# Patient Record
Sex: FEMALE | Race: WHITE | Employment: OTHER | URBAN - METROPOLITAN AREA
[De-identification: names, ages, dates, MRNs, and addresses within clinical notes are randomized per-mention and may not be internally consistent; named-entity substitution may affect disease eponyms.]

---

## 2023-10-09 ENCOUNTER — OFFICE VISIT (OUTPATIENT)
Dept: PHYSICAL MEDICINE AND REHAB | Age: 78
End: 2023-10-09
Payer: MEDICARE

## 2023-10-09 VITALS
HEIGHT: 62 IN | HEART RATE: 82 BPM | WEIGHT: 116 LBS | SYSTOLIC BLOOD PRESSURE: 140 MMHG | DIASTOLIC BLOOD PRESSURE: 80 MMHG | BODY MASS INDEX: 21.35 KG/M2

## 2023-10-09 DIAGNOSIS — R26.89 BALANCE DISORDER: ICD-10-CM

## 2023-10-09 DIAGNOSIS — G44.86 CERVICOGENIC HEADACHE: ICD-10-CM

## 2023-10-09 DIAGNOSIS — R11.0 NAUSEA: ICD-10-CM

## 2023-10-09 DIAGNOSIS — M48.02 CERVICAL STENOSIS OF SPINAL CANAL: ICD-10-CM

## 2023-10-09 DIAGNOSIS — M47.812 SPONDYLOSIS, CERVICAL: Primary | ICD-10-CM

## 2023-10-09 DIAGNOSIS — G25.9 MOVEMENT DISORDER: ICD-10-CM

## 2023-10-09 PROCEDURE — G8420 CALC BMI NORM PARAMETERS: HCPCS | Performed by: PHYSICAL MEDICINE & REHABILITATION

## 2023-10-09 PROCEDURE — G8427 DOCREV CUR MEDS BY ELIG CLIN: HCPCS | Performed by: PHYSICAL MEDICINE & REHABILITATION

## 2023-10-09 PROCEDURE — G8484 FLU IMMUNIZE NO ADMIN: HCPCS | Performed by: PHYSICAL MEDICINE & REHABILITATION

## 2023-10-09 PROCEDURE — 1090F PRES/ABSN URINE INCON ASSESS: CPT | Performed by: PHYSICAL MEDICINE & REHABILITATION

## 2023-10-09 PROCEDURE — 99205 OFFICE O/P NEW HI 60 MIN: CPT | Performed by: PHYSICAL MEDICINE & REHABILITATION

## 2023-10-09 RX ORDER — ASPIRIN 81 MG/1
81 TABLET, CHEWABLE ORAL DAILY
COMMUNITY

## 2023-10-09 RX ORDER — CELECOXIB 50 MG/1
50 CAPSULE ORAL 2 TIMES DAILY
Qty: 60 CAPSULE | Refills: 2 | Status: SHIPPED | OUTPATIENT
Start: 2023-10-09

## 2023-10-09 RX ORDER — LEVOTHYROXINE SODIUM 100 UG/1
1 CAPSULE ORAL DAILY
COMMUNITY
Start: 2023-08-17

## 2023-10-09 NOTE — PROGRESS NOTES
Pam Parada D.O. [facility]  [address], [address] [place] [address]  Phone: [phone]  Fax: [phone]    10/9/2023  Consult requested by: Berna Terrazas DO  [address] [facility],  [address] [facility] for :   Chief Complaint   Patient presents with    Headache     NEW PATIENT  FOR HEADACHES      Primary care: Dr. Porfirio Saenz    An independent historian was not needed. Hand dominance: RIGHT HAND     Onset: suddenly after no known injury 2 months  ago. Patient was referred by Dr. Jennifer Hammer, ortho spine, for neck and head pain starting in the bilateral occipital and superior cervical spine and radiating to bilateral retro-orbital region. Patient is a poor historian. Patient reports she has been having balance issues since 1998 when she was diagnosed with Sjogren's she was evaluated with rheumatology at [facility] Dr. Corrinne Cary. She also reports dizzness with nausea which only occurs when she turns her head. States she follows with [facility] and has \"inner ear damage\". Neurology consultation with Dr. Bryce Carreon is scheduled for 10/29/23. The following records were reviewed in care everywhere from [id number]. Office note Dr. Corrinne Cary impression was Sjogren's. She was seen at the [address] [facility] clinic Dr. Jack Curtis  at [facility] due to T2 abnormalities on cervical spine imaging. She has been seen by [facility] program of vestibular and balance disorders Dr. Charmel Koyanagi. Both Dr. Jack Curtis and Dr. Charmel Koyanagi have documented that Sjogren rather than MSmay define MRI changes in cerivcal spine. Dr. Charmel Koyanagi felt pain was nociceptive rather than cervicogenic or neurocardiogenic. She was referred to Dr. Neva Garcia in the chronic pain rehabilitation program.  C spine was repeated with contrast as well as vetibular testing and LP. She has attempted treatment for BPPV in the past and used Meclizine without success. She had a tilt table test with Dr. Cristian Larios at [facility] and had no postural hypotension.   She had MRI brain which

## 2023-10-27 ENCOUNTER — OFFICE VISIT (OUTPATIENT)
Age: 78
End: 2023-10-27
Payer: MEDICARE

## 2023-10-27 VITALS
BODY MASS INDEX: 21.07 KG/M2 | HEART RATE: 76 BPM | DIASTOLIC BLOOD PRESSURE: 68 MMHG | SYSTOLIC BLOOD PRESSURE: 153 MMHG | WEIGHT: 115.2 LBS

## 2023-10-27 DIAGNOSIS — R26.0 ATAXIC GAIT: ICD-10-CM

## 2023-10-27 DIAGNOSIS — G43.711 INTRACTABLE CHRONIC MIGRAINE WITHOUT AURA AND WITH STATUS MIGRAINOSUS: ICD-10-CM

## 2023-10-27 DIAGNOSIS — G43.809 CERVICOGENIC MIGRAINE: ICD-10-CM

## 2023-10-27 DIAGNOSIS — R25.1: Primary | ICD-10-CM

## 2023-10-27 PROCEDURE — 1123F ACP DISCUSS/DSCN MKR DOCD: CPT | Performed by: PSYCHIATRY & NEUROLOGY

## 2023-10-27 PROCEDURE — 99204 OFFICE O/P NEW MOD 45 MIN: CPT | Performed by: PSYCHIATRY & NEUROLOGY

## 2023-10-27 RX ORDER — DIVALPROEX SODIUM 125 MG/1
125 TABLET, DELAYED RELEASE ORAL 2 TIMES DAILY
Qty: 60 TABLET | Refills: 2 | Status: SHIPPED | OUTPATIENT
Start: 2023-10-27

## 2023-10-27 RX ORDER — METHYLPREDNISOLONE 4 MG/1
TABLET ORAL
Qty: 25 TABLET | Refills: 0 | Status: SHIPPED | OUTPATIENT
Start: 2023-10-27 | End: 2023-11-02

## 2023-10-27 NOTE — PROGRESS NOTES
neck flexion strength  5/5   XII: tongue strength  Normal   Perioral tremors         Muscle tone examination showed : mildly increased  Muscle strength testing revealed  : 5/5 in ue/le  Deep tendon reflexes were : 2+ diffusely  The plantar reflex was Right:  downgoing  Left:  downgoing  There is action tremors in left hand, perioral tremors  There was not dysmetria seen on bilaterally found on finger-nose-finger testing and heel shin testing. Rapid alternating movements were : decreased in hands, left > right   There was no sensory deficits noted    There was not extinction on the bilaterally with bilateral simultaneous stimulus. The gait examination wide based  Truncal ataxia. Gait ataxia,   Skin:  Skin is warm. she is not diaphoretic. Psychiatric: Memory, affect and judgement normal.     ASSESSMENT AND PLAN   1. Perioral tremor  2. Ataxic gait    Patient has perioral tremors, c/o left arm tremors (not seen on exam)  Has truncal ataxia. Has gait ataxia. No involvement of speech, EOM (like nystagmus)  Chronic but progressive  Mri brain- did not reveal any cerebellar atrophy  Will start by working up with reji scan to r/o MSA  Check tilt test to evaluate for autonomic dysfunction with parkinsonism or sjogrens disease     May need blood work up for causes of cerebellar ataxia- based on results of reji scan(cbc, cmp, a1c, tsh, cpk, marcus, tpo ab, kennedi ab, praful ab, celiac, spep, paraneoplastic- hu, yo, ri, nmda ab, lyme, rpr, htlv, b12/folate/copper, ceruloplasmin, ferritin, urine heavy metals, coq10, lactate, pyruvate)   May need emg to evaluate for peripheral nerve involvement  Hold off genetic tests     Will try depakote for headaches, small suggestion of it helping ataxia as well  Consider amantadine in future    - NM BRAIN SPECT; Future  - NM Tilt Table Test    3.  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

## 2023-11-03 ENCOUNTER — TELEPHONE (OUTPATIENT)
Age: 78
End: 2023-11-03

## 2023-11-07 ENCOUNTER — HOSPITAL ENCOUNTER (OUTPATIENT)
Dept: NUCLEAR MEDICINE | Age: 78
Discharge: HOME | End: 2023-11-09
Attending: PSYCHIATRY & NEUROLOGY
Payer: MEDICARE

## 2023-11-07 DIAGNOSIS — R25.1: ICD-10-CM

## 2023-11-07 DIAGNOSIS — R26.0 ATAXIC GAIT: ICD-10-CM

## 2023-11-07 PROCEDURE — 6370000000 HC RX 637 (ALT 250 FOR IP): Performed by: RADIOLOGY

## 2023-11-07 PROCEDURE — 3430000000 HC RX DIAGNOSTIC RADIOPHARMACEUTICAL: Performed by: RADIOLOGY

## 2023-11-07 PROCEDURE — A9584 IODINE I-123 IOFLUPANE: HCPCS | Performed by: RADIOLOGY

## 2023-11-07 PROCEDURE — 78803 RP LOCLZJ TUM SPECT 1 AREA: CPT

## 2023-11-07 RX ORDER — IODINE SOLUTION STRONG 5% (LUGOL'S) 5 %
0.8 SOLUTION ORAL ONCE
Status: COMPLETED | OUTPATIENT
Start: 2023-11-07 | End: 2023-11-07

## 2023-11-07 RX ADMIN — IOFLUPANE I-123 5.5 MILLICURIE: 2 INJECTION, SOLUTION INTRAVENOUS at 08:35

## 2023-11-07 RX ADMIN — IODINE SOLUTION STRONG 5% (LUGOL'S) 0.8 ML: 5 SOLUTION at 07:25

## 2023-12-16 ENCOUNTER — HOSPITAL ENCOUNTER (EMERGENCY)
Age: 78
Discharge: HOME | End: 2023-12-16
Payer: MEDICARE

## 2023-12-16 ENCOUNTER — APPOINTMENT (OUTPATIENT)
Dept: GENERAL RADIOLOGY | Age: 78
End: 2023-12-16
Payer: MEDICARE

## 2023-12-16 VITALS
WEIGHT: 108 LBS | DIASTOLIC BLOOD PRESSURE: 84 MMHG | TEMPERATURE: 97.3 F | SYSTOLIC BLOOD PRESSURE: 140 MMHG | OXYGEN SATURATION: 97 % | RESPIRATION RATE: 20 BRPM | BODY MASS INDEX: 19.75 KG/M2 | HEART RATE: 71 BPM

## 2023-12-16 DIAGNOSIS — J18.9 PNEUMONIA DUE TO INFECTIOUS ORGANISM, UNSPECIFIED LATERALITY, UNSPECIFIED PART OF LUNG: Primary | ICD-10-CM

## 2023-12-16 PROCEDURE — 71046 X-RAY EXAM CHEST 2 VIEWS: CPT

## 2023-12-16 PROCEDURE — 99211 OFF/OP EST MAY X REQ PHY/QHP: CPT

## 2023-12-16 RX ORDER — GUAIFENESIN/DEXTROMETHORPHAN 100-10MG/5
10 SYRUP ORAL 3 TIMES DAILY PRN
Qty: 120 ML | Refills: 0 | Status: SHIPPED | OUTPATIENT
Start: 2023-12-16 | End: 2023-12-26

## 2023-12-16 RX ORDER — CEFUROXIME AXETIL 500 MG/1
500 TABLET ORAL 2 TIMES DAILY
Qty: 20 TABLET | Refills: 0 | Status: SHIPPED | OUTPATIENT
Start: 2023-12-16 | End: 2023-12-26

## 2023-12-16 RX ORDER — AZITHROMYCIN 250 MG/1
TABLET, FILM COATED ORAL
Qty: 6 TABLET | Refills: 0 | Status: SHIPPED | OUTPATIENT
Start: 2023-12-16

## 2023-12-16 ASSESSMENT — PAIN - FUNCTIONAL ASSESSMENT: PAIN_FUNCTIONAL_ASSESSMENT: NONE - DENIES PAIN

## 2024-01-04 ENCOUNTER — TELEPHONE (OUTPATIENT)
Dept: NEUROLOGY | Facility: HOSPITAL | Age: 79
End: 2024-01-04

## 2024-01-08 ENCOUNTER — HOSPITAL ENCOUNTER (OUTPATIENT)
Dept: NEUROLOGY | Facility: CLINIC | Age: 79
Discharge: HOME | End: 2024-01-08
Payer: MEDICARE

## 2024-01-08 DIAGNOSIS — R26.0: ICD-10-CM

## 2024-01-08 DIAGNOSIS — R42 DIZZINESS AND GIDDINESS: ICD-10-CM

## 2024-01-08 PROCEDURE — 95923 AUTONOMIC NRV SYST FUNJ TEST: CPT | Performed by: PSYCHIATRY & NEUROLOGY

## 2024-01-08 PROCEDURE — 95924 ANS PARASYMP & SYMP W/TILT: CPT | Performed by: PSYCHIATRY & NEUROLOGY

## 2024-01-30 ENCOUNTER — OFFICE VISIT (OUTPATIENT)
Age: 79
End: 2024-01-30
Payer: MEDICARE

## 2024-01-30 VITALS
BODY MASS INDEX: 21.56 KG/M2 | WEIGHT: 117.9 LBS | DIASTOLIC BLOOD PRESSURE: 84 MMHG | HEART RATE: 65 BPM | SYSTOLIC BLOOD PRESSURE: 147 MMHG

## 2024-01-30 DIAGNOSIS — G90.9 AUTONOMIC NEUROPATHY: Primary | ICD-10-CM

## 2024-01-30 DIAGNOSIS — G90.9 AUTONOMIC NEUROPATHY: ICD-10-CM

## 2024-01-30 DIAGNOSIS — R20.0 BILATERAL LEG NUMBNESS: ICD-10-CM

## 2024-01-30 DIAGNOSIS — G43.711 INTRACTABLE CHRONIC MIGRAINE WITHOUT AURA AND WITH STATUS MIGRAINOSUS: ICD-10-CM

## 2024-01-30 LAB
C-REACTIVE PROTEIN: <3 MG/L (ref 0–5)
FOLATE: 15.8 NG/ML (ref 4.8–24.2)
HBA1C MFR BLD: 5.9 % (ref 4–5.6)
HOMOCYSTEINE: 10.4 UMOL/L (ref 0–15)
SEDIMENTATION RATE, ERYTHROCYTE: 22 MM/HR (ref 0–20)
T4 FREE: 2 NG/DL (ref 0.9–1.7)
TSH SERPL DL<=0.05 MIU/L-ACNC: 0.04 UIU/ML (ref 0.27–4.2)
VITAMIN B-12: 332 PG/ML (ref 211–946)
VITAMIN D 25-HYDROXY: 29.6 NG/ML (ref 30–100)

## 2024-01-30 PROCEDURE — 1123F ACP DISCUSS/DSCN MKR DOCD: CPT | Performed by: PSYCHIATRY & NEUROLOGY

## 2024-01-30 PROCEDURE — G8427 DOCREV CUR MEDS BY ELIG CLIN: HCPCS | Performed by: PSYCHIATRY & NEUROLOGY

## 2024-01-30 PROCEDURE — 1036F TOBACCO NON-USER: CPT | Performed by: PSYCHIATRY & NEUROLOGY

## 2024-01-30 PROCEDURE — 99215 OFFICE O/P EST HI 40 MIN: CPT | Performed by: PSYCHIATRY & NEUROLOGY

## 2024-01-30 PROCEDURE — 1090F PRES/ABSN URINE INCON ASSESS: CPT | Performed by: PSYCHIATRY & NEUROLOGY

## 2024-01-30 PROCEDURE — G8484 FLU IMMUNIZE NO ADMIN: HCPCS | Performed by: PSYCHIATRY & NEUROLOGY

## 2024-01-30 PROCEDURE — G8400 PT W/DXA NO RESULTS DOC: HCPCS | Performed by: PSYCHIATRY & NEUROLOGY

## 2024-01-30 PROCEDURE — G8420 CALC BMI NORM PARAMETERS: HCPCS | Performed by: PSYCHIATRY & NEUROLOGY

## 2024-01-30 RX ORDER — ERENUMAB-AOOE 70 MG/ML
70 INJECTION SUBCUTANEOUS
Qty: 1 ML | Refills: 2 | Status: SHIPPED | OUTPATIENT
Start: 2024-01-30

## 2024-01-30 RX ORDER — FLUDROCORTISONE ACETATE 0.1 MG/1
0.1 TABLET ORAL DAILY
Qty: 30 TABLET | Refills: 2 | Status: SHIPPED | OUTPATIENT
Start: 2024-01-30

## 2024-01-30 NOTE — PROGRESS NOTES
profile; Future  - Hemoglobin A1C; Future  - TSH; Future  - T4, Free; Future  - Copper, Serum; Future    2. Intractable chronic migraine without aura and with status migrainosus  No benefit with depakote- so d/yen  Cannot use tricyclic antidepressants with severe autonomic dysfunction  Failed inderal with dizziness in the past  Use aimovig 70 mg q 4 weeks.             Jaylene Arroyo MD

## 2024-01-30 NOTE — PATIENT INSTRUCTIONS
Wear thigh high compression when awake    Measure bp in am and at night- everyday. Bring the log every time with appt      Jaylene Arroyo MD

## 2024-01-31 LAB
ANTI RNP AB: NEGATIVE
ANTI-NUCLEAR ANTIBODY (ANA): NEGATIVE
ANTI-SMITH: NEGATIVE
JO-1 ANTIBODY: NEGATIVE
SCLERODERMA (SCL-70) AB: NEGATIVE
SJOGREN'S ANTIBODIES (SSA): POSITIVE
SJOGREN'S ANTIBODIES (SSB): POSITIVE

## 2024-02-01 LAB
ALBUMIN (CALCULATED): 3.3 G/DL (ref 3.5–4.7)
ALPHA-1-GLOBULIN: 0.3 G/DL (ref 0.2–0.4)
ALPHA-2-GLOBULIN: 1.2 G/DL (ref 0.5–1)
BETA GLOBULIN: 1.1 G/DL (ref 0.8–1.3)
GAMMA GLOBULIN: 1.2 G/DL (ref 0.7–1.6)
PATHOLOGIST REVIEW: NORMAL
PATHOLOGIST: ABNORMAL
PROTEIN ELECTROPHORESIS, SERUM: ABNORMAL
SERUM IFX INTERP: NORMAL
TOTAL PROTEIN: 7.1 G/DL (ref 6.4–8.3)

## 2024-02-02 LAB
BORRELIA BURGDORFERI ABS TOTAL: 0.08 IV
COPPER: 120.9 UG/DL (ref 80–155)
THYROID PEROXIDASE (TPO) AB: 33 IU/ML (ref 0–25)

## 2024-02-03 LAB — VITAMIN B6: 29.8 NMOL/L (ref 20–125)

## 2024-02-04 LAB
ANCA IFA PATTERN: NORMAL
ANCA IFA TITER: NORMAL
MYELOPEROXIDASE AB: 0 AU/ML (ref 0–19)
SERINE PROTEASE 3, IGG: 0 AU/ML (ref 0–19)
VITAMIN B1 WHOLE BLOOD: 172 NMOL/L (ref 70–180)

## 2024-02-05 ENCOUNTER — HOSPITAL ENCOUNTER (INPATIENT)
Age: 79
LOS: 14 days | Discharge: NURSING, REHAB OR LONG-TERM CARE | DRG: 233 | End: 2024-02-19
Attending: INTERNAL MEDICINE | Admitting: INTERNAL MEDICINE
Payer: MEDICARE

## 2024-02-05 ENCOUNTER — HOSPITAL ENCOUNTER (EMERGENCY)
Age: 79
Discharge: ANOTHER HOSPITAL | End: 2024-02-05
Attending: EMERGENCY MEDICINE
Payer: MEDICARE

## 2024-02-05 ENCOUNTER — ANESTHESIA EVENT (OUTPATIENT)
Dept: OPERATING ROOM | Age: 79
End: 2024-02-05
Payer: MEDICARE

## 2024-02-05 ENCOUNTER — APPOINTMENT (OUTPATIENT)
Dept: ULTRASOUND IMAGING | Age: 79
DRG: 233 | End: 2024-02-05
Attending: INTERNAL MEDICINE
Payer: MEDICARE

## 2024-02-05 ENCOUNTER — APPOINTMENT (OUTPATIENT)
Dept: INTERVENTIONAL RADIOLOGY/VASCULAR | Age: 79
DRG: 233 | End: 2024-02-05
Attending: INTERNAL MEDICINE
Payer: MEDICARE

## 2024-02-05 ENCOUNTER — APPOINTMENT (OUTPATIENT)
Dept: GENERAL RADIOLOGY | Age: 79
End: 2024-02-05
Payer: MEDICARE

## 2024-02-05 ENCOUNTER — APPOINTMENT (OUTPATIENT)
Age: 79
DRG: 233 | End: 2024-02-05
Attending: INTERNAL MEDICINE
Payer: MEDICARE

## 2024-02-05 VITALS
OXYGEN SATURATION: 94 % | SYSTOLIC BLOOD PRESSURE: 99 MMHG | WEIGHT: 127 LBS | BODY MASS INDEX: 23.23 KG/M2 | TEMPERATURE: 98 F | DIASTOLIC BLOOD PRESSURE: 65 MMHG | RESPIRATION RATE: 20 BRPM | HEART RATE: 80 BPM

## 2024-02-05 DIAGNOSIS — Z95.1 S/P CABG X 3: Primary | ICD-10-CM

## 2024-02-05 DIAGNOSIS — I50.23 ACUTE ON CHRONIC SYSTOLIC HEART FAILURE (HCC): ICD-10-CM

## 2024-02-05 DIAGNOSIS — G89.18 ACUTE POST-OPERATIVE PAIN: ICD-10-CM

## 2024-02-05 DIAGNOSIS — I21.11 ACUTE ST ELEVATION MYOCARDIAL INFARCTION (STEMI) INVOLVING RIGHT CORONARY ARTERY (HCC): Primary | ICD-10-CM

## 2024-02-05 DIAGNOSIS — I21.3 STEMI (ST ELEVATION MYOCARDIAL INFARCTION) (HCC): ICD-10-CM

## 2024-02-05 DIAGNOSIS — I25.10 CAD IN NATIVE ARTERY: ICD-10-CM

## 2024-02-05 DIAGNOSIS — Z59.82 TRANSPORTATION INSECURITY: ICD-10-CM

## 2024-02-05 PROBLEM — Z98.890 S/P CARDIAC CATH: Status: ACTIVE | Noted: 2024-02-05

## 2024-02-05 LAB
ANION GAP SERPL CALCULATED.3IONS-SCNC: 13 MMOL/L (ref 7–16)
B-OH-BUTYR SERPL-MCNC: 0.37 MMOL/L (ref 0.02–0.27)
BACTERIA URNS QL MICRO: ABNORMAL
BASOPHILS # BLD: 0.02 K/UL (ref 0–0.2)
BASOPHILS NFR BLD: 0 % (ref 0–2)
BILIRUB UR QL STRIP: NEGATIVE
BUN SERPL-MCNC: 29 MG/DL (ref 6–23)
CALCIUM SERPL-MCNC: 8.4 MG/DL (ref 8.6–10.2)
CHLORIDE SERPL-SCNC: 94 MMOL/L (ref 98–107)
CLARITY UR: CLEAR
CO2 SERPL-SCNC: 22 MMOL/L (ref 22–29)
COLOR UR: YELLOW
CREAT SERPL-MCNC: 1.2 MG/DL (ref 0.5–1)
ECHO AO ASC DIAM: 3.1 CM
ECHO AO ASCENDING AORTA INDEX: 1.96 CM/M2
ECHO AV AREA PEAK VELOCITY: 1.7 CM2
ECHO AV AREA VTI: 1.9 CM2
ECHO AV AREA/BSA PEAK VELOCITY: 1.1 CM2/M2
ECHO AV AREA/BSA VTI: 1.2 CM2/M2
ECHO AV CUSP MM: 2 CM
ECHO AV MEAN GRADIENT: 4 MMHG
ECHO AV MEAN VELOCITY: 0.9 M/S
ECHO AV PEAK GRADIENT: 7 MMHG
ECHO AV PEAK VELOCITY: 1.3 M/S
ECHO AV VELOCITY RATIO: 0.62
ECHO AV VTI: 23.1 CM
ECHO BSA: 1.59 M2
ECHO EST RA PRESSURE: 3 MMHG
ECHO LA DIAMETER INDEX: 2.09 CM/M2
ECHO LA DIAMETER: 3.3 CM
ECHO LA VOL A-L A2C: 26 ML (ref 22–52)
ECHO LA VOL A-L A4C: 34 ML (ref 22–52)
ECHO LA VOL MOD A2C: 24 ML (ref 22–52)
ECHO LA VOL MOD A4C: 33 ML (ref 22–52)
ECHO LA VOLUME AREA LENGTH: 34 ML
ECHO LA VOLUME INDEX A-L A2C: 16 ML/M2 (ref 16–34)
ECHO LA VOLUME INDEX A-L A4C: 22 ML/M2 (ref 16–34)
ECHO LA VOLUME INDEX AREA LENGTH: 22 ML/M2 (ref 16–34)
ECHO LA VOLUME INDEX MOD A2C: 15 ML/M2 (ref 16–34)
ECHO LA VOLUME INDEX MOD A4C: 21 ML/M2 (ref 16–34)
ECHO LV EJECTION FRACTION A2C: 46 %
ECHO LV EJECTION FRACTION A4C: 42 %
ECHO LV INTERNAL DIMENSION DIASTOLE INDEX: 3.04 CM/M2
ECHO LV INTERNAL DIMENSION DIASTOLIC: 4.8 CM (ref 3.9–5.3)
ECHO LV IVSD: 0.8 CM (ref 0.6–0.9)
ECHO LV MASS 2D: 137.1 G (ref 67–162)
ECHO LV MASS INDEX 2D: 86.8 G/M2 (ref 43–95)
ECHO LV POSTERIOR WALL DIASTOLIC: 0.9 CM (ref 0.6–0.9)
ECHO LV RELATIVE WALL THICKNESS RATIO: 0.38
ECHO LVOT AREA: 2.8 CM2
ECHO LVOT AV VTI INDEX: 0.68
ECHO LVOT DIAM: 1.9 CM
ECHO LVOT MEAN GRADIENT: 2 MMHG
ECHO LVOT PEAK GRADIENT: 3 MMHG
ECHO LVOT PEAK VELOCITY: 0.8 M/S
ECHO LVOT STROKE VOLUME INDEX: 28.2 ML/M2
ECHO LVOT SV: 44.5 ML
ECHO LVOT VTI: 15.7 CM
ECHO MV "A" WAVE DURATION: 107.3 MSEC
ECHO MV A VELOCITY: 0.87 M/S
ECHO MV AREA PHT: 3 CM2
ECHO MV AREA VTI: 1.7 CM2
ECHO MV E DECELERATION TIME (DT): 168 MS
ECHO MV E VELOCITY: 0.73 M/S
ECHO MV E/A RATIO: 0.84
ECHO MV LVOT VTI INDEX: 1.62
ECHO MV MAX VELOCITY: 1 M/S
ECHO MV MEAN GRADIENT: 1 MMHG
ECHO MV MEAN VELOCITY: 0.6 M/S
ECHO MV PEAK GRADIENT: 4 MMHG
ECHO MV PRESSURE HALF TIME (PHT): 73.3 MS
ECHO MV VTI: 25.5 CM
ECHO PULMONARY ARTERY END DIASTOLIC PRESSURE: 5 MMHG
ECHO PV MAX VELOCITY: 0.9 M/S
ECHO PV MEAN GRADIENT: 2 MMHG
ECHO PV MEAN VELOCITY: 0.6 M/S
ECHO PV PEAK GRADIENT: 3 MMHG
ECHO PV REGURGITANT MAX VELOCITY: 1.1 M/S
ECHO PV VTI: 12.8 CM
ECHO RIGHT VENTRICULAR SYSTOLIC PRESSURE (RVSP): 27 MMHG
ECHO RV INTERNAL DIMENSION: 3 CM
ECHO TV REGURGITANT MAX VELOCITY: 2.47 M/S
ECHO TV REGURGITANT PEAK GRADIENT: 24 MMHG
EKG ATRIAL RATE: 70 BPM
EKG P AXIS: 52 DEGREES
EKG P-R INTERVAL: 132 MS
EKG Q-T INTERVAL: 450 MS
EKG QRS DURATION: 84 MS
EKG QTC CALCULATION (BAZETT): 486 MS
EKG R AXIS: -32 DEGREES
EKG T AXIS: 98 DEGREES
EKG VENTRICULAR RATE: 70 BPM
EOSINOPHIL # BLD: 0.07 K/UL (ref 0.05–0.5)
EOSINOPHILS RELATIVE PERCENT: 1 % (ref 0–6)
ERYTHROCYTE [DISTWIDTH] IN BLOOD BY AUTOMATED COUNT: 15 % (ref 11.5–15)
ERYTHROCYTE [DISTWIDTH] IN BLOOD BY AUTOMATED COUNT: 15.1 % (ref 11.5–15)
GFR SERPL CREATININE-BSD FRML MDRD: 46 ML/MIN/1.73M2
GLUCOSE BLD-MCNC: 101 MG/DL (ref 74–99)
GLUCOSE SERPL-MCNC: 103 MG/DL (ref 74–99)
GLUCOSE UR STRIP-MCNC: NEGATIVE MG/DL
HCT VFR BLD AUTO: 34.9 % (ref 34–48)
HCT VFR BLD AUTO: 35.2 % (ref 34–48)
HGB BLD-MCNC: 11.1 G/DL (ref 11.5–15.5)
HGB BLD-MCNC: 11.3 G/DL (ref 11.5–15.5)
HGB UR QL STRIP.AUTO: NEGATIVE
IMM GRANULOCYTES # BLD AUTO: 0.11 K/UL (ref 0–0.58)
IMM GRANULOCYTES NFR BLD: 1 % (ref 0–5)
KETONES UR STRIP-MCNC: NEGATIVE MG/DL
LEUKOCYTE ESTERASE UR QL STRIP: ABNORMAL
LYMPHOCYTES NFR BLD: 1.91 K/UL (ref 1.5–4)
LYMPHOCYTES RELATIVE PERCENT: 21 % (ref 20–42)
MCH RBC QN AUTO: 28.5 PG (ref 26–35)
MCH RBC QN AUTO: 28.8 PG (ref 26–35)
MCHC RBC AUTO-ENTMCNC: 31.8 G/DL (ref 32–34.5)
MCHC RBC AUTO-ENTMCNC: 32.1 G/DL (ref 32–34.5)
MCV RBC AUTO: 88.9 FL (ref 80–99.9)
MCV RBC AUTO: 90.6 FL (ref 80–99.9)
MONOCYTES NFR BLD: 0.9 K/UL (ref 0.1–0.95)
MONOCYTES NFR BLD: 10 % (ref 2–12)
NEUTROPHILS NFR BLD: 67 % (ref 43–80)
NEUTS SEG NFR BLD: 6.17 K/UL (ref 1.8–7.3)
NITRITE UR QL STRIP: NEGATIVE
PARTIAL THROMBOPLASTIN TIME: 27.7 SEC (ref 24.5–35.1)
PARTIAL THROMBOPLASTIN TIME: 73.5 SEC (ref 24.5–35.1)
PH UR STRIP: 6 [PH] (ref 5–9)
PH VENOUS: 7.34 (ref 7.35–7.45)
PLATELET # BLD AUTO: 247 K/UL (ref 130–450)
PLATELET # BLD AUTO: 284 K/UL (ref 130–450)
PMV BLD AUTO: 10.1 FL (ref 7–12)
PMV BLD AUTO: 10.6 FL (ref 7–12)
POTASSIUM SERPL-SCNC: 3.7 MMOL/L (ref 3.5–5)
PROT UR STRIP-MCNC: NEGATIVE MG/DL
RBC # BLD AUTO: 3.85 M/UL (ref 3.5–5.5)
RBC # BLD AUTO: 3.96 M/UL (ref 3.5–5.5)
RBC #/AREA URNS HPF: ABNORMAL /HPF
SODIUM SERPL-SCNC: 129 MMOL/L (ref 132–146)
SP GR UR STRIP: <1.005 (ref 1–1.03)
TROPONIN I SERPL HS-MCNC: 5020 NG/L (ref 0–9)
UROBILINOGEN UR STRIP-ACNC: 0.2 EU/DL (ref 0–1)
WBC #/AREA URNS HPF: ABNORMAL /HPF
WBC OTHER # BLD: 10.1 K/UL (ref 4.5–11.5)
WBC OTHER # BLD: 9.2 K/UL (ref 4.5–11.5)

## 2024-02-05 PROCEDURE — 2709999900 HC NON-CHARGEABLE SUPPLY: Performed by: INTERNAL MEDICINE

## 2024-02-05 PROCEDURE — C1894 INTRO/SHEATH, NON-LASER: HCPCS | Performed by: INTERNAL MEDICINE

## 2024-02-05 PROCEDURE — 2140000000 HC CCU INTERMEDIATE R&B

## 2024-02-05 PROCEDURE — 96365 THER/PROPH/DIAG IV INF INIT: CPT

## 2024-02-05 PROCEDURE — 2580000003 HC RX 258: Performed by: INTERNAL MEDICINE

## 2024-02-05 PROCEDURE — 93922 UPR/L XTREMITY ART 2 LEVELS: CPT | Performed by: SURGERY

## 2024-02-05 PROCEDURE — 2500000003 HC RX 250 WO HCPCS: Performed by: INTERNAL MEDICINE

## 2024-02-05 PROCEDURE — 99223 1ST HOSP IP/OBS HIGH 75: CPT | Performed by: INTERNAL MEDICINE

## 2024-02-05 PROCEDURE — 99222 1ST HOSP IP/OBS MODERATE 55: CPT | Performed by: STUDENT IN AN ORGANIZED HEALTH CARE EDUCATION/TRAINING PROGRAM

## 2024-02-05 PROCEDURE — 85730 THROMBOPLASTIN TIME PARTIAL: CPT

## 2024-02-05 PROCEDURE — 93005 ELECTROCARDIOGRAM TRACING: CPT

## 2024-02-05 PROCEDURE — 81001 URINALYSIS AUTO W/SCOPE: CPT

## 2024-02-05 PROCEDURE — B2151ZZ FLUOROSCOPY OF LEFT HEART USING LOW OSMOLAR CONTRAST: ICD-10-PCS | Performed by: INTERNAL MEDICINE

## 2024-02-05 PROCEDURE — 84484 ASSAY OF TROPONIN QUANT: CPT

## 2024-02-05 PROCEDURE — 82800 BLOOD PH: CPT

## 2024-02-05 PROCEDURE — 2580000003 HC RX 258

## 2024-02-05 PROCEDURE — 6370000000 HC RX 637 (ALT 250 FOR IP): Performed by: EMERGENCY MEDICINE

## 2024-02-05 PROCEDURE — 80048 BASIC METABOLIC PNL TOTAL CA: CPT

## 2024-02-05 PROCEDURE — 93010 ELECTROCARDIOGRAM REPORT: CPT | Performed by: INTERNAL MEDICINE

## 2024-02-05 PROCEDURE — 86900 BLOOD TYPING SEROLOGIC ABO: CPT

## 2024-02-05 PROCEDURE — 87086 URINE CULTURE/COLONY COUNT: CPT

## 2024-02-05 PROCEDURE — 82010 KETONE BODYS QUAN: CPT

## 2024-02-05 PROCEDURE — 6370000000 HC RX 637 (ALT 250 FOR IP): Performed by: INTERNAL MEDICINE

## 2024-02-05 PROCEDURE — 85027 COMPLETE CBC AUTOMATED: CPT

## 2024-02-05 PROCEDURE — 82962 GLUCOSE BLOOD TEST: CPT

## 2024-02-05 PROCEDURE — 93306 TTE W/DOPPLER COMPLETE: CPT | Performed by: INTERNAL MEDICINE

## 2024-02-05 PROCEDURE — 87081 CULTURE SCREEN ONLY: CPT

## 2024-02-05 PROCEDURE — 86850 RBC ANTIBODY SCREEN: CPT

## 2024-02-05 PROCEDURE — 93458 L HRT ARTERY/VENTRICLE ANGIO: CPT | Performed by: INTERNAL MEDICINE

## 2024-02-05 PROCEDURE — 6360000004 HC RX CONTRAST MEDICATION: Performed by: INTERNAL MEDICINE

## 2024-02-05 PROCEDURE — 85025 COMPLETE CBC W/AUTO DIFF WBC: CPT

## 2024-02-05 PROCEDURE — 93005 ELECTROCARDIOGRAM TRACING: CPT | Performed by: INTERNAL MEDICINE

## 2024-02-05 PROCEDURE — 86901 BLOOD TYPING SEROLOGIC RH(D): CPT

## 2024-02-05 PROCEDURE — 6360000002 HC RX W HCPCS: Performed by: INTERNAL MEDICINE

## 2024-02-05 PROCEDURE — 36415 COLL VENOUS BLD VENIPUNCTURE: CPT

## 2024-02-05 PROCEDURE — B2111ZZ FLUOROSCOPY OF MULTIPLE CORONARY ARTERIES USING LOW OSMOLAR CONTRAST: ICD-10-PCS | Performed by: INTERNAL MEDICINE

## 2024-02-05 PROCEDURE — 86923 COMPATIBILITY TEST ELECTRIC: CPT

## 2024-02-05 PROCEDURE — C1769 GUIDE WIRE: HCPCS | Performed by: INTERNAL MEDICINE

## 2024-02-05 PROCEDURE — 93306 TTE W/DOPPLER COMPLETE: CPT

## 2024-02-05 PROCEDURE — 99285 EMERGENCY DEPT VISIT HI MDM: CPT

## 2024-02-05 PROCEDURE — 93880 EXTRACRANIAL BILAT STUDY: CPT

## 2024-02-05 PROCEDURE — 4A023N7 MEASUREMENT OF CARDIAC SAMPLING AND PRESSURE, LEFT HEART, PERCUTANEOUS APPROACH: ICD-10-PCS | Performed by: INTERNAL MEDICINE

## 2024-02-05 PROCEDURE — 96376 TX/PRO/DX INJ SAME DRUG ADON: CPT

## 2024-02-05 PROCEDURE — 7100000010 HC PHASE II RECOVERY - FIRST 15 MIN: Performed by: INTERNAL MEDICINE

## 2024-02-05 PROCEDURE — 6360000002 HC RX W HCPCS

## 2024-02-05 PROCEDURE — 93922 UPR/L XTREMITY ART 2 LEVELS: CPT

## 2024-02-05 RX ORDER — KETOROLAC TROMETHAMINE 15 MG/ML
15 INJECTION, SOLUTION INTRAMUSCULAR; INTRAVENOUS ONCE
Status: DISCONTINUED | OUTPATIENT
Start: 2024-02-05 | End: 2024-02-05

## 2024-02-05 RX ORDER — 0.9 % SODIUM CHLORIDE 0.9 %
500 INTRAVENOUS SOLUTION INTRAVENOUS ONCE
Status: COMPLETED | OUTPATIENT
Start: 2024-02-05 | End: 2024-02-05

## 2024-02-05 RX ORDER — SODIUM CHLORIDE 9 MG/ML
INJECTION, SOLUTION INTRAVENOUS PRN
Status: DISCONTINUED | OUTPATIENT
Start: 2024-02-05 | End: 2024-02-07

## 2024-02-05 RX ORDER — HEPARIN SODIUM 1000 [USP'U]/ML
30 INJECTION, SOLUTION INTRAVENOUS; SUBCUTANEOUS PRN
Status: DISCONTINUED | OUTPATIENT
Start: 2024-02-05 | End: 2024-02-05 | Stop reason: HOSPADM

## 2024-02-05 RX ORDER — ASPIRIN 81 MG/1
324 TABLET, CHEWABLE ORAL ONCE
Status: COMPLETED | OUTPATIENT
Start: 2024-02-05 | End: 2024-02-05

## 2024-02-05 RX ORDER — FENTANYL CITRATE 50 UG/ML
INJECTION, SOLUTION INTRAMUSCULAR; INTRAVENOUS PRN
Status: DISCONTINUED | OUTPATIENT
Start: 2024-02-05 | End: 2024-02-05 | Stop reason: HOSPADM

## 2024-02-05 RX ORDER — HEPARIN SODIUM 1000 [USP'U]/ML
60 INJECTION, SOLUTION INTRAVENOUS; SUBCUTANEOUS PRN
Status: DISCONTINUED | OUTPATIENT
Start: 2024-02-05 | End: 2024-02-05 | Stop reason: HOSPADM

## 2024-02-05 RX ORDER — HEPARIN SODIUM 1000 [USP'U]/ML
60 INJECTION, SOLUTION INTRAVENOUS; SUBCUTANEOUS PRN
Status: DISCONTINUED | OUTPATIENT
Start: 2024-02-05 | End: 2024-02-07

## 2024-02-05 RX ORDER — HEPARIN SODIUM 1000 [USP'U]/ML
30 INJECTION, SOLUTION INTRAVENOUS; SUBCUTANEOUS PRN
Status: DISCONTINUED | OUTPATIENT
Start: 2024-02-05 | End: 2024-02-07

## 2024-02-05 RX ORDER — HEPARIN SODIUM 10000 [USP'U]/100ML
5-30 INJECTION, SOLUTION INTRAVENOUS CONTINUOUS
Status: DISCONTINUED | OUTPATIENT
Start: 2024-02-05 | End: 2024-02-07

## 2024-02-05 RX ORDER — VERAPAMIL HYDROCHLORIDE 2.5 MG/ML
INJECTION, SOLUTION INTRAVENOUS PRN
Status: DISCONTINUED | OUTPATIENT
Start: 2024-02-05 | End: 2024-02-05 | Stop reason: HOSPADM

## 2024-02-05 RX ORDER — ACETAMINOPHEN 325 MG/1
650 TABLET ORAL EVERY 4 HOURS PRN
Status: DISCONTINUED | OUTPATIENT
Start: 2024-02-05 | End: 2024-02-07

## 2024-02-05 RX ORDER — HEPARIN SODIUM 1000 [USP'U]/ML
60 INJECTION, SOLUTION INTRAVENOUS; SUBCUTANEOUS ONCE
Status: COMPLETED | OUTPATIENT
Start: 2024-02-05 | End: 2024-02-05

## 2024-02-05 RX ORDER — SODIUM CHLORIDE 0.9 % (FLUSH) 0.9 %
5-40 SYRINGE (ML) INJECTION EVERY 12 HOURS SCHEDULED
Status: DISCONTINUED | OUTPATIENT
Start: 2024-02-05 | End: 2024-02-07

## 2024-02-05 RX ORDER — NITROGLYCERIN 20 MG/100ML
INJECTION INTRAVENOUS PRN
Status: DISCONTINUED | OUTPATIENT
Start: 2024-02-05 | End: 2024-02-05 | Stop reason: HOSPADM

## 2024-02-05 RX ORDER — ASPIRIN 81 MG/1
81 TABLET, CHEWABLE ORAL DAILY
Status: DISCONTINUED | OUTPATIENT
Start: 2024-02-06 | End: 2024-02-07

## 2024-02-05 RX ORDER — ATORVASTATIN CALCIUM 40 MG/1
80 TABLET, FILM COATED ORAL NIGHTLY
Status: DISCONTINUED | OUTPATIENT
Start: 2024-02-05 | End: 2024-02-07

## 2024-02-05 RX ORDER — METOPROLOL SUCCINATE 25 MG/1
25 TABLET, EXTENDED RELEASE ORAL DAILY
Status: DISCONTINUED | OUTPATIENT
Start: 2024-02-06 | End: 2024-02-06

## 2024-02-05 RX ORDER — SODIUM CHLORIDE 0.9 % (FLUSH) 0.9 %
5-40 SYRINGE (ML) INJECTION PRN
Status: DISCONTINUED | OUTPATIENT
Start: 2024-02-05 | End: 2024-02-07

## 2024-02-05 RX ORDER — HEPARIN SODIUM 10000 [USP'U]/100ML
5-30 INJECTION, SOLUTION INTRAVENOUS CONTINUOUS
Status: DISCONTINUED | OUTPATIENT
Start: 2024-02-05 | End: 2024-02-05 | Stop reason: HOSPADM

## 2024-02-05 RX ORDER — HEPARIN SODIUM 10000 [USP'U]/ML
INJECTION, SOLUTION INTRAVENOUS; SUBCUTANEOUS PRN
Status: DISCONTINUED | OUTPATIENT
Start: 2024-02-05 | End: 2024-02-05 | Stop reason: HOSPADM

## 2024-02-05 RX ORDER — MIDAZOLAM HYDROCHLORIDE 1 MG/ML
INJECTION INTRAMUSCULAR; INTRAVENOUS PRN
Status: DISCONTINUED | OUTPATIENT
Start: 2024-02-05 | End: 2024-02-05 | Stop reason: HOSPADM

## 2024-02-05 RX ADMIN — SODIUM CHLORIDE 500 ML: 9 INJECTION, SOLUTION INTRAVENOUS at 09:50

## 2024-02-05 RX ADMIN — HEPARIN SODIUM 3460 UNITS: 1000 INJECTION INTRAVENOUS; SUBCUTANEOUS at 10:10

## 2024-02-05 RX ADMIN — ASPIRIN 81 MG CHEWABLE TABLET 324 MG: 81 TABLET CHEWABLE at 09:54

## 2024-02-05 RX ADMIN — HEPARIN SODIUM AND DEXTROSE 12 UNITS/KG/HR: 10000; 5 INJECTION INTRAVENOUS at 10:12

## 2024-02-05 RX ADMIN — HEPARIN SODIUM 12 UNITS/KG/HR: 10000 INJECTION, SOLUTION INTRAVENOUS at 11:58

## 2024-02-05 RX ADMIN — SODIUM CHLORIDE, PRESERVATIVE FREE 10 ML: 5 INJECTION INTRAVENOUS at 20:07

## 2024-02-05 RX ADMIN — ATORVASTATIN CALCIUM 80 MG: 80 TABLET, FILM COATED ORAL at 20:07

## 2024-02-05 SDOH — ECONOMIC STABILITY - TRANSPORTATION SECURITY: TRANSPORTATION INSECURITY: Z59.82

## 2024-02-05 NOTE — CONSULTS
CTS Consult    Patient name: Katt Diaz    Reason for consult: MVD    Referring Physician: Arnav Shaw    Primary Care Physician: Luis Angel Rea DO    Date of service: 2/5/2024    Chief Complaint: chest pressure    HPI: 77yo F with PMH Sjogrens, DM presented to OSH ED 2/5/2024 with chest pressure. Her troponins were markedly elevated. She was transferred for emergent LHC. LHC demonstrated severe MV CAD. CTS was consulted for recommendations.   Currently, she denies complaints. She is overwhelmed by the situation.    Allergies:   Allergies   Allergen Reactions    Doxycycline      hives, rash    Penicillins      hives       Home medications:    Current Facility-Administered Medications   Medication Dose Route Frequency Provider Last Rate Last Admin    sodium chloride flush 0.9 % injection 5-40 mL  5-40 mL IntraVENous 2 times per day Arnav Shaw MD        sodium chloride flush 0.9 % injection 5-40 mL  5-40 mL IntraVENous PRN Arnav Shaw MD        0.9 % sodium chloride infusion   IntraVENous PRN Arnav Shaw MD        acetaminophen (TYLENOL) tablet 650 mg  650 mg Oral Q4H PRN Arnav Shaw MD        atorvastatin (LIPITOR) tablet 80 mg  80 mg Oral Nightly Arnav Shaw MD        [START ON 2/6/2024] aspirin chewable tablet 81 mg  81 mg Oral Daily Arnav Shaw MD        heparin (porcine) injection 3,460 Units  60 Units/kg IntraVENous PRN Arnav Shaw MD        heparin (porcine) injection 1,730 Units  30 Units/kg IntraVENous PRN Arnav Shaw MD        heparin 25,000 units in dextrose 5% 250 mL (premix) infusion  5-30 Units/kg/hr IntraVENous Continuous Arnav Shaw MD 6.9 mL/hr at 02/05/24 1158 12 Units/kg/hr at 02/05/24 1158       Past Medical History:  Past Medical History:   Diagnosis Date    Diabetes mellitus (HCC)     Headache     Hearing loss     Hx of rheumatoid arthritis     Migraine     Osteopenia     Sjogren's disease (HCC)        Past Surgical History:  Past Surgical History:   Procedure  Minocycline Pregnancy And Lactation Text: This medication is Pregnancy Category D and not consider safe during pregnancy. It is also excreted in breast milk.

## 2024-02-05 NOTE — ED PROVIDER NOTES
78-year-old female with past medical history of rheumatoid arthritis,Sjogrens is on chronic steroid therapy.  She adamantly denies any history of diabetes nor has never been on any diabetic medication.  She states her blood pressure runs low and is being followed by her rheumatoid doctor.  Otherwise no other pertinent medical history.  She comes emergency department for the complaints of chest pain, shortness of breath and generalized weakness for the past 2 weeks.  Her symptoms are worsened with exertion.  She describes her chest pain as a sharp-like sensation across her chest without any radiation.  No other aggravating alleviating factors noted.  She denies the following: Abdominal pain, nausea, vomiting, urinary symptoms, abdominal pain patient did report she had 2 episodes of loose watery stools but otherwise no consistent episodes of diarrhea no GI bleed no rashes no headache no vision changes.      Chief Complaint   Patient presents with    Shortness of Breath     Weakness sent by dr ivan for eval       Review of Systems   Pertinent in HPI above  Physical Exam   Vitals and nursing note reviewed.   Constitutional:       General: She is not in acute distress.     Appearance: Normal appearance. She is normal weight. She is not ill-appearing or toxic-appearing.   HENT:      Head: Normocephalic and atraumatic.      Nose: Nose normal. No congestion.      Mouth/Throat:      Mouth: Mucous membranes are moist.      Pharynx: Oropharynx is clear.   Eyes:      General: No scleral icterus.     Extraocular Movements: Extraocular movements intact.      Conjunctiva/sclera: Conjunctivae normal.   Cardiovascular:      Rate and Rhythm: Normal rate and regular rhythm.      Pulses: Normal pulses.      Heart sounds: No murmur heard.  Pulmonary:      Effort: Pulmonary effort is normal. No respiratory distress.   Abdominal:      General: Abdomen is flat. There is no distension.      Palpations: Abdomen is soft.   Musculoskeletal:

## 2024-02-06 ENCOUNTER — APPOINTMENT (OUTPATIENT)
Dept: CT IMAGING | Age: 79
DRG: 233 | End: 2024-02-06
Attending: INTERNAL MEDICINE
Payer: MEDICARE

## 2024-02-06 ENCOUNTER — APPOINTMENT (OUTPATIENT)
Dept: ULTRASOUND IMAGING | Age: 79
DRG: 233 | End: 2024-02-06
Attending: INTERNAL MEDICINE
Payer: MEDICARE

## 2024-02-06 LAB
ALBUMIN SERPL-MCNC: 2.7 G/DL (ref 3.5–5.2)
ALP SERPL-CCNC: 89 U/L (ref 35–104)
ALT SERPL-CCNC: 32 U/L (ref 0–32)
ANION GAP SERPL CALCULATED.3IONS-SCNC: 11 MMOL/L (ref 7–16)
ANION GAP SERPL CALCULATED.3IONS-SCNC: 11 MMOL/L (ref 7–16)
AST SERPL-CCNC: 50 U/L (ref 0–31)
BASOPHILS # BLD: 0.05 K/UL (ref 0–0.2)
BASOPHILS NFR BLD: 1 % (ref 0–2)
BILIRUB DIRECT SERPL-MCNC: <0.2 MG/DL (ref 0–0.3)
BILIRUB INDIRECT SERPL-MCNC: ABNORMAL MG/DL (ref 0–1)
BILIRUB SERPL-MCNC: 0.4 MG/DL (ref 0–1.2)
BUN SERPL-MCNC: 22 MG/DL (ref 6–23)
BUN SERPL-MCNC: 23 MG/DL (ref 6–23)
CALCIUM SERPL-MCNC: 7.9 MG/DL (ref 8.6–10.2)
CALCIUM SERPL-MCNC: 8.2 MG/DL (ref 8.6–10.2)
CHLORIDE SERPL-SCNC: 100 MMOL/L (ref 98–107)
CHLORIDE SERPL-SCNC: 100 MMOL/L (ref 98–107)
CO2 SERPL-SCNC: 19 MMOL/L (ref 22–29)
CO2 SERPL-SCNC: 20 MMOL/L (ref 22–29)
CREAT SERPL-MCNC: 1 MG/DL (ref 0.5–1)
CREAT SERPL-MCNC: 1 MG/DL (ref 0.5–1)
EKG ATRIAL RATE: 79 BPM
EKG P AXIS: 47 DEGREES
EKG P-R INTERVAL: 96 MS
EKG Q-T INTERVAL: 366 MS
EKG QRS DURATION: 72 MS
EKG QTC CALCULATION (BAZETT): 419 MS
EKG R AXIS: -52 DEGREES
EKG T AXIS: 103 DEGREES
EKG VENTRICULAR RATE: 79 BPM
EOSINOPHIL # BLD: 0.13 K/UL (ref 0.05–0.5)
EOSINOPHILS RELATIVE PERCENT: 1 % (ref 0–6)
ERYTHROCYTE [DISTWIDTH] IN BLOOD BY AUTOMATED COUNT: 14.8 % (ref 11.5–15)
ERYTHROCYTE [DISTWIDTH] IN BLOOD BY AUTOMATED COUNT: 15 % (ref 11.5–15)
GFR SERPL CREATININE-BSD FRML MDRD: 55 ML/MIN/1.73M2
GFR SERPL CREATININE-BSD FRML MDRD: 58 ML/MIN/1.73M2
GLUCOSE SERPL-MCNC: 96 MG/DL (ref 74–99)
GLUCOSE SERPL-MCNC: 99 MG/DL (ref 74–99)
HCT VFR BLD AUTO: 28.7 % (ref 34–48)
HCT VFR BLD AUTO: 31.2 % (ref 34–48)
HGB BLD-MCNC: 9.3 G/DL (ref 11.5–15.5)
HGB BLD-MCNC: 9.9 G/DL (ref 11.5–15.5)
IMM GRANULOCYTES # BLD AUTO: 0.09 K/UL (ref 0–0.58)
IMM GRANULOCYTES NFR BLD: 1 % (ref 0–5)
INR PPP: 1.2
LYMPHOCYTES NFR BLD: 2.63 K/UL (ref 1.5–4)
LYMPHOCYTES RELATIVE PERCENT: 26 % (ref 20–42)
MCH RBC QN AUTO: 28.6 PG (ref 26–35)
MCH RBC QN AUTO: 28.7 PG (ref 26–35)
MCHC RBC AUTO-ENTMCNC: 31.7 G/DL (ref 32–34.5)
MCHC RBC AUTO-ENTMCNC: 32.4 G/DL (ref 32–34.5)
MCV RBC AUTO: 88.6 FL (ref 80–99.9)
MCV RBC AUTO: 90.2 FL (ref 80–99.9)
MICROORGANISM SPEC CULT: NO GROWTH
MONOCYTES NFR BLD: 0.85 K/UL (ref 0.1–0.95)
MONOCYTES NFR BLD: 8 % (ref 2–12)
NEUTROPHILS NFR BLD: 63 % (ref 43–80)
NEUTS SEG NFR BLD: 6.51 K/UL (ref 1.8–7.3)
PARTIAL THROMBOPLASTIN TIME: 39.5 SEC (ref 24.5–35.1)
PARTIAL THROMBOPLASTIN TIME: 45.9 SEC (ref 24.5–35.1)
PARTIAL THROMBOPLASTIN TIME: 50.9 SEC (ref 24.5–35.1)
PARTIAL THROMBOPLASTIN TIME: 63.1 SEC (ref 24.5–35.1)
PARTIAL THROMBOPLASTIN TIME: 69.7 SEC (ref 24.5–35.1)
PLATELET # BLD AUTO: 260 K/UL (ref 130–450)
PLATELET # BLD AUTO: 285 K/UL (ref 130–450)
PMV BLD AUTO: 10.6 FL (ref 7–12)
PMV BLD AUTO: 10.6 FL (ref 7–12)
POTASSIUM SERPL-SCNC: 3.2 MMOL/L (ref 3.5–5)
POTASSIUM SERPL-SCNC: 3.4 MMOL/L (ref 3.5–5)
PROT SERPL-MCNC: 6 G/DL (ref 6.4–8.3)
PROTHROMBIN TIME: 13.4 SEC (ref 9.3–12.4)
RBC # BLD AUTO: 3.24 M/UL (ref 3.5–5.5)
RBC # BLD AUTO: 3.46 M/UL (ref 3.5–5.5)
SODIUM SERPL-SCNC: 130 MMOL/L (ref 132–146)
SODIUM SERPL-SCNC: 131 MMOL/L (ref 132–146)
SPECIMEN DESCRIPTION: NORMAL
WBC OTHER # BLD: 10.3 K/UL (ref 4.5–11.5)
WBC OTHER # BLD: 9.9 K/UL (ref 4.5–11.5)

## 2024-02-06 PROCEDURE — 2140000000 HC CCU INTERMEDIATE R&B

## 2024-02-06 PROCEDURE — 80048 BASIC METABOLIC PNL TOTAL CA: CPT

## 2024-02-06 PROCEDURE — 85027 COMPLETE CBC AUTOMATED: CPT

## 2024-02-06 PROCEDURE — 93970 EXTREMITY STUDY: CPT

## 2024-02-06 PROCEDURE — 99232 SBSQ HOSP IP/OBS MODERATE 35: CPT | Performed by: STUDENT IN AN ORGANIZED HEALTH CARE EDUCATION/TRAINING PROGRAM

## 2024-02-06 PROCEDURE — 93010 ELECTROCARDIOGRAM REPORT: CPT | Performed by: INTERNAL MEDICINE

## 2024-02-06 PROCEDURE — 6370000000 HC RX 637 (ALT 250 FOR IP): Performed by: PHYSICIAN ASSISTANT

## 2024-02-06 PROCEDURE — 85025 COMPLETE CBC W/AUTO DIFF WBC: CPT

## 2024-02-06 PROCEDURE — 85610 PROTHROMBIN TIME: CPT

## 2024-02-06 PROCEDURE — 6370000000 HC RX 637 (ALT 250 FOR IP): Performed by: INTERNAL MEDICINE

## 2024-02-06 PROCEDURE — 6360000002 HC RX W HCPCS: Performed by: PHYSICIAN ASSISTANT

## 2024-02-06 PROCEDURE — 85730 THROMBOPLASTIN TIME PARTIAL: CPT

## 2024-02-06 PROCEDURE — 36415 COLL VENOUS BLD VENIPUNCTURE: CPT

## 2024-02-06 PROCEDURE — 71250 CT THORAX DX C-: CPT

## 2024-02-06 PROCEDURE — 94375 RESPIRATORY FLOW VOLUME LOOP: CPT

## 2024-02-06 PROCEDURE — 94729 DIFFUSING CAPACITY: CPT

## 2024-02-06 PROCEDURE — 2580000003 HC RX 258: Performed by: INTERNAL MEDICINE

## 2024-02-06 PROCEDURE — 99233 SBSQ HOSP IP/OBS HIGH 50: CPT | Performed by: INTERNAL MEDICINE

## 2024-02-06 PROCEDURE — 82248 BILIRUBIN DIRECT: CPT

## 2024-02-06 PROCEDURE — 6360000002 HC RX W HCPCS: Performed by: INTERNAL MEDICINE

## 2024-02-06 PROCEDURE — 80053 COMPREHEN METABOLIC PANEL: CPT

## 2024-02-06 RX ORDER — CHLORHEXIDINE GLUCONATE 4 G/100ML
SOLUTION TOPICAL SEE ADMIN INSTRUCTIONS
Status: DISCONTINUED | OUTPATIENT
Start: 2024-02-06 | End: 2024-02-07 | Stop reason: HOSPADM

## 2024-02-06 RX ORDER — SODIUM CHLORIDE 9 MG/ML
INJECTION, SOLUTION INTRAVENOUS PRN
Status: DISCONTINUED | OUTPATIENT
Start: 2024-02-06 | End: 2024-02-07

## 2024-02-06 RX ORDER — POTASSIUM CHLORIDE 20 MEQ/1
40 TABLET, EXTENDED RELEASE ORAL ONCE
Status: COMPLETED | OUTPATIENT
Start: 2024-02-06 | End: 2024-02-06

## 2024-02-06 RX ORDER — CHLORHEXIDINE GLUCONATE ORAL RINSE 1.2 MG/ML
15 SOLUTION DENTAL ONCE
Status: COMPLETED | OUTPATIENT
Start: 2024-02-07 | End: 2024-02-07

## 2024-02-06 RX ORDER — SODIUM CHLORIDE 0.9 % (FLUSH) 0.9 %
10 SYRINGE (ML) INJECTION PRN
Status: DISCONTINUED | OUTPATIENT
Start: 2024-02-06 | End: 2024-02-07

## 2024-02-06 RX ORDER — SODIUM CHLORIDE 0.9 % (FLUSH) 0.9 %
5-40 SYRINGE (ML) INJECTION EVERY 12 HOURS SCHEDULED
Status: DISCONTINUED | OUTPATIENT
Start: 2024-02-06 | End: 2024-02-07

## 2024-02-06 RX ADMIN — ATORVASTATIN CALCIUM 80 MG: 80 TABLET, FILM COATED ORAL at 22:52

## 2024-02-06 RX ADMIN — SODIUM CHLORIDE, PRESERVATIVE FREE 10 ML: 5 INJECTION INTRAVENOUS at 09:47

## 2024-02-06 RX ADMIN — HEPARIN SODIUM 1730 UNITS: 1000 INJECTION INTRAVENOUS; SUBCUTANEOUS at 09:47

## 2024-02-06 RX ADMIN — MUPIROCIN: 20 OINTMENT TOPICAL at 22:50

## 2024-02-06 RX ADMIN — POTASSIUM CHLORIDE 40 MEQ: 1500 TABLET, EXTENDED RELEASE ORAL at 14:50

## 2024-02-06 RX ADMIN — METOPROLOL SUCCINATE 25 MG: 25 TABLET, EXTENDED RELEASE ORAL at 11:29

## 2024-02-06 RX ADMIN — CHLORHEXIDINE GLUCONATE 118 ML: 4 SOLUTION TOPICAL at 22:51

## 2024-02-06 RX ADMIN — HEPARIN SODIUM 14 UNITS/KG/HR: 10000 INJECTION, SOLUTION INTRAVENOUS at 22:48

## 2024-02-06 RX ADMIN — ASPIRIN 81 MG 81 MG: 81 TABLET ORAL at 11:30

## 2024-02-06 ASSESSMENT — LIFESTYLE VARIABLES: SMOKING_STATUS: 0

## 2024-02-06 ASSESSMENT — ENCOUNTER SYMPTOMS: DYSPNEA ACTIVITY LEVEL: AFTER AMBULATING 1 FLIGHT OF STAIRS

## 2024-02-06 NOTE — CARE COORDINATION
2/6:  Transition of care:  Pt presented to the [facility] ER for CP, SOB & generalized weakness from home.  Pt was [place] [facility] [place].  Pt is on room air at 95% & Iv Heparin gtt.  PT/OT pending evals.  Pt was off the floor & spoke with pt's son Ke forman to discuss CM role & dc planning.  Pt's PCP is Dr Rea & [facility].  Pt lives alone in a ranch house with no steps to enter.  PTA pt was independent w/o any DME.  Pt has no hx of HHC/SNF.  CM sent backdoor referral to [facility].  Pt's dc plan is unclear until seen by PT/OT.  If home her family can transport.  CM provided pt with a SNF list & to have back up choices 3-4.  Sw/CM will continue to follow.  Electronically signed by Tena Wise RN on 2/6/2024 at 12:17 PM  The Plan for Transition of Care is related to the following treatment goals: SNF/HHC    The Patient and/or patient representative  was provided with a choice of provider and agrees   with the discharge plan. [x] Yes [] No    Freedom of choice list was provided with basic dialogue that supports the patient's individualized plan of care/goals, treatment preferences and shares the quality data associated with the providers. [x] Yes [] No

## 2024-02-06 NOTE — CONSULTS
Consult note    NAME: Katt Diaz     YOB: 1945   AGE: 78 y.o.   MEDICAL RECORD NUMBER: [id number]       Reason for consultation: Chest pain, STEMI    HPI:  79 yo F with PMH of RA, Sjogren dizziness, peripheral neuropathy who presented to [facility] with chest pain and SOB over the last 2 weeks. Found with elevated troponin and inferior ST elevations on her ECG along with Q waves inferiorly. She was taken to cath lab for coronary angiography and possible intervention.  Patient was found to have three-vessel disease.  CT surgery consulted.    ROS:  See HPI    Physical exam:  VITAL SIGNS: /71   Pulse 80   Temp 97 °F (36.1 °C) (Temporal)   Resp 18   Ht 1.575 m (5' 2\")   Wt 57.6 kg (127 lb)   SpO2 99%   BMI 23.23 kg/m²     GENERAL:\ NAD   HEAD: Normocephalic, atraumatic.   NECK: No JVD. No carotid bruits. No neck masses.    CARDIOVASCULAR: Normal rate, regular rhythm, normal S1, S2, no MRG    LUNGS: Clear to auscultation bilaterally, no wheezing.    ABDOMEN: Abdomen is soft and not distended. No tenderness.    EXTREMITIES: There is no pedal edema.   MUSCULOSKELETAL: No joint swelling. Normal range of motion    SKIN: Skin is moist. No ulcers or lesions.   NEUROLOGICAL: No evidence of obvious neurological deficits.    PSYCHOLOGICAL: Patient is in normal mood.        Labs:  CBC:   Lab Results   Component Value Date/Time    WBC 10.1 02/05/2024 02:23 PM    RBC 3.85 02/05/2024 02:23 PM    HGB 11.1 02/05/2024 02:23 PM    HCT 34.9 02/05/2024 02:23 PM    MCV 90.6 02/05/2024 02:23 PM    MCH 28.8 02/05/2024 02:23 PM    MCHC 31.8 02/05/2024 02:23 PM    RDW 15.1 02/05/2024 02:23 PM     02/05/2024 02:23 PM    MPV 10.6 02/05/2024 02:23 PM     BMP: @BRIEFLAB(N  A,K,CL,CO2,BUN,LABALBU,CREATININE,CALCIUM,GFRAA,LABGLOM,AGLUCOSE)@  Magnesium:  No results found for: \"MG\"  Cardiac Enzymes: No results found for: \"CKTOTAL\", \"CKMB\", \"CKMBINDEX\", \"TROPONINI\"   PT/INR:  No results found for:

## 2024-02-06 NOTE — PATIENT CARE CONFERENCE
P Quality Flow/Interdisciplinary Rounds Progress Note        Quality Flow Rounds held on February 6, 2024    Disciplines Attending:  Bedside Nurse, , , and Nursing Unit Leadership    Kattcirilo Diaz was admitted on 2/5/2024 10:40 AM    Anticipated Discharge Date:       Disposition:    Dani Score:  Dani Scale Score: 19    Readmission Risk              Risk of Unplanned Readmission:  8           Discussed patient goal for the day, patient clinical progression, and barriers to discharge.  The following Goal(s) of the Day/Commitment(s) have been identified:  Report labs/diagnostics       Dalila Mcqueen RN  February 6, 2024

## 2024-02-06 NOTE — H&P
History & Physical     CHIEF COMPLAINT: CP      DATE OF SERVICE: 2/5/2024     HISTORY OF PRESENT ILLNESS:    79 yo F with PMH of RA, Sjogren dizziness, peripheral neuropathy who presented to [facility] with chest pain and SOB over the last 2 weeks. Found with elevated troponin and inferior ST elevations on her ECG along with Q waves inferiorly. She was taken to cath lab for coronary angiography and possible intervention.               Past Medical History:  Past Medical History:   Diagnosis Date    CAD in native artery 2/5/2024    Diabetes mellitus (HCC)     Headache     Hearing loss     Hx of rheumatoid arthritis     Migraine     Osteopenia     Sjogren's disease (HCC)        Past Surgical History:   Past Surgical History:   Procedure Laterality Date    PARTIAL HYSTERECTOMY (CERVIX NOT REMOVED)      states 1976    VEIN LIGATION AND STRIPPING      states 1972        Home Medications:    Prior to Admission medications    Medication Sig Start Date End Date Taking? Authorizing Provider   Elastic Bandages & Supports (MEDICAL COMPRESSION THIGH HIGH) MISC 20-30 mm thigh high compression when awake 1/30/24   Jaylene Arroyo MD   fludrocortisone (FLORINEF) 0.1 MG tablet Take 1 tablet by mouth daily 1/30/24   Jaylene Arroyo MD   Erenumab-aooe (AIMOVIG) 70 MG/ML SOAJ Inject 70 mg into the skin every 28 days  Patient not taking: Reported on 2/5/2024 1/30/24   Jaylene Arroyo MD   azithromycin (ZITHROMAX Z-LILLIANA) 250 MG tablet Take 2 tabs on day one, followed by 1 tablet daily for 4 days.  Patient not taking: Reported on 2/5/2024 12/16/23   Tammi Haile, APRN - CNP   Levothyroxine Sodium 100 MCG CAPS Take 1 tablet by mouth daily 8/17/23   Provider, MD Vesna   aspirin 81 MG chewable tablet Take 1 tablet by mouth daily States OTC    Provider, MD Vesna   celecoxib (CELEBREX) 50 MG capsule Take 1 capsule by mouth 2 times daily  Patient not taking: Reported on 2/5/2024 10/9/23   Nicolasa Sagastume

## 2024-02-06 NOTE — ANESTHESIA PRE PROCEDURE
Department of Anesthesiology  Preprocedure Note       Name:  Katt Diaz   Age:  78 y.o.  :  1945                                          MRN:  [id number]         Date:  2024      Surgeon: Surgeon(s):  Nicolasa Jasso DO    Procedure: Procedure(s):  CORONARY ARTERY BYPASS GRAFTING    Medications prior to admission:   Prior to Admission medications    Medication Sig Start Date End Date Taking? Authorizing Provider   Elastic Bandages & Supports (MEDICAL COMPRESSION THIGH HIGH) MISC 20-30 mm thigh high compression when awake 24   Jaylene Arroyo MD   fludrocortisone (FLORINEF) 0.1 MG tablet Take 1 tablet by mouth daily 24   Jaylene Arroyo MD   Erenumab-aooe (AIMOVIG) 70 MG/ML SOAJ Inject 70 mg into the skin every 28 days  Patient not taking: Reported on 2024   Jaylene Arroyo MD   azithromycin (ZITHROMAX Z-LILLIANA) 250 MG tablet Take 2 tabs on day one, followed by 1 tablet daily for 4 days.  Patient not taking: Reported on 23   Tammi Haile, APRN - CNP   Levothyroxine Sodium 100 MCG CAPS Take 1 tablet by mouth daily 23   Provider, MD Vesna   aspirin 81 MG chewable tablet Take 1 tablet by mouth daily States OTC    Provider, MD Vesna   celecoxib (CELEBREX) 50 MG capsule Take 1 capsule by mouth 2 times daily  Patient not taking: Reported on 2024 10/9/23   Nicolasa Sagastume DO       Current medications:    Current Facility-Administered Medications   Medication Dose Route Frequency Provider Last Rate Last Admin    sodium chloride flush 0.9 % injection 5-40 mL  5-40 mL IntraVENous 2 times per day Aysha Christy PA        sodium chloride flush 0.9 % injection 10 mL  10 mL IntraVENous PRN Aysha Christy PA        0.9 % sodium chloride infusion   IntraVENous PRN Aysha Christy PA        mupirocin (BACTROBAN) 2 % ointment   Each Nostril BID Aysha Christy PA   Given at 24 2250    chlorhexidine (HIBICLENS) 4 % liquid

## 2024-02-06 NOTE — CARE COORDINATION
Advance Care Planning   Healthcare Decision Maker:    Primary Decision Maker: LOCO SANCHEZ - [phone]    Click here to complete Healthcare Decision Makers including selection of the Healthcare Decision Maker Relationship (ie \"Primary\").  Today we documented Decision Maker(s) consistent with Legal Next of Kin hierarchy.       Cm spoke with pt's son who states his mother has POA paper work.  CM requested a copy to be brought in.  Electronically signed by Tena Wise RN on 2/6/2024 at 12:18 PM

## 2024-02-07 ENCOUNTER — APPOINTMENT (OUTPATIENT)
Dept: GENERAL RADIOLOGY | Age: 79
DRG: 233 | End: 2024-02-07
Attending: INTERNAL MEDICINE
Payer: MEDICARE

## 2024-02-07 ENCOUNTER — ANESTHESIA (OUTPATIENT)
Dept: OPERATING ROOM | Age: 79
End: 2024-02-07
Payer: MEDICARE

## 2024-02-07 PROBLEM — T81.9XXA COMPLICATION OF SURGICAL PROCEDURE: Status: ACTIVE | Noted: 2024-02-07

## 2024-02-07 PROBLEM — I95.81 POSTOPERATIVE HYPOTENSION: Status: ACTIVE | Noted: 2024-02-07

## 2024-02-07 PROBLEM — R73.9 STRESS HYPERGLYCEMIA: Status: ACTIVE | Noted: 2024-02-07

## 2024-02-07 LAB
AADO2: 187.3 MMHG
AADO2: 79.7 MMHG
ACTIVATED CLOTTING TIME, HIGH RANGE: 374 SEC
ACTIVATED CLOTTING TIME, HIGH RANGE: 401 SEC
ACTIVATED CLOTTING TIME, HIGH RANGE: 414 SEC
ACTIVATED CLOTTING TIME, HIGH RANGE: 448 SEC
ACTIVATED CLOTTING TIME, HIGH RANGE: 92 SEC
ACTIVATED CLOTTING TIME, HIGH RANGE: 93 SEC
ANION GAP SERPL CALCULATED.3IONS-SCNC: 11 MMOL/L (ref 7–16)
ANION GAP SERPL CALCULATED.3IONS-SCNC: 13 MMOL/L (ref 7–16)
B.E.: -3.1 MMOL/L (ref -3–3)
B.E.: -6.5 MMOL/L (ref -3–3)
B.E.: -8.5 MMOL/L (ref -3–3)
BUN BLD-MCNC: 12 MG/DL (ref 6–23)
BUN BLD-MCNC: 12 MG/DL (ref 6–23)
BUN SERPL-MCNC: 12 MG/DL (ref 6–23)
BUN SERPL-MCNC: 15 MG/DL (ref 6–23)
CA-I BLD-SCNC: 0.88 MMOL/L (ref 1.15–1.33)
CA-I BLD-SCNC: 1.05 MMOL/L (ref 1.15–1.33)
CA-I BLD-SCNC: 1.21 MMOL/L (ref 1.15–1.33)
CALCIUM SERPL-MCNC: 6.8 MG/DL (ref 8.6–10.2)
CALCIUM SERPL-MCNC: 8 MG/DL (ref 8.6–10.2)
CHLORIDE BLD-SCNC: 103 MMOL/L (ref 100–108)
CHLORIDE BLD-SCNC: 104 MMOL/L (ref 100–108)
CHLORIDE SERPL-SCNC: 102 MMOL/L (ref 98–107)
CHLORIDE SERPL-SCNC: 102 MMOL/L (ref 98–107)
CO2 BLD CALC-SCNC: 17 MMOL/L (ref 22–29)
CO2 BLD CALC-SCNC: 18 MMOL/L (ref 22–29)
CO2 SERPL-SCNC: 18 MMOL/L (ref 22–29)
CO2 SERPL-SCNC: 19 MMOL/L (ref 22–29)
COHB: 0.3 % (ref 0–1.5)
CREAT BLD-MCNC: 0.5 MG/DL (ref 0.5–1)
CREAT BLD-MCNC: 0.7 MG/DL (ref 0.5–1)
CREAT SERPL-MCNC: 0.8 MG/DL (ref 0.5–1)
CREAT SERPL-MCNC: 1 MG/DL (ref 0.5–1)
CRITICAL: ABNORMAL
DATE ANALYZED: ABNORMAL
DATE OF COLLECTION: ABNORMAL
EGFR, POC: >60 ML/MIN/1.73M2
EGFR, POC: >60 ML/MIN/1.73M2
ERYTHROCYTE [DISTWIDTH] IN BLOOD BY AUTOMATED COUNT: 14.6 % (ref 11.5–15)
ERYTHROCYTE [DISTWIDTH] IN BLOOD BY AUTOMATED COUNT: 14.9 % (ref 11.5–15)
FIO2: 40 %
FIO2: 50 %
GFR SERPL CREATININE-BSD FRML MDRD: 59 ML/MIN/1.73M2
GFR SERPL CREATININE-BSD FRML MDRD: >60 ML/MIN/1.73M2
GLUCOSE BLD-MCNC: 101 MG/DL (ref 74–99)
GLUCOSE BLD-MCNC: 114 MG/DL (ref 74–99)
GLUCOSE BLD-MCNC: 116 MG/DL (ref 74–99)
GLUCOSE BLD-MCNC: 123 MG/DL (ref 74–99)
GLUCOSE BLD-MCNC: 183 MG/DL (ref 74–99)
GLUCOSE BLD-MCNC: 223 MG/DL (ref 74–99)
GLUCOSE BLD-MCNC: 226 MG/DL (ref 74–99)
GLUCOSE BLD-MCNC: 239 MG/DL (ref 74–99)
GLUCOSE BLD-MCNC: 241 MG/DL (ref 74–99)
GLUCOSE BLD-MCNC: 264 MG/DL (ref 74–99)
GLUCOSE BLD-MCNC: 271 MG/DL (ref 74–99)
GLUCOSE BLD-MCNC: 292 MG/DL (ref 74–99)
GLUCOSE SERPL-MCNC: 154 MG/DL (ref 74–99)
GLUCOSE SERPL-MCNC: 248 MG/DL (ref 74–99)
HCO3: 17.4 MMOL/L (ref 22–26)
HCO3: 18.1 MMOL/L (ref 22–26)
HCO3: 20.8 MMOL/L (ref 22–26)
HCT VFR BLD AUTO: 17 % (ref 37–54)
HCT VFR BLD AUTO: 24 % (ref 37–54)
HCT VFR BLD AUTO: 25.8 % (ref 34–48)
HCT VFR BLD AUTO: 29.3 % (ref 34–48)
HGB BLD-MCNC: 8.5 G/DL (ref 11.5–15.5)
HGB BLD-MCNC: 9.6 G/DL (ref 11.5–15.5)
HHB: 1.4 % (ref 0–5)
HHB: 1.7 % (ref 0–5)
HHB: 2.5 % (ref 0–5)
INR PPP: 1.4
LAB: ABNORMAL
Lab: 1200
Lab: 1445
Lab: 1705
MAGNESIUM SERPL-MCNC: 3.5 MG/DL (ref 1.6–2.6)
MCH RBC QN AUTO: 29.3 PG (ref 26–35)
MCH RBC QN AUTO: 29.9 PG (ref 26–35)
MCHC RBC AUTO-ENTMCNC: 32.8 G/DL (ref 32–34.5)
MCHC RBC AUTO-ENTMCNC: 32.9 G/DL (ref 32–34.5)
MCV RBC AUTO: 89.3 FL (ref 80–99.9)
MCV RBC AUTO: 90.8 FL (ref 80–99.9)
METHB: 0.2 % (ref 0–1.5)
METHB: 0.3 % (ref 0–1.5)
METHB: 0.5 % (ref 0–1.5)
MICROORGANISM SPEC CULT: NORMAL
MODE: ABNORMAL
MODE: ABNORMAL
MODE: AC
NEGATIVE BASE EXCESS, ART: 6.5 MMOL/L
NEGATIVE BASE EXCESS, ART: 7.6 MMOL/L
O2 CONTENT: 12.9 ML/DL
O2 SATURATION: 97.5 % (ref 92–98.5)
O2 SATURATION: 98.3 % (ref 92–98.5)
O2 SATURATION: 98.6 % (ref 92–98.5)
O2HB: 96.7 % (ref 94–97)
O2HB: 97.7 % (ref 94–97)
O2HB: 98.1 % (ref 94–97)
OPERATOR ID: 7292
OPERATOR ID: ABNORMAL
OPERATOR ID: ABNORMAL
PARTIAL THROMBOPLASTIN TIME: 33.2 SEC (ref 24.5–35.1)
PARTIAL THROMBOPLASTIN TIME: 48.3 SEC (ref 24.5–35.1)
PATIENT TEMP: 37 C
PCO2: 32.5 MMHG (ref 35–45)
PCO2: 32.7 MMHG (ref 35–45)
PCO2: 37.2 MMHG (ref 35–45)
PEEP/CPAP: 8 CMH2O
PEEP/CPAP: 8 CMH2O
PFO2: 2.3 MMHG/%
PFO2: 3.95 MMHG/%
PH BLOOD GAS: 7.29 (ref 7.35–7.45)
PH BLOOD GAS: 7.36 (ref 7.35–7.45)
PH BLOOD GAS: 7.42 (ref 7.35–7.45)
PLATELET # BLD AUTO: 142 K/UL (ref 130–450)
PLATELET # BLD AUTO: 304 K/UL (ref 130–450)
PMV BLD AUTO: 10.5 FL (ref 7–12)
PMV BLD AUTO: 9.9 FL (ref 7–12)
PO2: 114.9 MMHG (ref 75–100)
PO2: 157.9 MMHG (ref 75–100)
PO2: 242.5 MMHG (ref 75–100)
POC ANION GAP: 10 MMOL/L (ref 7–16)
POC ANION GAP: 6 MMOL/L (ref 7–16)
POC HCO3: 17.2 MMOL/L (ref 22–26)
POC HCO3: 17.9 MMOL/L (ref 22–26)
POC HEMOGLOBIN (CALC): 5.7 G/DL (ref 12.5–15.5)
POC HEMOGLOBIN (CALC): 8.2 G/DL (ref 12.5–15.5)
POC LACTIC ACID: 0.6 MMOL/L (ref 0.5–2.2)
POC LACTIC ACID: 2.8 MMOL/L (ref 0.5–2.2)
POC O2 SATURATION: 100 % (ref 92–98.5)
POC O2 SATURATION: 94.8 % (ref 92–98.5)
POC PCO2: 26 MM HG (ref 35–45)
POC PCO2: 35.7 MM HG (ref 35–45)
POC PH: 7.31 (ref 7.35–7.45)
POC PH: 7.43 (ref 7.35–7.45)
POC PO2: 404 MM HG (ref 60–80)
POC PO2: 80.7 MM HG (ref 60–80)
POTASSIUM BLD-SCNC: 4.6 MMOL/L (ref 3.5–5)
POTASSIUM BLD-SCNC: 5.1 MMOL/L (ref 3.5–5)
POTASSIUM SERPL-SCNC: 3.71 MMOL/L (ref 3.5–5)
POTASSIUM SERPL-SCNC: 3.8 MMOL/L (ref 3.5–5)
POTASSIUM SERPL-SCNC: 4.4 MMOL/L (ref 3.5–5)
POTASSIUM SERPL-SCNC: 4.7 MMOL/L (ref 3.5–5)
PROTHROMBIN TIME: 15.2 SEC (ref 9.3–12.4)
PS: 8 CMH20
RBC # BLD AUTO: 2.84 M/UL (ref 3.5–5.5)
RBC # BLD AUTO: 3.28 M/UL (ref 3.5–5.5)
RI(T): 0.5
RI(T): 1.63
RR MECHANICAL: 12 B/MIN
SODIUM BLD-SCNC: 126 MMOL/L (ref 132–146)
SODIUM BLD-SCNC: 132 MMOL/L (ref 132–146)
SODIUM SERPL-SCNC: 132 MMOL/L (ref 132–146)
SODIUM SERPL-SCNC: 133 MMOL/L (ref 132–146)
SOURCE, BLOOD GAS: ABNORMAL
SPECIMEN DESCRIPTION: NORMAL
THB: 9.3 G/DL (ref 11.5–16.5)
THB: 9.3 G/DL (ref 11.5–16.5)
THB: 9.6 G/DL (ref 11.5–16.5)
TIME ANALYZED: 1209
TIME ANALYZED: 1507
TIME ANALYZED: 1709
VT MECHANICAL: 350 ML
WBC OTHER # BLD: 17.9 K/UL (ref 4.5–11.5)
WBC OTHER # BLD: 9.9 K/UL (ref 4.5–11.5)

## 2024-02-07 PROCEDURE — 85730 THROMBOPLASTIN TIME PARTIAL: CPT

## 2024-02-07 PROCEDURE — 30233N1 TRANSFUSION OF NONAUTOLOGOUS RED BLOOD CELLS INTO PERIPHERAL VEIN, PERCUTANEOUS APPROACH: ICD-10-PCS | Performed by: INTERNAL MEDICINE

## 2024-02-07 PROCEDURE — C9113 INJ PANTOPRAZOLE SODIUM, VIA: HCPCS | Performed by: NURSE PRACTITIONER

## 2024-02-07 PROCEDURE — P9045 ALBUMIN (HUMAN), 5%, 250 ML: HCPCS

## 2024-02-07 PROCEDURE — 94002 VENT MGMT INPAT INIT DAY: CPT

## 2024-02-07 PROCEDURE — 2580000003 HC RX 258: Performed by: STUDENT IN AN ORGANIZED HEALTH CARE EDUCATION/TRAINING PROGRAM

## 2024-02-07 PROCEDURE — 2500000003 HC RX 250 WO HCPCS: Performed by: NURSE PRACTITIONER

## 2024-02-07 PROCEDURE — C1729 CATH, DRAINAGE: HCPCS | Performed by: STUDENT IN AN ORGANIZED HEALTH CARE EDUCATION/TRAINING PROGRAM

## 2024-02-07 PROCEDURE — 6370000000 HC RX 637 (ALT 250 FOR IP)

## 2024-02-07 PROCEDURE — P9016 RBC LEUKOCYTES REDUCED: HCPCS

## 2024-02-07 PROCEDURE — 94640 AIRWAY INHALATION TREATMENT: CPT

## 2024-02-07 PROCEDURE — 6360000002 HC RX W HCPCS: Performed by: PHYSICIAN ASSISTANT

## 2024-02-07 PROCEDURE — 7100000001 HC PACU RECOVERY - ADDTL 15 MIN

## 2024-02-07 PROCEDURE — P9045 ALBUMIN (HUMAN), 5%, 250 ML: HCPCS | Performed by: NURSE PRACTITIONER

## 2024-02-07 PROCEDURE — B24BZZ4 ULTRASONOGRAPHY OF HEART WITH AORTA, TRANSESOPHAGEAL: ICD-10-PCS | Performed by: STUDENT IN AN ORGANIZED HEALTH CARE EDUCATION/TRAINING PROGRAM

## 2024-02-07 PROCEDURE — 3700000000 HC ANESTHESIA ATTENDED CARE: Performed by: STUDENT IN AN ORGANIZED HEALTH CARE EDUCATION/TRAINING PROGRAM

## 2024-02-07 PROCEDURE — 80047 BASIC METABLC PNL IONIZED CA: CPT

## 2024-02-07 PROCEDURE — 88307 TISSUE EXAM BY PATHOLOGIST: CPT

## 2024-02-07 PROCEDURE — 36415 COLL VENOUS BLD VENIPUNCTURE: CPT

## 2024-02-07 PROCEDURE — 2580000003 HC RX 258

## 2024-02-07 PROCEDURE — 82962 GLUCOSE BLOOD TEST: CPT

## 2024-02-07 PROCEDURE — 2500000003 HC RX 250 WO HCPCS

## 2024-02-07 PROCEDURE — 6370000000 HC RX 637 (ALT 250 FOR IP): Performed by: PHYSICIAN ASSISTANT

## 2024-02-07 PROCEDURE — 88342 IMHCHEM/IMCYTCHM 1ST ANTB: CPT

## 2024-02-07 PROCEDURE — 02100Z9 BYPASS CORONARY ARTERY, ONE ARTERY FROM LEFT INTERNAL MAMMARY, OPEN APPROACH: ICD-10-PCS | Performed by: STUDENT IN AN ORGANIZED HEALTH CARE EDUCATION/TRAINING PROGRAM

## 2024-02-07 PROCEDURE — 84132 ASSAY OF SERUM POTASSIUM: CPT

## 2024-02-07 PROCEDURE — 3E033XZ INTRODUCTION OF VASOPRESSOR INTO PERIPHERAL VEIN, PERCUTANEOUS APPROACH: ICD-10-PCS | Performed by: OTOLARYNGOLOGY

## 2024-02-07 PROCEDURE — 33518 CABG ARTERY-VEIN TWO: CPT | Performed by: STUDENT IN AN ORGANIZED HEALTH CARE EDUCATION/TRAINING PROGRAM

## 2024-02-07 PROCEDURE — 85347 COAGULATION TIME ACTIVATED: CPT

## 2024-02-07 PROCEDURE — 2000000000 HC ICU R&B

## 2024-02-07 PROCEDURE — 6360000002 HC RX W HCPCS: Performed by: NURSE ANESTHETIST, CERTIFIED REGISTERED

## 2024-02-07 PROCEDURE — 5A1221Z PERFORMANCE OF CARDIAC OUTPUT, CONTINUOUS: ICD-10-PCS | Performed by: STUDENT IN AN ORGANIZED HEALTH CARE EDUCATION/TRAINING PROGRAM

## 2024-02-07 PROCEDURE — 6360000002 HC RX W HCPCS: Performed by: NURSE PRACTITIONER

## 2024-02-07 PROCEDURE — 06BQ4ZZ EXCISION OF LEFT SAPHENOUS VEIN, PERCUTANEOUS ENDOSCOPIC APPROACH: ICD-10-PCS | Performed by: STUDENT IN AN ORGANIZED HEALTH CARE EDUCATION/TRAINING PROGRAM

## 2024-02-07 PROCEDURE — 6370000000 HC RX 637 (ALT 250 FOR IP): Performed by: NURSE PRACTITIONER

## 2024-02-07 PROCEDURE — 4A133J1 MONITORING OF ARTERIAL PULSE, PERIPHERAL, PERCUTANEOUS APPROACH: ICD-10-PCS | Performed by: ANESTHESIOLOGY

## 2024-02-07 PROCEDURE — 82803 BLOOD GASES ANY COMBINATION: CPT

## 2024-02-07 PROCEDURE — 33268 EXCL LAA OPN OTH PX ANY METH: CPT | Performed by: STUDENT IN AN ORGANIZED HEALTH CARE EDUCATION/TRAINING PROGRAM

## 2024-02-07 PROCEDURE — 6360000002 HC RX W HCPCS: Performed by: STUDENT IN AN ORGANIZED HEALTH CARE EDUCATION/TRAINING PROGRAM

## 2024-02-07 PROCEDURE — 02HV33Z INSERTION OF INFUSION DEVICE INTO SUPERIOR VENA CAVA, PERCUTANEOUS APPROACH: ICD-10-PCS | Performed by: ANESTHESIOLOGY

## 2024-02-07 PROCEDURE — 3600000008 HC SURGERY OHS BASE: Performed by: STUDENT IN AN ORGANIZED HEALTH CARE EDUCATION/TRAINING PROGRAM

## 2024-02-07 PROCEDURE — 88305 TISSUE EXAM BY PATHOLOGIST: CPT

## 2024-02-07 PROCEDURE — 85610 PROTHROMBIN TIME: CPT

## 2024-02-07 PROCEDURE — P9041 ALBUMIN (HUMAN),5%, 50ML: HCPCS

## 2024-02-07 PROCEDURE — A4216 STERILE WATER/SALINE, 10 ML: HCPCS | Performed by: NURSE PRACTITIONER

## 2024-02-07 PROCEDURE — 2580000003 HC RX 258: Performed by: NURSE PRACTITIONER

## 2024-02-07 PROCEDURE — 83605 ASSAY OF LACTIC ACID: CPT

## 2024-02-07 PROCEDURE — 02L70CK OCCLUSION OF LEFT ATRIAL APPENDAGE WITH EXTRALUMINAL DEVICE, OPEN APPROACH: ICD-10-PCS | Performed by: STUDENT IN AN ORGANIZED HEALTH CARE EDUCATION/TRAINING PROGRAM

## 2024-02-07 PROCEDURE — 88341 IMHCHEM/IMCYTCHM EA ADD ANTB: CPT

## 2024-02-07 PROCEDURE — 03H733Z INSERTION OF INFUSION DEVICE INTO RIGHT BRACHIAL ARTERY, PERCUTANEOUS APPROACH: ICD-10-PCS | Performed by: ANESTHESIOLOGY

## 2024-02-07 PROCEDURE — 82330 ASSAY OF CALCIUM: CPT

## 2024-02-07 PROCEDURE — 71045 X-RAY EXAM CHEST 1 VIEW: CPT

## 2024-02-07 PROCEDURE — 2709999900 HC NON-CHARGEABLE SUPPLY: Performed by: STUDENT IN AN ORGANIZED HEALTH CARE EDUCATION/TRAINING PROGRAM

## 2024-02-07 PROCEDURE — 37799 UNLISTED PX VASCULAR SURGERY: CPT

## 2024-02-07 PROCEDURE — 83735 ASSAY OF MAGNESIUM: CPT

## 2024-02-07 PROCEDURE — 3600000018 HC SURGERY OHS ADDTL 15MIN: Performed by: STUDENT IN AN ORGANIZED HEALTH CARE EDUCATION/TRAINING PROGRAM

## 2024-02-07 PROCEDURE — 82805 BLOOD GASES W/O2 SATURATION: CPT

## 2024-02-07 PROCEDURE — 2700000000 HC OXYGEN THERAPY PER DAY

## 2024-02-07 PROCEDURE — 2580000003 HC RX 258: Performed by: PHYSICIAN ASSISTANT

## 2024-02-07 PROCEDURE — 6360000002 HC RX W HCPCS

## 2024-02-07 PROCEDURE — A4217 STERILE WATER/SALINE, 500 ML: HCPCS | Performed by: STUDENT IN AN ORGANIZED HEALTH CARE EDUCATION/TRAINING PROGRAM

## 2024-02-07 PROCEDURE — C1889 IMPLANT/INSERT DEVICE, NOC: HCPCS | Performed by: STUDENT IN AN ORGANIZED HEALTH CARE EDUCATION/TRAINING PROGRAM

## 2024-02-07 PROCEDURE — 33533 CABG ARTERIAL SINGLE: CPT | Performed by: STUDENT IN AN ORGANIZED HEALTH CARE EDUCATION/TRAINING PROGRAM

## 2024-02-07 PROCEDURE — 021109W BYPASS CORONARY ARTERY, TWO ARTERIES FROM AORTA WITH AUTOLOGOUS VENOUS TISSUE, OPEN APPROACH: ICD-10-PCS | Performed by: STUDENT IN AN ORGANIZED HEALTH CARE EDUCATION/TRAINING PROGRAM

## 2024-02-07 PROCEDURE — 7100000000 HC PACU RECOVERY - FIRST 15 MIN

## 2024-02-07 PROCEDURE — C1713 ANCHOR/SCREW BN/BN,TIS/BN: HCPCS | Performed by: STUDENT IN AN ORGANIZED HEALTH CARE EDUCATION/TRAINING PROGRAM

## 2024-02-07 PROCEDURE — 99291 CRITICAL CARE FIRST HOUR: CPT | Performed by: INTERNAL MEDICINE

## 2024-02-07 PROCEDURE — 33508 ENDOSCOPIC VEIN HARVEST: CPT | Performed by: STUDENT IN AN ORGANIZED HEALTH CARE EDUCATION/TRAINING PROGRAM

## 2024-02-07 PROCEDURE — 2720000010 HC SURG SUPPLY STERILE: Performed by: STUDENT IN AN ORGANIZED HEALTH CARE EDUCATION/TRAINING PROGRAM

## 2024-02-07 PROCEDURE — 99232 SBSQ HOSP IP/OBS MODERATE 35: CPT | Performed by: NURSE PRACTITIONER

## 2024-02-07 PROCEDURE — 85027 COMPLETE CBC AUTOMATED: CPT

## 2024-02-07 PROCEDURE — 3700000001 HC ADD 15 MINUTES (ANESTHESIA): Performed by: STUDENT IN AN ORGANIZED HEALTH CARE EDUCATION/TRAINING PROGRAM

## 2024-02-07 PROCEDURE — 85014 HEMATOCRIT: CPT

## 2024-02-07 PROCEDURE — 4A133B1 MONITORING OF ARTERIAL PRESSURE, PERIPHERAL, PERCUTANEOUS APPROACH: ICD-10-PCS | Performed by: ANESTHESIOLOGY

## 2024-02-07 PROCEDURE — 80048 BASIC METABOLIC PNL TOTAL CA: CPT

## 2024-02-07 DEVICE — LOCKING SCREW,AXS,SELF-DRILLING
Type: IMPLANTABLE DEVICE | Site: STERNUM | Status: FUNCTIONAL
Brand: AXS, SMARTLOCK

## 2024-02-07 DEVICE — STERNALPLATE, BOX: Type: IMPLANTABLE DEVICE | Site: STERNUM | Status: FUNCTIONAL

## 2024-02-07 DEVICE — DEVICE OCCL CLP L35MM PLUNG GRP FLX SHFT FOR GILLINOV: Type: IMPLANTABLE DEVICE | Site: HEART | Status: FUNCTIONAL

## 2024-02-07 DEVICE — STERNALPLATE, HEX, NARROW: Type: IMPLANTABLE DEVICE | Site: STERNUM | Status: FUNCTIONAL

## 2024-02-07 RX ORDER — INSULIN GLARGINE 100 [IU]/ML
0.15 INJECTION, SOLUTION SUBCUTANEOUS NIGHTLY
Status: DISCONTINUED | OUTPATIENT
Start: 2024-02-08 | End: 2024-02-12

## 2024-02-07 RX ORDER — SODIUM CHLORIDE 0.9 % (FLUSH) 0.9 %
5-40 SYRINGE (ML) INJECTION PRN
Status: DISCONTINUED | OUTPATIENT
Start: 2024-02-07 | End: 2024-02-19 | Stop reason: HOSPADM

## 2024-02-07 RX ORDER — FLUDROCORTISONE ACETATE 0.1 MG/1
0.1 TABLET ORAL DAILY
Status: DISCONTINUED | OUTPATIENT
Start: 2024-02-08 | End: 2024-02-19 | Stop reason: HOSPADM

## 2024-02-07 RX ORDER — MAGNESIUM SULFATE IN WATER 40 MG/ML
2000 INJECTION, SOLUTION INTRAVENOUS PRN
Status: DISCONTINUED | OUTPATIENT
Start: 2024-02-07 | End: 2024-02-14

## 2024-02-07 RX ORDER — LIDOCAINE HYDROCHLORIDE 20 MG/ML
INJECTION, SOLUTION INTRAVENOUS PRN
Status: DISCONTINUED | OUTPATIENT
Start: 2024-02-07 | End: 2024-02-07 | Stop reason: SDUPTHER

## 2024-02-07 RX ORDER — PANTOPRAZOLE SODIUM 40 MG/1
40 TABLET, DELAYED RELEASE ORAL
Status: DISCONTINUED | OUTPATIENT
Start: 2024-02-08 | End: 2024-02-19 | Stop reason: HOSPADM

## 2024-02-07 RX ORDER — 0.9 % SODIUM CHLORIDE 0.9 %
250 INTRAVENOUS SOLUTION INTRAVENOUS CONTINUOUS PRN
Status: DISCONTINUED | OUTPATIENT
Start: 2024-02-07 | End: 2024-02-14

## 2024-02-07 RX ORDER — ONDANSETRON 2 MG/ML
4 INJECTION INTRAMUSCULAR; INTRAVENOUS EVERY 6 HOURS PRN
Status: DISCONTINUED | OUTPATIENT
Start: 2024-02-07 | End: 2024-02-19 | Stop reason: HOSPADM

## 2024-02-07 RX ORDER — INSULIN LISPRO 100 [IU]/ML
0-16 INJECTION, SOLUTION INTRAVENOUS; SUBCUTANEOUS EVERY 4 HOURS
Status: DISCONTINUED | OUTPATIENT
Start: 2024-02-07 | End: 2024-02-12

## 2024-02-07 RX ORDER — GLYCOPYRROLATE 1 MG/5 ML
SYRINGE (ML) INTRAVENOUS PRN
Status: DISCONTINUED | OUTPATIENT
Start: 2024-02-07 | End: 2024-02-07 | Stop reason: SDUPTHER

## 2024-02-07 RX ORDER — ACETAMINOPHEN 325 MG/1
650 TABLET ORAL EVERY 4 HOURS PRN
Status: DISCONTINUED | OUTPATIENT
Start: 2024-02-10 | End: 2024-02-09 | Stop reason: SDUPTHER

## 2024-02-07 RX ORDER — VASOPRESSIN 20 U/ML
INJECTION PARENTERAL PRN
Status: DISCONTINUED | OUTPATIENT
Start: 2024-02-07 | End: 2024-02-07 | Stop reason: SDUPTHER

## 2024-02-07 RX ORDER — FENTANYL CITRATE 0.05 MG/ML
INJECTION, SOLUTION INTRAMUSCULAR; INTRAVENOUS PRN
Status: DISCONTINUED | OUTPATIENT
Start: 2024-02-07 | End: 2024-02-07 | Stop reason: SDUPTHER

## 2024-02-07 RX ORDER — SODIUM CHLORIDE 0.9 % (FLUSH) 0.9 %
5-40 SYRINGE (ML) INJECTION EVERY 12 HOURS SCHEDULED
Status: DISCONTINUED | OUTPATIENT
Start: 2024-02-07 | End: 2024-02-19 | Stop reason: HOSPADM

## 2024-02-07 RX ORDER — CALCIUM CHLORIDE 100 MG/ML
INJECTION INTRAVENOUS; INTRAVENTRICULAR PRN
Status: DISCONTINUED | OUTPATIENT
Start: 2024-02-07 | End: 2024-02-07 | Stop reason: SDUPTHER

## 2024-02-07 RX ORDER — POTASSIUM CHLORIDE 29.8 MG/ML
20 INJECTION INTRAVENOUS PRN
Status: DISCONTINUED | OUTPATIENT
Start: 2024-02-07 | End: 2024-02-14

## 2024-02-07 RX ORDER — ACETAMINOPHEN 650 MG/1
650 SUPPOSITORY RECTAL EVERY 4 HOURS PRN
Status: DISCONTINUED | OUTPATIENT
Start: 2024-02-07 | End: 2024-02-14

## 2024-02-07 RX ORDER — MIDAZOLAM HYDROCHLORIDE 1 MG/ML
INJECTION INTRAMUSCULAR; INTRAVENOUS PRN
Status: DISCONTINUED | OUTPATIENT
Start: 2024-02-07 | End: 2024-02-07 | Stop reason: SDUPTHER

## 2024-02-07 RX ORDER — PROTAMINE SULFATE 10 MG/ML
INJECTION, SOLUTION INTRAVENOUS PRN
Status: DISCONTINUED | OUTPATIENT
Start: 2024-02-07 | End: 2024-02-07 | Stop reason: SDUPTHER

## 2024-02-07 RX ORDER — GLUCAGON 1 MG/ML
1 KIT INJECTION PRN
Status: DISCONTINUED | OUTPATIENT
Start: 2024-02-07 | End: 2024-02-14

## 2024-02-07 RX ORDER — PROPOFOL 10 MG/ML
10 INJECTION, EMULSION INTRAVENOUS CONTINUOUS PRN
Status: DISCONTINUED | OUTPATIENT
Start: 2024-02-07 | End: 2024-02-08

## 2024-02-07 RX ORDER — PROPOFOL 10 MG/ML
INJECTION, EMULSION INTRAVENOUS
Status: COMPLETED
Start: 2024-02-07 | End: 2024-02-07

## 2024-02-07 RX ORDER — DEXTROSE MONOHYDRATE 100 MG/ML
INJECTION, SOLUTION INTRAVENOUS CONTINUOUS PRN
Status: DISCONTINUED | OUTPATIENT
Start: 2024-02-07 | End: 2024-02-14

## 2024-02-07 RX ORDER — SODIUM CHLORIDE 9 MG/ML
INJECTION, SOLUTION INTRAVENOUS CONTINUOUS PRN
Status: DISCONTINUED | OUTPATIENT
Start: 2024-02-07 | End: 2024-02-07 | Stop reason: SDUPTHER

## 2024-02-07 RX ORDER — SENNA AND DOCUSATE SODIUM 50; 8.6 MG/1; MG/1
1 TABLET, FILM COATED ORAL 2 TIMES DAILY
Status: DISCONTINUED | OUTPATIENT
Start: 2024-02-07 | End: 2024-02-14

## 2024-02-07 RX ORDER — ASPIRIN 81 MG/1
81 TABLET, CHEWABLE ORAL ONCE
Status: COMPLETED | OUTPATIENT
Start: 2024-02-07 | End: 2024-02-07

## 2024-02-07 RX ORDER — SODIUM CHLORIDE 9 MG/ML
INJECTION, SOLUTION INTRAVENOUS PRN
Status: DISCONTINUED | OUTPATIENT
Start: 2024-02-07 | End: 2024-02-14

## 2024-02-07 RX ORDER — SODIUM CHLORIDE 9 MG/ML
INJECTION, SOLUTION INTRAVENOUS CONTINUOUS
Status: DISCONTINUED | OUTPATIENT
Start: 2024-02-07 | End: 2024-02-14

## 2024-02-07 RX ORDER — ALBUMIN, HUMAN INJ 5% 5 %
SOLUTION INTRAVENOUS PRN
Status: DISCONTINUED | OUTPATIENT
Start: 2024-02-07 | End: 2024-02-07 | Stop reason: SDUPTHER

## 2024-02-07 RX ORDER — NEOSTIGMINE METHYLSULFATE 1 MG/ML
INJECTION, SOLUTION INTRAVENOUS PRN
Status: DISCONTINUED | OUTPATIENT
Start: 2024-02-07 | End: 2024-02-07 | Stop reason: SDUPTHER

## 2024-02-07 RX ORDER — AMIODARONE HYDROCHLORIDE 200 MG/1
400 TABLET ORAL PRN
Status: DISCONTINUED | OUTPATIENT
Start: 2024-02-07 | End: 2024-02-14

## 2024-02-07 RX ORDER — DEXTROSE MONOHYDRATE 25 G/50ML
INJECTION, SOLUTION INTRAVENOUS PRN
Status: DISCONTINUED | OUTPATIENT
Start: 2024-02-07 | End: 2024-02-07 | Stop reason: SDUPTHER

## 2024-02-07 RX ORDER — LANOLIN ALCOHOL/MO/W.PET/CERES
400 CREAM (GRAM) TOPICAL 2 TIMES DAILY
Status: DISCONTINUED | OUTPATIENT
Start: 2024-02-08 | End: 2024-02-19 | Stop reason: HOSPADM

## 2024-02-07 RX ORDER — LEVOTHYROXINE SODIUM 0.1 MG/1
100 TABLET ORAL DAILY
Status: DISCONTINUED | OUTPATIENT
Start: 2024-02-08 | End: 2024-02-19 | Stop reason: HOSPADM

## 2024-02-07 RX ORDER — OXYCODONE HYDROCHLORIDE 5 MG/1
5 TABLET ORAL EVERY 4 HOURS PRN
Status: DISCONTINUED | OUTPATIENT
Start: 2024-02-07 | End: 2024-02-19 | Stop reason: HOSPADM

## 2024-02-07 RX ORDER — ONDANSETRON 4 MG/1
4 TABLET, ORALLY DISINTEGRATING ORAL EVERY 8 HOURS PRN
Status: DISCONTINUED | OUTPATIENT
Start: 2024-02-07 | End: 2024-02-19 | Stop reason: HOSPADM

## 2024-02-07 RX ORDER — IPRATROPIUM BROMIDE AND ALBUTEROL SULFATE 2.5; .5 MG/3ML; MG/3ML
1 SOLUTION RESPIRATORY (INHALATION)
Status: DISCONTINUED | OUTPATIENT
Start: 2024-02-07 | End: 2024-02-19 | Stop reason: HOSPADM

## 2024-02-07 RX ORDER — VECURONIUM BROMIDE 1 MG/ML
INJECTION, POWDER, LYOPHILIZED, FOR SOLUTION INTRAVENOUS PRN
Status: DISCONTINUED | OUTPATIENT
Start: 2024-02-07 | End: 2024-02-07 | Stop reason: SDUPTHER

## 2024-02-07 RX ORDER — BISACODYL 10 MG
10 SUPPOSITORY, RECTAL RECTAL DAILY PRN
Status: DISCONTINUED | OUTPATIENT
Start: 2024-02-08 | End: 2024-02-14

## 2024-02-07 RX ORDER — LIDOCAINE 4 G/G
1 PATCH TOPICAL DAILY
Status: DISCONTINUED | OUTPATIENT
Start: 2024-02-07 | End: 2024-02-19 | Stop reason: HOSPADM

## 2024-02-07 RX ORDER — ACETAMINOPHEN 500 MG
1000 TABLET ORAL EVERY 6 HOURS
Status: DISPENSED | OUTPATIENT
Start: 2024-02-07 | End: 2024-02-10

## 2024-02-07 RX ORDER — ETOMIDATE 2 MG/ML
INJECTION INTRAVENOUS PRN
Status: DISCONTINUED | OUTPATIENT
Start: 2024-02-07 | End: 2024-02-07 | Stop reason: SDUPTHER

## 2024-02-07 RX ORDER — CHLORHEXIDINE GLUCONATE ORAL RINSE 1.2 MG/ML
15 SOLUTION DENTAL 2 TIMES DAILY
Status: DISCONTINUED | OUTPATIENT
Start: 2024-02-07 | End: 2024-02-14

## 2024-02-07 RX ORDER — HEPARIN SODIUM 10000 [USP'U]/ML
INJECTION, SOLUTION INTRAVENOUS; SUBCUTANEOUS PRN
Status: DISCONTINUED | OUTPATIENT
Start: 2024-02-07 | End: 2024-02-07 | Stop reason: SDUPTHER

## 2024-02-07 RX ORDER — MEPERIDINE HYDROCHLORIDE 25 MG/ML
25 INJECTION INTRAMUSCULAR; INTRAVENOUS; SUBCUTANEOUS
Status: ACTIVE | OUTPATIENT
Start: 2024-02-07 | End: 2024-02-08

## 2024-02-07 RX ORDER — AMINOCAPROIC ACID 250 MG/ML
INJECTION, SOLUTION INTRAVENOUS PRN
Status: DISCONTINUED | OUTPATIENT
Start: 2024-02-07 | End: 2024-02-07 | Stop reason: SDUPTHER

## 2024-02-07 RX ORDER — ALBUMIN, HUMAN INJ 5% 5 %
25 SOLUTION INTRAVENOUS PRN
Status: DISCONTINUED | OUTPATIENT
Start: 2024-02-07 | End: 2024-02-14

## 2024-02-07 RX ORDER — NOREPINEPHRINE BITARTRATE 0.06 MG/ML
1-100 INJECTION, SOLUTION INTRAVENOUS CONTINUOUS PRN
Status: DISCONTINUED | OUTPATIENT
Start: 2024-02-07 | End: 2024-02-08

## 2024-02-07 RX ORDER — ASPIRIN 81 MG/1
81 TABLET ORAL DAILY
Status: DISCONTINUED | OUTPATIENT
Start: 2024-02-08 | End: 2024-02-14

## 2024-02-07 RX ORDER — ALBUMIN, HUMAN INJ 5% 5 %
SOLUTION INTRAVENOUS
Status: COMPLETED
Start: 2024-02-07 | End: 2024-02-07

## 2024-02-07 RX ORDER — ATORVASTATIN CALCIUM 40 MG/1
40 TABLET, FILM COATED ORAL NIGHTLY
Status: DISCONTINUED | OUTPATIENT
Start: 2024-02-07 | End: 2024-02-19 | Stop reason: HOSPADM

## 2024-02-07 RX ORDER — OXYCODONE HYDROCHLORIDE 10 MG/1
10 TABLET ORAL EVERY 4 HOURS PRN
Status: DISCONTINUED | OUTPATIENT
Start: 2024-02-07 | End: 2024-02-19 | Stop reason: HOSPADM

## 2024-02-07 RX ORDER — CLOPIDOGREL BISULFATE 75 MG/1
75 TABLET ORAL DAILY
Status: DISCONTINUED | OUTPATIENT
Start: 2024-02-08 | End: 2024-02-19 | Stop reason: HOSPADM

## 2024-02-07 RX ADMIN — INSULIN HUMAN 5 UNITS: 100 INJECTION, SOLUTION PARENTERAL at 10:07

## 2024-02-07 RX ADMIN — PROPOFOL 10 MCG/KG/MIN: 10 INJECTION, EMULSION INTRAVENOUS at 12:09

## 2024-02-07 RX ADMIN — ETOMIDATE 10 MG: 20 INJECTION, SOLUTION INTRAVENOUS at 07:10

## 2024-02-07 RX ADMIN — ALBUMIN (HUMAN) 25 G: 12.5 INJECTION, SOLUTION INTRAVENOUS at 11:36

## 2024-02-07 RX ADMIN — AMINOCAPROIC ACID 5000 MG: 250 INJECTION, SOLUTION INTRAVENOUS at 07:30

## 2024-02-07 RX ADMIN — Medication 50 MEQ: at 12:36

## 2024-02-07 RX ADMIN — SODIUM CHLORIDE, PRESERVATIVE FREE 10 ML: 5 INJECTION INTRAVENOUS at 20:09

## 2024-02-07 RX ADMIN — CEFAZOLIN 2000 MG: 2 INJECTION, POWDER, FOR SOLUTION INTRAMUSCULAR; INTRAVENOUS at 11:13

## 2024-02-07 RX ADMIN — ASPIRIN 81 MG: 81 TABLET, CHEWABLE ORAL at 13:07

## 2024-02-07 RX ADMIN — HEPARIN SODIUM 25000 UNITS: 10000 INJECTION INTRAVENOUS; SUBCUTANEOUS at 08:54

## 2024-02-07 RX ADMIN — FENTANYL CITRATE 100 MCG: 50 INJECTION, SOLUTION INTRAMUSCULAR; INTRAVENOUS at 10:51

## 2024-02-07 RX ADMIN — SODIUM CHLORIDE 4.64 UNITS/HR: 9 INJECTION, SOLUTION INTRAVENOUS at 14:17

## 2024-02-07 RX ADMIN — ALBUMIN (HUMAN) 25 G: 12.5 INJECTION, SOLUTION INTRAVENOUS at 12:18

## 2024-02-07 RX ADMIN — ALBUMIN (HUMAN) 12.5 G: 12.5 INJECTION, SOLUTION INTRAVENOUS at 14:42

## 2024-02-07 RX ADMIN — PANTOPRAZOLE SODIUM 40 MG: 40 INJECTION, POWDER, FOR SOLUTION INTRAVENOUS at 12:59

## 2024-02-07 RX ADMIN — Medication 0.6 MG: at 14:10

## 2024-02-07 RX ADMIN — LIDOCAINE HYDROCHLORIDE 100 MG: 20 INJECTION, SOLUTION INTRAVENOUS at 07:10

## 2024-02-07 RX ADMIN — PHENYLEPHRINE HYDROCHLORIDE 200 MCG: 10 INJECTION INTRAVENOUS at 07:52

## 2024-02-07 RX ADMIN — PROTAMINE SULFATE 200 MG: 10 INJECTION, SOLUTION INTRAVENOUS at 10:47

## 2024-02-07 RX ADMIN — Medication 3 MG: at 14:10

## 2024-02-07 RX ADMIN — VASOPRESSIN 1 UNITS: 20 INJECTION INTRAVENOUS at 10:46

## 2024-02-07 RX ADMIN — SODIUM BICARBONATE 50 MEQ: 84 INJECTION INTRAVENOUS at 12:36

## 2024-02-07 RX ADMIN — IPRATROPIUM BROMIDE AND ALBUTEROL SULFATE 1 DOSE: .5; 2.5 SOLUTION RESPIRATORY (INHALATION) at 16:28

## 2024-02-07 RX ADMIN — CHLORHEXIDINE GLUCONATE, 0.12% ORAL RINSE 15 ML: 1.2 SOLUTION DENTAL at 05:28

## 2024-02-07 RX ADMIN — CEFAZOLIN 2000 MG: 2 INJECTION, POWDER, FOR SOLUTION INTRAMUSCULAR; INTRAVENOUS at 07:32

## 2024-02-07 RX ADMIN — MIDAZOLAM 2 MG: 1 INJECTION INTRAMUSCULAR; INTRAVENOUS at 06:57

## 2024-02-07 RX ADMIN — ACETAMINOPHEN 1000 MG: 500 TABLET ORAL at 13:07

## 2024-02-07 RX ADMIN — SODIUM NITROPRUSSIDE 0.1 MCG/KG/MIN: 50 INJECTION, SOLUTION INTRAVENOUS at 13:59

## 2024-02-07 RX ADMIN — AMINOCAPROIC ACID 1 G/HR: 250 INJECTION, SOLUTION INTRAVENOUS at 07:32

## 2024-02-07 RX ADMIN — SENNOSIDES AND DOCUSATE SODIUM 1 TABLET: 8.6; 5 TABLET ORAL at 20:09

## 2024-02-07 RX ADMIN — SODIUM CHLORIDE: 9 INJECTION, SOLUTION INTRAVENOUS at 06:51

## 2024-02-07 RX ADMIN — PHENYLEPHRINE HYDROCHLORIDE 200 MCG: 10 INJECTION INTRAVENOUS at 07:37

## 2024-02-07 RX ADMIN — OXYCODONE 5 MG: 5 TABLET ORAL at 20:48

## 2024-02-07 RX ADMIN — CALCIUM CHLORIDE INJECTION 0.5 G: 100 INJECTION, SOLUTION INTRAVENOUS at 10:47

## 2024-02-07 RX ADMIN — Medication 2 MCG/MIN: at 14:56

## 2024-02-07 RX ADMIN — PHENYLEPHRINE HYDROCHLORIDE 200 MCG: 10 INJECTION INTRAVENOUS at 07:32

## 2024-02-07 RX ADMIN — CALCIUM CHLORIDE INJECTION 1000 MG: 100 INJECTION, SOLUTION INTRAVENOUS at 18:12

## 2024-02-07 RX ADMIN — EPINEPHRINE 2 MCG/MIN: 1 INJECTION INTRAMUSCULAR; INTRAVENOUS; SUBCUTANEOUS at 12:03

## 2024-02-07 RX ADMIN — HYDROCORTISONE SODIUM SUCCINATE 100 MG: 100 INJECTION, POWDER, FOR SOLUTION INTRAMUSCULAR; INTRAVENOUS at 07:20

## 2024-02-07 RX ADMIN — IPRATROPIUM BROMIDE AND ALBUTEROL SULFATE 1 DOSE: .5; 2.5 SOLUTION RESPIRATORY (INHALATION) at 20:57

## 2024-02-07 RX ADMIN — CHLORHEXIDINE GLUCONATE, 0.12% ORAL RINSE 15 ML: 1.2 SOLUTION DENTAL at 20:09

## 2024-02-07 RX ADMIN — POTASSIUM CHLORIDE 20 MEQ: 29.8 INJECTION, SOLUTION INTRAVENOUS at 17:19

## 2024-02-07 RX ADMIN — SODIUM CHLORIDE: 9 INJECTION, SOLUTION INTRAVENOUS at 11:58

## 2024-02-07 RX ADMIN — HYDROMORPHONE HYDROCHLORIDE 0.25 MG: 1 INJECTION, SOLUTION INTRAMUSCULAR; INTRAVENOUS; SUBCUTANEOUS at 16:18

## 2024-02-07 RX ADMIN — VECURONIUM BROMIDE 5 MG: 1 INJECTION, POWDER, LYOPHILIZED, FOR SOLUTION INTRAVENOUS at 08:53

## 2024-02-07 RX ADMIN — MUPIROCIN: 20 OINTMENT TOPICAL at 20:09

## 2024-02-07 RX ADMIN — FENTANYL CITRATE 250 MCG: 50 INJECTION, SOLUTION INTRAMUSCULAR; INTRAVENOUS at 07:10

## 2024-02-07 RX ADMIN — VECURONIUM BROMIDE 10 MG: 1 INJECTION, POWDER, LYOPHILIZED, FOR SOLUTION INTRAVENOUS at 07:10

## 2024-02-07 RX ADMIN — CHLORHEXIDINE GLUCONATE, 0.12% ORAL RINSE 15 ML: 1.2 SOLUTION DENTAL at 13:07

## 2024-02-07 RX ADMIN — SENNOSIDES AND DOCUSATE SODIUM 1 TABLET: 8.6; 5 TABLET ORAL at 13:07

## 2024-02-07 RX ADMIN — ATORVASTATIN CALCIUM 40 MG: 40 TABLET, FILM COATED ORAL at 20:09

## 2024-02-07 RX ADMIN — PHENYLEPHRINE HYDROCHLORIDE 100 MCG: 10 INJECTION INTRAVENOUS at 07:47

## 2024-02-07 RX ADMIN — PHENYLEPHRINE HYDROCHLORIDE 200 MCG: 10 INJECTION INTRAVENOUS at 09:13

## 2024-02-07 RX ADMIN — CEFAZOLIN 2000 MG: 2 INJECTION, POWDER, FOR SOLUTION INTRAMUSCULAR; INTRAVENOUS at 18:56

## 2024-02-07 RX ADMIN — DEXTROSE MONOHYDRATE 12.5 G: 25 INJECTION, SOLUTION INTRAVENOUS at 10:07

## 2024-02-07 RX ADMIN — EPINEPHRINE 0.04 MCG: 1 INJECTION PARENTERAL at 10:29

## 2024-02-07 RX ADMIN — MUPIROCIN: 20 OINTMENT TOPICAL at 12:58

## 2024-02-07 RX ADMIN — PHENYLEPHRINE HYDROCHLORIDE 200 MCG: 10 INJECTION INTRAVENOUS at 07:21

## 2024-02-07 ASSESSMENT — PULMONARY FUNCTION TESTS
PIF_VALUE: 25
PIF_VALUE: 24
PIF_VALUE: 16
PIF_VALUE: 17
PIF_VALUE: 25
PIF_VALUE: 24
PIF_VALUE: 24
PIF_VALUE: 17
PIF_VALUE: 25
PIF_VALUE: 16
PIF_VALUE: 17
PIF_VALUE: 17
PIF_VALUE: 25
PIF_VALUE: 24
PIF_VALUE: 25

## 2024-02-07 ASSESSMENT — PAIN DESCRIPTION - LOCATION
LOCATION: STERNUM;CHEST
LOCATION: CHEST

## 2024-02-07 ASSESSMENT — PAIN DESCRIPTION - ORIENTATION
ORIENTATION: MID
ORIENTATION: MID

## 2024-02-07 ASSESSMENT — PAIN SCALES - GENERAL
PAINLEVEL_OUTOF10: 0
PAINLEVEL_OUTOF10: 9
PAINLEVEL_OUTOF10: 6

## 2024-02-07 ASSESSMENT — PAIN DESCRIPTION - DESCRIPTORS
DESCRIPTORS: ACHING;DISCOMFORT
DESCRIPTORS: ACHING

## 2024-02-07 ASSESSMENT — PAIN DESCRIPTION - FREQUENCY: FREQUENCY: CONTINUOUS

## 2024-02-07 ASSESSMENT — PAIN - FUNCTIONAL ASSESSMENT: PAIN_FUNCTIONAL_ASSESSMENT: ACTIVITIES ARE NOT PREVENTED

## 2024-02-07 ASSESSMENT — PAIN DESCRIPTION - ONSET: ONSET: AWAKENED FROM SLEEP

## 2024-02-07 ASSESSMENT — PAIN DESCRIPTION - PAIN TYPE: TYPE: SURGICAL PAIN

## 2024-02-07 NOTE — BRIEF OP NOTE
Brief Postoperative Note    Katt BURLESON   YOB: 1945  [id number]    Pre-operative Diagnosis: STEMI, severe coronary artery disease    Post-operative Diagnosis: Same    Operation: urgent sternotomy, urgent CABG x3 (LIMA-LAD, SVG-OM, SVG-RCA), EVH LLE, left atrial appendage ligation with 35mm Atriclip    Anesthesia: General    Surgeon: Louisa    Assistants: Eran Manzano Hayes    Estimated Blood Loss: 1000    Complications: none apparent    Implants: 35mm Atriclip

## 2024-02-07 NOTE — PROCEDURES
[address] [place] [address]                               PULMONARY FUNCTION    PATIENT NAME: JESSICA SANCHEZ                    :        1945  MED REC NO:   [id number]                            ROOM:       [facility]8  ACCOUNT NO:   [id number]                           ADMIT DATE: 2024  PROVIDER:     Carlos Adams DO    DATE OF PROCEDURE:  2024    IDENTIFYING INFORMATION:  A 78-year-old female, 62 inches, 127 pounds.    FINDINGS:  Spirometry reveals a forced vital capacity of 1.93 liters,  80% of predicted with an FEV1 of 1.70 liters, 92% of predicted with an  FEV1-FVC ratio of 88%.  Midflow rates are 141% of predicted with a  maximum voluntary ventilation of 59 liters per minute, 78% of predicted.  The diffusion capacity is 12.20 mL/minute per mmHg, which is 69% of  predicted, but when the diffusion capacity is corrected for hemoglobin,  the diffusion capacity normalizes to 81%.  Notching in the expiratory  flow limb are seen.    IMPRESSION:  No definitive airflow obstruction, normal spirometric  values, and a corrected diffusion capacity for hemoglobin indicates the  gas transfer is limited to exchange due to anemia.  Clinical correlation  needed.        CARLOS ADAMS DO    D: 2024 18:15:17       T: 2024 18:17:28     [email or web address]  Job#: [id number]     Doc#: [id number]    CC:

## 2024-02-07 NOTE — ACP (ADVANCE CARE PLANNING)
Advance Care Planning   Healthcare Decision Maker:    Primary Decision Maker (Active): ROSE SANCHEZ - [phone]    Secondary Decision Maker: LOCO Jessie Guevara - [phone]    Click here to complete Healthcare Decision Makers including selection of the Healthcare Decision Maker Relationship (ie \"Primary\").

## 2024-02-07 NOTE — OP NOTE
[facility]  [address] [place] [address]      OPERATIVE REPORT     PATIENT NAME: Katt Diaz  : 1945  MEDICAL RECORD NUMBER: [id number]                          ADMIT DATE: 2024 10:40 AM    PROVIDER: Nicolasa Jasso DO     DATE OF PROCEDURE:  2024     PREOPERATIVE DIAGNOSES:  Severe multivessel coronary artery disease, STEMI.     POSTOPERATIVE DIAGNOSES: same     INDICATIONS:  Severe multivessel coronary artery disease, STEMI     SURGEON:  Nicolasa Jasso DO     ASSISTANT:  Kevin Manzano MD (no resident was adequately trained to be able to assist).  Dr. Manzano was present and assisting throughout the critical portion of the operation.     SECOND ASSISTANT:  Nery Barillas NP (harvested the vein), Aysha WAGGONER (assisted with opening, exposure and closure).     COMPLICATIONS:  None apparent.     ESTIMATED BLOOD LOSS:  Approximately 1000 mL.     ANESTHESIA:  General endotracheal.     ANESTHESIA ATTENDING:  Jayna Valenzuela MD     SPECIMENS OBTAINED:  PA window lymph node.     DRAINS: 28Fr chest tube in the mediastinum, 19Fr channel drain x2 in the mediastinum, 19Fr channel drain in the left chest     PROCEDURES PERFORMED:  1.  Urgent sternotomy.  2.  Urgent coronary artery bypass grafting x3; left internal mammary artery to the LAD, saphenous vein graft to the distal right coronary artery, saphenous vein graft to the obtuse marginal artery.  3.  Left atrial appendage occlusion with a 35 mm AtriClip.  4.  Endoscopic harvesting, left lower extremity greater saphenous vein.  5.  Sternal closure with combination of sternal wires and Martha sternal plates x2     HISTORY:  This is a 78-year-old female who was having chest pain. She was found to have a STEMI. She had a cardiac cath, which showed severe multivessel coronary artery disease.  She was referred for coronary artery bypass grafting by Dr. Shaw.  The patient was described the procedure in full

## 2024-02-07 NOTE — ANESTHESIA PROCEDURE NOTES
Central Venous Line:    A central venous line was placed using ultrasound guidance and surface landmarks, in the pre-op for the following indication(s): central venous access and CVP monitoring.    Sterility preparation included the following: hand hygiene performed prior to procedure, maximum sterile barriers used and sterile technique used to drape from head to toe.    The patient was placed in Trendelenburg position.The right internal jugular vein was prepped.    The site was prepped with Chloraprep.  A 8.5 Fr (size), 10 (length), introducer slick was placed.    During the procedure, the following specific steps were taken: target vein identified, needle advanced into vein and blood aspirated and guidewire advanced into vein.    Intravenous verification was obtained by ultrasound and venous blood return.    Post insertion care included: all ports aspirated, all ports flushed easily, guidewire removed intact, Biopatch applied, line sutured in place and dressing applied.    During the procedure the patient experienced: patient tolerated procedure well with no complications.      Outcomes: uncomplicated and patient tolerated procedure well  Real-time US image taken/store: yes  Anesthesia type: local..No  Staffing  Performed: Anesthesiologist   Anesthesiologist: Jayna Valenzuela MD  Performed by: Jayna Valenzuela MD  Authorized by: Jayna Valenzuela MD    Preanesthetic Checklist  Completed: patient identified, IV checked, site marked, risks and benefits discussed, surgical/procedural consents, equipment checked, pre-op evaluation, timeout performed, anesthesia consent given, oxygen available, monitors applied/VS acknowledged, fire risk safety assessment completed and verbalized and blood product R/B/A discussed and consented

## 2024-02-07 NOTE — ANESTHESIA PROCEDURE NOTES
Arterial Line:    An arterial line was placed using ultrasound guidance, in the OR for the following indication(s): continuous blood pressure monitoring and blood sampling needed.    A 20 gauge (size), 12 cm (length), Arrow (type) catheter was placed, Seldinger technique used, into the right brachial artery, secured by Tegaderm.  Anesthesia type: Local  Local infiltration: Injection    Events:  patient tolerated procedure well with no complications.2/7/2024 7:00 AM2/7/2024 7:08 AM  Anesthesiologist: Jayna Valenzuela MD  Resident/CRNA: Thee Edmond APRN - RODERICK  Other anesthesia staff: Navneet Martinez RN  Performed: Other anesthesia staff   Preanesthetic Checklist  Completed: patient identified, IV checked, site marked, risks and benefits discussed, surgical/procedural consents, equipment checked, pre-op evaluation, timeout performed, anesthesia consent given, oxygen available and monitors applied/VS acknowledged

## 2024-02-07 NOTE — ANESTHESIA PROCEDURE NOTES
Procedure Performed: YOVANY       Start Time:        End Time:      Preanesthesia Checklist:  Patient identified, IV assessed, risks and benefits discussed, monitors and equipment assessed, procedure being performed at surgeon's request and anesthesia consent obtained.    General Procedure Information  Diagnostic Indications for Echo:  assessment of ascending aorta, assessment of surgical repair, defect repair evaluation, hemodynamic monitoring and assessment of valve function  Physician Requesting Echo: Nicolasa Jasso DO  Location performed:  OR  Intubated  Bite block placed  Heart visualized  Probe Insertion:  Easy  Probe Type:  3D and mulitplane  Modalities:  3D, color flow mapping, pulse wave Doppler and continuous wave Doppler    Echocardiographic and Doppler Measurements    Ventricles    Right Ventricle:  Cavity size dilated.  Hypertrophy not present.  Thrombus not present.  Global function moderately impaired.    Left Ventricle:  Cavity size normal.  Hypertrophy not present.  Thrombus not present.  Global Function moderately impaired.  Ejection Fraction 35%.      Ventricular Regional Function:  1- Basal Anteroseptal:  normal  2- Basal Anterior:  normal  3- Basal Anterolateral:  normal  4- Basal Inferolateral:  akinetic  5- Basal Inferior:  akinetic  6- Basal Inferoseptal:  akinetic  7- Mid Anteroseptal:  normal  8- Mid Anterior:  normal  9- Mid Anterolateral:  normal  10- Mid Inferolateral:  hypokinetic  11- Mid Inferior:  akinetic  12- Mid Inferoseptal:  akinetic  13- Apical Anterior:  normal  14- Apical Lateral:  normal  15- Apical Inferior:  hypokinetic  16- Apical Septal:  normal  17- McConnells:  normal    Wall Motion Comments:       Inferior to inferoseptal akinesis.  RV posterior wall severe hypokinesis    Valves    Aortic Valve:  Annulus calcified.  Stenosis not present.  Regurgitation none.  Leaflets normal and thickened.  Leaflet motions normal.      Mitral Valve:  Annulus calcified.  Stenosis not

## 2024-02-08 ENCOUNTER — APPOINTMENT (OUTPATIENT)
Age: 79
DRG: 233 | End: 2024-02-08
Attending: INTERNAL MEDICINE
Payer: MEDICARE

## 2024-02-08 ENCOUNTER — APPOINTMENT (OUTPATIENT)
Dept: GENERAL RADIOLOGY | Age: 79
DRG: 233 | End: 2024-02-08
Attending: INTERNAL MEDICINE
Payer: MEDICARE

## 2024-02-08 LAB
ALBUMIN SERPL-MCNC: 4.2 G/DL (ref 3.5–5.2)
ALBUMIN SERPL-MCNC: 4.4 G/DL (ref 3.5–5.2)
ALP SERPL-CCNC: 41 U/L (ref 35–104)
ALP SERPL-CCNC: 42 U/L (ref 35–104)
ALT SERPL-CCNC: 6 U/L (ref 0–32)
ALT SERPL-CCNC: 7 U/L (ref 0–32)
ANION GAP SERPL CALCULATED.3IONS-SCNC: 13 MMOL/L (ref 7–16)
ANION GAP SERPL CALCULATED.3IONS-SCNC: 17 MMOL/L (ref 7–16)
ANION GAP SERPL CALCULATED.3IONS-SCNC: 20 MMOL/L (ref 7–16)
AST SERPL-CCNC: 27 U/L (ref 0–31)
AST SERPL-CCNC: 28 U/L (ref 0–31)
B.E.: -2.4 MMOL/L (ref -3–3)
B.E.: -5.8 MMOL/L (ref -3–3)
B.E.: -6 MMOL/L (ref -3–3)
B.E.: -9.1 MMOL/L (ref -3–3)
BILIRUB SERPL-MCNC: 0.8 MG/DL (ref 0–1.2)
BILIRUB SERPL-MCNC: 0.9 MG/DL (ref 0–1.2)
BUN SERPL-MCNC: 16 MG/DL (ref 6–23)
BUN SERPL-MCNC: 18 MG/DL (ref 6–23)
BUN SERPL-MCNC: 19 MG/DL (ref 6–23)
CA-I BLD-SCNC: 1.1 MMOL/L (ref 1.15–1.33)
CALCIUM SERPL-MCNC: 7.8 MG/DL (ref 8.6–10.2)
CALCIUM SERPL-MCNC: 8 MG/DL (ref 8.6–10.2)
CALCIUM SERPL-MCNC: 8.3 MG/DL (ref 8.6–10.2)
CHLORIDE SERPL-SCNC: 105 MMOL/L (ref 98–107)
CHLORIDE SERPL-SCNC: 107 MMOL/L (ref 98–107)
CHLORIDE SERPL-SCNC: 107 MMOL/L (ref 98–107)
CO2 SERPL-SCNC: 17 MMOL/L (ref 22–29)
CO2 SERPL-SCNC: 17 MMOL/L (ref 22–29)
CO2 SERPL-SCNC: 19 MMOL/L (ref 22–29)
COHB: 0.3 % (ref 0–1.5)
CREAT SERPL-MCNC: 1.1 MG/DL (ref 0.5–1)
CREAT SERPL-MCNC: 1.1 MG/DL (ref 0.5–1)
CREAT SERPL-MCNC: 1.2 MG/DL (ref 0.5–1)
CRITICAL: ABNORMAL
D DIMER: 676 NG/ML DDU (ref 0–232)
DATE ANALYZED: ABNORMAL
DATE OF COLLECTION: ABNORMAL
ECHO AO ASC DIAM: 3.4 CM
ECHO AO ASCENDING AORTA INDEX: 2.11 CM/M2
ECHO AV AREA PEAK VELOCITY: 1.1 CM2
ECHO AV AREA VTI: 0.9 CM2
ECHO AV AREA/BSA PEAK VELOCITY: 0.7 CM2/M2
ECHO AV AREA/BSA VTI: 0.6 CM2/M2
ECHO AV CUSP MM: 1.5 CM
ECHO AV MEAN GRADIENT: 11 MMHG
ECHO AV MEAN VELOCITY: 1.6 M/S
ECHO AV PEAK GRADIENT: 16 MMHG
ECHO AV PEAK VELOCITY: 2 M/S
ECHO AV VELOCITY RATIO: 0.35
ECHO AV VTI: 39.3 CM
ECHO BSA: 1.49 M2
ECHO LA DIAMETER INDEX: 2.42 CM/M2
ECHO LA DIAMETER: 3.9 CM
ECHO LA VOL A-L A2C: 39 ML (ref 22–52)
ECHO LA VOL A-L A4C: 43 ML (ref 22–52)
ECHO LA VOL MOD A2C: 37 ML (ref 22–52)
ECHO LA VOL MOD A4C: 39 ML (ref 22–52)
ECHO LA VOLUME AREA LENGTH: 43 ML
ECHO LA VOLUME INDEX A-L A2C: 24 ML/M2 (ref 16–34)
ECHO LA VOLUME INDEX A-L A4C: 27 ML/M2 (ref 16–34)
ECHO LA VOLUME INDEX AREA LENGTH: 27 ML/M2 (ref 16–34)
ECHO LA VOLUME INDEX MOD A2C: 23 ML/M2 (ref 16–34)
ECHO LA VOLUME INDEX MOD A4C: 24 ML/M2 (ref 16–34)
ECHO LV EJECTION FRACTION A2C: 26 %
ECHO LV EJECTION FRACTION A4C: 29 %
ECHO LV FRACTIONAL SHORTENING: 15 % (ref 28–44)
ECHO LV INTERNAL DIMENSION DIASTOLE INDEX: 2.92 CM/M2
ECHO LV INTERNAL DIMENSION DIASTOLIC: 4.7 CM (ref 3.9–5.3)
ECHO LV INTERNAL DIMENSION SYSTOLIC INDEX: 2.48 CM/M2
ECHO LV INTERNAL DIMENSION SYSTOLIC: 4 CM
ECHO LV IVSD: 0.9 CM (ref 0.6–0.9)
ECHO LV IVSS: 1 CM
ECHO LV MASS 2D: 132.3 G (ref 67–162)
ECHO LV MASS INDEX 2D: 82.2 G/M2 (ref 43–95)
ECHO LV POSTERIOR WALL DIASTOLIC: 0.8 CM (ref 0.6–0.9)
ECHO LV POSTERIOR WALL SYSTOLIC: 1 CM
ECHO LV RELATIVE WALL THICKNESS RATIO: 0.34
ECHO LVOT AREA: 3.1 CM2
ECHO LVOT AV VTI INDEX: 0.28
ECHO LVOT DIAM: 2 CM
ECHO LVOT MEAN GRADIENT: 1 MMHG
ECHO LVOT PEAK GRADIENT: 2 MMHG
ECHO LVOT PEAK VELOCITY: 0.7 M/S
ECHO LVOT STROKE VOLUME INDEX: 21.6 ML/M2
ECHO LVOT SV: 34.9 ML
ECHO LVOT VTI: 11.1 CM
ECHO MV "A" WAVE DURATION: 79.6 MSEC
ECHO MV A VELOCITY: 0.89 M/S
ECHO MV AREA PHT: 3 CM2
ECHO MV AREA VTI: 2.1 CM2
ECHO MV E DECELERATION TIME (DT): 123.7 MS
ECHO MV E VELOCITY: 0.85 M/S
ECHO MV E/A RATIO: 0.96
ECHO MV EROA PISA: 0.2 CM2
ECHO MV LVOT VTI INDEX: 1.5
ECHO MV MAX VELOCITY: 0.9 M/S
ECHO MV MEAN GRADIENT: 1 MMHG
ECHO MV MEAN VELOCITY: 0.6 M/S
ECHO MV PEAK GRADIENT: 3 MMHG
ECHO MV PRESSURE HALF TIME (PHT): 72.3 MS
ECHO MV REGURGITANT ALIASING (NYQUIST) VELOCITY: 37 CM/S
ECHO MV REGURGITANT RADIUS PISA: 0.53 CM
ECHO MV REGURGITANT VELOCITY PISA: 4 M/S
ECHO MV REGURGITANT VOLUME PISA: 18.75 ML
ECHO MV REGURGITANT VTIA: 114.9 CM
ECHO MV VTI: 16.6 CM
ECHO PV MAX VELOCITY: 0.6 M/S
ECHO PV MEAN GRADIENT: 1 MMHG
ECHO PV MEAN VELOCITY: 0.5 M/S
ECHO PV PEAK GRADIENT: 1 MMHG
ECHO PV VTI: 9.6 CM
ECHO RV INTERNAL DIMENSION: 3.2 CM
ECHO TV REGURGITANT MAX VELOCITY: 3.18 M/S
ECHO TV REGURGITANT PEAK GRADIENT: 40 MMHG
ERYTHROCYTE [DISTWIDTH] IN BLOOD BY AUTOMATED COUNT: 15.4 % (ref 11.5–15)
GFR SERPL CREATININE-BSD FRML MDRD: 47 ML/MIN/1.73M2
GFR SERPL CREATININE-BSD FRML MDRD: 50 ML/MIN/1.73M2
GFR SERPL CREATININE-BSD FRML MDRD: 53 ML/MIN/1.73M2
GLUCOSE BLD-MCNC: 106 MG/DL (ref 74–99)
GLUCOSE BLD-MCNC: 118 MG/DL (ref 74–99)
GLUCOSE BLD-MCNC: 124 MG/DL (ref 74–99)
GLUCOSE BLD-MCNC: 134 MG/DL (ref 74–99)
GLUCOSE BLD-MCNC: 136 MG/DL (ref 74–99)
GLUCOSE BLD-MCNC: 145 MG/DL (ref 74–99)
GLUCOSE BLD-MCNC: 147 MG/DL (ref 74–99)
GLUCOSE BLD-MCNC: 153 MG/DL (ref 74–99)
GLUCOSE BLD-MCNC: 164 MG/DL (ref 74–99)
GLUCOSE BLD-MCNC: 185 MG/DL (ref 74–99)
GLUCOSE BLD-MCNC: 187 MG/DL (ref 74–99)
GLUCOSE BLD-MCNC: 188 MG/DL (ref 74–99)
GLUCOSE BLD-MCNC: 194 MG/DL (ref 74–99)
GLUCOSE BLD-MCNC: 223 MG/DL (ref 74–99)
GLUCOSE SERPL-MCNC: 144 MG/DL (ref 74–99)
GLUCOSE SERPL-MCNC: 184 MG/DL (ref 74–99)
GLUCOSE SERPL-MCNC: 219 MG/DL (ref 74–99)
HCO3: 14.8 MMOL/L (ref 22–26)
HCO3: 16.9 MMOL/L (ref 22–26)
HCO3: 17.1 MMOL/L (ref 22–26)
HCO3: 20.7 MMOL/L (ref 22–26)
HCT VFR BLD AUTO: 25.2 % (ref 34–48)
HGB BLD-MCNC: 8.2 G/DL (ref 11.5–15.5)
HHB: 4.8 % (ref 0–5)
HHB: 5.1 % (ref 0–5)
HHB: 7.2 % (ref 0–5)
HHB: 7.6 % (ref 0–5)
INR PPP: 1.3
LAB: ABNORMAL
Lab: 1000
Lab: 1210
Lab: 2034
Lab: 430
MAGNESIUM SERPL-MCNC: 2.9 MG/DL (ref 1.6–2.6)
MCH RBC QN AUTO: 29.1 PG (ref 26–35)
MCHC RBC AUTO-ENTMCNC: 32.5 G/DL (ref 32–34.5)
MCV RBC AUTO: 89.4 FL (ref 80–99.9)
METHB: 0.2 % (ref 0–1.5)
METHB: 0.3 % (ref 0–1.5)
METHB: 0.3 % (ref 0–1.5)
METHB: 0.4 % (ref 0–1.5)
MODE: ABNORMAL
O2 SATURATION: 92.3 % (ref 92–98.5)
O2 SATURATION: 92.8 % (ref 92–98.5)
O2 SATURATION: 94.9 % (ref 92–98.5)
O2 SATURATION: 95.2 % (ref 92–98.5)
O2HB: 91.7 % (ref 94–97)
O2HB: 92.2 % (ref 94–97)
O2HB: 94.4 % (ref 94–97)
O2HB: 94.6 % (ref 94–97)
OPERATOR ID: 2962
OPERATOR ID: 421
OPERATOR ID: 5323
OPERATOR ID: 5323
PATIENT TEMP: 37 C
PCO2: 24.1 MMHG (ref 35–45)
PCO2: 25.1 MMHG (ref 35–45)
PCO2: 25.1 MMHG (ref 35–45)
PCO2: 29 MMHG (ref 35–45)
PH BLOOD GAS: 7.39 (ref 7.35–7.45)
PH BLOOD GAS: 7.45 (ref 7.35–7.45)
PH BLOOD GAS: 7.46 (ref 7.35–7.45)
PH BLOOD GAS: 7.47 (ref 7.35–7.45)
PLATELET # BLD AUTO: 145 K/UL (ref 130–450)
PMV BLD AUTO: 10.7 FL (ref 7–12)
PO2: 65.1 MMHG (ref 75–100)
PO2: 65.6 MMHG (ref 75–100)
PO2: 78.2 MMHG (ref 75–100)
PO2: 83.1 MMHG (ref 75–100)
POC LACTIC ACID: 2.9 MMOL/L (ref 0.5–2.2)
POC LACTIC ACID: 4 MMOL/L (ref 0.5–2.2)
POC LACTIC ACID: 5 MMOL/L (ref 0.5–2.2)
POC LACTIC ACID: 5.1 MMOL/L (ref 0.5–2.2)
POC LACTIC ACID: 5.1 MMOL/L (ref 0.5–2.2)
POC LACTIC ACID: 5.5 MMOL/L (ref 0.5–2.2)
POC LACTIC ACID: 7.2 MMOL/L (ref 0.5–2.2)
POC LACTIC ACID: 7.3 MMOL/L (ref 0.5–2.2)
POTASSIUM SERPL-SCNC: 4 MMOL/L (ref 3.5–5)
POTASSIUM SERPL-SCNC: 4.1 MMOL/L (ref 3.5–5)
POTASSIUM SERPL-SCNC: 4.7 MMOL/L (ref 3.5–5)
POTASSIUM SERPL-SCNC: 4.9 MMOL/L (ref 3.5–5)
POTASSIUM SERPL-SCNC: 5 MMOL/L (ref 3.5–5)
PROT SERPL-MCNC: 5.5 G/DL (ref 6.4–8.3)
PROT SERPL-MCNC: 5.8 G/DL (ref 6.4–8.3)
PROTHROMBIN TIME: 13.6 SEC (ref 9.3–12.4)
RBC # BLD AUTO: 2.82 M/UL (ref 3.5–5.5)
SODIUM SERPL-SCNC: 137 MMOL/L (ref 132–146)
SODIUM SERPL-SCNC: 141 MMOL/L (ref 132–146)
SODIUM SERPL-SCNC: 144 MMOL/L (ref 132–146)
SOURCE, BLOOD GAS: ABNORMAL
THB: 7.9 G/DL (ref 11.5–16.5)
THB: 8.3 G/DL (ref 11.5–16.5)
THB: 8.5 G/DL (ref 11.5–16.5)
THB: 9.4 G/DL (ref 11.5–16.5)
TIME ANALYZED: 1010
TIME ANALYZED: 1213
TIME ANALYZED: 2044
TIME ANALYZED: 437
WBC OTHER # BLD: 10.8 K/UL (ref 4.5–11.5)

## 2024-02-08 PROCEDURE — 94669 MECHANICAL CHEST WALL OSCILL: CPT

## 2024-02-08 PROCEDURE — 83605 ASSAY OF LACTIC ACID: CPT

## 2024-02-08 PROCEDURE — 2500000003 HC RX 250 WO HCPCS: Performed by: NURSE PRACTITIONER

## 2024-02-08 PROCEDURE — 97162 PT EVAL MOD COMPLEX 30 MIN: CPT

## 2024-02-08 PROCEDURE — 2580000003 HC RX 258: Performed by: NURSE PRACTITIONER

## 2024-02-08 PROCEDURE — 85027 COMPLETE CBC AUTOMATED: CPT

## 2024-02-08 PROCEDURE — 82805 BLOOD GASES W/O2 SATURATION: CPT

## 2024-02-08 PROCEDURE — 85610 PROTHROMBIN TIME: CPT

## 2024-02-08 PROCEDURE — 97530 THERAPEUTIC ACTIVITIES: CPT

## 2024-02-08 PROCEDURE — P9047 ALBUMIN (HUMAN), 25%, 50ML: HCPCS | Performed by: NURSE PRACTITIONER

## 2024-02-08 PROCEDURE — 80048 BASIC METABOLIC PNL TOTAL CA: CPT

## 2024-02-08 PROCEDURE — 93306 TTE W/DOPPLER COMPLETE: CPT | Performed by: INTERNAL MEDICINE

## 2024-02-08 PROCEDURE — P9047 ALBUMIN (HUMAN), 25%, 50ML: HCPCS

## 2024-02-08 PROCEDURE — 93306 TTE W/DOPPLER COMPLETE: CPT

## 2024-02-08 PROCEDURE — 6360000002 HC RX W HCPCS

## 2024-02-08 PROCEDURE — P9045 ALBUMIN (HUMAN), 5%, 250 ML: HCPCS | Performed by: NURSE PRACTITIONER

## 2024-02-08 PROCEDURE — 6370000000 HC RX 637 (ALT 250 FOR IP): Performed by: NURSE PRACTITIONER

## 2024-02-08 PROCEDURE — 71045 X-RAY EXAM CHEST 1 VIEW: CPT

## 2024-02-08 PROCEDURE — 97166 OT EVAL MOD COMPLEX 45 MIN: CPT

## 2024-02-08 PROCEDURE — 6360000002 HC RX W HCPCS: Performed by: NURSE PRACTITIONER

## 2024-02-08 PROCEDURE — 2580000003 HC RX 258: Performed by: THORACIC SURGERY (CARDIOTHORACIC VASCULAR SURGERY)

## 2024-02-08 PROCEDURE — 36415 COLL VENOUS BLD VENIPUNCTURE: CPT

## 2024-02-08 PROCEDURE — 83735 ASSAY OF MAGNESIUM: CPT

## 2024-02-08 PROCEDURE — 85379 FIBRIN DEGRADATION QUANT: CPT

## 2024-02-08 PROCEDURE — 37799 UNLISTED PX VASCULAR SURGERY: CPT

## 2024-02-08 PROCEDURE — 94640 AIRWAY INHALATION TREATMENT: CPT

## 2024-02-08 PROCEDURE — 82962 GLUCOSE BLOOD TEST: CPT

## 2024-02-08 PROCEDURE — 2700000000 HC OXYGEN THERAPY PER DAY

## 2024-02-08 PROCEDURE — 82330 ASSAY OF CALCIUM: CPT

## 2024-02-08 PROCEDURE — P9047 ALBUMIN (HUMAN), 25%, 50ML: HCPCS | Performed by: THORACIC SURGERY (CARDIOTHORACIC VASCULAR SURGERY)

## 2024-02-08 PROCEDURE — 6360000002 HC RX W HCPCS: Performed by: THORACIC SURGERY (CARDIOTHORACIC VASCULAR SURGERY)

## 2024-02-08 PROCEDURE — 84132 ASSAY OF SERUM POTASSIUM: CPT

## 2024-02-08 PROCEDURE — 2500000003 HC RX 250 WO HCPCS: Performed by: THORACIC SURGERY (CARDIOTHORACIC VASCULAR SURGERY)

## 2024-02-08 PROCEDURE — 2000000000 HC ICU R&B

## 2024-02-08 PROCEDURE — 80053 COMPREHEN METABOLIC PANEL: CPT

## 2024-02-08 PROCEDURE — 99291 CRITICAL CARE FIRST HOUR: CPT | Performed by: INTERNAL MEDICINE

## 2024-02-08 RX ORDER — ALBUMIN (HUMAN) 12.5 G/50ML
25 SOLUTION INTRAVENOUS ONCE
Status: COMPLETED | OUTPATIENT
Start: 2024-02-08 | End: 2024-02-08

## 2024-02-08 RX ORDER — ALBUMIN (HUMAN) 12.5 G/50ML
SOLUTION INTRAVENOUS
Status: COMPLETED
Start: 2024-02-08 | End: 2024-02-08

## 2024-02-08 RX ORDER — ALBUMIN, HUMAN INJ 5% 5 %
25 SOLUTION INTRAVENOUS ONCE
Status: COMPLETED | OUTPATIENT
Start: 2024-02-08 | End: 2024-02-08

## 2024-02-08 RX ORDER — ALBUMIN (HUMAN) 12.5 G/50ML
50 SOLUTION INTRAVENOUS ONCE
Status: COMPLETED | OUTPATIENT
Start: 2024-02-08 | End: 2024-02-08

## 2024-02-08 RX ADMIN — WATER 100 MG: 1 INJECTION INTRAMUSCULAR; INTRAVENOUS; SUBCUTANEOUS at 19:45

## 2024-02-08 RX ADMIN — SODIUM CHLORIDE, PRESERVATIVE FREE 10 ML: 5 INJECTION INTRAVENOUS at 19:46

## 2024-02-08 RX ADMIN — CHLORHEXIDINE GLUCONATE, 0.12% ORAL RINSE 15 ML: 1.2 SOLUTION DENTAL at 19:46

## 2024-02-08 RX ADMIN — ALBUMIN (HUMAN) 25 G: 0.25 INJECTION, SOLUTION INTRAVENOUS at 18:43

## 2024-02-08 RX ADMIN — OXYCODONE 5 MG: 5 TABLET ORAL at 11:31

## 2024-02-08 RX ADMIN — ACETAMINOPHEN 1000 MG: 500 TABLET ORAL at 18:06

## 2024-02-08 RX ADMIN — MUPIROCIN: 20 OINTMENT TOPICAL at 08:21

## 2024-02-08 RX ADMIN — SODIUM CHLORIDE, PRESERVATIVE FREE 10 ML: 5 INJECTION INTRAVENOUS at 08:21

## 2024-02-08 RX ADMIN — Medication 400 MG: at 08:22

## 2024-02-08 RX ADMIN — ACETAMINOPHEN 1000 MG: 500 TABLET ORAL at 00:18

## 2024-02-08 RX ADMIN — MUPIROCIN: 20 OINTMENT TOPICAL at 19:46

## 2024-02-08 RX ADMIN — CALCIUM CHLORIDE INJECTION 1000 MG: 100 INJECTION, SOLUTION INTRAVENOUS at 07:19

## 2024-02-08 RX ADMIN — IPRATROPIUM BROMIDE AND ALBUTEROL SULFATE 1 DOSE: .5; 2.5 SOLUTION RESPIRATORY (INHALATION) at 19:53

## 2024-02-08 RX ADMIN — IPRATROPIUM BROMIDE AND ALBUTEROL SULFATE 1 DOSE: .5; 2.5 SOLUTION RESPIRATORY (INHALATION) at 12:27

## 2024-02-08 RX ADMIN — IPRATROPIUM BROMIDE AND ALBUTEROL SULFATE 1 DOSE: .5; 2.5 SOLUTION RESPIRATORY (INHALATION) at 08:06

## 2024-02-08 RX ADMIN — SODIUM CHLORIDE: 9 INJECTION, SOLUTION INTRAVENOUS at 17:33

## 2024-02-08 RX ADMIN — CEFAZOLIN 2000 MG: 2 INJECTION, POWDER, FOR SOLUTION INTRAMUSCULAR; INTRAVENOUS at 18:47

## 2024-02-08 RX ADMIN — LEVOTHYROXINE SODIUM 100 MCG: 0.1 TABLET ORAL at 05:34

## 2024-02-08 RX ADMIN — ACETAMINOPHEN 1000 MG: 500 TABLET ORAL at 05:34

## 2024-02-08 RX ADMIN — ALBUMIN (HUMAN) 25 G: 0.25 INJECTION, SOLUTION INTRAVENOUS at 13:57

## 2024-02-08 RX ADMIN — ALBUMIN (HUMAN) 25 G: 12.5 INJECTION, SOLUTION INTRAVENOUS at 09:44

## 2024-02-08 RX ADMIN — VASOPRESSIN 0.02 UNITS/MIN: 20 INJECTION INTRAVENOUS at 10:10

## 2024-02-08 RX ADMIN — SENNOSIDES AND DOCUSATE SODIUM 1 TABLET: 8.6; 5 TABLET ORAL at 08:22

## 2024-02-08 RX ADMIN — ALBUMIN (HUMAN) 50 G: 12.5 SOLUTION INTRAVENOUS at 06:34

## 2024-02-08 RX ADMIN — OXYCODONE 5 MG: 5 TABLET ORAL at 18:06

## 2024-02-08 RX ADMIN — Medication 400 MG: at 19:48

## 2024-02-08 RX ADMIN — ASPIRIN 81 MG: 81 TABLET, COATED ORAL at 08:22

## 2024-02-08 RX ADMIN — SODIUM BICARBONATE: 84 INJECTION, SOLUTION INTRAVENOUS at 22:06

## 2024-02-08 RX ADMIN — SODIUM CHLORIDE 2.5 UNITS/HR: 9 INJECTION, SOLUTION INTRAVENOUS at 15:12

## 2024-02-08 RX ADMIN — SODIUM BICARBONATE 50 MEQ: 84 INJECTION INTRAVENOUS at 08:39

## 2024-02-08 RX ADMIN — INSULIN GLARGINE 8 UNITS: 100 INJECTION, SOLUTION SUBCUTANEOUS at 20:30

## 2024-02-08 RX ADMIN — IPRATROPIUM BROMIDE AND ALBUTEROL SULFATE 1 DOSE: .5; 2.5 SOLUTION RESPIRATORY (INHALATION) at 16:02

## 2024-02-08 RX ADMIN — SENNOSIDES AND DOCUSATE SODIUM 1 TABLET: 8.6; 5 TABLET ORAL at 19:45

## 2024-02-08 RX ADMIN — OXYCODONE HYDROCHLORIDE 10 MG: 10 TABLET ORAL at 05:34

## 2024-02-08 RX ADMIN — PANTOPRAZOLE SODIUM 40 MG: 40 TABLET, DELAYED RELEASE ORAL at 05:33

## 2024-02-08 RX ADMIN — CEFAZOLIN 2000 MG: 2 INJECTION, POWDER, FOR SOLUTION INTRAMUSCULAR; INTRAVENOUS at 10:11

## 2024-02-08 RX ADMIN — FLUDROCORTISONE ACETATE 0.1 MG: 0.1 TABLET ORAL at 08:22

## 2024-02-08 RX ADMIN — CEFAZOLIN 2000 MG: 2 INJECTION, POWDER, FOR SOLUTION INTRAMUSCULAR; INTRAVENOUS at 03:06

## 2024-02-08 RX ADMIN — ACETAMINOPHEN 1000 MG: 500 TABLET ORAL at 11:32

## 2024-02-08 RX ADMIN — ONDANSETRON 4 MG: 2 INJECTION INTRAMUSCULAR; INTRAVENOUS at 10:21

## 2024-02-08 RX ADMIN — ALBUMIN (HUMAN) 12.5 G: 12.5 INJECTION, SOLUTION INTRAVENOUS at 00:26

## 2024-02-08 RX ADMIN — EPINEPHRINE 2 MCG/MIN: 1 INJECTION INTRAMUSCULAR; INTRAVENOUS; SUBCUTANEOUS at 10:51

## 2024-02-08 RX ADMIN — ATORVASTATIN CALCIUM 40 MG: 40 TABLET, FILM COATED ORAL at 19:45

## 2024-02-08 RX ADMIN — WATER 100 MG: 1 INJECTION INTRAMUSCULAR; INTRAVENOUS; SUBCUTANEOUS at 11:56

## 2024-02-08 RX ADMIN — CLOPIDOGREL 75 MG: 75 TABLET, FILM COATED ORAL at 08:22

## 2024-02-08 RX ADMIN — INSULIN LISPRO 4 UNITS: 100 INJECTION, SOLUTION INTRAVENOUS; SUBCUTANEOUS at 11:49

## 2024-02-08 RX ADMIN — ALBUMIN (HUMAN) 50 G: 0.25 INJECTION, SOLUTION INTRAVENOUS at 06:34

## 2024-02-08 RX ADMIN — Medication 100 MEQ: at 10:26

## 2024-02-08 ASSESSMENT — PAIN SCALES - GENERAL
PAINLEVEL_OUTOF10: 0
PAINLEVEL_OUTOF10: 0
PAINLEVEL_OUTOF10: 4
PAINLEVEL_OUTOF10: 0
PAINLEVEL_OUTOF10: 9
PAINLEVEL_OUTOF10: 0
PAINLEVEL_OUTOF10: 5
PAINLEVEL_OUTOF10: 0
PAINLEVEL_OUTOF10: 5

## 2024-02-08 ASSESSMENT — PAIN DESCRIPTION - ORIENTATION
ORIENTATION: MID
ORIENTATION: RIGHT
ORIENTATION: MID
ORIENTATION: MID

## 2024-02-08 ASSESSMENT — PAIN DESCRIPTION - FREQUENCY
FREQUENCY: INTERMITTENT
FREQUENCY: INTERMITTENT

## 2024-02-08 ASSESSMENT — PAIN DESCRIPTION - PAIN TYPE
TYPE: SURGICAL PAIN
TYPE: SURGICAL PAIN

## 2024-02-08 ASSESSMENT — PAIN DESCRIPTION - LOCATION
LOCATION: CHEST
LOCATION: STERNUM;CHEST;INCISION
LOCATION: CHEST
LOCATION: STERNUM;INCISION

## 2024-02-08 ASSESSMENT — PAIN DESCRIPTION - ONSET
ONSET: ON-GOING
ONSET: ON-GOING

## 2024-02-08 ASSESSMENT — PAIN DESCRIPTION - DESCRIPTORS
DESCRIPTORS: ACHING
DESCRIPTORS: ACHING

## 2024-02-08 ASSESSMENT — PAIN - FUNCTIONAL ASSESSMENT: PAIN_FUNCTIONAL_ASSESSMENT: ACTIVITIES ARE NOT PREVENTED

## 2024-02-08 NOTE — CARE COORDINATION
2/8 Care Coordination: Pt in [facility], POD#1 CABG x 3. On RA, IV Tung.Pt lives alone in a ranch house with no steps to enter. PTA pt was independent w/o any DME. Pt has no hx of HHC/SNF. [facility] is following. Previous CM also gave Family [facility]O list. Will await to see how pt does post op. Await PT/OT evals.   CM/SW will continue to follow for discharge planning.   Dhaval MARTINEZ,RN--BC  [phone]

## 2024-02-09 ENCOUNTER — APPOINTMENT (OUTPATIENT)
Dept: GENERAL RADIOLOGY | Age: 79
DRG: 233 | End: 2024-02-09
Attending: INTERNAL MEDICINE
Payer: MEDICARE

## 2024-02-09 LAB
ABO/RH: NORMAL
ALBUMIN SERPL-MCNC: 3.7 G/DL (ref 3.5–5.2)
ALBUMIN SERPL-MCNC: 3.8 G/DL (ref 3.5–5.2)
ALBUMIN SERPL-MCNC: 4 G/DL (ref 3.5–5.2)
ALBUMIN SERPL-MCNC: 4.2 G/DL (ref 3.5–5.2)
ALP SERPL-CCNC: 41 U/L (ref 35–104)
ALP SERPL-CCNC: 52 U/L (ref 35–104)
ALP SERPL-CCNC: 75 U/L (ref 35–104)
ALP SERPL-CCNC: 99 U/L (ref 35–104)
ALT SERPL-CCNC: 6 U/L (ref 0–32)
ALT SERPL-CCNC: 6 U/L (ref 0–32)
ALT SERPL-CCNC: 7 U/L (ref 0–32)
ALT SERPL-CCNC: 7 U/L (ref 0–32)
ANION GAP SERPL CALCULATED.3IONS-SCNC: 12 MMOL/L (ref 7–16)
ANION GAP SERPL CALCULATED.3IONS-SCNC: 17 MMOL/L (ref 7–16)
ANION GAP SERPL CALCULATED.3IONS-SCNC: 18 MMOL/L (ref 7–16)
ANION GAP SERPL CALCULATED.3IONS-SCNC: 8 MMOL/L (ref 7–16)
ANTIBODY SCREEN: NEGATIVE
ARM BAND NUMBER: NORMAL
AST SERPL-CCNC: 36 U/L (ref 0–31)
AST SERPL-CCNC: 45 U/L (ref 0–31)
AST SERPL-CCNC: 50 U/L (ref 0–31)
AST SERPL-CCNC: 52 U/L (ref 0–31)
B.E.: -1.5 MMOL/L (ref -3–3)
B.E.: -2 MMOL/L (ref -3–3)
B.E.: 1.7 MMOL/L (ref -3–3)
B.E.: 2.7 MMOL/L (ref -3–3)
BILIRUB SERPL-MCNC: 0.6 MG/DL (ref 0–1.2)
BILIRUB SERPL-MCNC: 0.7 MG/DL (ref 0–1.2)
BILIRUB SERPL-MCNC: 0.7 MG/DL (ref 0–1.2)
BILIRUB SERPL-MCNC: 0.8 MG/DL (ref 0–1.2)
BLOOD BANK BLOOD PRODUCT EXPIRATION DATE: NORMAL
BLOOD BANK DISPENSE STATUS: NORMAL
BLOOD BANK ISBT PRODUCT BLOOD TYPE: 9500
BLOOD BANK PRODUCT CODE: NORMAL
BLOOD BANK SAMPLE EXPIRATION: NORMAL
BLOOD BANK UNIT TYPE AND RH: NORMAL
BPU ID: NORMAL
BUN SERPL-MCNC: 21 MG/DL (ref 6–23)
BUN SERPL-MCNC: 22 MG/DL (ref 6–23)
BUN SERPL-MCNC: 25 MG/DL (ref 6–23)
BUN SERPL-MCNC: 25 MG/DL (ref 6–23)
CA-I BLD-SCNC: 0.97 MMOL/L (ref 1.15–1.33)
CA-I BLD-SCNC: 1.23 MMOL/L (ref 1.15–1.33)
CALCIUM SERPL-MCNC: 7.1 MG/DL (ref 8.6–10.2)
CALCIUM SERPL-MCNC: 7.3 MG/DL (ref 8.6–10.2)
CALCIUM SERPL-MCNC: 7.7 MG/DL (ref 8.6–10.2)
CALCIUM SERPL-MCNC: 9.3 MG/DL (ref 8.6–10.2)
CHLORIDE SERPL-SCNC: 100 MMOL/L (ref 98–107)
CHLORIDE SERPL-SCNC: 103 MMOL/L (ref 98–107)
CHLORIDE SERPL-SCNC: 97 MMOL/L (ref 98–107)
CHLORIDE SERPL-SCNC: 99 MMOL/L (ref 98–107)
CO2 SERPL-SCNC: 20 MMOL/L (ref 22–29)
CO2 SERPL-SCNC: 21 MMOL/L (ref 22–29)
CO2 SERPL-SCNC: 25 MMOL/L (ref 22–29)
CO2 SERPL-SCNC: 28 MMOL/L (ref 22–29)
COHB: 0.1 % (ref 0–1.5)
COHB: 0.3 % (ref 0–1.5)
COMPONENT: NORMAL
CREAT SERPL-MCNC: 1.3 MG/DL (ref 0.5–1)
CREAT SERPL-MCNC: 1.3 MG/DL (ref 0.5–1)
CREAT SERPL-MCNC: 1.4 MG/DL (ref 0.5–1)
CREAT SERPL-MCNC: 1.4 MG/DL (ref 0.5–1)
CRITICAL: ABNORMAL
CROSSMATCH RESULT: NORMAL
DATE ANALYZED: ABNORMAL
DATE OF COLLECTION: ABNORMAL
ERYTHROCYTE [DISTWIDTH] IN BLOOD BY AUTOMATED COUNT: 15.6 % (ref 11.5–15)
GFR SERPL CREATININE-BSD FRML MDRD: 38 ML/MIN/1.73M2
GFR SERPL CREATININE-BSD FRML MDRD: 38 ML/MIN/1.73M2
GFR SERPL CREATININE-BSD FRML MDRD: 40 ML/MIN/1.73M2
GFR SERPL CREATININE-BSD FRML MDRD: 42 ML/MIN/1.73M2
GLUCOSE BLD-MCNC: 109 MG/DL (ref 74–99)
GLUCOSE BLD-MCNC: 126 MG/DL (ref 74–99)
GLUCOSE BLD-MCNC: 149 MG/DL (ref 74–99)
GLUCOSE BLD-MCNC: 151 MG/DL (ref 74–99)
GLUCOSE BLD-MCNC: 163 MG/DL (ref 74–99)
GLUCOSE BLD-MCNC: 163 MG/DL (ref 74–99)
GLUCOSE BLD-MCNC: 171 MG/DL (ref 74–99)
GLUCOSE BLD-MCNC: 223 MG/DL (ref 74–99)
GLUCOSE BLD-MCNC: 70 MG/DL (ref 74–99)
GLUCOSE BLD-MCNC: 85 MG/DL (ref 74–99)
GLUCOSE SERPL-MCNC: 106 MG/DL (ref 74–99)
GLUCOSE SERPL-MCNC: 153 MG/DL (ref 74–99)
GLUCOSE SERPL-MCNC: 168 MG/DL (ref 74–99)
GLUCOSE SERPL-MCNC: 199 MG/DL (ref 74–99)
HCO3: 21.1 MMOL/L (ref 22–26)
HCO3: 21.9 MMOL/L (ref 22–26)
HCO3: 24.1 MMOL/L (ref 22–26)
HCO3: 25.4 MMOL/L (ref 22–26)
HCT VFR BLD AUTO: 22 % (ref 34–48)
HGB BLD-MCNC: 7.3 G/DL (ref 11.5–15.5)
HHB: 3.6 % (ref 0–5)
HHB: 4.7 % (ref 0–5)
HHB: 6.3 % (ref 0–5)
HHB: 7.4 % (ref 0–5)
LAB: ABNORMAL
Lab: 10
Lab: 1005
Lab: 1355
Lab: 513
MAGNESIUM SERPL-MCNC: 2.7 MG/DL (ref 1.6–2.6)
MCH RBC QN AUTO: 29.6 PG (ref 26–35)
MCHC RBC AUTO-ENTMCNC: 33.2 G/DL (ref 32–34.5)
MCV RBC AUTO: 89.1 FL (ref 80–99.9)
METHB: 0.3 % (ref 0–1.5)
METHB: 0.4 % (ref 0–1.5)
MODE: ABNORMAL
O2 CONTENT: 10.8 ML/DL
O2 SATURATION: 92.5 % (ref 92–98.5)
O2 SATURATION: 93.7 % (ref 92–98.5)
O2 SATURATION: 95.3 % (ref 92–98.5)
O2 SATURATION: 96.4 % (ref 92–98.5)
O2HB: 91.9 % (ref 94–97)
O2HB: 93.1 % (ref 94–97)
O2HB: 94.6 % (ref 94–97)
O2HB: 95.9 % (ref 94–97)
OPERATOR ID: 1023
OPERATOR ID: 1023
OPERATOR ID: 2577
OPERATOR ID: 2577
PATIENT TEMP: 37 C
PCO2: 29.3 MMHG (ref 35–45)
PCO2: 29.4 MMHG (ref 35–45)
PCO2: 30.9 MMHG (ref 35–45)
PCO2: 31.7 MMHG (ref 35–45)
PH BLOOD GAS: 7.46 (ref 7.35–7.45)
PH BLOOD GAS: 7.47 (ref 7.35–7.45)
PH BLOOD GAS: 7.53 (ref 7.35–7.45)
PH BLOOD GAS: 7.53 (ref 7.35–7.45)
PLATELET # BLD AUTO: 177 K/UL (ref 130–450)
PMV BLD AUTO: 11.2 FL (ref 7–12)
PO2: 65.3 MMHG (ref 75–100)
PO2: 71.3 MMHG (ref 75–100)
PO2: 80.5 MMHG (ref 75–100)
PO2: 89.4 MMHG (ref 75–100)
POC LACTIC ACID: 0.6 MMOL/L (ref 0.5–2.2)
POC LACTIC ACID: 1.1 MMOL/L (ref 0.5–2.2)
POC LACTIC ACID: 1.4 MMOL/L (ref 0.5–2.2)
POC LACTIC ACID: 1.9 MMOL/L (ref 0.5–2.2)
POC LACTIC ACID: 3.8 MMOL/L (ref 0.5–2.2)
POC LACTIC ACID: 4.3 MMOL/L (ref 0.5–2.2)
POC LACTIC ACID: 5.8 MMOL/L (ref 0.5–2.2)
POTASSIUM SERPL-SCNC: 3.9 MMOL/L (ref 3.5–5)
POTASSIUM SERPL-SCNC: 3.9 MMOL/L (ref 3.5–5)
POTASSIUM SERPL-SCNC: 4.1 MMOL/L (ref 3.5–5)
POTASSIUM SERPL-SCNC: 4.3 MMOL/L (ref 3.5–5)
PROT SERPL-MCNC: 5 G/DL (ref 6.4–8.3)
PROT SERPL-MCNC: 5.3 G/DL (ref 6.4–8.3)
PROT SERPL-MCNC: 5.5 G/DL (ref 6.4–8.3)
PROT SERPL-MCNC: 5.6 G/DL (ref 6.4–8.3)
RBC # BLD AUTO: 2.47 M/UL (ref 3.5–5.5)
SODIUM SERPL-SCNC: 134 MMOL/L (ref 132–146)
SODIUM SERPL-SCNC: 136 MMOL/L (ref 132–146)
SODIUM SERPL-SCNC: 137 MMOL/L (ref 132–146)
SODIUM SERPL-SCNC: 141 MMOL/L (ref 132–146)
SOURCE, BLOOD GAS: ABNORMAL
THB: 7.9 G/DL (ref 11.5–16.5)
THB: 8.2 G/DL (ref 11.5–16.5)
THB: 8.2 G/DL (ref 11.5–16.5)
THB: 8.5 G/DL (ref 11.5–16.5)
TIME ANALYZED: 1021
TIME ANALYZED: 1357
TIME ANALYZED: 17
TIME ANALYZED: 517
TRANSFUSION STATUS: NORMAL
UNIT DIVISION: 0
UNIT ISSUE DATE/TIME: NORMAL
WBC OTHER # BLD: 12.9 K/UL (ref 4.5–11.5)

## 2024-02-09 PROCEDURE — 2580000003 HC RX 258: Performed by: NURSE PRACTITIONER

## 2024-02-09 PROCEDURE — 6370000000 HC RX 637 (ALT 250 FOR IP): Performed by: NURSE PRACTITIONER

## 2024-02-09 PROCEDURE — 2580000003 HC RX 258: Performed by: THORACIC SURGERY (CARDIOTHORACIC VASCULAR SURGERY)

## 2024-02-09 PROCEDURE — C1751 CATH, INF, PER/CENT/MIDLINE: HCPCS

## 2024-02-09 PROCEDURE — 82805 BLOOD GASES W/O2 SATURATION: CPT

## 2024-02-09 PROCEDURE — 37799 UNLISTED PX VASCULAR SURGERY: CPT

## 2024-02-09 PROCEDURE — 6360000002 HC RX W HCPCS: Performed by: NURSE PRACTITIONER

## 2024-02-09 PROCEDURE — 82330 ASSAY OF CALCIUM: CPT

## 2024-02-09 PROCEDURE — 99291 CRITICAL CARE FIRST HOUR: CPT | Performed by: INTERNAL MEDICINE

## 2024-02-09 PROCEDURE — 94640 AIRWAY INHALATION TREATMENT: CPT

## 2024-02-09 PROCEDURE — 99291 CRITICAL CARE FIRST HOUR: CPT | Performed by: NURSE PRACTITIONER

## 2024-02-09 PROCEDURE — 85027 COMPLETE CBC AUTOMATED: CPT

## 2024-02-09 PROCEDURE — 2500000003 HC RX 250 WO HCPCS: Performed by: NURSE PRACTITIONER

## 2024-02-09 PROCEDURE — 2700000000 HC OXYGEN THERAPY PER DAY

## 2024-02-09 PROCEDURE — 83605 ASSAY OF LACTIC ACID: CPT

## 2024-02-09 PROCEDURE — 71045 X-RAY EXAM CHEST 1 VIEW: CPT

## 2024-02-09 PROCEDURE — 2000000000 HC ICU R&B

## 2024-02-09 PROCEDURE — 83735 ASSAY OF MAGNESIUM: CPT

## 2024-02-09 PROCEDURE — 2500000003 HC RX 250 WO HCPCS: Performed by: THORACIC SURGERY (CARDIOTHORACIC VASCULAR SURGERY)

## 2024-02-09 PROCEDURE — 82962 GLUCOSE BLOOD TEST: CPT

## 2024-02-09 RX ORDER — LIDOCAINE HYDROCHLORIDE 10 MG/ML
5 INJECTION, SOLUTION EPIDURAL; INFILTRATION; INTRACAUDAL; PERINEURAL ONCE
Status: DISCONTINUED | OUTPATIENT
Start: 2024-02-09 | End: 2024-02-14

## 2024-02-09 RX ORDER — SODIUM CHLORIDE 0.9 % (FLUSH) 0.9 %
5-40 SYRINGE (ML) INJECTION EVERY 12 HOURS SCHEDULED
Status: DISCONTINUED | OUTPATIENT
Start: 2024-02-09 | End: 2024-02-19 | Stop reason: HOSPADM

## 2024-02-09 RX ORDER — FUROSEMIDE 10 MG/ML
20 INJECTION INTRAMUSCULAR; INTRAVENOUS ONCE
Status: COMPLETED | OUTPATIENT
Start: 2024-02-09 | End: 2024-02-09

## 2024-02-09 RX ORDER — SODIUM CHLORIDE 9 MG/ML
INJECTION, SOLUTION INTRAVENOUS PRN
Status: DISCONTINUED | OUTPATIENT
Start: 2024-02-09 | End: 2024-02-14

## 2024-02-09 RX ORDER — SODIUM CHLORIDE 0.9 % (FLUSH) 0.9 %
5-40 SYRINGE (ML) INJECTION PRN
Status: DISCONTINUED | OUTPATIENT
Start: 2024-02-09 | End: 2024-02-19 | Stop reason: HOSPADM

## 2024-02-09 RX ORDER — HEPARIN 100 UNIT/ML
3 SYRINGE INTRAVENOUS EVERY 12 HOURS SCHEDULED
Status: DISCONTINUED | OUTPATIENT
Start: 2024-02-09 | End: 2024-02-14

## 2024-02-09 RX ORDER — HEPARIN 100 UNIT/ML
3 SYRINGE INTRAVENOUS PRN
Status: DISCONTINUED | OUTPATIENT
Start: 2024-02-09 | End: 2024-02-14

## 2024-02-09 RX ORDER — DOBUTAMINE HYDROCHLORIDE 200 MG/100ML
2.5 INJECTION INTRAVENOUS CONTINUOUS
Status: DISCONTINUED | OUTPATIENT
Start: 2024-02-09 | End: 2024-02-11

## 2024-02-09 RX ORDER — ACETAMINOPHEN 325 MG/1
650 TABLET ORAL EVERY 4 HOURS PRN
Status: DISCONTINUED | OUTPATIENT
Start: 2024-02-10 | End: 2024-02-14

## 2024-02-09 RX ADMIN — ASPIRIN 81 MG: 81 TABLET, COATED ORAL at 08:07

## 2024-02-09 RX ADMIN — INSULIN LISPRO 4 UNITS: 100 INJECTION, SOLUTION INTRAVENOUS; SUBCUTANEOUS at 16:49

## 2024-02-09 RX ADMIN — CHLORHEXIDINE GLUCONATE, 0.12% ORAL RINSE 15 ML: 1.2 SOLUTION DENTAL at 08:06

## 2024-02-09 RX ADMIN — POTASSIUM CHLORIDE 20 MEQ: 29.8 INJECTION, SOLUTION INTRAVENOUS at 14:22

## 2024-02-09 RX ADMIN — ACETAMINOPHEN 1000 MG: 500 TABLET ORAL at 05:25

## 2024-02-09 RX ADMIN — Medication 400 MG: at 20:34

## 2024-02-09 RX ADMIN — OXYCODONE 5 MG: 5 TABLET ORAL at 16:50

## 2024-02-09 RX ADMIN — IPRATROPIUM BROMIDE AND ALBUTEROL SULFATE 1 DOSE: .5; 2.5 SOLUTION RESPIRATORY (INHALATION) at 16:11

## 2024-02-09 RX ADMIN — CEFAZOLIN 2000 MG: 2 INJECTION, POWDER, FOR SOLUTION INTRAMUSCULAR; INTRAVENOUS at 03:30

## 2024-02-09 RX ADMIN — ATORVASTATIN CALCIUM 40 MG: 40 TABLET, FILM COATED ORAL at 20:34

## 2024-02-09 RX ADMIN — SENNOSIDES AND DOCUSATE SODIUM 1 TABLET: 8.6; 5 TABLET ORAL at 20:34

## 2024-02-09 RX ADMIN — Medication 400 MG: at 08:07

## 2024-02-09 RX ADMIN — SODIUM BICARBONATE: 84 INJECTION, SOLUTION INTRAVENOUS at 06:42

## 2024-02-09 RX ADMIN — SODIUM CHLORIDE, PRESERVATIVE FREE 10 ML: 5 INJECTION INTRAVENOUS at 08:06

## 2024-02-09 RX ADMIN — INSULIN LISPRO 4 UNITS: 100 INJECTION, SOLUTION INTRAVENOUS; SUBCUTANEOUS at 23:48

## 2024-02-09 RX ADMIN — INSULIN GLARGINE 8 UNITS: 100 INJECTION, SOLUTION SUBCUTANEOUS at 20:35

## 2024-02-09 RX ADMIN — IPRATROPIUM BROMIDE AND ALBUTEROL SULFATE 1 DOSE: .5; 2.5 SOLUTION RESPIRATORY (INHALATION) at 11:48

## 2024-02-09 RX ADMIN — OXYCODONE 5 MG: 5 TABLET ORAL at 08:14

## 2024-02-09 RX ADMIN — SENNOSIDES AND DOCUSATE SODIUM 1 TABLET: 8.6; 5 TABLET ORAL at 08:07

## 2024-02-09 RX ADMIN — LEVOTHYROXINE SODIUM 100 MCG: 0.1 TABLET ORAL at 05:25

## 2024-02-09 RX ADMIN — MUPIROCIN: 20 OINTMENT TOPICAL at 08:06

## 2024-02-09 RX ADMIN — IPRATROPIUM BROMIDE AND ALBUTEROL SULFATE 1 DOSE: .5; 2.5 SOLUTION RESPIRATORY (INHALATION) at 08:32

## 2024-02-09 RX ADMIN — FUROSEMIDE 20 MG: 10 INJECTION, SOLUTION INTRAMUSCULAR; INTRAVENOUS at 09:07

## 2024-02-09 RX ADMIN — CALCIUM CHLORIDE INJECTION 2000 MG: 100 INJECTION, SOLUTION INTRAVENOUS at 11:44

## 2024-02-09 RX ADMIN — EPINEPHRINE 4 MCG/MIN: 1 INJECTION INTRAMUSCULAR; INTRAVENOUS; SUBCUTANEOUS at 06:43

## 2024-02-09 RX ADMIN — IPRATROPIUM BROMIDE AND ALBUTEROL SULFATE 1 DOSE: .5; 2.5 SOLUTION RESPIRATORY (INHALATION) at 21:21

## 2024-02-09 RX ADMIN — MUPIROCIN: 20 OINTMENT TOPICAL at 20:34

## 2024-02-09 RX ADMIN — ACETAMINOPHEN 1000 MG: 500 TABLET ORAL at 19:06

## 2024-02-09 RX ADMIN — WATER 100 MG: 1 INJECTION INTRAMUSCULAR; INTRAVENOUS; SUBCUTANEOUS at 12:08

## 2024-02-09 RX ADMIN — OXYCODONE 5 MG: 5 TABLET ORAL at 00:21

## 2024-02-09 RX ADMIN — CHLORHEXIDINE GLUCONATE, 0.12% ORAL RINSE 15 ML: 1.2 SOLUTION DENTAL at 20:34

## 2024-02-09 RX ADMIN — CLOPIDOGREL 75 MG: 75 TABLET, FILM COATED ORAL at 08:07

## 2024-02-09 RX ADMIN — SODIUM CHLORIDE, PRESERVATIVE FREE 10 ML: 5 INJECTION INTRAVENOUS at 20:34

## 2024-02-09 RX ADMIN — PANTOPRAZOLE SODIUM 40 MG: 40 TABLET, DELAYED RELEASE ORAL at 05:25

## 2024-02-09 RX ADMIN — WATER 100 MG: 1 INJECTION INTRAMUSCULAR; INTRAVENOUS; SUBCUTANEOUS at 02:28

## 2024-02-09 RX ADMIN — DOBUTAMINE HYDROCHLORIDE 2.5 MCG/KG/MIN: 200 INJECTION INTRAVENOUS at 12:16

## 2024-02-09 ASSESSMENT — PAIN SCALES - GENERAL
PAINLEVEL_OUTOF10: 0
PAINLEVEL_OUTOF10: 4
PAINLEVEL_OUTOF10: 3
PAINLEVEL_OUTOF10: 0
PAINLEVEL_OUTOF10: 0
PAINLEVEL_OUTOF10: 2
PAINLEVEL_OUTOF10: 6
PAINLEVEL_OUTOF10: 0
PAINLEVEL_OUTOF10: 5
PAINLEVEL_OUTOF10: 0
PAINLEVEL_OUTOF10: 3
PAINLEVEL_OUTOF10: 0
PAINLEVEL_OUTOF10: 5
PAINLEVEL_OUTOF10: 6
PAINLEVEL_OUTOF10: 5

## 2024-02-09 ASSESSMENT — PAIN - FUNCTIONAL ASSESSMENT
PAIN_FUNCTIONAL_ASSESSMENT: ACTIVITIES ARE NOT PREVENTED

## 2024-02-09 ASSESSMENT — PAIN DESCRIPTION - DESCRIPTORS
DESCRIPTORS: ACHING;DISCOMFORT
DESCRIPTORS: ACHING
DESCRIPTORS: ACHING;DISCOMFORT
DESCRIPTORS: ACHING;DISCOMFORT
DESCRIPTORS: DISCOMFORT;ACHING

## 2024-02-09 ASSESSMENT — PAIN DESCRIPTION - ORIENTATION
ORIENTATION: MID
ORIENTATION: LOWER;MID
ORIENTATION: MID
ORIENTATION: MID

## 2024-02-09 ASSESSMENT — PAIN DESCRIPTION - FREQUENCY: FREQUENCY: INTERMITTENT

## 2024-02-09 ASSESSMENT — PAIN DESCRIPTION - LOCATION
LOCATION: GENERALIZED
LOCATION: OTHER (COMMENT)
LOCATION: GENERALIZED
LOCATION: CHEST
LOCATION: GENERALIZED
LOCATION: STERNUM;INCISION;CHEST
LOCATION: GENERALIZED
LOCATION: OTHER (COMMENT)

## 2024-02-09 ASSESSMENT — PAIN DESCRIPTION - PAIN TYPE: TYPE: SURGICAL PAIN

## 2024-02-09 ASSESSMENT — PAIN DESCRIPTION - ONSET: ONSET: ON-GOING

## 2024-02-09 NOTE — ANESTHESIA POSTPROCEDURE EVALUATION
Department of Anesthesiology  Postprocedure Note    Patient: Katt Diaz  MRN: [id number]  YOB: 1945  Date of evaluation: 2/9/2024    Procedure Summary       Date: 02/07/24 Room / Location: [address]    Anesthesia Start: 0651 Anesthesia Stop: 1204    Procedure: CORONARY ARTERY BYPASS GRAFTING, EVH Diagnosis:       CAD in native artery      (CAD in native artery [I25.10])    Surgeons: Nicolasa Jasso DO Responsible Provider: Jayna Valenzuela MD    Anesthesia Type: General ASA Status: 4            Anesthesia Type: General    Alis Phase I: Alis Score: 4    Alis Phase II:      Anesthesia Post Evaluation    Patient location during evaluation: ICU  Patient participation: complete - patient participated  Level of consciousness: awake and alert  Airway patency: patent  Nausea & Vomiting: no nausea and no vomiting  Cardiovascular status: blood pressure returned to baseline and hemodynamically stable  Respiratory status: acceptable  Hydration status: euvolemic  Pain management: adequate    No notable events documented.

## 2024-02-10 ENCOUNTER — APPOINTMENT (OUTPATIENT)
Dept: GENERAL RADIOLOGY | Age: 79
DRG: 233 | End: 2024-02-10
Attending: INTERNAL MEDICINE
Payer: MEDICARE

## 2024-02-10 LAB
ALBUMIN SERPL-MCNC: 3.4 G/DL (ref 3.5–5.2)
ALP SERPL-CCNC: 133 U/L (ref 35–104)
ALT SERPL-CCNC: 7 U/L (ref 0–32)
ANION GAP SERPL CALCULATED.3IONS-SCNC: 12 MMOL/L (ref 7–16)
AST SERPL-CCNC: 68 U/L (ref 0–31)
B.E.: 6.1 MMOL/L (ref -3–3)
BILIRUB SERPL-MCNC: 0.5 MG/DL (ref 0–1.2)
BUN SERPL-MCNC: 27 MG/DL (ref 6–23)
CA-I BLD-SCNC: 1.18 MMOL/L (ref 1.15–1.33)
CALCIUM SERPL-MCNC: 8.8 MG/DL (ref 8.6–10.2)
CHLORIDE SERPL-SCNC: 100 MMOL/L (ref 98–107)
CO2 SERPL-SCNC: 25 MMOL/L (ref 22–29)
COHB: 0.3 % (ref 0–1.5)
CREAT SERPL-MCNC: 1.4 MG/DL (ref 0.5–1)
CRITICAL NOTIFICATION DATE/TIME: NORMAL
CRITICAL NOTIFICATION DATE/TIME: NORMAL
CRITICAL: ABNORMAL
DATE ANALYZED: ABNORMAL
DATE OF COLLECTION: ABNORMAL
ERYTHROCYTE [DISTWIDTH] IN BLOOD BY AUTOMATED COUNT: 15.7 % (ref 11.5–15)
GFR SERPL CREATININE-BSD FRML MDRD: 39 ML/MIN/1.73M2
GLUCOSE BLD-MCNC: 120 MG/DL (ref 74–99)
GLUCOSE BLD-MCNC: 124 MG/DL (ref 74–99)
GLUCOSE BLD-MCNC: 142 MG/DL (ref 74–99)
GLUCOSE BLD-MCNC: 86 MG/DL (ref 74–99)
GLUCOSE BLD-MCNC: 89 MG/DL (ref 74–99)
GLUCOSE SERPL-MCNC: 82 MG/DL (ref 74–99)
HCO3: 29.1 MMOL/L (ref 22–26)
HCT VFR BLD AUTO: 19.8 % (ref 34–48)
HCT VFR BLD AUTO: 25.4 % (ref 34–48)
HGB BLD-MCNC: 6.3 G/DL (ref 11.5–15.5)
HGB BLD-MCNC: 8.4 G/DL (ref 11.5–15.5)
HHB: 3.1 % (ref 0–5)
LAB: ABNORMAL
Lab: 425
MAGNESIUM SERPL-MCNC: 2.6 MG/DL (ref 1.6–2.6)
MCH RBC QN AUTO: 28.6 PG (ref 26–35)
MCHC RBC AUTO-ENTMCNC: 31.8 G/DL (ref 32–34.5)
MCV RBC AUTO: 90 FL (ref 80–99.9)
METHB: 0.2 % (ref 0–1.5)
MODE: ABNORMAL
O2 SATURATION: 96.9 % (ref 92–98.5)
O2HB: 96.4 % (ref 94–97)
OPERATOR ID: 2577
PATIENT TEMP: 37 C
PCO2: 35.1 MMHG (ref 35–45)
PH BLOOD GAS: 7.54 (ref 7.35–7.45)
PLATELET # BLD AUTO: 146 K/UL (ref 130–450)
PMV BLD AUTO: 11 FL (ref 7–12)
PO2: 96.8 MMHG (ref 75–100)
POC LACTIC ACID: 0.9 MMOL/L (ref 0.5–2.2)
POC LACTIC ACID: 1 MMOL/L (ref 0.5–2.2)
POC LACTIC ACID: 1 MMOL/L (ref 0.5–2.2)
POTASSIUM SERPL-SCNC: 3.9 MMOL/L (ref 3.5–5)
PROT SERPL-MCNC: 5 G/DL (ref 6.4–8.3)
RBC # BLD AUTO: 2.2 M/UL (ref 3.5–5.5)
SODIUM SERPL-SCNC: 137 MMOL/L (ref 132–146)
SOURCE, BLOOD GAS: ABNORMAL
THB: 7.3 G/DL (ref 11.5–16.5)
TIME ANALYZED: 428
WBC OTHER # BLD: 10.5 K/UL (ref 4.5–11.5)

## 2024-02-10 PROCEDURE — 99291 CRITICAL CARE FIRST HOUR: CPT | Performed by: INTERNAL MEDICINE

## 2024-02-10 PROCEDURE — 6360000002 HC RX W HCPCS: Performed by: THORACIC SURGERY (CARDIOTHORACIC VASCULAR SURGERY)

## 2024-02-10 PROCEDURE — 6370000000 HC RX 637 (ALT 250 FOR IP): Performed by: NURSE PRACTITIONER

## 2024-02-10 PROCEDURE — 94640 AIRWAY INHALATION TREATMENT: CPT

## 2024-02-10 PROCEDURE — 85018 HEMOGLOBIN: CPT

## 2024-02-10 PROCEDURE — 2580000003 HC RX 258: Performed by: NURSE PRACTITIONER

## 2024-02-10 PROCEDURE — 71045 X-RAY EXAM CHEST 1 VIEW: CPT

## 2024-02-10 PROCEDURE — 85014 HEMATOCRIT: CPT

## 2024-02-10 PROCEDURE — 37799 UNLISTED PX VASCULAR SURGERY: CPT

## 2024-02-10 PROCEDURE — 83735 ASSAY OF MAGNESIUM: CPT

## 2024-02-10 PROCEDURE — 82330 ASSAY OF CALCIUM: CPT

## 2024-02-10 PROCEDURE — 83605 ASSAY OF LACTIC ACID: CPT

## 2024-02-10 PROCEDURE — 86901 BLOOD TYPING SEROLOGIC RH(D): CPT

## 2024-02-10 PROCEDURE — 86850 RBC ANTIBODY SCREEN: CPT

## 2024-02-10 PROCEDURE — 82805 BLOOD GASES W/O2 SATURATION: CPT

## 2024-02-10 PROCEDURE — 80053 COMPREHEN METABOLIC PANEL: CPT

## 2024-02-10 PROCEDURE — 85027 COMPLETE CBC AUTOMATED: CPT

## 2024-02-10 PROCEDURE — 6360000002 HC RX W HCPCS: Performed by: NURSE PRACTITIONER

## 2024-02-10 PROCEDURE — P9016 RBC LEUKOCYTES REDUCED: HCPCS

## 2024-02-10 PROCEDURE — 2700000000 HC OXYGEN THERAPY PER DAY

## 2024-02-10 PROCEDURE — 86923 COMPATIBILITY TEST ELECTRIC: CPT

## 2024-02-10 PROCEDURE — 6360000002 HC RX W HCPCS

## 2024-02-10 PROCEDURE — 2000000000 HC ICU R&B

## 2024-02-10 PROCEDURE — 86900 BLOOD TYPING SEROLOGIC ABO: CPT

## 2024-02-10 PROCEDURE — 94669 MECHANICAL CHEST WALL OSCILL: CPT

## 2024-02-10 PROCEDURE — 82962 GLUCOSE BLOOD TEST: CPT

## 2024-02-10 PROCEDURE — 36430 TRANSFUSION BLD/BLD COMPNT: CPT

## 2024-02-10 RX ORDER — FUROSEMIDE 10 MG/ML
40 INJECTION INTRAMUSCULAR; INTRAVENOUS ONCE
Status: COMPLETED | OUTPATIENT
Start: 2024-02-10 | End: 2024-02-10

## 2024-02-10 RX ORDER — FUROSEMIDE 10 MG/ML
INJECTION INTRAMUSCULAR; INTRAVENOUS
Status: COMPLETED
Start: 2024-02-10 | End: 2024-02-10

## 2024-02-10 RX ORDER — SODIUM CHLORIDE 9 MG/ML
INJECTION, SOLUTION INTRAVENOUS PRN
Status: DISCONTINUED | OUTPATIENT
Start: 2024-02-10 | End: 2024-02-14

## 2024-02-10 RX ORDER — FUROSEMIDE 10 MG/ML
20 INJECTION INTRAMUSCULAR; INTRAVENOUS PRN
Status: DISCONTINUED | OUTPATIENT
Start: 2024-02-10 | End: 2024-02-14

## 2024-02-10 RX ADMIN — SODIUM CHLORIDE, PRESERVATIVE FREE 10 ML: 5 INJECTION INTRAVENOUS at 08:42

## 2024-02-10 RX ADMIN — MUPIROCIN: 20 OINTMENT TOPICAL at 20:31

## 2024-02-10 RX ADMIN — IPRATROPIUM BROMIDE AND ALBUTEROL SULFATE 1 DOSE: .5; 2.5 SOLUTION RESPIRATORY (INHALATION) at 12:19

## 2024-02-10 RX ADMIN — FUROSEMIDE 20 MG: 10 INJECTION, SOLUTION INTRAMUSCULAR; INTRAVENOUS at 10:50

## 2024-02-10 RX ADMIN — ATORVASTATIN CALCIUM 40 MG: 40 TABLET, FILM COATED ORAL at 20:30

## 2024-02-10 RX ADMIN — SODIUM CHLORIDE, PRESERVATIVE FREE 10 ML: 5 INJECTION INTRAVENOUS at 20:30

## 2024-02-10 RX ADMIN — INSULIN GLARGINE 8 UNITS: 100 INJECTION, SOLUTION SUBCUTANEOUS at 20:30

## 2024-02-10 RX ADMIN — IPRATROPIUM BROMIDE AND ALBUTEROL SULFATE 1 DOSE: .5; 2.5 SOLUTION RESPIRATORY (INHALATION) at 07:46

## 2024-02-10 RX ADMIN — OXYCODONE HYDROCHLORIDE 10 MG: 10 TABLET ORAL at 21:01

## 2024-02-10 RX ADMIN — Medication 400 MG: at 20:30

## 2024-02-10 RX ADMIN — DOBUTAMINE HYDROCHLORIDE 2.5 MCG/KG/MIN: 200 INJECTION INTRAVENOUS at 16:38

## 2024-02-10 RX ADMIN — MUPIROCIN: 20 OINTMENT TOPICAL at 08:42

## 2024-02-10 RX ADMIN — CLOPIDOGREL 75 MG: 75 TABLET, FILM COATED ORAL at 08:41

## 2024-02-10 RX ADMIN — IPRATROPIUM BROMIDE AND ALBUTEROL SULFATE 1 DOSE: .5; 2.5 SOLUTION RESPIRATORY (INHALATION) at 15:51

## 2024-02-10 RX ADMIN — IPRATROPIUM BROMIDE AND ALBUTEROL SULFATE 1 DOSE: .5; 2.5 SOLUTION RESPIRATORY (INHALATION) at 20:38

## 2024-02-10 RX ADMIN — PANTOPRAZOLE SODIUM 40 MG: 40 TABLET, DELAYED RELEASE ORAL at 05:34

## 2024-02-10 RX ADMIN — FUROSEMIDE 40 MG: 10 INJECTION, SOLUTION INTRAMUSCULAR; INTRAVENOUS at 16:40

## 2024-02-10 RX ADMIN — FUROSEMIDE 40 MG: 10 INJECTION INTRAMUSCULAR; INTRAVENOUS at 16:40

## 2024-02-10 RX ADMIN — ASPIRIN 81 MG: 81 TABLET, COATED ORAL at 08:42

## 2024-02-10 RX ADMIN — OXYCODONE 5 MG: 5 TABLET ORAL at 13:48

## 2024-02-10 RX ADMIN — FLUDROCORTISONE ACETATE 0.1 MG: 0.1 TABLET ORAL at 08:42

## 2024-02-10 RX ADMIN — OXYCODONE HYDROCHLORIDE 10 MG: 10 TABLET ORAL at 09:31

## 2024-02-10 RX ADMIN — SENNOSIDES AND DOCUSATE SODIUM 1 TABLET: 8.6; 5 TABLET ORAL at 08:41

## 2024-02-10 RX ADMIN — LEVOTHYROXINE SODIUM 100 MCG: 0.1 TABLET ORAL at 05:34

## 2024-02-10 RX ADMIN — SODIUM CHLORIDE: 9 INJECTION, SOLUTION INTRAVENOUS at 06:23

## 2024-02-10 RX ADMIN — Medication 400 MG: at 08:42

## 2024-02-10 RX ADMIN — ACETAMINOPHEN 1000 MG: 500 TABLET ORAL at 05:34

## 2024-02-10 ASSESSMENT — PAIN DESCRIPTION - DESCRIPTORS
DESCRIPTORS: ACHING;DISCOMFORT

## 2024-02-10 ASSESSMENT — PAIN DESCRIPTION - ORIENTATION
ORIENTATION: MID

## 2024-02-10 ASSESSMENT — PAIN SCALES - GENERAL
PAINLEVEL_OUTOF10: 4
PAINLEVEL_OUTOF10: 6
PAINLEVEL_OUTOF10: 7
PAINLEVEL_OUTOF10: 5
PAINLEVEL_OUTOF10: 6
PAINLEVEL_OUTOF10: 8
PAINLEVEL_OUTOF10: 6
PAINLEVEL_OUTOF10: 3
PAINLEVEL_OUTOF10: 3
PAINLEVEL_OUTOF10: 7
PAINLEVEL_OUTOF10: 5

## 2024-02-10 ASSESSMENT — PAIN DESCRIPTION - LOCATION
LOCATION: GENERALIZED;INCISION
LOCATION: GENERALIZED;INCISION
LOCATION: STERNUM
LOCATION: GENERALIZED;INCISION
LOCATION: INCISION
LOCATION: GENERALIZED;INCISION
LOCATION: STERNUM
LOCATION: GENERALIZED;INCISION
LOCATION: GENERALIZED;INCISION

## 2024-02-10 ASSESSMENT — PAIN - FUNCTIONAL ASSESSMENT
PAIN_FUNCTIONAL_ASSESSMENT: PREVENTS OR INTERFERES SOME ACTIVE ACTIVITIES AND ADLS
PAIN_FUNCTIONAL_ASSESSMENT: PREVENTS OR INTERFERES SOME ACTIVE ACTIVITIES AND ADLS

## 2024-02-11 ENCOUNTER — APPOINTMENT (OUTPATIENT)
Dept: GENERAL RADIOLOGY | Age: 79
DRG: 233 | End: 2024-02-11
Attending: INTERNAL MEDICINE
Payer: MEDICARE

## 2024-02-11 ENCOUNTER — APPOINTMENT (OUTPATIENT)
Dept: CT IMAGING | Age: 79
DRG: 233 | End: 2024-02-11
Attending: INTERNAL MEDICINE
Payer: MEDICARE

## 2024-02-11 LAB
ABO/RH: NORMAL
ALBUMIN SERPL-MCNC: 3 G/DL (ref 3.5–5.2)
ALP SERPL-CCNC: 128 U/L (ref 35–104)
ALT SERPL-CCNC: 14 U/L (ref 0–32)
ANION GAP SERPL CALCULATED.3IONS-SCNC: 10 MMOL/L (ref 7–16)
ANTIBODY SCREEN: NEGATIVE
ARM BAND NUMBER: NORMAL
AST SERPL-CCNC: 91 U/L (ref 0–31)
B.E.: 3.7 MMOL/L (ref -3–3)
BILIRUB SERPL-MCNC: 0.6 MG/DL (ref 0–1.2)
BLOOD BANK BLOOD PRODUCT EXPIRATION DATE: NORMAL
BLOOD BANK DISPENSE STATUS: NORMAL
BLOOD BANK ISBT PRODUCT BLOOD TYPE: 9500
BLOOD BANK PRODUCT CODE: NORMAL
BLOOD BANK SAMPLE EXPIRATION: NORMAL
BLOOD BANK UNIT TYPE AND RH: NORMAL
BPU ID: NORMAL
BUN SERPL-MCNC: 29 MG/DL (ref 6–23)
CA-I BLD-SCNC: 1.13 MMOL/L (ref 1.15–1.33)
CALCIUM SERPL-MCNC: 7.9 MG/DL (ref 8.6–10.2)
CHLORIDE SERPL-SCNC: 100 MMOL/L (ref 98–107)
CO2 SERPL-SCNC: 26 MMOL/L (ref 22–29)
COHB: 0.4 % (ref 0–1.5)
COMPONENT: NORMAL
CREAT SERPL-MCNC: 1.3 MG/DL (ref 0.5–1)
CRITICAL NOTIFICATION DATE/TIME: NORMAL
CRITICAL: ABNORMAL
CROSSMATCH RESULT: NORMAL
DATE ANALYZED: ABNORMAL
DATE OF COLLECTION: ABNORMAL
ERYTHROCYTE [DISTWIDTH] IN BLOOD BY AUTOMATED COUNT: 15.7 % (ref 11.5–15)
GFR SERPL CREATININE-BSD FRML MDRD: 43 ML/MIN/1.73M2
GLUCOSE BLD-MCNC: 103 MG/DL (ref 74–99)
GLUCOSE BLD-MCNC: 111 MG/DL (ref 74–99)
GLUCOSE BLD-MCNC: 117 MG/DL (ref 74–99)
GLUCOSE BLD-MCNC: 122 MG/DL (ref 74–99)
GLUCOSE BLD-MCNC: 67 MG/DL (ref 74–99)
GLUCOSE BLD-MCNC: 72 MG/DL (ref 74–99)
GLUCOSE BLD-MCNC: 85 MG/DL (ref 74–99)
GLUCOSE BLD-MCNC: 94 MG/DL (ref 74–99)
GLUCOSE SERPL-MCNC: 85 MG/DL (ref 74–99)
HCO3: 27.2 MMOL/L (ref 22–26)
HCT VFR BLD AUTO: 24.7 % (ref 34–48)
HGB BLD-MCNC: 8.1 G/DL (ref 11.5–15.5)
HHB: 1.9 % (ref 0–5)
LAB: ABNORMAL
Lab: 418
MAGNESIUM SERPL-MCNC: 2.4 MG/DL (ref 1.6–2.6)
MCH RBC QN AUTO: 29.8 PG (ref 26–35)
MCHC RBC AUTO-ENTMCNC: 32.8 G/DL (ref 32–34.5)
MCV RBC AUTO: 90.8 FL (ref 80–99.9)
METHB: 0.3 % (ref 0–1.5)
MODE: ABNORMAL
O2 SATURATION: 98.1 % (ref 92–98.5)
O2HB: 97.4 % (ref 94–97)
OPERATOR ID: 2577
PATIENT TEMP: 37 C
PCO2: 36.3 MMHG (ref 35–45)
PH BLOOD GAS: 7.49 (ref 7.35–7.45)
PLATELET # BLD AUTO: 153 K/UL (ref 130–450)
PMV BLD AUTO: 11.2 FL (ref 7–12)
PO2: 121.6 MMHG (ref 75–100)
POC LACTIC ACID: 0.6 MMOL/L (ref 0.5–2.2)
POC LACTIC ACID: 0.7 MMOL/L (ref 0.5–2.2)
POC LACTIC ACID: 0.8 MMOL/L (ref 0.5–2.2)
POC LACTIC ACID: 1 MMOL/L (ref 0.5–2.2)
POTASSIUM SERPL-SCNC: 3.8 MMOL/L (ref 3.5–5)
PROT SERPL-MCNC: 4.8 G/DL (ref 6.4–8.3)
RBC # BLD AUTO: 2.72 M/UL (ref 3.5–5.5)
SODIUM SERPL-SCNC: 136 MMOL/L (ref 132–146)
SOURCE, BLOOD GAS: ABNORMAL
THB: 8.9 G/DL (ref 11.5–16.5)
TIME ANALYZED: 422
TRANSFUSION STATUS: NORMAL
UNIT DIVISION: 0
UNIT ISSUE DATE/TIME: NORMAL
WBC OTHER # BLD: 8.8 K/UL (ref 4.5–11.5)

## 2024-02-11 PROCEDURE — 71250 CT THORAX DX C-: CPT

## 2024-02-11 PROCEDURE — 82805 BLOOD GASES W/O2 SATURATION: CPT

## 2024-02-11 PROCEDURE — 6370000000 HC RX 637 (ALT 250 FOR IP): Performed by: NURSE PRACTITIONER

## 2024-02-11 PROCEDURE — 2580000003 HC RX 258: Performed by: NURSE PRACTITIONER

## 2024-02-11 PROCEDURE — 82330 ASSAY OF CALCIUM: CPT

## 2024-02-11 PROCEDURE — 99291 CRITICAL CARE FIRST HOUR: CPT | Performed by: INTERNAL MEDICINE

## 2024-02-11 PROCEDURE — 94669 MECHANICAL CHEST WALL OSCILL: CPT

## 2024-02-11 PROCEDURE — 80053 COMPREHEN METABOLIC PANEL: CPT

## 2024-02-11 PROCEDURE — 94640 AIRWAY INHALATION TREATMENT: CPT

## 2024-02-11 PROCEDURE — 71045 X-RAY EXAM CHEST 1 VIEW: CPT

## 2024-02-11 PROCEDURE — 82962 GLUCOSE BLOOD TEST: CPT

## 2024-02-11 PROCEDURE — 83605 ASSAY OF LACTIC ACID: CPT

## 2024-02-11 PROCEDURE — 37799 UNLISTED PX VASCULAR SURGERY: CPT

## 2024-02-11 PROCEDURE — 2000000000 HC ICU R&B

## 2024-02-11 PROCEDURE — 85027 COMPLETE CBC AUTOMATED: CPT

## 2024-02-11 PROCEDURE — 2700000000 HC OXYGEN THERAPY PER DAY

## 2024-02-11 PROCEDURE — 83735 ASSAY OF MAGNESIUM: CPT

## 2024-02-11 RX ORDER — DOBUTAMINE HYDROCHLORIDE 200 MG/100ML
2.5 INJECTION INTRAVENOUS CONTINUOUS
Status: DISCONTINUED | OUTPATIENT
Start: 2024-02-11 | End: 2024-02-12

## 2024-02-11 RX ADMIN — SODIUM CHLORIDE, PRESERVATIVE FREE 10 ML: 5 INJECTION INTRAVENOUS at 08:25

## 2024-02-11 RX ADMIN — Medication 16 G: at 09:34

## 2024-02-11 RX ADMIN — LEVOTHYROXINE SODIUM 100 MCG: 0.1 TABLET ORAL at 05:05

## 2024-02-11 RX ADMIN — SODIUM CHLORIDE, PRESERVATIVE FREE 10 ML: 5 INJECTION INTRAVENOUS at 20:17

## 2024-02-11 RX ADMIN — OXYCODONE 5 MG: 5 TABLET ORAL at 20:16

## 2024-02-11 RX ADMIN — SENNOSIDES AND DOCUSATE SODIUM 1 TABLET: 8.6; 5 TABLET ORAL at 08:31

## 2024-02-11 RX ADMIN — ATORVASTATIN CALCIUM 40 MG: 40 TABLET, FILM COATED ORAL at 20:17

## 2024-02-11 RX ADMIN — PANTOPRAZOLE SODIUM 40 MG: 40 TABLET, DELAYED RELEASE ORAL at 05:05

## 2024-02-11 RX ADMIN — Medication 400 MG: at 08:31

## 2024-02-11 RX ADMIN — INSULIN GLARGINE 8 UNITS: 100 INJECTION, SOLUTION SUBCUTANEOUS at 20:16

## 2024-02-11 RX ADMIN — SENNOSIDES AND DOCUSATE SODIUM 1 TABLET: 8.6; 5 TABLET ORAL at 20:17

## 2024-02-11 RX ADMIN — ASPIRIN 81 MG: 81 TABLET, COATED ORAL at 08:31

## 2024-02-11 RX ADMIN — IPRATROPIUM BROMIDE AND ALBUTEROL SULFATE 1 DOSE: .5; 2.5 SOLUTION RESPIRATORY (INHALATION) at 19:28

## 2024-02-11 RX ADMIN — Medication 400 MG: at 20:17

## 2024-02-11 RX ADMIN — MUPIROCIN: 20 OINTMENT TOPICAL at 20:18

## 2024-02-11 RX ADMIN — SODIUM CHLORIDE, PRESERVATIVE FREE 10 ML: 5 INJECTION INTRAVENOUS at 08:30

## 2024-02-11 RX ADMIN — MUPIROCIN: 20 OINTMENT TOPICAL at 08:25

## 2024-02-11 RX ADMIN — OXYCODONE 5 MG: 5 TABLET ORAL at 08:39

## 2024-02-11 RX ADMIN — OXYCODONE HYDROCHLORIDE 10 MG: 10 TABLET ORAL at 13:54

## 2024-02-11 RX ADMIN — IPRATROPIUM BROMIDE AND ALBUTEROL SULFATE 1 DOSE: .5; 2.5 SOLUTION RESPIRATORY (INHALATION) at 08:54

## 2024-02-11 RX ADMIN — IPRATROPIUM BROMIDE AND ALBUTEROL SULFATE 1 DOSE: .5; 2.5 SOLUTION RESPIRATORY (INHALATION) at 11:55

## 2024-02-11 RX ADMIN — ACETAMINOPHEN 650 MG: 325 TABLET ORAL at 17:40

## 2024-02-11 RX ADMIN — FLUDROCORTISONE ACETATE 0.1 MG: 0.1 TABLET ORAL at 08:31

## 2024-02-11 RX ADMIN — CHLORHEXIDINE GLUCONATE, 0.12% ORAL RINSE 15 ML: 1.2 SOLUTION DENTAL at 20:16

## 2024-02-11 RX ADMIN — IPRATROPIUM BROMIDE AND ALBUTEROL SULFATE 1 DOSE: .5; 2.5 SOLUTION RESPIRATORY (INHALATION) at 15:54

## 2024-02-11 ASSESSMENT — PAIN DESCRIPTION - ORIENTATION
ORIENTATION: MID

## 2024-02-11 ASSESSMENT — PAIN DESCRIPTION - LOCATION
LOCATION: STERNUM
LOCATION: CHEST

## 2024-02-11 ASSESSMENT — PAIN DESCRIPTION - DESCRIPTORS
DESCRIPTORS: DULL;ACHING;DISCOMFORT
DESCRIPTORS: ACHING;DISCOMFORT
DESCRIPTORS: DULL

## 2024-02-11 ASSESSMENT — PAIN SCALES - GENERAL
PAINLEVEL_OUTOF10: 6
PAINLEVEL_OUTOF10: 3
PAINLEVEL_OUTOF10: 3
PAINLEVEL_OUTOF10: 0
PAINLEVEL_OUTOF10: 6
PAINLEVEL_OUTOF10: 7
PAINLEVEL_OUTOF10: 2
PAINLEVEL_OUTOF10: 3

## 2024-02-11 ASSESSMENT — PAIN - FUNCTIONAL ASSESSMENT
PAIN_FUNCTIONAL_ASSESSMENT: ACTIVITIES ARE NOT PREVENTED
PAIN_FUNCTIONAL_ASSESSMENT: PREVENTS OR INTERFERES SOME ACTIVE ACTIVITIES AND ADLS
PAIN_FUNCTIONAL_ASSESSMENT: ACTIVITIES ARE NOT PREVENTED
PAIN_FUNCTIONAL_ASSESSMENT: PREVENTS OR INTERFERES SOME ACTIVE ACTIVITIES AND ADLS

## 2024-02-11 ASSESSMENT — PAIN DESCRIPTION - PAIN TYPE
TYPE: SURGICAL PAIN

## 2024-02-11 ASSESSMENT — PAIN DESCRIPTION - FREQUENCY: FREQUENCY: INTERMITTENT

## 2024-02-11 NOTE — HOME CARE
[facility] intake following.  SOC orders will need to be placed PRIOR to discharge.      Thank you!  Lynne Vickers LPN  Central Intake Clinical Liaison  [facility].

## 2024-02-12 LAB
ALBUMIN SERPL-MCNC: 2.9 G/DL (ref 3.5–5.2)
ALP SERPL-CCNC: 104 U/L (ref 35–104)
ALT SERPL-CCNC: 11 U/L (ref 0–32)
ANION GAP SERPL CALCULATED.3IONS-SCNC: 7 MMOL/L (ref 7–16)
AST SERPL-CCNC: 50 U/L (ref 0–31)
BILIRUB SERPL-MCNC: 0.5 MG/DL (ref 0–1.2)
BUN SERPL-MCNC: 24 MG/DL (ref 6–23)
CA-I BLD-SCNC: 1.1 MMOL/L (ref 1.15–1.33)
CALCIUM SERPL-MCNC: 7.5 MG/DL (ref 8.6–10.2)
CHLORIDE SERPL-SCNC: 100 MMOL/L (ref 98–107)
CO2 SERPL-SCNC: 26 MMOL/L (ref 22–29)
CREAT SERPL-MCNC: 1 MG/DL (ref 0.5–1)
ERYTHROCYTE [DISTWIDTH] IN BLOOD BY AUTOMATED COUNT: 15.7 % (ref 11.5–15)
GFR SERPL CREATININE-BSD FRML MDRD: >60 ML/MIN/1.73M2
GLUCOSE BLD-MCNC: 113 MG/DL (ref 74–99)
GLUCOSE BLD-MCNC: 62 MG/DL (ref 74–99)
GLUCOSE BLD-MCNC: 70 MG/DL (ref 74–99)
GLUCOSE BLD-MCNC: 72 MG/DL (ref 74–99)
GLUCOSE BLD-MCNC: 81 MG/DL (ref 74–99)
GLUCOSE BLD-MCNC: 84 MG/DL (ref 74–99)
GLUCOSE BLD-MCNC: 91 MG/DL (ref 74–99)
GLUCOSE SERPL-MCNC: 78 MG/DL (ref 74–99)
HCT VFR BLD AUTO: 25.1 % (ref 34–48)
HGB BLD-MCNC: 8.1 G/DL (ref 11.5–15.5)
MAGNESIUM SERPL-MCNC: 2.2 MG/DL (ref 1.6–2.6)
MCH RBC QN AUTO: 29.6 PG (ref 26–35)
MCHC RBC AUTO-ENTMCNC: 32.3 G/DL (ref 32–34.5)
MCV RBC AUTO: 91.6 FL (ref 80–99.9)
PLATELET # BLD AUTO: 166 K/UL (ref 130–450)
PMV BLD AUTO: 10.4 FL (ref 7–12)
POC LACTIC ACID: 0.6 MMOL/L (ref 0.5–2.2)
POC LACTIC ACID: 0.7 MMOL/L (ref 0.5–2.2)
POTASSIUM SERPL-SCNC: 3.6 MMOL/L (ref 3.5–5)
PROT SERPL-MCNC: 4.5 G/DL (ref 6.4–8.3)
RBC # BLD AUTO: 2.74 M/UL (ref 3.5–5.5)
SODIUM SERPL-SCNC: 133 MMOL/L (ref 132–146)
WBC OTHER # BLD: 8.5 K/UL (ref 4.5–11.5)

## 2024-02-12 PROCEDURE — 2580000003 HC RX 258: Performed by: NURSE PRACTITIONER

## 2024-02-12 PROCEDURE — 6370000000 HC RX 637 (ALT 250 FOR IP): Performed by: NURSE PRACTITIONER

## 2024-02-12 PROCEDURE — 2000000000 HC ICU R&B

## 2024-02-12 PROCEDURE — 2700000000 HC OXYGEN THERAPY PER DAY

## 2024-02-12 PROCEDURE — 82330 ASSAY OF CALCIUM: CPT

## 2024-02-12 PROCEDURE — 83605 ASSAY OF LACTIC ACID: CPT

## 2024-02-12 PROCEDURE — 85027 COMPLETE CBC AUTOMATED: CPT

## 2024-02-12 PROCEDURE — 2500000003 HC RX 250 WO HCPCS: Performed by: NURSE PRACTITIONER

## 2024-02-12 PROCEDURE — 97530 THERAPEUTIC ACTIVITIES: CPT

## 2024-02-12 PROCEDURE — 94640 AIRWAY INHALATION TREATMENT: CPT

## 2024-02-12 PROCEDURE — 94669 MECHANICAL CHEST WALL OSCILL: CPT

## 2024-02-12 PROCEDURE — 37799 UNLISTED PX VASCULAR SURGERY: CPT

## 2024-02-12 PROCEDURE — 83735 ASSAY OF MAGNESIUM: CPT

## 2024-02-12 PROCEDURE — 82962 GLUCOSE BLOOD TEST: CPT

## 2024-02-12 PROCEDURE — 6360000002 HC RX W HCPCS: Performed by: NURSE PRACTITIONER

## 2024-02-12 PROCEDURE — 80053 COMPREHEN METABOLIC PANEL: CPT

## 2024-02-12 PROCEDURE — 99024 POSTOP FOLLOW-UP VISIT: CPT | Performed by: NURSE PRACTITIONER

## 2024-02-12 RX ORDER — INSULIN LISPRO 100 [IU]/ML
0-8 INJECTION, SOLUTION INTRAVENOUS; SUBCUTANEOUS NIGHTLY
Status: DISCONTINUED | OUTPATIENT
Start: 2024-02-12 | End: 2024-02-19 | Stop reason: HOSPADM

## 2024-02-12 RX ORDER — SORBITOL SOLUTION 70 %
30 SOLUTION, ORAL MISCELLANEOUS ONCE
Status: COMPLETED | OUTPATIENT
Start: 2024-02-12 | End: 2024-02-12

## 2024-02-12 RX ORDER — DOBUTAMINE HYDROCHLORIDE 200 MG/100ML
1.5 INJECTION INTRAVENOUS CONTINUOUS
Status: DISCONTINUED | OUTPATIENT
Start: 2024-02-12 | End: 2024-02-14

## 2024-02-12 RX ORDER — INSULIN LISPRO 100 [IU]/ML
0-8 INJECTION, SOLUTION INTRAVENOUS; SUBCUTANEOUS
Status: DISCONTINUED | OUTPATIENT
Start: 2024-02-12 | End: 2024-02-16

## 2024-02-12 RX ADMIN — SENNOSIDES AND DOCUSATE SODIUM 1 TABLET: 8.6; 5 TABLET ORAL at 08:51

## 2024-02-12 RX ADMIN — Medication 400 MG: at 08:51

## 2024-02-12 RX ADMIN — Medication 16 G: at 00:16

## 2024-02-12 RX ADMIN — ACETAMINOPHEN 650 MG: 325 TABLET ORAL at 20:43

## 2024-02-12 RX ADMIN — ACETAMINOPHEN 650 MG: 325 TABLET ORAL at 02:54

## 2024-02-12 RX ADMIN — SODIUM CHLORIDE: 9 INJECTION, SOLUTION INTRAVENOUS at 21:48

## 2024-02-12 RX ADMIN — POTASSIUM CHLORIDE 20 MEQ: 29.8 INJECTION, SOLUTION INTRAVENOUS at 08:00

## 2024-02-12 RX ADMIN — LEVOTHYROXINE SODIUM 100 MCG: 0.1 TABLET ORAL at 07:18

## 2024-02-12 RX ADMIN — IPRATROPIUM BROMIDE AND ALBUTEROL SULFATE 1 DOSE: .5; 2.5 SOLUTION RESPIRATORY (INHALATION) at 20:53

## 2024-02-12 RX ADMIN — ASPIRIN 81 MG: 81 TABLET, COATED ORAL at 08:51

## 2024-02-12 RX ADMIN — OXYCODONE 5 MG: 5 TABLET ORAL at 02:54

## 2024-02-12 RX ADMIN — PANTOPRAZOLE SODIUM 40 MG: 40 TABLET, DELAYED RELEASE ORAL at 07:18

## 2024-02-12 RX ADMIN — SODIUM CHLORIDE, PRESERVATIVE FREE 10 ML: 5 INJECTION INTRAVENOUS at 20:44

## 2024-02-12 RX ADMIN — OXYCODONE 5 MG: 5 TABLET ORAL at 20:42

## 2024-02-12 RX ADMIN — SORBITOL SOLUTION (BULK) 30 ML: 70 SOLUTION at 16:04

## 2024-02-12 RX ADMIN — OXYCODONE 5 MG: 5 TABLET ORAL at 09:11

## 2024-02-12 RX ADMIN — Medication 400 MG: at 20:42

## 2024-02-12 RX ADMIN — IPRATROPIUM BROMIDE AND ALBUTEROL SULFATE 1 DOSE: .5; 2.5 SOLUTION RESPIRATORY (INHALATION) at 16:10

## 2024-02-12 RX ADMIN — FLUDROCORTISONE ACETATE 0.1 MG: 0.1 TABLET ORAL at 08:52

## 2024-02-12 RX ADMIN — SODIUM CHLORIDE, PRESERVATIVE FREE 10 ML: 5 INJECTION INTRAVENOUS at 20:43

## 2024-02-12 RX ADMIN — ATORVASTATIN CALCIUM 40 MG: 40 TABLET, FILM COATED ORAL at 20:43

## 2024-02-12 RX ADMIN — SENNOSIDES AND DOCUSATE SODIUM 1 TABLET: 8.6; 5 TABLET ORAL at 20:42

## 2024-02-12 RX ADMIN — SODIUM CHLORIDE, PRESERVATIVE FREE 10 ML: 5 INJECTION INTRAVENOUS at 08:54

## 2024-02-12 RX ADMIN — CALCIUM CHLORIDE INJECTION 1000 MG: 100 INJECTION, SOLUTION INTRAVENOUS at 08:50

## 2024-02-12 RX ADMIN — IPRATROPIUM BROMIDE AND ALBUTEROL SULFATE 1 DOSE: .5; 2.5 SOLUTION RESPIRATORY (INHALATION) at 12:51

## 2024-02-12 RX ADMIN — OXYCODONE HYDROCHLORIDE 10 MG: 10 TABLET ORAL at 16:04

## 2024-02-12 RX ADMIN — IPRATROPIUM BROMIDE AND ALBUTEROL SULFATE 1 DOSE: .5; 2.5 SOLUTION RESPIRATORY (INHALATION) at 08:45

## 2024-02-12 ASSESSMENT — PAIN DESCRIPTION - DESCRIPTORS
DESCRIPTORS: ACHING;DISCOMFORT
DESCRIPTORS: ACHING;DISCOMFORT

## 2024-02-12 ASSESSMENT — PAIN SCALES - GENERAL
PAINLEVEL_OUTOF10: 2
PAINLEVEL_OUTOF10: 5
PAINLEVEL_OUTOF10: 2
PAINLEVEL_OUTOF10: 0
PAINLEVEL_OUTOF10: 8
PAINLEVEL_OUTOF10: 2
PAINLEVEL_OUTOF10: 6
PAINLEVEL_OUTOF10: 6

## 2024-02-12 ASSESSMENT — PAIN DESCRIPTION - ORIENTATION: ORIENTATION: RIGHT;LEFT

## 2024-02-12 ASSESSMENT — PAIN - FUNCTIONAL ASSESSMENT
PAIN_FUNCTIONAL_ASSESSMENT: ACTIVITIES ARE NOT PREVENTED
PAIN_FUNCTIONAL_ASSESSMENT: ACTIVITIES ARE NOT PREVENTED

## 2024-02-12 ASSESSMENT — PAIN DESCRIPTION - LOCATION
LOCATION: CHEST
LOCATION: CHEST;INCISION;SHOULDER

## 2024-02-12 NOTE — PRE-PROCEDURE INSTRUCTIONS
Special Procedures/Interventional Radiology Pre-Procedure Evaluation    Patient Name: Katt Diaz  MRN: [id number]  : 1945  Date of Procedure: 24  Planned Procedure: PICC and R Thora    Is the patient alert, oriented, and capable of providing informed procedural consent? Yes  Has the patient adhered to NPO status since midnight? No, ate lunch  Are recent lab results within acceptable parameters to safely proceed with the procedure? Yes  Has medical/surgical/allergy history been reviewed? Yes  Have anticoagulant medications been appropriately withheld as per protocol? No, ASA admin  Is the patient receiving intravenous antibiotic therapy? No    I have discussed and reviewed this information with the patient's primary RN, Jessica ,via telephone conversation. Plan to proceed with the procedure TOMORROW. However, the exact timing of the procedure cannot be specified at this moment. There is also a possibility that the procedure may be rescheduled due to the department's caseload and prioritization of cases.

## 2024-02-12 NOTE — CARE COORDINATION
2/12 Care Coordination: Pt remains in [facility] POD#5,CABG x 3.Remains on 2 liters NC, Cont CT's. Cont on IV Dobutrex drip. Spoke with pt in her room. Discussed discharge plan.. PTA she lives alone, was Independent. Pt states she would like to go home. Discussed she would need some one to stay with her. She could not go home alone. Previous CM/SW made referral to [facility]. We spoke about FLACO. She states if needed want [facility] in [place]. CM will make referral for them to follow. Spoke with [facility],she will review. CM/SW will continue to follow for discharge planning.   Dhaval MARTINEZ,RN-CV-BC  [phone]

## 2024-02-13 ENCOUNTER — APPOINTMENT (OUTPATIENT)
Dept: GENERAL RADIOLOGY | Age: 79
DRG: 233 | End: 2024-02-13
Attending: INTERNAL MEDICINE
Payer: MEDICARE

## 2024-02-13 ENCOUNTER — APPOINTMENT (OUTPATIENT)
Dept: INTERVENTIONAL RADIOLOGY/VASCULAR | Age: 79
DRG: 233 | End: 2024-02-13
Attending: INTERNAL MEDICINE
Payer: MEDICARE

## 2024-02-13 PROBLEM — N17.9 AKI (ACUTE KIDNEY INJURY) (HCC): Status: ACTIVE | Noted: 2024-02-13

## 2024-02-13 PROBLEM — I50.23 ACUTE ON CHRONIC SYSTOLIC HEART FAILURE (HCC): Status: ACTIVE | Noted: 2024-02-13

## 2024-02-13 LAB
ALBUMIN SERPL-MCNC: 2.6 G/DL (ref 3.5–5.2)
ALP SERPL-CCNC: 105 U/L (ref 35–104)
ALT SERPL-CCNC: 12 U/L (ref 0–32)
ANION GAP SERPL CALCULATED.3IONS-SCNC: 10 MMOL/L (ref 7–16)
AST SERPL-CCNC: 37 U/L (ref 0–31)
BILIRUB SERPL-MCNC: 0.6 MG/DL (ref 0–1.2)
BUN SERPL-MCNC: 22 MG/DL (ref 6–23)
CA-I BLD-SCNC: 1.13 MMOL/L (ref 1.15–1.33)
CALCIUM SERPL-MCNC: 7.8 MG/DL (ref 8.6–10.2)
CHLORIDE SERPL-SCNC: 99 MMOL/L (ref 98–107)
CO2 SERPL-SCNC: 22 MMOL/L (ref 22–29)
CREAT SERPL-MCNC: 0.8 MG/DL (ref 0.5–1)
ERYTHROCYTE [DISTWIDTH] IN BLOOD BY AUTOMATED COUNT: 15.6 % (ref 11.5–15)
GFR SERPL CREATININE-BSD FRML MDRD: >60 ML/MIN/1.73M2
GLUCOSE BLD-MCNC: 102 MG/DL (ref 74–99)
GLUCOSE BLD-MCNC: 104 MG/DL (ref 74–99)
GLUCOSE BLD-MCNC: 137 MG/DL (ref 74–99)
GLUCOSE BLD-MCNC: 84 MG/DL (ref 74–99)
GLUCOSE SERPL-MCNC: 118 MG/DL (ref 74–99)
HCT VFR BLD AUTO: 27.1 % (ref 34–48)
HGB BLD-MCNC: 8.7 G/DL (ref 11.5–15.5)
INR PPP: 1.3
MAGNESIUM SERPL-MCNC: 1.9 MG/DL (ref 1.6–2.6)
MCH RBC QN AUTO: 29.7 PG (ref 26–35)
MCHC RBC AUTO-ENTMCNC: 32.1 G/DL (ref 32–34.5)
MCV RBC AUTO: 92.5 FL (ref 80–99.9)
PLATELET # BLD AUTO: 206 K/UL (ref 130–450)
PMV BLD AUTO: 11.2 FL (ref 7–12)
POTASSIUM SERPL-SCNC: 4.4 MMOL/L (ref 3.5–5)
PROT SERPL-MCNC: 4.6 G/DL (ref 6.4–8.3)
PROTHROMBIN TIME: 14.4 SEC (ref 9.3–12.4)
RBC # BLD AUTO: 2.93 M/UL (ref 3.5–5.5)
SODIUM SERPL-SCNC: 131 MMOL/L (ref 132–146)
WBC OTHER # BLD: 9.8 K/UL (ref 4.5–11.5)

## 2024-02-13 PROCEDURE — 2000000000 HC ICU R&B

## 2024-02-13 PROCEDURE — 83735 ASSAY OF MAGNESIUM: CPT

## 2024-02-13 PROCEDURE — 05HM33Z INSERTION OF INFUSION DEVICE INTO RIGHT INTERNAL JUGULAR VEIN, PERCUTANEOUS APPROACH: ICD-10-PCS | Performed by: RADIOLOGY

## 2024-02-13 PROCEDURE — 2580000003 HC RX 258: Performed by: NURSE PRACTITIONER

## 2024-02-13 PROCEDURE — 6360000002 HC RX W HCPCS: Performed by: RADIOLOGY

## 2024-02-13 PROCEDURE — 36556 INSERT NON-TUNNEL CV CATH: CPT

## 2024-02-13 PROCEDURE — 6370000000 HC RX 637 (ALT 250 FOR IP): Performed by: NURSE PRACTITIONER

## 2024-02-13 PROCEDURE — 37799 UNLISTED PX VASCULAR SURGERY: CPT

## 2024-02-13 PROCEDURE — 6360000002 HC RX W HCPCS: Performed by: NURSE PRACTITIONER

## 2024-02-13 PROCEDURE — 2709999900 IR TUNNELED CVC PLACE WO SQ PORT/PUMP > 5 YEARS

## 2024-02-13 PROCEDURE — 76937 US GUIDE VASCULAR ACCESS: CPT

## 2024-02-13 PROCEDURE — 2500000003 HC RX 250 WO HCPCS: Performed by: RADIOLOGY

## 2024-02-13 PROCEDURE — 85610 PROTHROMBIN TIME: CPT

## 2024-02-13 PROCEDURE — 80053 COMPREHEN METABOLIC PANEL: CPT

## 2024-02-13 PROCEDURE — 94640 AIRWAY INHALATION TREATMENT: CPT

## 2024-02-13 PROCEDURE — 82330 ASSAY OF CALCIUM: CPT

## 2024-02-13 PROCEDURE — 76604 US EXAM CHEST: CPT

## 2024-02-13 PROCEDURE — 85027 COMPLETE CBC AUTOMATED: CPT

## 2024-02-13 PROCEDURE — 71045 X-RAY EXAM CHEST 1 VIEW: CPT

## 2024-02-13 PROCEDURE — 82962 GLUCOSE BLOOD TEST: CPT

## 2024-02-13 PROCEDURE — 36558 INSERT TUNNELED CV CATH: CPT

## 2024-02-13 PROCEDURE — 99024 POSTOP FOLLOW-UP VISIT: CPT | Performed by: NURSE PRACTITIONER

## 2024-02-13 PROCEDURE — 32555 ASPIRATE PLEURA W/ IMAGING: CPT

## 2024-02-13 PROCEDURE — 2580000003 HC RX 258: Performed by: RADIOLOGY

## 2024-02-13 PROCEDURE — 77001 FLUOROGUIDE FOR VEIN DEVICE: CPT

## 2024-02-13 PROCEDURE — 0JH63XZ INSERTION OF TUNNELED VASCULAR ACCESS DEVICE INTO CHEST SUBCUTANEOUS TISSUE AND FASCIA, PERCUTANEOUS APPROACH: ICD-10-PCS | Performed by: RADIOLOGY

## 2024-02-13 PROCEDURE — 2700000000 HC OXYGEN THERAPY PER DAY

## 2024-02-13 RX ORDER — LIDOCAINE HYDROCHLORIDE 20 MG/ML
INJECTION, SOLUTION INFILTRATION; PERINEURAL PRN
Status: COMPLETED | OUTPATIENT
Start: 2024-02-13 | End: 2024-02-13

## 2024-02-13 RX ORDER — SORBITOL SOLUTION 70 %
30 SOLUTION, ORAL MISCELLANEOUS ONCE
Status: COMPLETED | OUTPATIENT
Start: 2024-02-13 | End: 2024-02-13

## 2024-02-13 RX ORDER — LIDOCAINE HYDROCHLORIDE AND EPINEPHRINE BITARTRATE 20; .01 MG/ML; MG/ML
INJECTION, SOLUTION SUBCUTANEOUS PRN
Status: COMPLETED | OUTPATIENT
Start: 2024-02-13 | End: 2024-02-13

## 2024-02-13 RX ADMIN — SORBITOL SOLUTION (BULK) 30 ML: 70 SOLUTION at 22:32

## 2024-02-13 RX ADMIN — DOBUTAMINE HYDROCHLORIDE 2 MCG/KG/MIN: 200 INJECTION INTRAVENOUS at 05:17

## 2024-02-13 RX ADMIN — LEVOTHYROXINE SODIUM 100 MCG: 0.1 TABLET ORAL at 07:45

## 2024-02-13 RX ADMIN — PANTOPRAZOLE SODIUM 40 MG: 40 TABLET, DELAYED RELEASE ORAL at 11:35

## 2024-02-13 RX ADMIN — LIDOCAINE HYDROCHLORIDE 10 ML: 20 INJECTION, SOLUTION INFILTRATION; PERINEURAL at 17:44

## 2024-02-13 RX ADMIN — IPRATROPIUM BROMIDE AND ALBUTEROL SULFATE 1 DOSE: .5; 2.5 SOLUTION RESPIRATORY (INHALATION) at 22:18

## 2024-02-13 RX ADMIN — LIDOCAINE HYDROCHLORIDE,EPINEPHRINE BITARTRATE 10 ML: 20; .01 INJECTION, SOLUTION INFILTRATION; PERINEURAL at 17:45

## 2024-02-13 RX ADMIN — SODIUM CHLORIDE, PRESERVATIVE FREE 10 ML: 5 INJECTION INTRAVENOUS at 20:48

## 2024-02-13 RX ADMIN — IPRATROPIUM BROMIDE AND ALBUTEROL SULFATE 1 DOSE: .5; 2.5 SOLUTION RESPIRATORY (INHALATION) at 13:35

## 2024-02-13 RX ADMIN — ATORVASTATIN CALCIUM 40 MG: 40 TABLET, FILM COATED ORAL at 20:48

## 2024-02-13 RX ADMIN — OXYCODONE HYDROCHLORIDE 10 MG: 10 TABLET ORAL at 21:01

## 2024-02-13 RX ADMIN — CEFAZOLIN 1000 MG: 2 INJECTION, POWDER, FOR SOLUTION INTRAMUSCULAR; INTRAVENOUS at 17:57

## 2024-02-13 RX ADMIN — SENNOSIDES AND DOCUSATE SODIUM 1 TABLET: 8.6; 5 TABLET ORAL at 20:48

## 2024-02-13 RX ADMIN — FLUDROCORTISONE ACETATE 0.1 MG: 0.1 TABLET ORAL at 11:33

## 2024-02-13 RX ADMIN — OXYCODONE 5 MG: 5 TABLET ORAL at 11:31

## 2024-02-13 RX ADMIN — Medication 400 MG: at 20:48

## 2024-02-13 RX ADMIN — SODIUM CHLORIDE, PRESERVATIVE FREE 10 ML: 5 INJECTION INTRAVENOUS at 09:00

## 2024-02-13 ASSESSMENT — PAIN SCALES - GENERAL
PAINLEVEL_OUTOF10: 6
PAINLEVEL_OUTOF10: 0
PAINLEVEL_OUTOF10: 8

## 2024-02-13 ASSESSMENT — PAIN DESCRIPTION - LOCATION
LOCATION: CHEST;INCISION
LOCATION: CHEST;INCISION

## 2024-02-13 ASSESSMENT — PAIN DESCRIPTION - DESCRIPTORS
DESCRIPTORS: ACHING;DISCOMFORT
DESCRIPTORS: ACHING;DISCOMFORT

## 2024-02-13 ASSESSMENT — PAIN - FUNCTIONAL ASSESSMENT: PAIN_FUNCTIONAL_ASSESSMENT: ACTIVITIES ARE NOT PREVENTED

## 2024-02-13 ASSESSMENT — PAIN DESCRIPTION - ORIENTATION: ORIENTATION: MID

## 2024-02-13 NOTE — OR NURSING
Report called to primary RN. New tunneled CVC okay to use. Both ports draw and flush easily. Placement confirmed with Xray

## 2024-02-13 NOTE — OR NURSING
Ultrasound performed and patient does not have enough fluid in right lung to perform thoracentesis safely.

## 2024-02-13 NOTE — BRIEF OP NOTE
Brief-Op Note  ____________________________________________________________      IR  U/S LIMITED CHEST PLEURAL SPACE  [facility]    Patient Name: Katt Diaz   YOB: 1945  Medical Record Number: [id number]  Date of Procedure: 2/13/24  Room/Bed: [address] [place] [address]    Preoperative diagnosis: right Pleural Effusion    Postoperative diagnosis: Minimal/Insufficient Pleural Effusion    Consent: The patient was counseled regarding the procedure, it's indications, risks, potential complications and alternatives and any questions were answered.     Procedure: Prior to start of procedure, routine scanning of the posterior thorax was performed using real-time ultrasound and revealed a small amount of pleural fluid present. Decision was made not to proceed with procedure due to insufficient fluid present.  Risks and benefits were discussed with patient/family and agree not to proceed at this time.    Anesthesia: None.    Performed by: EDILSON Ascencio under on-site supervision by Quan Hutchins MD.    Estimated blood loss: None    Complications: None    Specimen Obtained: None    Electronically signed by EDILSON Ascencio   DD: 2/13/24  4:31 PM

## 2024-02-14 ENCOUNTER — APPOINTMENT (OUTPATIENT)
Dept: GENERAL RADIOLOGY | Age: 79
DRG: 233 | End: 2024-02-14
Attending: INTERNAL MEDICINE
Payer: MEDICARE

## 2024-02-14 LAB
ALBUMIN SERPL-MCNC: 2.6 G/DL (ref 3.5–5.2)
ALP SERPL-CCNC: 102 U/L (ref 35–104)
ALT SERPL-CCNC: 11 U/L (ref 0–32)
ANION GAP SERPL CALCULATED.3IONS-SCNC: 8 MMOL/L (ref 7–16)
AST SERPL-CCNC: 39 U/L (ref 0–31)
BILIRUB SERPL-MCNC: 0.5 MG/DL (ref 0–1.2)
BUN SERPL-MCNC: 23 MG/DL (ref 6–23)
CA-I BLD-SCNC: 1.12 MMOL/L (ref 1.15–1.33)
CALCIUM SERPL-MCNC: 7.6 MG/DL (ref 8.6–10.2)
CHLORIDE SERPL-SCNC: 103 MMOL/L (ref 98–107)
CO2 SERPL-SCNC: 22 MMOL/L (ref 22–29)
CREAT SERPL-MCNC: 0.7 MG/DL (ref 0.5–1)
ERYTHROCYTE [DISTWIDTH] IN BLOOD BY AUTOMATED COUNT: 15.7 % (ref 11.5–15)
GFR SERPL CREATININE-BSD FRML MDRD: >60 ML/MIN/1.73M2
GLUCOSE BLD-MCNC: 123 MG/DL (ref 74–99)
GLUCOSE BLD-MCNC: 132 MG/DL (ref 74–99)
GLUCOSE BLD-MCNC: 134 MG/DL (ref 74–99)
GLUCOSE BLD-MCNC: 135 MG/DL (ref 74–99)
GLUCOSE BLD-MCNC: 92 MG/DL (ref 74–99)
GLUCOSE SERPL-MCNC: 146 MG/DL (ref 74–99)
HCT VFR BLD AUTO: 27.3 % (ref 34–48)
HGB BLD-MCNC: 8.7 G/DL (ref 11.5–15.5)
MAGNESIUM SERPL-MCNC: 1.8 MG/DL (ref 1.6–2.6)
MCH RBC QN AUTO: 29.3 PG (ref 26–35)
MCHC RBC AUTO-ENTMCNC: 31.9 G/DL (ref 32–34.5)
MCV RBC AUTO: 91.9 FL (ref 80–99.9)
PLATELET # BLD AUTO: 242 K/UL (ref 130–450)
PMV BLD AUTO: 10.7 FL (ref 7–12)
POTASSIUM SERPL-SCNC: 4.1 MMOL/L (ref 3.5–5)
POTASSIUM SERPL-SCNC: 4.8 MMOL/L (ref 3.5–5)
PROT SERPL-MCNC: 4.9 G/DL (ref 6.4–8.3)
RBC # BLD AUTO: 2.97 M/UL (ref 3.5–5.5)
SODIUM SERPL-SCNC: 133 MMOL/L (ref 132–146)
SURGICAL PATHOLOGY REPORT: NORMAL
WBC OTHER # BLD: 8.5 K/UL (ref 4.5–11.5)

## 2024-02-14 PROCEDURE — 6370000000 HC RX 637 (ALT 250 FOR IP): Performed by: NURSE PRACTITIONER

## 2024-02-14 PROCEDURE — 97530 THERAPEUTIC ACTIVITIES: CPT

## 2024-02-14 PROCEDURE — 2140000000 HC CCU INTERMEDIATE R&B

## 2024-02-14 PROCEDURE — 94669 MECHANICAL CHEST WALL OSCILL: CPT

## 2024-02-14 PROCEDURE — 80053 COMPREHEN METABOLIC PANEL: CPT

## 2024-02-14 PROCEDURE — 82330 ASSAY OF CALCIUM: CPT

## 2024-02-14 PROCEDURE — 84132 ASSAY OF SERUM POTASSIUM: CPT

## 2024-02-14 PROCEDURE — 2580000003 HC RX 258: Performed by: NURSE PRACTITIONER

## 2024-02-14 PROCEDURE — 71045 X-RAY EXAM CHEST 1 VIEW: CPT

## 2024-02-14 PROCEDURE — 82962 GLUCOSE BLOOD TEST: CPT

## 2024-02-14 PROCEDURE — 94640 AIRWAY INHALATION TREATMENT: CPT

## 2024-02-14 PROCEDURE — 93798 PHYS/QHP OP CAR RHAB W/ECG: CPT

## 2024-02-14 PROCEDURE — 83735 ASSAY OF MAGNESIUM: CPT

## 2024-02-14 PROCEDURE — 2700000000 HC OXYGEN THERAPY PER DAY

## 2024-02-14 PROCEDURE — 37799 UNLISTED PX VASCULAR SURGERY: CPT

## 2024-02-14 PROCEDURE — 6360000002 HC RX W HCPCS: Performed by: NURSE PRACTITIONER

## 2024-02-14 PROCEDURE — 85027 COMPLETE CBC AUTOMATED: CPT

## 2024-02-14 PROCEDURE — 36415 COLL VENOUS BLD VENIPUNCTURE: CPT

## 2024-02-14 RX ORDER — BISACODYL 5 MG/1
5 TABLET, DELAYED RELEASE ORAL DAILY
Status: DISCONTINUED | OUTPATIENT
Start: 2024-02-14 | End: 2024-02-19 | Stop reason: HOSPADM

## 2024-02-14 RX ORDER — ASCORBIC ACID 500 MG
500 TABLET ORAL 2 TIMES DAILY
Status: DISCONTINUED | OUTPATIENT
Start: 2024-02-14 | End: 2024-02-19 | Stop reason: HOSPADM

## 2024-02-14 RX ORDER — DOBUTAMINE HYDROCHLORIDE 200 MG/100ML
1 INJECTION INTRAVENOUS CONTINUOUS
Status: DISCONTINUED | OUTPATIENT
Start: 2024-02-14 | End: 2024-02-15

## 2024-02-14 RX ORDER — AMIODARONE HYDROCHLORIDE 200 MG/1
400 TABLET ORAL PRN
Status: DISCONTINUED | OUTPATIENT
Start: 2024-02-14 | End: 2024-02-19 | Stop reason: HOSPADM

## 2024-02-14 RX ORDER — MAGNESIUM SULFATE IN WATER 40 MG/ML
2000 INJECTION, SOLUTION INTRAVENOUS PRN
Status: DISCONTINUED | OUTPATIENT
Start: 2024-02-14 | End: 2024-02-19 | Stop reason: HOSPADM

## 2024-02-14 RX ORDER — ENOXAPARIN SODIUM 100 MG/ML
40 INJECTION SUBCUTANEOUS DAILY
Status: DISCONTINUED | OUTPATIENT
Start: 2024-02-14 | End: 2024-02-19 | Stop reason: HOSPADM

## 2024-02-14 RX ORDER — FERROUS SULFATE 325(65) MG
325 TABLET ORAL 2 TIMES DAILY WITH MEALS
Status: DISCONTINUED | OUTPATIENT
Start: 2024-02-14 | End: 2024-02-19 | Stop reason: HOSPADM

## 2024-02-14 RX ORDER — DEXTROSE MONOHYDRATE 100 MG/ML
INJECTION, SOLUTION INTRAVENOUS CONTINUOUS PRN
Status: DISCONTINUED | OUTPATIENT
Start: 2024-02-14 | End: 2024-02-19 | Stop reason: HOSPADM

## 2024-02-14 RX ORDER — GLUCAGON 1 MG/ML
1 KIT INJECTION PRN
Status: DISCONTINUED | OUTPATIENT
Start: 2024-02-14 | End: 2024-02-19 | Stop reason: HOSPADM

## 2024-02-14 RX ORDER — LACTULOSE 10 G/15ML
20 SOLUTION ORAL 3 TIMES DAILY
Status: DISCONTINUED | OUTPATIENT
Start: 2024-02-14 | End: 2024-02-15

## 2024-02-14 RX ORDER — PROCHLORPERAZINE EDISYLATE 5 MG/ML
10 INJECTION INTRAMUSCULAR; INTRAVENOUS EVERY 6 HOURS PRN
Status: DISCONTINUED | OUTPATIENT
Start: 2024-02-14 | End: 2024-02-19 | Stop reason: HOSPADM

## 2024-02-14 RX ORDER — BISACODYL 10 MG
10 SUPPOSITORY, RECTAL RECTAL 2 TIMES DAILY
Status: DISCONTINUED | OUTPATIENT
Start: 2024-02-14 | End: 2024-02-15

## 2024-02-14 RX ORDER — ENOXAPARIN SODIUM 100 MG/ML
30 INJECTION SUBCUTANEOUS DAILY
Status: DISCONTINUED | OUTPATIENT
Start: 2024-02-14 | End: 2024-02-14

## 2024-02-14 RX ORDER — BISACODYL 10 MG
10 SUPPOSITORY, RECTAL RECTAL DAILY
Status: DISCONTINUED | OUTPATIENT
Start: 2024-02-14 | End: 2024-02-14 | Stop reason: SDUPTHER

## 2024-02-14 RX ORDER — DIPHENHYDRAMINE HCL 25 MG
25 TABLET ORAL EVERY 8 HOURS PRN
Status: DISCONTINUED | OUTPATIENT
Start: 2024-02-14 | End: 2024-02-19 | Stop reason: HOSPADM

## 2024-02-14 RX ORDER — POTASSIUM CHLORIDE 20 MEQ/1
20 TABLET, EXTENDED RELEASE ORAL PRN
Status: DISCONTINUED | OUTPATIENT
Start: 2024-02-14 | End: 2024-02-19 | Stop reason: HOSPADM

## 2024-02-14 RX ORDER — ASPIRIN 81 MG/1
81 TABLET ORAL DAILY
Status: DISCONTINUED | OUTPATIENT
Start: 2024-02-15 | End: 2024-02-19 | Stop reason: HOSPADM

## 2024-02-14 RX ORDER — FOLIC ACID 1 MG/1
1 TABLET ORAL DAILY
Status: DISCONTINUED | OUTPATIENT
Start: 2024-02-14 | End: 2024-02-19 | Stop reason: HOSPADM

## 2024-02-14 RX ORDER — SENNA AND DOCUSATE SODIUM 50; 8.6 MG/1; MG/1
1 TABLET, FILM COATED ORAL 2 TIMES DAILY
Status: DISCONTINUED | OUTPATIENT
Start: 2024-02-14 | End: 2024-02-19 | Stop reason: HOSPADM

## 2024-02-14 RX ORDER — ACETAMINOPHEN 325 MG/1
650 TABLET ORAL EVERY 4 HOURS PRN
Status: DISCONTINUED | OUTPATIENT
Start: 2024-02-14 | End: 2024-02-19 | Stop reason: HOSPADM

## 2024-02-14 RX ADMIN — Medication 400 MG: at 21:42

## 2024-02-14 RX ADMIN — SODIUM CHLORIDE, PRESERVATIVE FREE 10 ML: 5 INJECTION INTRAVENOUS at 21:46

## 2024-02-14 RX ADMIN — SENNOSIDES AND DOCUSATE SODIUM 1 TABLET: 8.6; 5 TABLET ORAL at 08:06

## 2024-02-14 RX ADMIN — Medication 500 MG: at 21:42

## 2024-02-14 RX ADMIN — BISACODYL 5 MG: 5 TABLET, COATED ORAL at 11:46

## 2024-02-14 RX ADMIN — BISACODYL 10 MG: 10 SUPPOSITORY RECTAL at 09:38

## 2024-02-14 RX ADMIN — PANTOPRAZOLE SODIUM 40 MG: 40 TABLET, DELAYED RELEASE ORAL at 06:19

## 2024-02-14 RX ADMIN — IPRATROPIUM BROMIDE AND ALBUTEROL SULFATE 1 DOSE: .5; 2.5 SOLUTION RESPIRATORY (INHALATION) at 20:07

## 2024-02-14 RX ADMIN — OXYCODONE HYDROCHLORIDE 10 MG: 10 TABLET ORAL at 06:19

## 2024-02-14 RX ADMIN — CHLORHEXIDINE GLUCONATE, 0.12% ORAL RINSE 15 ML: 1.2 SOLUTION DENTAL at 08:04

## 2024-02-14 RX ADMIN — SODIUM CHLORIDE: 9 INJECTION, SOLUTION INTRAVENOUS at 07:12

## 2024-02-14 RX ADMIN — LACTULOSE 20 G: 20 SOLUTION ORAL at 21:43

## 2024-02-14 RX ADMIN — IPRATROPIUM BROMIDE AND ALBUTEROL SULFATE 1 DOSE: .5; 2.5 SOLUTION RESPIRATORY (INHALATION) at 16:17

## 2024-02-14 RX ADMIN — OXYCODONE 5 MG: 5 TABLET ORAL at 21:42

## 2024-02-14 RX ADMIN — FOLIC ACID 1 MG: 1 TABLET ORAL at 11:46

## 2024-02-14 RX ADMIN — LEVOTHYROXINE SODIUM 100 MCG: 0.1 TABLET ORAL at 06:19

## 2024-02-14 RX ADMIN — CLOPIDOGREL 75 MG: 75 TABLET, FILM COATED ORAL at 09:38

## 2024-02-14 RX ADMIN — SODIUM CHLORIDE, PRESERVATIVE FREE 10 ML: 5 INJECTION INTRAVENOUS at 08:05

## 2024-02-14 RX ADMIN — FLUDROCORTISONE ACETATE 0.1 MG: 0.1 TABLET ORAL at 08:01

## 2024-02-14 RX ADMIN — IPRATROPIUM BROMIDE AND ALBUTEROL SULFATE 1 DOSE: .5; 2.5 SOLUTION RESPIRATORY (INHALATION) at 12:48

## 2024-02-14 RX ADMIN — OXYCODONE 5 MG: 5 TABLET ORAL at 11:50

## 2024-02-14 RX ADMIN — Medication 400 MG: at 08:05

## 2024-02-14 RX ADMIN — FERROUS SULFATE TAB 325 MG (65 MG ELEMENTAL FE) 325 MG: 325 (65 FE) TAB at 11:46

## 2024-02-14 RX ADMIN — SENNOSIDES AND DOCUSATE SODIUM 1 TABLET: 50; 8.6 TABLET ORAL at 21:42

## 2024-02-14 RX ADMIN — SENNOSIDES AND DOCUSATE SODIUM 1 TABLET: 50; 8.6 TABLET ORAL at 11:46

## 2024-02-14 RX ADMIN — SODIUM CHLORIDE, PRESERVATIVE FREE 10 ML: 5 INJECTION INTRAVENOUS at 08:06

## 2024-02-14 RX ADMIN — FERROUS SULFATE TAB 325 MG (65 MG ELEMENTAL FE) 325 MG: 325 (65 FE) TAB at 16:42

## 2024-02-14 RX ADMIN — SODIUM CHLORIDE, PRESERVATIVE FREE 10 ML: 5 INJECTION INTRAVENOUS at 21:43

## 2024-02-14 RX ADMIN — ENOXAPARIN SODIUM 40 MG: 100 INJECTION SUBCUTANEOUS at 09:38

## 2024-02-14 RX ADMIN — Medication 500 MG: at 11:46

## 2024-02-14 RX ADMIN — ATORVASTATIN CALCIUM 40 MG: 40 TABLET, FILM COATED ORAL at 21:42

## 2024-02-14 RX ADMIN — LACTULOSE 20 G: 20 SOLUTION ORAL at 09:38

## 2024-02-14 RX ADMIN — ASPIRIN 81 MG: 81 TABLET, COATED ORAL at 08:05

## 2024-02-14 RX ADMIN — HEPARIN 300 UNITS: 100 SYRINGE at 08:04

## 2024-02-14 ASSESSMENT — PAIN DESCRIPTION - LOCATION
LOCATION: INCISION
LOCATION: INCISION
LOCATION: CHEST;INCISION
LOCATION: INCISION
LOCATION: CHEST

## 2024-02-14 ASSESSMENT — PAIN SCALES - GENERAL
PAINLEVEL_OUTOF10: 1
PAINLEVEL_OUTOF10: 8
PAINLEVEL_OUTOF10: 8
PAINLEVEL_OUTOF10: 1
PAINLEVEL_OUTOF10: 9
PAINLEVEL_OUTOF10: 5
PAINLEVEL_OUTOF10: 5
PAINLEVEL_OUTOF10: 6

## 2024-02-14 ASSESSMENT — PAIN DESCRIPTION - DESCRIPTORS
DESCRIPTORS: ACHING;DISCOMFORT
DESCRIPTORS: ACHING;DISCOMFORT
DESCRIPTORS: ACHING;DISCOMFORT;SORE
DESCRIPTORS: ACHING;DISCOMFORT;SORE
DESCRIPTORS: DISCOMFORT;SORE
DESCRIPTORS: ACHING;DISCOMFORT;SORE
DESCRIPTORS: ACHING;DISCOMFORT

## 2024-02-14 ASSESSMENT — PAIN DESCRIPTION - ORIENTATION
ORIENTATION: MID

## 2024-02-14 ASSESSMENT — PAIN - FUNCTIONAL ASSESSMENT: PAIN_FUNCTIONAL_ASSESSMENT: PREVENTS OR INTERFERES SOME ACTIVE ACTIVITIES AND ADLS

## 2024-02-14 NOTE — CARE COORDINATION
2/14 Care Coordination: POD# 7 CABG x 3. Pt remains in [facility]IC, Plan for Transfer to PCCU today. Dobutamine weaned to 1mcg/kg/min. Pt on 2L O2 NC. PTA she lives alone, was Independent. Pt states she would like to go home. Discussed she would need some one to stay with her. She could not go home alone. Previous CM/SW made referral to [facility]. We spoke about [facility]. She states if needed want [facility] in [place]. [facility] following. Need to let her know when closer to discharge to see if will have a Bed.  CM/SW will continue to follow for discharge planning.   Dhaval HOANGN,RN-CV-BC  [phone]

## 2024-02-15 ENCOUNTER — APPOINTMENT (OUTPATIENT)
Age: 79
DRG: 233 | End: 2024-02-15
Attending: INTERNAL MEDICINE
Payer: MEDICARE

## 2024-02-15 ENCOUNTER — APPOINTMENT (OUTPATIENT)
Dept: GENERAL RADIOLOGY | Age: 79
DRG: 233 | End: 2024-02-15
Attending: INTERNAL MEDICINE
Payer: MEDICARE

## 2024-02-15 LAB
ALBUMIN SERPL-MCNC: 2.5 G/DL (ref 3.5–5.2)
ALP SERPL-CCNC: 77 U/L (ref 35–104)
ALT SERPL-CCNC: 9 U/L (ref 0–32)
ANION GAP SERPL CALCULATED.3IONS-SCNC: 7 MMOL/L (ref 7–16)
AST SERPL-CCNC: 29 U/L (ref 0–31)
BILIRUB SERPL-MCNC: 0.5 MG/DL (ref 0–1.2)
BUN SERPL-MCNC: 18 MG/DL (ref 6–23)
CALCIUM SERPL-MCNC: 7.5 MG/DL (ref 8.6–10.2)
CHLORIDE SERPL-SCNC: 101 MMOL/L (ref 98–107)
CO2 SERPL-SCNC: 25 MMOL/L (ref 22–29)
CREAT SERPL-MCNC: 0.6 MG/DL (ref 0.5–1)
ECHO BSA: 1.49 M2
ECHO LA DIAMETER INDEX: 1.95 CM/M2
ECHO LA DIAMETER: 3.2 CM
ECHO LA VOL A-L A2C: 42 ML (ref 22–52)
ECHO LA VOL A-L A4C: 27 ML (ref 22–52)
ECHO LA VOL MOD A2C: 40 ML (ref 22–52)
ECHO LA VOL MOD A4C: 27 ML (ref 22–52)
ECHO LA VOLUME AREA LENGTH: 34 ML
ECHO LA VOLUME INDEX A-L A2C: 26 ML/M2 (ref 16–34)
ECHO LA VOLUME INDEX A-L A4C: 16 ML/M2 (ref 16–34)
ECHO LA VOLUME INDEX AREA LENGTH: 21 ML/M2 (ref 16–34)
ECHO LA VOLUME INDEX MOD A2C: 24 ML/M2 (ref 16–34)
ECHO LA VOLUME INDEX MOD A4C: 16 ML/M2 (ref 16–34)
ECHO LV EDV A2C: 74 ML
ECHO LV EDV A4C: 82 ML
ECHO LV EDV BP: 80 ML (ref 56–104)
ECHO LV EDV INDEX A4C: 50 ML/M2
ECHO LV EDV INDEX BP: 49 ML/M2
ECHO LV EDV NDEX A2C: 45 ML/M2
ECHO LV EJECTION FRACTION A2C: 30 %
ECHO LV EJECTION FRACTION A4C: 47 %
ECHO LV EJECTION FRACTION BIPLANE: 39 % (ref 55–100)
ECHO LV ESV A2C: 52 ML
ECHO LV ESV A4C: 43 ML
ECHO LV ESV BP: 48 ML (ref 19–49)
ECHO LV ESV INDEX A2C: 32 ML/M2
ECHO LV ESV INDEX A4C: 26 ML/M2
ECHO LV ESV INDEX BP: 29 ML/M2
ECHO LV FRACTIONAL SHORTENING: 18 % (ref 28–44)
ECHO LV INTERNAL DIMENSION DIASTOLE INDEX: 3.05 CM/M2
ECHO LV INTERNAL DIMENSION DIASTOLIC: 5 CM (ref 3.9–5.3)
ECHO LV INTERNAL DIMENSION SYSTOLIC INDEX: 2.5 CM/M2
ECHO LV INTERNAL DIMENSION SYSTOLIC: 4.1 CM
ECHO LV IVSD: 1 CM (ref 0.6–0.9)
ECHO LV MASS 2D: 207.1 G (ref 67–162)
ECHO LV MASS INDEX 2D: 126.3 G/M2 (ref 43–95)
ECHO LV POSTERIOR WALL DIASTOLIC: 1.2 CM (ref 0.6–0.9)
ECHO LV RELATIVE WALL THICKNESS RATIO: 0.48
ECHO MV A VELOCITY: 0.89 M/S
ECHO MV E DECELERATION TIME (DT): 125.4 MS
ECHO MV E VELOCITY: 0.78 M/S
ECHO MV E/A RATIO: 0.88
ECHO MV MAX VELOCITY: 0.9 M/S
ECHO MV MEAN GRADIENT: 1 MMHG
ECHO MV MEAN VELOCITY: 0.5 M/S
ECHO MV PEAK GRADIENT: 3 MMHG
ECHO MV VTI: 11.2 CM
ECHO TV REGURGITANT MAX VELOCITY: 2.31 M/S
ECHO TV REGURGITANT PEAK GRADIENT: 21 MMHG
ERYTHROCYTE [DISTWIDTH] IN BLOOD BY AUTOMATED COUNT: 15.5 % (ref 11.5–15)
GFR SERPL CREATININE-BSD FRML MDRD: >60 ML/MIN/1.73M2
GLUCOSE BLD-MCNC: 100 MG/DL (ref 74–99)
GLUCOSE BLD-MCNC: 105 MG/DL (ref 74–99)
GLUCOSE BLD-MCNC: 87 MG/DL (ref 74–99)
GLUCOSE BLD-MCNC: 99 MG/DL (ref 74–99)
GLUCOSE SERPL-MCNC: 113 MG/DL (ref 74–99)
HCT VFR BLD AUTO: 28.8 % (ref 34–48)
HGB BLD-MCNC: 9.1 G/DL (ref 11.5–15.5)
MAGNESIUM SERPL-MCNC: 1.9 MG/DL (ref 1.6–2.6)
MCH RBC QN AUTO: 29.6 PG (ref 26–35)
MCHC RBC AUTO-ENTMCNC: 31.6 G/DL (ref 32–34.5)
MCV RBC AUTO: 93.8 FL (ref 80–99.9)
PLATELET # BLD AUTO: 271 K/UL (ref 130–450)
PMV BLD AUTO: 10.2 FL (ref 7–12)
POTASSIUM SERPL-SCNC: 3.7 MMOL/L (ref 3.5–5)
PROT SERPL-MCNC: 4.7 G/DL (ref 6.4–8.3)
RBC # BLD AUTO: 3.07 M/UL (ref 3.5–5.5)
SODIUM SERPL-SCNC: 133 MMOL/L (ref 132–146)
WBC OTHER # BLD: 8.8 K/UL (ref 4.5–11.5)

## 2024-02-15 PROCEDURE — 94640 AIRWAY INHALATION TREATMENT: CPT

## 2024-02-15 PROCEDURE — 80053 COMPREHEN METABOLIC PANEL: CPT

## 2024-02-15 PROCEDURE — 2140000000 HC CCU INTERMEDIATE R&B

## 2024-02-15 PROCEDURE — 6370000000 HC RX 637 (ALT 250 FOR IP): Performed by: NURSE PRACTITIONER

## 2024-02-15 PROCEDURE — 93308 TTE F-UP OR LMTD: CPT

## 2024-02-15 PROCEDURE — 82962 GLUCOSE BLOOD TEST: CPT

## 2024-02-15 PROCEDURE — 93325 DOPPLER ECHO COLOR FLOW MAPG: CPT | Performed by: INTERNAL MEDICINE

## 2024-02-15 PROCEDURE — 97535 SELF CARE MNGMENT TRAINING: CPT

## 2024-02-15 PROCEDURE — 83735 ASSAY OF MAGNESIUM: CPT

## 2024-02-15 PROCEDURE — 85027 COMPLETE CBC AUTOMATED: CPT

## 2024-02-15 PROCEDURE — 2580000003 HC RX 258: Performed by: NURSE PRACTITIONER

## 2024-02-15 PROCEDURE — 6370000000 HC RX 637 (ALT 250 FOR IP): Performed by: PHYSICIAN ASSISTANT

## 2024-02-15 PROCEDURE — 71045 X-RAY EXAM CHEST 1 VIEW: CPT

## 2024-02-15 PROCEDURE — 6360000002 HC RX W HCPCS: Performed by: NURSE PRACTITIONER

## 2024-02-15 PROCEDURE — 6370000000 HC RX 637 (ALT 250 FOR IP)

## 2024-02-15 PROCEDURE — 97530 THERAPEUTIC ACTIVITIES: CPT

## 2024-02-15 PROCEDURE — 93308 TTE F-UP OR LMTD: CPT | Performed by: INTERNAL MEDICINE

## 2024-02-15 PROCEDURE — 93321 DOPPLER ECHO F-UP/LMTD STD: CPT | Performed by: INTERNAL MEDICINE

## 2024-02-15 PROCEDURE — 93798 PHYS/QHP OP CAR RHAB W/ECG: CPT

## 2024-02-15 PROCEDURE — 2700000000 HC OXYGEN THERAPY PER DAY

## 2024-02-15 RX ORDER — AMIODARONE HYDROCHLORIDE 200 MG/1
200 TABLET ORAL 2 TIMES DAILY
Status: DISCONTINUED | OUTPATIENT
Start: 2024-02-15 | End: 2024-02-15

## 2024-02-15 RX ORDER — AMIODARONE HYDROCHLORIDE 200 MG/1
400 TABLET ORAL 2 TIMES DAILY
Status: DISCONTINUED | OUTPATIENT
Start: 2024-02-15 | End: 2024-02-19 | Stop reason: HOSPADM

## 2024-02-15 RX ORDER — FUROSEMIDE 40 MG/1
40 TABLET ORAL ONCE
Status: COMPLETED | OUTPATIENT
Start: 2024-02-15 | End: 2024-02-15

## 2024-02-15 RX ADMIN — AMIODARONE HYDROCHLORIDE 400 MG: 200 TABLET ORAL at 20:46

## 2024-02-15 RX ADMIN — ATORVASTATIN CALCIUM 40 MG: 40 TABLET, FILM COATED ORAL at 20:47

## 2024-02-15 RX ADMIN — FUROSEMIDE 40 MG: 40 TABLET ORAL at 10:23

## 2024-02-15 RX ADMIN — Medication 400 MG: at 20:47

## 2024-02-15 RX ADMIN — LEVOTHYROXINE SODIUM 100 MCG: 0.1 TABLET ORAL at 06:13

## 2024-02-15 RX ADMIN — POTASSIUM CHLORIDE 20 MEQ: 1500 TABLET, EXTENDED RELEASE ORAL at 08:57

## 2024-02-15 RX ADMIN — AMIODARONE HYDROCHLORIDE 200 MG: 200 TABLET ORAL at 10:23

## 2024-02-15 RX ADMIN — PANTOPRAZOLE SODIUM 40 MG: 40 TABLET, DELAYED RELEASE ORAL at 06:13

## 2024-02-15 RX ADMIN — SODIUM CHLORIDE, PRESERVATIVE FREE 10 ML: 5 INJECTION INTRAVENOUS at 20:49

## 2024-02-15 RX ADMIN — POTASSIUM CHLORIDE 20 MEQ: 1500 TABLET, EXTENDED RELEASE ORAL at 08:58

## 2024-02-15 RX ADMIN — ENOXAPARIN SODIUM 40 MG: 100 INJECTION SUBCUTANEOUS at 08:56

## 2024-02-15 RX ADMIN — FOLIC ACID 1 MG: 1 TABLET ORAL at 08:56

## 2024-02-15 RX ADMIN — IPRATROPIUM BROMIDE AND ALBUTEROL SULFATE 1 DOSE: .5; 2.5 SOLUTION RESPIRATORY (INHALATION) at 12:30

## 2024-02-15 RX ADMIN — Medication 500 MG: at 08:56

## 2024-02-15 RX ADMIN — Medication 500 MG: at 20:47

## 2024-02-15 RX ADMIN — FERROUS SULFATE TAB 325 MG (65 MG ELEMENTAL FE) 325 MG: 325 (65 FE) TAB at 16:25

## 2024-02-15 RX ADMIN — SODIUM CHLORIDE, PRESERVATIVE FREE 10 ML: 5 INJECTION INTRAVENOUS at 08:58

## 2024-02-15 RX ADMIN — SODIUM CHLORIDE, PRESERVATIVE FREE 10 ML: 5 INJECTION INTRAVENOUS at 20:47

## 2024-02-15 RX ADMIN — ASPIRIN 81 MG: 81 TABLET, COATED ORAL at 08:56

## 2024-02-15 RX ADMIN — FERROUS SULFATE TAB 325 MG (65 MG ELEMENTAL FE) 325 MG: 325 (65 FE) TAB at 08:56

## 2024-02-15 RX ADMIN — CLOPIDOGREL 75 MG: 75 TABLET, FILM COATED ORAL at 08:56

## 2024-02-15 RX ADMIN — OXYCODONE HYDROCHLORIDE 10 MG: 10 TABLET ORAL at 06:15

## 2024-02-15 RX ADMIN — OXYCODONE HYDROCHLORIDE 10 MG: 10 TABLET ORAL at 20:47

## 2024-02-15 RX ADMIN — FLUDROCORTISONE ACETATE 0.1 MG: 0.1 TABLET ORAL at 08:59

## 2024-02-15 RX ADMIN — Medication 400 MG: at 08:56

## 2024-02-15 ASSESSMENT — PAIN DESCRIPTION - LOCATION
LOCATION: CHEST;INCISION
LOCATION: CHEST

## 2024-02-15 ASSESSMENT — PAIN SCALES - GENERAL
PAINLEVEL_OUTOF10: 6
PAINLEVEL_OUTOF10: 4
PAINLEVEL_OUTOF10: 8

## 2024-02-15 ASSESSMENT — PAIN DESCRIPTION - ORIENTATION: ORIENTATION: MID

## 2024-02-15 ASSESSMENT — PAIN DESCRIPTION - DESCRIPTORS
DESCRIPTORS: DISCOMFORT;SORE;SHARP
DESCRIPTORS: DISCOMFORT;SORE;SHARP

## 2024-02-15 NOTE — PATIENT CARE CONFERENCE
P Quality Flow/Interdisciplinary Rounds Progress Note        Quality Flow Rounds held on February 15, 2024    Disciplines Attending:  Bedside Nurse, , , and Nursing Unit Leadership    Kattcirilo Diaz was admitted on 2/5/2024 10:40 AM    Anticipated Discharge Date:       Disposition:    Dani Score:  Dani Scale Score: 19    Readmission Risk              Risk of Unplanned Readmission:  16           Discussed patient goal for the day, patient clinical progression, and barriers to discharge.  The following Goal(s) of the Day/Commitment(s) have been identified:  Report labs/diagnostics      Dalila Mcqueen RN  February 15, 2024

## 2024-02-15 NOTE — CARE COORDINATION
2/15/24  Transition of care update.  Patient is POD #8 for CABG x3.  One chest tube removed with one remaining.  Dobutamine has been stopped.  TTE pending to assess LV/RV function and need for potential WCD.  Patient continues on 2 liters of 02 with attempt to wean.  Patient lives alone and is only walking a few steps. PT/OT evals complete with AM-PAC of 10/24 for PT and 11/24 for OT.  Discharge goal is a FLACO stay at [facility] who is following per previous .  [facility] was asked to start pre-cert but states they \"cannot until the final chest tube is pulled as they cannot accept clinically until then\".  [facility] is following should pre-cert not be approved. Ambulette started and pending on the soft chart.  PASRR complete.  SW/Cm to follow.    Electronically signed by ADAMA Shirley on 2/15/2024 at 11:42 AM

## 2024-02-15 NOTE — DISCHARGE INSTR - COC
patient.    Active Problems:  Patient Active Problem List   Diagnosis Code    S/P cardiac cath Z98.890    CAD in native artery I25.10    ST elevation myocardial infarction (STEMI) ([facility]) I21.3    Postoperative hypotension I95.81    Complication of surgical procedure T81.9XXA    Stress hyperglycemia R73.9    VERONIQUE (acute kidney injury) ([facility]) N17.9    Acute on chronic systolic heart failure ([facility]) I50.23       Isolation/Infection:   Isolation            No Isolation          Patient Infection Status       None to display            Nurse Assessment:  Last Vital Signs: BP (!) 116/59   Pulse (!) 107   Temp 97 °F (36.1 °C) (Temporal)   Resp 22   Ht 1.6 m (5' 3\")   Wt 61.2 kg (135 lb)   SpO2 93%   BMI 23.91 kg/m²     Last documented pain score (0-10 scale): Pain Level: 4  Last Weight:   Wt Readings from Last 1 Encounters:   02/15/24 61.2 kg (135 lb)     Mental Status:  oriented and alert    IV Access:  - None    Nursing Mobility/ADLs:  Walking   Assisted  Transfer  Assisted  Bathing  Assisted  Dressing  Assisted  Toileting  Assisted  Feeding  Assisted  Med Admin  Assisted  Med Delivery   whole    Wound Care Documentation and Therapy:  Puncture 02/07/24 Thigh (Active)   Dressing/Treatment Surgical glue;Other (comment) 02/07/24 1015   Number of days: 8       Incision 02/07/24 Thigh Distal;Left (Active)   Dressing Status Clean;Dry;Intact 02/15/24 0444   Incision Cleansed Cleansed with saline 02/15/24 0444   Dressing/Treatment Open to air;JUNIE hose 02/15/24 0444   Closure Surgical glue 02/15/24 0444   Incision Assessment Dry 02/15/24 0444   Drainage Amount None (dry) 02/15/24 0444   Odor None 02/15/24 0444   Carmen-incision Assessment Intact 02/15/24 0444   Number of days: 8       Incision 02/07/24 Sternum Mid (Active)   Dressing Status Clean;Dry;Intact 02/15/24 0444   Incision Cleansed Not Cleansed 02/14/24 1335   Dressing/Treatment Other (comment) 02/15/24 0444   Closure Other (Comment) 02/10/24 0800   Incision Assessment

## 2024-02-16 LAB
ALBUMIN SERPL-MCNC: 2.3 G/DL (ref 3.5–5.2)
ALP SERPL-CCNC: 71 U/L (ref 35–104)
ALT SERPL-CCNC: 9 U/L (ref 0–32)
ANION GAP SERPL CALCULATED.3IONS-SCNC: 10 MMOL/L (ref 7–16)
AST SERPL-CCNC: 32 U/L (ref 0–31)
BILIRUB SERPL-MCNC: 0.5 MG/DL (ref 0–1.2)
BUN SERPL-MCNC: 14 MG/DL (ref 6–23)
CALCIUM SERPL-MCNC: 7.6 MG/DL (ref 8.6–10.2)
CHLORIDE SERPL-SCNC: 99 MMOL/L (ref 98–107)
CO2 SERPL-SCNC: 23 MMOL/L (ref 22–29)
CREAT SERPL-MCNC: 0.7 MG/DL (ref 0.5–1)
ERYTHROCYTE [DISTWIDTH] IN BLOOD BY AUTOMATED COUNT: 15.4 % (ref 11.5–15)
GFR SERPL CREATININE-BSD FRML MDRD: >60 ML/MIN/1.73M2
GLUCOSE BLD-MCNC: 120 MG/DL (ref 74–99)
GLUCOSE BLD-MCNC: 93 MG/DL (ref 74–99)
GLUCOSE BLD-MCNC: 98 MG/DL (ref 74–99)
GLUCOSE SERPL-MCNC: 93 MG/DL (ref 74–99)
HCT VFR BLD AUTO: 28.7 % (ref 34–48)
HGB BLD-MCNC: 9.1 G/DL (ref 11.5–15.5)
MAGNESIUM SERPL-MCNC: 1.9 MG/DL (ref 1.6–2.6)
MCH RBC QN AUTO: 29.4 PG (ref 26–35)
MCHC RBC AUTO-ENTMCNC: 31.7 G/DL (ref 32–34.5)
MCV RBC AUTO: 92.6 FL (ref 80–99.9)
PLATELET # BLD AUTO: 300 K/UL (ref 130–450)
PMV BLD AUTO: 10.2 FL (ref 7–12)
POTASSIUM SERPL-SCNC: 4.1 MMOL/L (ref 3.5–5)
PROT SERPL-MCNC: 4.8 G/DL (ref 6.4–8.3)
RBC # BLD AUTO: 3.1 M/UL (ref 3.5–5.5)
SODIUM SERPL-SCNC: 132 MMOL/L (ref 132–146)
WBC OTHER # BLD: 8.1 K/UL (ref 4.5–11.5)

## 2024-02-16 PROCEDURE — 97530 THERAPEUTIC ACTIVITIES: CPT

## 2024-02-16 PROCEDURE — 85027 COMPLETE CBC AUTOMATED: CPT

## 2024-02-16 PROCEDURE — 94640 AIRWAY INHALATION TREATMENT: CPT

## 2024-02-16 PROCEDURE — 2580000003 HC RX 258: Performed by: NURSE PRACTITIONER

## 2024-02-16 PROCEDURE — 6370000000 HC RX 637 (ALT 250 FOR IP): Performed by: NURSE PRACTITIONER

## 2024-02-16 PROCEDURE — 6370000000 HC RX 637 (ALT 250 FOR IP): Performed by: PHYSICIAN ASSISTANT

## 2024-02-16 PROCEDURE — 80053 COMPREHEN METABOLIC PANEL: CPT

## 2024-02-16 PROCEDURE — 6370000000 HC RX 637 (ALT 250 FOR IP)

## 2024-02-16 PROCEDURE — 83735 ASSAY OF MAGNESIUM: CPT

## 2024-02-16 PROCEDURE — 2140000000 HC CCU INTERMEDIATE R&B

## 2024-02-16 PROCEDURE — 2700000000 HC OXYGEN THERAPY PER DAY

## 2024-02-16 PROCEDURE — 82962 GLUCOSE BLOOD TEST: CPT

## 2024-02-16 PROCEDURE — 6360000002 HC RX W HCPCS: Performed by: NURSE PRACTITIONER

## 2024-02-16 PROCEDURE — 93798 PHYS/QHP OP CAR RHAB W/ECG: CPT

## 2024-02-16 RX ORDER — METOPROLOL SUCCINATE 25 MG/1
12.5 TABLET, EXTENDED RELEASE ORAL DAILY
Status: DISCONTINUED | OUTPATIENT
Start: 2024-02-16 | End: 2024-02-19 | Stop reason: HOSPADM

## 2024-02-16 RX ADMIN — IPRATROPIUM BROMIDE AND ALBUTEROL SULFATE 1 DOSE: .5; 2.5 SOLUTION RESPIRATORY (INHALATION) at 10:05

## 2024-02-16 RX ADMIN — SODIUM CHLORIDE, PRESERVATIVE FREE 10 ML: 5 INJECTION INTRAVENOUS at 08:39

## 2024-02-16 RX ADMIN — CLOPIDOGREL 75 MG: 75 TABLET, FILM COATED ORAL at 08:38

## 2024-02-16 RX ADMIN — ENOXAPARIN SODIUM 40 MG: 100 INJECTION SUBCUTANEOUS at 08:37

## 2024-02-16 RX ADMIN — METOPROLOL SUCCINATE 12.5 MG: 25 TABLET, EXTENDED RELEASE ORAL at 09:56

## 2024-02-16 RX ADMIN — Medication 500 MG: at 21:32

## 2024-02-16 RX ADMIN — MAGNESIUM SULFATE HEPTAHYDRATE 2000 MG: 40 INJECTION, SOLUTION INTRAVENOUS at 08:44

## 2024-02-16 RX ADMIN — IPRATROPIUM BROMIDE AND ALBUTEROL SULFATE 1 DOSE: .5; 2.5 SOLUTION RESPIRATORY (INHALATION) at 19:03

## 2024-02-16 RX ADMIN — IPRATROPIUM BROMIDE AND ALBUTEROL SULFATE 1 DOSE: .5; 2.5 SOLUTION RESPIRATORY (INHALATION) at 15:36

## 2024-02-16 RX ADMIN — OXYCODONE HYDROCHLORIDE 10 MG: 10 TABLET ORAL at 08:53

## 2024-02-16 RX ADMIN — SODIUM CHLORIDE, PRESERVATIVE FREE 10 ML: 5 INJECTION INTRAVENOUS at 21:25

## 2024-02-16 RX ADMIN — FERROUS SULFATE TAB 325 MG (65 MG ELEMENTAL FE) 325 MG: 325 (65 FE) TAB at 08:38

## 2024-02-16 RX ADMIN — FOLIC ACID 1 MG: 1 TABLET ORAL at 08:38

## 2024-02-16 RX ADMIN — ATORVASTATIN CALCIUM 40 MG: 40 TABLET, FILM COATED ORAL at 21:32

## 2024-02-16 RX ADMIN — LEVOTHYROXINE SODIUM 100 MCG: 0.1 TABLET ORAL at 05:42

## 2024-02-16 RX ADMIN — PANTOPRAZOLE SODIUM 40 MG: 40 TABLET, DELAYED RELEASE ORAL at 05:42

## 2024-02-16 RX ADMIN — Medication 400 MG: at 08:38

## 2024-02-16 RX ADMIN — AMIODARONE HYDROCHLORIDE 400 MG: 200 TABLET ORAL at 08:38

## 2024-02-16 RX ADMIN — ASPIRIN 81 MG: 81 TABLET, COATED ORAL at 08:38

## 2024-02-16 RX ADMIN — FLUDROCORTISONE ACETATE 0.1 MG: 0.1 TABLET ORAL at 08:39

## 2024-02-16 RX ADMIN — FERROUS SULFATE TAB 325 MG (65 MG ELEMENTAL FE) 325 MG: 325 (65 FE) TAB at 17:27

## 2024-02-16 RX ADMIN — AMIODARONE HYDROCHLORIDE 400 MG: 200 TABLET ORAL at 21:32

## 2024-02-16 RX ADMIN — Medication 400 MG: at 21:32

## 2024-02-16 RX ADMIN — Medication 500 MG: at 08:38

## 2024-02-16 ASSESSMENT — PAIN SCALES - GENERAL
PAINLEVEL_OUTOF10: 0
PAINLEVEL_OUTOF10: 7

## 2024-02-16 ASSESSMENT — PAIN - FUNCTIONAL ASSESSMENT: PAIN_FUNCTIONAL_ASSESSMENT: ACTIVITIES ARE NOT PREVENTED

## 2024-02-16 NOTE — PATIENT CARE CONFERENCE
P Quality Flow/Interdisciplinary Rounds Progress Note        Quality Flow Rounds held on February 16, 2024    Disciplines Attending:  Bedside Nurse, , , and Nursing Unit Leadership    Kattcirilo Diaz was admitted on 2/5/2024 10:40 AM    Anticipated Discharge Date:       Disposition:    Dani Score:  Dani Scale Score: 18    Readmission Risk              Risk of Unplanned Readmission:  16           Discussed patient goal for the day, patient clinical progression, and barriers to discharge.  The following Goal(s) of the Day/Commitment(s) have been identified:  Diagnostics - Report Results and Labs - Report Results      Jena Locke RN  February 16, 2024

## 2024-02-16 NOTE — CARE COORDINATION
2/16/24  Transition of Care Update.  Patient is POD #9 for CABG x3. Chest tube removed today.  TTE complete with EF of 30-35% with life vest ordered.  [facility] refusing to [facility] due to need for life vest.  Referral called to [facility] does not have a bed avail, [facility] who is not in network with patients insurance as well as [facility].  [facility] able to accept but will not have a bed until Monday.  Pre-cert is needed and will be started this weekend per liaison.  Patient added to the weekend therapy list.  JOSE and destination updated.  PASRR changed to [facility].  Ambulette started and pending on the soft chart. FRED/[facility] to follow.     2:15pm  Cardiac rehab protocol placed on soft chart to transfer with patient to the nursing facility.     Electronically signed by ADAMA Shirley on 2/16/2024 at 1:58 PM

## 2024-02-17 LAB
ALBUMIN SERPL-MCNC: 2.6 G/DL (ref 3.5–5.2)
ALP SERPL-CCNC: 71 U/L (ref 35–104)
ALT SERPL-CCNC: 11 U/L (ref 0–32)
ANION GAP SERPL CALCULATED.3IONS-SCNC: 6 MMOL/L (ref 7–16)
AST SERPL-CCNC: 37 U/L (ref 0–31)
BILIRUB SERPL-MCNC: 0.4 MG/DL (ref 0–1.2)
BUN SERPL-MCNC: 15 MG/DL (ref 6–23)
CALCIUM SERPL-MCNC: 7.3 MG/DL (ref 8.6–10.2)
CHLORIDE SERPL-SCNC: 97 MMOL/L (ref 98–107)
CO2 SERPL-SCNC: 25 MMOL/L (ref 22–29)
CREAT SERPL-MCNC: 0.7 MG/DL (ref 0.5–1)
ERYTHROCYTE [DISTWIDTH] IN BLOOD BY AUTOMATED COUNT: 15.4 % (ref 11.5–15)
GFR SERPL CREATININE-BSD FRML MDRD: >60 ML/MIN/1.73M2
GLUCOSE BLD-MCNC: 100 MG/DL (ref 74–99)
GLUCOSE BLD-MCNC: 114 MG/DL (ref 74–99)
GLUCOSE SERPL-MCNC: 98 MG/DL (ref 74–99)
HCT VFR BLD AUTO: 26.1 % (ref 34–48)
HGB BLD-MCNC: 8.4 G/DL (ref 11.5–15.5)
MAGNESIUM SERPL-MCNC: 2.2 MG/DL (ref 1.6–2.6)
MCH RBC QN AUTO: 29.9 PG (ref 26–35)
MCHC RBC AUTO-ENTMCNC: 32.2 G/DL (ref 32–34.5)
MCV RBC AUTO: 92.9 FL (ref 80–99.9)
PLATELET # BLD AUTO: 317 K/UL (ref 130–450)
PMV BLD AUTO: 10.1 FL (ref 7–12)
POTASSIUM SERPL-SCNC: 4 MMOL/L (ref 3.5–5)
PROT SERPL-MCNC: 4.8 G/DL (ref 6.4–8.3)
RBC # BLD AUTO: 2.81 M/UL (ref 3.5–5.5)
SODIUM SERPL-SCNC: 128 MMOL/L (ref 132–146)
WBC OTHER # BLD: 8.2 K/UL (ref 4.5–11.5)

## 2024-02-17 PROCEDURE — 6360000002 HC RX W HCPCS: Performed by: NURSE PRACTITIONER

## 2024-02-17 PROCEDURE — 2140000000 HC CCU INTERMEDIATE R&B

## 2024-02-17 PROCEDURE — 82962 GLUCOSE BLOOD TEST: CPT

## 2024-02-17 PROCEDURE — 6370000000 HC RX 637 (ALT 250 FOR IP)

## 2024-02-17 PROCEDURE — 85027 COMPLETE CBC AUTOMATED: CPT

## 2024-02-17 PROCEDURE — 6370000000 HC RX 637 (ALT 250 FOR IP): Performed by: NURSE PRACTITIONER

## 2024-02-17 PROCEDURE — 2580000003 HC RX 258: Performed by: NURSE PRACTITIONER

## 2024-02-17 PROCEDURE — 93798 PHYS/QHP OP CAR RHAB W/ECG: CPT

## 2024-02-17 PROCEDURE — 6370000000 HC RX 637 (ALT 250 FOR IP): Performed by: PHYSICIAN ASSISTANT

## 2024-02-17 PROCEDURE — 83735 ASSAY OF MAGNESIUM: CPT

## 2024-02-17 PROCEDURE — 80053 COMPREHEN METABOLIC PANEL: CPT

## 2024-02-17 RX ADMIN — SODIUM CHLORIDE, PRESERVATIVE FREE 10 ML: 5 INJECTION INTRAVENOUS at 21:02

## 2024-02-17 RX ADMIN — PANTOPRAZOLE SODIUM 40 MG: 40 TABLET, DELAYED RELEASE ORAL at 06:04

## 2024-02-17 RX ADMIN — CLOPIDOGREL 75 MG: 75 TABLET, FILM COATED ORAL at 08:37

## 2024-02-17 RX ADMIN — FERROUS SULFATE TAB 325 MG (65 MG ELEMENTAL FE) 325 MG: 325 (65 FE) TAB at 08:23

## 2024-02-17 RX ADMIN — ENOXAPARIN SODIUM 40 MG: 100 INJECTION SUBCUTANEOUS at 08:25

## 2024-02-17 RX ADMIN — FOLIC ACID 1 MG: 1 TABLET ORAL at 08:24

## 2024-02-17 RX ADMIN — SODIUM CHLORIDE, PRESERVATIVE FREE 10 ML: 5 INJECTION INTRAVENOUS at 08:38

## 2024-02-17 RX ADMIN — FLUDROCORTISONE ACETATE 0.1 MG: 0.1 TABLET ORAL at 08:41

## 2024-02-17 RX ADMIN — LEVOTHYROXINE SODIUM 100 MCG: 0.1 TABLET ORAL at 06:04

## 2024-02-17 RX ADMIN — OXYCODONE 5 MG: 5 TABLET ORAL at 08:36

## 2024-02-17 RX ADMIN — METOPROLOL SUCCINATE 12.5 MG: 25 TABLET, EXTENDED RELEASE ORAL at 08:23

## 2024-02-17 RX ADMIN — Medication 400 MG: at 21:02

## 2024-02-17 RX ADMIN — Medication 500 MG: at 21:02

## 2024-02-17 RX ADMIN — Medication 400 MG: at 08:23

## 2024-02-17 RX ADMIN — OXYCODONE HYDROCHLORIDE 10 MG: 10 TABLET ORAL at 21:02

## 2024-02-17 RX ADMIN — ASPIRIN 81 MG: 81 TABLET, COATED ORAL at 08:23

## 2024-02-17 RX ADMIN — AMIODARONE HYDROCHLORIDE 400 MG: 200 TABLET ORAL at 21:02

## 2024-02-17 RX ADMIN — ATORVASTATIN CALCIUM 40 MG: 40 TABLET, FILM COATED ORAL at 21:01

## 2024-02-17 RX ADMIN — SODIUM CHLORIDE, PRESERVATIVE FREE 10 ML: 5 INJECTION INTRAVENOUS at 21:03

## 2024-02-17 RX ADMIN — Medication 500 MG: at 08:23

## 2024-02-17 RX ADMIN — AMIODARONE HYDROCHLORIDE 400 MG: 200 TABLET ORAL at 08:23

## 2024-02-17 RX ADMIN — FERROUS SULFATE TAB 325 MG (65 MG ELEMENTAL FE) 325 MG: 325 (65 FE) TAB at 16:47

## 2024-02-17 ASSESSMENT — PAIN DESCRIPTION - DESCRIPTORS
DESCRIPTORS: DISCOMFORT
DESCRIPTORS: ACHING;DISCOMFORT;SORE

## 2024-02-17 ASSESSMENT — PAIN DESCRIPTION - LOCATION
LOCATION: CHEST
LOCATION: CHEST
LOCATION: CHEST;INCISION

## 2024-02-17 ASSESSMENT — PAIN - FUNCTIONAL ASSESSMENT: PAIN_FUNCTIONAL_ASSESSMENT: ACTIVITIES ARE NOT PREVENTED

## 2024-02-17 ASSESSMENT — PAIN SCALES - GENERAL
PAINLEVEL_OUTOF10: 6
PAINLEVEL_OUTOF10: 5
PAINLEVEL_OUTOF10: 5

## 2024-02-17 ASSESSMENT — PAIN DESCRIPTION - ORIENTATION
ORIENTATION: MID
ORIENTATION: MID

## 2024-02-18 LAB
ALBUMIN SERPL-MCNC: 2.5 G/DL (ref 3.5–5.2)
ALP SERPL-CCNC: 76 U/L (ref 35–104)
ALT SERPL-CCNC: 14 U/L (ref 0–32)
ANION GAP SERPL CALCULATED.3IONS-SCNC: 8 MMOL/L (ref 7–16)
AST SERPL-CCNC: 31 U/L (ref 0–31)
BILIRUB SERPL-MCNC: 0.4 MG/DL (ref 0–1.2)
BUN SERPL-MCNC: 14 MG/DL (ref 6–23)
CALCIUM SERPL-MCNC: 7.4 MG/DL (ref 8.6–10.2)
CHLORIDE SERPL-SCNC: 101 MMOL/L (ref 98–107)
CO2 SERPL-SCNC: 25 MMOL/L (ref 22–29)
CREAT SERPL-MCNC: 0.8 MG/DL (ref 0.5–1)
ERYTHROCYTE [DISTWIDTH] IN BLOOD BY AUTOMATED COUNT: 15.8 % (ref 11.5–15)
GFR SERPL CREATININE-BSD FRML MDRD: >60 ML/MIN/1.73M2
GLUCOSE BLD-MCNC: 120 MG/DL (ref 74–99)
GLUCOSE BLD-MCNC: 89 MG/DL (ref 74–99)
GLUCOSE SERPL-MCNC: 91 MG/DL (ref 74–99)
HCT VFR BLD AUTO: 25.4 % (ref 34–48)
HGB BLD-MCNC: 8 G/DL (ref 11.5–15.5)
MAGNESIUM SERPL-MCNC: 2.2 MG/DL (ref 1.6–2.6)
MCH RBC QN AUTO: 29.4 PG (ref 26–35)
MCHC RBC AUTO-ENTMCNC: 31.5 G/DL (ref 32–34.5)
MCV RBC AUTO: 93.4 FL (ref 80–99.9)
PLATELET # BLD AUTO: 306 K/UL (ref 130–450)
PMV BLD AUTO: 10 FL (ref 7–12)
POTASSIUM SERPL-SCNC: 4.3 MMOL/L (ref 3.5–5)
PROT SERPL-MCNC: 5 G/DL (ref 6.4–8.3)
RBC # BLD AUTO: 2.72 M/UL (ref 3.5–5.5)
SODIUM SERPL-SCNC: 134 MMOL/L (ref 132–146)
WBC OTHER # BLD: 7.8 K/UL (ref 4.5–11.5)

## 2024-02-18 PROCEDURE — 2580000003 HC RX 258: Performed by: NURSE PRACTITIONER

## 2024-02-18 PROCEDURE — 6370000000 HC RX 637 (ALT 250 FOR IP): Performed by: NURSE PRACTITIONER

## 2024-02-18 PROCEDURE — 97530 THERAPEUTIC ACTIVITIES: CPT

## 2024-02-18 PROCEDURE — 6370000000 HC RX 637 (ALT 250 FOR IP): Performed by: PHYSICIAN ASSISTANT

## 2024-02-18 PROCEDURE — 85027 COMPLETE CBC AUTOMATED: CPT

## 2024-02-18 PROCEDURE — 97535 SELF CARE MNGMENT TRAINING: CPT

## 2024-02-18 PROCEDURE — 83735 ASSAY OF MAGNESIUM: CPT

## 2024-02-18 PROCEDURE — 93798 PHYS/QHP OP CAR RHAB W/ECG: CPT

## 2024-02-18 PROCEDURE — 82962 GLUCOSE BLOOD TEST: CPT

## 2024-02-18 PROCEDURE — 6360000002 HC RX W HCPCS: Performed by: NURSE PRACTITIONER

## 2024-02-18 PROCEDURE — 2140000000 HC CCU INTERMEDIATE R&B

## 2024-02-18 PROCEDURE — 80053 COMPREHEN METABOLIC PANEL: CPT

## 2024-02-18 PROCEDURE — 6370000000 HC RX 637 (ALT 250 FOR IP)

## 2024-02-18 RX ADMIN — SODIUM CHLORIDE, PRESERVATIVE FREE 10 ML: 5 INJECTION INTRAVENOUS at 21:29

## 2024-02-18 RX ADMIN — AMIODARONE HYDROCHLORIDE 400 MG: 200 TABLET ORAL at 08:19

## 2024-02-18 RX ADMIN — OXYCODONE 5 MG: 5 TABLET ORAL at 08:18

## 2024-02-18 RX ADMIN — BISACODYL 5 MG: 5 TABLET, COATED ORAL at 08:19

## 2024-02-18 RX ADMIN — ASPIRIN 81 MG: 81 TABLET, COATED ORAL at 08:19

## 2024-02-18 RX ADMIN — METOPROLOL SUCCINATE 12.5 MG: 25 TABLET, EXTENDED RELEASE ORAL at 08:19

## 2024-02-18 RX ADMIN — FOLIC ACID 1 MG: 1 TABLET ORAL at 08:19

## 2024-02-18 RX ADMIN — OXYCODONE 5 MG: 5 TABLET ORAL at 15:54

## 2024-02-18 RX ADMIN — PANTOPRAZOLE SODIUM 40 MG: 40 TABLET, DELAYED RELEASE ORAL at 06:19

## 2024-02-18 RX ADMIN — FERROUS SULFATE TAB 325 MG (65 MG ELEMENTAL FE) 325 MG: 325 (65 FE) TAB at 15:54

## 2024-02-18 RX ADMIN — ENOXAPARIN SODIUM 40 MG: 100 INJECTION SUBCUTANEOUS at 08:20

## 2024-02-18 RX ADMIN — FLUDROCORTISONE ACETATE 0.1 MG: 0.1 TABLET ORAL at 08:21

## 2024-02-18 RX ADMIN — Medication 400 MG: at 21:28

## 2024-02-18 RX ADMIN — SODIUM CHLORIDE, PRESERVATIVE FREE 10 ML: 5 INJECTION INTRAVENOUS at 08:21

## 2024-02-18 RX ADMIN — ACETAMINOPHEN 650 MG: 325 TABLET ORAL at 21:33

## 2024-02-18 RX ADMIN — SODIUM CHLORIDE, PRESERVATIVE FREE 10 ML: 5 INJECTION INTRAVENOUS at 08:20

## 2024-02-18 RX ADMIN — ATORVASTATIN CALCIUM 40 MG: 40 TABLET, FILM COATED ORAL at 21:28

## 2024-02-18 RX ADMIN — AMIODARONE HYDROCHLORIDE 400 MG: 200 TABLET ORAL at 21:28

## 2024-02-18 RX ADMIN — Medication 500 MG: at 21:28

## 2024-02-18 RX ADMIN — DIPHENHYDRAMINE HYDROCHLORIDE 25 MG: 25 TABLET ORAL at 00:38

## 2024-02-18 RX ADMIN — CLOPIDOGREL 75 MG: 75 TABLET, FILM COATED ORAL at 08:19

## 2024-02-18 RX ADMIN — Medication 400 MG: at 08:19

## 2024-02-18 RX ADMIN — Medication 500 MG: at 08:19

## 2024-02-18 RX ADMIN — FERROUS SULFATE TAB 325 MG (65 MG ELEMENTAL FE) 325 MG: 325 (65 FE) TAB at 08:18

## 2024-02-18 RX ADMIN — DIPHENHYDRAMINE HYDROCHLORIDE 25 MG: 25 TABLET ORAL at 21:31

## 2024-02-18 RX ADMIN — SENNOSIDES AND DOCUSATE SODIUM 1 TABLET: 50; 8.6 TABLET ORAL at 08:18

## 2024-02-18 RX ADMIN — LEVOTHYROXINE SODIUM 100 MCG: 0.1 TABLET ORAL at 06:19

## 2024-02-18 ASSESSMENT — PAIN SCALES - GENERAL
PAINLEVEL_OUTOF10: 6
PAINLEVEL_OUTOF10: 6

## 2024-02-18 ASSESSMENT — PAIN DESCRIPTION - ORIENTATION: ORIENTATION: MID

## 2024-02-18 ASSESSMENT — PAIN - FUNCTIONAL ASSESSMENT: PAIN_FUNCTIONAL_ASSESSMENT: ACTIVITIES ARE NOT PREVENTED

## 2024-02-18 ASSESSMENT — PAIN DESCRIPTION - LOCATION
LOCATION: CHEST
LOCATION: CHEST;INCISION

## 2024-02-18 ASSESSMENT — PAIN DESCRIPTION - DESCRIPTORS: DESCRIPTORS: ACHING;DISCOMFORT;SORE

## 2024-02-19 ENCOUNTER — APPOINTMENT (OUTPATIENT)
Dept: INTERVENTIONAL RADIOLOGY/VASCULAR | Age: 79
DRG: 233 | End: 2024-02-19
Attending: INTERNAL MEDICINE
Payer: MEDICARE

## 2024-02-19 VITALS
RESPIRATION RATE: 16 BRPM | TEMPERATURE: 97.5 F | WEIGHT: 126.2 LBS | OXYGEN SATURATION: 98 % | DIASTOLIC BLOOD PRESSURE: 62 MMHG | SYSTOLIC BLOOD PRESSURE: 101 MMHG | HEIGHT: 63 IN | BODY MASS INDEX: 22.36 KG/M2 | HEART RATE: 79 BPM

## 2024-02-19 LAB
ALBUMIN SERPL-MCNC: 2.7 G/DL (ref 3.5–5.2)
ALP SERPL-CCNC: 75 U/L (ref 35–104)
ALT SERPL-CCNC: 9 U/L (ref 0–32)
ANION GAP SERPL CALCULATED.3IONS-SCNC: 12 MMOL/L (ref 7–16)
AST SERPL-CCNC: 30 U/L (ref 0–31)
BILIRUB SERPL-MCNC: 0.4 MG/DL (ref 0–1.2)
BUN SERPL-MCNC: 15 MG/DL (ref 6–23)
CALCIUM SERPL-MCNC: 7.5 MG/DL (ref 8.6–10.2)
CHLORIDE SERPL-SCNC: 103 MMOL/L (ref 98–107)
CO2 SERPL-SCNC: 22 MMOL/L (ref 22–29)
CREAT SERPL-MCNC: 0.8 MG/DL (ref 0.5–1)
ERYTHROCYTE [DISTWIDTH] IN BLOOD BY AUTOMATED COUNT: 15.6 % (ref 11.5–15)
GFR SERPL CREATININE-BSD FRML MDRD: >60 ML/MIN/1.73M2
GLUCOSE SERPL-MCNC: 97 MG/DL (ref 74–99)
HCT VFR BLD AUTO: 26.2 % (ref 34–48)
HGB BLD-MCNC: 8.2 G/DL (ref 11.5–15.5)
MAGNESIUM SERPL-MCNC: 2.1 MG/DL (ref 1.6–2.6)
MCH RBC QN AUTO: 29.1 PG (ref 26–35)
MCHC RBC AUTO-ENTMCNC: 31.3 G/DL (ref 32–34.5)
MCV RBC AUTO: 92.9 FL (ref 80–99.9)
PLATELET # BLD AUTO: 326 K/UL (ref 130–450)
PMV BLD AUTO: 10 FL (ref 7–12)
POTASSIUM SERPL-SCNC: 4.3 MMOL/L (ref 3.5–5)
PROT SERPL-MCNC: 5.2 G/DL (ref 6.4–8.3)
RBC # BLD AUTO: 2.82 M/UL (ref 3.5–5.5)
SODIUM SERPL-SCNC: 137 MMOL/L (ref 132–146)
WBC OTHER # BLD: 7.8 K/UL (ref 4.5–11.5)

## 2024-02-19 PROCEDURE — 6370000000 HC RX 637 (ALT 250 FOR IP): Performed by: PHYSICIAN ASSISTANT

## 2024-02-19 PROCEDURE — 6370000000 HC RX 637 (ALT 250 FOR IP): Performed by: NURSE PRACTITIONER

## 2024-02-19 PROCEDURE — 0JPTXXZ REMOVAL OF TUNNELED VASCULAR ACCESS DEVICE FROM TRUNK SUBCUTANEOUS TISSUE AND FASCIA, EXTERNAL APPROACH: ICD-10-PCS | Performed by: RADIOLOGY

## 2024-02-19 PROCEDURE — 93798 PHYS/QHP OP CAR RHAB W/ECG: CPT

## 2024-02-19 PROCEDURE — 97530 THERAPEUTIC ACTIVITIES: CPT

## 2024-02-19 PROCEDURE — 83735 ASSAY OF MAGNESIUM: CPT

## 2024-02-19 PROCEDURE — 97535 SELF CARE MNGMENT TRAINING: CPT

## 2024-02-19 PROCEDURE — 77001 FLUOROGUIDE FOR VEIN DEVICE: CPT

## 2024-02-19 PROCEDURE — 80053 COMPREHEN METABOLIC PANEL: CPT

## 2024-02-19 PROCEDURE — 36589 REMOVAL TUNNELED CV CATH: CPT

## 2024-02-19 PROCEDURE — 85027 COMPLETE CBC AUTOMATED: CPT

## 2024-02-19 PROCEDURE — 6370000000 HC RX 637 (ALT 250 FOR IP)

## 2024-02-19 PROCEDURE — 6360000002 HC RX W HCPCS: Performed by: NURSE PRACTITIONER

## 2024-02-19 PROCEDURE — 2580000003 HC RX 258: Performed by: NURSE PRACTITIONER

## 2024-02-19 RX ORDER — FUROSEMIDE 20 MG/1
10 TABLET ORAL DAILY
Status: DISCONTINUED | OUTPATIENT
Start: 2024-02-19 | End: 2024-02-19 | Stop reason: HOSPADM

## 2024-02-19 RX ORDER — LANOLIN ALCOHOL/MO/W.PET/CERES
400 CREAM (GRAM) TOPICAL 2 TIMES DAILY
Qty: 28 TABLET | Refills: 0
Start: 2024-02-19 | End: 2024-03-04

## 2024-02-19 RX ORDER — HYDROCODONE BITARTRATE AND ACETAMINOPHEN 5; 325 MG/1; MG/1
1 TABLET ORAL EVERY 6 HOURS PRN
Qty: 28 TABLET | Refills: 0 | Status: ON HOLD | OUTPATIENT
Start: 2024-02-19 | End: 2024-02-22

## 2024-02-19 RX ORDER — ENOXAPARIN SODIUM 100 MG/ML
40 INJECTION SUBCUTANEOUS DAILY
Qty: 120 ML | Refills: 0 | Status: SHIPPED | OUTPATIENT
Start: 2024-02-19

## 2024-02-19 RX ORDER — ATORVASTATIN CALCIUM 40 MG/1
40 TABLET, FILM COATED ORAL NIGHTLY
Qty: 30 TABLET | Refills: 3
Start: 2024-02-19

## 2024-02-19 RX ORDER — PANTOPRAZOLE SODIUM 40 MG/1
40 TABLET, DELAYED RELEASE ORAL
Qty: 14 TABLET | Refills: 0
Start: 2024-02-20 | End: 2024-03-05

## 2024-02-19 RX ORDER — DOCUSATE SODIUM 100 MG/1
100 CAPSULE, LIQUID FILLED ORAL 2 TIMES DAILY PRN
Qty: 60 CAPSULE | Refills: 0
Start: 2024-02-19 | End: 2024-03-20

## 2024-02-19 RX ORDER — FUROSEMIDE 20 MG/1
10 TABLET ORAL DAILY
Qty: 60 TABLET | Refills: 3 | Status: ON HOLD
Start: 2024-02-19 | End: 2024-02-22

## 2024-02-19 RX ORDER — POTASSIUM CHLORIDE 750 MG/1
10 TABLET, EXTENDED RELEASE ORAL EVERY OTHER DAY
Status: DISCONTINUED | OUTPATIENT
Start: 2024-02-19 | End: 2024-02-19 | Stop reason: HOSPADM

## 2024-02-19 RX ORDER — ASCORBIC ACID 500 MG
500 TABLET ORAL 2 TIMES DAILY
Qty: 60 TABLET | Refills: 0
Start: 2024-02-19 | End: 2024-03-20

## 2024-02-19 RX ORDER — FOLIC ACID 1 MG/1
1 TABLET ORAL DAILY
Qty: 30 TABLET | Refills: 0
Start: 2024-02-19 | End: 2024-03-20

## 2024-02-19 RX ORDER — FERROUS SULFATE 325(65) MG
325 TABLET ORAL 2 TIMES DAILY WITH MEALS
Qty: 60 TABLET | Refills: 0 | Status: ON HOLD
Start: 2024-02-19 | End: 2024-02-22 | Stop reason: HOSPADM

## 2024-02-19 RX ORDER — CLOPIDOGREL BISULFATE 75 MG/1
75 TABLET ORAL DAILY
Qty: 45 TABLET | Refills: 0
Start: 2024-02-19 | End: 2024-04-04

## 2024-02-19 RX ORDER — AMIODARONE HYDROCHLORIDE 200 MG/1
TABLET ORAL
Qty: 44 TABLET | Refills: 0 | Status: SHIPPED | OUTPATIENT
Start: 2024-02-19

## 2024-02-19 RX ORDER — POTASSIUM CHLORIDE 750 MG/1
10 TABLET, EXTENDED RELEASE ORAL EVERY OTHER DAY
Qty: 60 TABLET | Refills: 3 | Status: ON HOLD
Start: 2024-02-19 | End: 2024-02-22 | Stop reason: HOSPADM

## 2024-02-19 RX ORDER — METOPROLOL SUCCINATE 25 MG/1
12.5 TABLET, EXTENDED RELEASE ORAL DAILY
Qty: 30 TABLET | Refills: 3 | Status: ON HOLD
Start: 2024-02-19 | End: 2024-02-22 | Stop reason: HOSPADM

## 2024-02-19 RX ADMIN — Medication 500 MG: at 08:33

## 2024-02-19 RX ADMIN — PANTOPRAZOLE SODIUM 40 MG: 40 TABLET, DELAYED RELEASE ORAL at 06:22

## 2024-02-19 RX ADMIN — LEVOTHYROXINE SODIUM 100 MCG: 0.1 TABLET ORAL at 06:22

## 2024-02-19 RX ADMIN — Medication 400 MG: at 08:33

## 2024-02-19 RX ADMIN — FOLIC ACID 1 MG: 1 TABLET ORAL at 08:34

## 2024-02-19 RX ADMIN — AMIODARONE HYDROCHLORIDE 400 MG: 200 TABLET ORAL at 08:33

## 2024-02-19 RX ADMIN — FERROUS SULFATE TAB 325 MG (65 MG ELEMENTAL FE) 325 MG: 325 (65 FE) TAB at 08:34

## 2024-02-19 RX ADMIN — ASPIRIN 81 MG: 81 TABLET, COATED ORAL at 08:34

## 2024-02-19 RX ADMIN — METOPROLOL SUCCINATE 12.5 MG: 25 TABLET, EXTENDED RELEASE ORAL at 08:34

## 2024-02-19 RX ADMIN — CLOPIDOGREL 75 MG: 75 TABLET, FILM COATED ORAL at 08:34

## 2024-02-19 RX ADMIN — OXYCODONE 5 MG: 5 TABLET ORAL at 08:38

## 2024-02-19 RX ADMIN — SENNOSIDES AND DOCUSATE SODIUM 1 TABLET: 50; 8.6 TABLET ORAL at 08:33

## 2024-02-19 RX ADMIN — POTASSIUM CHLORIDE 10 MEQ: 750 TABLET, EXTENDED RELEASE ORAL at 08:33

## 2024-02-19 RX ADMIN — OXYCODONE 5 MG: 5 TABLET ORAL at 16:38

## 2024-02-19 RX ADMIN — ENOXAPARIN SODIUM 40 MG: 100 INJECTION SUBCUTANEOUS at 08:34

## 2024-02-19 RX ADMIN — FLUDROCORTISONE ACETATE 0.1 MG: 0.1 TABLET ORAL at 08:34

## 2024-02-19 RX ADMIN — FERROUS SULFATE TAB 325 MG (65 MG ELEMENTAL FE) 325 MG: 325 (65 FE) TAB at 16:38

## 2024-02-19 RX ADMIN — BISACODYL 5 MG: 5 TABLET, COATED ORAL at 08:34

## 2024-02-19 RX ADMIN — FUROSEMIDE 10 MG: 20 TABLET ORAL at 08:33

## 2024-02-19 RX ADMIN — SODIUM CHLORIDE, PRESERVATIVE FREE 10 ML: 5 INJECTION INTRAVENOUS at 08:34

## 2024-02-19 ASSESSMENT — PAIN SCALES - GENERAL
PAINLEVEL_OUTOF10: 6
PAINLEVEL_OUTOF10: 5

## 2024-02-19 ASSESSMENT — PAIN DESCRIPTION - ORIENTATION
ORIENTATION: MID
ORIENTATION: MID

## 2024-02-19 ASSESSMENT — PAIN DESCRIPTION - DESCRIPTORS: DESCRIPTORS: DISCOMFORT;DULL;ACHING

## 2024-02-19 ASSESSMENT — PAIN DESCRIPTION - LOCATION
LOCATION: CHEST
LOCATION: CHEST;STERNUM

## 2024-02-19 NOTE — OP NOTE
Operative Note  ______________________________________________________________      IR REMOVAL OF TUNNELED HD CATHETER  [facility] P[facility]U 1    Patient Name: Katt Diaz   YOB: 1945  Medical Record Number: [id number]  Date of Procedure: 2/19/24  Room/Bed: [address] [place] [address]      DATE OF PROCEDURE: 2/19/2024     PERFORMED BY: EDILSON Ascencio     PREOPERATIVE DIAGNOSIS: Removal of  CVC Tunneled Catheter     POSTOPERATIVE DIAGNOSIS: Same    PROCEDURE: Removal of right internal jugular vein tunneled central venous catheter     ANESTHESIA: none     ESTIMATED BLOOD LOSS: none     COMPLICATIONS: None    DESCRIPTION OF PROCEDURE: The patient was identified and the procedure was confirmed. The right neck, chest, and catheter were prepped and draped in the usual sterile fashion. Traction was applied to the catheter and it was removed intact. Pressure was held for hemostasis and tissue adhesive was placed and a sterile pressure dressing was then applied to the exit site.  The patient tolerated the procedure well.    EDILSON Ascencio   DD: 2/19/24  4:02 PM

## 2024-02-19 NOTE — PLAN OF CARE
Problem: Chronic Conditions and Co-morbidities  Goal: Patient's chronic conditions and co-morbidity symptoms are monitored and maintained or improved  2/10/2024 0925 by Angela Thurman, RN  Outcome: Progressing     Problem: Discharge Planning  Goal: Discharge to home or other facility with appropriate resources  2/10/2024 0925 by Angela Thurman, RN  Outcome: Progressing     Problem: Safety - Adult  Goal: Free from fall injury  2/10/2024 0925 by Angela Thurman, RN  Outcome: Progressing     Problem: ABCDS Injury Assessment  Goal: Absence of physical injury  2/10/2024 0925 by Angela Thurman, RN  Outcome: Progressing     
  Problem: Chronic Conditions and Co-morbidities  Goal: Patient's chronic conditions and co-morbidity symptoms are monitored and maintained or improved  2/12/2024 2235 by Linda Teague RN  Outcome: Progressing  Flowsheets (Taken 2/10/2024 2000 by Silvia Jama, RN)  Care Plan - Patient's Chronic Conditions and Co-Morbidity Symptoms are Monitored and Maintained or Improved: Monitor and assess patient's chronic conditions and comorbid symptoms for stability, deterioration, or improvement     Problem: Discharge Planning  Goal: Discharge to home or other facility with appropriate resources  Outcome: Progressing  Flowsheets (Taken 2/11/2024 2200)  Discharge to home or other facility with appropriate resources:   Arrange for needed discharge resources and transportation as appropriate   Identify discharge learning needs (meds, wound care, etc)   Identify barriers to discharge with patient and caregiver   Arrange for interpreters to assist at discharge as needed     Problem: Metabolic/Fluid and Electrolytes - Adult  Goal: Electrolytes maintained within normal limits  2/12/2024 2235 by Linda Teague RN  Outcome: Progressing  Flowsheets (Taken 2/10/2024 2000 by Silvia Jama, RN)  Electrolytes maintained within normal limits:   Monitor labs and assess patient for signs and symptoms of electrolyte imbalances   Administer electrolyte replacement as ordered     
  Problem: Chronic Conditions and Co-morbidities  Goal: Patient's chronic conditions and co-morbidity symptoms are monitored and maintained or improved  2/13/2024 0929 by Maria Isabel Lopez RN  Outcome: Progressing  Flowsheets (Taken 2/10/2024 2000 by Silvia Jama, RN)  Care Plan - Patient's Chronic Conditions and Co-Morbidity Symptoms are Monitored and Maintained or Improved: Monitor and assess patient's chronic conditions and comorbid symptoms for stability, deterioration, or improvement  2/12/2024 2235 by Linda Teague RN  Outcome: Progressing  Flowsheets (Taken 2/10/2024 2000 by Silvia Jama, RN)  Care Plan - Patient's Chronic Conditions and Co-Morbidity Symptoms are Monitored and Maintained or Improved: Monitor and assess patient's chronic conditions and comorbid symptoms for stability, deterioration, or improvement     Problem: Discharge Planning  Goal: Discharge to home or other facility with appropriate resources  2/13/2024 0929 by Maria Isabel Lopez RN  Outcome: Progressing  Flowsheets (Taken 2/11/2024 2200 by Linda Teague RN)  Discharge to home or other facility with appropriate resources:   Arrange for needed discharge resources and transportation as appropriate   Identify discharge learning needs (meds, wound care, etc)   Identify barriers to discharge with patient and caregiver   Arrange for interpreters to assist at discharge as needed  2/12/2024 2235 by Linda Teague RN  Outcome: Progressing  Flowsheets (Taken 2/11/2024 2200)  Discharge to home or other facility with appropriate resources:   Arrange for needed discharge resources and transportation as appropriate   Identify discharge learning needs (meds, wound care, etc)   Identify barriers to discharge with patient and caregiver   Arrange for interpreters to assist at discharge as needed     Problem: Metabolic/Fluid and Electrolytes - Adult  Goal: Electrolytes maintained within normal limits  2/12/2024 2235 by Linda Teague RN  Outcome: 
  Problem: Chronic Conditions and Co-morbidities  Goal: Patient's chronic conditions and co-morbidity symptoms are monitored and maintained or improved  2/13/2024 2113 by Silvia Jama RN  Outcome: Progressing  Flowsheets (Taken 2/13/2024 2000)  Care Plan - Patient's Chronic Conditions and Co-Morbidity Symptoms are Monitored and Maintained or Improved: Monitor and assess patient's chronic conditions and comorbid symptoms for stability, deterioration, or improvement  2/13/2024 0929 by Maria Isabel Lopez RN  Outcome: Progressing  Flowsheets (Taken 2/10/2024 2000 by Silvia Jama, RN)  Care Plan - Patient's Chronic Conditions and Co-Morbidity Symptoms are Monitored and Maintained or Improved: Monitor and assess patient's chronic conditions and comorbid symptoms for stability, deterioration, or improvement     Problem: Discharge Planning  Goal: Discharge to home or other facility with appropriate resources  2/13/2024 2113 by Silvia Jama RN  Outcome: Progressing  Flowsheets (Taken 2/13/2024 2000)  Discharge to home or other facility with appropriate resources: Identify barriers to discharge with patient and caregiver  2/13/2024 0929 by Maria Isabel Lopez RN  Outcome: Progressing  Flowsheets (Taken 2/11/2024 2200 by Linda Teague, RN)  Discharge to home or other facility with appropriate resources:   Arrange for needed discharge resources and transportation as appropriate   Identify discharge learning needs (meds, wound care, etc)   Identify barriers to discharge with patient and caregiver   Arrange for interpreters to assist at discharge as needed     Problem: Safety - Adult  Goal: Free from fall injury  Outcome: Progressing  Flowsheets (Taken 2/13/2024 2000)  Free From Fall Injury: Instruct family/caregiver on patient safety     Problem: ABCDS Injury Assessment  Goal: Absence of physical injury  Outcome: Progressing  Flowsheets (Taken 2/13/2024 2000)  Absence of Physical Injury: Implement safety measures based on 
  Problem: Chronic Conditions and Co-morbidities  Goal: Patient's chronic conditions and co-morbidity symptoms are monitored and maintained or improved  2/15/2024 1029 by Farida Taylor RN  Outcome: Progressing  2/15/2024 0042 by Bobbi Vásquez  Outcome: Progressing     Problem: Discharge Planning  Goal: Discharge to home or other facility with appropriate resources  Outcome: Progressing     Problem: Safety - Adult  Goal: Free from fall injury  2/15/2024 1029 by Farida Taylor RN  Outcome: Progressing  2/15/2024 0042 by Bobbi Vásquez  Outcome: Progressing     Problem: ABCDS Injury Assessment  Goal: Absence of physical injury  Outcome: Progressing     Problem: Cardiovascular - Adult  Goal: Maintains optimal cardiac output and hemodynamic stability  2/15/2024 0042 by Bobbi Vásquez  Outcome: Progressing  Goal: Absence of cardiac dysrhythmias or at baseline  2/15/2024 0042 by Bobbi Vásquez  Outcome: Progressing     Problem: Nutrition Deficit:  Goal: Optimize nutritional status  Outcome: Progressing     
  Problem: Chronic Conditions and Co-morbidities  Goal: Patient's chronic conditions and co-morbidity symptoms are monitored and maintained or improved  2/16/2024 2119 by Maritza Glez RN  Outcome: Progressing  2/16/2024 1136 by Farida Taylor RN  Outcome: Progressing     Problem: Discharge Planning  Goal: Discharge to home or other facility with appropriate resources  2/16/2024 2119 by Maritza Glez RN  Outcome: Progressing  2/16/2024 1136 by Farida Taylor RN  Outcome: Progressing     Problem: Safety - Adult  Goal: Free from fall injury  2/16/2024 1136 by Farida Taylor RN  Outcome: Progressing     Problem: ABCDS Injury Assessment  Goal: Absence of physical injury  2/16/2024 1136 by Farida Taylor RN  Outcome: Progressing     Problem: Cardiovascular - Adult  Goal: Maintains optimal cardiac output and hemodynamic stability  2/16/2024 1136 by Farida Taylor RN  Outcome: Progressing     Problem: Metabolic/Fluid and Electrolytes - Adult  Goal: Electrolytes maintained within normal limits  2/16/2024 1136 by Farida Taylor RN  Outcome: Progressing     Problem: Nutrition Deficit:  Goal: Optimize nutritional status  2/16/2024 1136 by Farida Taylor RN  Outcome: Progressing     
  Problem: Chronic Conditions and Co-morbidities  Goal: Patient's chronic conditions and co-morbidity symptoms are monitored and maintained or improved  2/17/2024 0912 by Tiesha Melgar RN  Outcome: Progressing  2/16/2024 2119 by Maritza Glez RN  Outcome: Progressing     Problem: Discharge Planning  Goal: Discharge to home or other facility with appropriate resources  2/17/2024 0912 by Tiesha Melgar RN  Outcome: Progressing  2/16/2024 2119 by Maritza Glez RN  Outcome: Progressing     Problem: Safety - Adult  Goal: Free from fall injury  Outcome: Progressing     Problem: ABCDS Injury Assessment  Goal: Absence of physical injury  Outcome: Progressing     Problem: Cardiovascular - Adult  Goal: Maintains optimal cardiac output and hemodynamic stability  Outcome: Progressing  Goal: Absence of cardiac dysrhythmias or at baseline  Outcome: Progressing     Problem: Metabolic/Fluid and Electrolytes - Adult  Goal: Electrolytes maintained within normal limits  Outcome: Progressing  Goal: Hemodynamic stability and optimal renal function maintained  Outcome: Progressing  Goal: Glucose maintained within prescribed range  Outcome: Progressing     Problem: Nutrition Deficit:  Goal: Optimize nutritional status  Outcome: Progressing     
  Problem: Chronic Conditions and Co-morbidities  Goal: Patient's chronic conditions and co-morbidity symptoms are monitored and maintained or improved  2/17/2024 2059 by Maritza Glez RN  Outcome: Progressing  2/17/2024 0912 by Tiesha Melgar RN  Outcome: Progressing     Problem: Discharge Planning  Goal: Discharge to home or other facility with appropriate resources  2/17/2024 2059 by Maritza Glez RN  Outcome: Progressing  2/17/2024 0912 by Tiesha Melgar RN  Outcome: Progressing     Problem: Safety - Adult  Goal: Free from fall injury  2/17/2024 0912 by Tiesha Melgar RN  Outcome: Progressing     Problem: ABCDS Injury Assessment  Goal: Absence of physical injury  2/17/2024 0912 by Tiesha Melgar RN  Outcome: Progressing     Problem: Cardiovascular - Adult  Goal: Maintains optimal cardiac output and hemodynamic stability  2/17/2024 0912 by Tiesha Melgar RN  Outcome: Progressing  Goal: Absence of cardiac dysrhythmias or at baseline  2/17/2024 0912 by Tiesha Melgar RN  Outcome: Progressing     Problem: Metabolic/Fluid and Electrolytes - Adult  Goal: Electrolytes maintained within normal limits  2/17/2024 0912 by Tiesha Melgar RN  Outcome: Progressing  Goal: Hemodynamic stability and optimal renal function maintained  2/17/2024 0912 by Tiesha Melgar RN  Outcome: Progressing  Goal: Glucose maintained within prescribed range  2/17/2024 0912 by Tiesha Melgar RN  Outcome: Progressing     Problem: Nutrition Deficit:  Goal: Optimize nutritional status  2/17/2024 0912 by Tiesha Melgar RN  Outcome: Progressing     
  Problem: Chronic Conditions and Co-morbidities  Goal: Patient's chronic conditions and co-morbidity symptoms are monitored and maintained or improved  2/18/2024 0851 by Tiesha Melgar RN  Outcome: Progressing  2/17/2024 2059 by Maritza Glez RN  Outcome: Progressing     Problem: Discharge Planning  Goal: Discharge to home or other facility with appropriate resources  2/18/2024 0851 by Tiesha Melgar RN  Outcome: Progressing  2/17/2024 2059 by Maritza Glez RN  Outcome: Progressing     Problem: Safety - Adult  Goal: Free from fall injury  Outcome: Progressing     Problem: ABCDS Injury Assessment  Goal: Absence of physical injury  Outcome: Progressing     Problem: Cardiovascular - Adult  Goal: Maintains optimal cardiac output and hemodynamic stability  Outcome: Progressing  Goal: Absence of cardiac dysrhythmias or at baseline  Outcome: Progressing     Problem: Metabolic/Fluid and Electrolytes - Adult  Goal: Electrolytes maintained within normal limits  Outcome: Progressing  Goal: Hemodynamic stability and optimal renal function maintained  Outcome: Progressing  Goal: Glucose maintained within prescribed range  Outcome: Progressing     Problem: Nutrition Deficit:  Goal: Optimize nutritional status  Outcome: Progressing  Flowsheets (Taken 2/18/2024 0851)  Nutrient intake appropriate for improving, restoring, or maintaining nutritional needs:   Recommend appropriate diets, oral nutritional supplements, and vitamin/mineral supplements   Assess nutritional status and recommend course of action   Monitor oral intake, labs, and treatment plans     
  Problem: Chronic Conditions and Co-morbidities  Goal: Patient's chronic conditions and co-morbidity symptoms are monitored and maintained or improved  2/18/2024 2210 by Thalia Nicole RN  Outcome: Progressing     Problem: Discharge Planning  Goal: Discharge to home or other facility with appropriate resources  2/18/2024 2210 by Thalia Nicole RN  Outcome: Progressing     Problem: Safety - Adult  Goal: Free from fall injury  2/18/2024 2210 by Thalia Nicole RN  Outcome: Progressing     Problem: ABCDS Injury Assessment  Goal: Absence of physical injury  2/18/2024 2210 by Thalia Nicole RN  Outcome: Progressing     Problem: Cardiovascular - Adult  Goal: Maintains optimal cardiac output and hemodynamic stability  2/18/2024 2210 by Thalia Nicole RN  Outcome: Progressing     Problem: Cardiovascular - Adult  Goal: Absence of cardiac dysrhythmias or at baseline  2/18/2024 2210 by Thalia Nicole RN  Outcome: Progressing     Problem: Metabolic/Fluid and Electrolytes - Adult  Goal: Electrolytes maintained within normal limits  2/18/2024 2210 by Thalia Nicole RN  Outcome: Progressing     Problem: Nutrition Deficit:  Goal: Optimize nutritional status  2/18/2024 2210 by Thalia Nicole RN  Outcome: Progressing     
  Problem: Chronic Conditions and Co-morbidities  Goal: Patient's chronic conditions and co-morbidity symptoms are monitored and maintained or improved  2/6/2024 0125 by Umberto Pritchard, RN  Outcome: Progressing     Problem: Discharge Planning  Goal: Discharge to home or other facility with appropriate resources  2/6/2024 0125 by Umberto Pritchard, RN  Outcome: Progressing     Problem: Safety - Adult  Goal: Free from fall injury  2/6/2024 0125 by Umberto Pritchard, RN  Outcome: Progressing     Problem: ABCDS Injury Assessment  Goal: Absence of physical injury  2/6/2024 0125 by Umberto Pritchard, RN  Outcome: Progressing     
  Problem: Chronic Conditions and Co-morbidities  Goal: Patient's chronic conditions and co-morbidity symptoms are monitored and maintained or improved  2/7/2024 0141 by Umberto Pritchard, RN  Outcome: Progressing     Problem: Safety - Adult  Goal: Free from fall injury  2/7/2024 0141 by Umberto Pritchard, RN  Outcome: Progressing     Problem: ABCDS Injury Assessment  Goal: Absence of physical injury  2/7/2024 0141 by Umberto Pritchard, RN  Outcome: Progressing     
  Problem: Chronic Conditions and Co-morbidities  Goal: Patient's chronic conditions and co-morbidity symptoms are monitored and maintained or improved  2/7/2024 1530 by Nancy Hawkins RN  Outcome: Progressing  Flowsheets (Taken 2/7/2024 1200)  Care Plan - Patient's Chronic Conditions and Co-Morbidity Symptoms are Monitored and Maintained or Improved: Monitor and assess patient's chronic conditions and comorbid symptoms for stability, deterioration, or improvement  2/7/2024 0141 by Umberto Pritchard, RN  Outcome: Progressing     Problem: Safety - Adult  Goal: Free from fall injury  2/7/2024 1530 by Nancy Hawkins RN  Outcome: Progressing  2/7/2024 0141 by Umberto Pritchard, RN  Outcome: Progressing     Problem: ABCDS Injury Assessment  Goal: Absence of physical injury  2/7/2024 1530 by Nancy Hawkins RN  Outcome: Progressing  2/7/2024 0141 by Umberto Pritchard, RN  Outcome: Progressing     
  Problem: Chronic Conditions and Co-morbidities  Goal: Patient's chronic conditions and co-morbidity symptoms are monitored and maintained or improved  2/7/2024 2212 by Kelley Shafer RN  Outcome: Progressing  2/7/2024 1530 by Nancy Hawkins RN  Outcome: Progressing  Flowsheets (Taken 2/7/2024 1200)  Care Plan - Patient's Chronic Conditions and Co-Morbidity Symptoms are Monitored and Maintained or Improved: Monitor and assess patient's chronic conditions and comorbid symptoms for stability, deterioration, or improvement     Problem: Discharge Planning  Goal: Discharge to home or other facility with appropriate resources  Outcome: Progressing  Flowsheets (Taken 2/7/2024 1200 by Nancy Hawkins RN)  Discharge to home or other facility with appropriate resources:   Identify barriers to discharge with patient and caregiver   Arrange for needed discharge resources and transportation as appropriate     Problem: Safety - Adult  Goal: Free from fall injury  2/7/2024 2212 by Kelley Shafer RN  Outcome: Progressing  2/7/2024 1530 by Nancy Hawkins RN  Outcome: Progressing     Problem: ABCDS Injury Assessment  Goal: Absence of physical injury  2/7/2024 2212 by Kelley Shafer RN  Outcome: Progressing  2/7/2024 1530 by Nancy Hawkins RN  Outcome: Progressing     Problem: Cardiovascular - Adult  Goal: Maintains optimal cardiac output and hemodynamic stability  Outcome: Progressing  Goal: Absence of cardiac dysrhythmias or at baseline  Outcome: Progressing     Problem: Metabolic/Fluid and Electrolytes - Adult  Goal: Electrolytes maintained within normal limits  Outcome: Progressing  Goal: Hemodynamic stability and optimal renal function maintained  Outcome: Progressing  Goal: Glucose maintained within prescribed range  Outcome: Progressing     
  Problem: Chronic Conditions and Co-morbidities  Goal: Patient's chronic conditions and co-morbidity symptoms are monitored and maintained or improved  2/8/2024 1046 by Nancy Hawkins RN  Outcome: Progressing  2/7/2024 2212 by Kelley Shafer RN  Outcome: Progressing     Problem: Safety - Adult  Goal: Free from fall injury  2/8/2024 1046 by Nancy Hawkins RN  Outcome: Progressing  2/7/2024 2212 by Kelley Shafer RN  Outcome: Progressing     Problem: ABCDS Injury Assessment  Goal: Absence of physical injury  2/8/2024 1046 by Nancy Hawkins RN  Outcome: Progressing  2/7/2024 2212 by Kelley Shafer RN  Outcome: Progressing     
  Problem: Chronic Conditions and Co-morbidities  Goal: Patient's chronic conditions and co-morbidity symptoms are monitored and maintained or improved  2/8/2024 2140 by Kelley Shafer RN  Outcome: Progressing  2/8/2024 1046 by Nancy Hawkins RN  Outcome: Progressing     Problem: Discharge Planning  Goal: Discharge to home or other facility with appropriate resources  Outcome: Progressing     Problem: Safety - Adult  Goal: Free from fall injury  2/8/2024 2140 by Kelley Shafer RN  Outcome: Progressing  2/8/2024 1046 by Nancy Hawkins RN  Outcome: Progressing     Problem: ABCDS Injury Assessment  Goal: Absence of physical injury  2/8/2024 2140 by Kelley Shafer RN  Outcome: Progressing  2/8/2024 1046 by Nancy Hawkins RN  Outcome: Progressing     Problem: Cardiovascular - Adult  Goal: Maintains optimal cardiac output and hemodynamic stability  Outcome: Progressing  Goal: Absence of cardiac dysrhythmias or at baseline  Outcome: Progressing     Problem: Metabolic/Fluid and Electrolytes - Adult  Goal: Electrolytes maintained within normal limits  Outcome: Progressing  Goal: Hemodynamic stability and optimal renal function maintained  Outcome: Progressing  Goal: Glucose maintained within prescribed range  Outcome: Progressing     
  Problem: Chronic Conditions and Co-morbidities  Goal: Patient's chronic conditions and co-morbidity symptoms are monitored and maintained or improved  2/9/2024 0837 by Nancy Hawkins RN  Outcome: Progressing  2/8/2024 2140 by Kelley Shafer RN  Outcome: Progressing     Problem: Discharge Planning  Goal: Discharge to home or other facility with appropriate resources  2/9/2024 0837 by Nancy Hawkins RN  Outcome: Progressing  2/8/2024 2140 by Kelley Shafer RN  Outcome: Progressing     Problem: Safety - Adult  Goal: Free from fall injury  2/9/2024 0837 by Nancy Hawkins RN  Outcome: Progressing  2/8/2024 2140 by Kelley Shafer RN  Outcome: Progressing     
  Problem: Chronic Conditions and Co-morbidities  Goal: Patient's chronic conditions and co-morbidity symptoms are monitored and maintained or improved  Outcome: Progressing     Problem: Discharge Planning  Goal: Discharge to home or other facility with appropriate resources  2/16/2024 1136 by Farida Taylor RN  Outcome: Progressing  2/16/2024 0047 by Bobbi Vásquez  Outcome: Progressing     Problem: Safety - Adult  Goal: Free from fall injury  2/16/2024 1136 by Farida Taylor RN  Outcome: Progressing  2/16/2024 0047 by Bobbi Vásquez  Outcome: Progressing     Problem: ABCDS Injury Assessment  Goal: Absence of physical injury  Outcome: Progressing     Problem: Cardiovascular - Adult  Goal: Maintains optimal cardiac output and hemodynamic stability  Outcome: Progressing     Problem: Metabolic/Fluid and Electrolytes - Adult  Goal: Electrolytes maintained within normal limits  Outcome: Progressing     Problem: Nutrition Deficit:  Goal: Optimize nutritional status  Outcome: Progressing     
  Problem: Chronic Conditions and Co-morbidities  Goal: Patient's chronic conditions and co-morbidity symptoms are monitored and maintained or improved  Outcome: Progressing     Problem: Discharge Planning  Goal: Discharge to home or other facility with appropriate resources  Outcome: Progressing     Problem: Safety - Adult  Goal: Free from fall injury  Outcome: Progressing     Problem: ABCDS Injury Assessment  Goal: Absence of physical injury  Outcome: Progressing     
  Problem: Chronic Conditions and Co-morbidities  Goal: Patient's chronic conditions and co-morbidity symptoms are monitored and maintained or improved  Outcome: Progressing     Problem: Discharge Planning  Goal: Discharge to home or other facility with appropriate resources  Outcome: Progressing     Problem: Safety - Adult  Goal: Free from fall injury  Outcome: Progressing     Problem: ABCDS Injury Assessment  Goal: Absence of physical injury  Outcome: Progressing     
  Problem: Chronic Conditions and Co-morbidities  Goal: Patient's chronic conditions and co-morbidity symptoms are monitored and maintained or improved  Outcome: Progressing     Problem: Safety - Adult  Goal: Free from fall injury  Outcome: Progressing     Problem: Cardiovascular - Adult  Goal: Maintains optimal cardiac output and hemodynamic stability  Outcome: Progressing     Problem: Cardiovascular - Adult  Goal: Absence of cardiac dysrhythmias or at baseline  Outcome: Progressing     
  Problem: Chronic Conditions and Co-morbidities  Goal: Patient's chronic conditions and co-morbidity symptoms are monitored and maintained or improved  Outcome: Progressing  Flowsheets (Taken 2/10/2024 2000 by Silvia Jama RN)  Care Plan - Patient's Chronic Conditions and Co-Morbidity Symptoms are Monitored and Maintained or Improved: Monitor and assess patient's chronic conditions and comorbid symptoms for stability, deterioration, or improvement     Problem: Discharge Planning  Goal: Discharge to home or other facility with appropriate resources  Outcome: Progressing  Flowsheets (Taken 2/10/2024 2000 by Silvia Jama, RN)  Discharge to home or other facility with appropriate resources: Identify barriers to discharge with patient and caregiver     Problem: Safety - Adult  Goal: Free from fall injury  Outcome: Progressing  Flowsheets (Taken 2/10/2024 2000 by Silvia Jama, RN)  Free From Fall Injury: Instruct family/caregiver on patient safety     Problem: ABCDS Injury Assessment  Goal: Absence of physical injury  Outcome: Progressing  Flowsheets (Taken 2/10/2024 2000 by Silvia Jama, RN)  Absence of Physical Injury: Implement safety measures based on patient assessment     
  Problem: Chronic Conditions and Co-morbidities  Goal: Patient's chronic conditions and co-morbidity symptoms are monitored and maintained or improved  Outcome: Progressing  Flowsheets (Taken 2/10/2024 2000 by Silvia Jama RN)  Care Plan - Patient's Chronic Conditions and Co-Morbidity Symptoms are Monitored and Maintained or Improved: Monitor and assess patient's chronic conditions and comorbid symptoms for stability, deterioration, or improvement     Problem: Discharge Planning  Goal: Discharge to home or other facility with appropriate resources  Outcome: Progressing  Flowsheets (Taken 2/11/2024 2200)  Discharge to home or other facility with appropriate resources:   Arrange for needed discharge resources and transportation as appropriate   Identify discharge learning needs (meds, wound care, etc)   Identify barriers to discharge with patient and caregiver   Arrange for interpreters to assist at discharge as needed     Problem: Cardiovascular - Adult  Goal: Maintains optimal cardiac output and hemodynamic stability  Outcome: Progressing  Flowsheets (Taken 2/11/2024 2200)  Maintains optimal cardiac output and hemodynamic stability:   Monitor blood pressure and heart rate   Monitor urine output and notify Licensed Independent Practitioner for values outside of normal range   Assess for signs of decreased cardiac output   Administer fluid and/or volume expanders as ordered   Administer vasoactive medications as ordered     
  Problem: Chronic Conditions and Co-morbidities  Goal: Patient's chronic conditions and co-morbidity symptoms are monitored and maintained or improved  Outcome: Progressing  Flowsheets (Taken 2/9/2024 2000)  Care Plan - Patient's Chronic Conditions and Co-Morbidity Symptoms are Monitored and Maintained or Improved: Monitor and assess patient's chronic conditions and comorbid symptoms for stability, deterioration, or improvement     Problem: Discharge Planning  Goal: Discharge to home or other facility with appropriate resources  Outcome: Progressing  Flowsheets (Taken 2/9/2024 2000)  Discharge to home or other facility with appropriate resources: Identify barriers to discharge with patient and caregiver     Problem: Safety - Adult  Goal: Free from fall injury  Outcome: Progressing  Flowsheets (Taken 2/9/2024 2000)  Free From Fall Injury: Instruct family/caregiver on patient safety     Problem: ABCDS Injury Assessment  Goal: Absence of physical injury  Outcome: Progressing  Flowsheets (Taken 2/9/2024 2000)  Absence of Physical Injury: Implement safety measures based on patient assessment     Problem: Cardiovascular - Adult  Goal: Maintains optimal cardiac output and hemodynamic stability  Outcome: Progressing  Flowsheets (Taken 2/9/2024 2000)  Maintains optimal cardiac output and hemodynamic stability:   Monitor blood pressure and heart rate   Monitor urine output and notify Licensed Independent Practitioner for values outside of normal range  Goal: Absence of cardiac dysrhythmias or at baseline  Outcome: Progressing  Flowsheets (Taken 2/9/2024 2000)  Absence of cardiac dysrhythmias or at baseline: Monitor cardiac rate and rhythm     Problem: Metabolic/Fluid and Electrolytes - Adult  Goal: Electrolytes maintained within normal limits  Outcome: Progressing  Flowsheets (Taken 2/9/2024 2000)  Electrolytes maintained within normal limits:   Monitor labs and assess patient for signs and symptoms of electrolyte 
  Problem: Discharge Planning  Goal: Discharge to home or other facility with appropriate resources  Outcome: Progressing     Problem: Safety - Adult  Goal: Free from fall injury  Outcome: Progressing     
Patient waiting to be discharged to rehab.   
Patient's chart updated to reflect:      .    - HF care plan, HF education points and HF discharge instructions.  -Orders: 2 gram sodium diet, daily weights, I/O.  -PCP and cardiology follow up appointments to be scheduled within 7 days of hospital discharge.  -CHF education session will be provided to the patient prior to hospital discharge.    Niki Mendes RN   Heart Failure Navigator    
values outside of normal range   Assess for signs of decreased cardiac output   Administer fluid and/or volume expanders as ordered   Administer vasoactive medications as ordered  2/12/2024 0437 by Linda Teague, RN  Outcome: Progressing  Flowsheets (Taken 2/11/2024 2200)  Maintains optimal cardiac output and hemodynamic stability:   Monitor blood pressure and heart rate   Monitor urine output and notify Licensed Independent Practitioner for values outside of normal range   Assess for signs of decreased cardiac output   Administer fluid and/or volume expanders as ordered   Administer vasoactive medications as ordered     Problem: Metabolic/Fluid and Electrolytes - Adult  Goal: Electrolytes maintained within normal limits  Outcome: Progressing  Flowsheets (Taken 2/10/2024 2000 by Silvia Jama, RN)  Electrolytes maintained within normal limits:   Monitor labs and assess patient for signs and symptoms of electrolyte imbalances   Administer electrolyte replacement as ordered

## 2024-02-19 NOTE — CONSULTS
[facility]   Inpatient CHF Nurse Navigator Consult      Cardiologist: New to Dr. Colin BURLESON  is a 78 y.o. (1945) female with a history of HFrEF, most recent EF: 30-35% 2/15/24  No results found for: \"LVEF\", \"LVEFMODE\"    Patient was awake and alert, sitting up in her chair during the consultation and is agreeable to heart failure education. She was engaged and asked appropriate questions throughout the education session. Patient is being discharged to [facility] and with a [facility]. Patient is interested in the CHF clinic. Post hospital follow up appointments scheduled. Milagros Mckeon our community healthcare worker has been consulted to help with transportation once patient is discharged from rehab.     Barriers identified during consult contributing to HF Hospitalization:  [] Limited medication adherence   [] Poor health literacy, education regarding HF medications provided   [] Pill box provided to patient  [] Difficulty affording medications  [] Difficulty obtaining/ managing medications  [] Prescription assistance information given     [] Not weighing themselves daily  [x] Weight log provided for easy monitoring  [] Scale provided     [] Not following low sodium diet  [] Food insecurity   [x] 2 gram sodium diet education provided   [] Low sodium recipes provided  [] Sodium free seasoning provided   [] Low sodium meal delivery options given to patient  [] Dietician consulted     [] Lack of transportation to appointments     [] Depression, given chronic illness  [] Primary team notified     [] Goals of care need addressed  [] Palliative care consulted     [x] CHF CHW consulted, to assist with transportation insecurities.         Chart Reviewed:  Diet: ADULT ORAL NUTRITION SUPPLEMENT; Breakfast, Dinner; Wound Healing Oral Supplement  ADULT ORAL NUTRITION SUPPLEMENT; Breakfast, Lunch, Dinner; Diabetic Oral Supplement  ADULT DIET; Regular; 4 carb

## 2024-02-19 NOTE — DISCHARGE INSTRUCTIONS
Discharge Instructions for Open Heart Surgery    What you will need at home:               * Accurate Scale               *  Digital Thermometer               *  Antibacterial Soap                *  Clean Wash Cloths             *  Someone to be with you for one week              DO NOT LIFT, PUSH, OR PULL ANYTHING OVER 5 POUNDS FOR 8 WEEK from the day of surgery. This could prevent your sternum from healing properly.                    ?When you are given permission from your surgeon to begin lifting again,                     do so gradually. You will need time to build your muscle strength.                  ? A gallon of milk is more than 5 lbs. you should buy ½ gallons.    NEVER SMOKE AGAIN!  Absolutely no tobacco products!  Do not allow others to smoke around you.  Second hand smoke can be just as bad.  The [employer] has a free program available, call 1-680.660.4739.      Activity: See your activity diary in your binder for your walking plan.  Plan to walk indoors on days the temperature is below 40? or over 80? or during smog alerts.  At first limit your stair climbing to once or twice a day.  You may use the handrail for balance only.  Do not pull yourself up with it.  It is not unusual to feel tired for the first few weeks, but walking builds up your strength.    Driving: Do not drive a car or any vehicle for 4 weeks from the day of surgery.  You can not drive a truck, tractor or  for 8 weeks from the day of surgery.  After 4 weeks, you can drive vehicles with power steering only.  Do not drive while on pain medication.    Incentive Spirometer:  Continue to use your lung exerciser for the next 4 weeks.  Support your chest and cough each time you complete the 10 exercises.    Weight:  Weigh yourself every morning.  Call the surgeon if you gain 3 pounds or more overnight or 5 pounds in a week.  This may be a sign of fluid retention.    Medication:    Pain pills are ordered to keep you

## 2024-02-19 NOTE — INTERVAL H&P NOTE
IR H&P UPDATE NOTE  [facility] 6SE PCCU 1    Patient's History and Physical Examination were reviewed from current hospital admission records.    Katt Diaz appears likely to able to tolerate the requested Tunneled Catheter Removal procedure by the consultant.    Risk and benefits were discussed with patient including ultimate complications, possibly death and consent was obtained.    Electronically signed by EDILSON Ascencio   DD: 2/19/24  3:59 PM

## 2024-02-19 NOTE — PROGRESS NOTES
OT consult received and appreciated. Chart reviewed. Will hold occupational therapy this date. Pt scheduled for CABG tomorrow in AM. Will evaluate at a later time. Thank you. Ivone Case, OTR/L # [id number]     
      Hospitalist Progress Note      PCP: Luis Angel Rea DO    Date of Admission: 2/5/2024        Hospital Course:   PT ADMITTED WITH CHEST PAIN AND SOB, FOR THE PAST 2 WEEKS AND WAS FOUND TO AVE AN ELEVATED TT AND EKG CHANGES.   SHE WAS TAKEN TO THE CATH LAB, AND FOUND TO HAVE SEVERE  CAD.  WENT TO OR FOR STAT CABG .        Subjective: POST OP PAIN           Medications:  Reviewed    Infusion Medications    vasopressin 20 Units in sodium chloride 0.9 % 100 mL infusion 0.04 Units/min (02/08/24 1144)    sodium chloride 30 mL/hr at 02/08/24 0726    sodium chloride      sodium chloride      nitroPRUSSide (NIPRIDE) 50 mg in sodium chloride 0.9 % 250 mL infusion Stopped (02/07/24 1434)    insulin 2.5 Units/hr (02/08/24 1512)    dextrose      EPINEPHrine 4 mcg/min (02/08/24 1540)     Scheduled Medications    hydrocortisone sodium succinate PF (SOLU-CORTEF) 100 mg in sterile water 2 mL injection  100 mg IntraVENous Q8H    atorvastatin  40 mg Oral Nightly    sodium chloride flush  5-40 mL IntraVENous 2 times per day    aspirin  81 mg Oral Daily    acetaminophen  1,000 mg Oral Q6H    chlorhexidine  15 mL Mouth/Throat BID    magnesium oxide  400 mg Oral BID    mupirocin   Each Nostril BID    sennosides-docusate sodium  1 tablet Oral BID    ceFAZolin (ANCEF) IVPB  2,000 mg IntraVENous Q8H    ipratropium 0.5 mg-albuterol 2.5 mg  1 Dose Inhalation Q4H WA RT    insulin glargine  0.15 Units/kg SubCUTAneous Nightly    insulin lispro  0-16 Units SubCUTAneous Q4H    lidocaine  1 patch TransDERmal Daily    pantoprazole  40 mg Oral QAM AC    clopidogrel  75 mg Oral Daily    [Held by provider] fludrocortisone  0.1 mg Oral Daily    levothyroxine  100 mcg Oral Daily     PRN Meds: sodium chloride flush, sodium chloride, ondansetron **OR** ondansetron, oxyCODONE **OR** oxyCODONE, HYDROmorphone **OR** HYDROmorphone, magnesium hydroxide, bisacodyl, potassium chloride, magnesium sulfate, albumin human 5%, sodium chloride, nitroPRUSSide 
      Hospitalist Progress Note      PCP: Luis Angel Rea DO    Date of Admission: 2/5/2024        Hospital Course:  PT ADMITTED WITH CHEST PAIN AND SOB, FOR THE PAST 2 WEEKS AND WAS FOUND TO AVE AN ELEVATED TT AND EKG CHANGES.   SHE WAS TAKEN TO THE CATH LAB, AND FOUND TO HAVE SEVERE  CAD.  WENT TO OR FOR STAT CABG .           Subjective:    NO COMPLAINTS           Medications:  Reviewed    Infusion Medications    DOBUTamine 1.5 mcg/kg/min (02/13/24 1512)    sodium chloride      sodium chloride      sodium chloride 30 mL/hr at 02/13/24 1512    sodium chloride      sodium chloride      nitroPRUSSide (NIPRIDE) 50 mg in sodium chloride 0.9 % 250 mL infusion Stopped (02/07/24 1434)    dextrose       Scheduled Medications    sorbitol  30 mL Oral Once    insulin lispro  0-8 Units SubCUTAneous TID WC    insulin lispro  0-8 Units SubCUTAneous Nightly    lidocaine PF  5 mL IntraDERmal Once    sodium chloride flush  5-40 mL IntraVENous 2 times per day    heparin flush  3 mL IntraVENous 2 times per day    atorvastatin  40 mg Oral Nightly    sodium chloride flush  5-40 mL IntraVENous 2 times per day    aspirin  81 mg Oral Daily    chlorhexidine  15 mL Mouth/Throat BID    magnesium oxide  400 mg Oral BID    sennosides-docusate sodium  1 tablet Oral BID    ipratropium 0.5 mg-albuterol 2.5 mg  1 Dose Inhalation Q4H WA RT    lidocaine  1 patch TransDERmal Daily    pantoprazole  40 mg Oral QAM AC    [Held by provider] clopidogrel  75 mg Oral Daily    fludrocortisone  0.1 mg Oral Daily    levothyroxine  100 mcg Oral Daily     PRN Meds: sodium chloride, furosemide, acetaminophen, calcium chloride 1,000 mg in sodium chloride 0.9 % 100 mL IVPB **OR** calcium chloride 2,000 mg in sodium chloride 0.9 % 100 mL IVPB, sodium chloride flush, sodium chloride, heparin flush, sodium chloride flush, sodium chloride, ondansetron **OR** ondansetron, oxyCODONE **OR** oxyCODONE, magnesium hydroxide, bisacodyl, potassium chloride, magnesium 
      Hospitalist Progress Note      PCP: Luis Angel Rea DO    Date of Admission: 2/5/2024        Hospital Course:  PT ADMITTED WITH CHEST PAIN AND SOB, FOR THE PAST 2 WEEKS AND WAS FOUND TO AVE AN ELEVATED TT AND EKG CHANGES.   SHE WAS TAKEN TO THE CATH LAB, AND FOUND TO HAVE SEVERE  CAD.  WENT TO OR FOR STAT CABG .           Subjective:   POST OP PAIN      Medications:  Reviewed    Infusion Medications    DOBUTamine 5 mcg/kg/min (02/09/24 1308)    sodium chloride      vasopressin 20 Units in sodium chloride 0.9 % 100 mL infusion Stopped (02/08/24 1842)    sodium chloride 30 mL/hr at 02/09/24 0547    sodium chloride      sodium chloride      nitroPRUSSide (NIPRIDE) 50 mg in sodium chloride 0.9 % 250 mL infusion Stopped (02/07/24 1434)    insulin Stopped (02/09/24 0806)    dextrose      EPINEPHrine 1.5 mcg/min (02/09/24 1518)     Scheduled Medications    lidocaine PF  5 mL IntraDERmal Once    sodium chloride flush  5-40 mL IntraVENous 2 times per day    heparin flush  3 mL IntraVENous 2 times per day    atorvastatin  40 mg Oral Nightly    sodium chloride flush  5-40 mL IntraVENous 2 times per day    aspirin  81 mg Oral Daily    acetaminophen  1,000 mg Oral Q6H    chlorhexidine  15 mL Mouth/Throat BID    magnesium oxide  400 mg Oral BID    mupirocin   Each Nostril BID    sennosides-docusate sodium  1 tablet Oral BID    ipratropium 0.5 mg-albuterol 2.5 mg  1 Dose Inhalation Q4H WA RT    insulin glargine  0.15 Units/kg SubCUTAneous Nightly    insulin lispro  0-16 Units SubCUTAneous Q4H    lidocaine  1 patch TransDERmal Daily    pantoprazole  40 mg Oral QAM AC    clopidogrel  75 mg Oral Daily    fludrocortisone  0.1 mg Oral Daily    levothyroxine  100 mcg Oral Daily     PRN Meds: [START ON 2/10/2024] acetaminophen, calcium chloride 1,000 mg in sodium chloride 0.9 % 100 mL IVPB **OR** calcium chloride 2,000 mg in sodium chloride 0.9 % 100 mL IVPB, sodium chloride flush, sodium chloride, heparin flush, sodium chloride 
      Hospitalist Progress Note      PCP: Luis Angel Rea DO    Date of Admission: 2/5/2024        Hospital Course:  PT ADMITTED WITH CHEST PAIN AND SOB, FOR THE PAST 2 WEEKS AND WAS FOUND TO AVE AN ELEVATED TT AND EKG CHANGES.   SHE WAS TAKEN TO THE CATH LAB, AND FOUND TO HAVE SEVERE  CAD.  WENT TO OR FOR STAT CABG .           Subjective:  STILL HAVING POST OP PAIN,            Medications:  Reviewed    Infusion Medications    DOBUTamine 2 mcg/kg/min (02/12/24 0847)    sodium chloride      sodium chloride      sodium chloride 30 mL/hr at 02/12/24 0925    sodium chloride      sodium chloride      nitroPRUSSide (NIPRIDE) 50 mg in sodium chloride 0.9 % 250 mL infusion Stopped (02/07/24 1434)    dextrose       Scheduled Medications    insulin lispro  0-8 Units SubCUTAneous TID WC    insulin lispro  0-8 Units SubCUTAneous Nightly    lidocaine PF  5 mL IntraDERmal Once    sodium chloride flush  5-40 mL IntraVENous 2 times per day    heparin flush  3 mL IntraVENous 2 times per day    atorvastatin  40 mg Oral Nightly    sodium chloride flush  5-40 mL IntraVENous 2 times per day    aspirin  81 mg Oral Daily    chlorhexidine  15 mL Mouth/Throat BID    magnesium oxide  400 mg Oral BID    sennosides-docusate sodium  1 tablet Oral BID    ipratropium 0.5 mg-albuterol 2.5 mg  1 Dose Inhalation Q4H WA RT    lidocaine  1 patch TransDERmal Daily    pantoprazole  40 mg Oral QAM AC    [Held by provider] clopidogrel  75 mg Oral Daily    fludrocortisone  0.1 mg Oral Daily    levothyroxine  100 mcg Oral Daily     PRN Meds: sodium chloride, furosemide, acetaminophen, calcium chloride 1,000 mg in sodium chloride 0.9 % 100 mL IVPB **OR** calcium chloride 2,000 mg in sodium chloride 0.9 % 100 mL IVPB, sodium chloride flush, sodium chloride, heparin flush, sodium chloride flush, sodium chloride, ondansetron **OR** ondansetron, oxyCODONE **OR** oxyCODONE, magnesium hydroxide, bisacodyl, potassium chloride, magnesium sulfate, albumin human 5%, 
      Hospitalist Progress Note      PCP: Luis Angel Rea DO    Date of Admission: 2/5/2024        Hospital Course:  PT ADMITTED WITH CHEST PAIN AND SOB, FOR THE PAST 2 WEEKS AND WAS FOUND TO AVE AN ELEVATED TT AND EKG CHANGES.   SHE WAS TAKEN TO THE CATH LAB, AND FOUND TO HAVE SEVERE  CAD.  WENT TO OR FOR STAT CABG .           Subjective: Appears comfortable, denies any acute complaints, she is responsive and alert on evaluation      Medications:  Reviewed    Infusion Medications    sodium chloride      DOBUTamine 5 mcg/kg/min (02/09/24 1308)    sodium chloride      vasopressin 20 Units in sodium chloride 0.9 % 100 mL infusion Stopped (02/08/24 1842)    sodium chloride 30 mL/hr at 02/10/24 1358    sodium chloride      sodium chloride      nitroPRUSSide (NIPRIDE) 50 mg in sodium chloride 0.9 % 250 mL infusion Stopped (02/07/24 1434)    insulin Stopped (02/09/24 0806)    dextrose      EPINEPHrine Stopped (02/10/24 1345)     Scheduled Medications    lidocaine PF  5 mL IntraDERmal Once    sodium chloride flush  5-40 mL IntraVENous 2 times per day    heparin flush  3 mL IntraVENous 2 times per day    atorvastatin  40 mg Oral Nightly    sodium chloride flush  5-40 mL IntraVENous 2 times per day    aspirin  81 mg Oral Daily    chlorhexidine  15 mL Mouth/Throat BID    magnesium oxide  400 mg Oral BID    mupirocin   Each Nostril BID    sennosides-docusate sodium  1 tablet Oral BID    ipratropium 0.5 mg-albuterol 2.5 mg  1 Dose Inhalation Q4H WA RT    insulin glargine  0.15 Units/kg SubCUTAneous Nightly    insulin lispro  0-16 Units SubCUTAneous Q4H    lidocaine  1 patch TransDERmal Daily    pantoprazole  40 mg Oral QAM AC    clopidogrel  75 mg Oral Daily    fludrocortisone  0.1 mg Oral Daily    levothyroxine  100 mcg Oral Daily     PRN Meds: sodium chloride, furosemide, acetaminophen, calcium chloride 1,000 mg in sodium chloride 0.9 % 100 mL IVPB **OR** calcium chloride 2,000 mg in sodium chloride 0.9 % 100 mL IVPB, 
      Hospitalist Progress Note      PCP: Luis Angel Rea DO    Date of Admission: 2/5/2024        Hospital Course:  PT ADMITTED WITH CHEST PAIN AND SOB, FOR THE PAST 2 WEEKS AND WAS FOUND TO AVE AN ELEVATED TT AND EKG CHANGES.   SHE WAS TAKEN TO THE CATH LAB, AND FOUND TO HAVE SEVERE  CAD.  WENT TO OR FOR STAT CABG .     2/14  TRANSFERRED TO [facility]       Subjective:  CONT TO HAVE POST OP PAIN      Medications:  Reviewed    Infusion Medications    dextrose      DOBUTamine 1 mcg/kg/min (02/14/24 0856)     Scheduled Medications    [START ON 2/15/2024] aspirin  81 mg Oral Daily    bisacodyl  5 mg Oral Daily    sennosides-docusate sodium  1 tablet Oral BID    ferrous sulfate  325 mg Oral BID WC    folic acid  1 mg Oral Daily    vitamin C  500 mg Oral BID    bisacodyl  10 mg Rectal BID    lactulose  20 g Oral TID    enoxaparin  40 mg SubCUTAneous Daily    insulin lispro  0-8 Units SubCUTAneous TID WC    insulin lispro  0-8 Units SubCUTAneous Nightly    sodium chloride flush  5-40 mL IntraVENous 2 times per day    atorvastatin  40 mg Oral Nightly    sodium chloride flush  5-40 mL IntraVENous 2 times per day    magnesium oxide  400 mg Oral BID    ipratropium 0.5 mg-albuterol 2.5 mg  1 Dose Inhalation Q4H WA RT    lidocaine  1 patch TransDERmal Daily    pantoprazole  40 mg Oral QAM AC    clopidogrel  75 mg Oral Daily    fludrocortisone  0.1 mg Oral Daily    levothyroxine  100 mcg Oral Daily     PRN Meds: acetaminophen, amiodarone, amiodarone (CORDARONE) 150 mg in dextrose 5 % 100 mL bolus, diphenhydrAMINE, HYDROmorphone, glucose, dextrose bolus **OR** dextrose bolus, glucagon (rDNA), dextrose, magnesium sulfate, magnesium sulfate, potassium chloride, prochlorperazine, sodium chloride, sodium chloride flush, sodium chloride flush, ondansetron **OR** ondansetron, oxyCODONE **OR** oxyCODONE      Intake/Output Summary (Last 24 hours) at 2/14/2024 1631  Last data filed at 2/14/2024 1540  Gross per 24 hour   Intake 415.91 ml 
     OCCUPATIONAL THERAPY INITIAL EVALUATION    [facility]  [address], [place]     Date:2024                                                               Patient Name: Katt Diaz  MRN: [id number]  : 1945  Room: [address] [place] [address]    Evaluating OT: Lynne Mcintyre, OTR/L [id number]    Referring Provider:   Zoe Shannon APRN - CNP      Specific Provider Orders/Date: OT eval and treat (24)       Diagnosis: STEMI (ST elevation myocardial infarction) ([facility]) [I21.3]  S/P cardiac cath [Z98.890]        Surgery/Procedures:  urgent CABG x3      Pertinent Medical History:    Past Medical History:   Diagnosis Date    CAD in native artery 2024    Diabetes mellitus (HCC)     Headache     Hearing loss     Hx of rheumatoid arthritis     Migraine     Osteopenia     Sjogren's disease (HCC)     Pt reports balance concerns PTA- seeking medical attention (neurologist and therapy)    *Precautions:  Fall Risk, sternal, O2, chest tubes x 2, pacemaker, multidirectional balance deficits, hypotensive EOB     Assessment of current deficits   [x]Functional mobility  [x]ADLs [x]Strength  []Cognition  [x]Functional transfers  [x]IADLs [x]Safety Awareness  [x]Endurance  [x]Fine Motor Coordination  [x]Balance       []Vision/perception  [x]Sensation    [x]Gross Motor Coordination [x]ROM  []Delirium                  [x] Motor Control     [x]Communication     OT PLAN OF CARE   OT POC based on physician orders, patient diagnosis and results of clinical assessment.       Frequency/Duration: 1-3 days/wk for 1-2 weeks PRN     Specific OT Treatment to include:   ADL retraining/adapted techniques and AE recommendations to increase functional independence within precautions                    Energy conservation techniques to improve tolerance for selfcare routine   Functional transfer/mobility training/DME recommendations for increased independence, 
    Inpatient Cardiology Progress note        SUBJECTIVE:   Denies having chest pain or shortness of breath    OBJECTIVE: No apparent distress       PHYSICAL EXAM:   /62   Pulse 76   Temp 98.6 °F (37 °C) (Temporal)   Resp 18   Ht 1.575 m (5' 2\")   Wt 53.3 kg (117 lb 9.6 oz)   SpO2 97%   BMI 21.51 kg/m²    B/P Range last 24 hours: Systolic (24hrs), Av , Min:97 , Max:114    Diastolic (24hrs), Av, Min:48, Max:75      GENERAL: Not in distress.   HEAD: Normocephalic, Atraumatic.   NECK: No JVD. No carotid bruits. No neck masses.?   CARDIOVASCULAR: Normal rate, regular rhythm, normal S1, S2, no MRG    LUNGS: Clear to auscultation bilaterally, no wheezing.?   ABDOMEN: Abdomen is soft and not distended. No tenderness.  EXTREMITIES:There is no pedal edema.   MUSCULOSKELETAL: No joint swelling. Normal range of motion?   SKIN: Skins is moist. No ulcers or lesions.   NEUROLOGICAL: Conscious, alert, oriented to time, place and person. No evidence of obvious neurological deficits.?   PSYCHOLOGICAL: Patient is in normal mood.?       Intake/Output Summary (Last 24 hours) at 2024 2304  Last data filed at 2024 1332  Gross per 24 hour   Intake 240 ml   Output 400 ml   Net -160 ml       Weight:   Wt Readings from Last 3 Encounters:   24 53.3 kg (117 lb 9.6 oz)   24 57.6 kg (127 lb)   24 53.5 kg (117 lb 14.4 oz)       Current Inpatient Medications:   sodium chloride flush  5-40 mL IntraVENous 2 times per day    [START ON 2024] ceFAZolin (ANCEF) IVPB  2,000 mg IntraVENous On Call to OR    mupirocin   Each Nostril BID    [START ON 2024] chlorhexidine  15 mL Mouth/Throat Once    chlorhexidine   Topical See Admin Instructions    sodium chloride flush  5-40 mL IntraVENous 2 times per day    atorvastatin  80 mg Oral Nightly    aspirin  81 mg Oral Daily       IV Infusions (if any):   sodium chloride      sodium chloride      heparin (PORCINE) Infusion 14 Units/kg/hr (24 3691) 
    Inpatient Cardiology Progress note        SUBJECTIVE:   Patient was seen and examined at bedside.  Oxygen requirements improving, currently on 4 L nasal cannula.  Her urine output improved with 40 IV Lasix yesterday.  CT chest showing large right pleural effusion.  Hemoglobin stable, creatinine stable.          PHYSICAL EXAM:   BP (!) 118/58   Pulse (!) 108   Temp 99.5 °F (37.5 °C) (Bladder)   Resp 11   Ht 1.6 m (5' 3\")   Wt 61.7 kg (136 lb 0.4 oz)   SpO2 98%   BMI 24.10 kg/m²    B/P Range last 24 hours: Systolic (24hrs), Av , Min:102 , Max:134    Diastolic (24hrs), Av, Min:50, Max:69      GENERAL: Not in distress.   HEAD: Normocephalic, Atraumatic.   NECK: Right IJ triple lumen catheter. No carotid bruits. No neck masses.?   CARDIOVASCULAR: Normal rate, regular rhythm, normal S1, S2, no MRG    LUNGS: Diminished RLL breath sounds. no wheezing.?   ABDOMEN: Abdomen is soft and not distended. No tenderness.  EXTREMITIES:There is left upper extremity edema edema with .   MUSCULOSKELETAL: No joint swelling. Normal range of motion?   SKIN: Skins is moist. No ulcers or lesions. Left upper arm ecchymosis  NEUROLOGICAL: Conscious, alert, oriented to time, place and person. No evidence of obvious neurological deficits.?   PSYCHOLOGICAL: Patient is in normal mood.?       Intake/Output Summary (Last 24 hours) at 2024 1811  Last data filed at 2024 1700  Gross per 24 hour   Intake 662.48 ml   Output 1338 ml   Net -675.52 ml       Weight:   Wt Readings from Last 3 Encounters:   24 61.7 kg (136 lb 0.4 oz)   24 57.6 kg (127 lb)   24 53.5 kg (117 lb 14.4 oz)       Current Inpatient Medications:   lidocaine PF  5 mL IntraDERmal Once    sodium chloride flush  5-40 mL IntraVENous 2 times per day    heparin flush  3 mL IntraVENous 2 times per day    atorvastatin  40 mg Oral Nightly    sodium chloride flush  5-40 mL IntraVENous 2 times per day    aspirin  81 mg Oral Daily    chlorhexidine  
    Inpatient Cardiology Progress note        SUBJECTIVE:   Patient was seen and examined at bedside. She does have significant bruise over her left arm from site of PICC line attempt. Her hgb trended down and was given 1 unit of RBC which brought it up appropriately. Now off levophed, only on Dobutamine 5 mcg rate. Her urine output still low, given 20 mg IV lasix after blood transfusion    Her lactic acidosis has improved.    OBJECTIVE: No apparent distress       PHYSICAL EXAM:   /62   Pulse (!) 104   Temp 97.7 °F (36.5 °C) (Bladder)   Resp 21   Ht 1.6 m (5' 3\")   Wt 61.8 kg (136 lb 3.9 oz)   SpO2 97%   BMI 24.13 kg/m²    B/P Range last 24 hours: Systolic (24hrs), Av , Min:73 , Max:138    Diastolic (24hrs), Av, Min:37, Max:71      GENERAL: Not in distress.   HEAD: Normocephalic, Atraumatic.   NECK: No JVD. No carotid bruits. No neck masses.?   CARDIOVASCULAR: Normal rate, regular rhythm, normal S1, S2, no MRG    LUNGS: Bilateral crackles, no wheezing.?   ABDOMEN: Abdomen is soft and not distended. No tenderness.  EXTREMITIES:There is no pedal edema.   MUSCULOSKELETAL: No joint swelling. Normal range of motion?   SKIN: Skins is moist. No ulcers or lesions. Left upper arm ecchymosis  NEUROLOGICAL: Conscious, alert, oriented to time, place and person. No evidence of obvious neurological deficits.?   PSYCHOLOGICAL: Patient is in normal mood.?       Intake/Output Summary (Last 24 hours) at 2/10/2024 1646  Last data filed at 2/10/2024 1500  Gross per 24 hour   Intake 1345.53 ml   Output 610 ml   Net 735.53 ml       Weight:   Wt Readings from Last 3 Encounters:   02/10/24 61.8 kg (136 lb 3.9 oz)   24 57.6 kg (127 lb)   24 53.5 kg (117 lb 14.4 oz)       Current Inpatient Medications:   lidocaine PF  5 mL IntraDERmal Once    sodium chloride flush  5-40 mL IntraVENous 2 times per day    heparin flush  3 mL IntraVENous 2 times per day    atorvastatin  40 mg Oral Nightly    sodium chloride 
    Inpatient Cardiology Progress note        SUBJECTIVE:   Patient was seen and examined at bedside./Still have some pain over the surgical site.  Denies have any shortness of breath or lower extremity edema.    Patient continues to be requiring pressors with worsening lactic acidosis.    OBJECTIVE: No apparent distress       PHYSICAL EXAM:   /65   Pulse 87   Temp 97.5 °F (36.4 °C) (Bladder)   Resp 26   Ht 1.6 m (5' 3\")   Wt 58.8 kg (129 lb 9.6 oz)   SpO2 96%   BMI 22.96 kg/m²    B/P Range last 24 hours: Systolic (24hrs), Av , Min:92 , Max:115    Diastolic (24hrs), Av, Min:57, Max:65      GENERAL: Not in distress.   HEAD: Normocephalic, Atraumatic.   NECK: No JVD. No carotid bruits. No neck masses.?   CARDIOVASCULAR: Normal rate, regular rhythm, normal S1, S2, no MRG    LUNGS: Clear to auscultation bilaterally, no wheezing.?   ABDOMEN: Abdomen is soft and not distended. No tenderness.  EXTREMITIES:There is no pedal edema.   MUSCULOSKELETAL: No joint swelling. Normal range of motion?   SKIN: Skins is moist. No ulcers or lesions.   NEUROLOGICAL: Conscious, alert, oriented to time, place and person. No evidence of obvious neurological deficits.?   PSYCHOLOGICAL: Patient is in normal mood.?       Intake/Output Summary (Last 24 hours) at 2024 0011  Last data filed at 2024 2300  Gross per 24 hour   Intake 3093.61 ml   Output 1215 ml   Net 1878.61 ml       Weight:   Wt Readings from Last 3 Encounters:   24 58.8 kg (129 lb 9.6 oz)   24 57.6 kg (127 lb)   24 53.5 kg (117 lb 14.4 oz)       Current Inpatient Medications:   hydrocortisone sodium succinate PF (SOLU-CORTEF) 100 mg in sterile water 2 mL injection  100 mg IntraVENous Q8H    atorvastatin  40 mg Oral Nightly    sodium chloride flush  5-40 mL IntraVENous 2 times per day    aspirin  81 mg Oral Daily    acetaminophen  1,000 mg Oral Q6H    chlorhexidine  15 mL Mouth/Throat BID    magnesium oxide  400 mg Oral BID    
    Inpatient Cardiology Progress note        SUBJECTIVE:   Patient was seen and examined at bedside.Still have some pain over the surgical site.  She has some shortness of breath today    Her lactic acidosis has improved.    OBJECTIVE: No apparent distress       PHYSICAL EXAM:   BP 91/61   Pulse 97   Temp 98.4 °F (36.9 °C) (Bladder)   Resp 11   Ht 1.6 m (5' 3\")   Wt 59.1 kg (130 lb 3.2 oz)   SpO2 98%   BMI 23.06 kg/m²    B/P Range last 24 hours: Systolic (24hrs), Av , Min:73 , Max:138    Diastolic (24hrs), Av, Min:37, Max:67      GENERAL: Not in distress.   HEAD: Normocephalic, Atraumatic.   NECK: No JVD. No carotid bruits. No neck masses.?   CARDIOVASCULAR: Normal rate, regular rhythm, normal S1, S2, no MRG    LUNGS: Bilateral crackles, no wheezing.?   ABDOMEN: Abdomen is soft and not distended. No tenderness.  EXTREMITIES:There is no pedal edema.   MUSCULOSKELETAL: No joint swelling. Normal range of motion?   SKIN: Skins is moist. No ulcers or lesions.   NEUROLOGICAL: Conscious, alert, oriented to time, place and person. No evidence of obvious neurological deficits.?   PSYCHOLOGICAL: Patient is in normal mood.?       Intake/Output Summary (Last 24 hours) at 2/10/2024 0006  Last data filed at 2024 2300  Gross per 24 hour   Intake 3774.08 ml   Output 1285 ml   Net 2489.08 ml       Weight:   Wt Readings from Last 3 Encounters:   24 59.1 kg (130 lb 3.2 oz)   24 57.6 kg (127 lb)   24 53.5 kg (117 lb 14.4 oz)       Current Inpatient Medications:   lidocaine PF  5 mL IntraDERmal Once    sodium chloride flush  5-40 mL IntraVENous 2 times per day    heparin flush  3 mL IntraVENous 2 times per day    atorvastatin  40 mg Oral Nightly    sodium chloride flush  5-40 mL IntraVENous 2 times per day    aspirin  81 mg Oral Daily    acetaminophen  1,000 mg Oral Q6H    chlorhexidine  15 mL Mouth/Throat BID    magnesium oxide  400 mg Oral BID    mupirocin   Each Nostril BID    sennosides-docusate 
    [facility]                                Progress note    Subjective:  Pt off the floor for testing on my attempts to see     Objective:    BP (!) 98/54   Pulse 84   Temp 98.7 °F (37.1 °C) (Temporal)   Resp 14   Ht 1.575 m (5' 2\")   Wt 57.6 kg (127 lb)   SpO2 95%   BMI 23.23 kg/m²     In: 120 [P.O.:120]  Out: 20   In: 120   Out: 20          Recent Labs     02/05/24  0941 02/05/24  1423 02/06/24  0355   WBC 9.2 10.1 9.9   HGB 11.3* 11.1* 9.3*   HCT 35.2 34.9 28.7*    247 260       Recent Labs     02/05/24  0941 02/06/24  0355   * 130*   K 3.7 3.4*   CL 94* 100   CO2 22 19*   BUN 29* 23   CREATININE 1.2* 1.0   CALCIUM 8.4* 7.9*       Assessment:    Principal Problem:    S/P cardiac cath  Active Problems:    CAD in native artery    ST elevation myocardial infarction (STEMI) (formerly [facility])  Resolved Problems:    * No resolved hospital problems. *      Plan:  Patient is a 78-year-old female admitted to [facility] for  MVCAD   -Monitor labs  -s/p [facility] multivessel coronary artery disease- no intervention done   -CT surgery consulted  -CABG imaging workup pending  -Tentative plan for CABG tomorrow 2/7  -Continue ASA statin beta-blocker  -Continue heparin drip  -BP's marginal , continue to monitor - meds with parameters   -echo      H/P and Consult  notes both completed by Cardiology service , however admitted pt to my service.    Will ask cardiology to assume care, if they wish to  be admitting service.       Code status: Full  Consultants: Cardio and CTS     DVT Prophylaxis Heparin gtt  PT/OT  Discharge planning      Angelika Pa MD     
  OCCUPATIONAL THERAPY TREATMENT NOTE   [facility]  [address], [place]       Date:2024                                                               Patient Name: Katt Diaz  MRN: [id number]  : 1945  Room: [address] [place], [place] [address]    Evaluating OT: Lynne Mcintyre, OTR/L 7074     Referring Provider:   Zoe Shannon APRN - CNP       Specific Provider Orders/Date: OT eval and treat (24)        Diagnosis: STEMI (ST elevation myocardial infarction) (formerly [facility]) [I21.3]  S/P cardiac cath [Z98.890]         Surgery/Procedures:  urgent CABG x3       Pertinent Medical History:    Past Medical History        Past Medical History:   Diagnosis Date    CAD in native artery 2024    Diabetes mellitus (HCC)      Headache      Hearing loss      Hx of rheumatoid arthritis      Migraine      Osteopenia      Sjogren's disease (HCC)         Pt reports balance concerns PTA- seeking medical attention (neurologist and therapy)     *Precautions:  Fall Risk, sternal, O2, REFUGIO drain, , pacemaker,, hypotensive EOB      Assessment of current deficits   [x]Functional mobility            [x]ADLs           [x]Strength                  []Cognition  [x]Functional transfers          [x]IADLs          [x]Safety Awareness   [x]Endurance  [x]Fine Motor Coordination   [x]Balance       []Vision/perception    [x]Sensation      [x]Gross Motor Coordination [x]ROM           []Delirium                  [x] Motor Control     [x]Communication      OT PLAN OF CARE   OT POC based on physician orders, patient diagnosis and results of clinical assessment.        Frequency/Duration: 1-3 days/wk for 1-2 weeks PRN     Specific OT Treatment to include:   ADL retraining/adapted techniques and AE recommendations to increase functional independence within precautions                    Energy conservation techniques to improve tolerance for selfcare routine   Functional 
  OCCUPATIONAL THERAPY TREATMENT NOTE   [facility]  [address] [place], [place]       Date:2024                                                               Patient Name: Katt Diaz  MRN: [id number]  : 1945  Room: [address] [place], [place] [address]    Evaluating OT: Lynne Mcintyre, OTR/L 5982     Referring Provider:   Zoe Shannon APRN - CNP       Specific Provider Orders/Date: OT eval and treat (24)        Diagnosis: STEMI (ST elevation myocardial infarction) ([facility]) [I21.3]  S/P cardiac cath [Z98.890]         Surgery/Procedures:  urgent CABG x3       Pertinent Medical History:    Past Medical History        Past Medical History:   Diagnosis Date    CAD in native artery 2024    Diabetes mellitus (HCC)      Headache      Hearing loss      Hx of rheumatoid arthritis      Migraine      Osteopenia      Sjogren's disease (HCC)         Pt reports balance concerns PTA- seeking medical attention (neurologist and therapy)     *Precautions:  Fall Risk, sternal, O2, REFUGIO drain, , pacemaker,, hypotensive EOB      Assessment of current deficits   [x]Functional mobility            [x]ADLs           [x]Strength                  []Cognition  [x]Functional transfers          [x]IADLs          [x]Safety Awareness   [x]Endurance  [x]Fine Motor Coordination   [x]Balance       []Vision/perception    [x]Sensation      [x]Gross Motor Coordination [x]ROM           []Delirium                  [x] Motor Control     [x]Communication      OT PLAN OF CARE   OT POC based on physician orders, patient diagnosis and results of clinical assessment.        Frequency/Duration: 1-3 days/wk for 1-2 weeks PRN     Specific OT Treatment to include:   ADL retraining/adapted techniques and AE recommendations to increase functional independence within precautions                    Energy conservation techniques to improve tolerance for selfcare routine   Functional 
  OCCUPATIONAL THERAPY TREATMENT NOTE   [facility]  [address] [place], [place]       Date:2024                                                               Patient Name: Katt Diaz  MRN: [id number]  : 1945  Room: [address] [place], [place] [address]    Evaluating OT: Lynne Mcintyre OTR/L 5723     Referring Provider:   Zoe Shannon APRN - CNP       Specific Provider Orders/Date: OT eval and treat (24)        Diagnosis: STEMI (ST elevation myocardial infarction) ([facility]) [I21.3]  S/P cardiac cath [Z98.890]         Surgery/Procedures:  urgent CABG x3       Pertinent Medical History:    Past Medical History        Past Medical History:   Diagnosis Date    CAD in native artery 2024    Diabetes mellitus ([facility])      Headache      Hearing loss      Hx of rheumatoid arthritis      Migraine      Osteopenia      Sjogren's disease (HCC)         Pt reports balance concerns PTA- seeking medical attention (neurologist and therapy)     *Precautions:  Fall Risk, sternal, , pacemaker,, hypotensive EOB      Assessment of current deficits   [x]Functional mobility            [x]ADLs           [x]Strength                  []Cognition  [x]Functional transfers          [x]IADLs          [x]Safety Awareness   [x]Endurance  [x]Fine Motor Coordination   [x]Balance       []Vision/perception    [x]Sensation      [x]Gross Motor Coordination [x]ROM           []Delirium                  [x] Motor Control     [x]Communication      OT PLAN OF CARE   OT POC based on physician orders, patient diagnosis and results of clinical assessment.        Frequency/Duration: 1-3 days/wk for 1-2 weeks PRN     Specific OT Treatment to include:   ADL retraining/adapted techniques and AE recommendations to increase functional independence within precautions                    Energy conservation techniques to improve tolerance for selfcare routine   Functional transfer/mobility 
 Nutrition Education        Nutrition Recommendations/Plan:   [facility] dietetic student counseled pt on a heart healthy/diabetic diet and provided educational handouts and sample meal plans.   Pt states that she lives alone, cooks for herself, and that her appetite is improving.   Recommend to continue ONS glucerna TID and Skip BID to promote wound healing and help meet nutritional needs.                    Madeline Suh  Contact: [phone]  
4 Eyes Skin Assessment     NAME:  Katt Diaz  YOB: 1945  MEDICAL RECORD NUMBER:  [id number]    The patient is being assessed for  Post-Op Surgical    I agree that at least one RN has performed a thorough Head to Toe Skin Assessment on the patient. ALL assessment sites listed below have been assessed.      Areas assessed by both nurses:    Head, Face, Ears, Shoulders, Back, Chest, Arms, Elbows, Hands, Sacrum. Buttock, Coccyx, Ischium, Legs. Feet and Heels, and Under Medical Devices         Does the Patient have a Wound? No noted wound(s)       Dani Prevention initiated by RN: Yes  Wound Care Orders initiated by RN: No    Pressure Injury (Stage 3,4, Unstageable, DTI, NWPT, and Complex wounds) if present, place Wound referral order by RN under : No    New Ostomies, if present place, Ostomy referral order under : No     Nurse 1 eSignature: Electronically signed by Nancy Hawkins RN on 2/7/24 at 4:04 PM EST    **SHARE this note so that the co-signing nurse can place an eSignature**    Nurse 2 eSignature: Electronically signed by Jelly Meyer RN on 2/7/24 at 4:08 PM EST   
4 Eyes Skin Assessment     NAME:  Katt Diaz  YOB: 1945  MEDICAL RECORD NUMBER:  [id number]    The patient is being assessed for  Admission    I agree that at least one RN has performed a thorough Head to Toe Skin Assessment on the patient. ALL assessment sites listed below have been assessed.      Areas assessed by both nurses:    Head, Face, Ears, Shoulders, Back, Chest, Arms, Elbows, Hands, Sacrum. Buttock, Coccyx, Ischium, and Legs. Feet and Heels        Does the Patient have a Wound? No noted wound(s)       Dani Prevention initiated by RN: No  Wound Care Orders initiated by RN: No    Pressure Injury (Stage 3,4, Unstageable, DTI, NWPT, and Complex wounds) if present, place Wound referral order by RN under : No    New Ostomies, if present place, Ostomy referral order under : No     Nurse 1 eSignature: Electronically signed by Jessika Brennan RN on 2/5/24 at 2:30 PM EST    **SHARE this note so that the co-signing nurse can place an eSignature**    Nurse 2 eSignature: Electronically signed by Dalila Mcqueen RN on 2/5/24 at 3:44 PM EST  
4 Eyes Skin Assessment     NAME:  Katt Diaz  YOB: 1945  MEDICAL RECORD NUMBER:  [id number]    The patient is being assessed for  Transfer to New Unit    I agree that at least one RN has performed a thorough Head to Toe Skin Assessment on the patient. ALL assessment sites listed below have been assessed.      Areas assessed by both nurses:    Head, Face, Ears, Shoulders, Back, Chest, Arms, Elbows, Hands, Sacrum. Buttock, Coccyx, Ischium, and Legs. Feet and Heels        Does the Patient have a Wound? No noted wound(s)       Dani Prevention initiated by RN: Yes  Wound Care Orders initiated by RN: No    Pressure Injury (Stage 3,4, Unstageable, DTI, NWPT, and Complex wounds) if present, place Wound referral order by RN under : No    New Ostomies, if present place, Ostomy referral order under : No     Nurse 1 eSignature: Electronically signed by Farida Romero RN on 2/14/24 at 12:49 PM EST    **SHARE this note so that the co-signing nurse can place an eSignature**    Nurse 2 eSignature: {Esignature:[id number]}  
Alysa called to reverse patient.  
Attempted picc line placement Bilateral upper extremities with ultrasound under sterile and max. Barrier prec.  The right brachial vein was accessed without diff.  The picc line would not advance beyond the right subclavian vein despite multiple position changes and maneuvers. An extra guidewire was placed to stiffen the catheter but advancement was still unsuccessful.  The procedure was stopped and the catheter removed, DSD applied..  The left upper extremity prior to attempt was noted with edema and a large hematoma at the AC region. All vessel compressed without difficulty.The brachial vein was attempted but the guidewire would not advance Via a 21 gauge micropuncture needle. Procedure stopped. The venous system bilaterally does demonstrate a tortuous pathway. RN notified of. Pt tolerated well.  
Bilateral BP done     R arm 98/55  L arm 103/58    
CVICU Admission Note    Name: Katt Diaz  MRN: [id number]    CC: Postoperative Critical Care Management     Indication for Surgery/Procedure: STEMi,CAD  LVEF:  35% pre CPB, 57% post CPB on inotropic support  RVF:  NML    Important/Relevant PMH/PSH: HFrEF, rheumatoid arthritis, sjrogen disease, chronic steroids, neuropathy, migraines     Procedure/Surgeries: 2/7/2024 urgent sternotomy, urgent CABG x3 (LIMA-LAD, SVG-OM, SVG-RCA), EVH LLE, left atrial appendage ligation with 35mm Atriclip     Pacing wires: Ventricular       Physical Exam:    BP (!) 107/49   Pulse 86   Temp 96.8 °F (36 °C) (Bladder)   Resp 14   Ht 1.6 m (5' 3\")   Wt 58.5 kg (129 lb)   SpO2 100%   BMI 22.85 kg/m²     Recent Labs     02/07/24  1204   WBC 17.9*   RBC 2.84*   HGB 8.5*   HCT 25.8*   MCV 90.8   MCH 29.9   MCHC 32.9   RDW 14.6      MPV 9.9     Recent Labs     02/07/24  0502 02/07/24  0942 02/07/24  1124     --   --    K 3.8  --   --      --   --    CO2 19*  --   --    BUN 15  --   --    CREATININE 1.0   < > 0.7   GLUCOSE 154*  --   --    CALCIUM 8.0*  --   --     < > = values in this interval not displayed.         Post operative CXR:        Atelectasis, No pneumothorax noted, Mildly increased vascular markings bilateral, No significant pleural effusion. ETT/lines/drains appear to be in proper position.  Final Radiology report pending.     General Appearance: Arrived to ICU intubated, placed on ventilator,   Eyes: PERRL  Pulmonary: Diminished bibasilar.  No wheezes, no accessory muscle use noted   Ventilator: Mode: AC/VC, 50 FiO2, 8 PEEP, 350 Vt   Cardiovascular: RRR, no heaves or thrills palpated   Telemetry: SR  Abdomen: Soft, OG to LIWS  Extremities: Palpable pulses all extremities, no edema   Neurologic/Psych: Sedated   Skin: Warm and dry    Incision: MSI with sumit dressing intact, LLE SVG sites with ace wrap on       Assessment/Plan: Day of Surgery     1. STEMI/CAD S/p Urgent CABG x3 (LIMA-LAD, SVG-OM, 
CVICU Progress Note    Name: Katt Diaz  MRN: [id number]    CC: Postoperative Critical Care Management     Indication for Surgery/Procedure: STEMi,CAD  LVEF:  35% pre CPB, 57% post CPB on inotropic support  RVF:  NML     Important/Relevant PMH/PSH: HFrEF, rheumatoid arthritis, sjrogen disease, chronic steroids, neuropathy, migraines      Procedure/Surgeries: 2/7/2024 urgent sternotomy, urgent CABG x3 (LIMA-LAD, SVG-OM, SVG-RCA), EVH LLE, left atrial appendage ligation with 35mm Atriclip      Pacing wires: Ventricular      Intake/Output Summary (Last 24 hours) at 2/8/2024 0834  Last data filed at 2/8/2024 0726  Gross per 24 hour   Intake 5139.36 ml   Output 2200 ml   Net 2939.36 ml       Recent Labs     02/07/24  0504 02/07/24  1204 02/08/24  0403   WBC 9.9 17.9* 10.8   HGB 9.6* 8.5* 8.2*   HCT 29.3* 25.8* 25.2*    142 145      Lab Results   Component Value Date/Time     02/08/2024 04:01 AM    K 4.9 02/08/2024 04:03 AM     02/08/2024 04:01 AM    CO2 17 02/08/2024 04:01 AM    BUN 16 02/08/2024 04:01 AM    CREATININE 1.1 02/08/2024 04:01 AM    GLUCOSE 144 02/08/2024 04:01 AM    CALCIUM 7.8 02/08/2024 04:01 AM    MG 2.9 02/08/2024 04:01 AM         Physical Exam:    BP (!) 107/49   Pulse 71   Temp 97 °F (36.1 °C) (Bladder)   Resp 21   Ht 1.6 m (5' 3\")   Wt 58.8 kg (129 lb 9.6 oz)   SpO2 95%   BMI 22.96 kg/m²       CXR Findings: 2/8/2024    -- CXR personally viewed and interpreted by ICU Nurse Practitioner, agree with above findings     General: Awake, alert. Denies SOB, palpitations   Eyes: PERRL, anicteric   Pulmonary: Diminished bibasilar. No wheezes, no accessory muscle use noted on room air   Cardiovascular:  RRR, no heaves or thrills on palpation  Tele: SR   Abdomen: Soft, nontender, + BS   Extremities: Palpable pulses all extremities, no edema   Neurologic/Psych: A&Ox3, ARROYO to command   Skin: Warm and dry  Incisions: MSI with sumit dressing intact, SVG sites well approximated 
CVICU Progress Note    Name: Katt Diaz  MRN: [id number]    CC: Postoperative Critical Care Management      Indication for Surgery/Procedure: STEMi,CAD  LVEF:  35% pre CPB, 57% post CPB on inotropic support  RVF:  NML     Important/Relevant PMH/PSH: HFrEF, rheumatoid arthritis, sjrogen disease, chronic steroids, neuropathy, migraines      Procedure/Surgeries: 2/7/2024 urgent sternotomy, urgent CABG x3 (LIMA-LAD, SVG-OM, SVG-RCA), EVH LLE, left atrial appendage ligation with 35mm Atriclip      Pacing wires: Ventricular       Intake/Output Summary (Last 24 hours) at 2/14/2024 0841  Last data filed at 2/14/2024 0800  Gross per 24 hour   Intake 1350.73 ml   Output 1350 ml   Net 0.73 ml       Recent Labs     02/12/24  0406 02/13/24  0401 02/14/24  0349   WBC 8.5 9.8 8.5   HGB 8.1* 8.7* 8.7*   HCT 25.1* 27.1* 27.3*    206 242      Lab Results   Component Value Date/Time     02/14/2024 03:49 AM    K 4.1 02/14/2024 03:49 AM     02/14/2024 03:49 AM    CO2 22 02/14/2024 03:49 AM    BUN 23 02/14/2024 03:49 AM    CREATININE 0.7 02/14/2024 03:49 AM    GLUCOSE 146 02/14/2024 03:49 AM    CALCIUM 7.6 02/14/2024 03:49 AM    MG 1.8 02/14/2024 03:49 AM         Physical Exam:    /74   Pulse (!) 109   Temp 97.5 °F (36.4 °C) (Temporal)   Resp 17   Ht 1.6 m (5' 3\")   Wt 65.3 kg (143 lb 15.4 oz)   SpO2 99%   BMI 25.50 kg/m²       CXR Findings:     - CXR reviewed by CVICU Nurse Practitioner, Radiology report pending. Right pleural effusion significantly improved.     General: Awake, alert. On Dobutamine 1mcg/kg/min.   Eyes: PERRL, anicteric   Pulmonary: Diminished bibasilar. No wheezes, no accessory muscle use noted on 2L O2 NC  Cardiovascular:  RRR, no heaves or thrills on palpation  Tele: ST   Abdomen: Soft, nontender, nondistended +BS, -BM  Extremities: Palpable pulses all extremities, trace edema   Neurologic/Psych: A&Ox3, ARROYO to command   Skin: Warm and dry, LUE ecchymosis   Incisions: MSI EDU 
CVICU Progress Note    Name: Katt Diaz  MRN: [id number]    CC: Postoperative Critical Care Management      Indication for Surgery/Procedure: STEMi,CAD  LVEF:  35% pre CPB, 57% post CPB on inotropic support  RVF:  NML     Important/Relevant PMH/PSH: HFrEF, rheumatoid arthritis, sjrogen disease, chronic steroids, neuropathy, migraines      Procedure/Surgeries: 2/7/2024 urgent sternotomy, urgent CABG x3 (LIMA-LAD, SVG-OM, SVG-RCA), EVH LLE, left atrial appendage ligation with 35mm Atriclip      Pacing wires: Ventricular       Intake/Output Summary (Last 24 hours) at 2/12/2024 0923  Last data filed at 2/12/2024 0700  Gross per 24 hour   Intake 384.06 ml   Output 1578 ml   Net -1193.94 ml       Recent Labs     02/10/24  0403 02/10/24  1229 02/11/24  0408 02/12/24  0406   WBC 10.5  --  8.8 8.5   HGB 6.3* 8.4* 8.1* 8.1*   HCT 19.8* 25.4* 24.7* 25.1*     --  153 166      Lab Results   Component Value Date/Time     02/12/2024 04:06 AM    K 3.6 02/12/2024 04:06 AM     02/12/2024 04:06 AM    CO2 26 02/12/2024 04:06 AM    BUN 24 02/12/2024 04:06 AM    CREATININE 1.0 02/12/2024 04:06 AM    GLUCOSE 78 02/12/2024 04:06 AM    CALCIUM 7.5 02/12/2024 04:06 AM    MG 2.2 02/12/2024 04:06 AM         Physical Exam:    /65   Pulse (!) 103   Temp 98.2 °F (36.8 °C) (Bladder)   Resp 15   Ht 1.6 m (5' 3\")   Wt 66.9 kg (147 lb 7.8 oz)   SpO2 100%   BMI 26.13 kg/m²       CXR Findings: ordered for tomorrow       General: Awake, alert. On Dobutamine, weaned to 2mcg/kg/min. On 2L O2 NC   Eyes: PERRL, anicteric   Pulmonary: Diminished bibasilar. No wheezes, no accessory muscle use noted on 2L O2 NC  Cardiovascular:  RRR, no heaves or thrills on palpation  Tele: SR/ST   Abdomen: Soft, nontender, +BS, -BM  Extremities: Palpable pulses all extremities, trace edema   Neurologic/Psych: A&Ox3, ARROYO to command   Skin: Warm and dry, LUE ecchymosis   Incisions: MSI EDU intact      Assessment/Plan: POD #5    1. STEMI/CAD 
CVICU Progress Note    Name: Katt Diaz  MRN: [id number]    CC: Postoperative Critical Care Management      Indication for Surgery/Procedure: STEMi,CAD  LVEF:  35% pre CPB, 57% post CPB on inotropic support  RVF:  NML     Important/Relevant PMH/PSH: HFrEF, rheumatoid arthritis, sjrogen disease, chronic steroids, neuropathy, migraines      Procedure/Surgeries: 2/7/2024 urgent sternotomy, urgent CABG x3 (LIMA-LAD, SVG-OM, SVG-RCA), EVH LLE, left atrial appendage ligation with 35mm Atriclip      Pacing wires: Ventricular       Intake/Output Summary (Last 24 hours) at 2/13/2024 0835  Last data filed at 2/13/2024 0700  Gross per 24 hour   Intake 617.33 ml   Output 1398 ml   Net -780.67 ml       Recent Labs     02/11/24  0408 02/12/24  0406 02/13/24  0401   WBC 8.8 8.5 9.8   HGB 8.1* 8.1* 8.7*   HCT 24.7* 25.1* 27.1*    166 206      Lab Results   Component Value Date/Time     02/13/2024 04:01 AM    K 4.4 02/13/2024 04:01 AM    CL 99 02/13/2024 04:01 AM    CO2 22 02/13/2024 04:01 AM    BUN 22 02/13/2024 04:01 AM    CREATININE 0.8 02/13/2024 04:01 AM    GLUCOSE 118 02/13/2024 04:01 AM    CALCIUM 7.8 02/13/2024 04:01 AM    MG 1.9 02/13/2024 04:01 AM         Physical Exam:    /65   Pulse (!) 106   Temp 99.4 °F (37.4 °C) (Oral)   Resp 17   Ht 1.6 m (5' 3\")   Wt 64.3 kg (141 lb 12.1 oz)   SpO2 (!) 88% Comment: finger bent  BMI 25.11 kg/m²       CXR Findings:     Findings: IMPRESSION:  Bibasilar infiltrates with some partial resolution in the right lower lobe  since the prior study.    General: Awake, alert. On Dobutamine 2mcg/kg/min.   Eyes: PERRL, anicteric   Pulmonary: Diminished bibasilar. No wheezes, no accessory muscle use noted on 3L O2 NC  Cardiovascular:  RRR, no heaves or thrills on palpation  Tele: SR/ST   Abdomen: Soft, nontender, +BS, -BM  Extremities: Palpable pulses all extremities, trace edema   Neurologic/Psych: A&Ox3, ARROYO to command   Skin: Warm and dry, LUE ecchymosis   Incisions: 
Called and spoke to nurse about tunneled catheter removal. Surgical resident to remove. IR procedure cancelled.   
Dr. Manzano notified of hemoglobin 6.3    New orders received   
First pre OP CHG bath completed at this time.   Linen, gown, electrodes changed.     BS Checked at 0313. Pre surgery drink finished by 0320    Pt second CHG bath completed, linens, gown electrodes changed, pre op checklists completed, height/weight, pre Op antibiotic attached to soft chart, procedural/blood consent signed and stuffed in patient soft chart along with blank anesthesia consent. All completed between 1123-5279.  
IR here to  remove right CVC  
Incentive spirometer given to patient and educated on use. Patient verbalized understanding and demonstrated appropriate use.    Last dose of 25 mg Toprol XL given 2/6 at 1129.  
Leone removed at 1000  
Life vest put on and patient told has to wear at all times except when showers. Extra battery and  in box. Family here and took belongings.   
MS 32 Fr chest tube removed without difficulty, remaining drains continue to WS.   
N-N called to [facility], all questions answered   
Notified Dr. Manzano of patient's decrease in urinary output, order for 200cc of 25% Albumin. Updated on patient's morning ABG as well.   
OCCUPATIONAL THERAPY    Date:2024  Patient Name: Katt Diaz  MRN: [id number]  : 1945  Room: [address] [place] [address]              Chart reviewed. Pt on hold for medical status.   Will re-attempt at later time. Thank you for consult.    Lynne Mcintyre, OTR/L 2094     
Okay to discontinue POCT per nursing communication order. Patient has not need coverage in over 48 hours.     Farida Romero RN    
POD#10 Awake, alert. No complaints. Denies CP, palpitations, SOB at rest, dizziness/lightheadedness.    Vitals:    02/16/24 1939 02/16/24 2332 02/17/24 0322 02/17/24 0649   BP: (!) 100/58 (!) 98/58 (!) 106/55    Pulse: 91 86 (!) 18    Resp: 16 16     Temp: 97.2 °F (36.2 °C) 97.1 °F (36.2 °C) 97 °F (36.1 °C)    TempSrc: Temporal Temporal Temporal    SpO2: 97% 92% 100%    Weight:    61 kg (134 lb 6.4 oz)   Height:         O2: None       Intake/Output Summary (Last 24 hours) at 2/17/2024 0741  Last data filed at 2/17/2024 0456  Gross per 24 hour   Intake 840 ml   Output 800 ml   Net 40 ml         +BM on 2/15    UO: 200mL/8hr       Recent Labs     02/15/24  0545 02/16/24  0505 02/17/24  0600   WBC 8.8 8.1 8.2   HGB 9.1* 9.1* 8.4*   HCT 28.8* 28.7* 26.1*    300 317      Recent Labs     02/15/24  0545 02/16/24  0505 02/17/24  0600   BUN 18 14 15   CREATININE 0.6 0.7 0.7         Telemetry: SR      PE  Cardiac: RRR  Lungs: decreased bases  Chest incision  C/D/I, approximated, no erythema. Sternum stable. Prior chest tube site incisions C/D/I, no erythema with intact sutures. Epicardial pacing wires present and secure.   Abd: Soft, nontender, +BS  Ext: Incisions C/D/I, approximated, no erythema, + mild  edema           A/P: POD#10      1. STEMI/CAD  --Stable s/p urgent CABG x3 (LIMA-LAD, SVG-OM, SVG-RCA), EVH LLE, left atrial appendage ligation with 35mm Atriclip    --Post op YOVANY reveals BiV dysfunction  -- TTE done 2/15- EF 30-35%- Life vest ordered 2/16/24 and in room   --Scr stable at 0.7  --DAPT/lipitor/BB  -- po amio for AF prophy  --reinforce sternal precautions  --continue epicardial pacing wires  -- All CTs out   - Unable to place PICC BUEs; 2/13 Right Subclavian tunneled line placed         2. Expected acute blood loss anemia secondary to open heart surgery  --1 prbc intra-op, 1 unit prbc 2/10   --hgb 8.4 today ( hgb 9.1 yest) no signs of active bleeding, will monitor         3. NSR  -- BB started yesterday, 
POD#11 Awake, alert. No complaints. Denies CP, palpitations, SOB at rest, dizziness/lightheadedness.    Vitals:    02/17/24 1956 02/17/24 2308 02/18/24 0306 02/18/24 0411   BP: (!) 107/59 110/61 102/63    Pulse: 76 74 72    Resp: 18 18 16    Temp: 98 °F (36.7 °C) 98 °F (36.7 °C) 97.6 °F (36.4 °C)    TempSrc: Temporal Temporal Temporal    SpO2: 94% 94% 97%    Weight:    60.9 kg (134 lb 3.2 oz)   Height:         O2: None       Intake/Output Summary (Last 24 hours) at 2/18/2024 0656  Last data filed at 2/18/2024 0333  Gross per 24 hour   Intake --   Output 500 ml   Net -500 ml         +BM on 2/15    UO: 200mL/8hr       Recent Labs     02/16/24  0505 02/17/24  0600 02/18/24  0613   WBC 8.1 8.2 7.8   HGB 9.1* 8.4* 8.0*   HCT 28.7* 26.1* 25.4*    317 306      Recent Labs     02/16/24  0505 02/17/24  0600   BUN 14 15   CREATININE 0.7 0.7         Telemetry: SR      PE  Cardiac: RRR  Lungs: decreased bases  Chest incision  C/D/I, approximated, no erythema. Sternum stable. Prior chest tube site incisions C/D/I, no erythema with intact sutures. Epicardial pacing wires present and secure.   Abd: Soft, nontender, +BS  Ext: Incisions C/D/I, approximated, no erythema, + mild  edema           A/P: POD# 11    1. STEMI/CAD  --Stable s/p urgent CABG x3 (LIMA-LAD, SVG-OM, SVG-RCA), EVH LLE, left atrial appendage ligation with 35mm Atriclip    --Post op YOVANY reveals BiV dysfunction  -- TTE done 2/15- EF 30-35%- Life vest ordered 2/16/24 and in room   --Scr stable at 0.7  --DAPT/lipitor/BB  -- po amio for AF prophy  --reinforce sternal precautions  --continue epicardial pacing wires  -- All CTs out   - Unable to place PICC BUEs; 2/13 Right Subclavian tunneled line placed         2. Expected acute blood loss anemia secondary to open heart surgery  --1 prbc intra-op, 1 unit prbc 2/10   --hgb 8.0 today ( hgb 8.4 yest) no signs of active bleeding, will monitor         3. NSR  -- BB started 2/16 , tolerating  -- Cont PO amio for afib 
POD#8 Awake, alert. No complaints. OOB in chair      Vitals:    02/14/24 2319 02/15/24 0324 02/15/24 0605 02/15/24 0717   BP: 134/79 113/65  (!) 116/59   Pulse: (!) 110 (!) 108  (!) 107   Resp: 14 16 22   Temp: 97.7 °F (36.5 °C) 98.3 °F (36.8 °C)  97 °F (36.1 °C)   TempSrc: Temporal Temporal  Temporal   SpO2: 100% 97%  96%   Weight:   61.2 kg (135 lb)    Height:         O2: 2 L/NC      Intake/Output Summary (Last 24 hours) at 2/15/2024 0845  Last data filed at 2/15/2024 0605  Gross per 24 hour   Intake 120 ml   Output 895 ml   Net -775 ml         +BM x 3 on 2/14      CT output: Mediastinal: (70mL/24hrs)     Pleural:  (495mL/24hrs)      Recent Labs     02/13/24  0401 02/14/24  0349 02/15/24  0545   WBC 9.8 8.5 8.8   HGB 8.7* 8.7* 9.1*   HCT 27.1* 27.3* 28.8*    242 271      Recent Labs     02/13/24  0401 02/14/24  0349 02/15/24  0545   BUN 22 23 18   CREATININE 0.8 0.7 0.6         Telemetry: NSR/borderline ST      PE  Cardiac: RRR  Lungs: decreased bases  Chest incision with intact EDU DSD.   Sternum stable. Prior chest tube site incisions C/D/I, no erythema with intact sutures. Chest tubes x 2 and Epicardial pacing wires present and secure.   Abd: Soft, nontender, +BS  Ext: Incisions C/D/I, approximated, no erythema, minimal edema or ecchymosis           A/P: POD#8    1. STEMI/CAD  --Stable s/p urgent CABG x3 (LIMA-LAD, SVG-OM, SVG-RCA), EVH LLE, left atrial appendage ligation with 35mm Atriclip    --Post op YOVANY reveals BiV dysfunction  --will order TTE today to assess LV/RV function off dobutamine and potential need for WCD on discharge  --Scr stable at 0.6  --DAPT/lipitor- add po amio for AF prophy- hopefully add BB prior to DC if BP allows  --reinforce sternal precautions  --continue epicardial pacing wires  --mediastinal CT removed today; cont pleural tube (draining)  - remove EDU today  - Unable to place PICC BUEs; 2/13 Right Subclavian tunneled line placed        2. Expected acute blood loss anemia 
Patent off unit for vascular studies. Unable to take VS. VS prior to leaving floor were stable, right radial site stable with +2 pulse, no redness, swelling, drainage, or hematoma.  
Patient arrived to PCCU with cellphone, , and eyeglasses. VS stable. Patient on 2L of O2. 2 Chest tubes to bulb suction. Dobutamine gtt at 1mcg    Farida Romero RN    
Patient left unit at this time  
Patient off floor in OR during rounds.    Electronically signed by PALMER Alston CNP on 2/7/2024 at 10:45 AM    
Patient refused vein mapping, had diarrhea and asked to return to room. Will retry exam tomorrow 2/5.  
Patient to IR for procedure with IR RN's.  
Physical Therapy      Name: Katt Diaz  : 1945  MRN: [id number]  Date of Service: 2024  Room #:  6508/6508-A    PT order received. PT held - surgical intervention planned. PT will follow up as able/appropriate.    Camacho Bolanos, PT, DPT  [id number]      
Physical Therapy  Physical Therapy Attempt    Name: Katt Diaz  : 1945  MRN: [id number]      Date of Service: 2024  Chart reviewed.  Spoke with NP - pt is not medically appropriate for skilled PT at this time.  Will re-attempt as able.    Toni Flores, PT, DPT  [id number]    
Physical Therapy  Physical Therapy Initial Assessment     Name: Katt Diaz  : 1945  MRN: [id number]      Date of Service: 2024    Evaluating PT:  Toni Flores PT, DPT [id number]    Room #:  3818/3818-A  Diagnosis:  STEMI (ST elevation myocardial infarction) ([facility]) [I21.3]  S/P cardiac cath [Z98.890]  PMHx/PSHx:    Past Medical History:   Diagnosis Date    CAD in native artery 2024    Diabetes mellitus (HCC)     Headache     Hearing loss     Hx of rheumatoid arthritis     Migraine     Osteopenia     Sjogren's disease (HCC)      Procedure/Surgery:   LHC,  CABG x 3  Precautions:  Falls, Sternal, O2, chest tube x 2  Equipment Needs:  TBD    SUBJECTIVE:    Pt lives alone in a 1 story Independent Living condo with level entry.     Pt ambulated without device and was independent PTA.    OBJECTIVE:   Initial Evaluation  Date: 24 Treatment Short Term/ Long Term   Goals   AM-PAC 6 Clicks      Was pt agreeable to Eval/treatment? Yes     Does pt have pain? 5/10 surgical pain     Bed Mobility  Rolling: NT  Supine to sit: MaxA with HOB elevated  Sit to supine: MaxA  Scooting: MaxA  Bekah   Transfers Sit to stand: NT  Stand to sit: NT  Stand pivot: NT  Bekah with WW   Ambulation   NT  >100 feet with Bekah with WW   Stair negotiation: ascended and descended NA     ROM BUE:  Defer to OT note  BLE:  WFL     Strength BUE:  Defer to OT note  BLE:  4/5  Increase by 1/3 MMT grade   Balance Sitting EOB:  MaxA  Dynamic Standing:  NT  Sitting EOB:  Independent  Dynamic Standing:  Bekah with WW     Pt is A & O x 4  CAM-ICU: NT  RASS: 0  Sensation:  no reported paresthesias  Edema:  none    Vitals:  Heart Rate at rest 89 bpm Heart Rate post session 95 bpm   SpO2 at rest 90% SpO2 post session 93%   Blood Pressure at rest 117/58 mmHg Blood Pressure post session 105/56 mmHg       Functional Status Score-Intensive Care Unit (FSS-ICU)   Rolling -/   Supine to sit transfer 2/7   Unsupported sitting  2/   Sit to stand 
Physical Therapy  Physical Therapy Treatment Note    Name: Katt Diaz  : 1945  MRN: [id number]      Date of Service: 2024    Evaluating PT:  Toni Flores, PT, DPT [id number]    Room #:  6506/6506-A  Diagnosis:  STEMI (ST elevation myocardial infarction) ([facility]) [I21.3]  S/P cardiac cath [Z98.890]  PMHx/PSHx:    Past Medical History:   Diagnosis Date    VERONIQUE (acute kidney injury) ([facility]) 2024    CAD in native artery 2024    Diabetes mellitus (HCC)     Headache     Hearing loss     Hx of rheumatoid arthritis     Migraine     Osteopenia     Sjogren's disease (HCC)      Procedure/Surgery:   LHC,  CABG x 3  Precautions:  Falls, Sternal, O2, chest tube x 2  Equipment Needs:  TBD    SUBJECTIVE:    Pt lives alone in a 1 story Independent Living condo with level entry.     Pt ambulated without device and was independent PTA.    OBJECTIVE:   Initial Evaluation  Date: 24 Treatment  24 Short Term/ Long Term   Goals   AM-PAC 6 Clicks 8/24 10/24    Was pt agreeable to Eval/treatment? Yes Yes    Does pt have pain? 5/10 surgical pain No c/o pain    Bed Mobility  Rolling: NT  Supine to sit: MaxA with HOB elevated  Sit to supine: MaxA  Scooting: MaxA Rolling: NT  Supine to sit: MaxA with HOB elevated  Sit to supine: MaxA  Scooting: MaxA Bekah   Transfers Sit to stand: NT  Stand to sit: NT  Stand pivot: NT Sit to stand: MaxA  Stand to sit: MaxA  Stand pivot: MaxA no device Bekah with WW   Ambulation   NT A few steps to chair with MaxA no device >100 feet with Bekah with WW   Stair negotiation: ascended and descended NA NT    ROM BUE:  Defer to OT note  BLE:  WFL     Strength BUE:  Defer to OT note  BLE:  4/5  Increase by 1/3 MMT grade   Balance Sitting EOB:  MaxA  Dynamic Standing:  NT Sitting EOB:  Min A  Dynamic Standing:  MaxA no device Sitting EOB:  Independent  Dynamic Standing:  Bekah with WW     Pt is A & O x 4  Sensation:  no reported paresthesias  Edema:  none    Vitals:   bpm, SPO2 90% 
Physical Therapy  Treatment Note    Name: Katt Diaz  : 1945  MRN: [id number]      Date of Service: 2024    Evaluating PT:  Toni Flores, PT, DPT [id number]    Room #:  6506/6506-A  Diagnosis:  STEMI (ST elevation myocardial infarction) ([facility]) [I21.3]  S/P cardiac cath [Z98.890]  PMHx/PSHx:   has a past medical history of VERONIQUE (acute kidney injury) ([facility]), CAD in native artery, Diabetes mellitus ([facility]), Headache, Hearing loss, Hx of rheumatoid arthritis, Migraine, Osteopenia, and Sjogren's disease ([facility]).   has a past surgical history that includes Vein ligation and stripping; Partial hysterectomy; Cardiac procedure (N/A, 2024); Coronary artery bypass graft (N/A, 2024); and IR TUNNELED CVC PLACE WO SQ PORT/PUMP > 5 YEARS (2024).  Procedure/Surgery:   LHC,  CABG x 3  Precautions:  Falls, Sternal, O2, Equipment Needs:  TBD    SUBJECTIVE:    Pt lives alone in a 1 story Independent Living condo with level entry.  Pt ambulated without device and was independent PTA.    OBJECTIVE:   Initial Evaluation  Date: 24 Treatment  Date: 24 Short Term/ Long Term   Goals   AM-PAC 6 Clicks 8/24 15/24    Was pt agreeable to Eval/treatment? Yes Yes    Does pt have pain? 5/10 surgical pain Unrated sternal pain    Bed Mobility  Rolling: NT  Supine to sit: MaxA with HOB elevated  Sit to supine: MaxA  Scooting: MaxA Rolling: NT  Supine to sit: NT  Sit to supine: NT  Scooting: NT Bekah   Transfers Sit to stand: NT  Stand to sit: NT  Stand pivot: NT Sit to stand: min A  Stand to sit: min A  Stand pivot: min A with no AD Bekah with WW   Ambulation   NT 50'x2 with no AD mod A >100 feet with Bekah with WW   Stair negotiation: ascended and descended NA NT    ROM BUE:  Defer to OT note  BLE:  WFL     Strength BUE:  Defer to OT note  BLE:  4/5  Increase by 1/3 MMT grade   Balance Sitting EOB:  MaxA  Dynamic Standing:  NT Sitting EOB:  SBA  Dynamic Standing:  mod A with no AD Sitting EOB:  Independent  Dynamic 
Please continue to hold thinners and keep NPO, after INR is drawn and resulted, we will bring pt over to IR for procedure as soon as time allows.   
Pt admitted to room 3818 from OR, report received from anesthesia. Hemodynamic lines leveled and zeroed, VSS  
Pt awake. Alert, following commands appropriately, VSS. Anasthesia criteria met.  
Pt extubated per RT, placed on 6L n/c, Instructed to take slow deep breaths. No stridor noted.   
Pt son Ke brought in a copy of her POA paper work appointing son, Luis Angel NICHOLSEverardo Diaz as primary decision maker-- also brought in pt living will.    All in patient soft chart.   
[facility] all questions answered. Transport Requested. The following patient belongings:  Cell Phone and Cell Phone  were gathered and placed with patient on transfer upon transfer to [facility]. Family notified by phone, updated on new unit and room number. Patient agrees to update family with her personal cell phone.   
Stable.  Epi off, Dobut at 5- slow wean.  Labs ok.  CXR moderate to large right effusion vs RLL collapse.  Check non con CT chest today.  Continue ICU for now.  Kevin Manzano MD      Addendum:  It appears that Dobutamine was adjusted to 2.5 without my knowledge.  Inotropes will be managed by CTS.  No further diuretics for now.  Kevin Manzano MD    
Stable.  On 5 Dobut and 1 Epi.  MAP currently 65-75.  Hb low- expected blood loss anemia from cardiac surgery.  Transfuse to increase D02.  Continue inotropes for biventricular failure and low output syndrome.  CXR with right effusion- lasix today, observe.  Might need drained at some point.  Kevin Manzano MD    
The pt was suctioned prior to extubation.  The pt was extubated to a 6 lpm nasal cannula with no stridor.  
Unable to take bilateral arm BPs at this time due to right radial heart cath this morning.  
Updated Dr. Barrios. Lactic 7.2. Low urine outputs. Epi and vaso Gtt  
Updated Dr. Barrios. Patient had sudden SOB/wheezing, increased O2 demand, visible discomfort/difficulty breathing with shallow breaths. Crackles in upper lobes. ABG sent and updated Dr. Barrios on results. D-dimer 676. Order for Bicarb gtt and STAT chest xray.   
Voicemail left for echo department, notifying them that patient needs echo done today for possible open heart Wednesday.  
[facility]  PROGRESS NOTE  ATTENDING NOTE    A lot of confusion from residents that they involved me to remove this tunneled line.  I am not removing a line IR placed.  I called IR and they are happy to remove the line today.  Nurse has been called and updated.    Mara Larsen MD, MSc, FACS  2/19/2024  2:52 PM    
  BMI Categories: Underweight (BMI less than 22) age over 65    Estimated Daily Nutrient Needs:  Energy Requirements Based On: Formula  Weight Used for Energy Requirements: Admission  Energy (kcal/day):  x 1.3 SF= 0044-6050  Weight Used for Protein Requirements: Admission  Protein (g/day): 1.5-1.8 g/kg dry adm wt; 80-95  Fluid (ml/day): per critical care    Nutrition Diagnosis:   Increased nutrient needs related to increase demand for energy/nutrients (2/2 open heart) as evidenced by wounds (surgical)    Nutrition Interventions:     Nutrition Education/Counseling: Education needed (when transferred to general floor)  Coordination of Nutrition Care: Continue to monitor while inpatient       Goals:     Goals: by next RD assessment, PO intake 75% or greater       Nutrition Monitoring and Evaluation:      Food/Nutrient Intake Outcomes: Food and Nutrient Intake, Supplement Intake  Physical Signs/Symptoms Outcomes: Biochemical Data, Nutrition Focused Physical Findings, Skin, Weight, GI Status, Fluid Status or Edema, Hemodynamic Status, Constipation    Discharge Planning:    Continue Oral Nutrition Supplement     Tita Carlisle RD, LD  Contact: Ext [phone]    
Bekah with WW     Pt is A & O x 4  Sensation:  No reports of numbness/tingling to extremities  Edema:  Unremarkable    Vitals:   -122 with activity.    Patient education  Pt educated on safety during functional mobility.    Patient response to education:   Pt expressed understanding Pt demonstrated skill Pt requires further education in this area   Yes Yes Yes     ASSESSMENT:    Comments:  Session cleared by nurse. Patient sitting in chair upon arrival; agreeable to PT session. Cardiac rehab present. Ambulated with decreased speed and unsteadiness. Patient dependently assisted during bed mobility. Patient left in bed with call light in reach.    Treatment:  Patient practiced and was instructed in the following treatment:    Functional transfers - physical assistance provided as needed during activity  Ambulation - physical assistance provided as needed during activity  Skilled monitoring of vitals    PLAN:    Patient is making good progress towards established goals.  Will continue with current POC.      Time in  0853  Time out  0908    Total Treatment Time  15 minutes     CPT codes:  [] Gait training 88588 0 minutes  [] Manual therapy 39824 0 minutes  [x] Therapeutic activities 52975 15 minutes  [] Therapeutic exercises 76960 0 minutes  [] Neuromuscular reeducation 84461 0 minutes    Camacho Bolanos, PT, DPT  [id number]  
on palpation  Tele: SR   Abdomen: Soft, nontender, hypoactive BS   Extremities: Palpable pulses all extremities, no edema   Neurologic/Psych: A&Ox3, ARROYO to command   Skin: Warm and dry  Incisions: MSI with sumit dressing intact, SVG sites well approximated       Assessment/Plan: POD #2  1. STEMI/CAD S/p Urgent CABG x3 (LIMA-LAD, SVG-OM, SVG-RCA), EVH LLE, left atrial appendage ligation with 35mm Atriclip   - DAPT, Lipitor   - Perioperative Ancef  - Chest tubes with 1005cc/24hrs, will remove large MS chest tube today, continue all remaining tubes to wall suction.    - Place PICC today      2. Acute Pulmonary Insufficiency Following Surgery    - Worsening hypoxia, now on 8L O2 HFNC, sats 96%  - CXR with right pleural effusion, lasix 20mg x1, minimal response   - continue IS, Ezpap, Flutter, ambulate      3. Cardiogenic Shock   - Acute on chronic HFrEF   -Intra-op YOVANY: 35% pre CPB improved 57% post CPB on inotropic support  -2/8 Progressing hypotension, started on vasopressin and levophed infusion weaned off. Oliguric with LA 2.9, Epinephrine started. STAT Echo with biventricular dysfunction, EF severe 20-25%, RV moderately reduced systolic dysfunction with evidence of volume overload. Moderate to severe TR.   - 2/9 LA 3.8>>1.9. Epi @ 4mcg, add dobutamine infusion, start at 2.5mcg/kg/min, increase to 5mcg/kg/min if hemodynamics improve. Plan to wean epi once dobutamine started.      4. VERONIQUE   -Scr 0.8-->1.1--> 1.3 today, on bicarb infusion, lasix 20mg x1 this AM,minimal response  -Repeat CMP at 1200     5. Acute Post Operative Pain   - Scheduled tylenol, lidocaine patch; PRN oxycodone     6. Acute blood loss anemia following surgery   -1 prbc intra-op   -Hemoglobin 7.3; monitor       7. Stress hyperglycemia   -Hemoglobin A1c 5.9  on no diabetic meds at home  -RHI infusion for BG >180 per protocol, SSI when off infusion       This patient has a high probability of sudden deterioration, which requires preparedness to 
session 103 bpm   SpO2 at rest 96% SpO2 post session 92%   Blood Pressure at rest 126/66 mmHg Blood Pressure post session 124/63 mmHg     Functional Status Score-Intensive Care Unit (FSS-ICU)   Rolling -/7   Supine to sit transfer 2/7   Unsupported sitting  2/7   Sit to stand transfers 2/7   Ambulation 1/7   Total  7/35     Therapeutic Exercises:  BLE ROM x 5 reps    Patient education  Pt educated on safety    Patient response to education:   Pt verbalized understanding Pt demonstrated skill Pt requires further education in this area   yes yes yes     ASSESSMENT:    Conditions Requiring Skilled Therapeutic Intervention:    [x]Decreased strength     []Decreased ROM  [x]Decreased functional mobility  [x]Decreased balance   [x]Decreased endurance   [x]Decreased posture  []Decreased sensation  [x]Decreased coordination   []Decreased vision  [x]Decreased safety awareness   [x]Increased pain       Comments:  NP reported pt was medically stable.  Pt was in bed upon arrival, agreeable to treatment session.  Reviewed sternal precautions prior to activity.  Increased assistance provided for all mobility.  Pt did not complain of dizziness at EOB this session which limited pt's initial evaluation.  Pt still had some minor LOBs in all directions while sitting EOB but improved.  Pt stood with flexed posture and completed shuffled steps to chair.  All needs met and call light in reach.  All lines remained intact.  RN aware.    Treatment:  Patient practiced and was instructed in the following treatment:    Bed mobility training - pt given verbal and tactile cues to facilitate proper sequencing and safety during supine>sit as well as provided with physical assistance.   Sitting EOB for >3 minutes for upright tolerance, postural awareness and BLE ROM  Transfer training - pt was given verbal and tactile cues to facilitate proper hand placement, technique and safety during sit to stand and stand to sit as well as provided with physical 
  ALT 6 7 14   BILITOT 0.6 0.5 0.6   ALKPHOS 99 133* 128*     No results for input(s): \"LACTA\" in the last 72 hours.  No results found for: \"LABURIC\", \"URICACID\"  No results found for: \"AMMONIA\"    Assessment:    Active Hospital Problems    Diagnosis Date Noted    S/P cardiac cath [Z98.890] 02/05/2024     Priority: High    Postoperative hypotension [I95.81] 02/07/2024    Complication of surgical procedure [T81.9XXA] 02/07/2024    Stress hyperglycemia [R73.9] 02/07/2024    CAD in native artery [I25.10] 02/05/2024    ST elevation myocardial infarction (STEMI) (HCC) [I21.3] 02/05/2024   S/P CABG  HYPOTHYROID   Plan:  LIPITOR   CORDARONE   ASA   PLAVIX   FLORINEF  SSI   SYNTHROID    2/10/2024  78-year-old woman post CABG, seen in ICU setting  Remains on dobutamine drip epinephrine and vasopressin wean off drips as tolerated  Alert and answering questions appropriately  Chest tubes remain in place  Continue ICU care for now  Diuresis underway for right-sided pleural effusion, thoracentesis if no improvement in this  Transfuse if drop in hemoglobin below 8  Appropriate urine output    2/11/2024  Continue ICU management in setting of CABG post STEMI  Cardiomyopathy with ejection fraction 35 to 40%-wean dobutamine as able  Diuresis at discretion of ICU team  Await possible thoracentesis of large right-sided pleural effusion  Once okay with cardiothoracic service, she can be transferred out of ICU  No evidence of infection    DVT Prophylaxis: SCD  Diet: ADULT DIET; Regular; 4 carb choices (60 gm/meal); Low Fat/Low Chol/High Fiber/JUAN DAVID; No Added Salt (3-4 gm); High Fiber  ADULT ORAL NUTRITION SUPPLEMENT; Breakfast, Lunch, Dinner; Diabetic Oral Supplement  ADULT ORAL NUTRITION SUPPLEMENT; AM Snack, PM Snack; Other Oral Supplement; Skip wound healing BID  Code Status: Full Code     PT/OT Eval Status: ORDERED     Dispo - FLACO    Electronically signed by Angelika Pa MD on 2/11/2024 at 5:20 PM   
WFL     Functional Assessment: AM-PAC Daily Activity Raw Score: 13/24    Initial Eval Status  Date: 2/8/24 Treatment Status  Date:2/15/24 STG=LTG  Time Frame: 5-7 days   Feeding Min A; set up   Bed level  set up  seated                    Miriam  while seated up in chair to increase activity tolerance         Grooming Max A Mod A  Standing at sink to wash hands due to balance deficits                      Min A   while standing sink level demonstrating G tolerance; G balance.      UB dressing/bathing Max A  (limited by medical lines & decreased tolerance)  Mod A  To don gown sitting in chair, vc for tech to adhere to sternal precautions                        Min A   demonstrating G knowledge of precautions during tasks      LB dressing/bathing Dep  Max A  To don brief sitting in chair and standing to manage over hips                      Mod A  using AE as needed for safe reach/ energy conservation        Toileting NT  Max A  For clothing management/hygiene using commode due to stand balance deficits                      Min A      Bed Mobility  Supine to sit:   Max A     Sit to supine:   Max A+2 NT                       Min A  in prep of ADL tasks & transfers   Functional Transfers Sit to stand: NT  from higher bed surface;  Deferred by NP due to low BP  Sit to stand:   Mod A    Stand to sit:   Mod A    SPT: Mod A                       SBA  sit<>stand/functional bathroom transfers using AD/DME as needed for balance and safety   Functional Mobility NT  no device Mod A  No AD to/from bathroom in pt room for 2 trials                       SBA   functional/bathroom mobility using AD as needed & demonstrating G safety      Balance Sitting:     Static:  Min to   Mod A (unsteady EOB)- pt reporting baseline status-questionable due to prior level of IND    Dynamic:ModA  Standing: NT  Sitting:     Static:  Min A    Dynamic:Min A  Standing: Mod A    SBA dynamic sitting balance;   Min A dynamic standing balance  during ADL 
dose today since BP adequate  -2/16- BP stable off dobut, add BB today - Will add other GDMT as BP allows, consider adding Lasix tomorrow if BP remains stable , consult CHF clinic   - 2/17 Tolerating low dose  BB, hold off on lasix today given borderline SBP, add GDMT as BP allows   - 2/18- Tolerating low dose BB, hold off again today on diuresis given borderline SBP, add GDMT as BP allows ( likely as outpatient)  CHF nurse to see patient prior to DC tomorrow   - needs to be sen by CHF nurse- add low dose po lasix daily in anticipation for DC     9.   VERONIQUE (resolved)  -Scr peaked at 1.4 postop, has been stable - Scr 0.8 today  - po lasix x 1 2/15     10. Stress hyperglycemia   -Hemoglobin A1c 5.9  on no diabetic meds at home  - Nightly glargine stopped 2/12  - Medium dose SSI AC/HS      11. Preop hyponatremia  - resolved- Na 137 today  -- Fluid restriction  -- Lasix as BP allows - add low dose po today, 10 mg lasix daily    12. Intra-op pathology from LN sampling pending (incidentally seen)     DVT prophylaxis:  --continue bilateral knee high JUNIE hose  --continue PCDs  --continue progressive ambulation  --on lovenox for dvt prophylaxis and continue knee high JUNIE hose/pcds/progressive ambulation        Dispo: [facility] on DC  (was from assisted living pre-op)- ready as soon as bed available, hopefully today     This patient's case and care plan was discussed with the attending surgeon    
function off inotropes; lasix po x 1 dose today since BP adequate  -2/16- BP stable off dobut, add BB today - Will add other GDMT as BP allows, consider adding Lasix tomorrow if BP remains stable , consult CHF clinic      9.   VERONIQUE (resolved)  -Scr peaked at 1.4 postop, 0.7 today  - po lasix x 1 2/15     10. Stress hyperglycemia   -Hemoglobin A1c 5.9  on no diabetic meds at home  - Nightly glargine stopped 2/12  - Medium dose SSI AC/HS      11. Preop hyponatremia  - Na stable- 132 today      DVT prophylaxis:  --continue bilateral knee high JUNIE hose  --continue PCDs  --continue progressive ambulation  --on lovenox for dvt prophylaxis and continue knee high JUNIE hose/pcds/progressive ambulation        Dispo: will need rehab (was from assisted living pre-op)- discussed with SW, plan is to look for facility that will accept with [facility]          This patient's case and care plan was discussed with the attending surgeon  
myocardial infarction (STEMI) ([facility]) [I21.3] 02/05/2024   S/P CABG  HYPOTHYROID   Plan:  LIPITOR   CORDARONE   ASA   PLAVIX   FLORINEF  SSI   SYNTHROID     DVT Prophylaxis: SCD  Diet: ADULT DIET; Regular; 4 carb choices (60 gm/meal); Low Fat/Low Chol/High Fiber/JUAN DAVID; No Added Salt (3-4 gm); High Fiber  ADULT ORAL NUTRITION SUPPLEMENT; Breakfast, Lunch, Dinner; Diabetic Oral Supplement  ADULT ORAL NUTRITION SUPPLEMENT; AM Snack, PM Snack; Other Oral Supplement; Skip wound healing BID  Code Status: Full Code     PT/OT Eval Status: ORDERED     DISP [facility]        Electronically signed by Zeeshan Diaz DO on 2/15/2024 at 4:08 PM FACOI    
peripheral neuropathy who presented to [facility] with chest pain and SOB over the last 2 weeks. Found with elevated troponin and inferior ST elevations on her ECG along with Q waves inferiorly. She was taken to cath lab for coronary angiography and possible intervention.  Patient was found to have three-vessel disease.  None status post CABG on 2/7/2024 with LIMA to LAD, SVG to RCA, SVG to OM.     CAD: Inferior STEMI w 3 vessel disease:  Now status post CABG with Dr. Jasso with LIMA to LAD, SVG to distal RCA, SVG to OM, left atrial appendage occlusion with 35 mm AtriClip.  Continue with aspirin, Plavix and high intensity statin  Consider beta-blockers once okay from surgery standpoint  HFrEF (EF 35-40%):  We will consider GDMT once off pressors and okay from surgical standpoint        Arnav Shaw MD  Interventional Cardiology/ Structural heart disease    I spent a total of 37 minutes of critical care time on the date of the service which included preparing to see the patient, face-to-face patient care, completing clinical documentation, obtaining and/or reviewing separately obtained history, performing a medically appropriate examination, counseling and educating the patient/family/caregiver, and ordering medications, tests, or procedures.     
transfusion, including the risk and consequences of not receiving transfusion. The patient had an opportunity to ask questions and had agreed to proceed with transfusion of blood components      Note: Greater than or equal to 35 minutes was spent providing face-to-face patient care, including:  and coordinating care, reviewing the chart, labs, and diagnostics, as well as medical decision making. Greater than 50% of this time was spent instructing and counseling the patient face to face regarding findings and recommendations.    Patient seen. Preoperative studies reviewed.  CT chest with soft atheroma throughout ascending aorta, calcifications mostly arch and distal  Hgb decreased this AM, no active signs of bleeding, had bloody BM prior to admission, diarrhea yesterday - unknown if blood, check FOBT  Recheck labs this afternoon, Na and Hgb  H/O RLE vein stripping, awaiting LE duplex studies, discussed possible need for cryovein  All risks, benefits, alternatives and potential complications explained thoroughly including, but not limited to, bleeding, infection, lung injury, kidney injury, stroke, heart attack, prolonged ventilation, wound complication, need for re-operation, and death, and the patient agrees to proceed.  Informed consent obtained, consent signed and left in the chart    Nicolasa Jasso DO  Cardiothoracic Surgery          
session, patient left seated in chair to increase activity tolerance.    Call light within reach, all lines and tubes intact. Pt instructed on use of call light for assistance and fall prevention.Overall, pt presents with decreased activity tolerance, dynamic balance, functional mobility limiting completion of ADLs and safe return home. Pt can benefit from continued skilled OT to increase safety, functional independence & quality of life.     Pt has made good progress towards set goals.   Continue with current plan of care       Time In:1046              Time Out: 1100         Total Treatment Time: 14      Treatment Charges: Mins Units   Ther Ex  [id number]     Manual Therapy 36771     Thera Activities [id number] 14 1   ADL/Home Mgt [id number]     Neuro Re-ed 62043     Orthotic manage/training  39653     Non-Billable Time         Lynne Mcintyre, OTR/L [id number]

## 2024-02-19 NOTE — PATIENT CARE CONFERENCE
P Quality Flow/Interdisciplinary Rounds Progress Note        Quality Flow Rounds held on February 19, 2024    Disciplines Attending:  Bedside Nurse, , , and Nursing Unit Leadership    Kattcirilo Diaz was admitted on 2/5/2024 10:40 AM    Anticipated Discharge Date:       Disposition:    Dani Score:  Dani Scale Score: 20    Readmission Risk              Risk of Unplanned Readmission:  25           Discussed patient goal for the day, patient clinical progression, and barriers to discharge.  The following Goal(s) of the Day/Commitment(s) have been identified:   get central line removed      Jena Locke RN  February 19, 2024

## 2024-02-19 NOTE — CARE COORDINATION
2/19/24  Transition of Care Update.  Patient is POD# 12.  All tubes are out and pacing wires are being cut.  Life vest ordered and in patients room.  Pre-cert obtained for [facility].  Spoke with the facility liaison as well as patient son and nursing.  Transport arranged with [facility] for a 4pm .  [facility] states [facility] contracted with them and transport will be covered. JOSE and destination complete. PASRR and ambulette complete and on the soft chart.  Cardiac rehab protocol placed on soft chart to transfer with patient to the nursing facility.     Electronically signed by ADAMA Shirley on 2/19/2024 at 9:57 AM

## 2024-02-19 NOTE — BRIEF OP NOTE
Brief-Op Note  ______________________________________________________________      IR TUNNELED CVC CATHETER REMOVAL  [facility] 6SE PCCU 1    Patient Name: Katt Diaz   YOB: 1945  Medical Record Number: [id number]  Date of Procedure: 2/19/24  Room/Bed: [address] [place] [address]      Preoperative diagnosis: Removal of Tunneled CVC Catheter.    Postoperative diagnosis: Same.    Consent: INFORMED CONSENT WAS OBTAINED BY patient, RISK AND BENEFITS WERE DISCUSSED.     Procedure: Removal of tunneled central catheter.    Anesthesia:None    Performed by: EDILSON Ascencio under on-site supervision by Patricio Sanabria MD.    Estimated blood loss: Less than 10 mL    Complications: None    Specimen obtained: None      Electronically signed by EDILSON Ascencio   DD: 2/19/24  4:00 PM

## 2024-02-19 NOTE — CONSULTS
Met with patient and discussed our outpatient Phase II Cardiac Rehabilitation program. Reviewed the benefits of cardiac rehabilitation based on their diagnosis and personal risk factors. Patient demonstrates moderate interest in Cardiac Rehabilitation at this time.  Cardiac Rehabilitation brochure provided to patient/family. The patient may call [facility] at [phone] for additional information or questions. Contact information for [facility] and other choices close to the patient's residence have been provided in the discharge instructions so that the patient may call and schedule an appointment when cleared by their physician.

## 2024-02-20 ENCOUNTER — APPOINTMENT (OUTPATIENT)
Dept: CT IMAGING | Age: 79
DRG: 281 | End: 2024-02-20
Payer: MEDICARE

## 2024-02-20 ENCOUNTER — HOSPITAL ENCOUNTER (INPATIENT)
Age: 79
LOS: 3 days | Discharge: NURSING, REHAB OR LONG-TERM CARE | DRG: 281 | End: 2024-02-23
Attending: EMERGENCY MEDICINE | Admitting: INTERNAL MEDICINE
Payer: MEDICARE

## 2024-02-20 DIAGNOSIS — J90 PLEURAL EFFUSION, BILATERAL: ICD-10-CM

## 2024-02-20 DIAGNOSIS — G89.18 ACUTE POST-OPERATIVE PAIN: ICD-10-CM

## 2024-02-20 DIAGNOSIS — Z95.1 S/P CABG X 3: ICD-10-CM

## 2024-02-20 DIAGNOSIS — I50.9 ACUTE CONGESTIVE HEART FAILURE, UNSPECIFIED HEART FAILURE TYPE (HCC): Primary | ICD-10-CM

## 2024-02-20 LAB
ALBUMIN SERPL-MCNC: 2.8 G/DL (ref 3.5–5.2)
ALP SERPL-CCNC: 87 U/L (ref 35–104)
ALT SERPL-CCNC: 13 U/L (ref 0–32)
ANION GAP SERPL CALCULATED.3IONS-SCNC: 10 MMOL/L (ref 7–16)
AST SERPL-CCNC: 33 U/L (ref 0–31)
B PARAP IS1001 DNA NPH QL NAA+NON-PROBE: NOT DETECTED
B PERT DNA SPEC QL NAA+PROBE: NOT DETECTED
BASOPHILS # BLD: 0.02 K/UL (ref 0–0.2)
BASOPHILS NFR BLD: 0 % (ref 0–2)
BILIRUB SERPL-MCNC: 0.4 MG/DL (ref 0–1.2)
BILIRUB UR QL STRIP: NEGATIVE
BNP SERPL-MCNC: 9255 PG/ML (ref 0–450)
BUN SERPL-MCNC: 16 MG/DL (ref 6–23)
C PNEUM DNA NPH QL NAA+NON-PROBE: NOT DETECTED
CALCIUM SERPL-MCNC: 7.7 MG/DL (ref 8.6–10.2)
CHLORIDE SERPL-SCNC: 99 MMOL/L (ref 98–107)
CLARITY UR: CLEAR
CO2 SERPL-SCNC: 24 MMOL/L (ref 22–29)
COLOR UR: YELLOW
CREAT SERPL-MCNC: 1 MG/DL (ref 0.5–1)
EOSINOPHIL # BLD: 0.03 K/UL (ref 0.05–0.5)
EOSINOPHILS RELATIVE PERCENT: 0 % (ref 0–6)
ERYTHROCYTE [DISTWIDTH] IN BLOOD BY AUTOMATED COUNT: 15.5 % (ref 11.5–15)
FLUAV RNA NPH QL NAA+NON-PROBE: NOT DETECTED
FLUBV RNA NPH QL NAA+NON-PROBE: NOT DETECTED
GFR SERPL CREATININE-BSD FRML MDRD: 55 ML/MIN/1.73M2
GLUCOSE SERPL-MCNC: 125 MG/DL (ref 74–99)
GLUCOSE UR STRIP-MCNC: NEGATIVE MG/DL
HADV DNA NPH QL NAA+NON-PROBE: NOT DETECTED
HCOV 229E RNA NPH QL NAA+NON-PROBE: NOT DETECTED
HCOV HKU1 RNA NPH QL NAA+NON-PROBE: NOT DETECTED
HCOV NL63 RNA NPH QL NAA+NON-PROBE: NOT DETECTED
HCOV OC43 RNA NPH QL NAA+NON-PROBE: NOT DETECTED
HCT VFR BLD AUTO: 26.5 % (ref 34–48)
HGB BLD-MCNC: 8.7 G/DL (ref 11.5–15.5)
HGB UR QL STRIP.AUTO: NEGATIVE
HMPV RNA NPH QL NAA+NON-PROBE: NOT DETECTED
HPIV1 RNA NPH QL NAA+NON-PROBE: NOT DETECTED
HPIV2 RNA NPH QL NAA+NON-PROBE: NOT DETECTED
HPIV3 RNA NPH QL NAA+NON-PROBE: NOT DETECTED
HPIV4 RNA NPH QL NAA+NON-PROBE: NOT DETECTED
IMM GRANULOCYTES # BLD AUTO: 0.06 K/UL (ref 0–0.58)
IMM GRANULOCYTES NFR BLD: 1 % (ref 0–5)
KETONES UR STRIP-MCNC: NEGATIVE MG/DL
LEUKOCYTE ESTERASE UR QL STRIP: NEGATIVE
LYMPHOCYTES NFR BLD: 1.24 K/UL (ref 1.5–4)
LYMPHOCYTES RELATIVE PERCENT: 15 % (ref 20–42)
M PNEUMO DNA NPH QL NAA+NON-PROBE: NOT DETECTED
MCH RBC QN AUTO: 29.7 PG (ref 26–35)
MCHC RBC AUTO-ENTMCNC: 32.8 G/DL (ref 32–34.5)
MCV RBC AUTO: 90.4 FL (ref 80–99.9)
MONOCYTES NFR BLD: 0.48 K/UL (ref 0.1–0.95)
MONOCYTES NFR BLD: 6 % (ref 2–12)
NEUTROPHILS NFR BLD: 78 % (ref 43–80)
NEUTS SEG NFR BLD: 6.28 K/UL (ref 1.8–7.3)
NITRITE UR QL STRIP: NEGATIVE
PH UR STRIP: 7 [PH] (ref 5–9)
PLATELET # BLD AUTO: 361 K/UL (ref 130–450)
PMV BLD AUTO: 9.7 FL (ref 7–12)
POTASSIUM SERPL-SCNC: 4.7 MMOL/L (ref 3.5–5)
PROT SERPL-MCNC: 5.7 G/DL (ref 6.4–8.3)
PROT UR STRIP-MCNC: NEGATIVE MG/DL
RBC # BLD AUTO: 2.93 M/UL (ref 3.5–5.5)
RBC #/AREA URNS HPF: NORMAL /HPF
RSV RNA NPH QL NAA+NON-PROBE: NOT DETECTED
RV+EV RNA NPH QL NAA+NON-PROBE: NOT DETECTED
SARS-COV-2 RNA NPH QL NAA+NON-PROBE: NOT DETECTED
SODIUM SERPL-SCNC: 133 MMOL/L (ref 132–146)
SP GR UR STRIP: 1.01 (ref 1–1.03)
SPECIMEN DESCRIPTION: NORMAL
TROPONIN I SERPL HS-MCNC: 555 NG/L (ref 0–9)
TROPONIN I SERPL HS-MCNC: 635 NG/L (ref 0–9)
UROBILINOGEN UR STRIP-ACNC: 0.2 EU/DL (ref 0–1)
WBC #/AREA URNS HPF: NORMAL /HPF
WBC OTHER # BLD: 8.1 K/UL (ref 4.5–11.5)

## 2024-02-20 PROCEDURE — 96366 THER/PROPH/DIAG IV INF ADDON: CPT

## 2024-02-20 PROCEDURE — 87070 CULTURE OTHR SPECIMN AEROBIC: CPT

## 2024-02-20 PROCEDURE — 87086 URINE CULTURE/COLONY COUNT: CPT

## 2024-02-20 PROCEDURE — 0202U NFCT DS 22 TRGT SARS-COV-2: CPT

## 2024-02-20 PROCEDURE — APPSS60 APP SPLIT SHARED TIME 46-60 MINUTES: Performed by: CLINICAL NURSE SPECIALIST

## 2024-02-20 PROCEDURE — 96365 THER/PROPH/DIAG IV INF INIT: CPT

## 2024-02-20 PROCEDURE — 6370000000 HC RX 637 (ALT 250 FOR IP): Performed by: INTERNAL MEDICINE

## 2024-02-20 PROCEDURE — 6370000000 HC RX 637 (ALT 250 FOR IP): Performed by: CLINICAL NURSE SPECIALIST

## 2024-02-20 PROCEDURE — 80053 COMPREHEN METABOLIC PANEL: CPT

## 2024-02-20 PROCEDURE — 1200000000 HC SEMI PRIVATE

## 2024-02-20 PROCEDURE — 6360000004 HC RX CONTRAST MEDICATION: Performed by: RADIOLOGY

## 2024-02-20 PROCEDURE — 2060000000 HC ICU INTERMEDIATE R&B

## 2024-02-20 PROCEDURE — 6360000002 HC RX W HCPCS: Performed by: CLINICAL NURSE SPECIALIST

## 2024-02-20 PROCEDURE — 84484 ASSAY OF TROPONIN QUANT: CPT

## 2024-02-20 PROCEDURE — 96372 THER/PROPH/DIAG INJ SC/IM: CPT

## 2024-02-20 PROCEDURE — 87077 CULTURE AEROBIC IDENTIFY: CPT

## 2024-02-20 PROCEDURE — 81001 URINALYSIS AUTO W/SCOPE: CPT

## 2024-02-20 PROCEDURE — 96375 TX/PRO/DX INJ NEW DRUG ADDON: CPT

## 2024-02-20 PROCEDURE — 83880 ASSAY OF NATRIURETIC PEPTIDE: CPT

## 2024-02-20 PROCEDURE — 6360000002 HC RX W HCPCS

## 2024-02-20 PROCEDURE — 6360000002 HC RX W HCPCS: Performed by: INTERNAL MEDICINE

## 2024-02-20 PROCEDURE — 85025 COMPLETE CBC W/AUTO DIFF WBC: CPT

## 2024-02-20 PROCEDURE — 99285 EMERGENCY DEPT VISIT HI MDM: CPT

## 2024-02-20 PROCEDURE — 71275 CT ANGIOGRAPHY CHEST: CPT

## 2024-02-20 PROCEDURE — 93005 ELECTROCARDIOGRAM TRACING: CPT | Performed by: EMERGENCY MEDICINE

## 2024-02-20 RX ORDER — CLOPIDOGREL BISULFATE 75 MG/1
75 TABLET ORAL DAILY
Qty: 45 TABLET | Refills: 0 | Status: ON HOLD | OUTPATIENT
Start: 2024-02-20 | End: 2024-02-22 | Stop reason: HOSPADM

## 2024-02-20 RX ORDER — ENOXAPARIN SODIUM 100 MG/ML
40 INJECTION SUBCUTANEOUS DAILY
Status: DISCONTINUED | OUTPATIENT
Start: 2024-02-20 | End: 2024-02-23 | Stop reason: HOSPADM

## 2024-02-20 RX ORDER — ATORVASTATIN CALCIUM 40 MG/1
40 TABLET, FILM COATED ORAL DAILY
Qty: 30 TABLET | Refills: 3 | Status: ON HOLD | OUTPATIENT
Start: 2024-02-20 | End: 2024-02-22 | Stop reason: HOSPADM

## 2024-02-20 RX ORDER — ATORVASTATIN CALCIUM 40 MG/1
40 TABLET, FILM COATED ORAL NIGHTLY
Status: DISCONTINUED | OUTPATIENT
Start: 2024-02-20 | End: 2024-02-23 | Stop reason: HOSPADM

## 2024-02-20 RX ORDER — HYDROCODONE BITARTRATE AND ACETAMINOPHEN 5; 325 MG/1; MG/1
1 TABLET ORAL EVERY 4 HOURS PRN
Status: DISCONTINUED | OUTPATIENT
Start: 2024-02-20 | End: 2024-02-23 | Stop reason: HOSPADM

## 2024-02-20 RX ORDER — FUROSEMIDE 10 MG/ML
40 INJECTION INTRAMUSCULAR; INTRAVENOUS ONCE
Status: COMPLETED | OUTPATIENT
Start: 2024-02-20 | End: 2024-02-20

## 2024-02-20 RX ORDER — AMIODARONE HYDROCHLORIDE 200 MG/1
TABLET ORAL
Qty: 44 TABLET | Refills: 0 | Status: ON HOLD | OUTPATIENT
Start: 2024-02-20 | End: 2024-02-22 | Stop reason: HOSPADM

## 2024-02-20 RX ORDER — FERROUS SULFATE 325(65) MG
325 TABLET ORAL 2 TIMES DAILY
Qty: 60 TABLET | Refills: 0 | Status: ON HOLD | OUTPATIENT
Start: 2024-02-20 | End: 2024-02-22

## 2024-02-20 RX ORDER — ACETAMINOPHEN 500 MG
500 TABLET ORAL EVERY 6 HOURS PRN
Status: DISCONTINUED | OUTPATIENT
Start: 2024-02-20 | End: 2024-02-23 | Stop reason: HOSPADM

## 2024-02-20 RX ORDER — FOLIC ACID 1 MG/1
1 TABLET ORAL DAILY
Qty: 30 TABLET | Refills: 0 | Status: ON HOLD | OUTPATIENT
Start: 2024-02-20 | End: 2024-02-22 | Stop reason: HOSPADM

## 2024-02-20 RX ORDER — POTASSIUM CHLORIDE 20 MEQ/1
20 TABLET, EXTENDED RELEASE ORAL DAILY
Status: DISCONTINUED | OUTPATIENT
Start: 2024-02-20 | End: 2024-02-23 | Stop reason: HOSPADM

## 2024-02-20 RX ORDER — DOCUSATE SODIUM 100 MG/1
100 CAPSULE, LIQUID FILLED ORAL 2 TIMES DAILY PRN
Qty: 20 CAPSULE | Refills: 0 | Status: ON HOLD | OUTPATIENT
Start: 2024-02-20 | End: 2024-02-22 | Stop reason: HOSPADM

## 2024-02-20 RX ORDER — METOPROLOL SUCCINATE 25 MG/1
25 TABLET, EXTENDED RELEASE ORAL DAILY
Status: DISCONTINUED | OUTPATIENT
Start: 2024-02-21 | End: 2024-02-23 | Stop reason: HOSPADM

## 2024-02-20 RX ORDER — FUROSEMIDE 10 MG/ML
40 INJECTION INTRAMUSCULAR; INTRAVENOUS 2 TIMES DAILY
Status: DISCONTINUED | OUTPATIENT
Start: 2024-02-20 | End: 2024-02-22

## 2024-02-20 RX ORDER — PROCHLORPERAZINE EDISYLATE 5 MG/ML
5 INJECTION INTRAMUSCULAR; INTRAVENOUS EVERY 6 HOURS PRN
Status: DISCONTINUED | OUTPATIENT
Start: 2024-02-20 | End: 2024-02-23 | Stop reason: HOSPADM

## 2024-02-20 RX ORDER — POTASSIUM CHLORIDE 750 MG/1
10 TABLET, EXTENDED RELEASE ORAL EVERY OTHER DAY
Qty: 90 TABLET | Refills: 1 | Status: ON HOLD | OUTPATIENT
Start: 2024-02-20 | End: 2024-02-22

## 2024-02-20 RX ORDER — MAGNESIUM OXIDE 400 MG/1
400 TABLET ORAL 2 TIMES DAILY
Status: DISCONTINUED | OUTPATIENT
Start: 2024-02-20 | End: 2024-02-23 | Stop reason: HOSPADM

## 2024-02-20 RX ORDER — METOPROLOL SUCCINATE 25 MG/1
12.5 TABLET, EXTENDED RELEASE ORAL DAILY
Qty: 30 TABLET | Refills: 3 | Status: ON HOLD | OUTPATIENT
Start: 2024-02-20 | End: 2024-02-22 | Stop reason: HOSPADM

## 2024-02-20 RX ORDER — CLOPIDOGREL BISULFATE 75 MG/1
75 TABLET ORAL DAILY
Status: DISCONTINUED | OUTPATIENT
Start: 2024-02-20 | End: 2024-02-23 | Stop reason: HOSPADM

## 2024-02-20 RX ORDER — AMIODARONE HYDROCHLORIDE 200 MG/1
200 TABLET ORAL 2 TIMES DAILY
Status: DISCONTINUED | OUTPATIENT
Start: 2024-02-25 | End: 2024-02-23 | Stop reason: HOSPADM

## 2024-02-20 RX ORDER — TRAMADOL HYDROCHLORIDE 50 MG/1
50 TABLET ORAL EVERY 6 HOURS PRN
Status: DISCONTINUED | OUTPATIENT
Start: 2024-02-20 | End: 2024-02-23 | Stop reason: HOSPADM

## 2024-02-20 RX ORDER — POLYETHYLENE GLYCOL 3350 17 G/17G
17 POWDER, FOR SOLUTION ORAL DAILY PRN
Status: DISCONTINUED | OUTPATIENT
Start: 2024-02-20 | End: 2024-02-23 | Stop reason: HOSPADM

## 2024-02-20 RX ORDER — FLUDROCORTISONE ACETATE 0.1 MG/1
0.1 TABLET ORAL DAILY
Status: DISCONTINUED | OUTPATIENT
Start: 2024-02-20 | End: 2024-02-20

## 2024-02-20 RX ORDER — AMIODARONE HYDROCHLORIDE 200 MG/1
200 TABLET ORAL DAILY
Status: DISCONTINUED | OUTPATIENT
Start: 2024-03-01 | End: 2024-02-23 | Stop reason: HOSPADM

## 2024-02-20 RX ORDER — ASCORBIC ACID 500 MG
500 TABLET ORAL 2 TIMES DAILY
Status: DISCONTINUED | OUTPATIENT
Start: 2024-02-20 | End: 2024-02-23 | Stop reason: HOSPADM

## 2024-02-20 RX ORDER — AMIODARONE HYDROCHLORIDE 200 MG/1
400 TABLET ORAL 2 TIMES DAILY
Status: DISCONTINUED | OUTPATIENT
Start: 2024-02-20 | End: 2024-02-23 | Stop reason: HOSPADM

## 2024-02-20 RX ORDER — LEVOTHYROXINE SODIUM 0.05 MG/1
100 TABLET ORAL DAILY
Status: DISCONTINUED | OUTPATIENT
Start: 2024-02-20 | End: 2024-02-23 | Stop reason: HOSPADM

## 2024-02-20 RX ORDER — FUROSEMIDE 20 MG/1
10 TABLET ORAL DAILY
Qty: 60 TABLET | Refills: 3 | Status: ON HOLD | OUTPATIENT
Start: 2024-02-20 | End: 2024-02-22 | Stop reason: HOSPADM

## 2024-02-20 RX ORDER — HYDROCODONE BITARTRATE AND ACETAMINOPHEN 5; 325 MG/1; MG/1
1 TABLET ORAL EVERY 4 HOURS PRN
Status: DISCONTINUED | OUTPATIENT
Start: 2024-02-20 | End: 2024-02-20

## 2024-02-20 RX ORDER — PANTOPRAZOLE SODIUM 40 MG/1
40 TABLET, DELAYED RELEASE ORAL
Status: DISCONTINUED | OUTPATIENT
Start: 2024-02-21 | End: 2024-02-23 | Stop reason: HOSPADM

## 2024-02-20 RX ORDER — PANTOPRAZOLE SODIUM 20 MG/1
20 TABLET, DELAYED RELEASE ORAL DAILY
Qty: 14 TABLET | Refills: 0 | Status: ON HOLD | OUTPATIENT
Start: 2024-02-20 | End: 2024-02-22 | Stop reason: HOSPADM

## 2024-02-20 RX ORDER — ASPIRIN 81 MG/1
81 TABLET, CHEWABLE ORAL DAILY
Status: DISCONTINUED | OUTPATIENT
Start: 2024-02-20 | End: 2024-02-23 | Stop reason: HOSPADM

## 2024-02-20 RX ORDER — FOLIC ACID 1 MG/1
1 TABLET ORAL DAILY
Status: DISCONTINUED | OUTPATIENT
Start: 2024-02-20 | End: 2024-02-23 | Stop reason: HOSPADM

## 2024-02-20 RX ORDER — METOPROLOL SUCCINATE 25 MG/1
12.5 TABLET, EXTENDED RELEASE ORAL DAILY
Status: DISCONTINUED | OUTPATIENT
Start: 2024-02-20 | End: 2024-02-20

## 2024-02-20 RX ORDER — DOCUSATE SODIUM 100 MG/1
100 CAPSULE, LIQUID FILLED ORAL 2 TIMES DAILY PRN
Status: DISCONTINUED | OUTPATIENT
Start: 2024-02-20 | End: 2024-02-23 | Stop reason: HOSPADM

## 2024-02-20 RX ORDER — FERROUS SULFATE 325(65) MG
325 TABLET ORAL 2 TIMES DAILY
Status: DISCONTINUED | OUTPATIENT
Start: 2024-02-20 | End: 2024-02-23 | Stop reason: HOSPADM

## 2024-02-20 RX ORDER — LANOLIN ALCOHOL/MO/W.PET/CERES
400 CREAM (GRAM) TOPICAL 2 TIMES DAILY
Status: DISCONTINUED | OUTPATIENT
Start: 2024-02-20 | End: 2024-02-20 | Stop reason: CLARIF

## 2024-02-20 RX ORDER — MAGNESIUM SULFATE IN WATER 40 MG/ML
2000 INJECTION, SOLUTION INTRAVENOUS ONCE
Status: COMPLETED | OUTPATIENT
Start: 2024-02-20 | End: 2024-02-20

## 2024-02-20 RX ADMIN — ASPIRIN 81 MG CHEWABLE TABLET 81 MG: 81 TABLET CHEWABLE at 13:03

## 2024-02-20 RX ADMIN — FOLIC ACID 1 MG: 1 TABLET ORAL at 13:04

## 2024-02-20 RX ADMIN — FUROSEMIDE 40 MG: 10 INJECTION, SOLUTION INTRAMUSCULAR; INTRAVENOUS at 06:37

## 2024-02-20 RX ADMIN — FUROSEMIDE 40 MG: 10 INJECTION, SOLUTION INTRAMUSCULAR; INTRAVENOUS at 16:13

## 2024-02-20 RX ADMIN — AMIODARONE HYDROCHLORIDE 400 MG: 200 TABLET ORAL at 19:47

## 2024-02-20 RX ADMIN — DOCUSATE SODIUM 100 MG: 100 CAPSULE, LIQUID FILLED ORAL at 13:05

## 2024-02-20 RX ADMIN — LEVOTHYROXINE SODIUM 100 MCG: 0.05 TABLET ORAL at 13:03

## 2024-02-20 RX ADMIN — AMIODARONE HYDROCHLORIDE 400 MG: 200 TABLET ORAL at 13:03

## 2024-02-20 RX ADMIN — OXYCODONE HYDROCHLORIDE AND ACETAMINOPHEN 500 MG: 500 TABLET ORAL at 19:47

## 2024-02-20 RX ADMIN — MAGNESIUM OXIDE 400 MG: 400 TABLET ORAL at 13:05

## 2024-02-20 RX ADMIN — CLOPIDOGREL BISULFATE 75 MG: 75 TABLET ORAL at 13:04

## 2024-02-20 RX ADMIN — FERROUS SULFATE TAB 325 MG (65 MG ELEMENTAL FE) 325 MG: 325 (65 FE) TAB at 19:47

## 2024-02-20 RX ADMIN — MAGNESIUM SULFATE HEPTAHYDRATE 2000 MG: 40 INJECTION, SOLUTION INTRAVENOUS at 04:38

## 2024-02-20 RX ADMIN — ENOXAPARIN SODIUM 40 MG: 100 INJECTION SUBCUTANEOUS at 13:06

## 2024-02-20 RX ADMIN — FERROUS SULFATE TAB 325 MG (65 MG ELEMENTAL FE) 325 MG: 325 (65 FE) TAB at 13:05

## 2024-02-20 RX ADMIN — ATORVASTATIN CALCIUM 40 MG: 40 TABLET, FILM COATED ORAL at 19:47

## 2024-02-20 RX ADMIN — POTASSIUM CHLORIDE 20 MEQ: 1500 TABLET, EXTENDED RELEASE ORAL at 13:05

## 2024-02-20 RX ADMIN — IOPAMIDOL 80 ML: 755 INJECTION, SOLUTION INTRAVENOUS at 05:34

## 2024-02-20 RX ADMIN — MAGNESIUM OXIDE 400 MG: 400 TABLET ORAL at 19:47

## 2024-02-20 RX ADMIN — OXYCODONE HYDROCHLORIDE AND ACETAMINOPHEN 500 MG: 500 TABLET ORAL at 13:04

## 2024-02-20 RX ADMIN — HYDROCODONE BITARTRATE AND ACETAMINOPHEN 1 TABLET: 5; 325 TABLET ORAL at 16:12

## 2024-02-20 ASSESSMENT — PAIN DESCRIPTION - FREQUENCY: FREQUENCY: CONTINUOUS

## 2024-02-20 ASSESSMENT — PAIN DESCRIPTION - ONSET: ONSET: SUDDEN

## 2024-02-20 ASSESSMENT — PAIN - FUNCTIONAL ASSESSMENT
PAIN_FUNCTIONAL_ASSESSMENT: 0-10
PAIN_FUNCTIONAL_ASSESSMENT: 0-10

## 2024-02-20 ASSESSMENT — PAIN SCALES - GENERAL
PAINLEVEL_OUTOF10: 7
PAINLEVEL_OUTOF10: 8
PAINLEVEL_OUTOF10: 7

## 2024-02-20 ASSESSMENT — LIFESTYLE VARIABLES
HOW OFTEN DO YOU HAVE A DRINK CONTAINING ALCOHOL: NEVER
HOW MANY STANDARD DRINKS CONTAINING ALCOHOL DO YOU HAVE ON A TYPICAL DAY: PATIENT DOES NOT DRINK

## 2024-02-20 ASSESSMENT — PAIN DESCRIPTION - PAIN TYPE: TYPE: ACUTE PAIN

## 2024-02-20 ASSESSMENT — PAIN DESCRIPTION - DESCRIPTORS: DESCRIPTORS: ACHING

## 2024-02-20 ASSESSMENT — PAIN DESCRIPTION - LOCATION: LOCATION: STERNUM

## 2024-02-20 NOTE — H&P
Department of Internal Medicine        CHIEF COMPLAINT: Shortness of breath    Reason for Admission: Acute on chronic systolic/diastolic CHF    HISTORY OF PRESENT ILLNESS:      The patient is a 78 y.o. female who presents with persistent shortness of breath since she had open heart surgery about 1 week ago.  Patient was just sent to the [facility] 2/20 in the afternoon and when she got there she stated she did not like it and told the nursing home she wanted to go back to emergency room because of her shortness of breath.  Patient also admits to chest pain associated with taking a deep breath.  She denies any palpitations dizziness or syncope.  Routine lab work showed BUN/creatinine 16/1.0 with normal electrolytes with proBNP was 9200 and troponin was 555.  Liver enzymes normal with a WBC 8.1 hemoglobin 8.7.  Temperature 97.7 with heart rate of 63 and blood pressure 117/67.  O2 sat 98% on 2 L nasal cannula.  CTA of the chest showed no PE but had bilateral pleural effusions with basilar atelectasis.  Patient is now asking for pain medicine for her chest.  She says is that it hurts because he cut her chest open.  She denies the chest pain similar to when she went to [facility] initially.    Past Medical History:    Past Medical History:   Diagnosis Date    VERONIQUE (acute kidney injury) (HCC) 2/13/2024    CAD in native artery 2/5/2024    Diabetes mellitus (HCC)     Headache     Hearing loss     Hx of rheumatoid arthritis     Migraine     Osteopenia     Sjogren's disease (HCC)      Past Surgical History:    Past Surgical History:   Procedure Laterality Date    CARDIAC PROCEDURE N/A 2/5/2024    Left heart cath / coronary angiography performed by Arnav Shaw MD at [facility] [place] [place]ARDIAC CATH LAB    CORONARY ARTERY BYPASS GRAFT N/A 2/7/2024    CORONARY ARTERY BYPASS GRAFTING, EVH performed by Nicolasa Jasso DO at [facility] [place] [place]    IR TUNNELED CATHETER PLACEMENT GREATER THAN 5 YEARS  2/13/2024    IR TUNNELED  (FLORINEF) 0.1 MG tablet Take 1 tablet by mouth daily 1/30/24   Jaylene Arroyo MD   Levothyroxine Sodium 100 MCG CAPS Take 1 tablet by mouth daily 8/17/23   Vesna Mansfield MD   aspirin 81 MG chewable tablet Take 1 tablet by mouth daily States OTC    ProviderVesna MD       Allergies:  Doxycycline and Penicillins    Social History:   Social History     Socioeconomic History    Marital status:      Spouse name: Not on file    Number of children: Not on file    Years of education: Not on file    Highest education level: Not on file   Occupational History    Not on file   Tobacco Use    Smoking status: Never    Smokeless tobacco: Never   Substance and Sexual Activity    Alcohol use: Not Currently    Drug use: Never    Sexual activity: Not Currently   Other Topics Concern    Not on file   Social History Narrative    Not on file     Social Determinants of Health     Financial Resource Strain: Not on file   Food Insecurity: No Food Insecurity (2/15/2024)    Hunger Vital Sign     Worried About Running Out of Food in the Last Year: Never true     Ran Out of Food in the Last Year: Never true   Transportation Needs: No Transportation Needs (2/15/2024)    PRAPARE - Transportation     Lack of Transportation (Medical): No     Lack of Transportation (Non-Medical): No   Physical Activity: Not on file   Stress: Not on file   Social Connections: Not on file   Intimate Partner Violence: Not on file   Housing Stability: Low Risk  (2/15/2024)    Housing Stability Vital Sign     Unable to Pay for Housing in the Last Year: No     Number of Places Lived in the Last Year: 1     Unstable Housing in the Last Year: No       Family History:   Family History   Problem Relation Age of Onset    No Known Problems Mother     No Known Problems Father        REVIEW OF SYSTEMS:    Gen: Patient denies any lightheadedness or dizziness.  No LOC or syncope.  No fevers or chills.    HEENT: No earache, sore throat or nasal

## 2024-02-20 NOTE — ACP (ADVANCE CARE PLANNING)
Advance Care Planning   Healthcare Decision Maker:    Primary Decision Maker: ROSE SANCHEZ - [phone]    Secondary Decision Maker: LOCO SANCHEZ - [phone]    Click here to complete Healthcare Decision Makers including selection of the Healthcare Decision Maker Relationship (ie \"Primary\").  Today we documented Decision Maker(s) consistent with Legal Next of Kin hierarchy.   Electronically signed by ADAMA Jones on 2/20/2024 at 3:19 PM

## 2024-02-20 NOTE — CONSULTS
Consult Note            Date:2/20/2024        Patient Name:Katt Diaz     YOB: 1945     Age:78 y.o.    Inpatient consult to Infectious Diseases  Consult performed by: Alejandra Irby MD  Consult ordered by: Dre Hubbard DO          Chief Complaint     Chief Complaint   Patient presents with    Shortness of Breath     Pt had open heart surgery on Wednesday of last week at [facility] and went to [facility] for \"a few hours and family took her home.\" Pt states increasing SOB over the past few days.           History Obtained From   patient, electronic medical record    History of Present Illness   Katt Diaz is a 78 y.o. female who  has a past medical history of VERONIQUE (acute kidney injury) (HCC), CAD in native artery, Diabetes mellitus (HCC), Headache, Hearing loss, Hx of rheumatoid arthritis, Migraine, Osteopenia, and Sjogren's disease (HCC).   Pt is not known to ID/NEOIDA     Pt presents on 2/20/2024 with   Chief Complaint   Patient presents with    Shortness of Breath     Pt had open heart surgery on Wednesday of last week at [facility] and went to [facility] for \"a few hours and family took her home.\" Pt states increasing SOB over the past few days.      Pt is s/p 1.  Urgent sternotomy.  2.  Urgent coronary artery bypass grafting x3; left internal mammary artery to the LAD, saphenous vein graft to the distal right coronary artery, saphenous vein graft to the obtuse marginal artery.  3.  Left atrial appendage occlusion with a 35 mm AtriClip.  4.  Endoscopic harvesting, left lower extremity greater saphenous vein.  5.  Sternal closure with combination of sternal wires and Martha sternal plates x2  Pt had Severe multivessel coronary artery disease, STEMI on 2/7  Pt was d/c to rehab ([facility]) on 2/19   Pt was unhappy and had her family bring her home  Pt had no O2 became sob and returned to ER on 2/20   Pt denies f/c/nv/d/rash/itch /joint aches uti  sx  Afebrile wbc8.1 cr1 UA neg   RVP neg      CTA PULMONARY W CONTRAST    Result Date: 2/20/2024  1. No acute pulmonary artery embolism. 2. Bilateral pleural effusions with basilar atelectasis.       Past Medical History     Past Medical History:   Diagnosis Date    VERONIQUE (acute kidney injury) (HCC) 2/13/2024    CAD in native artery 2/5/2024    Diabetes mellitus (HCC)     Headache     Hearing loss     Hx of rheumatoid arthritis     Migraine     Osteopenia     Sjogren's disease (HCC)         Past Surgical History     Past Surgical History:   Procedure Laterality Date    CARDIAC PROCEDURE N/A 2/5/2024    Left heart cath / coronary [place] [facility] [place]    CORONARY ARTERY BYPASS GRAFT N/A 2/7/2024    CORONARY ARTERY BYPASS GRAFTING, EVH performed by [place] Jasso [place] [facility] [place]    IR TUNNELED CATHETER PLACEMENT GREATER THAN 5 YEARS  2/13/2024    IR TUNNELED CATHETER PLACEMENT GREATER THAN 5 YEARS 2/13/2024 REYNA Hutchins MD [facility] SPECIAL PROCEDURES    PARTIAL HYSTERECTOMY (CERVIX NOT REMOVED)      states 1976    VEIN LIGATION AND STRIPPING      states 1972        Medications   Not in a hospital admission.    CURRENT MEDS  amiodarone (CORDARONE) tablet 400 mg, BID  [START ON 2/25/2024] amiodarone (CORDARONE) tablet 200 mg, BID  [START ON 3/1/2024] amiodarone (CORDARONE) tablet 200 mg, Daily  ascorbic acid (VITAMIN C) tablet 500 mg, BID  aspirin chewable tablet 81 mg, Daily  atorvastatin (LIPITOR) tablet 40 mg, Nightly  clopidogrel (PLAVIX) tablet 75 mg, Daily  docusate sodium (COLACE) capsule 100 mg, BID PRN  enoxaparin (LOVENOX) injection 40 mg, Daily  ferrous sulfate (IRON 325) tablet 325 mg, BID  folic acid (FOLVITE) tablet 1 mg, Daily  levothyroxine (SYNTHROID) tablet 100 mcg, Daily  magnesium oxide (MAG-OX) tablet 400 mg, BID  [START ON 2/21/2024] metoprolol succinate (TOPROL XL) extended release tablet 25 mg, Daily  furosemide (LASIX) injection 40 mg, BID  potassium  studies, microbiologic studies have been reviewed.     The patient/FAMILY was educated about the diagnosis, prognosis, indications, risks and benefits of treatment.      An opportunity to ask questions was given to the patient/FAMILY and questions were answered.      Thank you for involving me in the care of Katt Diaz. Please call [phone]  for any questions or concerns.         Electronically signed by Alejandra Irby MD on 2/20/2024 at 2:33 PM

## 2024-02-20 NOTE — PROGRESS NOTES
Saw patient on ER list currently in [facility]. Apparently left rehab after only a few hours because she did not like it. Family brought her into ED for reported SOB. Dr jasso made aware.  Spoke with Ed provider. Vitals stable, CTA reviewed by Dr Jasso. Plan is for home. Will send Rx to patient pharmacy as she was likley not sent with any from rehab since she left on her own accord.

## 2024-02-20 NOTE — CARE COORDINATION
Case Management Assessment  Initial Evaluation    Date/Time of Evaluation: 2/20/2024 2:29 PM  Assessment Completed by: ADAMA Jones    If patient is discharged prior to next notation, then this note serves as note for discharge by case management.    Patient Name: Katt Diaz                   YOB: 1945  Diagnosis: Acute decompensated heart failure (HCC) [I50.9]                   Date / Time: 2/20/2024  3:52 AM    Patient Admission Status: Inpatient   Readmission Risk (Low < 19, Mod (19-27), High > 27): Readmission Risk Score: 14    Current PCP: Luis Angel Rea, DO  PCP verified by CM? Yes    Chart Reviewed: Yes      History Provided by: Patient, Medical Record  Patient Orientation: Alert and Oriented, Person, Place, Situation, Self    Patient Cognition: Alert    Hospitalization in the last 30 days (Readmission):  Yes    Readmission Assessment  Number of Days since last admission?: 1-7 days  Previous Disposition: SNF  Who is being Interviewed: Patient  What was the patient's/caregiver's perception as to why they think they needed to return back to the hospital?: Other (Comment) (Did not like SNF went to-)  Did you visit your Primary Care Physician after you left the hospital, before you returned this time?: No  Why weren't you able to visit your PCP?: Did not have an appointment  Did you see a specialist, such as Cardiac, Pulmonary, Orthopedic Physician, etc. after you left the hospital?: No  Who advised the patient to return to the hospital?: Self-referral  Does the patient report anything that got in the way of taking their medications?: No  In our efforts to provide the best possible care to you and others like you, can you think of anything that we could have done to help you after you left the hospital the first time, so that you might not have needed to return so soon?: Other (Comment) (Give her other (more) SNF options)      Advance Directives:      Code Status: Prior   Patient's  CABG x3. She has Life Vest & went to [facility] yesterday. She only stayed a few hours and went home and now needs another SNF. Typically, Pt [facility] [place] steps to enter & pt was independent w/o any DME. Pt has no hx of HHC/SNF besides [facility]. SW provided SNF list & her choices are: 1) [facility] 2) [facility]. SW called [facility] and no beds till Friday or Saturday. FRED called [facility] & completed referral. Pt will NEED PRECERT, JOSE and HENS (initiated HENS ID: [id number]). Pt on 2L 02 and no hx of 02.    The Plan for Transition of Care is related to the following treatment goals of Acute decompensated heart failure (HCC) [I50.9]      ADAMA Jones  Case Management Department  Electronically signed by ADAMA Jones on 2/20/2024 at 3:25 PM

## 2024-02-20 NOTE — PROGRESS NOTES
4 Eyes Skin Assessment     NAME:  Katt Diaz  YOB: 1945  MEDICAL RECORD NUMBER:  [id number]    The patient is being assessed for  Admission    I agree that at least one RN has performed a thorough Head to Toe Skin Assessment on the patient. ALL assessment sites listed below have been assessed.      Areas assessed by both nurses:    Head, Face, Ears, Shoulders, Back, Chest, Arms, Elbows, Hands, Sacrum. Buttock, Coccyx, Ischium, and Legs. Feet and Heels        Does the Patient have a Wound? Yes wound(s) were present on assessment. LDA wound assessment was Initiated and completed by RN       Dani Prevention initiated by RN: Yes  Wound Care Orders initiated by RN: No    Pressure Injury (Stage 3,4, Unstageable, DTI, NWPT, and Complex wounds) if present, place Wound referral order by RN under : No    New Ostomies, if present place, Ostomy referral order under : No     Nurse 1 eSignature: Electronically signed by Mayra Garcia RN on 2/20/24 at 5:19 PM EST    **SHARE this note so that the co-signing nurse can place an eSignature**    Nurse 2 eSignature: Electronically signed by Mckayla Gregory RN on 2/20/24 at 5:47 PM EST

## 2024-02-20 NOTE — ED PROVIDER NOTES
[facility]  EMERGENCY DEPARTMENT ENCOUNTER        Pt Name: Katt Diaz  MRN: [id number]  Birthdate 1945  Date of evaluation: 2/20/2024  Provider: Rosaura Odell DO  PCP: Luis Angel Rea DO  Note Started: 4:14 AM EST 2/20/24    CHIEF COMPLAINT       Chief Complaint   Patient presents with    Shortness of Breath     Pt had open heart surgery on Wednesday of last week at [facility] and went to [facility] for \"a few hours and family took her home.\" Pt states increasing SOB over the past few days.        HISTORY OF PRESENT ILLNESS: 1 or more Elements   History From: Patient    Limitations to history : None  Social Determinants : None    Katt Diaz is a 78 y.o. female who presents for shortness of breath.  Patient states that she has had shortness of breath since her cardiac surgery a week ago.  She states that she was just discharged from the hospital this afternoon and sent to [facility].  She states that she did not like it there.  She states that she does have some chest pain when she takes a deep breath.  This also has been since the surgery.  She does have a LifeVest that was placed after surgery.  She denies any history of blood clots.  She is not on any blood thinners.  Denies any fever, chills, n/v, headache, dizziness, vision changes, neck tenderness or stiffness, weakness, palpitations, leg swelling/tenderness, cough, abd pain, dysuria, hematuria, diarrhea, constipation, bloody stools.    Nursing Notes were all reviewed and agreed with or any disagreements were addressed in the HPI.    ROS:   Pertinent positives and negatives are stated within HPI, all other systems reviewed and are negative.      --------------------------------------------- PAST HISTORY ---------------------------------------------  Past Medical History:  has a past medical history of VERONIQUE (acute kidney injury) ([facility]), CAD in native artery, Diabetes mellitus (HCC), Headache, Hearing loss, Hx of

## 2024-02-20 NOTE — CONSULTS
Inpatient Cardiology Consultation      Reason for Consult:  Post op SOB / HF    Consulting Physician: Dr. Everett     Requesting Physician:   - ED      Date of Consultation: 2/20/2024    HISTORY OF PRESENT ILLNESS:   Katt Diaz  is a 78 y.o.  female known to [facility] and followed by Dr. Shaw - seen inpatient 2/5/2024  chest pain and SOB over the last 2 weeks. Found with elevated troponin and inferior ST elevations on her ECG along with Q waves inferiorly. She was taken to cath lab for coronary angiography and possible intervention.  Patient was found to have three-vessel disease.  None status post CABG on 2/7/2024 with LIMA to LAD, SVG to RCA, SVG to OM.   Discharged POD # 12 on 2/19/2024 2/19/24 - tunneled CVC catheter removed in IR prior to discharge >> discharge to rehab for \"a few hours\" - patient said this is not where she was told that she was going.  Place was \"dirty & loud\" family then took her home at 7pm.  Patient with increased SOB - taken to [facility] ED.     [facility] ED 2/20/24 0350   Arrival vitals: T97.7 RR 22 HR 83 /71 SpO2 97% on 2 L nasal cannula oxygen  Significant Labs: High-sensitivity troponin 635-555 (was 5020 2/5/24).  proBNP 9255.  BUN 16 CR 1.1 K4.7 recent mag 2.1 calcium 7.7 albumin 2.8 AST 33 remaining LFT WNL WBC 8.1 Hgb 8.7, which is where she has been postoperativly,   RAD: CT of the pulmonary with contrast: No acute pulmonary embolism.  Bilateral pleural effusions with bibasilar atelectasis.  Large right and small left.  ED treatment: Lasix IV 40mg 0615 am, Mag IV 2g     Patient seen and examined.  Recent vitals: T97.7 RR 22 HR 69 /75 SpO2 92% on room air  Patient tells me that day of discharge, yesterday, she was feeling good.  She walked with  ft with \"minimal\" TRUJILLO.    Patient says she was extremely anxious and upset about being transferred to [place] rehab and could not calm down.  She began to feel SOB at rest that worsened through  90% diffuse lesion.  RPL2 branch is small and has ostial 80% stenosis  LVEDP 9 mmHg  LV gram with EF of 30-35%, inferior akinesis  No hemodynamically significant gradient across the aortic valve on LV-Ao pullback.  2/7/2024 CABG X 3 ((LIMA-LAD, SVG-OM, SVG-RCA), EVH LLE, left atrial appendage ligation with 35mm Atriclip )  Intraoperative YOVANY 2/7/2024 LVEF 35% >>Post op 57%      TTE 2/5/2024:Dr Shaw     Left Ventricle: Severely reduced left ventricular systolic function with a visually estimated EF of 35 - 40%. Left ventricle size is normal. Normal wall thickness. Severe hypokinesis of the following segments: basal inferior, mid anteroseptal, mid inferior, mid inferolateral, mid inferoseptal, apical septal and apical inferior. Grade I diastolic dysfunction with normal LAP.    Right Ventricle: Low normal systolic function. TAPSE is abnormal.    Aortic Valve: Trileaflet valve. Mildly calcified cusp.    Mitral Valve: Mild annular calcification of the mitral valve. Mild regurgitation.    Tricuspid Valve: Mild regurgitation. The estimated RVSP is 27 mmHg.    Right Atrium: Right atrium is mildly dilated.    Image quality is good.    TTE 2/8/24: Dr Shaw     Left Ventricle: Severely reduced left ventricular systolic function with a visually estimated EF of 20 - 25%. Left ventricle size is normal. Normal wall thickness. Septal motion is consistent with post-operative status. Septal flattening in diastole consistent with right ventricular volume overload. Severe global hypokinesis present. Akinesis of the following segments: basal inferior, mid inferior and apical inferior. Grade I diastolic dysfunction with normal LAP.    Right Ventricle: Right ventricle is moderately dilated. Moderately reduced systolic function.    Aortic Valve: Trileaflet valve. Moderately calcified cusp. Trace regurgitation.    Mitral Valve: Mild regurgitation.    Tricuspid Valve: Moderate to severe regurgitation. RVSP at least 41 mmHg, could be

## 2024-02-21 PROBLEM — E44.1 MILD PROTEIN-CALORIE MALNUTRITION (HCC): Status: ACTIVE | Noted: 2024-02-21

## 2024-02-21 LAB
ALBUMIN SERPL-MCNC: 2.6 G/DL (ref 3.5–5.2)
ALP SERPL-CCNC: 75 U/L (ref 35–104)
ALT SERPL-CCNC: 7 U/L (ref 0–32)
ANION GAP SERPL CALCULATED.3IONS-SCNC: 12 MMOL/L (ref 7–16)
AST SERPL-CCNC: 26 U/L (ref 0–31)
BASOPHILS # BLD: 0.02 K/UL (ref 0–0.2)
BASOPHILS NFR BLD: 0 % (ref 0–2)
BILIRUB SERPL-MCNC: 0.4 MG/DL (ref 0–1.2)
BUN SERPL-MCNC: 14 MG/DL (ref 6–23)
CALCIUM SERPL-MCNC: 7.6 MG/DL (ref 8.6–10.2)
CHLORIDE SERPL-SCNC: 99 MMOL/L (ref 98–107)
CHOLEST SERPL-MCNC: 105 MG/DL
CO2 SERPL-SCNC: 24 MMOL/L (ref 22–29)
CREAT SERPL-MCNC: 0.9 MG/DL (ref 0.5–1)
EOSINOPHIL # BLD: 0.16 K/UL (ref 0.05–0.5)
EOSINOPHILS RELATIVE PERCENT: 2 % (ref 0–6)
ERYTHROCYTE [DISTWIDTH] IN BLOOD BY AUTOMATED COUNT: 16.1 % (ref 11.5–15)
GFR SERPL CREATININE-BSD FRML MDRD: >60 ML/MIN/1.73M2
GLUCOSE SERPL-MCNC: 94 MG/DL (ref 74–99)
HCT VFR BLD AUTO: 27.2 % (ref 34–48)
HDLC SERPL-MCNC: 31 MG/DL
HGB BLD-MCNC: 8.9 G/DL (ref 11.5–15.5)
IMM GRANULOCYTES # BLD AUTO: 0.05 K/UL (ref 0–0.58)
IMM GRANULOCYTES NFR BLD: 1 % (ref 0–5)
LDLC SERPL CALC-MCNC: 51 MG/DL
LYMPHOCYTES NFR BLD: 1.25 K/UL (ref 1.5–4)
LYMPHOCYTES RELATIVE PERCENT: 17 % (ref 20–42)
MAGNESIUM SERPL-MCNC: 2 MG/DL (ref 1.6–2.6)
MCH RBC QN AUTO: 29.8 PG (ref 26–35)
MCHC RBC AUTO-ENTMCNC: 32.7 G/DL (ref 32–34.5)
MCV RBC AUTO: 91 FL (ref 80–99.9)
MONOCYTES NFR BLD: 0.46 K/UL (ref 0.1–0.95)
MONOCYTES NFR BLD: 6 % (ref 2–12)
NEUTROPHILS NFR BLD: 73 % (ref 43–80)
NEUTS SEG NFR BLD: 5.23 K/UL (ref 1.8–7.3)
PLATELET # BLD AUTO: 405 K/UL (ref 130–450)
PMV BLD AUTO: 9.8 FL (ref 7–12)
POTASSIUM SERPL-SCNC: 4.2 MMOL/L (ref 3.5–5)
PROCALCITONIN SERPL-MCNC: 0.09 NG/ML (ref 0–0.08)
PROT SERPL-MCNC: 5.4 G/DL (ref 6.4–8.3)
RBC # BLD AUTO: 2.99 M/UL (ref 3.5–5.5)
SODIUM SERPL-SCNC: 135 MMOL/L (ref 132–146)
TRIGL SERPL-MCNC: 115 MG/DL
VLDLC SERPL CALC-MCNC: 23 MG/DL
WBC OTHER # BLD: 7.2 K/UL (ref 4.5–11.5)

## 2024-02-21 PROCEDURE — 85025 COMPLETE CBC W/AUTO DIFF WBC: CPT

## 2024-02-21 PROCEDURE — 97110 THERAPEUTIC EXERCISES: CPT

## 2024-02-21 PROCEDURE — 83735 ASSAY OF MAGNESIUM: CPT

## 2024-02-21 PROCEDURE — 99223 1ST HOSP IP/OBS HIGH 75: CPT | Performed by: INTERNAL MEDICINE

## 2024-02-21 PROCEDURE — 6370000000 HC RX 637 (ALT 250 FOR IP): Performed by: INTERNAL MEDICINE

## 2024-02-21 PROCEDURE — 84145 PROCALCITONIN (PCT): CPT

## 2024-02-21 PROCEDURE — 6360000002 HC RX W HCPCS: Performed by: INTERNAL MEDICINE

## 2024-02-21 PROCEDURE — 1200000000 HC SEMI PRIVATE

## 2024-02-21 PROCEDURE — 97161 PT EVAL LOW COMPLEX 20 MIN: CPT

## 2024-02-21 PROCEDURE — 36415 COLL VENOUS BLD VENIPUNCTURE: CPT

## 2024-02-21 PROCEDURE — 97530 THERAPEUTIC ACTIVITIES: CPT

## 2024-02-21 PROCEDURE — 97165 OT EVAL LOW COMPLEX 30 MIN: CPT

## 2024-02-21 PROCEDURE — 2060000000 HC ICU INTERMEDIATE R&B

## 2024-02-21 PROCEDURE — 80061 LIPID PANEL: CPT

## 2024-02-21 PROCEDURE — 80053 COMPREHEN METABOLIC PANEL: CPT

## 2024-02-21 RX ADMIN — FERROUS SULFATE TAB 325 MG (65 MG ELEMENTAL FE) 325 MG: 325 (65 FE) TAB at 20:21

## 2024-02-21 RX ADMIN — CLOPIDOGREL BISULFATE 75 MG: 75 TABLET ORAL at 08:37

## 2024-02-21 RX ADMIN — POTASSIUM CHLORIDE 20 MEQ: 1500 TABLET, EXTENDED RELEASE ORAL at 08:37

## 2024-02-21 RX ADMIN — MAGNESIUM OXIDE 400 MG: 400 TABLET ORAL at 08:37

## 2024-02-21 RX ADMIN — HYDROCODONE BITARTRATE AND ACETAMINOPHEN 1 TABLET: 5; 325 TABLET ORAL at 15:38

## 2024-02-21 RX ADMIN — METOPROLOL SUCCINATE 25 MG: 25 TABLET, EXTENDED RELEASE ORAL at 08:37

## 2024-02-21 RX ADMIN — AMIODARONE HYDROCHLORIDE 400 MG: 200 TABLET ORAL at 20:21

## 2024-02-21 RX ADMIN — MAGNESIUM OXIDE 400 MG: 400 TABLET ORAL at 20:22

## 2024-02-21 RX ADMIN — HYDROCODONE BITARTRATE AND ACETAMINOPHEN 1 TABLET: 5; 325 TABLET ORAL at 20:20

## 2024-02-21 RX ADMIN — AMIODARONE HYDROCHLORIDE 400 MG: 200 TABLET ORAL at 08:37

## 2024-02-21 RX ADMIN — HYDROCODONE BITARTRATE AND ACETAMINOPHEN 1 TABLET: 5; 325 TABLET ORAL at 08:38

## 2024-02-21 RX ADMIN — FUROSEMIDE 40 MG: 10 INJECTION, SOLUTION INTRAMUSCULAR; INTRAVENOUS at 08:38

## 2024-02-21 RX ADMIN — TRAMADOL HYDROCHLORIDE 50 MG: 50 TABLET ORAL at 10:05

## 2024-02-21 RX ADMIN — LEVOTHYROXINE SODIUM 100 MCG: 0.05 TABLET ORAL at 06:30

## 2024-02-21 RX ADMIN — ENOXAPARIN SODIUM 40 MG: 100 INJECTION SUBCUTANEOUS at 08:36

## 2024-02-21 RX ADMIN — OXYCODONE HYDROCHLORIDE AND ACETAMINOPHEN 500 MG: 500 TABLET ORAL at 20:21

## 2024-02-21 RX ADMIN — FUROSEMIDE 40 MG: 10 INJECTION, SOLUTION INTRAMUSCULAR; INTRAVENOUS at 17:28

## 2024-02-21 RX ADMIN — PANTOPRAZOLE SODIUM 40 MG: 40 TABLET, DELAYED RELEASE ORAL at 06:30

## 2024-02-21 RX ADMIN — OXYCODONE HYDROCHLORIDE AND ACETAMINOPHEN 500 MG: 500 TABLET ORAL at 08:37

## 2024-02-21 RX ADMIN — ASPIRIN 81 MG CHEWABLE TABLET 81 MG: 81 TABLET CHEWABLE at 08:37

## 2024-02-21 RX ADMIN — FOLIC ACID 1 MG: 1 TABLET ORAL at 08:38

## 2024-02-21 RX ADMIN — ATORVASTATIN CALCIUM 40 MG: 40 TABLET, FILM COATED ORAL at 20:21

## 2024-02-21 RX ADMIN — FERROUS SULFATE TAB 325 MG (65 MG ELEMENTAL FE) 325 MG: 325 (65 FE) TAB at 08:37

## 2024-02-21 ASSESSMENT — PAIN DESCRIPTION - PAIN TYPE
TYPE: ACUTE PAIN
TYPE: ACUTE PAIN

## 2024-02-21 ASSESSMENT — PAIN DESCRIPTION - ORIENTATION
ORIENTATION: MID

## 2024-02-21 ASSESSMENT — PAIN SCALES - GENERAL
PAINLEVEL_OUTOF10: 7
PAINLEVEL_OUTOF10: 4
PAINLEVEL_OUTOF10: 6
PAINLEVEL_OUTOF10: 7
PAINLEVEL_OUTOF10: 1
PAINLEVEL_OUTOF10: 5

## 2024-02-21 ASSESSMENT — PAIN DESCRIPTION - LOCATION
LOCATION: STERNUM
LOCATION: CHEST
LOCATION: STERNUM
LOCATION: STERNUM

## 2024-02-21 ASSESSMENT — PAIN - FUNCTIONAL ASSESSMENT
PAIN_FUNCTIONAL_ASSESSMENT: PREVENTS OR INTERFERES SOME ACTIVE ACTIVITIES AND ADLS
PAIN_FUNCTIONAL_ASSESSMENT: ACTIVITIES ARE NOT PREVENTED

## 2024-02-21 ASSESSMENT — PAIN DESCRIPTION - DESCRIPTORS
DESCRIPTORS: ACHING
DESCRIPTORS: DISCOMFORT
DESCRIPTORS: THROBBING;STABBING
DESCRIPTORS: DISCOMFORT;ACHING;THROBBING

## 2024-02-21 ASSESSMENT — PAIN SCALES - WONG BAKER
WONGBAKER_NUMERICALRESPONSE: 0
WONGBAKER_NUMERICALRESPONSE: 0

## 2024-02-21 ASSESSMENT — PAIN DESCRIPTION - FREQUENCY
FREQUENCY: CONTINUOUS
FREQUENCY: CONTINUOUS

## 2024-02-21 ASSESSMENT — PAIN DESCRIPTION - ONSET
ONSET: SUDDEN
ONSET: ON-GOING

## 2024-02-21 NOTE — CARE COORDINATION
2/21/2024 1320 CM Note:Pt recently had a CABGX3 at [facility] [place] [place].  She wears a life vest.  She was at [facility] for only a few hours, went home and will need another SNF.  Typically, Pt lives alone @ [facility] w/no steps to enter & pt was independent w/o any DME. [facility] and [facility]  Per Rosita she is reviewing and waiting for PT/OT evals.  WILL NEED PRECERT, JOSE and HENS was initiated.  CM will follow. Electronically signed by Lynne Ren RN on 2/21/2024 at 1:32 PM   Addendum 1515 Per Rosita pt was accepted at [facility] and PRECERT was Obtained today. Per Rosita they are requesting that pt is comfortable/educated, and aware of the use of the Life Vest  Electronically signed by Lynne Ren RN on 2/21/2024 at 3:25 PM

## 2024-02-21 NOTE — PROGRESS NOTES
OCCUPATIONAL THERAPY INITIAL EVALUATION    [facility]  [address] [place]. [place]        Date:2024                                                  Patient Name: Katt Diaz    MRN: [id number]    : 1945    Room: [address] [place] [address]      Evaluating OT: Howie Hurley OTR/L; #[id number]     Referring Provider and Specific Provider Orders/Date:      24  OT eval and treat  Start:  24,   End:  24 1530,   ONE TIME,   Standing Count:  1 Occurrences,   R         Dre Hubbard, DO      Placement Recommendation: Subacute Rehab       Diagnosis:   1. Acute congestive heart failure, unspecified heart failure type (HCC)    2. Pleural effusion, bilateral    3. S/P CABG x 3         Surgery: Pt had open heart surgery on Wednesday of last week at [facility]. CABG x3        Pertinent Medical History:       Past Medical History:   Diagnosis Date    VERONIQUE (acute kidney injury) (HCC) 2024    CAD in native artery 2024    Diabetes mellitus (HCC)     Headache     Hearing loss     Hx of rheumatoid arthritis     Migraine     Osteopenia     Sjogren's disease (HCC)          Past Surgical History:   Procedure Laterality Date    CARDIAC PROCEDURE N/A 2024    Left heart cath / coronary angiography performed by Arnav Shaw MD at [facility] CARDIAC CATH LAB    CORONARY ARTERY BYPASS GRAFT N/A 2024    CORONARY ARTERY BYPASS GRAFTING, EVH performed by Nicolasa Jasso DO at [facility] OR    IR TUNNELED CATHETER PLACEMENT GREATER THAN 5 YEARS  2024    IR TUNNELED CATHETER PLACEMENT GREATER THAN 5 YEARS 2024 CuongREYNA MD [facility] SPECIAL PROCEDURES    PARTIAL HYSTERECTOMY (CERVIX NOT REMOVED)      states     VEIN LIGATION AND STRIPPING      states         Precautions:  Activity as tolerated, falls and strict iso , STRICT  Sternal precautions ( no lifting, no pushing, no pulling, no shoulder flexion greater than 90 degrees, avoid bending,  much help is needed for putting on and taking off regular lower body clothing?: A Lot  How much help is needed for bathing (which includes washing, rinsing, drying)?: A Lot  How much help is needed for toileting (which includes using toilet, bedpan, or urinal)?: A Lot  How much help is needed for putting on and taking off regular upper body clothing?: A Little  How much help is needed for taking care of personal grooming?: A Little  How much help for eating meals?: None  AM-[facility] Inpatient Daily Activity Raw Score: 16  AM-PAC Inpatient ADL T-Scale Score : 35.96  ADL Inpatient CMS 0-100% Score: 53.32  ADL Inpatient CMS G-Code Modifier : CK     Functional Assessment:    Initial Eval Status  Date: 2/21/24 Treatment Status  Date: STGs = LTGs  Time frame: 10-14 days   Feeding Independent   Independent    Grooming Supervision   Modified [place]    UB Dressing Minimal Assist   Modified [place]    LB Dressing Maximal Assist     Patient provided reacher and educated on AE/Sternal precautions  Minimal Assist    Bathing Maximal Assist  Minimal Assist    Toileting Moderate Assist   Supervision    Bed Mobility  Supine to sit: N/T   Sit to supine: N/T   Rolling:N/T    Supine to sit: Supervision   Sit to supine: Supervision   Rolling:Supervision     Functional Transfers Minimal Assist from bedside chair sit to stand.  Transfer training with verbal cues for hand placement throughout session to improve safety.   Modified [place]    Functional Mobility Minimal Assist with no AD; Balance support with functional mobility to and from bathroom.   Modified [place]    Balance Sitting:     Static: good     Dynamic: fair   Standing: fair  with with no AD  Sitting:     Static: good     Dynamic: good   Standing: good  with no AD   Activity Tolerance fair   good    Visual/  Perceptual Glasses: Yes                Hand Dominance: Right     AROM (PROM) Strength Additional Info:  Goal:   RUE  WFL ( no lifting, no pushing, no  above-mentioned ADLs; training on proper hand placement, safety technique, sequencing, and energy conservation techniques.  Postural Balance: Sitting/standing balance retraining to improve righting reactions with postural changes during ADLs.      Rehab Potential: Good for established goals.      Patient / Family Goal: Patient would like to return home and return to [facility] re: ADLs and IADLs        Patient and/or family were instructed on functional diagnosis, prognosis/goals and OT plan of care. Demonstrated good understanding.     Eval Complexity: Low  History: Brief review of medical records and additional review of physical, cognitive, or psychosocial history related to current functional performance  Exam: 3+ performance deficits  Assistance/Modification: Mod assistance or modifications required to perform tasks. May have comorbidities that affect occupational performance.    Time In: 11:05 AM   Time Out: 11:31 AM    Total Treatment Time: 11      Min Units   OT Eval Low 97165  X  1    OT Eval Medium 38610      OT Eval High 04185      OT Re-Eval 31108            ADL/Self Care 33334     Therapeutic Activities 02972  11 1   Therapeutic Ex 11257       Orthotic Management 96606       Manual 59734     Neuro Re-Ed 40896       Non-Billable Time        Evaluation Time additionally includes thorough review of current medical information, gathering information on past medical history/social history and prior level of function, interpretation of standardized testing/informal observation of tasks, assessment of data and development of plan of care and goals.        Evaluating OT: Howie Hurley OTR/L; #[id number]
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
## 2024-02-21 NOTE — PROGRESS NOTES
NAME: Katt Diaz  MR:  [id number]  :   1945  Admit Date:  2024    Elements of this note, were copied and pasted from Previous. Updates have been made where noted and reflect current exam and medical decision making from the DOS of this encounter.  CHIEF COMPLAINT       Chief Complaint   Patient presents with    Shortness of Breath     Pt had open heart surgery on Wednesday of last week at [facility] and went to [facility] for \"a few hours and family took her home.\" Pt states increasing SOB over the past few days.      HISTORY OF PRESENT ILLNESS     Katt Diaz is a 78 y.o. female admitted on 2024 and has  has a past medical history of VERONIQUE (acute kidney injury) (HCC), CAD in native artery, Diabetes mellitus (HCC), Headache, Hearing loss, Hx of rheumatoid arthritis, Migraine, Osteopenia, and Sjogren's disease (HCC).     This is a face to face encounter   24 in bed  has  no c/o     Patient is tolerating medications. No reported adverse drug reactions.  Available labs, imaging studies, microbiologic studies have been reviewed with pt and family if present.  Assessment & Plan     Admitted for   Acute decompensated heart failure (HCC) [I50.9]  ID following for   Pt from [facility] auris exposure isolation check surveillance cx  S/p cabg for  Severe multivessel coronary artery disease, STEMI  -sternal wound healing   -no atbx at this time follow clinically  -check procal   IS   OOB INC ACTIVITY          DISPOSITION:  REVIEW OF SYSTEMS     As stated above      PHYSICAL EXAMINATION    /61   Pulse 85   Temp 97.8 °F (36.6 °C) (Oral)   Resp 16   Ht 1.6 m (5' 2.99\")   Wt 57.2 kg (126 lb)   SpO2 94%   BMI 22.33 kg/m²   Temp  Av.1 °F (36.7 °C)  Min: 97.8 °F (36.6 °C)  Max: 98.6 °F (37 °C)  CONSTITUTIONAL:  IN BED no apparent distress   ENT:  Normocephalic, atraumatic,  external ears without lesions, oral pharynx with moist mucus membranes,    LUNGS:  No increased  work of breathing, DEC r to auscultation bilaterally, no crackles or wheezing  CARDIOVASCULAR:  Normal apical impulse, regular rate and rhythm, normal S1 and S2,  no murmur noted  ABDOMEN:  Normal bowel sounds, soft, non-distended, non-tender  MUSCULOSKELETAL:  There is no redness, warmth, or swelling  BLE.  Full range of motion    RLE EDEMA   NEUROLOGIC:  Awake, alert   Cranial nerves II-XII are grossly intact.     SKIN:  Normal skin color, texture, turgor and no rashes  Lines:          Peripheral Intravenous Line:  Peripheral IV 02/20/24 Right Antecubital (Active)   Site Assessment Clean, dry & intact 02/21/24 0836   Line Status Blood return noted;Flushed 02/21/24 0836      Drain(s):  External Urinary Catheter (Active)   Site Assessment Clean,dry & intact 02/21/24 0800   Urine Color Yellow 02/20/24 1721   Output (mL) 350 mL 02/21/24 0648       CURRENT MEDICATIONS     Current Facility-Administered Medications:     amiodarone (CORDARONE) tablet 400 mg, 400 mg, Oral, BID, Hubbard, Dre A, DO, 400 mg at 02/21/24 0837    [START ON 2/25/2024] amiodarone (CORDARONE) tablet 200 mg, 200 mg, Oral, BID, Hubbard, Dre A, DO    [START ON 3/1/2024] amiodarone (CORDARONE) tablet 200 mg, 200 mg, Oral, Daily, Hubbard, Dre A, DO    ascorbic acid (VITAMIN C) tablet 500 mg, 500 mg, Oral, BID, Hubbard, Dre A, DO, 500 mg at 02/21/24 0837    aspirin chewable tablet 81 mg, 81 mg, Oral, Daily, Hubbard, Dre A, DO, 81 mg at 02/21/24 0837    atorvastatin (LIPITOR) tablet 40 mg, 40 mg, Oral, Nightly, Hubbard, Dre A, DO, 40 mg at 02/20/24 1947    clopidogrel (PLAVIX) tablet 75 mg, 75 mg, Oral, Daily, Hubbard, Dre A, DO, 75 mg at 02/21/24 0837    docusate sodium (COLACE) capsule 100 mg, 100 mg, Oral, BID PRN, Hubbard, Dre A, DO, 100 mg at 02/20/24 1305    enoxaparin (LOVENOX) injection 40 mg, 40 mg, SubCUTAneous, Daily, Dre Hubbard DO, 40 mg at 02/21/24 0836    ferrous sulfate (IRON 325) tablet 325 mg, 325 mg, Oral,

## 2024-02-21 NOTE — PROGRESS NOTES
Physical Therapy Initial Evaluation/Plan of Care    Room #:  0624/0624-02  Patient Name: Katt Diaz  YOB: 1945  MRN: [id number]    Date of Service: 2/21/2024     Tentative placement recommendation: Subacute Rehab  Equipment recommendation: To be determined      Evaluating Physical Therapist: Milena Diaz, PT #[id number]      Specific Provider Orders/Date/Referring Provider :   02/20/24 1200    PT eval and treat  Start:  02/20/24 1200,   End:  02/20/24 1200,   ONE TIME,   Standing Count:  1 Occurrences,   R       Last continued at transfer on Tue Feb 20, 2024  3:19 PM  Dre Hubbard DO    Admitting Diagnosis:   Acute decompensated heart failure (HCC) [I50.9]      Surgery:       Pt had open heart surgery on Wednesday of last week at [facility]. CABG x3     Visit Diagnoses         Codes    Acute congestive heart failure, unspecified heart failure type (HCC)    -  Primary I50.9    Pleural effusion, bilateral     J90    S/P CABG x 3     Z95.1            Patient Active Problem List   Diagnosis    S/P cardiac cath    CAD in native artery    ST elevation myocardial infarction (STEMI) (HCC)    Postoperative hypotension    Complication of surgical procedure    Stress hyperglycemia    VERONIQUE (acute kidney injury) (HCC)    Acute on chronic systolic heart failure (HCC)    Acute decompensated heart failure (HCC)    Mild protein-calorie malnutrition (HCC)        ASSESSMENT of Current Deficits Patient exhibits decreased strength, balance, and endurance impairing functional mobility, transfers, gait distance, and tolerance to activity wearing 2L O2 at hospital, none at home. Open heart surgery last  week, strict sternal precautions. Pt required mod A for transfers and min A for  gait. The patient will benefit from continued skilled therapy to increase strength and improve balance for safe functional mobility, to decrease risk of falls, and to meet goals at discharge.        PHYSICAL THERAPY  PLAN OF CARE  further education in this area   Yes Partial Yes      Treatment:  Patient practiced and was instructed/facilitated in the following treatment: Reviewed sternal precautions. Patient assisted to EOB. Sat edge of bed 10 minutes with Minimal progressing to supervision to increase dynamic sitting balance and activity tolerance. Pt stood and took steps to chair. Pt performed BLE exercises.     Therapeutic Exercises:  ankle pumps, heel raises, glut sets, long arc quad, and seated marching  2 x 10 reps.       At end of session, patient in chair with alarm call light and phone within reach,  all lines and tubes intact, nursing notified.      Patient would benefit from continued skilled Physical Therapy to improve functional independence and quality of life.         Patient's/ family goals   rehab    Time in  1016  Time out  1048    Total Treatment Time  12 minutes    Evaluation time includes thorough review of current medical information, gathering information on past medical history/social history and prior level of function, completion of standardized testing/informal observation of tasks, assessment of data, and development of Plan of care and goals.     CPT codes:  Low Complexity PT evaluation (02515)  Therapeutic exercises (67415)   12 minutes  1 unit(s)    Milena Diaz PT

## 2024-02-21 NOTE — CONSULTS
Comprehensive Nutrition Assessment    Type and Reason for Visit:  Initial, Consult (ONS)    Nutrition Recommendations/Plan:   Recommend 4 CHO choice diet  (per MD fluid restriction - not ordered at this time)   Recommend ONS Ensure Compact BID,  Skip BID        Malnutrition Assessment:  Malnutrition Status:  Mild malnutrition (02/21/24 1111)    Context:  Acute Illness     Findings of the 6 clinical characteristics of malnutrition:  Energy Intake:  Mild decrease in energy intake (Comment)  Weight Loss:  Unable to assess (d/t wt fluctuation per EMR, noted wt gain / fluid accumulation)     Body Fat Loss:  No significant body fat loss     Muscle Mass Loss:  Mild muscle mass loss Temples (temporalis), Clavicles (pectoralis & deltoids)  Fluid Accumulation:  No significant fluid accumulation     Strength:  Not Performed    Nutrition Assessment:    Pt admit w/ acute on chronic CHF.  PMHx of CAD, DM, RA, sjogren's ds. Pt is on Low NA diet, consult noted for ONS - will add Glucerna TID & Skip BID to aid healing of incisions w/ recent CABG 2/7/24, continue to encourage PO intake. would recommend Carb control diet for DM if Bglu elevated.    Nutrition Related Findings:    abd soft, NT, ND, active BSx4, diarrhea noted, +3 edema, I/O's -1.5L since admit Wound Type: Surgical Incision (chest,)       Current Nutrition Intake & Therapies:    Average Meal Intake: 51-75%  Average Supplements Intake: None Ordered  ADULT DIET; Regular; Low Sodium (2 gm)  ADULT ORAL NUTRITION SUPPLEMENT; Breakfast, Lunch; Wound Healing Oral Supplement  ADULT ORAL NUTRITION SUPPLEMENT; Breakfast, Lunch; Standard 4 oz Oral Supplement    Anthropometric Measures:  Height: 160 cm (5' 2.99\")  Ideal Body Weight (IBW): 115 lbs (52 kg)    Admission Body Weight: 57.2 kg (126 lb 3 oz) (2/19 standing scale)  Current Body Weight: 57.2 kg (126 lb 3 oz) (2/19 Standing Scale), 109.7 % IBW. Weight Source: Standing Scale  Current BMI (kg/m2): 22.4  Usual Body Weight:

## 2024-02-22 LAB
ALBUMIN SERPL-MCNC: 2.9 G/DL (ref 3.5–5.2)
ALP SERPL-CCNC: 88 U/L (ref 35–104)
ALT SERPL-CCNC: 8 U/L (ref 0–32)
ANION GAP SERPL CALCULATED.3IONS-SCNC: 9 MMOL/L (ref 7–16)
AST SERPL-CCNC: 26 U/L (ref 0–31)
BASOPHILS # BLD: 0.02 K/UL (ref 0–0.2)
BASOPHILS NFR BLD: 0 % (ref 0–2)
BILIRUB SERPL-MCNC: 0.4 MG/DL (ref 0–1.2)
BUN SERPL-MCNC: 16 MG/DL (ref 6–23)
CALCIUM SERPL-MCNC: 7.8 MG/DL (ref 8.6–10.2)
CHLORIDE SERPL-SCNC: 96 MMOL/L (ref 98–107)
CO2 SERPL-SCNC: 27 MMOL/L (ref 22–29)
CREAT SERPL-MCNC: 0.9 MG/DL (ref 0.5–1)
EOSINOPHIL # BLD: 0.05 K/UL (ref 0.05–0.5)
EOSINOPHILS RELATIVE PERCENT: 1 % (ref 0–6)
ERYTHROCYTE [DISTWIDTH] IN BLOOD BY AUTOMATED COUNT: 15.9 % (ref 11.5–15)
GFR SERPL CREATININE-BSD FRML MDRD: >60 ML/MIN/1.73M2
GLUCOSE SERPL-MCNC: 107 MG/DL (ref 74–99)
HCT VFR BLD AUTO: 29 % (ref 34–48)
HGB BLD-MCNC: 9.2 G/DL (ref 11.5–15.5)
IMM GRANULOCYTES # BLD AUTO: 0.06 K/UL (ref 0–0.58)
IMM GRANULOCYTES NFR BLD: 1 % (ref 0–5)
LYMPHOCYTES NFR BLD: 1.22 K/UL (ref 1.5–4)
LYMPHOCYTES RELATIVE PERCENT: 19 % (ref 20–42)
MAGNESIUM SERPL-MCNC: 2.1 MG/DL (ref 1.6–2.6)
MCH RBC QN AUTO: 29.3 PG (ref 26–35)
MCHC RBC AUTO-ENTMCNC: 31.7 G/DL (ref 32–34.5)
MCV RBC AUTO: 92.4 FL (ref 80–99.9)
MICROORGANISM SPEC CULT: ABNORMAL
MICROORGANISM SPEC CULT: ABNORMAL
MICROORGANISM SPEC CULT: NORMAL
MONOCYTES NFR BLD: 0.49 K/UL (ref 0.1–0.95)
MONOCYTES NFR BLD: 8 % (ref 2–12)
NEUTROPHILS NFR BLD: 72 % (ref 43–80)
NEUTS SEG NFR BLD: 4.61 K/UL (ref 1.8–7.3)
PLATELET # BLD AUTO: 444 K/UL (ref 130–450)
PMV BLD AUTO: 9.7 FL (ref 7–12)
POTASSIUM SERPL-SCNC: 4.5 MMOL/L (ref 3.5–5)
PROT SERPL-MCNC: 5.6 G/DL (ref 6.4–8.3)
RBC # BLD AUTO: 3.14 M/UL (ref 3.5–5.5)
SODIUM SERPL-SCNC: 132 MMOL/L (ref 132–146)
SPECIMEN DESCRIPTION: ABNORMAL
SPECIMEN DESCRIPTION: NORMAL
WBC OTHER # BLD: 6.5 K/UL (ref 4.5–11.5)

## 2024-02-22 PROCEDURE — 97535 SELF CARE MNGMENT TRAINING: CPT

## 2024-02-22 PROCEDURE — 6370000000 HC RX 637 (ALT 250 FOR IP): Performed by: INTERNAL MEDICINE

## 2024-02-22 PROCEDURE — 80053 COMPREHEN METABOLIC PANEL: CPT

## 2024-02-22 PROCEDURE — 1200000000 HC SEMI PRIVATE

## 2024-02-22 PROCEDURE — 85025 COMPLETE CBC W/AUTO DIFF WBC: CPT

## 2024-02-22 PROCEDURE — 2580000003 HC RX 258

## 2024-02-22 PROCEDURE — 6360000002 HC RX W HCPCS: Performed by: INTERNAL MEDICINE

## 2024-02-22 PROCEDURE — A4216 STERILE WATER/SALINE, 10 ML: HCPCS

## 2024-02-22 PROCEDURE — 99232 SBSQ HOSP IP/OBS MODERATE 35: CPT | Performed by: INTERNAL MEDICINE

## 2024-02-22 PROCEDURE — 2060000000 HC ICU INTERMEDIATE R&B

## 2024-02-22 PROCEDURE — 97530 THERAPEUTIC ACTIVITIES: CPT

## 2024-02-22 PROCEDURE — 36415 COLL VENOUS BLD VENIPUNCTURE: CPT

## 2024-02-22 PROCEDURE — 83735 ASSAY OF MAGNESIUM: CPT

## 2024-02-22 RX ORDER — FUROSEMIDE 40 MG/1
40 TABLET ORAL DAILY
DISCHARGE
Start: 2024-02-22 | End: 2024-03-04 | Stop reason: ALTCHOICE

## 2024-02-22 RX ORDER — METOPROLOL SUCCINATE 25 MG/1
25 TABLET, EXTENDED RELEASE ORAL DAILY
Qty: 30 TABLET | Refills: 3 | DISCHARGE
Start: 2024-02-23

## 2024-02-22 RX ORDER — FERROUS SULFATE 325(65) MG
325 TABLET ORAL
Qty: 30 TABLET | Refills: 0 | DISCHARGE
Start: 2024-02-22 | End: 2024-03-23

## 2024-02-22 RX ORDER — POTASSIUM CHLORIDE 750 MG/1
10 TABLET, EXTENDED RELEASE ORAL DAILY
Qty: 90 TABLET | Refills: 1 | DISCHARGE
Start: 2024-02-22

## 2024-02-22 RX ORDER — FUROSEMIDE 40 MG/1
40 TABLET ORAL 2 TIMES DAILY
Status: DISCONTINUED | OUTPATIENT
Start: 2024-02-22 | End: 2024-02-23 | Stop reason: HOSPADM

## 2024-02-22 RX ORDER — SODIUM CHLORIDE 9 MG/ML
INJECTION, SOLUTION INTRAMUSCULAR; INTRAVENOUS; SUBCUTANEOUS
Status: COMPLETED
Start: 2024-02-22 | End: 2024-02-22

## 2024-02-22 RX ORDER — FUROSEMIDE 20 MG/1
40 TABLET ORAL DAILY
Qty: 60 TABLET | Refills: 3 | DISCHARGE
Start: 2024-02-22 | End: 2024-02-22

## 2024-02-22 RX ORDER — HYDROCODONE BITARTRATE AND ACETAMINOPHEN 5; 325 MG/1; MG/1
1 TABLET ORAL EVERY 6 HOURS PRN
Qty: 12 TABLET | Refills: 0 | Status: SHIPPED | OUTPATIENT
Start: 2024-02-22 | End: 2024-02-25

## 2024-02-22 RX ADMIN — OXYCODONE HYDROCHLORIDE AND ACETAMINOPHEN 500 MG: 500 TABLET ORAL at 10:04

## 2024-02-22 RX ADMIN — AMIODARONE HYDROCHLORIDE 400 MG: 200 TABLET ORAL at 21:00

## 2024-02-22 RX ADMIN — HYDROCODONE BITARTRATE AND ACETAMINOPHEN 1 TABLET: 5; 325 TABLET ORAL at 16:59

## 2024-02-22 RX ADMIN — PANTOPRAZOLE SODIUM 40 MG: 40 TABLET, DELAYED RELEASE ORAL at 05:37

## 2024-02-22 RX ADMIN — FERROUS SULFATE TAB 325 MG (65 MG ELEMENTAL FE) 325 MG: 325 (65 FE) TAB at 20:59

## 2024-02-22 RX ADMIN — AMIODARONE HYDROCHLORIDE 400 MG: 200 TABLET ORAL at 10:05

## 2024-02-22 RX ADMIN — FUROSEMIDE 40 MG: 10 INJECTION, SOLUTION INTRAMUSCULAR; INTRAVENOUS at 11:32

## 2024-02-22 RX ADMIN — ENOXAPARIN SODIUM 40 MG: 100 INJECTION SUBCUTANEOUS at 10:01

## 2024-02-22 RX ADMIN — MAGNESIUM OXIDE 400 MG: 400 TABLET ORAL at 20:57

## 2024-02-22 RX ADMIN — LEVOTHYROXINE SODIUM 100 MCG: 0.05 TABLET ORAL at 05:37

## 2024-02-22 RX ADMIN — FERROUS SULFATE TAB 325 MG (65 MG ELEMENTAL FE) 325 MG: 325 (65 FE) TAB at 10:04

## 2024-02-22 RX ADMIN — ATORVASTATIN CALCIUM 40 MG: 40 TABLET, FILM COATED ORAL at 21:01

## 2024-02-22 RX ADMIN — METOPROLOL SUCCINATE 25 MG: 25 TABLET, EXTENDED RELEASE ORAL at 10:01

## 2024-02-22 RX ADMIN — OXYCODONE HYDROCHLORIDE AND ACETAMINOPHEN 500 MG: 500 TABLET ORAL at 20:59

## 2024-02-22 RX ADMIN — TRAMADOL HYDROCHLORIDE 50 MG: 50 TABLET ORAL at 10:04

## 2024-02-22 RX ADMIN — CLOPIDOGREL BISULFATE 75 MG: 75 TABLET ORAL at 10:05

## 2024-02-22 RX ADMIN — SODIUM CHLORIDE: 9 INJECTION, SOLUTION INTRAMUSCULAR; INTRAVENOUS; SUBCUTANEOUS at 11:48

## 2024-02-22 RX ADMIN — POTASSIUM CHLORIDE 20 MEQ: 1500 TABLET, EXTENDED RELEASE ORAL at 10:01

## 2024-02-22 RX ADMIN — FOLIC ACID 1 MG: 1 TABLET ORAL at 10:04

## 2024-02-22 RX ADMIN — ASPIRIN 81 MG CHEWABLE TABLET 81 MG: 81 TABLET CHEWABLE at 10:01

## 2024-02-22 RX ADMIN — FUROSEMIDE 40 MG: 40 TABLET ORAL at 16:57

## 2024-02-22 RX ADMIN — MAGNESIUM OXIDE 400 MG: 400 TABLET ORAL at 10:01

## 2024-02-22 ASSESSMENT — PAIN DESCRIPTION - DESCRIPTORS
DESCRIPTORS: ACHING;DISCOMFORT
DESCRIPTORS: ACHING;DISCOMFORT

## 2024-02-22 ASSESSMENT — PAIN SCALES - GENERAL
PAINLEVEL_OUTOF10: 1
PAINLEVEL_OUTOF10: 1
PAINLEVEL_OUTOF10: 5
PAINLEVEL_OUTOF10: 5
PAINLEVEL_OUTOF10: 4

## 2024-02-22 ASSESSMENT — PAIN SCALES - WONG BAKER
WONGBAKER_NUMERICALRESPONSE: 0

## 2024-02-22 ASSESSMENT — PAIN DESCRIPTION - LOCATION
LOCATION: CHEST;LEG;FOOT
LOCATION: CHEST

## 2024-02-22 ASSESSMENT — PAIN DESCRIPTION - ORIENTATION
ORIENTATION: RIGHT;LEFT;MID
ORIENTATION: MID

## 2024-02-22 ASSESSMENT — PAIN - FUNCTIONAL ASSESSMENT: PAIN_FUNCTIONAL_ASSESSMENT: PREVENTS OR INTERFERES SOME ACTIVE ACTIVITIES AND ADLS

## 2024-02-22 NOTE — PROGRESS NOTES
OCCUPATIONAL THERAPY TREATMENT NOTE    [facility]   [address]        Date:2024  Patient Name: Katt Diaz  MRN: [id number]  : 1945  Room: [address] [place] [address]     Evaluating OT: Howie Hurley OTR/L; #[id number]      Referring Provider and Specific Provider Orders/Date:      24   OT eval and treat  Start:  24,   End:  24 153,   ONE TIME,   Standing Count:  1 Occurrences,   R         Dre Hubbard, DO       Placement Recommendation: Subacute Rehab        Diagnosis:   1. Acute congestive heart failure, unspecified heart failure type (HCC)    2. Pleural effusion, bilateral    3. S/P CABG x 3         Surgery: Pt had open heart surgery on Wednesday of last week at [facility]. CABG x3         Pertinent Medical History:       Past Medical History        Past Medical History:   Diagnosis Date    VERONIQUE (acute kidney injury) (HCC) 2024    CAD in native artery 2024    Diabetes mellitus (HCC)      Headache      Hearing loss      Hx of rheumatoid arthritis      Migraine      Osteopenia      Sjogren's disease (HCC)              Past Surgical History         Past Surgical History:   Procedure Laterality Date    CARDIAC PROCEDURE N/A 2024     Left heart cath / coronary angiography performed by Arnav Shaw MD at [facility] CARDIAC CATH LAB    CORONARY ARTERY BYPASS GRAFT N/A 2024     CORONARY ARTERY BYPASS GRAFTING, EVH performed by Nicolasa Jasso DO at [facility] OR    IR TUNNELED CATHETER PLACEMENT GREATER THAN 5 YEARS   2024     IR TUNNELED CATHETER PLACEMENT GREATER THAN 5 YEARS 2024 CuongREYNA melchor MD [facility] SPECIAL PROCEDURES    PARTIAL HYSTERECTOMY (CERVIX NOT REMOVED)         states     VEIN LIGATION AND STRIPPING         states             Precautions:  Activity as tolerated, falls and strict iso , STRICT  Sternal precautions ( no lifting, no pushing, no pulling, no shoulder flexion greater than        Problem solving: good               Judgement/safety: good      [facility]         AM-PAC Daily Activity - Inpatient   How much help is needed for putting on and taking off regular lower body clothing?: A Lot  How much help is needed for bathing (which includes washing, rinsing, drying)?: A Lot  How much help is needed for toileting (which includes using toilet, bedpan, or urinal)?: A Lot  How much help is needed for putting on and taking off regular upper body clothing?: A Little  How much help is needed for taking care of personal grooming?: A Little  How much help for eating meals?: None  AM-[facility] Inpatient Daily Activity Raw Score: 16  AM-PAC Inpatient ADL T-Scale Score : 35.96  ADL Inpatient CMS 0-100% Score: 53.32  ADL Inpatient CMS G-Code Modifier : CK                Functional Assessment:     Initial Eval Status  Date: 2/21/24 Treatment Status  Date: 2/22/24  STGs = LTGs  Time frame: 10-14 days   Feeding Independent   independent  Independent    Grooming Supervision  Supervision simulated seated EOB  Modified [place]    UB Dressing Minimal Assist  MIN A to tie/adjust hospital gown in back  Modified [place]    LB Dressing Maximal Assist      Patient provided reacher and educated on AE/Sternal precautions MOD A to yanely pants using reacher seated EOB v/c's for technique      Minimal Assist    Bathing Maximal Assist N/t  Minimal Assist    Toileting Moderate Assist  N/t  Supervision    Bed Mobility  Supine to sit: N/T   Sit to supine: N/T   Rolling:N/T   Supine<>sit MAX A pt requiring assist to bring B LE in/out of bed following sternal precautions with trunk control  Supine to sit: Supervision   Sit to supine: Supervision   Rolling:Supervision     Functional Transfers Minimal Assist from bedside chair sit to stand.  Transfer training with verbal cues for hand placement throughout session to improve safety.  MIN A sit<>stand from EOB with cardiac bear  Modified [place]    Functional Mobility Minimal

## 2024-02-22 NOTE — PLAN OF CARE
Problem: Safety - Adult  Goal: Free from fall injury  Outcome: Progressing     Problem: Discharge Planning  Goal: Discharge to home or other facility with appropriate resources  Outcome: Progressing  Flowsheets (Taken 2/22/2024 0900)  Discharge to home or other facility with appropriate resources:   Identify barriers to discharge with patient and caregiver   Arrange for needed discharge resources and transportation as appropriate   Identify discharge learning needs (meds, wound care, etc)   Refer to discharge planning if patient needs post-hospital services based on physician order or complex needs related to functional status, cognitive ability or social support system     Problem: Pain  Goal: Verbalizes/displays adequate comfort level or baseline comfort level  Outcome: Progressing  Flowsheets (Taken 2/22/2024 1001)  Verbalizes/displays adequate comfort level or baseline comfort level:   Encourage patient to monitor pain and request assistance   Assess pain using appropriate pain scale   Administer analgesics based on type and severity of pain and evaluate response   Implement non-pharmacological measures as appropriate and evaluate response   Notify Licensed Independent Practitioner if interventions unsuccessful or patient reports new pain     Problem: Nutrition Deficit:  Goal: Optimize nutritional status  Outcome: Progressing     Problem: Chronic Conditions and Co-morbidities  Goal: Patient's chronic conditions and co-morbidity symptoms are monitored and maintained or improved  Outcome: Progressing  Flowsheets (Taken 2/22/2024 0900)  Care Plan - Patient's Chronic Conditions and Co-Morbidity Symptoms are Monitored and Maintained or Improved:   Collaborate with multidisciplinary team to address chronic and comorbid conditions and prevent exacerbation or deterioration   Monitor and assess patient's chronic conditions and comorbid symptoms for stability, deterioration, or improvement   Update acute care plan with

## 2024-02-22 NOTE — PROGRESS NOTES
NAME: Katt Diaz  MR:  [id number]  :   1945  Admit Date:  2024    Elements of this note, were copied and pasted from Previous. Updates have been made where noted and reflect current exam and medical decision making from the DOS of this encounter.  CHIEF COMPLAINT       Chief Complaint   Patient presents with    Shortness of Breath     Pt had open heart surgery on Wednesday of last week at [facility] and went to [facility] for \"a few hours and family took her home.\" Pt states increasing SOB over the past few days.      HISTORY OF PRESENT ILLNESS     Katt Diaz is a 78 y.o. female admitted on 2024 and has  has a past medical history of VERONIQUE (acute kidney injury) (HCC), CAD in native artery, Diabetes mellitus (HCC), Headache, Hearing loss, Hx of rheumatoid arthritis, Migraine, Osteopenia, and Sjogren's disease (HCC).     This is a face to face encounter   24 in bed hasno c/o on o2 no new concerns    in bed  has  no c/o     Patient is tolerating medications. No reported adverse drug reactions.  Available labs, imaging studies, microbiologic studies have been reviewed with pt and family if present.  Assessment & Plan     Admitted for   Acute decompensated heart failure (HCC) [I50.9]  ID following for   Pt from [facility] C auris exposure isolation check surveillance cx  S/p cabg for  Severe multivessel coronary artery disease, STEMI  -sternal wound healing   -no atbx at this time follow clinically  -check procal 0.09 repeat in am   IS   OOB INC ACTIVITY   Asymptomatic bacteruria     Can d/c    DISPOSITION:  REVIEW OF SYSTEMS     As stated above      PHYSICAL EXAMINATION    BP (!) 105/56   Pulse 69   Temp 97.4 °F (36.3 °C) (Oral)   Resp 18   Ht 1.6 m (5' 2.99\")   Wt 57.2 kg (126 lb)   SpO2 99%   BMI 22.33 kg/m²   Temp  Av °F (36.7 °C)  Min: 97.4 °F (36.3 °C)  Max: 98.4 °F (36.9 °C)  CONSTITUTIONAL:  IN BED no apparent distress   ENT:  Normocephalic,   Specimen Description .NOSE     Culture NORMAL RESPIRATORY YANETH    Culture, Respiratory [[id number]]     Order Status: Sent Specimen: Sputum, Suctioned     Respiratory Panel, Molecular, with COVID-19 (Restricted: peds pts or suitable admitted adults) [[id number]] Collected: 02/20/24 0945    Order Status: Completed Specimen: Nasopharyngeal Swab Updated: 02/20/24 1331     Specimen Description .NASOPHARYNGEAL SWAB     Adenovirus PCR Not Detected     Coronavirus 229E PCR Not Detected     Coronavirus HKU1 PCR Not Detected     Coronavirus NL63 PCR Not Detected     Coronavirus OC43 PCR Not Detected     SARS-CoV-2, PCR Not Detected     Human Metapneumovirus PCR Not Detected     Rhino/Enterovirus PCR Not Detected     Influenza A by PCR Not Detected     Influenza B by PCR Not Detected     Parainfluenza 1 PCR Not Detected     Parainfluenza 2 PCR Not Detected     Parainfluenza 3 PCR Not Detected     Parainfluenza 4 PCR Not Detected     Resp Syncytial Virus PCR Not Detected     Bordetella parapertussis by PCR Not Detected     B Pertussis by PCR Not Detected     Chlamydia pneumoniae By PCR Not Detected     Mycoplasma pneumo by PCR Not Detected     Comment: Performed by multiplexed nucleic acid assay.               No results found for: \"BC\", \"BLOODCULT2\", \"ORG\"  No results for input(s): \"CDIFFTOXANT\" in the last 72 hours.  No results found for: \"WNDABS\"  Recent Labs     02/20/24  0420 02/21/24  0609 02/22/24  0922   WBC 8.1 7.2 6.5    405 444       Recent Labs     02/21/24  0609 02/22/24  0922    132   K 4.2 4.5   CL 99 96*   CO2 24 27   BUN 14 16   CREATININE 0.9 0.9   GLUCOSE 94 107*   PROT 5.4* 5.6*   LABALBU 2.6* 2.9*   CALCIUM 7.6* 7.8*   BILITOT 0.4 0.4   ALKPHOS 75 88   AST 26 26   ALT 7 8       Lab Results   Component Value Date    SEDRATE 22 (H) 01/30/2024     Lab Results   Component Value Date    CRP <3.0 01/30/2024     Lab Results   Component Value Date    PROCAL 0.09 (H) 02/21/2024     Recent Labs

## 2024-02-22 NOTE — CARE COORDINATION
2/22/2024 1335 CM Note:Pt recently had a CABGX3 at [facility] [place] 2/6/2024  She wears a life vest. She was at [facility] for only a few hours, went home and will need another SNF. Pt has been accepted at [facility] and PRECERT was obtained 2/21 and good through 2/23.  Pt will d/c tomorrow am d/t isolation room is needed and it will be open then.  PAS is scheduled for a 9 am  via w/c, ambulette form completed and placed with chart.  HENS Completed, JOSE is signed.  Rosita liaison updated.  Pt is also aware. Typically, pt lives alone @ [facility] w/no steps to enter & pt was independent w/o any DME. CM will follow. Electronically signed by Lynne Ren RN on 2/22/2024 at 2:19 PM
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
## 2024-02-22 NOTE — PROGRESS NOTES
Reviewed and encouraged use of I.S. for patient due to c/o increased shortness of breath with occasional chest pain, intermittently throughout the day. Patient's incision is well approximated and KATI. No changes noted in lung sounds. Patient is placed on 2L for comfort and SPO2 99%. No s/s of acute distress or dyspnea noted. Norco given as well for pain and patient is agreeable to POC.

## 2024-02-22 NOTE — PROGRESS NOTES
[facility]        CARDIOLOGY                 INPATIENT PROGRESS NOTE          PATIENT SEEN IN FOLLOW UP FOR: CHF    Hospital Day: 3     Katt Diaz is a 78 year old patient known to Dr. Shaw      SUBJECTIVE: Denies any CP, breathing better, No rthopnea. Edema better    ROS: Review of rest of 10 systems negative except as mentioned above    OBJECTIVE: No acute distress.  See Assessment     Diagnostics:       Telemetry: Reviewed    TTE 2/15/2024 dr. Dias    Left Ventricle: The EF by visual approximation is 30 - 35%. Findings consistent with moderate concentric hypertrophy. Severe global hypokinesis present. There is inferior akinesis. Abnormal septal motion consistent with abnormal electrical activation.    Right Ventricle: Moderately reduced systolic function.    Mitral Valve: Mild to moderate regurgitation.    Tricuspid Valve: Moderate regurgitation.    Left Atrium: Left atrium size is normal.    Right Atrium: Right atrium is mildly dilated.    Limted study to assess biventricular function.    Image quality is adequate.      Intake/Output Summary (Last 24 hours) at 2/22/2024 1029  Last data filed at 2/22/2024 0843  Gross per 24 hour   Intake 270 ml   Output 600 ml   Net -330 ml       Labs:   CBC:   Recent Labs     02/21/24  0609 02/22/24  0922   WBC 7.2 6.5   HGB 8.9* 9.2*   HCT 27.2* 29.0*    444     BMP:   Recent Labs     02/21/24  0609 02/22/24  0922    132   K 4.2 4.5   CO2 24 27   BUN 14 16   CREATININE 0.9 0.9   LABGLOM >60 >60   CALCIUM 7.6* 7.8*     Mag:   Recent Labs     02/21/24  0609 02/22/24  0922   MG 2.0 2.1     Phos: No results for input(s): \"PHOS\" in the last 72 hours.  TSH: No results for input(s): \"TSH\" in the last 72 hours.  HgA1c:     BNP: No results for input(s): \"BNP\" in the last 72 hours.  PT/INR: No results for input(s): \"PROTIME\", \"INR\" in the last 72 hours.  APTT:No results for input(s): \"APTT\" in the last 72 hours.  CARDIAC ENZYMES:  Recent

## 2024-02-22 NOTE — DISCHARGE INSTR - COC
Continuity of Care Form    Patient Name: Katt Diaz   :  1945  MRN:  [id number]    Admit date:  2024  Discharge date:  24    Code Status Order: Full Code   Advance Directives:     Admitting Physician:  Dre Hubbard DO  PCP: Rose Rea DO    Discharging Nurse: RULA Block  Discharging Hospital Unit/Room#: 0624/0624-02  Discharging Unit Phone Number: [phone]    Emergency Contact:   Extended Emergency Contact Information  Primary Emergency Contact: ROSE DIAZ  Home Phone: [phone]  Mobile Phone: [phone]  Relation: Child  Preferred language: English   needed? No  Secondary Emergency Contact: LOCO DIAZ  Home Phone: [phone]  Mobile Phone: [phone]  Relation: Child  Preferred language: English   needed? No    Past Surgical History:  Past Surgical History:   Procedure Laterality Date    CARDIAC PROCEDURE N/A 2024    Left heart cath / coronary angiography performed by Arnav Shaw MD at [facility] [place] [place] CARDIAC CATH LAB    CORONARY ARTERY BYPASS GRAFT N/A 2024    CORONARY ARTERY BYPASS GRAFTING, EVH performed by Nicolasa Jasso DO at [facility] [place] [place] OR    IR TUNNELED CATHETER PLACEMENT GREATER THAN 5 YEARS  2024    IR TUNNELED CATHETER PLACEMENT GREATER THAN 5 YEARS 2024 CuongREYNA melchor MD [facility] [place] [place] SPECIAL PROCEDURES    PARTIAL HYSTERECTOMY (CERVIX NOT REMOVED)      states     VEIN LIGATION AND STRIPPING      states        Immunization History:     There is no immunization history on file for this patient.    Active Problems:  Patient Active Problem List   Diagnosis Code    S/P cardiac cath Z98.890    CAD in native artery I25.10    ST elevation myocardial infarction (STEMI) ([facility]) I21.3    Postoperative hypotension I95.81    Complication of surgical procedure T81.9XXA    Stress hyperglycemia R73.9    VERONIQUE (acute kidney injury) ([facility]) N17.9    Acute on chronic systolic heart failure ([facility]) I50.23    Acute congestive heart failure ([facility]) I50.9  2/22/2024 0843  Gross per 24 hour   Intake 270 ml   Output 600 ml   Net -330 ml     I/O last 3 completed shifts:  In: 480 [P.O.:480]  Out: 1950 [Urine:1950]    Safety Concerns:     At Risk for Falls    Impairments/Disabilities:      None    Nutrition Therapy:  Current Nutrition Therapy:   - Oral Diet:  Low Sodium (2gm)    Routes of Feeding: Oral  Liquids: Thin Liquids  Daily Fluid Restriction: no  Last Modified Barium Swallow with Video (Video Swallowing Test): not done    Treatments at the Time of Hospital Discharge:   Respiratory Treatments: ***  Oxygen Therapy:  is on oxygen at 1 L/min per nasal cannula.  Ventilator:    - No ventilator support    Rehab Therapies: Physical Therapy and Occupational Therapy  Weight Bearing Status/Restrictions: No weight bearing restrictions  Other Medical Equipment (for information only, NOT a DME order):  wheelchair, walker, bedside commode, and hospital bed  Other Treatments: ***    Patient's personal belongings (please select all that are sent with patient):  ***    RN SIGNATURE:  Electronically signed by Maulik Hyatt RN on 2/23/24 at 8:53 AM EST    CASE MANAGEMENT/SOCIAL WORK SECTION    Inpatient Status Date: 2/20/2024    Readmission Risk Assessment Score:  Readmission Risk              Risk of Unplanned Readmission:  22           Discharging to Facility/ Agency   Name: [facility]  Address:[address] [place] [id number]  [phone]  Fax:    Dialysis Facility (if applicable)   Name:  Address:  Dialysis Schedule:  Phone:  Fax:    / signature: Electronically signed by Lynne Ren RN on 2/22/24 at 12:30 PM EST    PHYSICIAN SECTION    Prognosis: Good    Condition at Discharge: Stable    Rehab Potential (if transferring to Rehab): Good    Recommended Labs or Other Treatments After Discharge: PT/OT    Physician Certification: I certify the above information and transfer of Katt BURLESON English  is necessary for the continuing

## 2024-02-23 ENCOUNTER — TELEPHONE (OUTPATIENT)
Dept: OTHER | Age: 79
End: 2024-02-23

## 2024-02-23 VITALS
HEIGHT: 63 IN | SYSTOLIC BLOOD PRESSURE: 120 MMHG | HEART RATE: 69 BPM | BODY MASS INDEX: 22.32 KG/M2 | TEMPERATURE: 97.5 F | WEIGHT: 126 LBS | RESPIRATION RATE: 16 BRPM | OXYGEN SATURATION: 96 % | DIASTOLIC BLOOD PRESSURE: 74 MMHG

## 2024-02-23 DIAGNOSIS — Z13.9 ENCOUNTER FOR SCREENING INVOLVING SOCIAL DETERMINANTS OF HEALTH (SDOH): Primary | ICD-10-CM

## 2024-02-23 DIAGNOSIS — Z59.82 LACK OF ACCESS TO TRANSPORTATION: ICD-10-CM

## 2024-02-23 LAB
ALBUMIN SERPL-MCNC: 2.7 G/DL (ref 3.5–5.2)
ALP SERPL-CCNC: 86 U/L (ref 35–104)
ALT SERPL-CCNC: 8 U/L (ref 0–32)
ANION GAP SERPL CALCULATED.3IONS-SCNC: 9 MMOL/L (ref 7–16)
AST SERPL-CCNC: 26 U/L (ref 0–31)
BASOPHILS # BLD: 0.02 K/UL (ref 0–0.2)
BASOPHILS NFR BLD: 0 % (ref 0–2)
BILIRUB SERPL-MCNC: 0.4 MG/DL (ref 0–1.2)
BUN SERPL-MCNC: 15 MG/DL (ref 6–23)
CALCIUM SERPL-MCNC: 7.5 MG/DL (ref 8.6–10.2)
CHLORIDE SERPL-SCNC: 95 MMOL/L (ref 98–107)
CO2 SERPL-SCNC: 26 MMOL/L (ref 22–29)
CREAT SERPL-MCNC: 0.9 MG/DL (ref 0.5–1)
EKG ATRIAL RATE: 81 BPM
EKG P AXIS: 83 DEGREES
EKG P-R INTERVAL: 132 MS
EKG Q-T INTERVAL: 446 MS
EKG QRS DURATION: 62 MS
EKG QTC CALCULATION (BAZETT): 518 MS
EKG R AXIS: -51 DEGREES
EKG T AXIS: -68 DEGREES
EKG VENTRICULAR RATE: 81 BPM
EOSINOPHIL # BLD: 0.06 K/UL (ref 0.05–0.5)
EOSINOPHILS RELATIVE PERCENT: 1 % (ref 0–6)
ERYTHROCYTE [DISTWIDTH] IN BLOOD BY AUTOMATED COUNT: 15.9 % (ref 11.5–15)
GFR SERPL CREATININE-BSD FRML MDRD: >60 ML/MIN/1.73M2
GLUCOSE SERPL-MCNC: 99 MG/DL (ref 74–99)
HCT VFR BLD AUTO: 29.2 % (ref 34–48)
HGB BLD-MCNC: 9.1 G/DL (ref 11.5–15.5)
IMM GRANULOCYTES # BLD AUTO: 0.04 K/UL (ref 0–0.58)
IMM GRANULOCYTES NFR BLD: 1 % (ref 0–5)
LYMPHOCYTES NFR BLD: 1.72 K/UL (ref 1.5–4)
LYMPHOCYTES RELATIVE PERCENT: 28 % (ref 20–42)
MAGNESIUM SERPL-MCNC: 2.1 MG/DL (ref 1.6–2.6)
MCH RBC QN AUTO: 28.6 PG (ref 26–35)
MCHC RBC AUTO-ENTMCNC: 31.2 G/DL (ref 32–34.5)
MCV RBC AUTO: 91.8 FL (ref 80–99.9)
MONOCYTES NFR BLD: 0.43 K/UL (ref 0.1–0.95)
MONOCYTES NFR BLD: 7 % (ref 2–12)
NEUTROPHILS NFR BLD: 64 % (ref 43–80)
NEUTS SEG NFR BLD: 3.94 K/UL (ref 1.8–7.3)
PLATELET # BLD AUTO: 443 K/UL (ref 130–450)
PMV BLD AUTO: 9.6 FL (ref 7–12)
POTASSIUM SERPL-SCNC: 4.4 MMOL/L (ref 3.5–5)
PROT SERPL-MCNC: 5.7 G/DL (ref 6.4–8.3)
RBC # BLD AUTO: 3.18 M/UL (ref 3.5–5.5)
SODIUM SERPL-SCNC: 130 MMOL/L (ref 132–146)
WBC OTHER # BLD: 6.2 K/UL (ref 4.5–11.5)

## 2024-02-23 PROCEDURE — 6370000000 HC RX 637 (ALT 250 FOR IP): Performed by: INTERNAL MEDICINE

## 2024-02-23 PROCEDURE — 80053 COMPREHEN METABOLIC PANEL: CPT

## 2024-02-23 PROCEDURE — 93010 ELECTROCARDIOGRAM REPORT: CPT | Performed by: INTERNAL MEDICINE

## 2024-02-23 PROCEDURE — 6360000002 HC RX W HCPCS: Performed by: INTERNAL MEDICINE

## 2024-02-23 PROCEDURE — 36415 COLL VENOUS BLD VENIPUNCTURE: CPT

## 2024-02-23 PROCEDURE — 83735 ASSAY OF MAGNESIUM: CPT

## 2024-02-23 PROCEDURE — 85025 COMPLETE CBC W/AUTO DIFF WBC: CPT

## 2024-02-23 RX ADMIN — POTASSIUM CHLORIDE 20 MEQ: 1500 TABLET, EXTENDED RELEASE ORAL at 08:20

## 2024-02-23 RX ADMIN — HYDROCODONE BITARTRATE AND ACETAMINOPHEN 1 TABLET: 5; 325 TABLET ORAL at 02:13

## 2024-02-23 RX ADMIN — AMIODARONE HYDROCHLORIDE 400 MG: 200 TABLET ORAL at 08:18

## 2024-02-23 RX ADMIN — MAGNESIUM OXIDE 400 MG: 400 TABLET ORAL at 08:19

## 2024-02-23 RX ADMIN — FOLIC ACID 1 MG: 1 TABLET ORAL at 08:20

## 2024-02-23 RX ADMIN — METOPROLOL SUCCINATE 25 MG: 25 TABLET, EXTENDED RELEASE ORAL at 08:19

## 2024-02-23 RX ADMIN — HYDROCODONE BITARTRATE AND ACETAMINOPHEN 1 TABLET: 5; 325 TABLET ORAL at 08:19

## 2024-02-23 RX ADMIN — PANTOPRAZOLE SODIUM 40 MG: 40 TABLET, DELAYED RELEASE ORAL at 05:12

## 2024-02-23 RX ADMIN — LEVOTHYROXINE SODIUM 100 MCG: 0.05 TABLET ORAL at 05:12

## 2024-02-23 RX ADMIN — FERROUS SULFATE TAB 325 MG (65 MG ELEMENTAL FE) 325 MG: 325 (65 FE) TAB at 08:18

## 2024-02-23 RX ADMIN — DOCUSATE SODIUM 100 MG: 100 CAPSULE, LIQUID FILLED ORAL at 08:18

## 2024-02-23 RX ADMIN — OXYCODONE HYDROCHLORIDE AND ACETAMINOPHEN 500 MG: 500 TABLET ORAL at 08:19

## 2024-02-23 RX ADMIN — ENOXAPARIN SODIUM 40 MG: 100 INJECTION SUBCUTANEOUS at 08:20

## 2024-02-23 RX ADMIN — ASPIRIN 81 MG CHEWABLE TABLET 81 MG: 81 TABLET CHEWABLE at 08:19

## 2024-02-23 RX ADMIN — FUROSEMIDE 40 MG: 40 TABLET ORAL at 08:20

## 2024-02-23 RX ADMIN — CLOPIDOGREL BISULFATE 75 MG: 75 TABLET ORAL at 08:19

## 2024-02-23 SDOH — ECONOMIC STABILITY - TRANSPORTATION SECURITY: TRANSPORTATION INSECURITY: Z59.82

## 2024-02-23 ASSESSMENT — PAIN DESCRIPTION - ORIENTATION
ORIENTATION: MID

## 2024-02-23 ASSESSMENT — PAIN DESCRIPTION - DESCRIPTORS
DESCRIPTORS: ACHING;DISCOMFORT

## 2024-02-23 ASSESSMENT — PAIN SCALES - GENERAL
PAINLEVEL_OUTOF10: 5

## 2024-02-23 ASSESSMENT — PAIN - FUNCTIONAL ASSESSMENT
PAIN_FUNCTIONAL_ASSESSMENT: PREVENTS OR INTERFERES SOME ACTIVE ACTIVITIES AND ADLS

## 2024-02-23 ASSESSMENT — PAIN DESCRIPTION - FREQUENCY: FREQUENCY: CONTINUOUS

## 2024-02-23 ASSESSMENT — PAIN DESCRIPTION - LOCATION
LOCATION: CHEST

## 2024-02-23 ASSESSMENT — PAIN DESCRIPTION - PAIN TYPE: TYPE: ACUTE PAIN

## 2024-02-23 ASSESSMENT — PAIN DESCRIPTION - ONSET: ONSET: AWAKENED FROM SLEEP

## 2024-02-23 NOTE — CONSULTS
HFNN Note  Attempted to see patient. Patient already discharged to SNF prior to being seen. Follow-up appointments already scheduled from previous admission.  Future Appointments   Date Time Provider Department Center   2/29/2024 12:45 PM [facility] CHF ROOM 1 [facility]   3/1/2024  1:30 PM Jaylene Arroyo MD [facility] NEUROLO [facility]   3/4/2024  8:30 AM Kusum Fonseca, APRN - CNP [facility]   3/7/2024 11:30 AM Nicolasa Jasso, DO CARDIO SURG [facility]   4/1/2024 10:50 AM Jaylene Arroyo MD [facility] NEURO Neurology -

## 2024-02-23 NOTE — DISCHARGE INSTRUCTIONS
***HEART FAILURE - CONGESTIVE HEART FAILURE***  DISCHARGE INSTRUCTIONS:  GUIDELINES TO FOLLOW AT [facility]ACO/LTAC/SNF/ Assisted Living    Future Appointments   Date Time Provider Department Center   2/29/2024 12:45 PM [facility]   3/1/2024  1:30 PM Jaylene Arroyo MD [facility] NEUROLO [facility]   3/4/2024  8:30 AM Kusum Fonseca, APRN - CNP [facility]   3/7/2024 11:30 AM Nicolasa Jasso, DO CARDIO SURG [facility]   4/1/2024 10:50 AM Jaylene Arroyo MD [facility] NEURO Neurology -          MEDICATIONS:  Please notify the doctor if patient is not able to take their medications or if medications are being held for any reasons (such as low blood pressure ect.)  Do not give the patient ibuprofen (Advil or Motrin), naproxen (Aleve) without talking to the doctor first. This could make their heart failure worse.         WEIGHT MONITORING:   Weigh patient every day in the morning after they void (If patient is able to stand, please get a standing weight.)   Notify the doctor of a weight gain of 3 pounds or more in 1 day   OR  a total of 5 pounds or more in 1 week             DIET   Cardiac heart healthy diet:  Low sodium diet: no  more than 2,000mg (2 grams) of salt / sodium per day (which equals to a little less than  a teaspoon of salt)/ Cardiac Diet: Low saturated / low trans fat, no added salt, caffeine restricted    If patient is there for rehab and will be returning home in the near future; reinforce with the patient and the family to follow a low sodium diet (2,000 mg)- avoid using salt at the table, avoid / limit use of canned soups, processed / packaged foods, salted snacks, olives and pickles.  Do not use a salt substitute without checking with the doctor. (Mrs. Guerra is safe to use).       NOTIFY THE DOCTOR THE FIRST DAY OF ONSET OF ANY OF THESE   SYMPTOMS:   Weight gain of 3 pounds or more in 1 day         OR 5 pounds or more in one week  More shortness of breath  More swelling  in stomach, legs, ankles or feet  Feeling more tired, No energy  Dry hacky cough  Dizziness  More chest pain / discomfort  Hard time breathing laying down     Lilibeth CARSON

## 2024-02-23 NOTE — PROGRESS NOTES
Patient is discharged to [facility] at this time via physician's ambulance. Life vest and all equipment went with patient, as well as cell phone and

## 2024-02-23 NOTE — PLAN OF CARE
Patient's chart updated to reflect:        - HF discharge instructions.  -Orders: 2 gram sodium diet, daily weights, I/O.  -PCP and cardiology follow up appointments to be scheduled within 7 days of hospital discharge.  -CHF education session will be provided to the patient prior to hospital discharge.    Silvia Ruiz, RN MSN,RN  Heart Failure Navigator

## 2024-02-23 NOTE — TELEPHONE ENCOUNTER
CHF CHW Initial Call  2/23/2024  1:44 PM    CHW received referral from Niki Mendes RN.  Patient need: transportation to appointment from [facility].   Notes: CHW called patient to assist with transportation to appointment. I called [facility] and spoke with Lilly. I told her up coming appointments for CHF Clinic to make sure pt had transportation. They confirmed they had her appointment on 2/29/24, but they needed the appointment for 3/4/24 @8:30AM      Patient in agreement with plan and further assistance.  [x] Agreed    [] Declined      CHW informed patient of the services and resources that can be provided to them. CHW instructed patient to call CHF CHW at [phone] for any future assistance.     Electronically signed by Milagros Mckeon on 2/23/2024 at 1:44 PM

## 2024-02-23 NOTE — CARE COORDINATION
2/23/2024 0900 CM Note:  Pt is being discharged to [facility].  PAS her to transport pt via w/c.  HENS completed, JOSE is signed.  Nurse to nurse called by Mckayla HORTA.  Electronically signed by Lynne Ren RN on 2/23/2024 at 9:29 AM

## 2024-02-23 NOTE — PLAN OF CARE
Problem: Safety - Adult  Goal: Free from fall injury  2/23/2024 0021 by Maira Pérez RN  Outcome: Progressing  2/22/2024 1724 by Mckayla Gregory RN  Outcome: Progressing     Problem: Discharge Planning  Goal: Discharge to home or other facility with appropriate resources  2/23/2024 0021 by Maira Pérez RN  Outcome: Progressing  Flowsheets (Taken 2/22/2024 2101)  Discharge to home or other facility with appropriate resources: Identify barriers to discharge with patient and caregiver  2/22/2024 1724 by Mckayla Gregory RN  Outcome: Progressing  Flowsheets (Taken 2/22/2024 0900)  Discharge to home or other facility with appropriate resources:   Identify barriers to discharge with patient and caregiver   Arrange for needed discharge resources and transportation as appropriate   Identify discharge learning needs (meds, wound care, etc)   Refer to discharge planning if patient needs post-hospital services based on physician order or complex needs related to functional status, cognitive ability or social support system     Problem: Pain  Goal: Verbalizes/displays adequate comfort level or baseline comfort level  2/23/2024 0021 by Maira Pérez RN  Outcome: Progressing  2/22/2024 1724 by Mckayla Gregory RN  Outcome: Progressing  Flowsheets (Taken 2/22/2024 1001)  Verbalizes/displays adequate comfort level or baseline comfort level:   Encourage patient to monitor pain and request assistance   Assess pain using appropriate pain scale   Administer analgesics based on type and severity of pain and evaluate response   Implement non-pharmacological measures as appropriate and evaluate response   Notify Licensed Independent Practitioner if interventions unsuccessful or patient reports new pain     Problem: Nutrition Deficit:  Goal: Optimize nutritional status  2/23/2024 0021 by Maira Pérez RN  Outcome: Progressing  2/22/2024 1724 by Mckayla Gregory RN  Outcome: Progressing     Problem: Chronic Conditions

## 2024-02-25 LAB
SEND OUT REPORT: NORMAL
TEST NAME: NORMAL

## 2024-02-26 RX ORDER — FLUDROCORTISONE ACETATE 0.1 MG/1
0.1 TABLET ORAL DAILY
Qty: 30 TABLET | Refills: 2 | Status: SHIPPED | OUTPATIENT
Start: 2024-02-26

## 2024-02-26 NOTE — TELEPHONE ENCOUNTER
Requested Prescriptions     Pending Prescriptions Disp Refills    fludrocortisone (FLORINEF) 0.1 MG tablet 30 tablet 2     Sig: Take 1 tablet by mouth daily

## 2024-02-26 NOTE — TELEPHONE ENCOUNTER
The following medication has been approved and sent to your pharmacy    Requested Prescriptions     Signed Prescriptions Disp Refills    fludrocortisone (FLORINEF) 0.1 MG tablet 30 tablet 2     Sig: Take 1 tablet by mouth daily     Authorizing Provider: WILIAN RHODES

## 2024-02-28 LAB
ANION GAP SERPL CALCULATED.3IONS-SCNC: 10 MMOL/L (ref 7–16)
BASOPHILS # BLD: 0.02 K/UL (ref 0–0.2)
BASOPHILS NFR BLD: 0 % (ref 0–2)
BUN SERPL-MCNC: 18 MG/DL (ref 6–23)
CALCIUM SERPL-MCNC: 8 MG/DL (ref 8.6–10.2)
CHLORIDE SERPL-SCNC: 95 MMOL/L (ref 98–107)
CO2 SERPL-SCNC: 27 MMOL/L (ref 22–29)
CREAT SERPL-MCNC: 1 MG/DL (ref 0.5–1)
EOSINOPHIL # BLD: 0.11 K/UL (ref 0.05–0.5)
EOSINOPHILS RELATIVE PERCENT: 2 % (ref 0–6)
ERYTHROCYTE [DISTWIDTH] IN BLOOD BY AUTOMATED COUNT: 16.6 % (ref 11.5–15)
GFR SERPL CREATININE-BSD FRML MDRD: 55 ML/MIN/1.73M2
GLUCOSE SERPL-MCNC: 101 MG/DL (ref 74–99)
HCT VFR BLD AUTO: 29.9 % (ref 34–48)
HGB BLD-MCNC: 9.1 G/DL (ref 11.5–15.5)
IMM GRANULOCYTES # BLD AUTO: 0.03 K/UL (ref 0–0.58)
IMM GRANULOCYTES NFR BLD: 1 % (ref 0–5)
LYMPHOCYTES NFR BLD: 1.6 K/UL (ref 1.5–4)
LYMPHOCYTES RELATIVE PERCENT: 34 % (ref 20–42)
MCH RBC QN AUTO: 29.1 PG (ref 26–35)
MCHC RBC AUTO-ENTMCNC: 30.4 G/DL (ref 32–34.5)
MCV RBC AUTO: 95.5 FL (ref 80–99.9)
MONOCYTES NFR BLD: 0.47 K/UL (ref 0.1–0.95)
MONOCYTES NFR BLD: 10 % (ref 2–12)
NEUTROPHILS NFR BLD: 53 % (ref 43–80)
NEUTS SEG NFR BLD: 2.5 K/UL (ref 1.8–7.3)
PLATELET # BLD AUTO: 411 K/UL (ref 130–450)
PMV BLD AUTO: 10.6 FL (ref 7–12)
POTASSIUM SERPL-SCNC: 4.4 MMOL/L (ref 3.5–5)
RBC # BLD AUTO: 3.13 M/UL (ref 3.5–5.5)
SODIUM SERPL-SCNC: 132 MMOL/L (ref 132–146)
WBC OTHER # BLD: 4.7 K/UL (ref 4.5–11.5)

## 2024-02-29 ENCOUNTER — HOSPITAL ENCOUNTER (OUTPATIENT)
Dept: OTHER | Age: 79
Setting detail: THERAPIES SERIES
Discharge: HOME | End: 2024-02-29
Payer: MEDICARE

## 2024-02-29 VITALS
HEART RATE: 72 BPM | BODY MASS INDEX: 22.86 KG/M2 | SYSTOLIC BLOOD PRESSURE: 94 MMHG | WEIGHT: 129 LBS | DIASTOLIC BLOOD PRESSURE: 50 MMHG | OXYGEN SATURATION: 99 % | RESPIRATION RATE: 18 BRPM

## 2024-02-29 LAB — BNP SERPL-MCNC: 6697 PG/ML (ref 0–450)

## 2024-02-29 PROCEDURE — 6360000002 HC RX W HCPCS: Performed by: NURSE PRACTITIONER

## 2024-02-29 PROCEDURE — 36415 COLL VENOUS BLD VENIPUNCTURE: CPT

## 2024-02-29 PROCEDURE — 99205 OFFICE O/P NEW HI 60 MIN: CPT

## 2024-02-29 PROCEDURE — 83880 ASSAY OF NATRIURETIC PEPTIDE: CPT

## 2024-02-29 PROCEDURE — 96374 THER/PROPH/DIAG INJ IV PUSH: CPT

## 2024-02-29 PROCEDURE — 2580000003 HC RX 258: Performed by: NURSE PRACTITIONER

## 2024-02-29 RX ORDER — HYDROCODONE BITARTRATE AND ACETAMINOPHEN 5; 325 MG/1; MG/1
1 TABLET ORAL EVERY 6 HOURS PRN
COMMUNITY

## 2024-02-29 RX ORDER — FUROSEMIDE 10 MG/ML
40 INJECTION INTRAMUSCULAR; INTRAVENOUS ONCE
Status: COMPLETED | OUTPATIENT
Start: 2024-02-29 | End: 2024-02-29

## 2024-02-29 RX ORDER — SODIUM CHLORIDE 0.9 % (FLUSH) 0.9 %
5-40 SYRINGE (ML) INJECTION ONCE
Status: COMPLETED | OUTPATIENT
Start: 2024-02-29 | End: 2024-02-29

## 2024-02-29 RX ADMIN — SODIUM CHLORIDE, PRESERVATIVE FREE 10 ML: 5 INJECTION INTRAVENOUS at 13:54

## 2024-02-29 RX ADMIN — FUROSEMIDE 40 MG: 10 INJECTION, SOLUTION INTRAMUSCULAR; INTRAVENOUS at 13:54

## 2024-02-29 NOTE — PROGRESS NOTES
Patient using pill packing pharmacy   [] CPAP/BiPAP use  [] Low impact exercise / cardiac rehab   [] LifeVest use  [x] Patient aware of signs and symptoms of worsening HF, CHF clinic phone number provided and made aware to call clinic for sooner if evaluation if needed     [] Difficulty affording medications  [] CHF CHW consulted  [] Prescription assistance information given   [] [facility] medication assistance program information given   [] Sample medications provided to patient to help bridge gap until affordability N/A    [x] PHQ assessment completed while in CHF clinic (1st visit, every 3 months and PRN)       No data to display              Interpretation of Total Score Depression Severity:   1-4 = Minimal depression  5-9 = Mild depression  10-14 = Moderate depression  15-19 = Moderately severe depression  20-27 = Severe depression   [] Patient provided community resources for counseling services  [] PCP called and PHQ result faxed   [] Behavorial health consultant called    Scheduled to follow up in CHF clinic on:     Future Appointments   Date Time Provider Department Center   3/4/2024  8:30 AM Kuusm Fonseca, APRN - CNP [facility] CHF Reader   3/7/2024 11:30 AM Nicolasa Jasso, DO CARDIO SURG [facility]   4/1/2024 10:50 AM Jaylene Arroyo MD [facility] [place] NEURO Neurology -

## 2024-03-01 ENCOUNTER — TELEPHONE (OUTPATIENT)
Dept: CARDIOLOGY CLINIC | Age: 79
End: 2024-03-01

## 2024-03-01 NOTE — TELEPHONE ENCOUNTER
----- Message from PALMER Smith - CNS sent at 2/23/2024  8:43 AM EST -----  Regarding: follow up with Ghrair in 3 weeks please  follow up with Ghrair in 3 weeks please    Thank you  Grace

## 2024-03-04 ENCOUNTER — HOSPITAL ENCOUNTER (OUTPATIENT)
Dept: OTHER | Age: 79
Setting detail: THERAPIES SERIES
Discharge: HOME | End: 2024-03-04
Payer: MEDICARE

## 2024-03-04 VITALS
SYSTOLIC BLOOD PRESSURE: 99 MMHG | HEART RATE: 58 BPM | WEIGHT: 126.5 LBS | DIASTOLIC BLOOD PRESSURE: 54 MMHG | OXYGEN SATURATION: 100 % | BODY MASS INDEX: 22.41 KG/M2

## 2024-03-04 LAB
ALBUMIN SERPL-MCNC: 3.2 G/DL (ref 3.5–5.2)
ALP SERPL-CCNC: 94 U/L (ref 35–104)
ALT SERPL-CCNC: 7 U/L (ref 0–32)
ANION GAP SERPL CALCULATED.3IONS-SCNC: 11 MMOL/L (ref 7–16)
AST SERPL-CCNC: 16 U/L (ref 0–31)
BILIRUB SERPL-MCNC: 0.2 MG/DL (ref 0–1.2)
BUN SERPL-MCNC: 19 MG/DL (ref 6–23)
CALCIUM SERPL-MCNC: 8.2 MG/DL (ref 8.6–10.2)
CHLORIDE SERPL-SCNC: 94 MMOL/L (ref 98–107)
CO2 SERPL-SCNC: 29 MMOL/L (ref 22–29)
CREAT SERPL-MCNC: 1 MG/DL (ref 0.5–1)
GFR SERPL CREATININE-BSD FRML MDRD: 55 ML/MIN/1.73M2
GLUCOSE SERPL-MCNC: 116 MG/DL (ref 74–99)
POTASSIUM SERPL-SCNC: 4 MMOL/L (ref 3.5–5)
PROT SERPL-MCNC: 6.1 G/DL (ref 6.4–8.3)
SODIUM SERPL-SCNC: 134 MMOL/L (ref 132–146)

## 2024-03-04 PROCEDURE — 96374 THER/PROPH/DIAG INJ IV PUSH: CPT

## 2024-03-04 PROCEDURE — 93292 WCD DEVICE INTERROGATE: CPT | Performed by: NURSE PRACTITIONER

## 2024-03-04 PROCEDURE — 99214 OFFICE O/P EST MOD 30 MIN: CPT

## 2024-03-04 PROCEDURE — 99215 OFFICE O/P EST HI 40 MIN: CPT | Performed by: NURSE PRACTITIONER

## 2024-03-04 PROCEDURE — 36415 COLL VENOUS BLD VENIPUNCTURE: CPT

## 2024-03-04 PROCEDURE — 6360000002 HC RX W HCPCS: Performed by: NURSE PRACTITIONER

## 2024-03-04 PROCEDURE — 99215 OFFICE O/P EST HI 40 MIN: CPT

## 2024-03-04 PROCEDURE — 2580000003 HC RX 258: Performed by: NURSE PRACTITIONER

## 2024-03-04 PROCEDURE — 80053 COMPREHEN METABOLIC PANEL: CPT

## 2024-03-04 RX ORDER — BUMETANIDE 0.25 MG/ML
2 INJECTION INTRAMUSCULAR; INTRAVENOUS ONCE
Status: DISCONTINUED | OUTPATIENT
Start: 2024-03-04 | End: 2024-03-04 | Stop reason: ALTCHOICE

## 2024-03-04 RX ORDER — HYDROXYZINE PAMOATE 25 MG/1
25 CAPSULE ORAL EVERY 8 HOURS
COMMUNITY

## 2024-03-04 RX ORDER — ACETAMINOPHEN 325 MG/1
650 TABLET ORAL EVERY 4 HOURS PRN
COMMUNITY

## 2024-03-04 RX ORDER — TORSEMIDE 20 MG/1
20 TABLET ORAL DAILY
Qty: 90 TABLET | Refills: 3 | Status: SHIPPED | OUTPATIENT
Start: 2024-03-04

## 2024-03-04 RX ORDER — SODIUM CHLORIDE 0.9 % (FLUSH) 0.9 %
5-40 SYRINGE (ML) INJECTION ONCE
Status: COMPLETED | OUTPATIENT
Start: 2024-03-04 | End: 2024-03-04

## 2024-03-04 RX ORDER — BISACODYL 10 MG
10 SUPPOSITORY, RECTAL RECTAL DAILY
COMMUNITY

## 2024-03-04 RX ADMIN — BUMETANIDE 2 MG: 0.25 INJECTION INTRAMUSCULAR; INTRAVENOUS at 09:09

## 2024-03-04 RX ADMIN — SODIUM CHLORIDE, PRESERVATIVE FREE 10 ML: 5 INJECTION INTRAVENOUS at 09:09

## 2024-03-04 ASSESSMENT — PATIENT HEALTH QUESTIONNAIRE - PHQ9
2. FEELING DOWN, DEPRESSED OR HOPELESS: NOT AT ALL
1. LITTLE INTEREST OR PLEASURE IN DOING THINGS: NOT AT ALL
SUM OF ALL RESPONSES TO PHQ9 QUESTIONS 1 & 2: 0

## 2024-03-04 NOTE — DISCHARGE INSTRUCTIONS
Get blood work and will give IV diuretic in CHF clinic today  STOP lasix  START Torsemide 20 mg daily   Continue rest of current cardiac medications   Elevate legs as much as possible  Place compression stockings or ACE wraps on in AM and off in PM  Continue to wear lifevest  Follow up in CHF clinic in 1 week   Follow up with Colin as scheduled  Weigh yourself daily    -Stay Hydrated, 64 oz     -Diet should sodium restricted to 2 grams    -Again watch your daily weight trends and if you gain water weight please follow below instructions.    -If you gain 3-5 pounds in 2-3 days OR notice that you are retaining fluid in anyway just like you did before then take an extra dose of your water pill (Demadex/Torsemide) every day until you lose the weight or feel better.     -If you notice that you have taken more than 2 extra doses in 1 week then please call and let us know.    -If at any time you feel that you are retaining fluid, your medications are not working, or you feel ill in anyway, then please call us for either same day appointment or the next day, and for instructions. Our goal is to keep you out of the emergency room and the hospital and we have ways to do it. You just need to call us in a timely manner.     -If you become sick for other reasons, and notice that you are not urinating as much, the urine is very dark, you have significant diarrhea or vomiting, then please DO NOT take your water pill and CALL US immediately.

## 2024-03-04 NOTE — PROGRESS NOTES
taking RAASi)  No      Review of Systems:   Cardiac: As per HPI  General: No fever, chills, rigors  Pulmonary: As per HPI  HEENT: No visual disturbances, difficult swallowing  GI: No nausea, vomiting, abdominal pain  : No dysuria or hematuria  Endocrine: No thyroid disease or diabetes  Musculoskeletal: ARROYO x 4, no focal motor deficits  Skin: Intact, no rashes  Neuro/Psych: No headache or seizures      Weights:  Wt Readings from Last 3 Encounters:   03/04/24 57.4 kg (126 lb 8 oz)   02/29/24 58.5 kg (129 lb)   02/23/24 57.2 kg (126 lb)         Physical Examination:     BP (!) 99/54   Pulse 58   Wt 57.4 kg (126 lb 8 oz)   SpO2 100%   BMI 22.41 kg/m²     CONSTITUTIONAL: Alert and oriented times 3, no acute distress and cooperative to examination with proper mood and affect.  SKIN: Skin color, texture, turgor normal. No rashes or lesions. Mid sternal incision well approximated.   LYMPH: no cervical nodes, no inguinal nodes  HEENT: Head is normocephalic, atraumatic. EOMI, PERRLA.  NECK: Supple, symmetrical, trachea midline, no adenopathy, thyroid symmetric, not enlarged and no tenderness, skin normal.  CHEST/LUNGS: chest symmetric with normal A/P diameter, normal respiratory rate and rhythm, lungs clear to auscultation without wheezes, rales or rhonchi. No accessory muscle use. Scars None   CARDIOVASCULAR: Heart sounds are normal.  Regular rate and rhythm without murmur, gallop or rub. Normal S1 and S2.. Carotid and femoral pulses 2+/4 and equal bilaterally.  ABDOMEN: Normal shape. No and Laparoscopic scar(s) present. Normal bowel sounds.  No bruits. soft, nondistended, no masses or organomegaly. no evidence of hernia. Percussion: Normal without hepatosplenomegally. Tenderness: absent.  RECTAL: deferred, not clinically indicated  NEUROLOGIC: There are no focalizing motor or sensory deficits. CN II-XII are grossly intact..   EXTREMITIES: no cyanosis, no clubbing. 2-3+ bilateral lower extremity edema.         All the

## 2024-03-07 ENCOUNTER — OFFICE VISIT (OUTPATIENT)
Dept: CARDIOTHORACIC SURGERY | Age: 79
End: 2024-03-07

## 2024-03-07 VITALS
DIASTOLIC BLOOD PRESSURE: 52 MMHG | WEIGHT: 128 LBS | SYSTOLIC BLOOD PRESSURE: 99 MMHG | HEIGHT: 63 IN | BODY MASS INDEX: 22.68 KG/M2

## 2024-03-07 DIAGNOSIS — Z95.1 S/P CABG (CORONARY ARTERY BYPASS GRAFT): Primary | ICD-10-CM

## 2024-03-07 PROCEDURE — 99024 POSTOP FOLLOW-UP VISIT: CPT | Performed by: PHYSICIAN ASSISTANT

## 2024-03-12 ENCOUNTER — TELEPHONE (OUTPATIENT)
Dept: OTHER | Age: 79
End: 2024-03-12

## 2024-03-12 ENCOUNTER — HOSPITAL ENCOUNTER (OUTPATIENT)
Dept: OTHER | Age: 79
Setting detail: THERAPIES SERIES
Discharge: HOME | End: 2024-03-12
Payer: MEDICARE

## 2024-03-12 VITALS
SYSTOLIC BLOOD PRESSURE: 112 MMHG | WEIGHT: 128 LBS | HEART RATE: 66 BPM | DIASTOLIC BLOOD PRESSURE: 55 MMHG | BODY MASS INDEX: 22.67 KG/M2 | OXYGEN SATURATION: 96 % | RESPIRATION RATE: 16 BRPM

## 2024-03-12 DIAGNOSIS — I50.9 CONGESTIVE HEART FAILURE, UNSPECIFIED HF CHRONICITY, UNSPECIFIED HEART FAILURE TYPE (HCC): Primary | ICD-10-CM

## 2024-03-12 LAB
ANION GAP SERPL CALCULATED.3IONS-SCNC: 12 MMOL/L (ref 7–16)
BNP SERPL-MCNC: 7403 PG/ML (ref 0–450)
BUN SERPL-MCNC: 19 MG/DL (ref 6–23)
CALCIUM SERPL-MCNC: 8.3 MG/DL (ref 8.6–10.2)
CHLORIDE SERPL-SCNC: 93 MMOL/L (ref 98–107)
CO2 SERPL-SCNC: 30 MMOL/L (ref 22–29)
CREAT SERPL-MCNC: 1 MG/DL (ref 0.5–1)
GFR SERPL CREATININE-BSD FRML MDRD: 56 ML/MIN/1.73M2
GLUCOSE SERPL-MCNC: 97 MG/DL (ref 74–99)
POTASSIUM SERPL-SCNC: 2.9 MMOL/L (ref 3.5–5)
SODIUM SERPL-SCNC: 135 MMOL/L (ref 132–146)

## 2024-03-12 PROCEDURE — 6360000002 HC RX W HCPCS: Performed by: NURSE PRACTITIONER

## 2024-03-12 PROCEDURE — 96374 THER/PROPH/DIAG INJ IV PUSH: CPT

## 2024-03-12 PROCEDURE — 83880 ASSAY OF NATRIURETIC PEPTIDE: CPT

## 2024-03-12 PROCEDURE — 80048 BASIC METABOLIC PNL TOTAL CA: CPT

## 2024-03-12 PROCEDURE — 36415 COLL VENOUS BLD VENIPUNCTURE: CPT

## 2024-03-12 PROCEDURE — 99214 OFFICE O/P EST MOD 30 MIN: CPT

## 2024-03-12 PROCEDURE — 2580000003 HC RX 258: Performed by: NURSE PRACTITIONER

## 2024-03-12 RX ORDER — SODIUM CHLORIDE 0.9 % (FLUSH) 0.9 %
5-40 SYRINGE (ML) INJECTION 2 TIMES DAILY
Status: DISCONTINUED | OUTPATIENT
Start: 2024-03-12 | End: 2024-03-13 | Stop reason: HOSPADM

## 2024-03-12 RX ORDER — FUROSEMIDE 10 MG/ML
40 INJECTION INTRAMUSCULAR; INTRAVENOUS ONCE
Status: COMPLETED | OUTPATIENT
Start: 2024-03-12 | End: 2024-03-12

## 2024-03-12 RX ADMIN — FUROSEMIDE 40 MG: 10 INJECTION, SOLUTION INTRAMUSCULAR; INTRAVENOUS at 12:45

## 2024-03-12 RX ADMIN — SODIUM CHLORIDE, PRESERVATIVE FREE 10 ML: 5 INJECTION INTRAVENOUS at 12:45

## 2024-03-12 ASSESSMENT — PATIENT HEALTH QUESTIONNAIRE - PHQ9
2. FEELING DOWN, DEPRESSED OR HOPELESS: NOT AT ALL
SUM OF ALL RESPONSES TO PHQ9 QUESTIONS 1 & 2: 0
1. LITTLE INTEREST OR PLEASURE IN DOING THINGS: NOT AT ALL

## 2024-03-12 NOTE — PROGRESS NOTES
Reported abnormal potassium of 2.9 to  Sharita CHAKRABORTY-CNS gave orders to have patient take 40 meq K-Dur oral twice today,  40 meq K-Dur oral twice tomorrow and Thursday and repeat BMP on Friday.    Called and spoke with patient and gave the above orders. She verbalized an understanding and repeated instructions back to me.

## 2024-03-12 NOTE — FLOWSHEET NOTE
03/12/24 1205   Over the past 2 weeks, how often have you been bothered by any of the following problems?   Little interest or pleasure in doing things Not at all   Feeling down, depressed, or hopeless Not at all   Patient Health Questionnaire-2 Score 0

## 2024-03-12 NOTE — RESULT ENCOUNTER NOTE
CHF clinic visit and labs reviewed.   Spoke with Eli in CHF clinic   VS: 112/55  Pulse 66  Given IV Lasix for lower extremity edema, patient reports little improvement with change from Lasix to torsemide     BNP 6697>>7403  Potassium 2.9   CO2 29>>30  Chloride 94>>93   BUN/Cr stable    Orders given to increase potassium supplement to 40 meq twice daily today, Wednesday, and Thursday    Repeat BMP Friday for close monitoring of electrolytes

## 2024-03-12 NOTE — PLAN OF CARE
Problem: Chronic Conditions and Co-morbidities  Goal: Patient's chronic conditions and co-morbidity symptoms are monitored and maintained or improved  3/12/2024 1248 by Eli Manning RN  Outcome: Progressing  3/12/2024 1148 by Eli Manning RN  Outcome: Progressing     Problem: Discharge Planning  Goal: Discharge to home or other facility with appropriate resources  3/12/2024 1248 by Eli Manning RN  Outcome: Progressing  3/12/2024 1148 by Eli Manning RN  Outcome: Progressing

## 2024-03-12 NOTE — PROGRESS NOTES
when to take action   [x] Daily weights  [] Scale provided   [x] Low sodium diet (2000 mg)  Barriers to compliance  [] Refuses to monitor diet  [] Socioeconomic difficulties  [] Unable to cook for self (use of frozen meals, can goods, etc)  [] CHF CHW consulted  [] Low sodium meal delivery options given to patient  [] Dietician consulted   [] Low sodium recipes provided  [] Sodium free seasoning provided   [x] Fluid intake 6-8 cups (around 64 oz)  [x] Reviewed currently prescribed medications with patient, educated on importance of compliance and answered any questions regarding their medication  [] Pill box provided to patient  [] Patient using pill packing pharmacy   [] CPAP/BiPAP use  [] Low impact exercise / cardiac rehab   [] LifeVest use  [x] Patient aware of signs and symptoms of worsening HF, CHF clinic phone number provided and made aware to call clinic for sooner if evaluation if needed     [] Difficulty affording medications  [] CHF CHW consulted  [] Prescription assistance information given   [] [facility] medication assistance program information given   [] Sample medications provided to patient to help bridge gap until affordability N/A    [x] PHQ assessment completed while in CHF clinic (1st visit, every 3 months and PRN)       No data to display              Interpretation of Total Score Depression Severity:   1-4 = Minimal depression  5-9 = Mild depression  10-14 = Moderate depression  15-19 = Moderately severe depression  20-27 = Severe depression   [] Patient provided community resources for counseling services  [] PCP called and PHQ result faxed   [] Behavorial health consultant called    Scheduled to follow up in CHF clinic on:     Future Appointments   Date Time Provider Department Center   3/27/2024 12:30 PM [facility] CHF ROOM 2 [facility]   4/1/2024 10:50 AM Jaylene Arroyo MD [facility] NEURO Neurology -   4/5/2024  8:45 AM Arnav Shaw MD [facility] CARDIO [facility]

## 2024-03-15 ENCOUNTER — HOSPITAL ENCOUNTER (OUTPATIENT)
Age: 79
Discharge: HOME | End: 2024-03-15
Payer: MEDICARE

## 2024-03-15 ENCOUNTER — TELEPHONE (OUTPATIENT)
Dept: OTHER | Age: 79
End: 2024-03-15

## 2024-03-15 DIAGNOSIS — I50.9 CONGESTIVE HEART FAILURE, UNSPECIFIED HF CHRONICITY, UNSPECIFIED HEART FAILURE TYPE (HCC): ICD-10-CM

## 2024-03-15 LAB
ANION GAP SERPL CALCULATED.3IONS-SCNC: 11 MMOL/L (ref 7–16)
BUN SERPL-MCNC: 21 MG/DL (ref 6–23)
CALCIUM SERPL-MCNC: 9 MG/DL (ref 8.6–10.2)
CHLORIDE SERPL-SCNC: 96 MMOL/L (ref 98–107)
CO2 SERPL-SCNC: 33 MMOL/L (ref 22–29)
CREAT SERPL-MCNC: 1.1 MG/DL (ref 0.5–1)
GFR SERPL CREATININE-BSD FRML MDRD: 49 ML/MIN/1.73M2
GLUCOSE SERPL-MCNC: 104 MG/DL (ref 74–99)
POTASSIUM SERPL-SCNC: 4.9 MMOL/L (ref 3.5–5)
SODIUM SERPL-SCNC: 140 MMOL/L (ref 132–146)

## 2024-03-15 PROCEDURE — 36415 COLL VENOUS BLD VENIPUNCTURE: CPT

## 2024-03-15 PROCEDURE — 80048 BASIC METABOLIC PNL TOTAL CA: CPT

## 2024-03-15 RX ORDER — POTASSIUM CHLORIDE 20 MEQ/1
20 TABLET, EXTENDED RELEASE ORAL DAILY
Qty: 90 TABLET | Refills: 3 | Status: ON HOLD | OUTPATIENT
Start: 2024-03-15

## 2024-03-15 NOTE — PROGRESS NOTES
Called and spoke with Kusum CAT and reviewed today's labwork. Potassium is improved and following orders given:  Start potassium 20 meq daily     Called and spoke to patient and gave above orders. Patient verbalized an understanding and repeated instructions back to me. Patient expressed to me that legs are still edematous and instructed patient to keep elevated and monitor sodium intake. Instructed patient to wear compression stockings and she expressed to me that they are too difficult to get on. Instructed patient if her edema is not improved to go to ER.

## 2024-03-15 NOTE — RESULT ENCOUNTER NOTE
Labs reviewed  Spoke to Eli HORTA   Potassium improved 2.9 > 4.9 (has taken KDUR 40 meq BID x 3 days)  Start potassium 20 meq daily   Follow up labs at scheduled chf clinic appointment

## 2024-03-16 ENCOUNTER — APPOINTMENT (OUTPATIENT)
Dept: GENERAL RADIOLOGY | Age: 79
End: 2024-03-16
Payer: MEDICARE

## 2024-03-16 ENCOUNTER — HOSPITAL ENCOUNTER (INPATIENT)
Age: 79
LOS: 6 days | Discharge: HOME | End: 2024-03-22
Attending: EMERGENCY MEDICINE | Admitting: INTERNAL MEDICINE
Payer: MEDICARE

## 2024-03-16 DIAGNOSIS — R06.09 DYSPNEA ON EXERTION: Primary | ICD-10-CM

## 2024-03-16 DIAGNOSIS — M79.89 LEG SWELLING: ICD-10-CM

## 2024-03-16 DIAGNOSIS — R79.89 ELEVATED BRAIN NATRIURETIC PEPTIDE (BNP) LEVEL: ICD-10-CM

## 2024-03-16 PROBLEM — I50.23 ACUTE ON CHRONIC SYSTOLIC (CONGESTIVE) HEART FAILURE (HCC): Status: ACTIVE | Noted: 2024-03-16

## 2024-03-16 LAB
ALBUMIN SERPL-MCNC: 3 G/DL (ref 3.5–5.2)
ALP SERPL-CCNC: 96 U/L (ref 35–104)
ALT SERPL-CCNC: 14 U/L (ref 0–32)
ANION GAP SERPL CALCULATED.3IONS-SCNC: 13 MMOL/L (ref 7–16)
AST SERPL-CCNC: 23 U/L (ref 0–31)
BASOPHILS # BLD: 0.03 K/UL (ref 0–0.2)
BASOPHILS NFR BLD: 1 % (ref 0–2)
BILIRUB SERPL-MCNC: 0.3 MG/DL (ref 0–1.2)
BNP SERPL-MCNC: ABNORMAL PG/ML (ref 0–450)
BUN SERPL-MCNC: 22 MG/DL (ref 6–23)
CALCIUM SERPL-MCNC: 8.9 MG/DL (ref 8.6–10.2)
CHLORIDE SERPL-SCNC: 96 MMOL/L (ref 98–107)
CO2 SERPL-SCNC: 32 MMOL/L (ref 22–29)
CREAT SERPL-MCNC: 1.2 MG/DL (ref 0.5–1)
EOSINOPHIL # BLD: 0.05 K/UL (ref 0.05–0.5)
EOSINOPHILS RELATIVE PERCENT: 1 % (ref 0–6)
ERYTHROCYTE [DISTWIDTH] IN BLOOD BY AUTOMATED COUNT: 16.1 % (ref 11.5–15)
GFR SERPL CREATININE-BSD FRML MDRD: 47 ML/MIN/1.73M2
GLUCOSE SERPL-MCNC: 148 MG/DL (ref 74–99)
HCT VFR BLD AUTO: 27.1 % (ref 34–48)
HGB BLD-MCNC: 8.6 G/DL (ref 11.5–15.5)
IMM GRANULOCYTES # BLD AUTO: 0.03 K/UL (ref 0–0.58)
IMM GRANULOCYTES NFR BLD: 1 % (ref 0–5)
INR PPP: 1.1
LYMPHOCYTES NFR BLD: 1.7 K/UL (ref 1.5–4)
LYMPHOCYTES RELATIVE PERCENT: 29 % (ref 20–42)
MAGNESIUM SERPL-MCNC: 1.9 MG/DL (ref 1.6–2.6)
MCH RBC QN AUTO: 29.4 PG (ref 26–35)
MCHC RBC AUTO-ENTMCNC: 31.7 G/DL (ref 32–34.5)
MCV RBC AUTO: 92.5 FL (ref 80–99.9)
MONOCYTES NFR BLD: 0.48 K/UL (ref 0.1–0.95)
MONOCYTES NFR BLD: 8 % (ref 2–12)
NEUTROPHILS NFR BLD: 61 % (ref 43–80)
NEUTS SEG NFR BLD: 3.58 K/UL (ref 1.8–7.3)
PLATELET # BLD AUTO: 284 K/UL (ref 130–450)
PMV BLD AUTO: 10.4 FL (ref 7–12)
POTASSIUM SERPL-SCNC: 4.2 MMOL/L (ref 3.5–5)
PROT SERPL-MCNC: 7.1 G/DL (ref 6.4–8.3)
PROTHROMBIN TIME: 12.2 SEC (ref 9.3–12.4)
RBC # BLD AUTO: 2.93 M/UL (ref 3.5–5.5)
SODIUM SERPL-SCNC: 141 MMOL/L (ref 132–146)
TROPONIN I SERPL HS-MCNC: 184 NG/L (ref 0–9)
TROPONIN I SERPL HS-MCNC: 194 NG/L (ref 0–9)
WBC OTHER # BLD: 5.9 K/UL (ref 4.5–11.5)

## 2024-03-16 PROCEDURE — 96374 THER/PROPH/DIAG INJ IV PUSH: CPT

## 2024-03-16 PROCEDURE — 1200000000 HC SEMI PRIVATE

## 2024-03-16 PROCEDURE — 84484 ASSAY OF TROPONIN QUANT: CPT

## 2024-03-16 PROCEDURE — 85025 COMPLETE CBC W/AUTO DIFF WBC: CPT

## 2024-03-16 PROCEDURE — 80053 COMPREHEN METABOLIC PANEL: CPT

## 2024-03-16 PROCEDURE — 83880 ASSAY OF NATRIURETIC PEPTIDE: CPT

## 2024-03-16 PROCEDURE — 85610 PROTHROMBIN TIME: CPT

## 2024-03-16 PROCEDURE — 83735 ASSAY OF MAGNESIUM: CPT

## 2024-03-16 PROCEDURE — 99285 EMERGENCY DEPT VISIT HI MDM: CPT

## 2024-03-16 PROCEDURE — 71045 X-RAY EXAM CHEST 1 VIEW: CPT

## 2024-03-16 PROCEDURE — 6360000002 HC RX W HCPCS: Performed by: STUDENT IN AN ORGANIZED HEALTH CARE EDUCATION/TRAINING PROGRAM

## 2024-03-16 PROCEDURE — 93005 ELECTROCARDIOGRAM TRACING: CPT | Performed by: EMERGENCY MEDICINE

## 2024-03-16 RX ORDER — MAGNESIUM SULFATE IN WATER 40 MG/ML
2000 INJECTION, SOLUTION INTRAVENOUS PRN
Status: DISCONTINUED | OUTPATIENT
Start: 2024-03-16 | End: 2024-03-22 | Stop reason: HOSPADM

## 2024-03-16 RX ORDER — METOPROLOL SUCCINATE 25 MG/1
25 TABLET, EXTENDED RELEASE ORAL DAILY
Status: DISCONTINUED | OUTPATIENT
Start: 2024-03-17 | End: 2024-03-22 | Stop reason: HOSPADM

## 2024-03-16 RX ORDER — PANTOPRAZOLE SODIUM 40 MG/1
40 TABLET, DELAYED RELEASE ORAL
Status: DISCONTINUED | OUTPATIENT
Start: 2024-03-17 | End: 2024-03-22 | Stop reason: HOSPADM

## 2024-03-16 RX ORDER — GLUCAGON 1 MG/ML
1 KIT INJECTION PRN
Status: DISCONTINUED | OUTPATIENT
Start: 2024-03-16 | End: 2024-03-22 | Stop reason: HOSPADM

## 2024-03-16 RX ORDER — FUROSEMIDE 10 MG/ML
20 INJECTION INTRAMUSCULAR; INTRAVENOUS ONCE
Status: COMPLETED | OUTPATIENT
Start: 2024-03-16 | End: 2024-03-16

## 2024-03-16 RX ORDER — ACETAMINOPHEN 650 MG/1
650 SUPPOSITORY RECTAL EVERY 6 HOURS PRN
Status: DISCONTINUED | OUTPATIENT
Start: 2024-03-16 | End: 2024-03-22 | Stop reason: HOSPADM

## 2024-03-16 RX ORDER — POTASSIUM CHLORIDE 20 MEQ/1
40 TABLET, EXTENDED RELEASE ORAL PRN
Status: DISCONTINUED | OUTPATIENT
Start: 2024-03-16 | End: 2024-03-22 | Stop reason: HOSPADM

## 2024-03-16 RX ORDER — SODIUM CHLORIDE 0.9 % (FLUSH) 0.9 %
5-40 SYRINGE (ML) INJECTION PRN
Status: DISCONTINUED | OUTPATIENT
Start: 2024-03-16 | End: 2024-03-22 | Stop reason: HOSPADM

## 2024-03-16 RX ORDER — LEVOTHYROXINE SODIUM 0.1 MG/1
100 TABLET ORAL DAILY
Status: DISCONTINUED | OUTPATIENT
Start: 2024-03-17 | End: 2024-03-22 | Stop reason: HOSPADM

## 2024-03-16 RX ORDER — ACETAMINOPHEN 325 MG/1
650 TABLET ORAL EVERY 6 HOURS PRN
Status: DISCONTINUED | OUTPATIENT
Start: 2024-03-16 | End: 2024-03-22 | Stop reason: HOSPADM

## 2024-03-16 RX ORDER — ENOXAPARIN SODIUM 100 MG/ML
40 INJECTION SUBCUTANEOUS DAILY
Status: DISCONTINUED | OUTPATIENT
Start: 2024-03-17 | End: 2024-03-22 | Stop reason: HOSPADM

## 2024-03-16 RX ORDER — SODIUM CHLORIDE 9 MG/ML
INJECTION, SOLUTION INTRAVENOUS PRN
Status: DISCONTINUED | OUTPATIENT
Start: 2024-03-16 | End: 2024-03-22 | Stop reason: HOSPADM

## 2024-03-16 RX ORDER — LEVOTHYROXINE SODIUM 100 UG/1
1 CAPSULE ORAL DAILY
Status: DISCONTINUED | OUTPATIENT
Start: 2024-03-17 | End: 2024-03-16 | Stop reason: CLARIF

## 2024-03-16 RX ORDER — DEXTROSE MONOHYDRATE 100 MG/ML
INJECTION, SOLUTION INTRAVENOUS CONTINUOUS PRN
Status: DISCONTINUED | OUTPATIENT
Start: 2024-03-16 | End: 2024-03-22 | Stop reason: HOSPADM

## 2024-03-16 RX ORDER — POLYETHYLENE GLYCOL 3350 17 G/17G
17 POWDER, FOR SOLUTION ORAL DAILY PRN
Status: DISCONTINUED | OUTPATIENT
Start: 2024-03-16 | End: 2024-03-22 | Stop reason: HOSPADM

## 2024-03-16 RX ORDER — ASPIRIN 81 MG/1
81 TABLET, CHEWABLE ORAL DAILY
Status: DISCONTINUED | OUTPATIENT
Start: 2024-03-17 | End: 2024-03-22 | Stop reason: HOSPADM

## 2024-03-16 RX ORDER — MAGNESIUM OXIDE 400 MG/1
400 TABLET ORAL 2 TIMES DAILY
Status: DISCONTINUED | OUTPATIENT
Start: 2024-03-16 | End: 2024-03-22 | Stop reason: HOSPADM

## 2024-03-16 RX ORDER — FLUDROCORTISONE ACETATE 0.1 MG/1
0.1 TABLET ORAL DAILY
Status: DISCONTINUED | OUTPATIENT
Start: 2024-03-17 | End: 2024-03-22 | Stop reason: HOSPADM

## 2024-03-16 RX ORDER — POTASSIUM CHLORIDE 7.45 MG/ML
10 INJECTION INTRAVENOUS PRN
Status: DISCONTINUED | OUTPATIENT
Start: 2024-03-16 | End: 2024-03-22 | Stop reason: HOSPADM

## 2024-03-16 RX ORDER — SODIUM CHLORIDE 0.9 % (FLUSH) 0.9 %
5-40 SYRINGE (ML) INJECTION EVERY 12 HOURS SCHEDULED
Status: DISCONTINUED | OUTPATIENT
Start: 2024-03-16 | End: 2024-03-22 | Stop reason: HOSPADM

## 2024-03-16 RX ORDER — CLOPIDOGREL BISULFATE 75 MG/1
75 TABLET ORAL DAILY
Status: DISCONTINUED | OUTPATIENT
Start: 2024-03-17 | End: 2024-03-22 | Stop reason: HOSPADM

## 2024-03-16 RX ADMIN — FUROSEMIDE 20 MG: 10 INJECTION, SOLUTION INTRAMUSCULAR; INTRAVENOUS at 21:33

## 2024-03-16 ASSESSMENT — PAIN DESCRIPTION - PAIN TYPE: TYPE: ACUTE PAIN

## 2024-03-16 ASSESSMENT — PAIN - FUNCTIONAL ASSESSMENT: PAIN_FUNCTIONAL_ASSESSMENT: 0-10

## 2024-03-16 ASSESSMENT — PAIN DESCRIPTION - DESCRIPTORS: DESCRIPTORS: ACHING

## 2024-03-16 ASSESSMENT — PAIN DESCRIPTION - ORIENTATION: ORIENTATION: RIGHT;LEFT

## 2024-03-16 ASSESSMENT — PAIN DESCRIPTION - FREQUENCY: FREQUENCY: CONTINUOUS

## 2024-03-16 ASSESSMENT — PAIN SCALES - GENERAL: PAINLEVEL_OUTOF10: 7

## 2024-03-16 ASSESSMENT — PAIN DESCRIPTION - ONSET: ONSET: ON-GOING

## 2024-03-17 LAB
ALBUMIN SERPL-MCNC: 3 G/DL (ref 3.5–5.2)
ALP SERPL-CCNC: 96 U/L (ref 35–104)
ALT SERPL-CCNC: 15 U/L (ref 0–32)
ANION GAP SERPL CALCULATED.3IONS-SCNC: 13 MMOL/L (ref 7–16)
AST SERPL-CCNC: 26 U/L (ref 0–31)
BASOPHILS # BLD: 0 K/UL (ref 0–0.2)
BASOPHILS NFR BLD: 0 % (ref 0–2)
BILIRUB SERPL-MCNC: 0.3 MG/DL (ref 0–1.2)
BUN SERPL-MCNC: 22 MG/DL (ref 6–23)
CALCIUM SERPL-MCNC: 8.7 MG/DL (ref 8.6–10.2)
CHLORIDE SERPL-SCNC: 97 MMOL/L (ref 98–107)
CO2 SERPL-SCNC: 27 MMOL/L (ref 22–29)
CREAT SERPL-MCNC: 1.1 MG/DL (ref 0.5–1)
EKG ATRIAL RATE: 73 BPM
EKG P AXIS: 71 DEGREES
EKG P-R INTERVAL: 132 MS
EKG Q-T INTERVAL: 480 MS
EKG QRS DURATION: 96 MS
EKG QTC CALCULATION (BAZETT): 528 MS
EKG R AXIS: 118 DEGREES
EKG T AXIS: -50 DEGREES
EKG VENTRICULAR RATE: 73 BPM
EOSINOPHIL # BLD: 0.12 K/UL (ref 0.05–0.5)
EOSINOPHILS RELATIVE PERCENT: 2 % (ref 0–6)
ERYTHROCYTE [DISTWIDTH] IN BLOOD BY AUTOMATED COUNT: 16.4 % (ref 11.5–15)
GFR SERPL CREATININE-BSD FRML MDRD: 53 ML/MIN/1.73M2
GLUCOSE SERPL-MCNC: 108 MG/DL (ref 74–99)
HCT VFR BLD AUTO: 27.6 % (ref 34–48)
HGB BLD-MCNC: 8.5 G/DL (ref 11.5–15.5)
LYMPHOCYTES NFR BLD: 1.22 K/UL (ref 1.5–4)
LYMPHOCYTES RELATIVE PERCENT: 20 % (ref 20–42)
MAGNESIUM SERPL-MCNC: 2 MG/DL (ref 1.6–2.6)
MCH RBC QN AUTO: 29.7 PG (ref 26–35)
MCHC RBC AUTO-ENTMCNC: 30.8 G/DL (ref 32–34.5)
MCV RBC AUTO: 96.5 FL (ref 80–99.9)
MONOCYTES NFR BLD: 0.73 K/UL (ref 0.1–0.95)
MONOCYTES NFR BLD: 12 % (ref 2–12)
NEUTROPHILS NFR BLD: 66 % (ref 43–80)
NEUTS SEG NFR BLD: 4.03 K/UL (ref 1.8–7.3)
PHOSPHATE SERPL-MCNC: 4.4 MG/DL (ref 2.5–4.5)
PLATELET # BLD AUTO: 275 K/UL (ref 130–450)
PMV BLD AUTO: 10.8 FL (ref 7–12)
POTASSIUM SERPL-SCNC: 4.1 MMOL/L (ref 3.5–5)
PROCALCITONIN SERPL-MCNC: 0.04 NG/ML (ref 0–0.08)
PROT SERPL-MCNC: 7.1 G/DL (ref 6.4–8.3)
RBC # BLD AUTO: 2.86 M/UL (ref 3.5–5.5)
RBC # BLD: ABNORMAL 10*6/UL
RBC # BLD: ABNORMAL 10*6/UL
SODIUM SERPL-SCNC: 137 MMOL/L (ref 132–146)
T4 FREE SERPL-MCNC: 1.7 NG/DL (ref 0.9–1.7)
TSH SERPL DL<=0.05 MIU/L-ACNC: 7.2 UIU/ML (ref 0.27–4.2)
WBC OTHER # BLD: 6.1 K/UL (ref 4.5–11.5)

## 2024-03-17 PROCEDURE — 2580000003 HC RX 258: Performed by: INTERNAL MEDICINE

## 2024-03-17 PROCEDURE — 93010 ELECTROCARDIOGRAM REPORT: CPT | Performed by: INTERNAL MEDICINE

## 2024-03-17 PROCEDURE — 84145 PROCALCITONIN (PCT): CPT

## 2024-03-17 PROCEDURE — 6360000002 HC RX W HCPCS: Performed by: INTERNAL MEDICINE

## 2024-03-17 PROCEDURE — 1200000000 HC SEMI PRIVATE

## 2024-03-17 PROCEDURE — 83735 ASSAY OF MAGNESIUM: CPT

## 2024-03-17 PROCEDURE — 80053 COMPREHEN METABOLIC PANEL: CPT

## 2024-03-17 PROCEDURE — 84100 ASSAY OF PHOSPHORUS: CPT

## 2024-03-17 PROCEDURE — 84439 ASSAY OF FREE THYROXINE: CPT

## 2024-03-17 PROCEDURE — 36415 COLL VENOUS BLD VENIPUNCTURE: CPT

## 2024-03-17 PROCEDURE — 85025 COMPLETE CBC W/AUTO DIFF WBC: CPT

## 2024-03-17 PROCEDURE — 84443 ASSAY THYROID STIM HORMONE: CPT

## 2024-03-17 PROCEDURE — 6370000000 HC RX 637 (ALT 250 FOR IP): Performed by: INTERNAL MEDICINE

## 2024-03-17 RX ORDER — FUROSEMIDE 10 MG/ML
40 INJECTION INTRAMUSCULAR; INTRAVENOUS 2 TIMES DAILY
Status: DISCONTINUED | OUTPATIENT
Start: 2024-03-17 | End: 2024-03-21

## 2024-03-17 RX ADMIN — MAGNESIUM OXIDE 400 MG: 400 TABLET ORAL at 21:11

## 2024-03-17 RX ADMIN — FUROSEMIDE 40 MG: 10 INJECTION, SOLUTION INTRAMUSCULAR; INTRAVENOUS at 18:18

## 2024-03-17 RX ADMIN — MAGNESIUM OXIDE 400 MG: 400 TABLET ORAL at 08:39

## 2024-03-17 RX ADMIN — Medication 10 ML: at 21:14

## 2024-03-17 RX ADMIN — FLUDROCORTISONE ACETATE 0.1 MG: 0.1 TABLET ORAL at 08:39

## 2024-03-17 RX ADMIN — ASPIRIN 81 MG CHEWABLE TABLET 81 MG: 81 TABLET CHEWABLE at 08:39

## 2024-03-17 RX ADMIN — LEVOTHYROXINE SODIUM 100 MCG: 0.1 TABLET ORAL at 05:57

## 2024-03-17 RX ADMIN — FUROSEMIDE 40 MG: 10 INJECTION, SOLUTION INTRAMUSCULAR; INTRAVENOUS at 08:42

## 2024-03-17 RX ADMIN — PANTOPRAZOLE SODIUM 40 MG: 40 TABLET, DELAYED RELEASE ORAL at 05:57

## 2024-03-17 RX ADMIN — Medication 10 ML: at 08:40

## 2024-03-17 RX ADMIN — ENOXAPARIN SODIUM 40 MG: 100 INJECTION SUBCUTANEOUS at 08:41

## 2024-03-17 RX ADMIN — METOPROLOL SUCCINATE 25 MG: 25 TABLET, EXTENDED RELEASE ORAL at 08:39

## 2024-03-17 RX ADMIN — POLYVINYL ALCOHOL, POVIDONE 1 DROP: 14; 6 SOLUTION/ DROPS OPHTHALMIC at 08:37

## 2024-03-17 RX ADMIN — MAGNESIUM OXIDE 400 MG: 400 TABLET ORAL at 01:05

## 2024-03-17 RX ADMIN — CLOPIDOGREL BISULFATE 75 MG: 75 TABLET ORAL at 08:39

## 2024-03-17 ASSESSMENT — PAIN DESCRIPTION - ORIENTATION: ORIENTATION: RIGHT;LEFT

## 2024-03-17 ASSESSMENT — PAIN DESCRIPTION - DESCRIPTORS: DESCRIPTORS: ACHING;DULL;DISCOMFORT

## 2024-03-17 ASSESSMENT — PAIN DESCRIPTION - LOCATION: LOCATION: LEG

## 2024-03-17 ASSESSMENT — PAIN SCALES - GENERAL: PAINLEVEL_OUTOF10: 2

## 2024-03-17 NOTE — H&P
Department of Internal Medicine  History and Physical    PCP: Luis Angel Rea DO  Admitting Physician: Dr. Hubbard  Consultants:   Date of Service: 3/16/2024    CHIEF COMPLAINT:  sob/edema    HISTORY OF PRESENT ILLNESS:    Patient is a 78-year-old female presented to ED due to shortness of breath and increased lower extremity edema.  Patient presented to [facility] rehab on Tuesday.  She also had CABG in February as [facility].  She currently has LifeVest.  States that she got out of rehab on Tuesday.  Since being home she has noted increased lower extremity edema.  She admits to increased shortness of breath as well.  States that she is keeping to a fluid and salt restriction.  She has been taking her medications as prescribed.  She had not noticed any significant change in how much she is urinating.  No complaints of fever or chills.    PAST MEDICAL Hx:  Past Medical History:   Diagnosis Date    VERONIQUE (acute kidney injury) (HCC) 02/13/2024    CAD in native artery 02/05/2024    Headache     Hearing loss     Hx of rheumatoid arthritis     Migraine     Osteopenia     Sjogren's disease (HCC)        PAST SURGICAL Hx:   Past Surgical History:   Procedure Laterality Date    CARDIAC PROCEDURE N/A 2/5/2024    Left heart cath / coronary angiography performed by Arnav Shaw MD at [facility] CARDIAC CATH LAB    CORONARY ARTERY BYPASS GRAFT N/A 2/7/2024    CORONARY ARTERY BYPASS GRAFTING, EVH performed by Nicolasa Jasso DO at [facility] OR    IR TUNNELED CATHETER PLACEMENT GREATER THAN 5 YEARS  2/13/2024    IR TUNNELED CATHETER PLACEMENT GREATER THAN 5 YEARS 2/13/2024 Cuong, REYNA Holguin MD [facility] SPECIAL PROCEDURES    PARTIAL HYSTERECTOMY (CERVIX NOT REMOVED)      states 1976    VEIN LIGATION AND STRIPPING      states 1972       FAMILY Hx:  Family History   Problem Relation Age of Onset    No Known Problems Mother     No Known Problems Father        HOME MEDICATIONS:  Prior to Admission medications    Medication Sig Start Date End Date  symmetrical, trachea midline, no adenopathy, thyroid symmetric, not enlarged and no tenderness, skin normal, no bruits, no JVD    HEMATOLOGIC/LYMPHATICS:    No cervical lymphadenopathy and no supraclavicular lymphadenopathy    LUNGS:    Symmetric. No increased work of breathing, good air exchange, clear to auscultation bilaterally, no wheezes, rhonchi, or rales,     CARDIOVASCULAR:    Normal apical impulse, regular rate and rhythm, normal S1 and S2, no S3 or S4, and no murmur noted    ABDOMEN:    No scars, normal bowel sounds, soft, non-distended, non-tender, no masses palpated, no hepatosplenomegaly, no rebound or guarding elicited on palpation     MUSCULOSKELETAL:    There is no redness, warmth, or swelling of the joints.  Full range of motion noted.  Motor strength is 5 out of 5 all extremities bilaterally.  Tone is normal.    NEUROLOGIC:    Awake, alert, oriented to name, place and time.  Cranial nerves II-XII are grossly intact.  Motor is 5 out of 5 bilaterally.      SKIN:    No bruising or bleeding.  No redness, warmth, or swelling    EXTREMITIES:    Peripheral pulses present.  No edema, cyanosis, or swelling.    LINES/CATHETERS     LABORATORY DATA:  CBC with Differential:    Lab Results   Component Value Date/Time    WBC 5.9 03/16/2024 08:45 PM    RBC 2.93 03/16/2024 08:45 PM    HGB 8.6 03/16/2024 08:45 PM    HCT 27.1 03/16/2024 08:45 PM     03/16/2024 08:45 PM    MCV 92.5 03/16/2024 08:45 PM    MCH 29.4 03/16/2024 08:45 PM    MCHC 31.7 03/16/2024 08:45 PM    RDW 16.1 03/16/2024 08:45 PM    LYMPHOPCT 29 03/16/2024 08:45 PM    MONOPCT 8 03/16/2024 08:45 PM    BASOPCT 1 03/16/2024 08:45 PM    MONOSABS 0.48 03/16/2024 08:45 PM    LYMPHSABS 1.70 03/16/2024 08:45 PM    EOSABS 0.05 03/16/2024 08:45 PM    BASOSABS 0.03 03/16/2024 08:45 PM     CMP:    Lab Results   Component Value Date/Time     03/16/2024 08:45 PM    K 4.2 03/16/2024 08:45 PM    CL 96 03/16/2024 08:45 PM    CO2 32 03/16/2024 08:45 PM

## 2024-03-17 NOTE — PROGRESS NOTES
Department of Internal Medicine  PN    PCP: Luis Angel Rea DO  Admitting Physician: Dr. Hubbard  Consultants:   Date of Service: 3/16/2024    CHIEF COMPLAINT:  sob/edema    HISTORY OF PRESENT ILLNESS:    Patient is a 78-year-old female presented to ED due to shortness of breath and increased lower extremity edema.  Patient presented to [facility] rehab on Tuesday.  She also had CABG in February as [facility].  She currently has LifeVest.  States that she got out of rehab on Tuesday.  Since being home she has noted increased lower extremity edema.  She admits to increased shortness of breath as well.  States that she is keeping to a fluid and salt restriction.  She has been taking her medications as prescribed.  She had not noticed any significant change in how much she is urinating.  No complaints of fever or chills.    3/17/2024  Patient seen and examined on telemetry floor.  Patient with poor urinary output yesterday until patient had pure wick catheter inserted and urine output dramatically improving.  BUN/creatinine 22/1.1 with normal transaminase.  Pro BNP was 13,600.  WBC 6.1 with hemoglobin 8.5.  Temperature is 98.4 with heart rate 70 and blood pressure 138/88.  O2 sat 100% on 2 L nasal cannula.  Patient again still has multiple questions about leg edema which she says she is talk to her cardiologist about, CHF clinic, thoracic surgeon and she says nobody is doing anything about it.  Reviewed etiology with the patient again.  Will add SCDs lower extremities along with the IV Lasix.  Patient is on fluid restriction and salt restriction.    PAST MEDICAL Hx:  Past Medical History:   Diagnosis Date    VERONIQUE (acute kidney injury) (HCC) 02/13/2024    CAD in native artery 02/05/2024    Headache     Hearing loss     Hx of rheumatoid arthritis     Migraine     Osteopenia     Sjogren's disease (HCC)        PAST SURGICAL Hx:   Past Surgical History:   Procedure Laterality Date    CARDIAC PROCEDURE N/A 2/5/2024    Left  day  1800 cc fluid restriction, no added salt diet  Pure wick catheter in place  Lasix 40 mg IV push twice daily    CMP, CBC in cord[place].mEverrado Hubbard DO  10:59 AM  3/17/2024

## 2024-03-17 NOTE — PROGRESS NOTES
4 Eyes Skin Assessment     NAME:  Katt Diaz  YOB: 1945  MEDICAL RECORD NUMBER:  [id number]    The patient is being assessed for  Admission    I agree that at least one RN has performed a thorough Head to Toe Skin Assessment on the patient. ALL assessment sites listed below have been assessed.      Areas assessed by both nurses:    Head, Face, Ears, Shoulders, Back, Chest, Arms, Elbows, Hands, Sacrum. Buttock, Coccyx, Ischium, Legs. Feet and Heels, and Under Medical Devices         Does the Patient have a Wound? Yes wound(s) were present on assessment. LDA wound assessment was Initiated and completed by RN       Dani Prevention initiated by RN: Yes  Wound Care Orders initiated by RN: Yes    Pressure Injury (Stage 3,4, Unstageable, DTI, NWPT, and Complex wounds) if present, place Wound referral order by RN under : Yes    New Ostomies, if present place, Ostomy referral order under : No     Nurse 1 eSignature: Electronically signed by Garret Sepulveda RN on 3/17/24 at 4:24 AM EDT    **SHARE this note so that the co-signing nurse can place an eSignature**    Nurse 2 eSignature: Electronically signed by Roxanne De Los Santos RN on 3/17/24 at 10:13 PM EDT

## 2024-03-17 NOTE — ED PROVIDER NOTES
[facility] 4 MED SURG TELE  EMERGENCY DEPARTMENT ENCOUNTER        Pt Name: Katt Diaz  MRN: [id number]  Birthdate 1945  Date of evaluation: 3/16/2024  Provider: Luis A Bustos DO  PCP: Luis Angel Rea DO  Note Started: 8:42 PM EDT 3/16/24    CHIEF COMPLAINT       Chief Complaint   Patient presents with    Leg Swelling    Shortness of Breath     Pt comes to the ED with c/o bilateral leg edema and shortness of breath. Pt states that she had open heart surgery at [facility] on 2/3/24 and states that she recently  has been having increased shortness of breath and bilateral leg swelling.        HISTORY OF PRESENT ILLNESS: 1 or more Elements   History From: Patient    Limitations to history : None    Katt Diaz is a 78 y.o. female who presents to the emergency department for complaint of shortness of breath and leg swelling that started after her open heart surgery 2/3/2024.  Shortness of breath has been increasing recently.  She states her leg swelling has been increased.  She is compliant with all her home medications.  She denies any chest pain.  Patient has a LifeVest in place and denies any firing of the LifeVest.  She reports orthopnea.      Nursing Notes were all reviewed and agreed with or any disagreements were addressed in the HPI.      REVIEW OF EXTERNAL NOTE :       Office visit 3/7/2024 reviewed.  Patient seen by cardiothoracic surgery status post CABG.  Plan for echo in 2 months for evaluation of EF.    REVIEW OF SYSTEMS :           Positives and Pertinent negatives as per HPI.     SURGICAL HISTORY     Past Surgical History:   Procedure Laterality Date    CARDIAC PROCEDURE N/A 2/5/2024    Left heart cath / coronary angiography performed by Arnav Shaw MD at [facility] CARDIAC CATH LAB    CORONARY ARTERY BYPASS GRAFT N/A 2/7/2024    CORONARY ARTERY BYPASS GRAFTING, EVH performed by Nicolasa Jasso DO at [facility] OR    IR TUNNELED CATHETER PLACEMENT GREATER THAN 5 YEARS  2/13/2024    IR TUNNELED

## 2024-03-18 PROBLEM — R06.09 DYSPNEA ON EXERTION: Status: ACTIVE | Noted: 2024-03-18

## 2024-03-18 LAB
ALBUMIN SERPL-MCNC: 2.9 G/DL (ref 3.5–5.2)
ALP SERPL-CCNC: 87 U/L (ref 35–104)
ALT SERPL-CCNC: 12 U/L (ref 0–32)
ANION GAP SERPL CALCULATED.3IONS-SCNC: 12 MMOL/L (ref 7–16)
AST SERPL-CCNC: 21 U/L (ref 0–31)
B PARAP IS1001 DNA NPH QL NAA+NON-PROBE: NOT DETECTED
B PERT DNA SPEC QL NAA+PROBE: NOT DETECTED
BASOPHILS # BLD: 0.04 K/UL (ref 0–0.2)
BASOPHILS NFR BLD: 1 % (ref 0–2)
BILIRUB SERPL-MCNC: 0.3 MG/DL (ref 0–1.2)
BNP SERPL-MCNC: 9879 PG/ML (ref 0–450)
BUN SERPL-MCNC: 21 MG/DL (ref 6–23)
C PNEUM DNA NPH QL NAA+NON-PROBE: NOT DETECTED
CALCIUM SERPL-MCNC: 8.4 MG/DL (ref 8.6–10.2)
CHLORIDE SERPL-SCNC: 98 MMOL/L (ref 98–107)
CO2 SERPL-SCNC: 32 MMOL/L (ref 22–29)
CREAT SERPL-MCNC: 1.2 MG/DL (ref 0.5–1)
EOSINOPHIL # BLD: 0.16 K/UL (ref 0.05–0.5)
EOSINOPHILS RELATIVE PERCENT: 3 % (ref 0–6)
ERYTHROCYTE [DISTWIDTH] IN BLOOD BY AUTOMATED COUNT: 16.5 % (ref 11.5–15)
FLUAV RNA NPH QL NAA+NON-PROBE: NOT DETECTED
FLUBV RNA NPH QL NAA+NON-PROBE: NOT DETECTED
GFR SERPL CREATININE-BSD FRML MDRD: 47 ML/MIN/1.73M2
GLUCOSE BLD-MCNC: 125 MG/DL (ref 74–99)
GLUCOSE SERPL-MCNC: 106 MG/DL (ref 74–99)
HADV DNA NPH QL NAA+NON-PROBE: NOT DETECTED
HCOV 229E RNA NPH QL NAA+NON-PROBE: NOT DETECTED
HCOV HKU1 RNA NPH QL NAA+NON-PROBE: NOT DETECTED
HCOV NL63 RNA NPH QL NAA+NON-PROBE: NOT DETECTED
HCOV OC43 RNA NPH QL NAA+NON-PROBE: NOT DETECTED
HCT VFR BLD AUTO: 26.2 % (ref 34–48)
HGB BLD-MCNC: 8 G/DL (ref 11.5–15.5)
HMPV RNA NPH QL NAA+NON-PROBE: NOT DETECTED
HPIV1 RNA NPH QL NAA+NON-PROBE: NOT DETECTED
HPIV2 RNA NPH QL NAA+NON-PROBE: NOT DETECTED
HPIV3 RNA NPH QL NAA+NON-PROBE: NOT DETECTED
HPIV4 RNA NPH QL NAA+NON-PROBE: NOT DETECTED
IMM GRANULOCYTES # BLD AUTO: 0.04 K/UL (ref 0–0.58)
IMM GRANULOCYTES NFR BLD: 1 % (ref 0–5)
LYMPHOCYTES NFR BLD: 1.72 K/UL (ref 1.5–4)
LYMPHOCYTES RELATIVE PERCENT: 27 % (ref 20–42)
M PNEUMO DNA NPH QL NAA+NON-PROBE: NOT DETECTED
MCH RBC QN AUTO: 29.3 PG (ref 26–35)
MCHC RBC AUTO-ENTMCNC: 30.5 G/DL (ref 32–34.5)
MCV RBC AUTO: 96 FL (ref 80–99.9)
MONOCYTES NFR BLD: 0.51 K/UL (ref 0.1–0.95)
MONOCYTES NFR BLD: 8 % (ref 2–12)
NEUTROPHILS NFR BLD: 61 % (ref 43–80)
NEUTS SEG NFR BLD: 3.85 K/UL (ref 1.8–7.3)
PLATELET # BLD AUTO: 278 K/UL (ref 130–450)
PMV BLD AUTO: 11.2 FL (ref 7–12)
POTASSIUM SERPL-SCNC: 3.7 MMOL/L (ref 3.5–5)
PROT SERPL-MCNC: 6.4 G/DL (ref 6.4–8.3)
RBC # BLD AUTO: 2.73 M/UL (ref 3.5–5.5)
RSV RNA NPH QL NAA+NON-PROBE: NOT DETECTED
RV+EV RNA NPH QL NAA+NON-PROBE: NOT DETECTED
SARS-COV-2 RNA NPH QL NAA+NON-PROBE: NOT DETECTED
SODIUM SERPL-SCNC: 142 MMOL/L (ref 132–146)
SPECIMEN DESCRIPTION: NORMAL
TROPONIN I SERPL HS-MCNC: 167 NG/L (ref 0–9)
WBC OTHER # BLD: 6.3 K/UL (ref 4.5–11.5)

## 2024-03-18 PROCEDURE — 6370000000 HC RX 637 (ALT 250 FOR IP): Performed by: PHYSICIAN ASSISTANT

## 2024-03-18 PROCEDURE — 1200000000 HC SEMI PRIVATE

## 2024-03-18 PROCEDURE — 0202U NFCT DS 22 TRGT SARS-COV-2: CPT

## 2024-03-18 PROCEDURE — APPSS60 APP SPLIT SHARED TIME 46-60 MINUTES: Performed by: PHYSICIAN ASSISTANT

## 2024-03-18 PROCEDURE — 2580000003 HC RX 258: Performed by: INTERNAL MEDICINE

## 2024-03-18 PROCEDURE — 85025 COMPLETE CBC W/AUTO DIFF WBC: CPT

## 2024-03-18 PROCEDURE — 99223 1ST HOSP IP/OBS HIGH 75: CPT | Performed by: INTERNAL MEDICINE

## 2024-03-18 PROCEDURE — 84484 ASSAY OF TROPONIN QUANT: CPT

## 2024-03-18 PROCEDURE — 6370000000 HC RX 637 (ALT 250 FOR IP): Performed by: INTERNAL MEDICINE

## 2024-03-18 PROCEDURE — 83880 ASSAY OF NATRIURETIC PEPTIDE: CPT

## 2024-03-18 PROCEDURE — 36415 COLL VENOUS BLD VENIPUNCTURE: CPT

## 2024-03-18 PROCEDURE — 6360000002 HC RX W HCPCS: Performed by: INTERNAL MEDICINE

## 2024-03-18 PROCEDURE — 82962 GLUCOSE BLOOD TEST: CPT

## 2024-03-18 PROCEDURE — 80053 COMPREHEN METABOLIC PANEL: CPT

## 2024-03-18 RX ORDER — HYDRALAZINE HYDROCHLORIDE 10 MG/1
10 TABLET, FILM COATED ORAL 2 TIMES DAILY
Status: DISCONTINUED | OUTPATIENT
Start: 2024-03-18 | End: 2024-03-22 | Stop reason: HOSPADM

## 2024-03-18 RX ORDER — ISOSORBIDE DINITRATE 10 MG/1
5 TABLET ORAL 2 TIMES DAILY
Status: DISCONTINUED | OUTPATIENT
Start: 2024-03-18 | End: 2024-03-22 | Stop reason: HOSPADM

## 2024-03-18 RX ADMIN — MAGNESIUM OXIDE 400 MG: 400 TABLET ORAL at 21:16

## 2024-03-18 RX ADMIN — METOPROLOL SUCCINATE 25 MG: 25 TABLET, EXTENDED RELEASE ORAL at 10:15

## 2024-03-18 RX ADMIN — Medication 10 ML: at 21:17

## 2024-03-18 RX ADMIN — FUROSEMIDE 40 MG: 10 INJECTION, SOLUTION INTRAMUSCULAR; INTRAVENOUS at 10:17

## 2024-03-18 RX ADMIN — HYDRALAZINE HYDROCHLORIDE 10 MG: 10 TABLET, FILM COATED ORAL at 21:16

## 2024-03-18 RX ADMIN — ENOXAPARIN SODIUM 40 MG: 100 INJECTION SUBCUTANEOUS at 10:13

## 2024-03-18 RX ADMIN — PANTOPRAZOLE SODIUM 40 MG: 40 TABLET, DELAYED RELEASE ORAL at 05:59

## 2024-03-18 RX ADMIN — ISOSORBIDE DINITRATE 5 MG: 10 TABLET ORAL at 21:16

## 2024-03-18 RX ADMIN — Medication 10 ML: at 10:17

## 2024-03-18 RX ADMIN — ASPIRIN 81 MG CHEWABLE TABLET 81 MG: 81 TABLET CHEWABLE at 10:12

## 2024-03-18 RX ADMIN — LEVOTHYROXINE SODIUM 100 MCG: 0.1 TABLET ORAL at 05:59

## 2024-03-18 RX ADMIN — CLOPIDOGREL BISULFATE 75 MG: 75 TABLET ORAL at 10:12

## 2024-03-18 RX ADMIN — MAGNESIUM OXIDE 400 MG: 400 TABLET ORAL at 10:15

## 2024-03-18 RX ADMIN — FLUDROCORTISONE ACETATE 0.1 MG: 0.1 TABLET ORAL at 10:14

## 2024-03-18 RX ADMIN — FUROSEMIDE 40 MG: 10 INJECTION, SOLUTION INTRAMUSCULAR; INTRAVENOUS at 18:03

## 2024-03-18 ASSESSMENT — PAIN SCALES - GENERAL
PAINLEVEL_OUTOF10: 0
PAINLEVEL_OUTOF10: 0

## 2024-03-18 ASSESSMENT — PAIN SCALES - WONG BAKER
WONGBAKER_NUMERICALRESPONSE: NO HURT
WONGBAKER_NUMERICALRESPONSE: NO HURT

## 2024-03-18 NOTE — DISCHARGE INSTRUCTIONS
[facility]  [phone] -will call to schedule home care visit                         HEART FAILURE  / CONGESTIVE HEART FAILURE  DISCHARGE INSTRUCTIONS:  GUIDELINES TO FOLLOW AT HOME    Self- Managed Care:     MEDICATIONS:  Take your medication as directed. If you are experiencing any side effects, inform your doctor, Do not stop taking any of your medications without letting your doctor know.   Check with your doctor before taking any over-the-counter medications / herbal / or dietary supplements. They may interfere with your other medications.  Do not take ibuprofen (Advil or Motrin) and naproxen (Aleve) without talking to your doctor first. They could make your heart failure worse.         WEIGHT MONITORING:   Weigh yourself everyday (with the same scale) around the same time of the day and write it down. (you can chart them on a calendar or keep track of them on paper.   Notify your doctor of a weight gain of 3 pounds or more in 1 day   OR a total of 5 pounds or more in 1 week    Take your weight record to your doctor visits  Also, the same goes if you loose more than 3# in one day, let your heart doctor know.         DIET:   Cardiac heart healthy diet- Low saturated / low trans fat, no added salt, caffeine restricted, Low sodium diet-   No more than 2,000mg (2 grams) of salt / sodium per day (which equals to a little less than  a teaspoon of salt)  If your doctor wants you on a fluid restriction...it is usually recommended a fluid limit of 2,000cc -  Fluid restriction- 2,000 ml (milliliters) = 64 ounces = you can have 8 glasses of fluid per day (each glass 8 ounces)    Follow a low salt diet - avoid using salt at the table, avoid / limit use of canned soups, processed / packaged foods, salted snacks, olives and pickles.  Do not use a salt substitute without checking with your doctor, they may contain a high amount of potassioum. (Mrs. Dash is safe to use).    Limit the use of alcohol       CALL YOUR

## 2024-03-18 NOTE — PLAN OF CARE
Problem: Pain  Goal: Verbalizes/displays adequate comfort level or baseline comfort level  3/17/2024 2053 by Deshaun Lima, RN  Outcome: Progressing     Problem: Safety - Adult  Goal: Free from fall injury  3/17/2024 2053 by Deshaun Lima, RN  Outcome: Progressing

## 2024-03-18 NOTE — ACP (ADVANCE CARE PLANNING)
Advance Care Planning   Healthcare Decision Maker:    Primary Decision Maker: ROSE SANCHEZ - [phone]    Secondary Decision Maker: LOCO SANCHEZ - [phone]

## 2024-03-18 NOTE — PROGRESS NOTES
Department of Internal Medicine  PN    PCP: Luis Angel Rea DO  Admitting Physician: Dr. Hubbard  Consultants:   Date of Service: 3/16/2024    CHIEF COMPLAINT:  sob/edema    HISTORY OF PRESENT ILLNESS:    Patient is a 78-year-old female presented to ED due to shortness of breath and increased lower extremity edema.  Patient presented to [facility] rehab on Tuesday.  She also had CABG in February as [facility].  She currently has LifeVest.  States that she got out of rehab on Tuesday.  Since being home she has noted increased lower extremity edema.  She admits to increased shortness of breath as well.  States that she is keeping to a fluid and salt restriction.  She has been taking her medications as prescribed.  She had not noticed any significant change in how much she is urinating.  No complaints of fever or chills.    3/17/2024  Patient seen and examined on telemetry floor.  Patient with poor urinary output yesterday until patient had pure wick catheter inserted and urine output dramatically improving.  BUN/creatinine 22/1.1 with normal transaminase.  Pro BNP was 13,600.  WBC 6.1 with hemoglobin 8.5.  Temperature is 98.4 with heart rate 70 and blood pressure 138/88.  O2 sat 100% on 2 L nasal cannula.  Patient again still has multiple questions about leg edema which she says she is talk to her cardiologist about, CHF clinic, thoracic surgeon and she says nobody is doing anything about it.  Reviewed etiology with the patient again.  Will add SCDs lower extremities along with the IV Lasix.  Patient is on fluid restriction and salt restriction.    3/18/2024  Patient seen examined on telemetry floor.  Patient denies any current chest pain, abdominal pain, nausea or vomiting.  Patient's breathing is improved.  BUN/creatinine was 21/1.2 with normal transaminase with WBC 6.3 hemoglobin 8.0.  Temperature is 98.2 with heart rate of 66 and blood pressure 117/58.  O2 sat 96% on 2 L nasal cannula.  Urine output seems to be  of breath, coughing, sputum production, hemoptysis, or wheezing.  No orthopnea.    Gastrointestinal:   Denies nausea, vomiting, diarrhea, or constipation.  Denies any abdominal pain.  Denies change in bowel habits or stools.      Genito-Urinary:    Denies any urgency, frequency, hematuria.  Voiding without difficulty.    Musculoskeletal:   Denies joint pain, joint stiffness, joint swelling or muscle pain    Neurology:    Denies any headache or focal neurological deficits. No weakness or paresthesia.    Derm:    Denies any rashes, ulcers, or excoriations.  Denies bruising.      Extremities:   Denies any lower extremity swelling or edema.      PHYSICAL EXAM: Abnormal findings noted  VITALS:  Vitals:    03/18/24 0930   BP: (!) 117/58   Pulse: 66   Resp: 16   Temp:    SpO2: 96%         CONSTITUTIONAL:    Awake, alert, cooperative, no apparent distress, and appears stated age    EYES:    PERRL, EOMI, sclera clear, conjunctiva normal    ENT:    Normocephalic, atraumatic, sinuses nontender on palpation. External ears without lesions. Oral pharynx with moist mucus membranes.  Tonsils without erythema or exudates.    NECK:    Supple, symmetrical, trachea midline, no adenopathy, thyroid symmetric, not enlarged and no tenderness, skin normal, no bruits, no JVD    HEMATOLOGIC/LYMPHATICS:    No cervical lymphadenopathy and no supraclavicular lymphadenopathy    LUNGS:    Symmetric. No increased work of breathing, good air exchange, clear to auscultation bilaterally, no wheezes, rhonchi, or rales,     CARDIOVASCULAR:    Normal apical impulse, regular rate and rhythm, normal S1 and S2, no S3 or S4, and no murmur noted    ABDOMEN:    No scars, normal bowel sounds, soft, non-distended, non-tender, no masses palpated, no hepatosplenomegaly, no rebound or guarding elicited on palpation     MUSCULOSKELETAL:    There is no redness, warmth, or swelling of the joints.  Full range of motion noted.  Motor strength is 5 out of 5 all

## 2024-03-18 NOTE — CONSULTS
INPATIENT CARDIOLOGY CONSULT     Reason for Consult: CHF, elevated troponin    Cardiologist: Dr. Salter    Requesting Physician: Dr. Hubbard     Date of Consultation: 3/18/2024    HISTORY OF PRESENT ILLNESS:   Patient is a 78 year old WF known to Dr. Shaw.    She has a known past medical history of CAD s/p CABG x 3 with MALIK ligation/Atriclip 2/7/2024, chronic HFrEF, ischemic cardiomyopathy, VHD, chronically elevated troponin, HLD, T2DM with peripheral neuropathy, hypothyroidism on HRT, RA, Sjogren's disease, CKD, chronic anemia, iron deficiency on oral supplementation, chronic migraines, osteopenia and hearing loss.    PERTINENT ENCOUNTERS:   Hospital Admission [facility] February 2024.  \"She presented to the emergency room at the beginning of February with complaints of increased chest pain and shortness of breath progressing for 2 weeks prior to presentation.  Upon arrival she was noted to have inferior STEMI, she underwent left heart catheterization with multivessel disease and CT surgery was consulted.  On 2/7/2024 she underwent three-vessel CABG (LIMA to LAD, SVG to RCA, SVG to OM).  She underwent TTE following surgery with LVEF 30-35%, moderate RV dysfunction with mild to moderate MR, moderate TR.  She was placed in a LifeVest and initiated on GDMT however limited given hypotension.  She was discharged to SNF facility for ongoing rehabilitation.\" Per CHF clinic visit 3/4/2024.  KIMBERLEE Fonseca, CHF clinic 3/4/2024.  Admitted to medication compliance.  Continues to require IV diuretic therapy at CHF clinic visits due to peripheral edema.  Chronic TRUJILLO.  Weight stable at 126 pounds.  LifeVest interrogated with no acute events noted.  Obtain labs.  Chidester to be hypervolemic, given IV diuresis at CHF clinic.  Stop oral Lasix and start torsemide 20 mg daily.  Continue compression stockings, leg elevation and LifeVest.  Continue Toprol-XL 25 mg daily, Potassium supplementation, Amiodarone per CT surgery, ASA 81 mg  consistent with post-operative status. Septal flattening in diastole consistent with right ventricular volume overload. Severe global hypokinesis present. Akinesis of the following segments: basal inferior, mid inferior and apical inferior. Grade I diastolic dysfunction with normal LAP.    Right Ventricle: Right ventricle is moderately dilated. Moderately reduced systolic function.    Aortic Valve: Trileaflet valve. Moderately calcified cusp. Trace regurgitation.    Mitral Valve: Mild regurgitation.    Tricuspid Valve: Moderate to severe regurgitation. RVSP at least 41 mmHg, could be underestimated due to moderate to severe TR    Right Atrium: Right atrium is severely dilated.    Pericardium: Trivial pericardial effusion present. No indication of cardiac tamponade.    Image quality is adequate.     Limited TTE 2/15/2024 Dr. Dias   Left Ventricle: The EF by visual approximation is 30 - 35%. Findings consistent with moderate concentric hypertrophy. Severe global hypokinesis present. There is inferior akinesis. Abnormal septal motion consistent with abnormal electrical activation.    Right Ventricle: Moderately reduced systolic function.    Mitral Valve: Mild to moderate regurgitation.    Tricuspid Valve: Moderate regurgitation.    Left Atrium: Left atrium size is normal.    Right Atrium: Right atrium is mildly dilated.    Limted study to assess biventricular function.    Image quality is adequate    LifeVest Interrogation CHF clinic 3/4/2024  Patient wearing device in CHF clinic today  LifeVest battery disconnected and reconnected while in clinic today, no issues with reboot.  On evaluation of the LifeVest, it is fitting the patient properly.   Patient educated that the garments are being changed and washed approximate every other day.   LifeVest History:  Original date of LifeVest was placed: 2/16/2024  TTE (2/15/2024): 30-35%  Following with CHF clinic and GDMT being titrated   Follow up TTE scheduled for: TBD

## 2024-03-18 NOTE — CARE COORDINATION
Case Management Assessment  Initial Evaluation    Date/Time of Evaluation: 3/18/2024 12:09 PM  Assessment Completed by: Doris Youssef RN    If patient is discharged prior to next notation, then this note serves as note for discharge by case management.    Patient Name: Katt Sanchez                   YOB: 1945  Diagnosis: Acute on chronic systolic (congestive) heart failure (HCC) [I50.23]                   Date / Time: 3/16/2024  8:32 PM    Patient Admission Status: Inpatient   Readmission Risk (Low < 19, Mod (19-27), High > 27): Readmission Risk Score: 26.1    Current PCP: Luis Angel Rea, DO  PCP verified by CM? Yes    Chart Reviewed: Yes      History Provided by: Patient  Patient Orientation: Alert and Oriented, Person, Place, Situation    Patient Cognition: Alert    Hospitalization in the last 30 days (Readmission):  Yes      Readmission Assessment  Number of Days since last admission?: 8-30 days  Previous Disposition: SNF  Who is being Interviewed: Patient  What was the patient's/caregiver's perception as to why they think they needed to return back to the hospital?: Other (Comment) (short of breath)  Did you visit your Primary Care Physician after you left the hospital, before you returned this time?: No  Why weren't you able to visit your PCP?: Other (Comment) (appt scheduled for this Wed)  Did you see a specialist, such as Cardiac, Pulmonary, Orthopedic Physician, etc. after you left the hospital?: No  Who advised the patient to return to the hospital?: Self-referral  In our efforts to provide the best possible care to you and others like you, can you think of anything that we could have done to help you after you left the hospital the first time, so that you might not have needed to return so soon?: Other (Comment) (no)     Advance Directives:      Code Status: Full Code   Patient's Primary Decision Maker is: Legal Next of Kin    Primary Decision Maker: LUIS ANGEL SANCHEZ

## 2024-03-18 NOTE — FLOWSHEET NOTE
Inpatient Wound Care (initial consult) [id number]    Admit Date: 3/16/2024  8:32 PM    Reason for consult:  coccyx, left toes    Significant history:  per H&P    CHIEF COMPLAINT:  sob/edema     HISTORY OF PRESENT ILLNESS:    Patient is a 78-year-old female presented to ED due to shortness of breath and increased lower extremity edema.  Patient presented to [facility] rehab on Tuesday.  She also had CABG in February as [facility].  She currently has LifeVest.  States that she got out of rehab on Tuesday.  Since being home she has noted increased lower extremity edema.  She admits to increased shortness of breath as well.  States that she is keeping to a fluid and salt restriction.  She has been taking her medications as prescribed.  She had not noticed any significant change in how much she is urinating.  No complaints of fever or chills.    Past Medical History:   Diagnosis Date    VERONIQUE (acute kidney injury) ([facility]) 02/13/2024    CAD in native artery 02/05/2024    Headache     Hearing loss     Hx of rheumatoid arthritis     Migraine     Osteopenia     Sjogren's disease ([facility])      Findings:     03/18/24 1255   Skin Integumentary    Skin Integrity Redness  (blanching)   Location bilateral buttocks   Skin Integrity Site 2   Skin Integrity Location 2 Redness  (intact blanching)   Location 2 both heels   Skin Integrity Site 3   Skin Integrity Location 3 Blister    Location 3 left 4th and 5th toes   Skin Integrity Site 4   Skin Integrity Location 4   (incision dry intact scab)   Location 4 left lower medial leg   Incision 02/07/24 Thigh Distal;Left   Date First Assessed/Time First Assessed: 02/07/24 0747   Present on Original Admission: No  Location: (c) Thigh  Incision Location Orientation: Distal;Left   Dressing/Treatment Open to air   Closure Surgical glue   Incision Assessment   (dry intact scab)   Incision 02/07/24 Sternum Mid   Date First Assessed/Time First Assessed: 02/07/24 0754   Present on Original Admission: No

## 2024-03-18 NOTE — PROGRESS NOTES
4 Eyes Skin Assessment     NAME:  Katt Diaz  YOB: 1945  MEDICAL RECORD NUMBER:  [id number]    The patient is being assessed for  Admission    I agree that at least one RN has performed a thorough Head to Toe Skin Assessment on the patient. ALL assessment sites listed below have been assessed.      Areas assessed by both nurses:    Head, Face, Ears, Shoulders, Back, Chest, Arms, Elbows, Hands, Sacrum. Buttock, Coccyx, Ischium, Legs. Feet and Heels, and Under Medical Devices         Does the Patient have a Wound? Yes wound(s) were present on assessment. LDA wound assessment was Initiated and completed by RN       Dani Prevention initiated by RN: Yes  Wound Care Orders initiated by RN: Yes    Pressure Injury (Stage 3,4, Unstageable, DTI, NWPT, and Complex wounds) if present, place Wound referral order by RN under : Yes    New Ostomies, if present place, Ostomy referral order under : No     Nurse 1 eSignature: Electronically signed by Garret Sepulveda RN on 3/17/24 at 9:14 PM EDT    **SHARE this note so that the co-signing nurse can place an eSignature**    Nurse 2 eSignature: Electronically signed by Roxanne De Los Santos RN on 3/17/24 at 10:13 PM EDT

## 2024-03-19 LAB
ALBUMIN SERPL-MCNC: 3 G/DL (ref 3.5–5.2)
ALP SERPL-CCNC: 81 U/L (ref 35–104)
ALT SERPL-CCNC: 9 U/L (ref 0–32)
ANION GAP SERPL CALCULATED.3IONS-SCNC: 9 MMOL/L (ref 7–16)
AST SERPL-CCNC: 23 U/L (ref 0–31)
BASOPHILS # BLD: 0.04 K/UL (ref 0–0.2)
BASOPHILS NFR BLD: 1 % (ref 0–2)
BILIRUB SERPL-MCNC: 0.4 MG/DL (ref 0–1.2)
BUN SERPL-MCNC: 22 MG/DL (ref 6–23)
CALCIUM SERPL-MCNC: 8.1 MG/DL (ref 8.6–10.2)
CHLORIDE SERPL-SCNC: 100 MMOL/L (ref 98–107)
CO2 SERPL-SCNC: 32 MMOL/L (ref 22–29)
CREAT SERPL-MCNC: 1.3 MG/DL (ref 0.5–1)
EOSINOPHIL # BLD: 0.19 K/UL (ref 0.05–0.5)
EOSINOPHILS RELATIVE PERCENT: 3 % (ref 0–6)
ERYTHROCYTE [DISTWIDTH] IN BLOOD BY AUTOMATED COUNT: 16.7 % (ref 11.5–15)
GFR SERPL CREATININE-BSD FRML MDRD: 44 ML/MIN/1.73M2
GLUCOSE SERPL-MCNC: 95 MG/DL (ref 74–99)
HCT VFR BLD AUTO: 25.5 % (ref 34–48)
HGB BLD-MCNC: 7.8 G/DL (ref 11.5–15.5)
IMM GRANULOCYTES # BLD AUTO: <0.03 K/UL (ref 0–0.58)
IMM GRANULOCYTES NFR BLD: 0 % (ref 0–5)
LYMPHOCYTES NFR BLD: 1.66 K/UL (ref 1.5–4)
LYMPHOCYTES RELATIVE PERCENT: 29 % (ref 20–42)
MCH RBC QN AUTO: 29.4 PG (ref 26–35)
MCHC RBC AUTO-ENTMCNC: 30.6 G/DL (ref 32–34.5)
MCV RBC AUTO: 96.2 FL (ref 80–99.9)
MONOCYTES NFR BLD: 0.49 K/UL (ref 0.1–0.95)
MONOCYTES NFR BLD: 9 % (ref 2–12)
NEUTROPHILS NFR BLD: 58 % (ref 43–80)
NEUTS SEG NFR BLD: 3.32 K/UL (ref 1.8–7.3)
PLATELET # BLD AUTO: 272 K/UL (ref 130–450)
PMV BLD AUTO: 11.3 FL (ref 7–12)
POTASSIUM SERPL-SCNC: 3.5 MMOL/L (ref 3.5–5)
PROT SERPL-MCNC: 6.4 G/DL (ref 6.4–8.3)
RBC # BLD AUTO: 2.65 M/UL (ref 3.5–5.5)
SODIUM SERPL-SCNC: 141 MMOL/L (ref 132–146)
WBC OTHER # BLD: 5.7 K/UL (ref 4.5–11.5)

## 2024-03-19 PROCEDURE — 97530 THERAPEUTIC ACTIVITIES: CPT | Performed by: PHYSICAL THERAPIST

## 2024-03-19 PROCEDURE — 85025 COMPLETE CBC W/AUTO DIFF WBC: CPT

## 2024-03-19 PROCEDURE — 36415 COLL VENOUS BLD VENIPUNCTURE: CPT

## 2024-03-19 PROCEDURE — 99233 SBSQ HOSP IP/OBS HIGH 50: CPT | Performed by: INTERNAL MEDICINE

## 2024-03-19 PROCEDURE — 1200000000 HC SEMI PRIVATE

## 2024-03-19 PROCEDURE — 6370000000 HC RX 637 (ALT 250 FOR IP): Performed by: PHYSICIAN ASSISTANT

## 2024-03-19 PROCEDURE — 97161 PT EVAL LOW COMPLEX 20 MIN: CPT | Performed by: PHYSICAL THERAPIST

## 2024-03-19 PROCEDURE — 2580000003 HC RX 258: Performed by: INTERNAL MEDICINE

## 2024-03-19 PROCEDURE — 6360000002 HC RX W HCPCS: Performed by: INTERNAL MEDICINE

## 2024-03-19 PROCEDURE — 80053 COMPREHEN METABOLIC PANEL: CPT

## 2024-03-19 PROCEDURE — 97165 OT EVAL LOW COMPLEX 30 MIN: CPT

## 2024-03-19 PROCEDURE — 6370000000 HC RX 637 (ALT 250 FOR IP): Performed by: INTERNAL MEDICINE

## 2024-03-19 RX ORDER — POTASSIUM CHLORIDE 20 MEQ/1
20 TABLET, EXTENDED RELEASE ORAL
Status: DISCONTINUED | OUTPATIENT
Start: 2024-03-19 | End: 2024-03-20

## 2024-03-19 RX ADMIN — FLUDROCORTISONE ACETATE 0.1 MG: 0.1 TABLET ORAL at 08:54

## 2024-03-19 RX ADMIN — HYDRALAZINE HYDROCHLORIDE 10 MG: 10 TABLET, FILM COATED ORAL at 21:00

## 2024-03-19 RX ADMIN — FUROSEMIDE 40 MG: 10 INJECTION, SOLUTION INTRAMUSCULAR; INTRAVENOUS at 17:51

## 2024-03-19 RX ADMIN — POTASSIUM CHLORIDE 20 MEQ: 1500 TABLET, EXTENDED RELEASE ORAL at 11:13

## 2024-03-19 RX ADMIN — Medication 10 ML: at 21:01

## 2024-03-19 RX ADMIN — HYDRALAZINE HYDROCHLORIDE 10 MG: 10 TABLET, FILM COATED ORAL at 08:52

## 2024-03-19 RX ADMIN — LEVOTHYROXINE SODIUM 100 MCG: 0.1 TABLET ORAL at 05:33

## 2024-03-19 RX ADMIN — Medication 10 ML: at 08:54

## 2024-03-19 RX ADMIN — POLYVINYL ALCOHOL, POVIDONE 1 DROP: 14; 6 SOLUTION/ DROPS OPHTHALMIC at 09:43

## 2024-03-19 RX ADMIN — MAGNESIUM OXIDE 400 MG: 400 TABLET ORAL at 08:52

## 2024-03-19 RX ADMIN — FUROSEMIDE 40 MG: 10 INJECTION, SOLUTION INTRAMUSCULAR; INTRAVENOUS at 08:54

## 2024-03-19 RX ADMIN — ENOXAPARIN SODIUM 40 MG: 100 INJECTION SUBCUTANEOUS at 08:52

## 2024-03-19 RX ADMIN — CLOPIDOGREL BISULFATE 75 MG: 75 TABLET ORAL at 08:51

## 2024-03-19 RX ADMIN — ISOSORBIDE DINITRATE 5 MG: 10 TABLET ORAL at 21:00

## 2024-03-19 RX ADMIN — METOPROLOL SUCCINATE 25 MG: 25 TABLET, EXTENDED RELEASE ORAL at 08:51

## 2024-03-19 RX ADMIN — PANTOPRAZOLE SODIUM 40 MG: 40 TABLET, DELAYED RELEASE ORAL at 05:33

## 2024-03-19 RX ADMIN — ASPIRIN 81 MG CHEWABLE TABLET 81 MG: 81 TABLET CHEWABLE at 08:52

## 2024-03-19 RX ADMIN — ISOSORBIDE DINITRATE 5 MG: 10 TABLET ORAL at 08:50

## 2024-03-19 RX ADMIN — MAGNESIUM OXIDE 400 MG: 400 TABLET ORAL at 20:59

## 2024-03-19 ASSESSMENT — PAIN SCALES - GENERAL
PAINLEVEL_OUTOF10: 0

## 2024-03-19 NOTE — PROGRESS NOTES
Department of Internal Medicine  PN    PCP: Luis Angel Rea DO  Admitting Physician: Dr. Hubbard  Consultants:   Date of Service: 3/16/2024    CHIEF COMPLAINT:  sob/edema    HISTORY OF PRESENT ILLNESS:    Patient is a 78-year-old female presented to ED due to shortness of breath and increased lower extremity edema.  Patient presented to [facility] rehab on Tuesday.  She also had CABG in February as [facility].  She currently has LifeVest.  States that she got out of rehab on Tuesday.  Since being home she has noted increased lower extremity edema.  She admits to increased shortness of breath as well.  States that she is keeping to a fluid and salt restriction.  She has been taking her medications as prescribed.  She had not noticed any significant change in how much she is urinating.  No complaints of fever or chills.    3/17/2024  Patient seen and examined on telemetry floor.  Patient with poor urinary output yesterday until patient had pure wick catheter inserted and urine output dramatically improving.  BUN/creatinine 22/1.1 with normal transaminase.  Pro BNP was 13,600.  WBC 6.1 with hemoglobin 8.5.  Temperature is 98.4 with heart rate 70 and blood pressure 138/88.  O2 sat 100% on 2 L nasal cannula.  Patient again still has multiple questions about leg edema which she says she is talk to her cardiologist about, CHF clinic, thoracic surgeon and she says nobody is doing anything about it.  Reviewed etiology with the patient again.  Will add SCDs lower extremities along with the IV Lasix.  Patient is on fluid restriction and salt restriction.    3/18/2024  Patient seen examined on telemetry floor.  Patient denies any current chest pain, abdominal pain, nausea or vomiting.  Patient's breathing is improved.  BUN/creatinine was 21/1.2 with normal transaminase with WBC 6.3 hemoglobin 8.0.  Temperature is 98.2 with heart rate of 66 and blood pressure 117/58.  O2 sat 96% on 2 L nasal cannula.  Urine output seems to be  to auscultation bilaterally, no wheezes, rhonchi, or rales,     CARDIOVASCULAR:    Normal apical impulse, regular rate and rhythm, normal S1 and S2, no S3 or S4, and no murmur noted    ABDOMEN:    No scars, normal bowel sounds, soft, non-distended, non-tender, no masses palpated, no hepatosplenomegaly, no rebound or guarding elicited on palpation     MUSCULOSKELETAL:    There is no redness, warmth, or swelling of the joints.  Full range of motion noted.  Motor strength is 5 out of 5 all extremities bilaterally.  Tone is normal.    NEUROLOGIC:    Awake, alert, oriented to name, place and time.  Cranial nerves II-XII are grossly intact.  Motor is 5 out of 5 bilaterally.      SKIN:    No bruising or bleeding.  No redness, warmth, or swelling    EXTREMITIES:    Peripheral pulses present.  No edema, cyanosis, or swelling.    LINES/CATHETERS     LABORATORY DATA:  CBC with Differential:    Lab Results   Component Value Date/Time    WBC 5.7 03/19/2024 07:21 AM    RBC 2.65 03/19/2024 07:21 AM    HGB 7.8 03/19/2024 07:21 AM    HCT 25.5 03/19/2024 07:21 AM     03/19/2024 07:21 AM    MCV 96.2 03/19/2024 07:21 AM    MCH 29.4 03/19/2024 07:21 AM    MCHC 30.6 03/19/2024 07:21 AM    RDW 16.7 03/19/2024 07:21 AM    LYMPHOPCT 29 03/19/2024 07:21 AM    MONOPCT 9 03/19/2024 07:21 AM    BASOPCT 1 03/19/2024 07:21 AM    MONOSABS 0.49 03/19/2024 07:21 AM    LYMPHSABS 1.66 03/19/2024 07:21 AM    EOSABS 0.19 03/19/2024 07:21 AM    BASOSABS 0.04 03/19/2024 07:21 AM     CMP:    Lab Results   Component Value Date/Time     03/19/2024 07:21 AM    K 3.5 03/19/2024 07:21 AM     03/19/2024 07:21 AM    CO2 32 03/19/2024 07:21 AM    BUN 22 03/19/2024 07:21 AM    CREATININE 1.3 03/19/2024 07:21 AM    LABGLOM 44 03/19/2024 07:21 AM    GLUCOSE 95 03/19/2024 07:21 AM    PROT 6.4 03/19/2024 07:21 AM    LABALBU 3.0 03/19/2024 07:21 AM    CALCIUM 8.1 03/19/2024 07:21 AM    BILITOT 0.4 03/19/2024 07:21 AM    ALKPHOS 81 03/19/2024 07:21 AM

## 2024-03-19 NOTE — PLAN OF CARE
Problem: Pain  Goal: Verbalizes/displays adequate comfort level or baseline comfort level  Outcome: Progressing     Problem: Safety - Adult  Goal: Free from fall injury  Outcome: Progressing     Problem: Skin/Tissue Integrity  Goal: Absence of new skin breakdown  Description: 1.  Monitor for areas of redness and/or skin breakdown  2.  Assess vascular access sites hourly  3.  Every 4-6 hours minimum:  Change oxygen saturation probe site  4.  Every 4-6 hours:  If on nasal continuous positive airway pressure, respiratory therapy assess nares and determine need for appliance change or resting period.  Outcome: Progressing     Problem: Chronic Conditions and Co-morbidities  Goal: Patient's chronic conditions and co-morbidity symptoms are monitored and maintained or improved  Outcome: Progressing

## 2024-03-19 NOTE — PROGRESS NOTES
Physical Therapy Initial Evaluation/Plan of Care    Room #:  0421/0421-02  Patient Name: Katt Diaz  YOB: 1945  MRN: [id number]    Date of Service: 3/19/2024     Tentative placement recommendation: Home with Home Health Physical Therapy  Equipment recommendation: To be determined      Evaluating Physical Therapist: Maria Isabel Bach, PT #[id number]      Specific Provider Orders/Date/Referring Provider :  03/18/24 0900    PT eval and treat  Start:  03/18/24 0900,   End:  03/18/24 0900,   ONE TIME,   Standing Count:  1 Occurrences,   R       Dre Hubbard,     Admitting Diagnosis:   Acute on chronic systolic (congestive) heart failure (HCC) [I50.23]      shortness of breath and leg swelling that started after her open heart surgery 2/3/2024.    Surgery: none  Visit Diagnoses         Codes    Leg swelling     M79.89    Elevated brain natriuretic peptide (BNP) level     R79.89            Patient Active Problem List   Diagnosis    S/P cardiac cath    CAD in native artery    ST elevation myocardial infarction (STEMI) (HCC)    Postoperative hypotension    Complication of surgical procedure    Stress hyperglycemia    VERONIQUE (acute kidney injury) (HCC)    Acute on chronic systolic heart failure (HCC)    Acute congestive heart failure (HCC)    Mild protein-calorie malnutrition (HCC)    Acute on chronic systolic (congestive) heart failure (HCC)    Dyspnea on exertion        ASSESSMENT of Current Deficits Patient exhibits decreased strength, balance, and endurance impairing functional mobility, transfers, gait , gait distance, and tolerance to activity wide base of support, slow jorge. Patient requires continued skilled physical therapy to address concerns listed above for increased safety and function at discharge.         PHYSICAL THERAPY  PLAN OF CARE       Physical therapy plan of care is established based on physician order,  patient diagnosis and clinical assessment    Current Treatment  intact, nursing notified.      Patient would benefit from continued skilled Physical Therapy to improve functional independence and quality of life.         Patient's/ family goals   home    Time in  0951  Time out  1021    Total Treatment Time  10 minutes    Evaluation time includes thorough review of current medical information, gathering information on past medical history/social history and prior level of function, completion of standardized testing/informal observation of tasks, assessment of data, and development of Plan of care and goals.     CPT codes:  Low Complexity PT evaluation (09300)  Therapeutic activities (02241)   10 minutes  1 unit(s)    Maria Isabel Bach, PT

## 2024-03-19 NOTE — PROGRESS NOTES
OCCUPATIONAL THERAPY INITIAL EVALUATION    [facility]  [address] [place]        Date:3/19/2024                                                  Patient Name: Katt Diaz    MRN: [id number]    : 1945    Room: [address] [place] [address]      Evaluating OT: Sasha Mosqueda OTR/L #[id number]     Referring Provider and Specific Provider Orders/Date:      24  OT eval and treat  Start:  24,   End:  24,   ONE TIME,   Standing Count:  1 Occurrences,   R         Dre Hubbard DO      Placement Recommendation: Home with Home Health OT        Diagnosis:   1. Dyspnea on exertion    2. Leg swelling    3. Elevated brain natriuretic peptide (BNP) level         Surgery: None       Pertinent Medical History:       Past Medical History:   Diagnosis Date    VERONIQUE (acute kidney injury) (HCC) 2024    CAD in native artery 2024    Headache     Hearing loss     Hx of rheumatoid arthritis     Migraine     Osteopenia     Sjogren's disease (HCC)          Past Surgical History:   Procedure Laterality Date    CARDIAC PROCEDURE N/A 2024    Left heart cath / coronary [place] [facility] [place]    CORONARY ARTERY BYPASS GRAFT N/A 2024    CORONARY ARTERY BYPASS GRAFTING, EVH [place] Nicolasa Jasso [place] [facility] [place]    IR TUNNELED CATHETER PLACEMENT GREATER THAN 5 YEARS  2024    IR TUNNELED CATHETER PLACEMENT GREATER THAN 5 YEARS 2024 CuongREYNA [place] [facility] [place]    PARTIAL HYSTERECTOMY (CERVIX NOT REMOVED)      states     VEIN LIGATION AND STRIPPING      states       Precautions:  Fall Risk, alarm, life vest, sternal precautions      Assessment of current deficits    [x] Functional mobility  [x]ADLs  [x] Strength               []Cognition    [x] Functional transfers   [x] IADLs         [x] Safety Awareness   [x]Endurance    [] Fine Coordination              [x]  Activity - Inpatient   How much help is needed for putting on and taking off regular lower body clothing?: A Little  How much help is needed for bathing (which includes washing, rinsing, drying)?: A Lot  How much help is needed for toileting (which includes using toilet, bedpan, or urinal)?: A Little  How much help is needed for putting on and taking off regular upper body clothing?: A Little  How much help is needed for taking care of personal grooming?: A Little  How much help for eating meals?: A Little  AM-[facility] Inpatient Daily Activity Raw Score: 17  AM-PAC Inpatient ADL T-Scale Score : 37.26  ADL Inpatient CMS 0-100% Score: 50.11  ADL Inpatient CMS G-Code Modifier : CK     Functional Assessment:    Initial Eval Status  Date: 3/19/24   Treatment Status  Date: STGs = LTGs  Time frame: 10-14 days   Feeding Supervision     Independent    Grooming Supervision     Modified [place]    UB Dressing Minimal Assist    Modified [place]    LB Dressing Minimal Assist     Modified [place]    Bathing Moderate Assist     Modified [place]    Toileting Supervision     Modified [place]    Bed Mobility  Supine to sit:  N/A  Sit to supine:  N/A    Supine to sit: Independent   Sit to supine: Independent    Functional Transfers Sit to stand: Supervision   Stand to sit: Supervision   Commode Transfer: Supervision     Pt bracing onto jitendra-bear with sit sit <> stand transfers to maintain sternal precautions.     Independent    Functional Mobility Supervision without AD in room to simulate household distances.     Modified [place]    Balance Sitting:     Static: good    Dynamic: good  Standing: fair plus     Sitting:     Static: good    Dynamic: good  Standing: good    Activity Tolerance fair  plus   Increase standing tolerance >3  minutes for improved engagement with functional transfers and indep in ADLs     Visual/  Perceptual Glasses: Yes     Pt c/o \"dry eyes\" and difficulty seeing. Onset ~1 week ago.

## 2024-03-19 NOTE — CARE COORDINATION
3/19/2024 1021 CM note: Pt presents from [facility] [place].and had a recent stay at [facility] [place]. Pt attends [facility] cardio rehab and is wearing a life vest from prior admission. She is wearing o2@2L NC(pox 100%), no home o2 . Pt plans to return home at d/c, family to transport. Will await PT/OT evals and follow for possible home o2 needs. Molly HORTA

## 2024-03-19 NOTE — PROGRESS NOTES
INPATIENT CARDIOLOGY CONSULT     Reason for Consult: CHF, elevated troponin    Cardiologist: Dr. Salter    Requesting Physician: Dr. Hubbard     Date of Consultation: 3/19/2024    HISTORY OF PRESENT ILLNESS:   Patient is a 78 year old WF known to Dr. Shaw.    She has a known past medical history of CAD s/p CABG x 3 with MALIK ligation/Atriclip 2/7/2024, chronic HFrEF, ischemic cardiomyopathy, VHD, chronically elevated troponin, HLD, T2DM with peripheral neuropathy, hypothyroidism on HRT, RA, Sjogren's disease, CKD, chronic anemia, iron deficiency on oral supplementation, chronic migraines, osteopenia and hearing loss.    PERTINENT ENCOUNTERS:   Hospital Admission [facility] February 2024.  \"She presented to the emergency room at the beginning of February with complaints of increased chest pain and shortness of breath progressing for 2 weeks prior to presentation.  Upon arrival she was noted to have inferior STEMI, she underwent left heart catheterization with multivessel disease and CT surgery was consulted.  On 2/7/2024 she underwent three-vessel CABG (LIMA to LAD, SVG to RCA, SVG to OM).  She underwent TTE following surgery with LVEF 30-35%, moderate RV dysfunction with mild to moderate MR, moderate TR.  She was placed in a LifeVest and initiated on GDMT however limited given hypotension.  She was discharged to SNF facility for ongoing rehabilitation.\" Per CHF clinic visit 3/4/2024.  KIMBERLEE Fonseca, CHF clinic 3/4/2024.  Admitted to medication compliance.  Continues to require IV diuretic therapy at CHF clinic visits due to peripheral edema.  Chronic TRUJILLO.  Weight stable at 126 pounds.  LifeVest interrogated with no acute events noted.  Obtain labs.  [place] to be hypervolemic, given IV diuresis at CHF clinic.  Stop oral Lasix and start torsemide 20 mg daily.  Continue compression stockings, leg elevation and LifeVest.  Continue Toprol-XL 25 mg daily, Potassium supplementation, Amiodarone per CT surgery, ASA 81 mg

## 2024-03-19 NOTE — CONSULTS
Nutrition Education    Educated on Low sodium diet, meal planning and label reading, Sodium free flavoring tips  Learners: Patient  Readiness: Acceptance  Method: Explanation and Handout  Response: Verbalizes Understanding  Contact name and number provided.    Heike Mcmillan RD, LD  Contact Number: [phone]

## 2024-03-20 DIAGNOSIS — Z95.1 S/P CABG X 3: Primary | ICD-10-CM

## 2024-03-20 LAB
ALBUMIN SERPL-MCNC: 3.2 G/DL (ref 3.5–5.2)
ALP SERPL-CCNC: 90 U/L (ref 35–104)
ALT SERPL-CCNC: 8 U/L (ref 0–32)
ANION GAP SERPL CALCULATED.3IONS-SCNC: 11 MMOL/L (ref 7–16)
AST SERPL-CCNC: 20 U/L (ref 0–31)
BASOPHILS # BLD: 0.04 K/UL (ref 0–0.2)
BASOPHILS NFR BLD: 1 % (ref 0–2)
BILIRUB SERPL-MCNC: 0.3 MG/DL (ref 0–1.2)
BNP SERPL-MCNC: 8145 PG/ML (ref 0–450)
BUN SERPL-MCNC: 21 MG/DL (ref 6–23)
CALCIUM SERPL-MCNC: 8.3 MG/DL (ref 8.6–10.2)
CHLORIDE SERPL-SCNC: 95 MMOL/L (ref 98–107)
CO2 SERPL-SCNC: 31 MMOL/L (ref 22–29)
CREAT SERPL-MCNC: 1.1 MG/DL (ref 0.5–1)
EOSINOPHIL # BLD: 0.14 K/UL (ref 0.05–0.5)
EOSINOPHILS RELATIVE PERCENT: 3 % (ref 0–6)
ERYTHROCYTE [DISTWIDTH] IN BLOOD BY AUTOMATED COUNT: 17 % (ref 11.5–15)
GFR SERPL CREATININE-BSD FRML MDRD: 49 ML/MIN/1.73M2
GLUCOSE SERPL-MCNC: 103 MG/DL (ref 74–99)
HCT VFR BLD AUTO: 25.8 % (ref 34–48)
HGB BLD-MCNC: 8 G/DL (ref 11.5–15.5)
IMM GRANULOCYTES # BLD AUTO: <0.03 K/UL (ref 0–0.58)
IMM GRANULOCYTES NFR BLD: 0 % (ref 0–5)
LYMPHOCYTES NFR BLD: 1.45 K/UL (ref 1.5–4)
LYMPHOCYTES RELATIVE PERCENT: 25 % (ref 20–42)
MCH RBC QN AUTO: 29.7 PG (ref 26–35)
MCHC RBC AUTO-ENTMCNC: 31 G/DL (ref 32–34.5)
MCV RBC AUTO: 95.9 FL (ref 80–99.9)
MONOCYTES NFR BLD: 0.51 K/UL (ref 0.1–0.95)
MONOCYTES NFR BLD: 9 % (ref 2–12)
NEUTROPHILS NFR BLD: 62 % (ref 43–80)
NEUTS SEG NFR BLD: 3.55 K/UL (ref 1.8–7.3)
PLATELET # BLD AUTO: 278 K/UL (ref 130–450)
PMV BLD AUTO: 11.5 FL (ref 7–12)
POTASSIUM SERPL-SCNC: 3.3 MMOL/L (ref 3.5–5)
PROT SERPL-MCNC: 7 G/DL (ref 6.4–8.3)
RBC # BLD AUTO: 2.69 M/UL (ref 3.5–5.5)
SODIUM SERPL-SCNC: 137 MMOL/L (ref 132–146)
WBC OTHER # BLD: 5.7 K/UL (ref 4.5–11.5)

## 2024-03-20 PROCEDURE — 1200000000 HC SEMI PRIVATE

## 2024-03-20 PROCEDURE — 80053 COMPREHEN METABOLIC PANEL: CPT

## 2024-03-20 PROCEDURE — 36415 COLL VENOUS BLD VENIPUNCTURE: CPT

## 2024-03-20 PROCEDURE — 83880 ASSAY OF NATRIURETIC PEPTIDE: CPT

## 2024-03-20 PROCEDURE — 6370000000 HC RX 637 (ALT 250 FOR IP): Performed by: INTERNAL MEDICINE

## 2024-03-20 PROCEDURE — 85025 COMPLETE CBC W/AUTO DIFF WBC: CPT

## 2024-03-20 PROCEDURE — 99233 SBSQ HOSP IP/OBS HIGH 50: CPT | Performed by: INTERNAL MEDICINE

## 2024-03-20 PROCEDURE — 2580000003 HC RX 258: Performed by: INTERNAL MEDICINE

## 2024-03-20 PROCEDURE — 6370000000 HC RX 637 (ALT 250 FOR IP): Performed by: PHYSICIAN ASSISTANT

## 2024-03-20 PROCEDURE — 6360000002 HC RX W HCPCS: Performed by: INTERNAL MEDICINE

## 2024-03-20 RX ORDER — POTASSIUM CHLORIDE 20 MEQ/1
20 TABLET, EXTENDED RELEASE ORAL
Status: DISCONTINUED | OUTPATIENT
Start: 2024-03-20 | End: 2024-03-21

## 2024-03-20 RX ADMIN — ISOSORBIDE DINITRATE 5 MG: 10 TABLET ORAL at 20:56

## 2024-03-20 RX ADMIN — ENOXAPARIN SODIUM 40 MG: 100 INJECTION SUBCUTANEOUS at 09:10

## 2024-03-20 RX ADMIN — CLOPIDOGREL BISULFATE 75 MG: 75 TABLET ORAL at 09:11

## 2024-03-20 RX ADMIN — ACETAMINOPHEN 650 MG: 325 TABLET ORAL at 21:40

## 2024-03-20 RX ADMIN — POTASSIUM CHLORIDE 20 MEQ: 1500 TABLET, EXTENDED RELEASE ORAL at 12:31

## 2024-03-20 RX ADMIN — MAGNESIUM OXIDE 400 MG: 400 TABLET ORAL at 09:13

## 2024-03-20 RX ADMIN — Medication 10 ML: at 20:57

## 2024-03-20 RX ADMIN — LEVOTHYROXINE SODIUM 100 MCG: 0.1 TABLET ORAL at 06:02

## 2024-03-20 RX ADMIN — Medication 10 ML: at 09:13

## 2024-03-20 RX ADMIN — PANTOPRAZOLE SODIUM 40 MG: 40 TABLET, DELAYED RELEASE ORAL at 06:02

## 2024-03-20 RX ADMIN — ASPIRIN 81 MG CHEWABLE TABLET 81 MG: 81 TABLET CHEWABLE at 09:11

## 2024-03-20 RX ADMIN — METOPROLOL SUCCINATE 25 MG: 25 TABLET, EXTENDED RELEASE ORAL at 09:34

## 2024-03-20 RX ADMIN — FLUDROCORTISONE ACETATE 0.1 MG: 0.1 TABLET ORAL at 09:10

## 2024-03-20 RX ADMIN — FUROSEMIDE 40 MG: 10 INJECTION, SOLUTION INTRAMUSCULAR; INTRAVENOUS at 09:11

## 2024-03-20 RX ADMIN — POTASSIUM CHLORIDE 20 MEQ: 1500 TABLET, EXTENDED RELEASE ORAL at 18:02

## 2024-03-20 RX ADMIN — FUROSEMIDE 40 MG: 10 INJECTION, SOLUTION INTRAMUSCULAR; INTRAVENOUS at 18:02

## 2024-03-20 RX ADMIN — HYDRALAZINE HYDROCHLORIDE 10 MG: 10 TABLET, FILM COATED ORAL at 20:56

## 2024-03-20 RX ADMIN — MAGNESIUM OXIDE 400 MG: 400 TABLET ORAL at 20:56

## 2024-03-20 RX ADMIN — POTASSIUM CHLORIDE 20 MEQ: 1500 TABLET, EXTENDED RELEASE ORAL at 09:11

## 2024-03-20 ASSESSMENT — PAIN DESCRIPTION - ORIENTATION: ORIENTATION: RIGHT;LEFT

## 2024-03-20 ASSESSMENT — PAIN SCALES - WONG BAKER: WONGBAKER_NUMERICALRESPONSE: NO HURT

## 2024-03-20 ASSESSMENT — PAIN SCALES - GENERAL: PAINLEVEL_OUTOF10: 6

## 2024-03-20 ASSESSMENT — PAIN DESCRIPTION - DESCRIPTORS: DESCRIPTORS: ACHING

## 2024-03-20 ASSESSMENT — PAIN DESCRIPTION - LOCATION: LOCATION: BREAST;CHEST

## 2024-03-20 NOTE — PROGRESS NOTES
Physician Progress Note      PATIENT:               JESSICA SANCHEZ  CSN #:                  [id number]  :                       1945  ADMIT DATE:       3/16/2024 8:32 PM  DISCH DATE:  RESPONDING  PROVIDER #:        Tian Salter MD        QUERY TEXT:    Stage of Chronic Kidney Disease: Please provide further specificity, if known.    Clinical indicators include: ckd, probnp, creatinine, cr, bun, bun/creatinine  Options provided:  -- Chronic kidney disease stage 1  -- Chronic kidney disease stage 2  -- Chronic kidney disease stage 3  -- Chronic kidney disease stage 3a  -- Chronic kidney disease stage 3b  -- Chronic kidney disease stage 4  -- Chronic kidney disease stage 5  -- Chronic kidney disease stage 5, requiring dialysis  -- End stage renal disease  -- Other - I will add my own diagnosis  -- Disagree - Not applicable / Not valid  -- Disagree - Clinically Unable to determine / Unknown        PROVIDER RESPONSE TEXT:    The patient has chronic kidney disease stage 3b.      Electronically signed by:  Tian Salter MD 3/20/2024 10:58 AM

## 2024-03-20 NOTE — PROGRESS NOTES
INPATIENT CARDIOLOGY CONSULT     Reason for Consult: CHF, elevated troponin    Cardiologist: Dr. Salter    Requesting Physician: Dr. Hubbard     Date of Consultation: 3/20/2024    HISTORY OF PRESENT ILLNESS:   Patient is a 78 year old WF known to Dr. Shaw.    She has a known past medical history of CAD s/p CABG x 3 with MALIK ligation/Atriclip 2/7/2024, chronic HFrEF, ischemic cardiomyopathy, VHD, chronically elevated troponin, HLD, T2DM with peripheral neuropathy, hypothyroidism on HRT, RA, Sjogren's disease, CKD, chronic anemia, iron deficiency on oral supplementation, chronic migraines, osteopenia and hearing loss.    PERTINENT ENCOUNTERS:   Hospital Admission [facility] February 2024.  \"She presented to the emergency room at the beginning of February with complaints of increased chest pain and shortness of breath progressing for 2 weeks prior to presentation.  Upon arrival she was noted to have inferior STEMI, she underwent left heart catheterization with multivessel disease and CT surgery was consulted.  On 2/7/2024 she underwent three-vessel CABG (LIMA to LAD, SVG to RCA, SVG to OM).  She underwent TTE following surgery with LVEF 30-35%, moderate RV dysfunction with mild to moderate MR, moderate TR.  She was placed in a LifeVest and initiated on GDMT however limited given hypotension.  She was discharged to SNF facility for ongoing rehabilitation.\" Per CHF clinic visit 3/4/2024.  KIMBERLEE Fonseca, CHF clinic 3/4/2024.  Admitted to medication compliance.  Continues to require IV diuretic therapy at CHF clinic visits due to peripheral edema.  Chronic TRUJILLO.  Weight stable at 126 pounds.  LifeVest interrogated with no acute events noted.  Obtain labs.  [place] to be hypervolemic, given IV diuresis at CHF clinic.  Stop oral Lasix and start torsemide 20 mg daily.  Continue compression stockings, leg elevation and LifeVest.  Continue Toprol-XL 25 mg daily, Potassium supplementation, Amiodarone per CT surgery, ASA 81 mg

## 2024-03-20 NOTE — PROGRESS NOTES
Department of Internal Medicine  PN    PCP: Luis Angel Rea DO  Admitting Physician: Dr. Hubbard  Consultants:   Date of Service: 3/16/2024    CHIEF COMPLAINT:  sob/edema    HISTORY OF PRESENT ILLNESS:    Patient is a 78-year-old female presented to ED due to shortness of breath and increased lower extremity edema.  Patient presented to [facility] rehab on Tuesday.  She also had CABG in February as [facility].  She currently has LifeVest.  States that she got out of rehab on Tuesday.  Since being home she has noted increased lower extremity edema.  She admits to increased shortness of breath as well.  States that she is keeping to a fluid and salt restriction.  She has been taking her medications as prescribed.  She had not noticed any significant change in how much she is urinating.  No complaints of fever or chills.    3/17/2024  Patient seen and examined on telemetry floor.  Patient with poor urinary output yesterday until patient had pure wick catheter inserted and urine output dramatically improving.  BUN/creatinine 22/1.1 with normal transaminase.  Pro BNP was 13,600.  WBC 6.1 with hemoglobin 8.5.  Temperature is 98.4 with heart rate 70 and blood pressure 138/88.  O2 sat 100% on 2 L nasal cannula.  Patient again still has multiple questions about leg edema which she says she is talk to her cardiologist about, CHF clinic, thoracic surgeon and she says nobody is doing anything about it.  Reviewed etiology with the patient again.  Will add SCDs lower extremities along with the IV Lasix.  Patient is on fluid restriction and salt restriction.    3/18/2024  Patient seen examined on telemetry floor.  Patient denies any current chest pain, abdominal pain, nausea or vomiting.  Patient's breathing is improved.  BUN/creatinine was 21/1.2 with normal transaminase with WBC 6.3 hemoglobin 8.0.  Temperature is 98.2 with heart rate of 66 and blood pressure 117/58.  O2 sat 96% on 2 L nasal cannula.  Urine output seems to be

## 2024-03-20 NOTE — CARE COORDINATION
3/20/2024 1049 CM note:  Pt presents from [facility].and had a recent stay at [facility] [place]. She is active with [facility] for PT/OT-will need LIAM orders. Pt attends [facility] cardio rehab and is wearing a life vest from prior admission. She is on room air.  Pt plans to return home at d/c, family to transport.CM to follow for C orders.  Molly HORTA

## 2024-03-20 NOTE — CONSULTS
[facility]   Inpatient CHF Nurse Navigator Consult      Cardiologist: Dr. Colin BURLESON  is a 78 y.o. (1945) female with a history of HFrEF, most recent EF: 30-35% 2/15/24  No results found for: \"LVEF\", \"LVEFMODE\"      GUIDELINE DIRECTED MEDICAL THERAPY for HFrEF/HFmrEF:  ARNI/ACE I/ARB: (1a indication)  No  Hydralazine/Nitrates (1a in symptomatic AA population, 2b if unable to tolerate ACE/ARB/ARNI)  Hydralazine/Isordil  Beta blocker: (1a indication)  Metoprolol Succinate (Toprol)  Aldosterone antagonist: (1a indication)  No  SGLT2i: (1a indication)  No    If Channel inhibitor (2a indication if HR >70 on maximally tolerated beta blocker)  No  Cardiac glycoside (2b indication for symptomatic HFrEF)  No  Soluble Guanylate Cyclase (sGC) Stimulator (2b indication LVEF <45% with recent HFH, IV diuretics or elevated BNP)  No  Potassium binders (2b indication for hyperkalemia while taking RAASi)  No      Patient was awake and alert, laying in bed during the consultation and is agreeable to heart failure education. She was engaged and asked appropriate questions throughout the education session. Follows with the CHF clinic for GERIT, Life Vest.  Patient is agreeable to continue symptom monitoring, daily weights, and following a low sodium diet and follow up appointments with PCP, [facility] and the CHF clinic.     Barriers identified during consult contributing to HF Hospitalization:  [] Limited medication adherence   [x] Poor health literacy, education regarding HF medications provided   [] Pill box provided to patient  [] Difficulty affording medications  [] Difficulty obtaining/ managing medications  [] Prescription assistance information given     [] Not weighing themselves daily  [x] Weight log provided for easy monitoring  [] Scale provided     [] Not following low sodium diet  [] Food insecurity   [x] 2 gram sodium diet education provided   [] Low sodium  the importance of obtaining daily weights, following a low sodium diet, reading food labels for the sodium content, keeping physician appointments, and smoking cessation.  Discussed writing / tracking daily weights on a calendar / log because a 5 pound gain in 1 week can sneak up if you are not tracking it.   Advised patient they can reduce the risk for Heart Failure exacerbations by modifying / controlling the risk factors.  Discussed self-managed care which includes the following: take medications as prescribed, report any intolerable side effects of medications to the medical provider (PCP or cardiologist), do not just stop taking the medication; follow a cardiac heart healthy / low sodium diet; weigh yourself daily, exercise regularly- per doctor recommendation and not to smoke or use an excess amount of alcohol.  Discussed calling the cardiologist / doctor with a weight gain of 3 pounds in one day or a total of 5 pounds or more in one week. Also, if you should have a significant weight loss of 3# or more in one day to call the doctor, they may need to decrease or hold the diuretic dose. On days you feels nauseated and not eating / drinking, having emesis or diarrhea,  informed to call the cardiologist  / doctor, they may need to decrease or hold diuretic to avoid dehydration.  Again, stressed the importance of informing their medical provider the first day of onset of any of the signs and symptoms in the \"Yellow Zone\" of the HF Zones.     Instructed to call 911 if you have any of the following symptoms:  Struggling to breathe unrelieved with rest  Having chest pain  Confusion or can’t think clearly      Patient verbalizes understanding of above.   Greater than 30 minutes was spent educating patient.        Silvia Ruiz RN   Heart Failure Navigator      Yes

## 2024-03-21 ENCOUNTER — APPOINTMENT (OUTPATIENT)
Dept: GENERAL RADIOLOGY | Age: 79
End: 2024-03-21
Payer: MEDICARE

## 2024-03-21 DIAGNOSIS — Z76.89 ENCOUNTER TO ESTABLISH CARE: Primary | ICD-10-CM

## 2024-03-21 DIAGNOSIS — Z95.1 S/P CABG (CORONARY ARTERY BYPASS GRAFT): ICD-10-CM

## 2024-03-21 LAB
ALBUMIN SERPL-MCNC: 3.1 G/DL (ref 3.5–5.2)
ALP SERPL-CCNC: 88 U/L (ref 35–104)
ALT SERPL-CCNC: 11 U/L (ref 0–32)
ANION GAP SERPL CALCULATED.3IONS-SCNC: 12 MMOL/L (ref 7–16)
AST SERPL-CCNC: 22 U/L (ref 0–31)
BASOPHILS # BLD: 0.03 K/UL (ref 0–0.2)
BASOPHILS NFR BLD: 1 % (ref 0–2)
BILIRUB SERPL-MCNC: 0.4 MG/DL (ref 0–1.2)
BUN SERPL-MCNC: 24 MG/DL (ref 6–23)
CALCIUM SERPL-MCNC: 8.6 MG/DL (ref 8.6–10.2)
CHLORIDE SERPL-SCNC: 97 MMOL/L (ref 98–107)
CO2 SERPL-SCNC: 27 MMOL/L (ref 22–29)
CREAT SERPL-MCNC: 1.3 MG/DL (ref 0.5–1)
EOSINOPHIL # BLD: 0.09 K/UL (ref 0.05–0.5)
EOSINOPHILS RELATIVE PERCENT: 2 % (ref 0–6)
ERYTHROCYTE [DISTWIDTH] IN BLOOD BY AUTOMATED COUNT: 17.3 % (ref 11.5–15)
GFR SERPL CREATININE-BSD FRML MDRD: 40 ML/MIN/1.73M2
GLUCOSE SERPL-MCNC: 102 MG/DL (ref 74–99)
HCT VFR BLD AUTO: 26.9 % (ref 34–48)
HGB BLD-MCNC: 8.3 G/DL (ref 11.5–15.5)
IMM GRANULOCYTES # BLD AUTO: 0.04 K/UL (ref 0–0.58)
IMM GRANULOCYTES NFR BLD: 1 % (ref 0–5)
LYMPHOCYTES NFR BLD: 1.66 K/UL (ref 1.5–4)
LYMPHOCYTES RELATIVE PERCENT: 34 % (ref 20–42)
MCH RBC QN AUTO: 29.7 PG (ref 26–35)
MCHC RBC AUTO-ENTMCNC: 30.9 G/DL (ref 32–34.5)
MCV RBC AUTO: 96.4 FL (ref 80–99.9)
MONOCYTES NFR BLD: 0.43 K/UL (ref 0.1–0.95)
MONOCYTES NFR BLD: 9 % (ref 2–12)
NEUTROPHILS NFR BLD: 54 % (ref 43–80)
NEUTS SEG NFR BLD: 2.68 K/UL (ref 1.8–7.3)
PLATELET # BLD AUTO: 290 K/UL (ref 130–450)
PMV BLD AUTO: 11.4 FL (ref 7–12)
POTASSIUM SERPL-SCNC: 4.4 MMOL/L (ref 3.5–5)
PROT SERPL-MCNC: 6.8 G/DL (ref 6.4–8.3)
RBC # BLD AUTO: 2.79 M/UL (ref 3.5–5.5)
SODIUM SERPL-SCNC: 136 MMOL/L (ref 132–146)
WBC OTHER # BLD: 4.9 K/UL (ref 4.5–11.5)

## 2024-03-21 PROCEDURE — 6360000002 HC RX W HCPCS: Performed by: INTERNAL MEDICINE

## 2024-03-21 PROCEDURE — 6370000000 HC RX 637 (ALT 250 FOR IP): Performed by: INTERNAL MEDICINE

## 2024-03-21 PROCEDURE — 99233 SBSQ HOSP IP/OBS HIGH 50: CPT | Performed by: INTERNAL MEDICINE

## 2024-03-21 PROCEDURE — 36415 COLL VENOUS BLD VENIPUNCTURE: CPT

## 2024-03-21 PROCEDURE — 85025 COMPLETE CBC W/AUTO DIFF WBC: CPT

## 2024-03-21 PROCEDURE — 80053 COMPREHEN METABOLIC PANEL: CPT

## 2024-03-21 PROCEDURE — 71046 X-RAY EXAM CHEST 2 VIEWS: CPT

## 2024-03-21 PROCEDURE — 1200000000 HC SEMI PRIVATE

## 2024-03-21 PROCEDURE — 2580000003 HC RX 258: Performed by: INTERNAL MEDICINE

## 2024-03-21 PROCEDURE — 97530 THERAPEUTIC ACTIVITIES: CPT

## 2024-03-21 PROCEDURE — 97116 GAIT TRAINING THERAPY: CPT

## 2024-03-21 PROCEDURE — 6370000000 HC RX 637 (ALT 250 FOR IP): Performed by: PHYSICIAN ASSISTANT

## 2024-03-21 PROCEDURE — 97110 THERAPEUTIC EXERCISES: CPT

## 2024-03-21 RX ORDER — POTASSIUM CHLORIDE 20 MEQ/1
20 TABLET, EXTENDED RELEASE ORAL
Status: DISCONTINUED | OUTPATIENT
Start: 2024-03-22 | End: 2024-03-22 | Stop reason: HOSPADM

## 2024-03-21 RX ORDER — MECOBALAMIN 5000 MCG
5 TABLET,DISINTEGRATING ORAL ONCE
Status: COMPLETED | OUTPATIENT
Start: 2024-03-21 | End: 2024-03-21

## 2024-03-21 RX ORDER — FUROSEMIDE 40 MG/1
40 TABLET ORAL 2 TIMES DAILY
Status: DISCONTINUED | OUTPATIENT
Start: 2024-03-21 | End: 2024-03-22 | Stop reason: HOSPADM

## 2024-03-21 RX ADMIN — POTASSIUM CHLORIDE 20 MEQ: 1500 TABLET, EXTENDED RELEASE ORAL at 11:41

## 2024-03-21 RX ADMIN — HYDRALAZINE HYDROCHLORIDE 10 MG: 10 TABLET, FILM COATED ORAL at 08:37

## 2024-03-21 RX ADMIN — Medication 5 MG: at 01:59

## 2024-03-21 RX ADMIN — ISOSORBIDE DINITRATE 5 MG: 10 TABLET ORAL at 08:37

## 2024-03-21 RX ADMIN — Medication 10 ML: at 08:38

## 2024-03-21 RX ADMIN — ACETAMINOPHEN 650 MG: 325 TABLET ORAL at 05:57

## 2024-03-21 RX ADMIN — PANTOPRAZOLE SODIUM 40 MG: 40 TABLET, DELAYED RELEASE ORAL at 05:57

## 2024-03-21 RX ADMIN — FUROSEMIDE 40 MG: 40 TABLET ORAL at 19:36

## 2024-03-21 RX ADMIN — ISOSORBIDE DINITRATE 5 MG: 10 TABLET ORAL at 21:12

## 2024-03-21 RX ADMIN — METOPROLOL SUCCINATE 25 MG: 25 TABLET, EXTENDED RELEASE ORAL at 08:37

## 2024-03-21 RX ADMIN — MAGNESIUM OXIDE 400 MG: 400 TABLET ORAL at 21:12

## 2024-03-21 RX ADMIN — LEVOTHYROXINE SODIUM 100 MCG: 0.1 TABLET ORAL at 05:57

## 2024-03-21 RX ADMIN — ENOXAPARIN SODIUM 40 MG: 100 INJECTION SUBCUTANEOUS at 08:36

## 2024-03-21 RX ADMIN — FUROSEMIDE 40 MG: 10 INJECTION, SOLUTION INTRAMUSCULAR; INTRAVENOUS at 08:37

## 2024-03-21 RX ADMIN — POTASSIUM CHLORIDE 20 MEQ: 1500 TABLET, EXTENDED RELEASE ORAL at 08:37

## 2024-03-21 RX ADMIN — Medication 10 ML: at 21:00

## 2024-03-21 RX ADMIN — FLUDROCORTISONE ACETATE 0.1 MG: 0.1 TABLET ORAL at 08:38

## 2024-03-21 RX ADMIN — HYDRALAZINE HYDROCHLORIDE 10 MG: 10 TABLET, FILM COATED ORAL at 21:12

## 2024-03-21 RX ADMIN — MAGNESIUM OXIDE 400 MG: 400 TABLET ORAL at 08:37

## 2024-03-21 RX ADMIN — ASPIRIN 81 MG CHEWABLE TABLET 81 MG: 81 TABLET CHEWABLE at 08:38

## 2024-03-21 RX ADMIN — CLOPIDOGREL BISULFATE 75 MG: 75 TABLET ORAL at 08:37

## 2024-03-21 ASSESSMENT — PAIN SCALES - GENERAL: PAINLEVEL_OUTOF10: 6

## 2024-03-21 ASSESSMENT — PAIN DESCRIPTION - LOCATION: LOCATION: CHEST

## 2024-03-21 ASSESSMENT — PAIN SCALES - WONG BAKER: WONGBAKER_NUMERICALRESPONSE: NO HURT

## 2024-03-21 ASSESSMENT — PAIN DESCRIPTION - DESCRIPTORS: DESCRIPTORS: ACHING

## 2024-03-21 NOTE — CARE COORDINATION
3/21/2024 1017 CM note: Pt presents from [facility].and had a recent stay at [facility] [place]. She is active with [facility] for PT/OT-will need LIAM orders. Pt attends [facility] cardio rehab and is wearing a life vest from prior admission. She is on room air, pox 89-97% on room air with ambulation. She plans to return home at d/c, family to transport.CM to follow for C orders. Molly HORTA

## 2024-03-21 NOTE — PROGRESS NOTES
Patient at rest on room air, 02 sat 95%, ambulated patient in room 02 sat dropped to 90% while ambulating, some sob, 02 recovered to 93% on room air when back to bed.

## 2024-03-21 NOTE — PROGRESS NOTES
Physical Therapy Treatment Note/Plan of Care    Room #:  0421/0421-02  Patient Name: Katt Diaz  YOB: 1945  MRN: [id number]    Date of Service: 3/21/2024     Tentative placement recommendation: Home with Home Health Physical Therapy  Equipment recommendation: To be determined      Evaluating Physical Therapist: Maria Isabel Bach, PT #[id number]      Specific Provider Orders/Date/Referring Provider :  03/18/24 0900    PT eval and treat  Start:  03/18/24 0900,   End:  03/18/24 0900,   ONE TIME,   Standing Count:  1 Occurrences,   R       Dre Hubbard,     Admitting Diagnosis:   Acute on chronic systolic (congestive) heart failure (HCC) [I50.23]      shortness of breath and leg swelling that started after her open heart surgery 2/3/2024.    Surgery: none  Visit Diagnoses         Codes    Leg swelling     M79.89    Elevated brain natriuretic peptide (BNP) level     R79.89            Patient Active Problem List   Diagnosis    S/P cardiac cath    CAD in native artery    ST elevation myocardial infarction (STEMI) (HCC)    Postoperative hypotension    Complication of surgical procedure    Stress hyperglycemia    VERONIQUE (acute kidney injury) (HCC)    Acute on chronic systolic heart failure (HCC)    Acute congestive heart failure (HCC)    Mild protein-calorie malnutrition (HCC)    Acute on chronic systolic (congestive) heart failure (HCC)    Dyspnea on exertion        ASSESSMENT of Current Deficits Patient exhibits decreased strength, balance, and endurance impairing functional mobility, transfers, gait , gait distance, and tolerance to activity pt tired today but ambulated with no device and min assist, presents with slow jorge. Pt reported she was short of breath after short walk, spo2 reading 93-97%, HR 76bpm. Pt reported 7/10 shortness of breath after gait.After exercises, pt desat to 89% on room air with recovery to 94% within 15 seconds of rest.  Required increased time between activity. Patient  LAB    CORONARY ARTERY BYPASS GRAFT N/A 2/7/2024    CORONARY ARTERY BYPASS GRAFTING, EVH performed by Nicolasa Jasso DO at [facility] OR    IR TUNNELED CATHETER PLACEMENT GREATER THAN 5 YEARS  2/13/2024    IR TUNNELED CATHETER PLACEMENT GREATER THAN 5 YEARS 2/13/2024 Cuong, MD [facility] SPECIAL PROCEDURES    PARTIAL HYSTERECTOMY (CERVIX NOT REMOVED)      states 1976    VEIN LIGATION AND STRIPPING      states 1972       SUBJECTIVE:    Precautions: Up with assistance and Continuous Pulse Oximetry , falls ,   life vest,sternal precautions ( coronary bypass graft on 2/7/24)    Imaging results: XR CHEST PORTABLE    Result Date: 3/16/2024  EXAMINATION: ONE XRAY VIEW OF THE CHEST 3/16/2024 8:44 pm     No acute process.      Social history: Patient lives alone [facility]    Equipment owned: Rollator, Shower chair, and Transport chair,       AM-PAC Basic Mobility        AM-PAC Basic Mobility - Inpatient   How much help is needed turning from your back to your side while in a flat bed without using bedrails?: A Little  How much help is needed moving from lying on your back to sitting on the side of a flat bed without using bedrails?: A Little  How much help is needed moving to and from a bed to a chair?: A Little  How much help is needed standing up from a chair using your arms?: A Little  How much help is needed walking in hospital room?: A Little  How much help is needed climbing 3-5 steps with a railing?: A Lot  AM-PAC Inpatient Mobility Raw Score : 17  AM-PAC Inpatient T-Scale Score : 42.13  Mobility Inpatient CMS 0-100% Score: 50.57  Mobility Inpatient CMS G-Code Modifier : CK    Nursing cleared patient for PT treatment. .    OBJECTIVE:   Initial Evaluation  Date: 3/18/2024 Treatment Date:  3/21/2024     Short Term/ Long Term   Goals   Was pt agreeable to Eval/treatment? Yes Yes To be met in 3 days   Pain level   3/10  Eyes  5/10  Eyes and chest/stomach area    Bed Mobility  Using rails and

## 2024-03-21 NOTE — PROGRESS NOTES
INPATIENT CARDIOLOGY CONSULT     Reason for Consult: CHF, elevated troponin    Cardiologist: Dr. Salter    Requesting Physician: Dr. Hubbard     Date of Consultation: 3/21/2024    HISTORY OF PRESENT ILLNESS:   Patient is a 78 year old WF known to Dr. Shaw.    She has a known past medical history of CAD s/p CABG x 3 with MALIK ligation/Atriclip 2/7/2024, chronic HFrEF, ischemic cardiomyopathy, VHD, chronically elevated troponin, HLD, T2DM with peripheral neuropathy, hypothyroidism on HRT, RA, Sjogren's disease, CKD, chronic anemia, iron deficiency on oral supplementation, chronic migraines, osteopenia and hearing loss.    PERTINENT ENCOUNTERS:   Hospital Admission [facility] February 2024.  \"She presented to the emergency room at the beginning of February with complaints of increased chest pain and shortness of breath progressing for 2 weeks prior to presentation.  Upon arrival she was noted to have inferior STEMI, she underwent left heart catheterization with multivessel disease and CT surgery was consulted.  On 2/7/2024 she underwent three-vessel CABG (LIMA to LAD, SVG to RCA, SVG to OM).  She underwent TTE following surgery with LVEF 30-35%, moderate RV dysfunction with mild to moderate MR, moderate TR.  She was placed in a LifeVest and initiated on GDMT however limited given hypotension.  She was discharged to SNF facility for ongoing rehabilitation.\" Per CHF clinic visit 3/4/2024.  KIMBERLEE Fonseca, CHF clinic 3/4/2024.  Admitted to medication compliance.  Continues to require IV diuretic therapy at CHF clinic visits due to peripheral edema.  Chronic TRUJILLO.  Weight stable at 126 pounds.  LifeVest interrogated with no acute events noted.  Obtain labs.  [place] to be hypervolemic, given IV diuresis at CHF clinic.  Stop oral Lasix and start torsemide 20 mg daily.  Continue compression stockings, leg elevation and LifeVest.  Continue Toprol-XL 25 mg daily, Potassium supplementation, Amiodarone per CT surgery, ASA 81 mg  changed and washed approximate every other day.   LifeVest History:  Original date of LifeVest was placed: 2/16/2024  TTE (2/15/2024): 30-35%  Following with CHF clinic and GDMT being titrated   Follow up TTE scheduled for: TBD following GDMT titration   LifeVest trends reviewed:  Wear time reviewed: 22.82  Recordings reviewed: No acute events  Trends reviewed:  Average HR: 65 bpm  Steps: 81  Body angle reviewed   Above discussed with patient   The patient is encouraged to call the office with any cardiology-related questions or concerns that should arise, and call [address] tech support at [phone] with any LifeVest related questions    EKG shows sinus rhythm, 73 bpm, nonspecific ST-T wave changes and incomplete right bundle branch block    Assessment    CAD   STEMI 2/5/2024 Inferior ST elevation with Q waves, Q waves in V3-V4 leads   [facility] 2/5/24 Dr. Shaw: MV CAD - full report below   2/7/2024 CABG x 3 ((LIMA-LAD, SVG-OM, SVG-RCA), EVH LLE, left atrial appendage ligation with 35mm Atriclip) Dr. Jasso   R pleural effusion s/p IR evaluation for drainage - not enough fluid for drainage 2/13/24  VERONIQUE post op - peak Cr 1.4   Required several days of IV pressors   Chronic HFrEF  Ischemic cardiomyopathy  Intraoperative YOVANY 2/7/2024 LVEF 35% >> Post op (LVEF 57%)  TTE 2/8/2024 LVEF 20-25% see full report below   TTE 2/15/2024 LVEF 30-35% TTE see full report below  LifeVest placed on discharge 2/19/2024   VHD   Chronically elevated troponin  HLD, on statin   T2DM with peripheral neuropathy   Hypothyroidism, on HRT  RA  Sjogren's disease  CKD.  Baseline Cr 1.0-1.2  Chronic anemia  Iron deficiency, on oral supplementation   Chronic migraines  Osteopenia   Hearing loss        Plan:   Patient troponin was over 5000 in February 2024 when she was diagnosed with STEMI and underwent CABG and troponin has continued to trend down (now in the 100).  Please continue to trend cardiac enzymes and EKG  Troponin leak likely from

## 2024-03-21 NOTE — PROGRESS NOTES
redness, warmth, or swelling of the joints.  Full range of motion noted.  Motor strength is 5 out of 5 all extremities bilaterally.  Tone is normal.    NEUROLOGIC:    Awake, alert, oriented to name, place and time.  Cranial nerves II-XII are grossly intact.  Motor is 5 out of 5 bilaterally.      SKIN:    No bruising or bleeding.  No redness, warmth, or swelling    EXTREMITIES:    Peripheral pulses present.  No edema, cyanosis, or swelling.    LINES/CATHETERS     LABORATORY DATA:  CBC with Differential:    Lab Results   Component Value Date/Time    WBC 4.9 03/21/2024 04:55 AM    RBC 2.79 03/21/2024 04:55 AM    HGB 8.3 03/21/2024 04:55 AM    HCT 26.9 03/21/2024 04:55 AM     03/21/2024 04:55 AM    MCV 96.4 03/21/2024 04:55 AM    MCH 29.7 03/21/2024 04:55 AM    MCHC 30.9 03/21/2024 04:55 AM    RDW 17.3 03/21/2024 04:55 AM    LYMPHOPCT 34 03/21/2024 04:55 AM    MONOPCT 9 03/21/2024 04:55 AM    BASOPCT 1 03/21/2024 04:55 AM    MONOSABS 0.43 03/21/2024 04:55 AM    LYMPHSABS 1.66 03/21/2024 04:55 AM    EOSABS 0.09 03/21/2024 04:55 AM    BASOSABS 0.03 03/21/2024 04:55 AM     CMP:    Lab Results   Component Value Date/Time     03/21/2024 04:55 AM    K 4.4 03/21/2024 04:55 AM    CL 97 03/21/2024 04:55 AM    CO2 27 03/21/2024 04:55 AM    BUN 24 03/21/2024 04:55 AM    CREATININE 1.3 03/21/2024 04:55 AM    LABGLOM 40 03/21/2024 04:55 AM    GLUCOSE 102 03/21/2024 04:55 AM    PROT 6.8 03/21/2024 04:55 AM    LABALBU 3.1 03/21/2024 04:55 AM    CALCIUM 8.6 03/21/2024 04:55 AM    BILITOT 0.4 03/21/2024 04:55 AM    ALKPHOS 88 03/21/2024 04:55 AM    AST 22 03/21/2024 04:55 AM    ALT 11 03/21/2024 04:55 AM       ASSESSMENT/PLAN:  Acute on systolic chronic congestive heart failure with LifeVest  Acute hypoxic respiratory failure  Elevated troponin  Chronic anemia  Coronary artery disease status post CABG  Mild to moderate mitral regurgitation   moderate tricuspid regurgitation  Sjogren's disease  History of rheumatoid

## 2024-03-22 VITALS
DIASTOLIC BLOOD PRESSURE: 91 MMHG | RESPIRATION RATE: 24 BRPM | WEIGHT: 132.06 LBS | HEART RATE: 61 BPM | SYSTOLIC BLOOD PRESSURE: 113 MMHG | TEMPERATURE: 97.7 F | HEIGHT: 63 IN | OXYGEN SATURATION: 92 % | BODY MASS INDEX: 23.4 KG/M2

## 2024-03-22 LAB
ALBUMIN SERPL-MCNC: 3.1 G/DL (ref 3.5–5.2)
ALP SERPL-CCNC: 90 U/L (ref 35–104)
ALT SERPL-CCNC: 11 U/L (ref 0–32)
ANION GAP SERPL CALCULATED.3IONS-SCNC: 15 MMOL/L (ref 7–16)
AST SERPL-CCNC: 23 U/L (ref 0–31)
BASOPHILS # BLD: 0.02 K/UL (ref 0–0.2)
BASOPHILS NFR BLD: 0 % (ref 0–2)
BILIRUB SERPL-MCNC: 0.5 MG/DL (ref 0–1.2)
BNP SERPL-MCNC: ABNORMAL PG/ML (ref 0–450)
BUN SERPL-MCNC: 22 MG/DL (ref 6–23)
CALCIUM SERPL-MCNC: 8.5 MG/DL (ref 8.6–10.2)
CHLORIDE SERPL-SCNC: 94 MMOL/L (ref 98–107)
CO2 SERPL-SCNC: 25 MMOL/L (ref 22–29)
CREAT SERPL-MCNC: 1.3 MG/DL (ref 0.5–1)
EOSINOPHIL # BLD: 0.06 K/UL (ref 0.05–0.5)
EOSINOPHILS RELATIVE PERCENT: 1 % (ref 0–6)
ERYTHROCYTE [DISTWIDTH] IN BLOOD BY AUTOMATED COUNT: 17.7 % (ref 11.5–15)
GFR SERPL CREATININE-BSD FRML MDRD: 43 ML/MIN/1.73M2
GLUCOSE SERPL-MCNC: 107 MG/DL (ref 74–99)
HCT VFR BLD AUTO: 26.4 % (ref 34–48)
HGB BLD-MCNC: 8.2 G/DL (ref 11.5–15.5)
IMM GRANULOCYTES # BLD AUTO: 0.04 K/UL (ref 0–0.58)
IMM GRANULOCYTES NFR BLD: 1 % (ref 0–5)
LYMPHOCYTES NFR BLD: 1.44 K/UL (ref 1.5–4)
LYMPHOCYTES RELATIVE PERCENT: 31 % (ref 20–42)
MCH RBC QN AUTO: 29.8 PG (ref 26–35)
MCHC RBC AUTO-ENTMCNC: 31.1 G/DL (ref 32–34.5)
MCV RBC AUTO: 96 FL (ref 80–99.9)
MONOCYTES NFR BLD: 0.46 K/UL (ref 0.1–0.95)
MONOCYTES NFR BLD: 10 % (ref 2–12)
NEUTROPHILS NFR BLD: 56 % (ref 43–80)
NEUTS SEG NFR BLD: 2.57 K/UL (ref 1.8–7.3)
PLATELET # BLD AUTO: 286 K/UL (ref 130–450)
PMV BLD AUTO: 11.5 FL (ref 7–12)
POTASSIUM SERPL-SCNC: 4.2 MMOL/L (ref 3.5–5)
PROT SERPL-MCNC: 6.7 G/DL (ref 6.4–8.3)
RBC # BLD AUTO: 2.75 M/UL (ref 3.5–5.5)
SODIUM SERPL-SCNC: 134 MMOL/L (ref 132–146)
WBC OTHER # BLD: 4.6 K/UL (ref 4.5–11.5)

## 2024-03-22 PROCEDURE — 97116 GAIT TRAINING THERAPY: CPT

## 2024-03-22 PROCEDURE — 6370000000 HC RX 637 (ALT 250 FOR IP): Performed by: PHYSICIAN ASSISTANT

## 2024-03-22 PROCEDURE — 6370000000 HC RX 637 (ALT 250 FOR IP): Performed by: INTERNAL MEDICINE

## 2024-03-22 PROCEDURE — 6360000002 HC RX W HCPCS: Performed by: INTERNAL MEDICINE

## 2024-03-22 PROCEDURE — 99232 SBSQ HOSP IP/OBS MODERATE 35: CPT | Performed by: INTERNAL MEDICINE

## 2024-03-22 PROCEDURE — 80053 COMPREHEN METABOLIC PANEL: CPT

## 2024-03-22 PROCEDURE — 97110 THERAPEUTIC EXERCISES: CPT

## 2024-03-22 PROCEDURE — 97530 THERAPEUTIC ACTIVITIES: CPT

## 2024-03-22 PROCEDURE — 83880 ASSAY OF NATRIURETIC PEPTIDE: CPT

## 2024-03-22 PROCEDURE — 2580000003 HC RX 258: Performed by: INTERNAL MEDICINE

## 2024-03-22 PROCEDURE — 85025 COMPLETE CBC W/AUTO DIFF WBC: CPT

## 2024-03-22 PROCEDURE — 36415 COLL VENOUS BLD VENIPUNCTURE: CPT

## 2024-03-22 RX ORDER — ISOSORBIDE DINITRATE 5 MG/1
5 TABLET ORAL 2 TIMES DAILY
Qty: 90 TABLET | Refills: 3 | Status: SHIPPED | OUTPATIENT
Start: 2024-03-22

## 2024-03-22 RX ORDER — HYDRALAZINE HYDROCHLORIDE 10 MG/1
10 TABLET, FILM COATED ORAL 2 TIMES DAILY
Qty: 90 TABLET | Refills: 3 | Status: SHIPPED | OUTPATIENT
Start: 2024-03-22

## 2024-03-22 RX ADMIN — ACETAMINOPHEN 650 MG: 325 TABLET ORAL at 05:48

## 2024-03-22 RX ADMIN — POTASSIUM CHLORIDE 20 MEQ: 1500 TABLET, EXTENDED RELEASE ORAL at 08:23

## 2024-03-22 RX ADMIN — FLUDROCORTISONE ACETATE 0.1 MG: 0.1 TABLET ORAL at 08:22

## 2024-03-22 RX ADMIN — ASPIRIN 81 MG CHEWABLE TABLET 81 MG: 81 TABLET CHEWABLE at 08:22

## 2024-03-22 RX ADMIN — ISOSORBIDE DINITRATE 5 MG: 10 TABLET ORAL at 08:22

## 2024-03-22 RX ADMIN — FUROSEMIDE 40 MG: 40 TABLET ORAL at 08:22

## 2024-03-22 RX ADMIN — METOPROLOL SUCCINATE 25 MG: 25 TABLET, EXTENDED RELEASE ORAL at 08:23

## 2024-03-22 RX ADMIN — MAGNESIUM OXIDE 400 MG: 400 TABLET ORAL at 08:23

## 2024-03-22 RX ADMIN — ENOXAPARIN SODIUM 40 MG: 100 INJECTION SUBCUTANEOUS at 08:23

## 2024-03-22 RX ADMIN — PANTOPRAZOLE SODIUM 40 MG: 40 TABLET, DELAYED RELEASE ORAL at 05:48

## 2024-03-22 RX ADMIN — HYDRALAZINE HYDROCHLORIDE 10 MG: 10 TABLET, FILM COATED ORAL at 08:23

## 2024-03-22 RX ADMIN — Medication 10 ML: at 08:26

## 2024-03-22 RX ADMIN — LEVOTHYROXINE SODIUM 100 MCG: 0.1 TABLET ORAL at 05:48

## 2024-03-22 RX ADMIN — CLOPIDOGREL BISULFATE 75 MG: 75 TABLET ORAL at 08:22

## 2024-03-22 ASSESSMENT — PAIN DESCRIPTION - DESCRIPTORS: DESCRIPTORS: ACHING

## 2024-03-22 ASSESSMENT — PAIN SCALES - GENERAL: PAINLEVEL_OUTOF10: 6

## 2024-03-22 ASSESSMENT — PAIN DESCRIPTION - LOCATION: LOCATION: CHEST

## 2024-03-22 ASSESSMENT — PAIN SCALES - WONG BAKER: WONGBAKER_NUMERICALRESPONSE: NO HURT

## 2024-03-22 NOTE — PROGRESS NOTES
INPATIENT CARDIOLOGY CONSULT     Reason for Consult: CHF, elevated troponin    Cardiologist: Dr. Salter    Requesting Physician: Dr. Hubbard     Date of Consultation: 3/22/2024    HISTORY OF PRESENT ILLNESS:   Patient is a 78 year old WF known to Dr. Shaw.    She has a known past medical history of CAD s/p CABG x 3 with MALIK ligation/Atriclip 2/7/2024, chronic HFrEF, ischemic cardiomyopathy, VHD, chronically elevated troponin, HLD, T2DM with peripheral neuropathy, hypothyroidism on HRT, RA, Sjogren's disease, CKD, chronic anemia, iron deficiency on oral supplementation, chronic migraines, osteopenia and hearing loss.    PERTINENT ENCOUNTERS:   Hospital Admission [facility] February 2024.  \"She presented to the emergency room at the beginning of February with complaints of increased chest pain and shortness of breath progressing for 2 weeks prior to presentation.  Upon arrival she was noted to have inferior STEMI, she underwent left heart catheterization with multivessel disease and CT surgery was consulted.  On 2/7/2024 she underwent three-vessel CABG (LIMA to LAD, SVG to RCA, SVG to OM).  She underwent TTE following surgery with LVEF 30-35%, moderate RV dysfunction with mild to moderate MR, moderate TR.  She was placed in a LifeVest and initiated on GDMT however limited given hypotension.  She was discharged to SNF facility for ongoing rehabilitation.\" Per CHF clinic visit 3/4/2024.  KIMBERLEE Fonseca, CHF clinic 3/4/2024.  Admitted to medication compliance.  Continues to require IV diuretic therapy at CHF clinic visits due to peripheral edema.  Chronic TRUJILLO.  Weight stable at 126 pounds.  LifeVest interrogated with no acute events noted.  Obtain labs.  [place] to be hypervolemic, given IV diuresis at CHF clinic.  Stop oral Lasix and start torsemide 20 mg daily.  Continue compression stockings, leg elevation and LifeVest.  Continue Toprol-XL 25 mg daily, Potassium supplementation, Amiodarone per CT surgery, ASA 81 mg  Septal motion is consistent with post-operative status. Septal flattening in diastole consistent with right ventricular volume overload. Severe global hypokinesis present. Akinesis of the following segments: basal inferior, mid inferior and apical inferior. Grade I diastolic dysfunction with normal LAP.    Right Ventricle: Right ventricle is moderately dilated. Moderately reduced systolic function.    Aortic Valve: Trileaflet valve. Moderately calcified cusp. Trace regurgitation.    Mitral Valve: Mild regurgitation.    Tricuspid Valve: Moderate to severe regurgitation. RVSP at least 41 mmHg, could be underestimated due to moderate to severe TR    Right Atrium: Right atrium is severely dilated.    Pericardium: Trivial pericardial effusion present. No indication of cardiac tamponade.    Image quality is adequate.     Limited TTE 2/15/2024 Dr. Dias   Left Ventricle: The EF by visual approximation is 30 - 35%. Findings consistent with moderate concentric hypertrophy. Severe global hypokinesis present. There is inferior akinesis. Abnormal septal motion consistent with abnormal electrical activation.    Right Ventricle: Moderately reduced systolic function.    Mitral Valve: Mild to moderate regurgitation.    Tricuspid Valve: Moderate regurgitation.    Left Atrium: Left atrium size is normal.    Right Atrium: Right atrium is mildly dilated.    Limted study to assess biventricular function.    Image quality is adequate     LifeVest Interrogation CHF clinic 3/4/2024  Patient wearing device in CHF clinic today  LifeVest battery disconnected and reconnected while in clinic today, no issues with reboot.  On evaluation of the LifeVest, it is fitting the patient properly.   Patient educated that the garments are being changed and washed approximate every other day.   LifeVest History:  Original date of LifeVest was placed: 2/16/2024  TTE (2/15/2024): 30-35%  Following with CHF clinic and GDMT being titrated   Follow up TTE

## 2024-03-22 NOTE — PROGRESS NOTES
CLINICAL PHARMACY NOTE: MEDS TO BEDS    Total # of Prescriptions Filled: 2   The following medications were delivered to the patient:  Isosorbide dinitrate 5 mg  Hydralazine 10 mg    Additional Documentation:     Where Do You Want The Question To Include Opioid Counseling Located?: Case Summary Tab

## 2024-03-22 NOTE — PROGRESS NOTES
OCCUPATIONAL THERAPY TREATMENT NOTE  [facility]  [address] [place]        Date:3/22/2024                                                  Patient Name: Katt Diaz    MRN: [id number]    : 1945    Room: [address] [place] [address]      Evaluating OT: Sasha Mosqueda OTR/L #[id number]     Referring Provider and Specific Provider Orders/Date:      24  OT eval and treat  Start:  24,   End:  24,   ONE TIME,   Standing Count:  1 Occurrences,   R         Dre Hubbard DO      Placement Recommendation: Home with Home Health OT        Diagnosis:   1. Dyspnea on exertion    2. Leg swelling    3. Elevated brain natriuretic peptide (BNP) level         Surgery: None       Pertinent Medical History:       Past Medical History:   Diagnosis Date    VERONIQUE (acute kidney injury) (HCC) 2024    CAD in native artery 2024    Headache     Hearing loss     Hx of rheumatoid arthritis     Migraine     Osteopenia     S/P CABG x 3     Sjogren's disease (HCC)          Past Surgical History:   Procedure Laterality Date    CARDIAC PROCEDURE N/A 2024    Left heart cath / coronary angiography performed by Arnav Shaw MD at [facility] CARDIAC CATH LAB    CORONARY ARTERY BYPASS GRAFT N/A 2024    CORONARY ARTERY BYPASS GRAFTING, EVH performed by Nicolasa Jasso DO at [facility] OR    IR TUNNELED CATHETER PLACEMENT GREATER THAN 5 YEARS  2024    IR TUNNELED CATHETER PLACEMENT GREATER THAN 5 YEARS 2024 CuongREYNA MD [facility] SPECIAL PROCEDURES    PARTIAL HYSTERECTOMY (CERVIX NOT REMOVED)      states     VEIN LIGATION AND STRIPPING      states       Precautions:  Fall Risk, alarm, life vest, sternal precautions      Assessment of current deficits    [x] Functional mobility  [x]ADLs  [x] Strength               []Cognition    [x] Functional transfers   [x] IADLs         [x] Safety Awareness   [x]Endurance    [] Fine Coordination         Sitting:     Static: good    Dynamic: good  Standing: good    Activity Tolerance fair  plus  fair  plus  Increase standing tolerance >3  minutes for improved engagement with functional transfers and indep in ADLs     Visual/  Perceptual Glasses: Yes     Pt c/o \"dry eyes\" and difficulty seeing. Onset ~1 week ago.     NA      Hand Dominance:      AROM (PROM) Strength Additional Info:  Goal:   RUE  WFL ; limited to 90 degrees 2/2 sternal precautions  Deferred  good  and wfl FMC/dexterity noted during ADL tasks   Improve overall RUE strength  for participation in functional tasks   LUE WFL ; limited to 90 degrees 2/2 sternal precautions  Deferred  good  and wfl FMC/dexterity noted during ADL tasks   Improve overall LUE strength  for participation in functional tasks     Hearing:  WFL   Sensation:   No c/o numbness or tingling  Tone:  WFL   Edema: LE edema      Vitals:  HR at rest: 64 bpm HR at end of session: 72 bpm   SpO2 at rest: 96% SpO2 at end of session: 94%     Comments: RN cleared patient for OT.   Upon arrival patient in chair.  Therapist facilitated and instructed pt on adapted  techniques & compensatory strategies to improve safety and independence with basic ADLs, bed mobility, functional transfers and mobility to allow pt to achieve highest level of independence and safely.  Pt demonstrated good understanding of education & follow through.  At end of session, patient was supine with HOB elevated with call light and phone within reach, all lines and tubes intact.  Overall, patient demonstrated  decreased independence and safety during completion of ADL tasks.  Pt would benefit from continued skilled OT to increase safety and independence with completion of ADL tasks and functional mobility for improved quality of life.       Rehab Potential: Good for established goals.      Patient / Family Goal: N/A      Patient and/or family were instructed on functional diagnosis, prognosis/goals and OT plan of

## 2024-03-22 NOTE — CARE COORDINATION
3/22/2024 0943 BR[place]ON note: Pt presents from [facility] and had a recent stay at [facility] [place]. She is active with [facility] for PT/OT-orders in [facility]. Pt attends [facility] cardio rehab and is wearing a life vest from prior admission. She is on room air and did not qualify for home o2. Pt plans to return home at d/c, family to transport. CM/SW to call [facility] at [phone]  when pt discharges home. Molly HORTA

## 2024-03-22 NOTE — DISCHARGE SUMMARY
Department of Internal Medicine  DS    PCP: Luis Angel Rea DO  Admitting Physician: Dr. Hubbard  Consultants:   Date of Service: 3/16/2024    CHIEF COMPLAINT:  sob/edema    HISTORY OF PRESENT ILLNESS:    Patient is a 78-year-old female presented to ED due to shortness of breath and increased lower extremity edema.  Patient presented to [facility] rehab on Tuesday.  She also had CABG in February as [facility].  She currently has LifeVest.  States that she got out of rehab on Tuesday.  Since being home she has noted increased lower extremity edema.  She admits to increased shortness of breath as well.  States that she is keeping to a fluid and salt restriction.  She has been taking her medications as prescribed.  She had not noticed any significant change in how much she is urinating.  No complaints of fever or chills.    3/17/2024  Patient seen and examined on telemetry floor.  Patient with poor urinary output yesterday until patient had pure wick catheter inserted and urine output dramatically improving.  BUN/creatinine 22/1.1 with normal transaminase.  Pro BNP was 13,600.  WBC 6.1 with hemoglobin 8.5.  Temperature is 98.4 with heart rate 70 and blood pressure 138/88.  O2 sat 100% on 2 L nasal cannula.  Patient again still has multiple questions about leg edema which she says she is talk to her cardiologist about, CHF clinic, thoracic surgeon and she says nobody is doing anything about it.  Reviewed etiology with the patient again.  Will add SCDs lower extremities along with the IV Lasix.  Patient is on fluid restriction and salt restriction.    3/18/2024  Patient seen examined on telemetry floor.  Patient denies any current chest pain, abdominal pain, nausea or vomiting.  Patient's breathing is improved.  BUN/creatinine was 21/1.2 with normal transaminase with WBC 6.3 hemoglobin 8.0.  Temperature is 98.2 with heart rate of 66 and blood pressure 117/58.  O2 sat 96% on 2 L nasal cannula.  Urine output seems to be

## 2024-03-22 NOTE — CARE COORDINATION
3/22/2024 1255 CM note: Pt presents from [facility]. She is active with [facility] but now requests [facility].  [facility] accepted pt and received orders. ([facility] notified). Pt attends [facility] cardio rehab and is wearing a life vest from prior admission. She is on room air and did not qualify for home o2. Pt plans to return home at d/c, family to transport. Molly HORTA      The Plan for Transition of Care is related to the following treatment goals: [facility]    The Patient  was provided with a choice of provider and agrees   with the discharge plan. [] Yes [] No    Freedom of choice list was provided with basic dialogue that supports the patient's individualized plan of care/goals, treatment preferences and shares the quality data associated with the providers. [] Yes [x] No declined list, request [facility]

## 2024-03-22 NOTE — PROGRESS NOTES
Not assessed     Supine to sit: Minimal assist of 1    Sit to supine: Not assessed     Scooting: Minimal assist of 1   Using rails and head of bed elevated:     Rolling: Supervision    Supine to sit: Minimal assist of 1   Sit to supine: Minimal progressing to supervision   Scooting: Minimal assist of 1    Rolling: Independent    Supine to sit: Independent    Sit to supine: Independent    Scooting: Independent     Transfers Sit to stand: Minimal assist of 1   Sit to stand: Minimal assist of 1 sternal precautions    Sit to stand: Independent     Ambulation     40 feet using  no device with Minimal assist of 1   for balance, Patient with decreased step length, and cues for safety and pacing 60 feet using  no device with Minimal assist of 1   progressing to supervision at times. for balance and safety and Patient with decreased jorge and decreased step length     100 feet using  no device with Modified Independent      ROM Within functional limits    Increase range of motion 10% of affected joints    Strength BUE:  refer to OT eval  RLE:  3+/5  LLE:  3+/5  Increase strength in affected mm groups by 1/3 grade   Balance Sitting EOB:  good -  Dynamic Standing:  fair   Sitting EOB: good -  Dynamic Standing: fair no device    Sitting EOB:  good    Dynamic Standing: good       Patient is Alert & Oriented x person, place, time, and situation and follows directions    Sensation:  Patient  denies numbness/tingling   Edema:  yes bilateral lower extremities   Endurance: fair  +    Vitals: room air   Blood Pressure at rest  Blood Pressure during session    Heart Rate at rest 68 Heart Rate during session    SPO2 at rest 94%  SPO2 during session %     Patient education  Patient educated on role of Physical Therapy, risks of immobility, safety and plan of care,  importance of mobility while in hospital , ankle pumps, quad set and glut set for edema control, blood clot prevention, and positioning for skin integrity and comfort      Patient response to education:   Pt verbalized understanding Pt demonstrated skill Pt requires further education in this area   Yes Partial Yes      Treatment:  Patient practiced and was instructed/facilitated in the following treatment: Performed supine exercise. Rolled multiple times for hygiene and chux change. Sat edge of bed 10 minutes with Supervision  to increase dynamic sitting balance and activity tolerance. Stood changed depends and paste d/t redness on rear end, nursing notified.  Stood ambulated into hallway and back into room to bedside chair. Performed seated exercise. Patient requested to get back into bed.      Therapeutic Exercises:  ankle pumps, heel raises, hip abduction/adduction, straight leg raise, long arc quad, and seated marching  x 15 reps.       At end of session, patient in bed with alarm call light and phone within reach,  all lines and tubes intact, nursing notified.      Patient would benefit from continued skilled Physical Therapy to improve functional independence and quality of life.         Patient's/ family goals   home    Time in  858  Time out  936    Total Treatment Time 38 minutes      CPT codes:  Therapeutic activities (02592)   15 minutes  1 unit(s)  Therapeutic exercises (74430)   15 minutes  1 unit(s)  Gait Training (90430) 8 minutes 1 unit(s)    Vesna Noe \[facility]\"" lic#[id number]

## 2024-03-22 NOTE — PROGRESS NOTES
Comprehensive Nutrition Assessment    Type and Reason for Visit:  Initial, RD Nutrition Re-Screen/LOS    Nutrition Recommendations/Plan:   Continue current diet   Recommend Ensure Compact (4oz) daily        Malnutrition Assessment:  Malnutrition Status:  At risk for malnutrition (Comment) (03/22/24 0840)    Context:  Acute Illness     Findings of the 6 clinical characteristics of malnutrition:  Energy Intake:  Mild decrease in energy intake (Comment)  Weight Loss:  No significant weight loss     Body Fat Loss:  No significant body fat loss     Muscle Mass Loss:  Mild muscle mass loss Temples (temporalis), Clavicles (pectoralis & deltoids)  Fluid Accumulation:  No significant fluid accumulation     Strength:  Not Performed    Nutrition Assessment:    Pt admit w/ SOB/edema, CHF w/ lifevest, acute resp failure. PMHx of anemia, CAD s/p CABG, sjorgen's disease, RA, DM. DI completed on Low Na+ 3/19. pt has been tolerating diet, intake sporadic. Will add ensure compact (4oz) daily, continue to encourage PO intake    Nutrition Related Findings:    abd soft, NT, ND, active BSx4, +2 edema, I/O's -5.78L since admit, A&Ox4 Wound Type: None       Current Nutrition Intake & Therapies:    Average Meal Intake: 51-75%  Average Supplements Intake: None Ordered  ADULT DIET; Regular; Low Sodium (2 gm); 1500 ml  ADULT ORAL NUTRITION SUPPLEMENT; Lunch; Standard 4 oz Oral Supplement    Anthropometric Measures:  Height: 160 cm (5' 2.99\")  Ideal Body Weight (IBW): 115 lbs (52 kg)    Admission Body Weight: 60.2 kg (132 lb 10 oz) (3/17 bed scale)  Current Body Weight: 64.9 kg (143 lb 1 oz) (3/21 bed scale;   3/22 bed scale noted 65lb which indicates 78lb wt loss in 1 day likely inaccurate - requested re-weigh), 124.4 % IBW. Weight Source: Bed Scale  Current BMI (kg/m2): 25.3  Usual Body Weight: 52.2 kg (115 lb 3 oz) (10/27/23)  % Weight Change (Calculated): 24.2  Weight Adjustment For: No Adjustment  BMI Categories: Overweight (BMI

## 2024-03-25 ENCOUNTER — TELEPHONE (OUTPATIENT)
Dept: ADMINISTRATIVE | Age: 79
End: 2024-03-25

## 2024-03-27 ENCOUNTER — HOSPITAL ENCOUNTER (OUTPATIENT)
Dept: OTHER | Age: 79
Setting detail: THERAPIES SERIES
Discharge: HOME | End: 2024-03-27
Payer: MEDICARE

## 2024-03-27 VITALS
DIASTOLIC BLOOD PRESSURE: 52 MMHG | BODY MASS INDEX: 22.86 KG/M2 | WEIGHT: 129 LBS | OXYGEN SATURATION: 96 % | HEART RATE: 64 BPM | SYSTOLIC BLOOD PRESSURE: 98 MMHG

## 2024-03-27 LAB
ANION GAP SERPL CALCULATED.3IONS-SCNC: 14 MMOL/L (ref 7–16)
BNP SERPL-MCNC: ABNORMAL PG/ML (ref 0–450)
BUN SERPL-MCNC: 33 MG/DL (ref 6–23)
CALCIUM SERPL-MCNC: 8.4 MG/DL (ref 8.6–10.2)
CHLORIDE SERPL-SCNC: 100 MMOL/L (ref 98–107)
CO2 SERPL-SCNC: 24 MMOL/L (ref 22–29)
CREAT SERPL-MCNC: 1.8 MG/DL (ref 0.5–1)
GFR SERPL CREATININE-BSD FRML MDRD: 29 ML/MIN/1.73M2
GLUCOSE SERPL-MCNC: 121 MG/DL (ref 74–99)
POTASSIUM SERPL-SCNC: 3.7 MMOL/L (ref 3.5–5)
SODIUM SERPL-SCNC: 138 MMOL/L (ref 132–146)

## 2024-03-27 PROCEDURE — 6360000002 HC RX W HCPCS: Performed by: NURSE PRACTITIONER

## 2024-03-27 PROCEDURE — 2580000003 HC RX 258: Performed by: NURSE PRACTITIONER

## 2024-03-27 PROCEDURE — 99215 OFFICE O/P EST HI 40 MIN: CPT

## 2024-03-27 PROCEDURE — 83880 ASSAY OF NATRIURETIC PEPTIDE: CPT

## 2024-03-27 PROCEDURE — 36415 COLL VENOUS BLD VENIPUNCTURE: CPT

## 2024-03-27 PROCEDURE — 80048 BASIC METABOLIC PNL TOTAL CA: CPT

## 2024-03-27 PROCEDURE — 96374 THER/PROPH/DIAG INJ IV PUSH: CPT

## 2024-03-27 RX ORDER — TORSEMIDE 20 MG/1
40 TABLET ORAL DAILY
Qty: 90 TABLET | Refills: 3 | Status: SHIPPED | OUTPATIENT
Start: 2024-03-27

## 2024-03-27 RX ORDER — FUROSEMIDE 10 MG/ML
40 INJECTION INTRAMUSCULAR; INTRAVENOUS ONCE
Status: COMPLETED | OUTPATIENT
Start: 2024-03-27 | End: 2024-03-27

## 2024-03-27 RX ORDER — SODIUM CHLORIDE 0.9 % (FLUSH) 0.9 %
10 SYRINGE (ML) INJECTION ONCE
Status: COMPLETED | OUTPATIENT
Start: 2024-03-27 | End: 2024-03-27

## 2024-03-27 RX ORDER — POTASSIUM CHLORIDE 20 MEQ/1
20 TABLET, EXTENDED RELEASE ORAL 2 TIMES DAILY
Qty: 90 TABLET | Refills: 3 | Status: SHIPPED | OUTPATIENT
Start: 2024-03-27

## 2024-03-27 RX ADMIN — SODIUM CHLORIDE, PRESERVATIVE FREE 10 ML: 5 INJECTION INTRAVENOUS at 14:00

## 2024-03-27 RX ADMIN — FUROSEMIDE 40 MG: 10 INJECTION, SOLUTION INTRAMUSCULAR; INTRAVENOUS at 14:00

## 2024-03-27 NOTE — PROGRESS NOTES
Congestive Heart Failure Clinic   [facility]      Referring Provider: Dr. Marcus/ Heart Failure Navigator  Primary Care Physician: Luis Angel Rea DO   Cardiologist: Dr. Marcus - kier salinas with/ Sarita CHAKRABORTY-CNP, Sharita CHAKRABORTY-CNS  Nephrologist: N/A      HISTORY OF PRESENT ILLNESS:     Katt Diaz is a 78 y.o. (1945) female with a history of HFrEF (EF < 40%)  Pre Cupid:   No results found for: \"LVEF\"  Post Cupid:    Lab Results   Component Value Date    EFBP 39 (A) 02/15/2024     HFrEF 2/15/2024 30-35% on TTE; Currently wearing a lifevest. Denies any alerts or alarms.     She presents to the CHF clinic for ongoing evaluation and monitoring of heart failure.    In the CHF clinic today she denies any adverse symptoms except:  [x] Dizziness or lightheadedness   [] Syncope or near syncope  [] Recent Fall  [] Shortness of breath at rest   [x] Dyspnea with exertion  [x] Decline in functional capacity (unable to perform activities they had previously been able to do)  [] Fatigue   [] Orthopnea  [] PND  [] Nocturnal cough  [] Hemoptysis  [] Chest pain, pressure, or discomfort  [] Palpitations  [] Abdominal distention  [] Abdominal bloating  [] Early satiety  [] Blood in stool   [] Diarrhea  [] Constipation  [] Nausea/Vomiting  [] Decreased urinary response to oral diuretic   [] Scrotal swelling   [x] Lower extremity edema  [] Used PRN doses of oral diuretic   [] Weight gain    Wt Readings from Last 3 Encounters:   03/27/24 58.5 kg (129 lb)   03/22/24 59.9 kg (132 lb 0.9 oz)   03/12/24 58.1 kg (128 lb)     SOCIAL HISTORY:  [x] Denies tobacco, alcohol or illicit drug abuse  [] Tobacco use:  [] ETOH use:  [] Illicit drug use:        MEDICATIONS:    Allergies   Allergen Reactions    Doxycycline      hives, rash    Penicillins      hives     Prior to Visit Medications    Medication Sig Taking? Authorizing Provider   isosorbide dinitrate (ISORDIL) 5 MG

## 2024-03-31 ENCOUNTER — HOSPITAL ENCOUNTER (EMERGENCY)
Age: 79
Discharge: HOME | DRG: 292 | End: 2024-03-31
Attending: EMERGENCY MEDICINE
Payer: MEDICARE

## 2024-03-31 ENCOUNTER — APPOINTMENT (OUTPATIENT)
Dept: GENERAL RADIOLOGY | Age: 79
DRG: 292 | End: 2024-03-31
Payer: MEDICARE

## 2024-03-31 VITALS
RESPIRATION RATE: 20 BRPM | HEIGHT: 63 IN | WEIGHT: 125 LBS | OXYGEN SATURATION: 96 % | TEMPERATURE: 98.6 F | HEART RATE: 81 BPM | BODY MASS INDEX: 22.15 KG/M2 | SYSTOLIC BLOOD PRESSURE: 102 MMHG | DIASTOLIC BLOOD PRESSURE: 70 MMHG

## 2024-03-31 DIAGNOSIS — I50.9 ACUTE ON CHRONIC CONGESTIVE HEART FAILURE, UNSPECIFIED HEART FAILURE TYPE (HCC): Primary | ICD-10-CM

## 2024-03-31 LAB
ALBUMIN SERPL-MCNC: 3.4 G/DL (ref 3.5–5.2)
ALP SERPL-CCNC: 102 U/L (ref 35–104)
ALT SERPL-CCNC: 14 U/L (ref 0–32)
ANION GAP SERPL CALCULATED.3IONS-SCNC: 15 MMOL/L (ref 7–16)
AST SERPL-CCNC: 22 U/L (ref 0–31)
BASOPHILS # BLD: 0 K/UL (ref 0–0.2)
BASOPHILS NFR BLD: 0 % (ref 0–2)
BILIRUB SERPL-MCNC: 1.1 MG/DL (ref 0–1.2)
BNP SERPL-MCNC: ABNORMAL PG/ML (ref 0–450)
BUN SERPL-MCNC: 33 MG/DL (ref 6–23)
CALCIUM SERPL-MCNC: 8.7 MG/DL (ref 8.6–10.2)
CHLORIDE SERPL-SCNC: 99 MMOL/L (ref 98–107)
CO2 SERPL-SCNC: 23 MMOL/L (ref 22–29)
CREAT SERPL-MCNC: 1.9 MG/DL (ref 0.5–1)
EKG ATRIAL RATE: 80 BPM
EKG P AXIS: 76 DEGREES
EKG P-R INTERVAL: 108 MS
EKG Q-T INTERVAL: 346 MS
EKG QRS DURATION: 72 MS
EKG QTC CALCULATION (BAZETT): 399 MS
EKG R AXIS: 123 DEGREES
EKG T AXIS: 49 DEGREES
EKG VENTRICULAR RATE: 80 BPM
EOSINOPHIL # BLD: 0 K/UL (ref 0.05–0.5)
EOSINOPHILS RELATIVE PERCENT: 0 % (ref 0–6)
ERYTHROCYTE [DISTWIDTH] IN BLOOD BY AUTOMATED COUNT: 21.2 % (ref 11.5–15)
GFR SERPL CREATININE-BSD FRML MDRD: 27 ML/MIN/1.73M2
GLUCOSE SERPL-MCNC: 114 MG/DL (ref 74–99)
HCT VFR BLD AUTO: 29.2 % (ref 34–48)
HGB BLD-MCNC: 9 G/DL (ref 11.5–15.5)
LYMPHOCYTES NFR BLD: 0.79 K/UL (ref 1.5–4)
LYMPHOCYTES RELATIVE PERCENT: 7 % (ref 20–42)
MCH RBC QN AUTO: 30.5 PG (ref 26–35)
MCHC RBC AUTO-ENTMCNC: 30.8 G/DL (ref 32–34.5)
MCV RBC AUTO: 99 FL (ref 80–99.9)
MONOCYTES NFR BLD: 0.69 K/UL (ref 0.1–0.95)
MONOCYTES NFR BLD: 6 % (ref 2–12)
NEUTROPHILS NFR BLD: 87 % (ref 43–80)
NEUTS SEG NFR BLD: 9.81 K/UL (ref 1.8–7.3)
PLATELET, FLUORESCENCE: 195 K/UL (ref 130–450)
PMV BLD AUTO: 11.9 FL (ref 7–12)
POTASSIUM SERPL-SCNC: 4.9 MMOL/L (ref 3.5–5)
PROT SERPL-MCNC: 7.1 G/DL (ref 6.4–8.3)
RBC # BLD AUTO: 2.95 M/UL (ref 3.5–5.5)
RBC # BLD: ABNORMAL 10*6/UL
SODIUM SERPL-SCNC: 137 MMOL/L (ref 132–146)
TROPONIN I SERPL HS-MCNC: 145 NG/L (ref 0–9)
WBC OTHER # BLD: 11.3 K/UL (ref 4.5–11.5)

## 2024-03-31 PROCEDURE — 85025 COMPLETE CBC W/AUTO DIFF WBC: CPT

## 2024-03-31 PROCEDURE — 71046 X-RAY EXAM CHEST 2 VIEWS: CPT

## 2024-03-31 PROCEDURE — 83880 ASSAY OF NATRIURETIC PEPTIDE: CPT

## 2024-03-31 PROCEDURE — 96374 THER/PROPH/DIAG INJ IV PUSH: CPT

## 2024-03-31 PROCEDURE — 93010 ELECTROCARDIOGRAM REPORT: CPT | Performed by: INTERNAL MEDICINE

## 2024-03-31 PROCEDURE — 80053 COMPREHEN METABOLIC PANEL: CPT

## 2024-03-31 PROCEDURE — 96375 TX/PRO/DX INJ NEW DRUG ADDON: CPT

## 2024-03-31 PROCEDURE — 99285 EMERGENCY DEPT VISIT HI MDM: CPT

## 2024-03-31 PROCEDURE — 93005 ELECTROCARDIOGRAM TRACING: CPT | Performed by: EMERGENCY MEDICINE

## 2024-03-31 PROCEDURE — 84484 ASSAY OF TROPONIN QUANT: CPT

## 2024-03-31 PROCEDURE — 6360000002 HC RX W HCPCS: Performed by: EMERGENCY MEDICINE

## 2024-03-31 RX ORDER — FUROSEMIDE 10 MG/ML
40 INJECTION INTRAMUSCULAR; INTRAVENOUS ONCE
Status: COMPLETED | OUTPATIENT
Start: 2024-03-31 | End: 2024-03-31

## 2024-03-31 RX ORDER — BENZONATATE 100 MG/1
100 CAPSULE ORAL 3 TIMES DAILY PRN
Qty: 30 CAPSULE | Refills: 0 | Status: ON HOLD | OUTPATIENT
Start: 2024-03-31 | End: 2024-04-10

## 2024-03-31 RX ORDER — FENTANYL CITRATE 0.05 MG/ML
50 INJECTION, SOLUTION INTRAMUSCULAR; INTRAVENOUS ONCE
Status: COMPLETED | OUTPATIENT
Start: 2024-03-31 | End: 2024-03-31

## 2024-03-31 RX ADMIN — FUROSEMIDE 40 MG: 10 INJECTION, SOLUTION INTRAMUSCULAR; INTRAVENOUS at 12:20

## 2024-03-31 RX ADMIN — FENTANYL CITRATE 50 MCG: 0.05 INJECTION, SOLUTION INTRAMUSCULAR; INTRAVENOUS at 12:20

## 2024-03-31 ASSESSMENT — PAIN DESCRIPTION - DESCRIPTORS
DESCRIPTORS: ACHING;DULL;DISCOMFORT;HEAVINESS
DESCRIPTORS: ACHING;DISCOMFORT;DULL

## 2024-03-31 ASSESSMENT — PAIN DESCRIPTION - ORIENTATION
ORIENTATION: RIGHT;LEFT
ORIENTATION: RIGHT;LEFT

## 2024-03-31 ASSESSMENT — PAIN SCALES - GENERAL
PAINLEVEL_OUTOF10: 9
PAINLEVEL_OUTOF10: 7

## 2024-03-31 ASSESSMENT — PAIN DESCRIPTION - LOCATION
LOCATION: LEG
LOCATION: LEG

## 2024-03-31 NOTE — ED PROVIDER NOTES
78-year-old female presenting from home via ambulance.  Has shortness of breath.  She complains of chest pain but she is not having pain she feels short of breath.  Legs been more swollen, is worried about fluid on her lungs, does take Lasix at home.         Family History   Problem Relation Age of Onset    No Known Problems Mother     No Known Problems Father      Past Surgical History:   Procedure Laterality Date    CARDIAC PROCEDURE N/A 2/5/2024    Left heart cath / coronary angiography performed by Arnav Shaw MD at [facility] CARDIAC CATH LAB    CORONARY ARTERY BYPASS GRAFT N/A 2/7/2024    CORONARY ARTERY BYPASS GRAFTING, EVH performed by Nicolasa Jasso DO at [facility] OR    IR TUNNELED CATHETER PLACEMENT GREATER THAN 5 YEARS  2/13/2024    IR TUNNELED CATHETER PLACEMENT GREATER THAN 5 YEARS 2/13/2024 REYNA Hutchins MD [facility] SPECIAL PROCEDURES    PARTIAL HYSTERECTOMY (CERVIX NOT REMOVED)      states 1976    VEIN LIGATION AND STRIPPING      states 1972       Review of Systems   Constitutional:  Negative for chills and fever.   Respiratory:  Positive for chest tightness and shortness of breath.    Cardiovascular:  Negative for chest pain.   Gastrointestinal:  Negative for abdominal pain.        Physical Exam  Constitutional:       General: She is not in acute distress.     Appearance: She is well-developed.   HENT:      Head: Normocephalic and atraumatic.   Eyes:      Conjunctiva/sclera: Conjunctivae normal.      Pupils: Pupils are equal, round, and reactive to light.   Neck:      Thyroid: No thyromegaly.   Cardiovascular:      Rate and Rhythm: Normal rate and regular rhythm.      Comments: Healing surgical incision for her open heart surgery  Pulmonary:      Effort: Pulmonary effort is normal. No respiratory distress.      Breath sounds: Normal breath sounds.   Abdominal:      General: There is no distension.      Palpations: Abdomen is soft.      Tenderness: There is no abdominal tenderness. There is no guarding  tomorrow.    Patient with recent coronary artery bypass surgery.  This was about 2 months ago.  She had a myocardial infarction leading up to this surgery.  History of valve surgery previously.  Ejection fraction diminished and patient has had ongoing CHF and fluid management difficulties.  She does follow-up with cardiac rehab, the CHF clinic, CT surgery, cardiology.  She is not hypoxic, no respiratory distress.  Is concerned about the ongoing leg swelling and some coughing and chest discomfort she has.  Imaging reveals a small pleural effusion but no significant vascular congestion.  She is not struggling to breathe, does have some discomfort with the coughing.  IV Lasix given, cough medication ordered for her, patient has family support at home.  Is ready be discharged and understands return to the ED for any problems or worsening  --------------------------------- ADDITIONAL PROVIDER NOTES ---------------------------------  At this time the patient is without objective evidence of an acute process requiring hospitalization or inpatient management.  They have remained hemodynamically stable throughout their entire ED visit and are stable for discharge with outpatient follow-up.     The plan has been discussed in detail and they are aware of the specific conditions for emergent return, as well as the importance of follow-up.      New Prescriptions    BENZONATATE (TESSALON) 100 MG CAPSULE    Take 1 capsule by mouth 3 times daily as needed for Cough       Diagnosis:  1. Acute on chronic congestive heart failure, unspecified heart failure type (HCC)        Disposition:  Patient's disposition: Discharge to home  Patient's condition is stable.            Rafiq Hamlin DO  03/31/24 1413

## 2024-04-01 ENCOUNTER — HOSPITAL ENCOUNTER (OUTPATIENT)
Dept: OTHER | Age: 79
Setting detail: THERAPIES SERIES
Discharge: HOME | End: 2024-04-01

## 2024-04-01 ENCOUNTER — HOSPITAL ENCOUNTER (INPATIENT)
Age: 79
LOS: 5 days | Discharge: NURSING, REHAB OR LONG-TERM CARE | DRG: 292 | End: 2024-04-08
Attending: STUDENT IN AN ORGANIZED HEALTH CARE EDUCATION/TRAINING PROGRAM | Admitting: INTERNAL MEDICINE
Payer: MEDICARE

## 2024-04-01 ENCOUNTER — APPOINTMENT (OUTPATIENT)
Dept: GENERAL RADIOLOGY | Age: 79
DRG: 292 | End: 2024-04-01
Payer: MEDICARE

## 2024-04-01 DIAGNOSIS — I25.10 CAD IN NATIVE ARTERY: ICD-10-CM

## 2024-04-01 DIAGNOSIS — I50.9 ACUTE ON CHRONIC CONGESTIVE HEART FAILURE, UNSPECIFIED HEART FAILURE TYPE (HCC): Primary | ICD-10-CM

## 2024-04-01 DIAGNOSIS — R62.7 FAILURE TO THRIVE IN ADULT: ICD-10-CM

## 2024-04-01 DIAGNOSIS — R06.09 DYSPNEA ON EXERTION: ICD-10-CM

## 2024-04-01 LAB
ANION GAP SERPL CALCULATED.3IONS-SCNC: 12 MMOL/L (ref 7–16)
BASOPHILS # BLD: 0 K/UL (ref 0–0.2)
BASOPHILS NFR BLD: 0 % (ref 0–2)
BNP SERPL-MCNC: ABNORMAL PG/ML (ref 0–450)
BUN SERPL-MCNC: 35 MG/DL (ref 6–23)
CALCIUM SERPL-MCNC: 8.3 MG/DL (ref 8.6–10.2)
CHLORIDE SERPL-SCNC: 94 MMOL/L (ref 98–107)
CO2 SERPL-SCNC: 25 MMOL/L (ref 22–29)
CREAT SERPL-MCNC: 1.9 MG/DL (ref 0.5–1)
EOSINOPHIL # BLD: 0 K/UL (ref 0.05–0.5)
EOSINOPHILS RELATIVE PERCENT: 0 % (ref 0–6)
ERYTHROCYTE [DISTWIDTH] IN BLOOD BY AUTOMATED COUNT: 20.8 % (ref 11.5–15)
GFR SERPL CREATININE-BSD FRML MDRD: 26 ML/MIN/1.73M2
GLUCOSE SERPL-MCNC: 115 MG/DL (ref 74–99)
HCT VFR BLD AUTO: 28.4 % (ref 34–48)
HGB BLD-MCNC: 8.6 G/DL (ref 11.5–15.5)
LYMPHOCYTES NFR BLD: 0.48 K/UL (ref 1.5–4)
LYMPHOCYTES RELATIVE PERCENT: 4 % (ref 20–42)
MCH RBC QN AUTO: 29.8 PG (ref 26–35)
MCHC RBC AUTO-ENTMCNC: 30.3 G/DL (ref 32–34.5)
MCV RBC AUTO: 98.3 FL (ref 80–99.9)
MONOCYTES NFR BLD: 0.6 K/UL (ref 0.1–0.95)
MONOCYTES NFR BLD: 4 % (ref 2–12)
NEUTROPHILS NFR BLD: 92 % (ref 43–80)
NEUTS SEG NFR BLD: 12.72 K/UL (ref 1.8–7.3)
PLATELET # BLD AUTO: 196 K/UL (ref 130–450)
PMV BLD AUTO: 11.6 FL (ref 7–12)
POTASSIUM SERPL-SCNC: 4.1 MMOL/L (ref 3.5–5)
RBC # BLD AUTO: 2.89 M/UL (ref 3.5–5.5)
RBC # BLD: ABNORMAL 10*6/UL
SODIUM SERPL-SCNC: 131 MMOL/L (ref 132–146)
TROPONIN I SERPL HS-MCNC: 165 NG/L (ref 0–9)
WBC OTHER # BLD: 13.8 K/UL (ref 4.5–11.5)

## 2024-04-01 PROCEDURE — G0378 HOSPITAL OBSERVATION PER HR: HCPCS

## 2024-04-01 PROCEDURE — 96374 THER/PROPH/DIAG INJ IV PUSH: CPT

## 2024-04-01 PROCEDURE — 71045 X-RAY EXAM CHEST 1 VIEW: CPT

## 2024-04-01 PROCEDURE — 2580000003 HC RX 258: Performed by: INTERNAL MEDICINE

## 2024-04-01 PROCEDURE — 80048 BASIC METABOLIC PNL TOTAL CA: CPT

## 2024-04-01 PROCEDURE — 99285 EMERGENCY DEPT VISIT HI MDM: CPT

## 2024-04-01 PROCEDURE — 6360000002 HC RX W HCPCS: Performed by: STUDENT IN AN ORGANIZED HEALTH CARE EDUCATION/TRAINING PROGRAM

## 2024-04-01 PROCEDURE — 83880 ASSAY OF NATRIURETIC PEPTIDE: CPT

## 2024-04-01 PROCEDURE — 85025 COMPLETE CBC W/AUTO DIFF WBC: CPT

## 2024-04-01 PROCEDURE — 93005 ELECTROCARDIOGRAM TRACING: CPT | Performed by: STUDENT IN AN ORGANIZED HEALTH CARE EDUCATION/TRAINING PROGRAM

## 2024-04-01 PROCEDURE — 84484 ASSAY OF TROPONIN QUANT: CPT

## 2024-04-01 PROCEDURE — 6370000000 HC RX 637 (ALT 250 FOR IP): Performed by: INTERNAL MEDICINE

## 2024-04-01 RX ORDER — MAGNESIUM OXIDE 400 MG/1
400 TABLET ORAL 2 TIMES DAILY
Status: DISCONTINUED | OUTPATIENT
Start: 2024-04-01 | End: 2024-04-08 | Stop reason: HOSPADM

## 2024-04-01 RX ORDER — SODIUM CHLORIDE 9 MG/ML
INJECTION, SOLUTION INTRAVENOUS PRN
Status: DISCONTINUED | OUTPATIENT
Start: 2024-04-01 | End: 2024-04-08 | Stop reason: HOSPADM

## 2024-04-01 RX ORDER — FLUDROCORTISONE ACETATE 0.1 MG/1
0.1 TABLET ORAL DAILY
Status: DISCONTINUED | OUTPATIENT
Start: 2024-04-01 | End: 2024-04-01

## 2024-04-01 RX ORDER — BENZONATATE 100 MG/1
100 CAPSULE ORAL 3 TIMES DAILY PRN
Status: DISCONTINUED | OUTPATIENT
Start: 2024-04-01 | End: 2024-04-08 | Stop reason: HOSPADM

## 2024-04-01 RX ORDER — SODIUM CHLORIDE 0.9 % (FLUSH) 0.9 %
5-40 SYRINGE (ML) INJECTION PRN
Status: DISCONTINUED | OUTPATIENT
Start: 2024-04-01 | End: 2024-04-08 | Stop reason: HOSPADM

## 2024-04-01 RX ORDER — ACETAMINOPHEN 650 MG/1
650 SUPPOSITORY RECTAL EVERY 6 HOURS PRN
Status: DISCONTINUED | OUTPATIENT
Start: 2024-04-01 | End: 2024-04-08 | Stop reason: HOSPADM

## 2024-04-01 RX ORDER — ATORVASTATIN CALCIUM 40 MG/1
40 TABLET, FILM COATED ORAL NIGHTLY
Status: DISCONTINUED | OUTPATIENT
Start: 2024-04-01 | End: 2024-04-08 | Stop reason: HOSPADM

## 2024-04-01 RX ORDER — SODIUM CHLORIDE 0.9 % (FLUSH) 0.9 %
5-40 SYRINGE (ML) INJECTION EVERY 12 HOURS SCHEDULED
Status: DISCONTINUED | OUTPATIENT
Start: 2024-04-01 | End: 2024-04-08 | Stop reason: HOSPADM

## 2024-04-01 RX ORDER — BUMETANIDE 0.25 MG/ML
1 INJECTION INTRAMUSCULAR; INTRAVENOUS ONCE
Status: COMPLETED | OUTPATIENT
Start: 2024-04-01 | End: 2024-04-01

## 2024-04-01 RX ORDER — TOBRAMYCIN AND DEXAMETHASONE 3; 1 MG/ML; MG/ML
1 SUSPENSION/ DROPS OPHTHALMIC 4 TIMES DAILY
Status: DISCONTINUED | OUTPATIENT
Start: 2024-04-01 | End: 2024-04-08 | Stop reason: HOSPADM

## 2024-04-01 RX ORDER — GLUCAGON 1 MG/ML
1 KIT INJECTION PRN
Status: DISCONTINUED | OUTPATIENT
Start: 2024-04-01 | End: 2024-04-08 | Stop reason: HOSPADM

## 2024-04-01 RX ORDER — TOBRAMYCIN AND DEXAMETHASONE 3; 1 MG/ML; MG/ML
1 SUSPENSION/ DROPS OPHTHALMIC 4 TIMES DAILY
Status: ON HOLD | COMMUNITY

## 2024-04-01 RX ORDER — TORSEMIDE 20 MG/1
40 TABLET ORAL DAILY
Status: DISCONTINUED | OUTPATIENT
Start: 2024-04-01 | End: 2024-04-03

## 2024-04-01 RX ORDER — ENOXAPARIN SODIUM 100 MG/ML
30 INJECTION SUBCUTANEOUS DAILY
Status: DISCONTINUED | OUTPATIENT
Start: 2024-04-01 | End: 2024-04-08 | Stop reason: HOSPADM

## 2024-04-01 RX ORDER — LEVOTHYROXINE SODIUM 0.1 MG/1
100 TABLET ORAL DAILY
Status: DISCONTINUED | OUTPATIENT
Start: 2024-04-02 | End: 2024-04-03

## 2024-04-01 RX ORDER — ACETAMINOPHEN 325 MG/1
650 TABLET ORAL EVERY 6 HOURS PRN
Status: DISCONTINUED | OUTPATIENT
Start: 2024-04-01 | End: 2024-04-08 | Stop reason: HOSPADM

## 2024-04-01 RX ORDER — FERROUS SULFATE 325(65) MG
325 TABLET ORAL 2 TIMES DAILY
Status: ON HOLD | COMMUNITY

## 2024-04-01 RX ORDER — CLOPIDOGREL BISULFATE 75 MG/1
75 TABLET ORAL DAILY
Status: DISCONTINUED | OUTPATIENT
Start: 2024-04-01 | End: 2024-04-08 | Stop reason: HOSPADM

## 2024-04-01 RX ORDER — ASPIRIN 81 MG/1
81 TABLET, CHEWABLE ORAL DAILY
Status: DISCONTINUED | OUTPATIENT
Start: 2024-04-01 | End: 2024-04-08 | Stop reason: HOSPADM

## 2024-04-01 RX ORDER — NEOMYCIN SULFATE, POLYMYXIN B SULFATE, AND DEXAMETHASONE 3.5; 10000; 1 MG/G; [USP'U]/G; MG/G
1 OINTMENT OPHTHALMIC NIGHTLY
Status: ON HOLD | COMMUNITY

## 2024-04-01 RX ORDER — POLYETHYLENE GLYCOL 3350 17 G/17G
17 POWDER, FOR SOLUTION ORAL DAILY PRN
Status: DISCONTINUED | OUTPATIENT
Start: 2024-04-01 | End: 2024-04-08 | Stop reason: HOSPADM

## 2024-04-01 RX ORDER — DEXTROSE MONOHYDRATE 100 MG/ML
INJECTION, SOLUTION INTRAVENOUS CONTINUOUS PRN
Status: DISCONTINUED | OUTPATIENT
Start: 2024-04-01 | End: 2024-04-08 | Stop reason: HOSPADM

## 2024-04-01 RX ADMIN — Medication 10 ML: at 22:08

## 2024-04-01 RX ADMIN — CLOPIDOGREL BISULFATE 75 MG: 75 TABLET ORAL at 22:04

## 2024-04-01 RX ADMIN — TOBRAMYCIN AND DEXAMETHASONE 1 DROP: 3; 1 SUSPENSION/ DROPS OPHTHALMIC at 22:07

## 2024-04-01 RX ADMIN — TORSEMIDE 40 MG: 20 TABLET ORAL at 22:04

## 2024-04-01 RX ADMIN — ATORVASTATIN CALCIUM 40 MG: 40 TABLET, FILM COATED ORAL at 22:04

## 2024-04-01 RX ADMIN — BUMETANIDE 1 MG: 0.25 INJECTION INTRAMUSCULAR; INTRAVENOUS at 16:38

## 2024-04-01 RX ADMIN — ACETAMINOPHEN 650 MG: 325 TABLET ORAL at 22:04

## 2024-04-01 RX ADMIN — BENZONATATE 100 MG: 100 CAPSULE ORAL at 22:04

## 2024-04-01 RX ADMIN — MAGNESIUM OXIDE 400 MG: 400 TABLET ORAL at 22:04

## 2024-04-01 ASSESSMENT — LIFESTYLE VARIABLES
HOW MANY STANDARD DRINKS CONTAINING ALCOHOL DO YOU HAVE ON A TYPICAL DAY: PATIENT DOES NOT DRINK
HOW OFTEN DO YOU HAVE A DRINK CONTAINING ALCOHOL: NEVER

## 2024-04-01 ASSESSMENT — PAIN SCALES - WONG BAKER: WONGBAKER_NUMERICALRESPONSE: NO HURT

## 2024-04-01 ASSESSMENT — PAIN DESCRIPTION - ORIENTATION
ORIENTATION: RIGHT;LEFT
ORIENTATION: LOWER

## 2024-04-01 ASSESSMENT — PAIN DESCRIPTION - LOCATION
LOCATION: FOOT;LEG
LOCATION: BACK

## 2024-04-01 ASSESSMENT — PAIN - FUNCTIONAL ASSESSMENT: PAIN_FUNCTIONAL_ASSESSMENT: 0-10

## 2024-04-01 ASSESSMENT — PAIN SCALES - GENERAL
PAINLEVEL_OUTOF10: 9
PAINLEVEL_OUTOF10: 0
PAINLEVEL_OUTOF10: 6

## 2024-04-01 ASSESSMENT — PAIN DESCRIPTION - DESCRIPTORS: DESCRIPTORS: ACHING

## 2024-04-01 NOTE — H&P
is normal.    NEUROLOGIC:    Awake, alert, oriented to name, place and time.  Cranial nerves II-XII are grossly intact.  Motor is 5 out of 5 bilaterally.      SKIN:    No bruising or bleeding.  No redness, warmth, or swelling    EXTREMITIES:    Peripheral pulses present.  No edema, cyanosis, or swelling.    LINES/CATHETERS     LABORATORY DATA:  CBC with Differential:    Lab Results   Component Value Date/Time    WBC 13.8 04/01/2024 02:26 PM    RBC 2.89 04/01/2024 02:26 PM    HGB 8.6 04/01/2024 02:26 PM    HCT 28.4 04/01/2024 02:26 PM     04/01/2024 02:26 PM    MCV 98.3 04/01/2024 02:26 PM    MCH 29.8 04/01/2024 02:26 PM    MCHC 30.3 04/01/2024 02:26 PM    RDW 20.8 04/01/2024 02:26 PM    LYMPHOPCT 4 04/01/2024 02:26 PM    MONOPCT 4 04/01/2024 02:26 PM    BASOPCT 0 04/01/2024 02:26 PM    MONOSABS 0.60 04/01/2024 02:26 PM    LYMPHSABS 0.48 04/01/2024 02:26 PM    EOSABS 0.00 04/01/2024 02:26 PM    BASOSABS 0.00 04/01/2024 02:26 PM     CMP:    Lab Results   Component Value Date/Time     04/01/2024 02:26 PM    K 4.1 04/01/2024 02:26 PM    CL 94 04/01/2024 02:26 PM    CO2 25 04/01/2024 02:26 PM    BUN 35 04/01/2024 02:26 PM    CREATININE 1.9 04/01/2024 02:26 PM    LABGLOM 26 04/01/2024 02:26 PM    GLUCOSE 115 04/01/2024 02:26 PM    PROT 7.1 03/31/2024 09:38 AM    LABALBU 3.4 03/31/2024 09:38 AM    CALCIUM 8.3 04/01/2024 02:26 PM    BILITOT 1.1 03/31/2024 09:38 AM    ALKPHOS 102 03/31/2024 09:38 AM    AST 22 03/31/2024 09:38 AM    ALT 14 03/31/2024 09:38 AM       ASSESSMENT/PLAN:   Acute on Chronic systolic congestive heart failure  Failure to thrive  Generalized weakness and fatigue  Elevated troponin  Anemia  Chronic kidney disease  Cardiology status post CABG  Rheumatoid arthritis  Sjogren's disease  History of migraine headaches        Patient presented due to weakness of fatigue as well as increased lower extremity edema and shortness of breath.  Patient lives at home alone.  However she is unable to care

## 2024-04-01 NOTE — ED PROVIDER NOTES
at this time.  proBNP uptrending, patient appears clinically fluid overloaded.  Patient given 1 mg IV Bumex.  Creatinine stable.  Case discussed with Dr. Hernández, internal medicine, who accepted patient for admission.    Differential diagnoses included but not limited to: ACS, pneumonia, CHF, etc.    Amount and/or Complexity of Data Reviewed  External Data Reviewed: notes.     Details: Echo obtained 2/15/2024-EF 30 to 35%  Labs: ordered. Decision-making details documented in ED Course.     Details: All labs interpreted by me  Radiology: ordered and independent interpretation performed.     Details: Chest x-ray-no pneumothorax    All imaging reviewed by me, final interpretation per radiologist    ECG/medicine tests: ordered and independent interpretation performed.     Details: Rate 71, normal sinus, no STEMI, low voltage EKG, appears stable compared to most recent EKG    Risk  Prescription drug management.  Decision regarding hospitalization.        Is this patient to be included in the SEP-1 core measure? No Exclusion criteria - the patient is NOT to be included for SEP-1 Core Measure due to: Infection is not suspected      ED Course as of 04/01/24 2245   Mon Apr 01, 2024   1524 CBC with Auto Differential(!):    WBC 13.8(!)   RBC 2.89(!)   Hemoglobin Quant 8.6(!)   Hematocrit 28.4(!)   MCV 98.3   MCH 29.8   MCHC 30.3(!)   RDW 20.8(!)   Platelet Count 196   MPV 11.6   Neutrophils, Automated 92(!)   Lymphocyte % 4(!)   Monocytes % 4   Eosinophils % 0   Basophils % 0   Neutrophils Absolute 12.72(!)   Lymphocytes Absolute 0.48(!)   Monocytes Absolute 0.60   Eosinophils Absolute 0.00(!)   Basophils Absolute 0.00   RBC Morphology 2+ ANISOCYTOSIS   RBC Morphology 1+ OVALOCYTES   RBC Morphology 1+ POIKILOCYTOSIS   RBC Morphology 1+ SCHISTOCYTES  Mild leukocytosis [AP]   1524 Brain Natriuretic Peptide(!):    Pro-BNP 19,892(!)  Elevated [AP]   1524 Troponin(!):    Troponin, High Sensitivity 165(!)  Uptrending from previous

## 2024-04-02 LAB
ALBUMIN SERPL-MCNC: 2.8 G/DL (ref 3.5–5.2)
ALP SERPL-CCNC: 90 U/L (ref 35–104)
ALT SERPL-CCNC: 12 U/L (ref 0–32)
ANION GAP SERPL CALCULATED.3IONS-SCNC: 14 MMOL/L (ref 7–16)
AST SERPL-CCNC: 25 U/L (ref 0–31)
B PARAP IS1001 DNA NPH QL NAA+NON-PROBE: NOT DETECTED
B PERT DNA SPEC QL NAA+PROBE: NOT DETECTED
BASOPHILS # BLD: 0.01 K/UL (ref 0–0.2)
BASOPHILS NFR BLD: 0 % (ref 0–2)
BILIRUB SERPL-MCNC: 1.6 MG/DL (ref 0–1.2)
BUN SERPL-MCNC: 35 MG/DL (ref 6–23)
C PNEUM DNA NPH QL NAA+NON-PROBE: NOT DETECTED
CALCIUM SERPL-MCNC: 7.9 MG/DL (ref 8.6–10.2)
CHLORIDE SERPL-SCNC: 98 MMOL/L (ref 98–107)
CO2 SERPL-SCNC: 24 MMOL/L (ref 22–29)
CREAT SERPL-MCNC: 1.7 MG/DL (ref 0.5–1)
CRP SERPL HS-MCNC: 138 MG/L (ref 0–5)
EOSINOPHIL # BLD: 0.02 K/UL (ref 0.05–0.5)
EOSINOPHILS RELATIVE PERCENT: 0 % (ref 0–6)
ERYTHROCYTE [DISTWIDTH] IN BLOOD BY AUTOMATED COUNT: 20.7 % (ref 11.5–15)
FLUAV RNA NPH QL NAA+NON-PROBE: NOT DETECTED
FLUBV RNA NPH QL NAA+NON-PROBE: NOT DETECTED
GFR SERPL CREATININE-BSD FRML MDRD: 31 ML/MIN/1.73M2
GLUCOSE SERPL-MCNC: 87 MG/DL (ref 74–99)
HADV DNA NPH QL NAA+NON-PROBE: NOT DETECTED
HCOV 229E RNA NPH QL NAA+NON-PROBE: NOT DETECTED
HCOV HKU1 RNA NPH QL NAA+NON-PROBE: NOT DETECTED
HCOV NL63 RNA NPH QL NAA+NON-PROBE: NOT DETECTED
HCOV OC43 RNA NPH QL NAA+NON-PROBE: NOT DETECTED
HCT VFR BLD AUTO: 29.1 % (ref 34–48)
HGB BLD-MCNC: 8.7 G/DL (ref 11.5–15.5)
HMPV RNA NPH QL NAA+NON-PROBE: NOT DETECTED
HPIV1 RNA NPH QL NAA+NON-PROBE: NOT DETECTED
HPIV2 RNA NPH QL NAA+NON-PROBE: NOT DETECTED
HPIV3 RNA NPH QL NAA+NON-PROBE: NOT DETECTED
HPIV4 RNA NPH QL NAA+NON-PROBE: NOT DETECTED
IMM GRANULOCYTES # BLD AUTO: 0.07 K/UL (ref 0–0.58)
IMM GRANULOCYTES NFR BLD: 1 % (ref 0–5)
LYMPHOCYTES NFR BLD: 1.41 K/UL (ref 1.5–4)
LYMPHOCYTES RELATIVE PERCENT: 13 % (ref 20–42)
M PNEUMO DNA NPH QL NAA+NON-PROBE: NOT DETECTED
MAGNESIUM SERPL-MCNC: 2.2 MG/DL (ref 1.6–2.6)
MCH RBC QN AUTO: 29.6 PG (ref 26–35)
MCHC RBC AUTO-ENTMCNC: 29.9 G/DL (ref 32–34.5)
MCV RBC AUTO: 99 FL (ref 80–99.9)
MONOCYTES NFR BLD: 0.75 K/UL (ref 0.1–0.95)
MONOCYTES NFR BLD: 7 % (ref 2–12)
NEUTROPHILS NFR BLD: 79 % (ref 43–80)
NEUTS SEG NFR BLD: 8.52 K/UL (ref 1.8–7.3)
PHOSPHATE SERPL-MCNC: 4.3 MG/DL (ref 2.5–4.5)
PLATELET # BLD AUTO: 172 K/UL (ref 130–450)
PMV BLD AUTO: 12.3 FL (ref 7–12)
POTASSIUM SERPL-SCNC: 3.5 MMOL/L (ref 3.5–5)
PROCALCITONIN SERPL-MCNC: 0.39 NG/ML (ref 0–0.08)
PROT SERPL-MCNC: 6.6 G/DL (ref 6.4–8.3)
RBC # BLD AUTO: 2.94 M/UL (ref 3.5–5.5)
RBC # BLD: ABNORMAL 10*6/UL
RSV RNA NPH QL NAA+NON-PROBE: NOT DETECTED
RV+EV RNA NPH QL NAA+NON-PROBE: NOT DETECTED
SARS-COV-2 RNA NPH QL NAA+NON-PROBE: NOT DETECTED
SODIUM SERPL-SCNC: 136 MMOL/L (ref 132–146)
SPECIMEN DESCRIPTION: NORMAL
T4 FREE SERPL-MCNC: 2.5 NG/DL (ref 0.9–1.7)
TSH SERPL DL<=0.05 MIU/L-ACNC: 0.75 UIU/ML (ref 0.27–4.2)
WBC OTHER # BLD: 10.8 K/UL (ref 4.5–11.5)

## 2024-04-02 PROCEDURE — 99223 1ST HOSP IP/OBS HIGH 75: CPT | Performed by: INTERNAL MEDICINE

## 2024-04-02 PROCEDURE — G0378 HOSPITAL OBSERVATION PER HR: HCPCS

## 2024-04-02 PROCEDURE — 36415 COLL VENOUS BLD VENIPUNCTURE: CPT

## 2024-04-02 PROCEDURE — 2580000003 HC RX 258: Performed by: INTERNAL MEDICINE

## 2024-04-02 PROCEDURE — 97530 THERAPEUTIC ACTIVITIES: CPT | Performed by: PHYSICAL THERAPIST

## 2024-04-02 PROCEDURE — 83735 ASSAY OF MAGNESIUM: CPT

## 2024-04-02 PROCEDURE — APPSS60 APP SPLIT SHARED TIME 46-60 MINUTES: Performed by: PHYSICIAN ASSISTANT

## 2024-04-02 PROCEDURE — 85025 COMPLETE CBC W/AUTO DIFF WBC: CPT

## 2024-04-02 PROCEDURE — 86140 C-REACTIVE PROTEIN: CPT

## 2024-04-02 PROCEDURE — 6370000000 HC RX 637 (ALT 250 FOR IP): Performed by: INTERNAL MEDICINE

## 2024-04-02 PROCEDURE — 6360000002 HC RX W HCPCS: Performed by: INTERNAL MEDICINE

## 2024-04-02 PROCEDURE — 84100 ASSAY OF PHOSPHORUS: CPT

## 2024-04-02 PROCEDURE — 80053 COMPREHEN METABOLIC PANEL: CPT

## 2024-04-02 PROCEDURE — 84439 ASSAY OF FREE THYROXINE: CPT

## 2024-04-02 PROCEDURE — 84145 PROCALCITONIN (PCT): CPT

## 2024-04-02 PROCEDURE — 6370000000 HC RX 637 (ALT 250 FOR IP): Performed by: PHYSICIAN ASSISTANT

## 2024-04-02 PROCEDURE — 84443 ASSAY THYROID STIM HORMONE: CPT

## 2024-04-02 PROCEDURE — 97161 PT EVAL LOW COMPLEX 20 MIN: CPT | Performed by: PHYSICAL THERAPIST

## 2024-04-02 PROCEDURE — 0202U NFCT DS 22 TRGT SARS-COV-2: CPT

## 2024-04-02 RX ORDER — FUROSEMIDE 10 MG/ML
20 INJECTION INTRAMUSCULAR; INTRAVENOUS 2 TIMES DAILY
Status: DISCONTINUED | OUTPATIENT
Start: 2024-04-02 | End: 2024-04-02

## 2024-04-02 RX ORDER — NEOMYCIN SULFATE, POLYMYXIN B SULFATE, AND DEXAMETHASONE 3.5; 10000; 1 MG/G; [USP'U]/G; MG/G
1 OINTMENT OPHTHALMIC NIGHTLY
Status: DISCONTINUED | OUTPATIENT
Start: 2024-04-02 | End: 2024-04-08 | Stop reason: HOSPADM

## 2024-04-02 RX ORDER — FUROSEMIDE 10 MG/ML
20 INJECTION INTRAMUSCULAR; INTRAVENOUS 2 TIMES DAILY
Status: DISCONTINUED | OUTPATIENT
Start: 2024-04-02 | End: 2024-04-03

## 2024-04-02 RX ORDER — MIDODRINE HYDROCHLORIDE 5 MG/1
2.5 TABLET ORAL
Status: DISCONTINUED | OUTPATIENT
Start: 2024-04-02 | End: 2024-04-08 | Stop reason: HOSPADM

## 2024-04-02 RX ORDER — METOPROLOL SUCCINATE 25 MG/1
25 TABLET, EXTENDED RELEASE ORAL DAILY
Status: DISCONTINUED | OUTPATIENT
Start: 2024-04-02 | End: 2024-04-08 | Stop reason: HOSPADM

## 2024-04-02 RX ORDER — FUROSEMIDE 10 MG/ML
40 INJECTION INTRAMUSCULAR; INTRAVENOUS 2 TIMES DAILY
Status: DISCONTINUED | OUTPATIENT
Start: 2024-04-02 | End: 2024-04-02

## 2024-04-02 RX ADMIN — MAGNESIUM OXIDE 400 MG: 400 TABLET ORAL at 10:32

## 2024-04-02 RX ADMIN — TOBRAMYCIN AND DEXAMETHASONE 1 DROP: 3; 1 SUSPENSION/ DROPS OPHTHALMIC at 10:34

## 2024-04-02 RX ADMIN — LEVOTHYROXINE SODIUM 100 MCG: 0.1 TABLET ORAL at 05:52

## 2024-04-02 RX ADMIN — TOBRAMYCIN AND DEXAMETHASONE 1 DROP: 3; 1 SUSPENSION/ DROPS OPHTHALMIC at 14:46

## 2024-04-02 RX ADMIN — ENOXAPARIN SODIUM 30 MG: 100 INJECTION SUBCUTANEOUS at 16:52

## 2024-04-02 RX ADMIN — MIDODRINE HYDROCHLORIDE 2.5 MG: 5 TABLET ORAL at 16:53

## 2024-04-02 RX ADMIN — ATORVASTATIN CALCIUM 40 MG: 40 TABLET, FILM COATED ORAL at 20:46

## 2024-04-02 RX ADMIN — ACETAMINOPHEN 650 MG: 325 TABLET ORAL at 05:51

## 2024-04-02 RX ADMIN — Medication 10 ML: at 20:47

## 2024-04-02 RX ADMIN — TOBRAMYCIN AND DEXAMETHASONE 1 DROP: 3; 1 SUSPENSION/ DROPS OPHTHALMIC at 16:59

## 2024-04-02 RX ADMIN — Medication 10 ML: at 10:33

## 2024-04-02 RX ADMIN — MAGNESIUM OXIDE 400 MG: 400 TABLET ORAL at 20:46

## 2024-04-02 RX ADMIN — TOBRAMYCIN AND DEXAMETHASONE 1 DROP: 3; 1 SUSPENSION/ DROPS OPHTHALMIC at 20:46

## 2024-04-02 RX ADMIN — CLOPIDOGREL BISULFATE 75 MG: 75 TABLET ORAL at 10:32

## 2024-04-02 RX ADMIN — FUROSEMIDE 20 MG: 10 INJECTION, SOLUTION INTRAMUSCULAR; INTRAVENOUS at 16:53

## 2024-04-02 RX ADMIN — ACETAMINOPHEN 650 MG: 325 TABLET ORAL at 22:18

## 2024-04-02 RX ADMIN — NEOMYCIN AND POLYMYXIN B SULFATES AND DEXAMETHASONE 1 EACH: 3.5; 10000; 1 OINTMENT OPHTHALMIC at 20:46

## 2024-04-02 RX ADMIN — ASPIRIN 81 MG: 81 TABLET, CHEWABLE ORAL at 10:32

## 2024-04-02 ASSESSMENT — PAIN - FUNCTIONAL ASSESSMENT: PAIN_FUNCTIONAL_ASSESSMENT: ACTIVITIES ARE NOT PREVENTED

## 2024-04-02 ASSESSMENT — PAIN DESCRIPTION - DESCRIPTORS
DESCRIPTORS: DISCOMFORT;SORE
DESCRIPTORS: ACHING

## 2024-04-02 ASSESSMENT — PAIN SCALES - GENERAL
PAINLEVEL_OUTOF10: 6
PAINLEVEL_OUTOF10: 0
PAINLEVEL_OUTOF10: 0

## 2024-04-02 ASSESSMENT — PAIN SCALES - WONG BAKER: WONGBAKER_NUMERICALRESPONSE: NO HURT

## 2024-04-02 ASSESSMENT — PAIN DESCRIPTION - ORIENTATION: ORIENTATION: LOWER

## 2024-04-02 ASSESSMENT — PAIN DESCRIPTION - LOCATION
LOCATION: ABDOMEN;HEAD
LOCATION: BACK

## 2024-04-02 NOTE — ACP (ADVANCE CARE PLANNING)
;Advance Care Planning   Healthcare Decision Maker:    Primary Decision Maker: ROSE SANCHEZ - [phone]    Secondary Decision Maker: [place] - [phone]      Today we documented Decision Maker(s) consistent with Legal Next of Kin hierarchy.

## 2024-04-02 NOTE — CONSULTS
daily.  CHF clinic visit 3/12/2024.  Wearing LifeVest.  Continues to note of peripheral edema, denied other cardiac complaints.  Up 2 pounds at 128 lbs.  /55.  HR 66.  Felt to be hypervolemic.  Given IV Lasix 40 mg x 1.  No significant improvement in peripheral edema with change to torsemide.  proBNP 6697 --> 74 3.  Potassium 2.9.  Creatinine stable.  Increase potassium supplementation.  Repeat BMP Friday.  Hospital Admission [facility] 3/16-3/23/2024 with CHF complaints.  Isordil/Hydralazine added to GDMT.  Underwent IV diuresis with clinical improvement.  Consider Entresto if tolerated.  With LifeVest.  Weight on discharge 132 pounds.  Discharged home with [facility] on Isordil 5 mg twice daily, Apresoline 10 mg twice daily, potassium supplementation, Demadex 20 mg daily, Toprol-XL 25 mg daily, Lipitor 40 mg daily, Plavix 75 mg daily, ASA 81 mg daily.  Most recent CHF clinic visit 3/27/2024.  Admits to wearing LifeVest.  Noted of TRUJILLO, peripheral edema decline in functional capacity and lightheadedness.  Weight 129 pounds.  BP 98/52, HR 64 bpm.  Latta to be hypervolemic given Lasix IV 40 mg x 1.  Torsemide titrated to 40 mg daily.  Follow-up next Monday in CHF clinic.  ED visit 3/31/2024 for shortness of breath, peripheral edema and chest tightness.  Weight per ED note 125 pounds.  Discharged home same day.        Patient presented back to [facility] on April 1, 2024 after being evaluated by her PCP with concern for failure to thrive.    Patient continues to note of complaints of dyspnea on exertion, peripheral edema bilaterally.  She denies weight gain, and admits to taking titrated dose of torsemide at home without increased urinary response.  She does admit to generalized weakness/fatigue and inability to care for herself at home.  She expressed this to her PCP, and was advised to present to the ED for further evaluation and possible facility placement.  Patient admits to productive cough, and notes

## 2024-04-03 LAB
ALBUMIN SERPL-MCNC: 2.6 G/DL (ref 3.5–5.2)
ALP SERPL-CCNC: 110 U/L (ref 35–104)
ALT SERPL-CCNC: 13 U/L (ref 0–32)
ANION GAP SERPL CALCULATED.3IONS-SCNC: 13 MMOL/L (ref 7–16)
AST SERPL-CCNC: 25 U/L (ref 0–31)
BACTERIA URNS QL MICRO: ABNORMAL
BASOPHILS # BLD: 0.01 K/UL (ref 0–0.2)
BASOPHILS NFR BLD: 0 % (ref 0–2)
BILIRUB SERPL-MCNC: 0.7 MG/DL (ref 0–1.2)
BILIRUB UR QL STRIP: NEGATIVE
BUN SERPL-MCNC: 35 MG/DL (ref 6–23)
CALCIUM SERPL-MCNC: 7.6 MG/DL (ref 8.6–10.2)
CHLORIDE SERPL-SCNC: 96 MMOL/L (ref 98–107)
CLARITY UR: ABNORMAL
CO2 SERPL-SCNC: 27 MMOL/L (ref 22–29)
COLOR UR: YELLOW
CREAT SERPL-MCNC: 1.5 MG/DL (ref 0.5–1)
D DIMER: 1717 NG/ML DDU (ref 0–232)
EKG ATRIAL RATE: 67 BPM
EKG ATRIAL RATE: 71 BPM
EKG P AXIS: 71 DEGREES
EKG P AXIS: 85 DEGREES
EKG P-R INTERVAL: 118 MS
EKG P-R INTERVAL: 120 MS
EKG Q-T INTERVAL: 370 MS
EKG Q-T INTERVAL: 560 MS
EKG QRS DURATION: 76 MS
EKG QRS DURATION: 80 MS
EKG QTC CALCULATION (BAZETT): 402 MS
EKG QTC CALCULATION (BAZETT): 591 MS
EKG R AXIS: 144 DEGREES
EKG R AXIS: 149 DEGREES
EKG T AXIS: -149 DEGREES
EKG T AXIS: 175 DEGREES
EKG VENTRICULAR RATE: 67 BPM
EKG VENTRICULAR RATE: 71 BPM
EOSINOPHIL # BLD: 0.09 K/UL (ref 0.05–0.5)
EOSINOPHILS RELATIVE PERCENT: 1 % (ref 0–6)
EPI CELLS #/AREA URNS HPF: ABNORMAL /HPF
ERYTHROCYTE [DISTWIDTH] IN BLOOD BY AUTOMATED COUNT: 20.1 % (ref 11.5–15)
ERYTHROCYTE [SEDIMENTATION RATE] IN BLOOD BY WESTERGREN METHOD: 61 MM/HR (ref 0–20)
GFR SERPL CREATININE-BSD FRML MDRD: 36 ML/MIN/1.73M2
GLUCOSE BLD-MCNC: 123 MG/DL (ref 74–99)
GLUCOSE SERPL-MCNC: 99 MG/DL (ref 74–99)
GLUCOSE UR STRIP-MCNC: NEGATIVE MG/DL
HCT VFR BLD AUTO: 27.9 % (ref 34–48)
HGB BLD-MCNC: 8.5 G/DL (ref 11.5–15.5)
HGB UR QL STRIP.AUTO: ABNORMAL
IMM GRANULOCYTES # BLD AUTO: 0.05 K/UL (ref 0–0.58)
IMM GRANULOCYTES NFR BLD: 1 % (ref 0–5)
KETONES UR STRIP-MCNC: NEGATIVE MG/DL
LEUKOCYTE ESTERASE UR QL STRIP: ABNORMAL
LYMPHOCYTES NFR BLD: 1.34 K/UL (ref 1.5–4)
LYMPHOCYTES RELATIVE PERCENT: 15 % (ref 20–42)
MCH RBC QN AUTO: 29.6 PG (ref 26–35)
MCHC RBC AUTO-ENTMCNC: 30.5 G/DL (ref 32–34.5)
MCV RBC AUTO: 97.2 FL (ref 80–99.9)
MONOCYTES NFR BLD: 0.6 K/UL (ref 0.1–0.95)
MONOCYTES NFR BLD: 7 % (ref 2–12)
NEUTROPHILS NFR BLD: 77 % (ref 43–80)
NEUTS SEG NFR BLD: 7.07 K/UL (ref 1.8–7.3)
NITRITE UR QL STRIP: POSITIVE
PH UR STRIP: 6.5 [PH] (ref 5–9)
PLATELET # BLD AUTO: 191 K/UL (ref 130–450)
PMV BLD AUTO: 11.7 FL (ref 7–12)
POTASSIUM SERPL-SCNC: 3 MMOL/L (ref 3.5–5)
PROT SERPL-MCNC: 6.3 G/DL (ref 6.4–8.3)
PROT UR STRIP-MCNC: NEGATIVE MG/DL
RBC # BLD AUTO: 2.87 M/UL (ref 3.5–5.5)
RBC # BLD: ABNORMAL 10*6/UL
RBC #/AREA URNS HPF: ABNORMAL /HPF
SODIUM SERPL-SCNC: 136 MMOL/L (ref 132–146)
SP GR UR STRIP: 1.01 (ref 1–1.03)
TROPONIN I SERPL HS-MCNC: 110 NG/L (ref 0–9)
UROBILINOGEN UR STRIP-ACNC: 0.2 EU/DL (ref 0–1)
WBC #/AREA URNS HPF: ABNORMAL /HPF
WBC OTHER # BLD: 9.2 K/UL (ref 4.5–11.5)

## 2024-04-03 PROCEDURE — 93010 ELECTROCARDIOGRAM REPORT: CPT | Performed by: INTERNAL MEDICINE

## 2024-04-03 PROCEDURE — 81001 URINALYSIS AUTO W/SCOPE: CPT

## 2024-04-03 PROCEDURE — 97165 OT EVAL LOW COMPLEX 30 MIN: CPT

## 2024-04-03 PROCEDURE — 6360000002 HC RX W HCPCS: Performed by: INTERNAL MEDICINE

## 2024-04-03 PROCEDURE — G0378 HOSPITAL OBSERVATION PER HR: HCPCS

## 2024-04-03 PROCEDURE — 97535 SELF CARE MNGMENT TRAINING: CPT

## 2024-04-03 PROCEDURE — 6370000000 HC RX 637 (ALT 250 FOR IP): Performed by: INTERNAL MEDICINE

## 2024-04-03 PROCEDURE — 85652 RBC SED RATE AUTOMATED: CPT

## 2024-04-03 PROCEDURE — 84484 ASSAY OF TROPONIN QUANT: CPT

## 2024-04-03 PROCEDURE — 93005 ELECTROCARDIOGRAM TRACING: CPT | Performed by: INTERNAL MEDICINE

## 2024-04-03 PROCEDURE — 82962 GLUCOSE BLOOD TEST: CPT

## 2024-04-03 PROCEDURE — 2580000003 HC RX 258: Performed by: INTERNAL MEDICINE

## 2024-04-03 PROCEDURE — 1200000000 HC SEMI PRIVATE

## 2024-04-03 PROCEDURE — 99233 SBSQ HOSP IP/OBS HIGH 50: CPT | Performed by: INTERNAL MEDICINE

## 2024-04-03 PROCEDURE — 85025 COMPLETE CBC W/AUTO DIFF WBC: CPT

## 2024-04-03 PROCEDURE — 87086 URINE CULTURE/COLONY COUNT: CPT

## 2024-04-03 PROCEDURE — 80053 COMPREHEN METABOLIC PANEL: CPT

## 2024-04-03 PROCEDURE — 85379 FIBRIN DEGRADATION QUANT: CPT

## 2024-04-03 PROCEDURE — 36415 COLL VENOUS BLD VENIPUNCTURE: CPT

## 2024-04-03 PROCEDURE — 97110 THERAPEUTIC EXERCISES: CPT

## 2024-04-03 PROCEDURE — 6370000000 HC RX 637 (ALT 250 FOR IP): Performed by: PHYSICIAN ASSISTANT

## 2024-04-03 PROCEDURE — 86140 C-REACTIVE PROTEIN: CPT

## 2024-04-03 RX ORDER — POTASSIUM CHLORIDE 20 MEQ/1
20 TABLET, EXTENDED RELEASE ORAL 2 TIMES DAILY WITH MEALS
Status: DISCONTINUED | OUTPATIENT
Start: 2024-04-03 | End: 2024-04-08 | Stop reason: HOSPADM

## 2024-04-03 RX ORDER — AMIODARONE HYDROCHLORIDE 200 MG/1
TABLET ORAL
Qty: 44 TABLET | Refills: 0 | OUTPATIENT
Start: 2024-04-03

## 2024-04-03 RX ORDER — POTASSIUM CHLORIDE 20 MEQ/1
40 TABLET, EXTENDED RELEASE ORAL ONCE
Status: COMPLETED | OUTPATIENT
Start: 2024-04-03 | End: 2024-04-03

## 2024-04-03 RX ORDER — LEVOTHYROXINE SODIUM 0.07 MG/1
75 TABLET ORAL DAILY
Status: DISCONTINUED | OUTPATIENT
Start: 2024-04-07 | End: 2024-04-08 | Stop reason: HOSPADM

## 2024-04-03 RX ORDER — FUROSEMIDE 10 MG/ML
20 INJECTION INTRAMUSCULAR; INTRAVENOUS 2 TIMES DAILY
Status: DISCONTINUED | OUTPATIENT
Start: 2024-04-03 | End: 2024-04-08 | Stop reason: HOSPADM

## 2024-04-03 RX ORDER — ISOSORBIDE DINITRATE 10 MG/1
5 TABLET ORAL 2 TIMES DAILY
Status: DISCONTINUED | OUTPATIENT
Start: 2024-04-03 | End: 2024-04-08 | Stop reason: HOSPADM

## 2024-04-03 RX ORDER — FUROSEMIDE 10 MG/ML
20 INJECTION INTRAMUSCULAR; INTRAVENOUS DAILY
Status: DISCONTINUED | OUTPATIENT
Start: 2024-04-04 | End: 2024-04-03

## 2024-04-03 RX ORDER — CEFDINIR 300 MG/1
300 CAPSULE ORAL DAILY
Status: DISCONTINUED | OUTPATIENT
Start: 2024-04-03 | End: 2024-04-08 | Stop reason: HOSPADM

## 2024-04-03 RX ORDER — HYDRALAZINE HYDROCHLORIDE 10 MG/1
10 TABLET, FILM COATED ORAL EVERY 12 HOURS SCHEDULED
Status: DISCONTINUED | OUTPATIENT
Start: 2024-04-03 | End: 2024-04-08 | Stop reason: HOSPADM

## 2024-04-03 RX ADMIN — Medication 10 ML: at 09:34

## 2024-04-03 RX ADMIN — HYDRALAZINE HYDROCHLORIDE 10 MG: 10 TABLET, FILM COATED ORAL at 21:00

## 2024-04-03 RX ADMIN — LEVOTHYROXINE SODIUM 100 MCG: 0.1 TABLET ORAL at 05:35

## 2024-04-03 RX ADMIN — MAGNESIUM OXIDE 400 MG: 400 TABLET ORAL at 09:33

## 2024-04-03 RX ADMIN — TOBRAMYCIN AND DEXAMETHASONE 1 DROP: 3; 1 SUSPENSION/ DROPS OPHTHALMIC at 21:00

## 2024-04-03 RX ADMIN — ATORVASTATIN CALCIUM 40 MG: 40 TABLET, FILM COATED ORAL at 21:00

## 2024-04-03 RX ADMIN — POTASSIUM CHLORIDE 20 MEQ: 1500 TABLET, EXTENDED RELEASE ORAL at 16:46

## 2024-04-03 RX ADMIN — CLOPIDOGREL BISULFATE 75 MG: 75 TABLET ORAL at 09:33

## 2024-04-03 RX ADMIN — BENZONATATE 100 MG: 100 CAPSULE ORAL at 21:01

## 2024-04-03 RX ADMIN — CEFDINIR 300 MG: 300 CAPSULE ORAL at 21:01

## 2024-04-03 RX ADMIN — MIDODRINE HYDROCHLORIDE 2.5 MG: 5 TABLET ORAL at 16:45

## 2024-04-03 RX ADMIN — POTASSIUM CHLORIDE 40 MEQ: 1500 TABLET, EXTENDED RELEASE ORAL at 12:16

## 2024-04-03 RX ADMIN — ASPIRIN 81 MG: 81 TABLET, CHEWABLE ORAL at 09:33

## 2024-04-03 RX ADMIN — TOBRAMYCIN AND DEXAMETHASONE 1 DROP: 3; 1 SUSPENSION/ DROPS OPHTHALMIC at 17:43

## 2024-04-03 RX ADMIN — MAGNESIUM OXIDE 400 MG: 400 TABLET ORAL at 21:00

## 2024-04-03 RX ADMIN — METOPROLOL SUCCINATE 25 MG: 25 TABLET, EXTENDED RELEASE ORAL at 09:33

## 2024-04-03 RX ADMIN — TOBRAMYCIN AND DEXAMETHASONE 1 DROP: 3; 1 SUSPENSION/ DROPS OPHTHALMIC at 12:16

## 2024-04-03 RX ADMIN — FUROSEMIDE 20 MG: 10 INJECTION, SOLUTION INTRAMUSCULAR; INTRAVENOUS at 09:33

## 2024-04-03 RX ADMIN — TOBRAMYCIN AND DEXAMETHASONE 1 DROP: 3; 1 SUSPENSION/ DROPS OPHTHALMIC at 09:34

## 2024-04-03 RX ADMIN — ACETAMINOPHEN 650 MG: 325 TABLET ORAL at 21:00

## 2024-04-03 RX ADMIN — ISOSORBIDE DINITRATE 5 MG: 10 TABLET ORAL at 20:59

## 2024-04-03 RX ADMIN — NEOMYCIN AND POLYMYXIN B SULFATES AND DEXAMETHASONE 1 EACH: 3.5; 10000; 1 OINTMENT OPHTHALMIC at 21:00

## 2024-04-03 RX ADMIN — MIDODRINE HYDROCHLORIDE 2.5 MG: 5 TABLET ORAL at 09:33

## 2024-04-03 RX ADMIN — MIDODRINE HYDROCHLORIDE 2.5 MG: 5 TABLET ORAL at 12:17

## 2024-04-03 RX ADMIN — Medication 10 ML: at 21:10

## 2024-04-03 RX ADMIN — ENOXAPARIN SODIUM 30 MG: 100 INJECTION SUBCUTANEOUS at 16:45

## 2024-04-03 RX ADMIN — FUROSEMIDE 20 MG: 10 INJECTION, SOLUTION INTRAMUSCULAR; INTRAVENOUS at 16:45

## 2024-04-03 ASSESSMENT — PAIN SCALES - GENERAL
PAINLEVEL_OUTOF10: 6
PAINLEVEL_OUTOF10: 5
PAINLEVEL_OUTOF10: 5

## 2024-04-03 ASSESSMENT — PAIN DESCRIPTION - DESCRIPTORS: DESCRIPTORS: ACHING;JABBING;THROBBING

## 2024-04-03 ASSESSMENT — PAIN DESCRIPTION - LOCATION
LOCATION: CHEST;FOOT
LOCATION: CHEST
LOCATION: CHEST

## 2024-04-03 ASSESSMENT — PAIN DESCRIPTION - ORIENTATION: ORIENTATION: RIGHT;LEFT

## 2024-04-03 NOTE — DISCHARGE INSTR - COC
Continuity of Care Form    Patient Name: Katt Diaz   :  1945  MRN:  [id number]    Admit date:  2024  Discharge date:  2024    Code Status Order: Full Code   Advance Directives:     Admitting Physician:  Dre Hubbard DO  PCP: Rose Rea DO    Discharging Nurse: Noé Farah LPN     Discharging Hospital Unit/Room#: 0421/0421-01  Discharging Unit Phone Number: [phone]    Emergency Contact:   Extended Emergency Contact Information  Primary Emergency Contact: ROSE DIAZ  Home Phone: [phone]  Mobile Phone: [phone]  Relation: Child  Preferred language: English   needed? No  Secondary Emergency Contact: LOCO DIAZ  Home Phone: [phone]  Mobile Phone: [phone]  Relation: Child  Preferred language: English   needed? No    Past Surgical History:  Past Surgical History:   Procedure Laterality Date    CARDIAC PROCEDURE N/A 2024    Left heart cath / coronary angiography performed by Arnav Shaw MD [place] [facility] [place]    CORONARY ARTERY BYPASS GRAFT N/A 2024    CORONARY ARTERY BYPASS GRAFTING, EVH performed by Nicolasa Jasso DO at [facility] [place]    IR TUNNELED CATHETER PLACEMENT GREATER THAN 5 YEARS  2024    IR TUNNELED CATHETER PLACEMENT GREATER THAN 5 YEARS 2024 CuongREYNA melchor MD [facility] SPECIAL PROCEDURES    PARTIAL HYSTERECTOMY (CERVIX NOT REMOVED)      states     VEIN LIGATION AND STRIPPING      states        Immunization History:     There is no immunization history on file for this patient.    Active Problems:  Patient Active Problem List   Diagnosis Code    S/P cardiac cath Z98.890    CAD in native artery I25.10    ST elevation myocardial infarction (STEMI) ([facility]) I21.3    Postoperative hypotension I95.81    Complication of surgical procedure T81.9XXA    Stress hyperglycemia R73.9    VERONIQUE (acute kidney injury) ([facility]) N17.9    Acute on chronic systolic heart failure ([facility]) I50.23    Acute on chronic congestive

## 2024-04-04 ENCOUNTER — APPOINTMENT (OUTPATIENT)
Age: 79
DRG: 292 | End: 2024-04-04
Attending: INTERNAL MEDICINE
Payer: MEDICARE

## 2024-04-04 LAB
ALBUMIN SERPL-MCNC: 2.9 G/DL (ref 3.5–5.2)
ALP SERPL-CCNC: 99 U/L (ref 35–104)
ALT SERPL-CCNC: 13 U/L (ref 0–32)
ANION GAP SERPL CALCULATED.3IONS-SCNC: 13 MMOL/L (ref 7–16)
AST SERPL-CCNC: 24 U/L (ref 0–31)
BASOPHILS # BLD: 0.02 K/UL (ref 0–0.2)
BASOPHILS NFR BLD: 0 % (ref 0–2)
BILIRUB SERPL-MCNC: 0.6 MG/DL (ref 0–1.2)
BUN SERPL-MCNC: 32 MG/DL (ref 6–23)
CALCIUM SERPL-MCNC: 8.1 MG/DL (ref 8.6–10.2)
CHLORIDE SERPL-SCNC: 95 MMOL/L (ref 98–107)
CO2 SERPL-SCNC: 24 MMOL/L (ref 22–29)
CREAT SERPL-MCNC: 1.3 MG/DL (ref 0.5–1)
CRP SERPL HS-MCNC: 93 MG/L (ref 0–5)
EOSINOPHIL # BLD: 0.08 K/UL (ref 0.05–0.5)
EOSINOPHILS RELATIVE PERCENT: 1 % (ref 0–6)
ERYTHROCYTE [DISTWIDTH] IN BLOOD BY AUTOMATED COUNT: 20 % (ref 11.5–15)
GFR SERPL CREATININE-BSD FRML MDRD: 44 ML/MIN/1.73M2
GLUCOSE SERPL-MCNC: 118 MG/DL (ref 74–99)
HCT VFR BLD AUTO: 31.1 % (ref 34–48)
HGB BLD-MCNC: 9.3 G/DL (ref 11.5–15.5)
IMM GRANULOCYTES # BLD AUTO: 0.03 K/UL (ref 0–0.58)
IMM GRANULOCYTES NFR BLD: 1 % (ref 0–5)
LYMPHOCYTES NFR BLD: 1.37 K/UL (ref 1.5–4)
LYMPHOCYTES RELATIVE PERCENT: 22 % (ref 20–42)
MCH RBC QN AUTO: 29.4 PG (ref 26–35)
MCHC RBC AUTO-ENTMCNC: 29.9 G/DL (ref 32–34.5)
MCV RBC AUTO: 98.4 FL (ref 80–99.9)
MONOCYTES NFR BLD: 0.5 K/UL (ref 0.1–0.95)
MONOCYTES NFR BLD: 8 % (ref 2–12)
NEUTROPHILS NFR BLD: 68 % (ref 43–80)
NEUTS SEG NFR BLD: 4.26 K/UL (ref 1.8–7.3)
PLATELET # BLD AUTO: 194 K/UL (ref 130–450)
PMV BLD AUTO: 11.4 FL (ref 7–12)
POTASSIUM SERPL-SCNC: 3.8 MMOL/L (ref 3.5–5)
PROT SERPL-MCNC: 7 G/DL (ref 6.4–8.3)
RBC # BLD AUTO: 3.16 M/UL (ref 3.5–5.5)
SODIUM SERPL-SCNC: 132 MMOL/L (ref 132–146)
WBC OTHER # BLD: 6.3 K/UL (ref 4.5–11.5)

## 2024-04-04 PROCEDURE — 36415 COLL VENOUS BLD VENIPUNCTURE: CPT

## 2024-04-04 PROCEDURE — 97530 THERAPEUTIC ACTIVITIES: CPT

## 2024-04-04 PROCEDURE — 6360000002 HC RX W HCPCS: Performed by: INTERNAL MEDICINE

## 2024-04-04 PROCEDURE — 6370000000 HC RX 637 (ALT 250 FOR IP): Performed by: INTERNAL MEDICINE

## 2024-04-04 PROCEDURE — 2580000003 HC RX 258: Performed by: INTERNAL MEDICINE

## 2024-04-04 PROCEDURE — 80053 COMPREHEN METABOLIC PANEL: CPT

## 2024-04-04 PROCEDURE — 85025 COMPLETE CBC W/AUTO DIFF WBC: CPT

## 2024-04-04 PROCEDURE — 97110 THERAPEUTIC EXERCISES: CPT

## 2024-04-04 PROCEDURE — 6370000000 HC RX 637 (ALT 250 FOR IP): Performed by: PHYSICIAN ASSISTANT

## 2024-04-04 PROCEDURE — 93306 TTE W/DOPPLER COMPLETE: CPT

## 2024-04-04 PROCEDURE — 1200000000 HC SEMI PRIVATE

## 2024-04-04 PROCEDURE — 99233 SBSQ HOSP IP/OBS HIGH 50: CPT | Performed by: INTERNAL MEDICINE

## 2024-04-04 RX ADMIN — MAGNESIUM OXIDE 400 MG: 400 TABLET ORAL at 21:08

## 2024-04-04 RX ADMIN — ISOSORBIDE DINITRATE 5 MG: 10 TABLET ORAL at 21:08

## 2024-04-04 RX ADMIN — NEOMYCIN AND POLYMYXIN B SULFATES AND DEXAMETHASONE 1 EACH: 3.5; 10000; 1 OINTMENT OPHTHALMIC at 21:11

## 2024-04-04 RX ADMIN — MAGNESIUM OXIDE 400 MG: 400 TABLET ORAL at 09:37

## 2024-04-04 RX ADMIN — FUROSEMIDE 20 MG: 10 INJECTION, SOLUTION INTRAMUSCULAR; INTRAVENOUS at 09:44

## 2024-04-04 RX ADMIN — TOBRAMYCIN AND DEXAMETHASONE 1 DROP: 3; 1 SUSPENSION/ DROPS OPHTHALMIC at 21:10

## 2024-04-04 RX ADMIN — Medication 10 ML: at 21:11

## 2024-04-04 RX ADMIN — ASPIRIN 81 MG: 81 TABLET, CHEWABLE ORAL at 09:39

## 2024-04-04 RX ADMIN — ENOXAPARIN SODIUM 30 MG: 100 INJECTION SUBCUTANEOUS at 19:51

## 2024-04-04 RX ADMIN — CEFDINIR 300 MG: 300 CAPSULE ORAL at 09:40

## 2024-04-04 RX ADMIN — HYDRALAZINE HYDROCHLORIDE 10 MG: 10 TABLET, FILM COATED ORAL at 09:37

## 2024-04-04 RX ADMIN — CLOPIDOGREL BISULFATE 75 MG: 75 TABLET ORAL at 09:37

## 2024-04-04 RX ADMIN — POTASSIUM CHLORIDE 20 MEQ: 1500 TABLET, EXTENDED RELEASE ORAL at 16:45

## 2024-04-04 RX ADMIN — HYDRALAZINE HYDROCHLORIDE 10 MG: 10 TABLET, FILM COATED ORAL at 21:08

## 2024-04-04 RX ADMIN — MIDODRINE HYDROCHLORIDE 2.5 MG: 5 TABLET ORAL at 09:38

## 2024-04-04 RX ADMIN — MIDODRINE HYDROCHLORIDE 2.5 MG: 5 TABLET ORAL at 16:45

## 2024-04-04 RX ADMIN — TOBRAMYCIN AND DEXAMETHASONE 1 DROP: 3; 1 SUSPENSION/ DROPS OPHTHALMIC at 09:40

## 2024-04-04 RX ADMIN — POTASSIUM CHLORIDE 20 MEQ: 1500 TABLET, EXTENDED RELEASE ORAL at 09:38

## 2024-04-04 RX ADMIN — ATORVASTATIN CALCIUM 40 MG: 40 TABLET, FILM COATED ORAL at 21:08

## 2024-04-04 RX ADMIN — METOPROLOL SUCCINATE 25 MG: 25 TABLET, EXTENDED RELEASE ORAL at 09:37

## 2024-04-04 RX ADMIN — FUROSEMIDE 20 MG: 10 INJECTION, SOLUTION INTRAMUSCULAR; INTRAVENOUS at 19:51

## 2024-04-04 RX ADMIN — ISOSORBIDE DINITRATE 5 MG: 10 TABLET ORAL at 09:38

## 2024-04-04 RX ADMIN — Medication 10 ML: at 09:40

## 2024-04-04 RX ADMIN — TOBRAMYCIN AND DEXAMETHASONE 1 DROP: 3; 1 SUSPENSION/ DROPS OPHTHALMIC at 16:45

## 2024-04-04 ASSESSMENT — PAIN SCALES - GENERAL
PAINLEVEL_OUTOF10: 6
PAINLEVEL_OUTOF10: 0

## 2024-04-04 ASSESSMENT — PAIN DESCRIPTION - ORIENTATION: ORIENTATION: RIGHT;LEFT

## 2024-04-04 ASSESSMENT — PAIN DESCRIPTION - LOCATION: LOCATION: LEG

## 2024-04-04 NOTE — PLAN OF CARE
Problem: Pain  Goal: Verbalizes/displays adequate comfort level or baseline comfort level  4/4/2024 1641 by Deshaun Lima, RN  Outcome: Progressing     Problem: Skin/Tissue Integrity  Goal: Absence of new skin breakdown  Description: 1.  Monitor for areas of redness and/or skin breakdown  2.  Assess vascular access sites hourly  3.  Every 4-6 hours minimum:  Change oxygen saturation probe site  4.  Every 4-6 hours:  If on nasal continuous positive airway pressure, respiratory therapy assess nares and determine need for appliance change or resting period.  4/4/2024 1641 by Deshaun Lima, RN  Outcome: Progressing

## 2024-04-05 LAB
ALBUMIN SERPL-MCNC: 3.1 G/DL (ref 3.5–5.2)
ALP SERPL-CCNC: 108 U/L (ref 35–104)
ALT SERPL-CCNC: 13 U/L (ref 0–32)
ANION GAP SERPL CALCULATED.3IONS-SCNC: 13 MMOL/L (ref 7–16)
AST SERPL-CCNC: 26 U/L (ref 0–31)
BASOPHILS # BLD: 0.02 K/UL (ref 0–0.2)
BASOPHILS NFR BLD: 0 % (ref 0–2)
BILIRUB SERPL-MCNC: 0.5 MG/DL (ref 0–1.2)
BUN SERPL-MCNC: 32 MG/DL (ref 6–23)
CALCIUM SERPL-MCNC: 8.2 MG/DL (ref 8.6–10.2)
CHLORIDE SERPL-SCNC: 98 MMOL/L (ref 98–107)
CO2 SERPL-SCNC: 23 MMOL/L (ref 22–29)
CREAT SERPL-MCNC: 1.3 MG/DL (ref 0.5–1)
ECHO AV AREA PEAK VELOCITY: 2.3 CM2
ECHO AV AREA VTI: 2.6 CM2
ECHO AV AREA/BSA PEAK VELOCITY: 1.5 CM2/M2
ECHO AV AREA/BSA VTI: 1.7 CM2/M2
ECHO AV CUSP MM: 1.6 CM
ECHO AV MEAN GRADIENT: 1 MMHG
ECHO AV MEAN VELOCITY: 0.5 M/S
ECHO AV PEAK GRADIENT: 2 MMHG
ECHO AV PEAK VELOCITY: 0.7 M/S
ECHO AV VELOCITY RATIO: 0.71
ECHO AV VTI: 12.1 CM
ECHO BSA: 1.56 M2
ECHO LA DIAMETER INDEX: 2.65 CM/M2
ECHO LA DIAMETER: 4.1 CM
ECHO LA VOL A-L A2C: 52 ML (ref 22–52)
ECHO LA VOL A-L A4C: 39 ML (ref 22–52)
ECHO LA VOL BP: 44 ML (ref 22–52)
ECHO LA VOL MOD A2C: 51 ML (ref 22–52)
ECHO LA VOL MOD A4C: 38 ML (ref 22–52)
ECHO LA VOL/BSA BIPLANE: 28 ML/M2 (ref 16–34)
ECHO LA VOLUME AREA LENGTH: 45 ML
ECHO LA VOLUME INDEX A-L A2C: 34 ML/M2 (ref 16–34)
ECHO LA VOLUME INDEX A-L A4C: 25 ML/M2 (ref 16–34)
ECHO LA VOLUME INDEX AREA LENGTH: 29 ML/M2 (ref 16–34)
ECHO LA VOLUME INDEX MOD A2C: 33 ML/M2 (ref 16–34)
ECHO LA VOLUME INDEX MOD A4C: 25 ML/M2 (ref 16–34)
ECHO LV FRACTIONAL SHORTENING: 9 % (ref 28–44)
ECHO LV INTERNAL DIMENSION DIASTOLE INDEX: 2.97 CM/M2
ECHO LV INTERNAL DIMENSION DIASTOLIC: 4.6 CM (ref 3.9–5.3)
ECHO LV INTERNAL DIMENSION SYSTOLIC INDEX: 2.71 CM/M2
ECHO LV INTERNAL DIMENSION SYSTOLIC: 4.2 CM
ECHO LV IVSD: 1 CM (ref 0.6–0.9)
ECHO LV MASS 2D: 158.8 G (ref 67–162)
ECHO LV MASS INDEX 2D: 102.5 G/M2 (ref 43–95)
ECHO LV POSTERIOR WALL DIASTOLIC: 1 CM (ref 0.6–0.9)
ECHO LV RELATIVE WALL THICKNESS RATIO: 0.43
ECHO LVOT AREA: 3.1 CM2
ECHO LVOT AV VTI INDEX: 0.81
ECHO LVOT DIAM: 2 CM
ECHO LVOT MEAN GRADIENT: 1 MMHG
ECHO LVOT PEAK GRADIENT: 1 MMHG
ECHO LVOT PEAK VELOCITY: 0.5 M/S
ECHO LVOT STROKE VOLUME INDEX: 19.9 ML/M2
ECHO LVOT SV: 30.8 ML
ECHO LVOT VTI: 9.8 CM
ECHO MV "A" WAVE DURATION: 134.2 MSEC
ECHO MV A VELOCITY: 0.61 M/S
ECHO MV AREA PHT: 2.4 CM2
ECHO MV AREA VTI: 1.3 CM2
ECHO MV E DECELERATION TIME (DT): 215.3 MS
ECHO MV E VELOCITY: 0.5 M/S
ECHO MV E/A RATIO: 0.82
ECHO MV EROA PISA: 0.3 CM2
ECHO MV LVOT VTI INDEX: 2.42
ECHO MV MAX VELOCITY: 0.7 M/S
ECHO MV MEAN GRADIENT: 1 MMHG
ECHO MV MEAN VELOCITY: 0.5 M/S
ECHO MV PEAK GRADIENT: 2 MMHG
ECHO MV PRESSURE HALF TIME (PHT): 92.4 MS
ECHO MV REGURGITANT ALIASING (NYQUIST) VELOCITY: 36 CM/S
ECHO MV REGURGITANT RADIUS PISA: 0.81 CM
ECHO MV REGURGITANT VELOCITY PISA: 5 M/S
ECHO MV REGURGITANT VOLUME PISA: 52.75 ML
ECHO MV REGURGITANT VTIA: 177.8 CM
ECHO MV VTI: 23.7 CM
ECHO PV MAX VELOCITY: 0.6 M/S
ECHO PV MEAN GRADIENT: 1 MMHG
ECHO PV MEAN VELOCITY: 0.4 M/S
ECHO PV PEAK GRADIENT: 1 MMHG
ECHO PV VTI: 8.2 CM
ECHO RV INTERNAL DIMENSION: 3.5 CM
ECHO TV REGURGITANT MAX VELOCITY: 2.21 M/S
ECHO TV REGURGITANT PEAK GRADIENT: 19 MMHG
EOSINOPHIL # BLD: 0.08 K/UL (ref 0.05–0.5)
EOSINOPHILS RELATIVE PERCENT: 1 % (ref 0–6)
ERYTHROCYTE [DISTWIDTH] IN BLOOD BY AUTOMATED COUNT: 19.6 % (ref 11.5–15)
GFR SERPL CREATININE-BSD FRML MDRD: 43 ML/MIN/1.73M2
GLUCOSE SERPL-MCNC: 106 MG/DL (ref 74–99)
HCT VFR BLD AUTO: 29.6 % (ref 34–48)
HGB BLD-MCNC: 9 G/DL (ref 11.5–15.5)
IMM GRANULOCYTES # BLD AUTO: 0.06 K/UL (ref 0–0.58)
IMM GRANULOCYTES NFR BLD: 1 % (ref 0–5)
LYMPHOCYTES NFR BLD: 1.42 K/UL (ref 1.5–4)
LYMPHOCYTES RELATIVE PERCENT: 22 % (ref 20–42)
MCH RBC QN AUTO: 29.7 PG (ref 26–35)
MCHC RBC AUTO-ENTMCNC: 30.4 G/DL (ref 32–34.5)
MCV RBC AUTO: 97.7 FL (ref 80–99.9)
MONOCYTES NFR BLD: 0.56 K/UL (ref 0.1–0.95)
MONOCYTES NFR BLD: 9 % (ref 2–12)
NEUTROPHILS NFR BLD: 68 % (ref 43–80)
NEUTS SEG NFR BLD: 4.45 K/UL (ref 1.8–7.3)
PLATELET # BLD AUTO: 209 K/UL (ref 130–450)
PMV BLD AUTO: 11.4 FL (ref 7–12)
POTASSIUM SERPL-SCNC: 4.6 MMOL/L (ref 3.5–5)
PROT SERPL-MCNC: 7.1 G/DL (ref 6.4–8.3)
RBC # BLD AUTO: 3.03 M/UL (ref 3.5–5.5)
SODIUM SERPL-SCNC: 134 MMOL/L (ref 132–146)
WBC OTHER # BLD: 6.6 K/UL (ref 4.5–11.5)

## 2024-04-05 PROCEDURE — 2580000003 HC RX 258: Performed by: INTERNAL MEDICINE

## 2024-04-05 PROCEDURE — 80053 COMPREHEN METABOLIC PANEL: CPT

## 2024-04-05 PROCEDURE — 1200000000 HC SEMI PRIVATE

## 2024-04-05 PROCEDURE — 99233 SBSQ HOSP IP/OBS HIGH 50: CPT | Performed by: INTERNAL MEDICINE

## 2024-04-05 PROCEDURE — 6360000002 HC RX W HCPCS: Performed by: INTERNAL MEDICINE

## 2024-04-05 PROCEDURE — 6370000000 HC RX 637 (ALT 250 FOR IP): Performed by: INTERNAL MEDICINE

## 2024-04-05 PROCEDURE — 36415 COLL VENOUS BLD VENIPUNCTURE: CPT

## 2024-04-05 PROCEDURE — 97530 THERAPEUTIC ACTIVITIES: CPT

## 2024-04-05 PROCEDURE — 93306 TTE W/DOPPLER COMPLETE: CPT | Performed by: INTERNAL MEDICINE

## 2024-04-05 PROCEDURE — 97110 THERAPEUTIC EXERCISES: CPT

## 2024-04-05 PROCEDURE — 6370000000 HC RX 637 (ALT 250 FOR IP): Performed by: PHYSICIAN ASSISTANT

## 2024-04-05 PROCEDURE — 85025 COMPLETE CBC W/AUTO DIFF WBC: CPT

## 2024-04-05 PROCEDURE — 2700000000 HC OXYGEN THERAPY PER DAY

## 2024-04-05 RX ORDER — MIDODRINE HYDROCHLORIDE 2.5 MG/1
2.5 TABLET ORAL
Qty: 90 TABLET | Refills: 3 | Status: ON HOLD | DISCHARGE
Start: 2024-04-05

## 2024-04-05 RX ORDER — LANOLIN ALCOHOL/MO/W.PET/CERES
400 CREAM (GRAM) TOPICAL 2 TIMES DAILY
Qty: 28 TABLET | Refills: 0 | Status: ON HOLD | DISCHARGE
Start: 2024-04-05 | End: 2024-04-19

## 2024-04-05 RX ADMIN — ENOXAPARIN SODIUM 30 MG: 100 INJECTION SUBCUTANEOUS at 17:22

## 2024-04-05 RX ADMIN — ACETAMINOPHEN 650 MG: 325 TABLET ORAL at 21:13

## 2024-04-05 RX ADMIN — TOBRAMYCIN AND DEXAMETHASONE 1 DROP: 3; 1 SUSPENSION/ DROPS OPHTHALMIC at 13:05

## 2024-04-05 RX ADMIN — ISOSORBIDE DINITRATE 5 MG: 10 TABLET ORAL at 21:13

## 2024-04-05 RX ADMIN — Medication 10 ML: at 08:54

## 2024-04-05 RX ADMIN — HYDRALAZINE HYDROCHLORIDE 10 MG: 10 TABLET, FILM COATED ORAL at 21:14

## 2024-04-05 RX ADMIN — BENZONATATE 100 MG: 100 CAPSULE ORAL at 21:13

## 2024-04-05 RX ADMIN — METOPROLOL SUCCINATE 25 MG: 25 TABLET, EXTENDED RELEASE ORAL at 08:54

## 2024-04-05 RX ADMIN — ASPIRIN 81 MG: 81 TABLET, CHEWABLE ORAL at 08:55

## 2024-04-05 RX ADMIN — ISOSORBIDE DINITRATE 5 MG: 10 TABLET ORAL at 08:55

## 2024-04-05 RX ADMIN — MIDODRINE HYDROCHLORIDE 2.5 MG: 5 TABLET ORAL at 08:54

## 2024-04-05 RX ADMIN — CLOPIDOGREL BISULFATE 75 MG: 75 TABLET ORAL at 08:55

## 2024-04-05 RX ADMIN — TOBRAMYCIN AND DEXAMETHASONE 1 DROP: 3; 1 SUSPENSION/ DROPS OPHTHALMIC at 17:22

## 2024-04-05 RX ADMIN — NEOMYCIN AND POLYMYXIN B SULFATES AND DEXAMETHASONE 1 EACH: 3.5; 10000; 1 OINTMENT OPHTHALMIC at 21:13

## 2024-04-05 RX ADMIN — MAGNESIUM OXIDE 400 MG: 400 TABLET ORAL at 08:54

## 2024-04-05 RX ADMIN — TOBRAMYCIN AND DEXAMETHASONE 1 DROP: 3; 1 SUSPENSION/ DROPS OPHTHALMIC at 08:59

## 2024-04-05 RX ADMIN — ATORVASTATIN CALCIUM 40 MG: 40 TABLET, FILM COATED ORAL at 21:14

## 2024-04-05 RX ADMIN — POTASSIUM CHLORIDE 20 MEQ: 1500 TABLET, EXTENDED RELEASE ORAL at 08:55

## 2024-04-05 RX ADMIN — MAGNESIUM OXIDE 400 MG: 400 TABLET ORAL at 21:14

## 2024-04-05 RX ADMIN — FUROSEMIDE 20 MG: 10 INJECTION, SOLUTION INTRAMUSCULAR; INTRAVENOUS at 17:22

## 2024-04-05 RX ADMIN — POTASSIUM CHLORIDE 20 MEQ: 1500 TABLET, EXTENDED RELEASE ORAL at 17:22

## 2024-04-05 RX ADMIN — HYDRALAZINE HYDROCHLORIDE 10 MG: 10 TABLET, FILM COATED ORAL at 08:54

## 2024-04-05 RX ADMIN — TOBRAMYCIN AND DEXAMETHASONE 1 DROP: 3; 1 SUSPENSION/ DROPS OPHTHALMIC at 21:13

## 2024-04-05 RX ADMIN — MIDODRINE HYDROCHLORIDE 2.5 MG: 5 TABLET ORAL at 17:22

## 2024-04-05 RX ADMIN — CEFDINIR 300 MG: 300 CAPSULE ORAL at 08:54

## 2024-04-05 RX ADMIN — FUROSEMIDE 20 MG: 10 INJECTION, SOLUTION INTRAMUSCULAR; INTRAVENOUS at 08:54

## 2024-04-05 RX ADMIN — MIDODRINE HYDROCHLORIDE 2.5 MG: 5 TABLET ORAL at 11:56

## 2024-04-05 RX ADMIN — Medication 10 ML: at 21:11

## 2024-04-05 ASSESSMENT — PAIN DESCRIPTION - LOCATION: LOCATION: LEG;FOOT

## 2024-04-05 ASSESSMENT — PAIN DESCRIPTION - PAIN TYPE: TYPE: CHRONIC PAIN

## 2024-04-05 ASSESSMENT — PAIN SCALES - GENERAL: PAINLEVEL_OUTOF10: 5

## 2024-04-05 ASSESSMENT — PAIN DESCRIPTION - ORIENTATION: ORIENTATION: RIGHT;LEFT

## 2024-04-05 ASSESSMENT — PAIN DESCRIPTION - DESCRIPTORS: DESCRIPTORS: POUNDING;THROBBING

## 2024-04-06 LAB
ALBUMIN SERPL-MCNC: 2.8 G/DL (ref 3.5–5.2)
ALP SERPL-CCNC: 92 U/L (ref 35–104)
ALT SERPL-CCNC: 12 U/L (ref 0–32)
ANION GAP SERPL CALCULATED.3IONS-SCNC: 11 MMOL/L (ref 7–16)
AST SERPL-CCNC: 25 U/L (ref 0–31)
BASOPHILS # BLD: 0.03 K/UL (ref 0–0.2)
BASOPHILS NFR BLD: 1 % (ref 0–2)
BILIRUB SERPL-MCNC: 0.5 MG/DL (ref 0–1.2)
BUN SERPL-MCNC: 28 MG/DL (ref 6–23)
CALCIUM SERPL-MCNC: 8.3 MG/DL (ref 8.6–10.2)
CHLORIDE SERPL-SCNC: 100 MMOL/L (ref 98–107)
CO2 SERPL-SCNC: 20 MMOL/L (ref 22–29)
CREAT SERPL-MCNC: 1.2 MG/DL (ref 0.5–1)
EOSINOPHIL # BLD: 0.1 K/UL (ref 0.05–0.5)
EOSINOPHILS RELATIVE PERCENT: 2 % (ref 0–6)
ERYTHROCYTE [DISTWIDTH] IN BLOOD BY AUTOMATED COUNT: 19.5 % (ref 11.5–15)
GFR SERPL CREATININE-BSD FRML MDRD: 47 ML/MIN/1.73M2
GLUCOSE SERPL-MCNC: 93 MG/DL (ref 74–99)
HCT VFR BLD AUTO: 28.8 % (ref 34–48)
HGB BLD-MCNC: 9.1 G/DL (ref 11.5–15.5)
IMM GRANULOCYTES # BLD AUTO: 0.03 K/UL (ref 0–0.58)
IMM GRANULOCYTES NFR BLD: 1 % (ref 0–5)
LYMPHOCYTES NFR BLD: 1.54 K/UL (ref 1.5–4)
LYMPHOCYTES RELATIVE PERCENT: 24 % (ref 20–42)
MCH RBC QN AUTO: 30.5 PG (ref 26–35)
MCHC RBC AUTO-ENTMCNC: 31.6 G/DL (ref 32–34.5)
MCV RBC AUTO: 96.6 FL (ref 80–99.9)
MICROORGANISM SPEC CULT: ABNORMAL
MONOCYTES NFR BLD: 0.58 K/UL (ref 0.1–0.95)
MONOCYTES NFR BLD: 9 % (ref 2–12)
NEUTROPHILS NFR BLD: 64 % (ref 43–80)
NEUTS SEG NFR BLD: 4.13 K/UL (ref 1.8–7.3)
PLATELET # BLD AUTO: 216 K/UL (ref 130–450)
PMV BLD AUTO: 11.4 FL (ref 7–12)
POTASSIUM SERPL-SCNC: 4.9 MMOL/L (ref 3.5–5)
PROT SERPL-MCNC: 6.6 G/DL (ref 6.4–8.3)
RBC # BLD AUTO: 2.98 M/UL (ref 3.5–5.5)
SODIUM SERPL-SCNC: 131 MMOL/L (ref 132–146)
SPECIMEN DESCRIPTION: ABNORMAL
T4 FREE SERPL-MCNC: 1.8 NG/DL (ref 0.9–1.7)
WBC OTHER # BLD: 6.4 K/UL (ref 4.5–11.5)

## 2024-04-06 PROCEDURE — 36415 COLL VENOUS BLD VENIPUNCTURE: CPT

## 2024-04-06 PROCEDURE — 6370000000 HC RX 637 (ALT 250 FOR IP): Performed by: INTERNAL MEDICINE

## 2024-04-06 PROCEDURE — 6370000000 HC RX 637 (ALT 250 FOR IP): Performed by: PHYSICIAN ASSISTANT

## 2024-04-06 PROCEDURE — 6360000002 HC RX W HCPCS: Performed by: INTERNAL MEDICINE

## 2024-04-06 PROCEDURE — 2700000000 HC OXYGEN THERAPY PER DAY

## 2024-04-06 PROCEDURE — 99233 SBSQ HOSP IP/OBS HIGH 50: CPT | Performed by: INTERNAL MEDICINE

## 2024-04-06 PROCEDURE — 84439 ASSAY OF FREE THYROXINE: CPT

## 2024-04-06 PROCEDURE — 85025 COMPLETE CBC W/AUTO DIFF WBC: CPT

## 2024-04-06 PROCEDURE — 2580000003 HC RX 258: Performed by: INTERNAL MEDICINE

## 2024-04-06 PROCEDURE — 1200000000 HC SEMI PRIVATE

## 2024-04-06 PROCEDURE — 80053 COMPREHEN METABOLIC PANEL: CPT

## 2024-04-06 RX ADMIN — METOPROLOL SUCCINATE 25 MG: 25 TABLET, EXTENDED RELEASE ORAL at 09:15

## 2024-04-06 RX ADMIN — ISOSORBIDE DINITRATE 5 MG: 10 TABLET ORAL at 22:26

## 2024-04-06 RX ADMIN — NEOMYCIN AND POLYMYXIN B SULFATES AND DEXAMETHASONE 1 EACH: 3.5; 10000; 1 OINTMENT OPHTHALMIC at 23:49

## 2024-04-06 RX ADMIN — FUROSEMIDE 20 MG: 10 INJECTION, SOLUTION INTRAMUSCULAR; INTRAVENOUS at 09:15

## 2024-04-06 RX ADMIN — ATORVASTATIN CALCIUM 40 MG: 40 TABLET, FILM COATED ORAL at 22:25

## 2024-04-06 RX ADMIN — POTASSIUM CHLORIDE 20 MEQ: 1500 TABLET, EXTENDED RELEASE ORAL at 17:14

## 2024-04-06 RX ADMIN — TOBRAMYCIN AND DEXAMETHASONE 1 DROP: 3; 1 SUSPENSION/ DROPS OPHTHALMIC at 17:14

## 2024-04-06 RX ADMIN — Medication 10 ML: at 09:15

## 2024-04-06 RX ADMIN — MIDODRINE HYDROCHLORIDE 2.5 MG: 5 TABLET ORAL at 09:14

## 2024-04-06 RX ADMIN — MIDODRINE HYDROCHLORIDE 2.5 MG: 5 TABLET ORAL at 12:10

## 2024-04-06 RX ADMIN — TOBRAMYCIN AND DEXAMETHASONE 1 DROP: 3; 1 SUSPENSION/ DROPS OPHTHALMIC at 22:26

## 2024-04-06 RX ADMIN — ISOSORBIDE DINITRATE 5 MG: 10 TABLET ORAL at 09:15

## 2024-04-06 RX ADMIN — TOBRAMYCIN AND DEXAMETHASONE 1 DROP: 3; 1 SUSPENSION/ DROPS OPHTHALMIC at 09:21

## 2024-04-06 RX ADMIN — MAGNESIUM OXIDE 400 MG: 400 TABLET ORAL at 09:14

## 2024-04-06 RX ADMIN — TOBRAMYCIN AND DEXAMETHASONE 1 DROP: 3; 1 SUSPENSION/ DROPS OPHTHALMIC at 12:10

## 2024-04-06 RX ADMIN — HYDRALAZINE HYDROCHLORIDE 10 MG: 10 TABLET, FILM COATED ORAL at 22:26

## 2024-04-06 RX ADMIN — CEFDINIR 300 MG: 300 CAPSULE ORAL at 09:15

## 2024-04-06 RX ADMIN — FUROSEMIDE 20 MG: 10 INJECTION, SOLUTION INTRAMUSCULAR; INTRAVENOUS at 17:14

## 2024-04-06 RX ADMIN — POTASSIUM CHLORIDE 20 MEQ: 1500 TABLET, EXTENDED RELEASE ORAL at 09:15

## 2024-04-06 RX ADMIN — ENOXAPARIN SODIUM 30 MG: 100 INJECTION SUBCUTANEOUS at 17:13

## 2024-04-06 RX ADMIN — MAGNESIUM OXIDE 400 MG: 400 TABLET ORAL at 22:26

## 2024-04-06 RX ADMIN — MIDODRINE HYDROCHLORIDE 2.5 MG: 5 TABLET ORAL at 17:14

## 2024-04-06 RX ADMIN — Medication 10 ML: at 22:27

## 2024-04-06 RX ADMIN — HYDRALAZINE HYDROCHLORIDE 10 MG: 10 TABLET, FILM COATED ORAL at 09:14

## 2024-04-06 RX ADMIN — CLOPIDOGREL BISULFATE 75 MG: 75 TABLET ORAL at 09:14

## 2024-04-06 RX ADMIN — ASPIRIN 81 MG: 81 TABLET, CHEWABLE ORAL at 09:15

## 2024-04-07 LAB
ALBUMIN SERPL-MCNC: 3.1 G/DL (ref 3.5–5.2)
ALP SERPL-CCNC: 114 U/L (ref 35–104)
ALT SERPL-CCNC: 11 U/L (ref 0–32)
ANION GAP SERPL CALCULATED.3IONS-SCNC: 10 MMOL/L (ref 7–16)
AST SERPL-CCNC: 23 U/L (ref 0–31)
BASOPHILS # BLD: 0.02 K/UL (ref 0–0.2)
BASOPHILS NFR BLD: 0 % (ref 0–2)
BILIRUB SERPL-MCNC: 0.3 MG/DL (ref 0–1.2)
BUN SERPL-MCNC: 33 MG/DL (ref 6–23)
CALCIUM SERPL-MCNC: 8.5 MG/DL (ref 8.6–10.2)
CHLORIDE SERPL-SCNC: 99 MMOL/L (ref 98–107)
CO2 SERPL-SCNC: 22 MMOL/L (ref 22–29)
CREAT SERPL-MCNC: 1.4 MG/DL (ref 0.5–1)
EOSINOPHIL # BLD: 0.07 K/UL (ref 0.05–0.5)
EOSINOPHILS RELATIVE PERCENT: 1 % (ref 0–6)
ERYTHROCYTE [DISTWIDTH] IN BLOOD BY AUTOMATED COUNT: 19.5 % (ref 11.5–15)
GFR SERPL CREATININE-BSD FRML MDRD: 39 ML/MIN/1.73M2
GLUCOSE SERPL-MCNC: 109 MG/DL (ref 74–99)
HCT VFR BLD AUTO: 30.4 % (ref 34–48)
HGB BLD-MCNC: 9.1 G/DL (ref 11.5–15.5)
IMM GRANULOCYTES # BLD AUTO: 0.06 K/UL (ref 0–0.58)
IMM GRANULOCYTES NFR BLD: 1 % (ref 0–5)
LYMPHOCYTES NFR BLD: 1.19 K/UL (ref 1.5–4)
LYMPHOCYTES RELATIVE PERCENT: 18 % (ref 20–42)
MCH RBC QN AUTO: 29.3 PG (ref 26–35)
MCHC RBC AUTO-ENTMCNC: 29.9 G/DL (ref 32–34.5)
MCV RBC AUTO: 97.7 FL (ref 80–99.9)
MONOCYTES NFR BLD: 0.51 K/UL (ref 0.1–0.95)
MONOCYTES NFR BLD: 8 % (ref 2–12)
NEUTROPHILS NFR BLD: 71 % (ref 43–80)
NEUTS SEG NFR BLD: 4.62 K/UL (ref 1.8–7.3)
PLATELET # BLD AUTO: 244 K/UL (ref 130–450)
PMV BLD AUTO: 11 FL (ref 7–12)
POTASSIUM SERPL-SCNC: 5.1 MMOL/L (ref 3.5–5)
PROT SERPL-MCNC: 6.7 G/DL (ref 6.4–8.3)
RBC # BLD AUTO: 3.11 M/UL (ref 3.5–5.5)
SODIUM SERPL-SCNC: 131 MMOL/L (ref 132–146)
WBC OTHER # BLD: 6.5 K/UL (ref 4.5–11.5)

## 2024-04-07 PROCEDURE — 6370000000 HC RX 637 (ALT 250 FOR IP): Performed by: INTERNAL MEDICINE

## 2024-04-07 PROCEDURE — 1200000000 HC SEMI PRIVATE

## 2024-04-07 PROCEDURE — 99233 SBSQ HOSP IP/OBS HIGH 50: CPT | Performed by: INTERNAL MEDICINE

## 2024-04-07 PROCEDURE — 36415 COLL VENOUS BLD VENIPUNCTURE: CPT

## 2024-04-07 PROCEDURE — 6360000002 HC RX W HCPCS: Performed by: INTERNAL MEDICINE

## 2024-04-07 PROCEDURE — 85025 COMPLETE CBC W/AUTO DIFF WBC: CPT

## 2024-04-07 PROCEDURE — 80053 COMPREHEN METABOLIC PANEL: CPT

## 2024-04-07 PROCEDURE — 2700000000 HC OXYGEN THERAPY PER DAY

## 2024-04-07 PROCEDURE — 2580000003 HC RX 258: Performed by: INTERNAL MEDICINE

## 2024-04-07 PROCEDURE — 6370000000 HC RX 637 (ALT 250 FOR IP): Performed by: PHYSICIAN ASSISTANT

## 2024-04-07 RX ADMIN — MIDODRINE HYDROCHLORIDE 2.5 MG: 5 TABLET ORAL at 17:17

## 2024-04-07 RX ADMIN — TOBRAMYCIN AND DEXAMETHASONE 1 DROP: 3; 1 SUSPENSION/ DROPS OPHTHALMIC at 17:17

## 2024-04-07 RX ADMIN — TOBRAMYCIN AND DEXAMETHASONE 1 DROP: 3; 1 SUSPENSION/ DROPS OPHTHALMIC at 20:37

## 2024-04-07 RX ADMIN — POTASSIUM CHLORIDE 20 MEQ: 1500 TABLET, EXTENDED RELEASE ORAL at 20:36

## 2024-04-07 RX ADMIN — MAGNESIUM OXIDE 400 MG: 400 TABLET ORAL at 20:36

## 2024-04-07 RX ADMIN — CEFDINIR 300 MG: 300 CAPSULE ORAL at 08:28

## 2024-04-07 RX ADMIN — Medication 10 ML: at 20:39

## 2024-04-07 RX ADMIN — MAGNESIUM OXIDE 400 MG: 400 TABLET ORAL at 08:28

## 2024-04-07 RX ADMIN — FUROSEMIDE 20 MG: 10 INJECTION, SOLUTION INTRAMUSCULAR; INTRAVENOUS at 17:16

## 2024-04-07 RX ADMIN — MIDODRINE HYDROCHLORIDE 2.5 MG: 5 TABLET ORAL at 08:28

## 2024-04-07 RX ADMIN — ISOSORBIDE DINITRATE 5 MG: 10 TABLET ORAL at 08:28

## 2024-04-07 RX ADMIN — TOBRAMYCIN AND DEXAMETHASONE 1 DROP: 3; 1 SUSPENSION/ DROPS OPHTHALMIC at 08:28

## 2024-04-07 RX ADMIN — METOPROLOL SUCCINATE 25 MG: 25 TABLET, EXTENDED RELEASE ORAL at 08:28

## 2024-04-07 RX ADMIN — HYDRALAZINE HYDROCHLORIDE 10 MG: 10 TABLET, FILM COATED ORAL at 20:36

## 2024-04-07 RX ADMIN — LEVOTHYROXINE SODIUM 75 MCG: 75 TABLET ORAL at 06:17

## 2024-04-07 RX ADMIN — ATORVASTATIN CALCIUM 40 MG: 40 TABLET, FILM COATED ORAL at 20:36

## 2024-04-07 RX ADMIN — ISOSORBIDE DINITRATE 5 MG: 10 TABLET ORAL at 20:36

## 2024-04-07 RX ADMIN — FUROSEMIDE 20 MG: 10 INJECTION, SOLUTION INTRAMUSCULAR; INTRAVENOUS at 08:29

## 2024-04-07 RX ADMIN — ENOXAPARIN SODIUM 30 MG: 100 INJECTION SUBCUTANEOUS at 17:16

## 2024-04-07 RX ADMIN — TOBRAMYCIN AND DEXAMETHASONE 1 DROP: 3; 1 SUSPENSION/ DROPS OPHTHALMIC at 13:47

## 2024-04-07 RX ADMIN — NEOMYCIN AND POLYMYXIN B SULFATES AND DEXAMETHASONE 1 EACH: 3.5; 10000; 1 OINTMENT OPHTHALMIC at 20:37

## 2024-04-07 RX ADMIN — CLOPIDOGREL BISULFATE 75 MG: 75 TABLET ORAL at 08:28

## 2024-04-07 RX ADMIN — HYDRALAZINE HYDROCHLORIDE 10 MG: 10 TABLET, FILM COATED ORAL at 08:28

## 2024-04-07 RX ADMIN — ASPIRIN 81 MG: 81 TABLET, CHEWABLE ORAL at 08:28

## 2024-04-07 RX ADMIN — Medication 10 ML: at 08:29

## 2024-04-07 RX ADMIN — POTASSIUM CHLORIDE 20 MEQ: 1500 TABLET, EXTENDED RELEASE ORAL at 08:28

## 2024-04-07 RX ADMIN — MIDODRINE HYDROCHLORIDE 2.5 MG: 5 TABLET ORAL at 13:47

## 2024-04-07 ASSESSMENT — PAIN SCALES - WONG BAKER
WONGBAKER_NUMERICALRESPONSE: NO HURT
WONGBAKER_NUMERICALRESPONSE: NO HURT

## 2024-04-08 VITALS
WEIGHT: 132.72 LBS | BODY MASS INDEX: 24.42 KG/M2 | HEART RATE: 67 BPM | DIASTOLIC BLOOD PRESSURE: 71 MMHG | SYSTOLIC BLOOD PRESSURE: 125 MMHG | TEMPERATURE: 97.8 F | RESPIRATION RATE: 20 BRPM | HEIGHT: 62 IN | OXYGEN SATURATION: 99 %

## 2024-04-08 LAB
ALBUMIN SERPL-MCNC: 3 G/DL (ref 3.5–5.2)
ALP SERPL-CCNC: 97 U/L (ref 35–104)
ALT SERPL-CCNC: 12 U/L (ref 0–32)
ANION GAP SERPL CALCULATED.3IONS-SCNC: 11 MMOL/L (ref 7–16)
AST SERPL-CCNC: 18 U/L (ref 0–31)
BASOPHILS # BLD: 0.03 K/UL (ref 0–0.2)
BASOPHILS NFR BLD: 1 % (ref 0–2)
BILIRUB SERPL-MCNC: 0.4 MG/DL (ref 0–1.2)
BUN SERPL-MCNC: 29 MG/DL (ref 6–23)
CALCIUM SERPL-MCNC: 8.5 MG/DL (ref 8.6–10.2)
CHLORIDE SERPL-SCNC: 104 MMOL/L (ref 98–107)
CO2 SERPL-SCNC: 21 MMOL/L (ref 22–29)
CREAT SERPL-MCNC: 1.3 MG/DL (ref 0.5–1)
EOSINOPHIL # BLD: 0.11 K/UL (ref 0.05–0.5)
EOSINOPHILS RELATIVE PERCENT: 2 % (ref 0–6)
ERYTHROCYTE [DISTWIDTH] IN BLOOD BY AUTOMATED COUNT: 19 % (ref 11.5–15)
GFR SERPL CREATININE-BSD FRML MDRD: 43 ML/MIN/1.73M2
GLUCOSE SERPL-MCNC: 93 MG/DL (ref 74–99)
HCT VFR BLD AUTO: 29.6 % (ref 34–48)
HGB BLD-MCNC: 9.2 G/DL (ref 11.5–15.5)
IMM GRANULOCYTES # BLD AUTO: 0.06 K/UL (ref 0–0.58)
IMM GRANULOCYTES NFR BLD: 1 % (ref 0–5)
LYMPHOCYTES NFR BLD: 1.47 K/UL (ref 1.5–4)
LYMPHOCYTES RELATIVE PERCENT: 23 % (ref 20–42)
MCH RBC QN AUTO: 30.2 PG (ref 26–35)
MCHC RBC AUTO-ENTMCNC: 31.1 G/DL (ref 32–34.5)
MCV RBC AUTO: 97 FL (ref 80–99.9)
MONOCYTES NFR BLD: 0.56 K/UL (ref 0.1–0.95)
MONOCYTES NFR BLD: 9 % (ref 2–12)
NEUTROPHILS NFR BLD: 65 % (ref 43–80)
NEUTS SEG NFR BLD: 4.21 K/UL (ref 1.8–7.3)
PLATELET # BLD AUTO: 254 K/UL (ref 130–450)
PMV BLD AUTO: 11.4 FL (ref 7–12)
POTASSIUM SERPL-SCNC: 5 MMOL/L (ref 3.5–5)
PROT SERPL-MCNC: 6.7 G/DL (ref 6.4–8.3)
RBC # BLD AUTO: 3.05 M/UL (ref 3.5–5.5)
SODIUM SERPL-SCNC: 136 MMOL/L (ref 132–146)
WBC OTHER # BLD: 6.4 K/UL (ref 4.5–11.5)

## 2024-04-08 PROCEDURE — 6370000000 HC RX 637 (ALT 250 FOR IP): Performed by: INTERNAL MEDICINE

## 2024-04-08 PROCEDURE — 6360000002 HC RX W HCPCS: Performed by: INTERNAL MEDICINE

## 2024-04-08 PROCEDURE — 2580000003 HC RX 258: Performed by: INTERNAL MEDICINE

## 2024-04-08 PROCEDURE — 85025 COMPLETE CBC W/AUTO DIFF WBC: CPT

## 2024-04-08 PROCEDURE — 6370000000 HC RX 637 (ALT 250 FOR IP): Performed by: PHYSICIAN ASSISTANT

## 2024-04-08 PROCEDURE — 80053 COMPREHEN METABOLIC PANEL: CPT

## 2024-04-08 PROCEDURE — 2700000000 HC OXYGEN THERAPY PER DAY

## 2024-04-08 PROCEDURE — 36415 COLL VENOUS BLD VENIPUNCTURE: CPT

## 2024-04-08 RX ORDER — CEFDINIR 300 MG/1
300 CAPSULE ORAL 2 TIMES DAILY
Qty: 8 CAPSULE | Refills: 0 | Status: ON HOLD | DISCHARGE
Start: 2024-04-08 | End: 2024-04-12

## 2024-04-08 RX ADMIN — MIDODRINE HYDROCHLORIDE 2.5 MG: 5 TABLET ORAL at 11:42

## 2024-04-08 RX ADMIN — ISOSORBIDE DINITRATE 5 MG: 10 TABLET ORAL at 08:25

## 2024-04-08 RX ADMIN — MAGNESIUM OXIDE 400 MG: 400 TABLET ORAL at 08:26

## 2024-04-08 RX ADMIN — FUROSEMIDE 20 MG: 10 INJECTION, SOLUTION INTRAMUSCULAR; INTRAVENOUS at 07:41

## 2024-04-08 RX ADMIN — CEFDINIR 300 MG: 300 CAPSULE ORAL at 08:26

## 2024-04-08 RX ADMIN — CLOPIDOGREL BISULFATE 75 MG: 75 TABLET ORAL at 08:26

## 2024-04-08 RX ADMIN — LEVOTHYROXINE SODIUM 75 MCG: 75 TABLET ORAL at 05:35

## 2024-04-08 RX ADMIN — ASPIRIN 81 MG: 81 TABLET, CHEWABLE ORAL at 08:26

## 2024-04-08 RX ADMIN — HYDRALAZINE HYDROCHLORIDE 10 MG: 10 TABLET, FILM COATED ORAL at 08:26

## 2024-04-08 RX ADMIN — METOPROLOL SUCCINATE 25 MG: 25 TABLET, EXTENDED RELEASE ORAL at 08:26

## 2024-04-08 RX ADMIN — Medication 10 ML: at 07:41

## 2024-04-08 RX ADMIN — TOBRAMYCIN AND DEXAMETHASONE 1 DROP: 3; 1 SUSPENSION/ DROPS OPHTHALMIC at 10:56

## 2024-04-08 RX ADMIN — TOBRAMYCIN AND DEXAMETHASONE 1 DROP: 3; 1 SUSPENSION/ DROPS OPHTHALMIC at 12:43

## 2024-04-08 RX ADMIN — MIDODRINE HYDROCHLORIDE 2.5 MG: 5 TABLET ORAL at 08:25

## 2024-04-08 ASSESSMENT — PAIN SCALES - WONG BAKER: WONGBAKER_NUMERICALRESPONSE: NO HURT

## 2024-04-08 NOTE — DISCHARGE SUMMARY
hours as needed for Dry Eyes    Vesna Mansfield MD   metoprolol succinate (TOPROL XL) 25 MG extended release tablet Take 1 tablet by mouth daily 2/23/24   Dre Hubbard DO   atorvastatin (LIPITOR) 40 MG tablet Take 1 tablet by mouth nightly 2/19/24   Aysha Christy PA   clopidogrel (PLAVIX) 75 MG tablet Take 1 tablet by mouth daily 2/19/24 4/4/24  Aysha Christy PA   Levothyroxine Sodium 100 MCG CAPS Take 1 tablet by mouth daily 8/17/23   Vesna Mansfield MD   aspirin 81 MG chewable tablet Take 1 tablet by mouth daily    Vesna Mansfield MD       ALLERGIES:  Penicillins and Doxycycline    SOCIAL Hx:  Social History     Socioeconomic History    Marital status:      Spouse name: Not on file    Number of children: Not on file    Years of education: Not on file    Highest education level: Not on file   Occupational History    Not on file   Tobacco Use    Smoking status: Never    Smokeless tobacco: Never   Vaping Use    Vaping Use: Never used   Substance and Sexual Activity    Alcohol use: Not Currently    Drug use: Never    Sexual activity: Not Currently   Other Topics Concern    Not on file   Social History Narrative    Not on file     Social Determinants of Health     Financial Resource Strain: Not on file   Food Insecurity: No Food Insecurity (4/1/2024)    Hunger Vital Sign     Worried About Running Out of Food in the Last Year: Never true     Ran Out of Food in the Last Year: Never true   Transportation Needs: No Transportation Needs (4/1/2024)    PRAPARE - Transportation     Lack of Transportation (Medical): No     Lack of Transportation (Non-Medical): No   Physical Activity: Not on file   Stress: Not on file   Social Connections: Not on file   Intimate Partner Violence: Not on file   Housing Stability: Low Risk  (4/1/2024)    Housing Stability Vital Sign     Unable to Pay for Housing in the Last Year: No     Number of Places Lived in the Last Year: 1     Unstable Housing in the 
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  Patient lives at home alone.  However she is unable to care for self and is a fall risk.  She is requiring increased assistance and has become weak after multiple hospitalizations.  Plan is for: Rehab.  PT OT will be consulted.  Additionally she does have signs of congestive heart failure exacerbation.  Will give her IV diuretics.  Will monitor intake and output..    Home medication reviewed  1500 cc fluid restriction  Lasix 20 mg IV push twice daily  SCDs lower extremities  Consult / for discharge planning to temporary extended-care facility  Consult cardiology  ?  Add ProAmatine 5 mg 3 times daily for blood pressure if okay with cardiology    Blood pressure supine and standing  Potassium chloride 40 mill equivalents p.o. x 1 now on 4/3 AM  Potassium chloride 20 mill equivalents twice daily  Decrease Lasix 20 mg IV push daily  Decrease levothyroxine 75 mcg p.o. daily but hold x 2 days    Repeat Echo 4/4  Free T4 4/6    Discharge to extended-care facility when okay with cardiology    BMP, CBC in a.m.      Dre Hubbard,   9:05 AM  4/5/2024

## 2024-04-08 NOTE — PROGRESS NOTES
INPATIENT CARDIOLOGY CONSULT     Reason for Consult: CHF    Cardiologist: Dr. Salter    Requesting Physician: Dr. Hubbard    Date of Consultation: 4/3/2024    HISTORY OF PRESENT ILLNESS:   Patient is a 78 year old WF known to Dr. Shaw.     She has a known past medical history of CAD s/p CABG x 3 with MALIK ligation/Atriclip 2/7/2024, chronic HFrEF, ischemic cardiomyopathy, VHD, chronically elevated troponin, HLD, T2DM with peripheral neuropathy, hypothyroidism on HRT, RA, Sjogren's disease, CKD, chronic anemia, iron deficiency on oral supplementation, chronic migraines, osteopenia and hearing loss.     Interim:  Still report chest pain  I am ordering CRP and sed rate to see if there is sign of pericardial inflammation and if so to treat for Dressler syndrome        PAST MEDICAL HISTORY:    CAD   STEMI 2/5/2024 Inferior ST elevation with Q waves, Q waves in V3-V4 leads   [facility] 2/5/24 Dr. Shaw: MV CAD - full report below   2/7/2024 CABG x 3 ((LIMA-LAD, SVG-OM, SVG-RCA), EVH LLE, left atrial appendage ligation with 35mm Atriclip) Dr. Louisa PARSON pleural effusion s/p IR evaluation for drainage - not enough fluid for drainage 2/13/24  VERONIQUE post op - peak Cr 1.4   Required several days of IV pressors   Chronic HFrEF  Ischemic cardiomyopathy  Intraoperative YOVANY 2/7/2024 LVEF 35% >> Post op (LVEF 57%)  TTE 2/8/2024 LVEF 20-25% see full report below   TTE 2/15/2024 LVEF 30-35% TTE see full report below  LifeVest placed on discharge 2/19/2024   VHD   Chronically elevated troponin  HLD, on statin   T2DM with peripheral neuropathy   Hypothyroidism, on HRT  RA  Sjogren's disease  CKD.  Baseline Cr 1.0-1.2  Chronic anemia  Iron deficiency, on oral supplementation   Chronic migraines  Osteopenia   Hearing loss         CARDIAC TESTING     [facility] 2/5/24 Dr. Shaw STEMI  Severe multivessel atherosclerotic coronary artery disease, in a right dominant system  LMCA has 40% proximal lesion  LAD has 70% ostial lesion and 100% distal 
     INPATIENT CARDIOLOGY CONSULT     Reason for Consult: CHF    Cardiologist: Dr. Salter    Requesting Physician: Dr. Hubbard    Date of Consultation: 4/4/2024    HISTORY OF PRESENT ILLNESS:   Patient is a 78 year old WF known to Dr. Shaw.     She has a known past medical history of CAD s/p CABG x 3 with MALIK ligation/Atriclip 2/7/2024, chronic HFrEF, ischemic cardiomyopathy, VHD, chronically elevated troponin, HLD, T2DM with peripheral neuropathy, hypothyroidism on HRT, RA, Sjogren's disease, CKD, chronic anemia, iron deficiency on oral supplementation, chronic migraines, osteopenia and hearing loss.     Interim:  CRP and sed rate are elevated  If patient develop recurrent chest pain initiate high-dose aspirin for possible Dressler syndrome        PAST MEDICAL HISTORY:    CAD   STEMI 2/5/2024 Inferior ST elevation with Q waves, Q waves in V3-V4 leads   [facility] 2/5/24 Dr. Shaw: MV CAD - full report below   2/7/2024 CABG x 3 ((LIMA-LAD, SVG-OM, SVG-RCA), EVH LLE, left atrial appendage ligation with 35mm Atriclip) Dr. Louisa PARSON pleural effusion s/p IR evaluation for drainage - not enough fluid for drainage 2/13/24  VERONIQUE post op - peak Cr 1.4   Required several days of IV pressors   Chronic HFrEF  Ischemic cardiomyopathy  Intraoperative YOVANY 2/7/2024 LVEF 35% >> Post op (LVEF 57%)  TTE 2/8/2024 LVEF 20-25% see full report below   TTE 2/15/2024 LVEF 30-35% TTE see full report below  LifeVest placed on discharge 2/19/2024   VHD   Chronically elevated troponin  HLD, on statin   T2DM with peripheral neuropathy   Hypothyroidism, on HRT  RA  Sjogren's disease  CKD.  Baseline Cr 1.0-1.2  Chronic anemia  Iron deficiency, on oral supplementation   Chronic migraines  Osteopenia   Hearing loss         CARDIAC TESTING     [facility] 2/5/24 Dr. Shaw STEMI  Severe multivessel atherosclerotic coronary artery disease, in a right dominant system  LMCA has 40% proximal lesion  LAD has 70% ostial lesion and 100% distal occlusion with L-L 
     INPATIENT CARDIOLOGY CONSULT     Reason for Consult: CHF    Cardiologist: Dr. Salter    Requesting Physician: Dr. Hubbard    Date of Consultation: 4/6/2024    HISTORY OF PRESENT ILLNESS:   Patient is a 78 year old WF known to Dr. Shaw.     She has a known past medical history of CAD s/p CABG x 3 with MALIK ligation/Atriclip 2/7/2024, chronic HFrEF, ischemic cardiomyopathy, VHD, chronically elevated troponin, HLD, T2DM with peripheral neuropathy, hypothyroidism on HRT, RA, Sjogren's disease, CKD, chronic anemia, iron deficiency on oral supplementation, chronic migraines, osteopenia and hearing loss.     Interim:  Echo shows EF is unchanged and now there is moderate mitral regurgitation and severe tricuspid regurgitation  Patient will need to be referred to EP to discuss resynchronization therapy if EF does not improve as well as been referred to heart valve clinic.  Patient can discuss this with her cardiologist in the outpatient once discharged  He is doing well and wished to be discharged today and okay with cardiology to discharge patient and have patient follow-up with her cardiologist in the outpatient      PAST MEDICAL HISTORY:    CAD   STEMI 2/5/2024 Inferior ST elevation with Q waves, Q waves in V3-V4 leads   [facility] 2/5/24 Dr. Shaw: MV CAD - full report below   2/7/2024 CABG x 3 ((LIMA-LAD, SVG-OM, SVG-RCA), EVH LLE, left atrial appendage ligation with 35mm Atriclip) Dr. Louisa PARSON pleural effusion s/p IR evaluation for drainage - not enough fluid for drainage 2/13/24  VERONIQUE post op - peak Cr 1.4   Required several days of IV pressors   Chronic HFrEF  Ischemic cardiomyopathy  Intraoperative YOVANY 2/7/2024 LVEF 35% >> Post op (LVEF 57%)  TTE 2/8/2024 LVEF 20-25% see full report below   TTE 2/15/2024 LVEF 30-35% TTE see full report below  LifeVest placed on discharge 2/19/2024   VHD   Chronically elevated troponin  HLD, on statin   T2DM with peripheral neuropathy   Hypothyroidism, on HRT  RA  Sjogren's 
     INPATIENT CARDIOLOGY CONSULT     Reason for Consult: CHF    Cardiologist: Dr. Salter    Requesting Physician: Dr. Hubbard    Date of Consultation: 4/7/2024    HISTORY OF PRESENT ILLNESS:   Patient is a 78 year old WF known to Dr. Shaw.     She has a known past medical history of CAD s/p CABG x 3 with MALIK ligation/Atriclip 2/7/2024, chronic HFrEF, ischemic cardiomyopathy, VHD, chronically elevated troponin, HLD, T2DM with peripheral neuropathy, hypothyroidism on HRT, RA, Sjogren's disease, CKD, chronic anemia, iron deficiency on oral supplementation, chronic migraines, osteopenia and hearing loss.     Interim:  Echo shows EF is unchanged and now there is moderate mitral regurgitation and severe tricuspid regurgitation  Patient will need to be referred to EP to discuss resynchronization therapy if EF does not improve as well as been referred to heart valve clinic both in the outpatient.    Patient can discuss this with her cardiologist in the outpatient once discharged  She is currently insisting to be discharged but unable to care for self at home  Consider case management, PT OT and social work for placement  Change from IV diuresis to p.o. diuresis  Cardiology will sign off.  Please call with questions.      PAST MEDICAL HISTORY:    CAD   STEMI 2/5/2024 Inferior ST elevation with Q waves, Q waves in V3-V4 leads   [facility] 2/5/24 Dr. Shaw: MV CAD - full report below   2/7/2024 CABG x 3 ((LIMA-LAD, SVG-OM, SVG-RCA), EVH LLE, left atrial appendage ligation with 35mm Atriclip) Dr. Louisa PARSON pleural effusion s/p IR evaluation for drainage - not enough fluid for drainage 2/13/24  VERONIQUE post op - peak Cr 1.4   Required several days of IV pressors   Chronic HFrEF  Ischemic cardiomyopathy  Intraoperative YOVANY 2/7/2024 LVEF 35% >> Post op (LVEF 57%)  TTE 2/8/2024 LVEF 20-25% see full report below   TTE 2/15/2024 LVEF 30-35% TTE see full report below  LifeVest placed on discharge 2/19/2024   VHD   Chronically elevated 
  Physician Progress Note      PATIENT:               JESSICA SANCHEZ  CSN #:                  [id number]  :                       1945  ADMIT DATE:       2024 1:09 PM  DISCH DATE:  RESPONDING  PROVIDER #:        Dre Hubbard DO        QUERY TEXT:    Stage of Chronic Kidney Disease: Please provide further specificity, if known.    Clinical indicators include: ckd, probnp, creatinine, cr, bun, pro-bnp,   bun/creatinine, chronic kidney disease  Options provided:  -- Chronic kidney disease stage 1  -- Chronic kidney disease stage 2  -- Chronic kidney disease stage 3  -- Chronic kidney disease stage 3a  -- Chronic kidney disease stage 3b  -- Chronic kidney disease stage 4  -- Chronic kidney disease stage 5  -- Chronic kidney disease stage 5, requiring dialysis  -- End stage renal disease  -- Other - I will add my own diagnosis  -- Disagree - Not applicable / Not valid  -- Disagree - Clinically Unable to determine / Unknown        PROVIDER RESPONSE TEXT:    The patient has chronic kidney disease stage 3b.      Electronically signed by:  Dre Hubbard DO 2024 2:32 PM          
4 Eyes Skin Assessment     NAME:  Katt Diaz  YOB: 1945  MEDICAL RECORD NUMBER:  [id number]    The patient is being assessed for  Admission    I agree that at least one RN has performed a thorough Head to Toe Skin Assessment on the patient. ALL assessment sites listed below have been assessed.      Areas assessed by both nurses:    Head, Face, Ears, Shoulders, Back, Chest, Arms, Elbows, Hands, Sacrum. Buttock, Coccyx, Ischium, Legs. Feet and Heels, and Under Medical Devices         Does the Patient have a Wound? No noted wound(s)       Dani Prevention initiated by RN: No  Wound Care Orders initiated by RN: No    Pressure Injury (Stage 3,4, Unstageable, DTI, NWPT, and Complex wounds) if present, place Wound referral order by RN under : No    New Ostomies, if present place, Ostomy referral order under : No     Nurse 1 eSignature: Electronically signed by Tammi Mccarthy RN on 4/1/24 at 11:44 PM EDT    **SHARE this note so that the co-signing nurse can place an eSignature**    Nurse 2 eSignature: Electronically signed by Arlette Severino LPN on 4/2/24 at 4:56 AM EDT    
Department of Internal Medicine  PN    PCP: Luis Angel Rea DO  Admitting Physician: Dr. Hubbard  Consultants:   Date of Service: 4/1/2024    CHIEF COMPLAINT:  weakness/fatigue and sob    HISTORY OF PRESENT ILLNESS:    Patient is 78-year-old female presents to the ED due to weakness and fatigue and inability to care for self.  Patient was seen earlier today by her PCP who cysts noticed her increased lower extremity edema as well as weakness.  Stated that she could not live at home alone and probably needed placement.  As such she presented to the ED for further evaluation and management.  Patient states that prior to becoming sick she had not been using any ambulatory assistance.  However she is now.  She was recently discharged from the hospital about 2 weeks ago.  States at home she has not been urinating much.  She has been taking her medications as prescribed however has not noticed significant ability to urinate.  She has noticed increased lower extremity edema.  She does admit to cough with deep breaths.  She has shortness of breath mainly with exertion.    4/2/2024  Patient seen examined on telemetry floor.  Patient complains of a nonproductive cough.  Patient has shortness of breath.  BUN/creatinine 35/1.7 with normal transaminase.  Total bili 1.6.  WBC is 10.8 with hemoglobin 8.7.  Temperature 97.7 with heart rate 60 and blood pressure 90/54.  O2 to sat 91% on room air at rest.  Urine output ranges 750-850 cc this shift.  Patient lower leg edema is already improved.  Case discussed with cardiology.  We will start SCDs and continue IV Lasix.   consulted for discharge planning to [facility] extended-care facility.      PAST MEDICAL Hx:  Past Medical History:   Diagnosis Date    VERONIQUE (acute kidney injury) (HCC) 02/13/2024    CAD in native artery 02/05/2024    Headache     Hearing loss     Hx of rheumatoid arthritis     Migraine     Osteopenia     S/P CABG x 3     Sjogren's disease (HCC)        PAST 
Department of Internal Medicine  PN    PCP: Luis Angel Rea DO  Admitting Physician: Dr. Hubbard  Consultants:   Date of Service: 4/1/2024    CHIEF COMPLAINT:  weakness/fatigue and sob    HISTORY OF PRESENT ILLNESS:    Patient is 78-year-old female presents to the ED due to weakness and fatigue and inability to care for self.  Patient was seen earlier today by her PCP who cysts noticed her increased lower extremity edema as well as weakness.  Stated that she could not live at home alone and probably needed placement.  As such she presented to the ED for further evaluation and management.  Patient states that prior to becoming sick she had not been using any ambulatory assistance.  However she is now.  She was recently discharged from the hospital about 2 weeks ago.  States at home she has not been urinating much.  She has been taking her medications as prescribed however has not noticed significant ability to urinate.  She has noticed increased lower extremity edema.  She does admit to cough with deep breaths.  She has shortness of breath mainly with exertion.    4/2/2024  Patient seen examined on telemetry floor.  Patient complains of a nonproductive cough.  Patient has shortness of breath.  BUN/creatinine 35/1.7 with normal transaminase.  Total bili 1.6.  WBC is 10.8 with hemoglobin 8.7.  Temperature 97.7 with heart rate 60 and blood pressure 90/54.  O2 to sat 91% on room air at rest.  Urine output ranges 750-850 cc this shift.  Patient lower leg edema is already improved.  Case discussed with cardiology.  We will start SCDs and continue IV Lasix.   consulted for discharge planning to [facility] extended-care facility.    4/3/2024  Patient seen examined on telemetry floor.  Patient still complains of shortness of breath and weakness and leg swelling.  Patient denies any chest pain.  BUN/creatinine 35/1.5 with serum potassium 3.0.  Transaminase normal with WBC six 9.2 with hemoglobin 8.5.  TSH is 0.75 
Life vest battery changed at 2048.  
Life vest battery changed at 3:30AM  
Life vest battery changed at midnight.  at bedside  
OCCUPATIONAL THERAPY INITIAL EVALUATION    [facility]  [address] [place]        Date:4/3/2024                                                  Patient Name: Katt Diaz    MRN: [id number]    : 1945    Room: [address] [place] [address]      Evaluating OT: Howie Hurley OTR/L; #[id number]     Referring Provider and Specific Provider Orders/Date:      24  OT eval and treat  Start:  24,   End:  24,   ONE TIME,   Standing Count:  1 Occurrences,   R         Betty, Ismail U, DO      Placement Recommendation: Subacute Rehab       Diagnosis:   1. Acute on chronic congestive heart failure, unspecified heart failure type ([facility])    2. Failure to thrive in adult         Surgery: None      Pertinent Medical History:       Past Medical History:   Diagnosis Date    VERONIQUE (acute kidney injury) ([facility]) 2024    CAD in native artery 2024    Headache     Hearing loss     Hx of rheumatoid arthritis     Migraine     Osteopenia     S/P CABG x 3     Sjogren's disease ([facility])          Past Surgical History:   Procedure Laterality Date    CARDIAC PROCEDURE N/A 2024    Left heart cath / coronary angiography performed by Arnav Shaw MD at [facility]ARDIAC CATH LAB    CORONARY ARTERY BYPASS GRAFT N/A 2024    CORONARY ARTERY BYPASS GRAFTING, EVH performed by Nicolasa Jasso DO at [facility] OR    IR TUNNELED CATHETER PLACEMENT GREATER THAN 5 YEARS  2024    IR TUNNELED CATHETER PLACEMENT GREATER THAN 5 YEARS 2024 Cuong, REYNA Holguin MD [facility] SPECIAL PROCEDURES    PARTIAL HYSTERECTOMY (CERVIX NOT REMOVED)      states     VEIN LIGATION AND STRIPPING      states         Precautions:  Up with assistance, Life Vest , and Continuous Pulse Oximetry , falls, alarm, O2, and rule out COVID-19 ,  life vest,sternal precautions ( coronary bypass graft on 24)      Assessment of current deficits:     [x] Functional mobility  [x]ADLs  [x] Strength 
This nurse called and gave nurse to nurse to Sindhu at [facility].   
Urine sent to lab  
requires skilled monitoring of patient's response and vital signs during treatment session.       PHYSICAL THERAPY  PLAN OF CARE       Physical therapy plan of care is established based on physician order,  patient diagnosis and clinical assessment    Current Treatment Recommendations:    -Bed Mobility: Lower and upper extremity exercises, and trunk control activities  -Sitting Balance: Incorporate reaching activities to activate trunk muscles   -Standing Balance: Perform strengthening exercises in standing to promote motor control with or without upper extremity support   -Transfers: Provide instruction on proper hand and foot position for adequate transfer of weight onto lower extremities and use of gait device if needed and Cues for hand placement, technique and safety. Provide stabilization to prevent fall   -Gait: Gait training and Standing activities to improve: base of support, weight shift, weight bearing    -Endurance: Utilize Supervised activities to increase level of endurance to allow for safe functional mobility including transfers and gait   -Stairs: Stair training with instruction on proper technique and hand placement on rail  -Positioning for skin integrity and comfort    PT long term treatment goals are located in below grid    Patient and or family understand(s) diagnosis, prognosis, and plan of care.    Frequency of treatments: Patient will be seen  daily.         Prior Level of Function: Patient ambulated independently, with wheeled walker, 180 feet when chf in control     Rehab Potential: good   for baseline    Past medical history:   Past Medical History:   Diagnosis Date    VERONIQUE (acute kidney injury) (HCC) 02/13/2024    CAD in native artery 02/05/2024    Headache     Hearing loss     Hx of rheumatoid arthritis     Migraine     Osteopenia     S/P CABG x 3     Sjogren's disease (HCC)      Past Surgical History:   Procedure Laterality Date    CARDIAC PROCEDURE N/A 2/5/2024    Left heart cath / 
with free T4 is elevated at 2.5 during last hospitalization was 1.7.  Temperature is 97.7 with heart rate 72 and blood pressure 102/53.  O2 sat 96% on room air at rest.  Urinary outputs are inaccurate.  Will decrease the levothyroxine to 75 mcg but hold for 2 days with the patient's free T4 being elevated.  Will increase potassium chloride supplement and continue 20 mg IV push twice daily Lasix.    4/4/2024  Patient seen examined on telemetry floor.  Patient still with CP and SOB. Patient still with complaints of intermittent chest pain.  Serum potassium 3.0 with BUN/creatinine 35/1.5.  Normal transaminase.  WBC 9.2 and hemoglobin 8.5.  CRP is still elevated at 138.  Temperature is 97.9 with heart rate of 62 and blood pressure 124/75.  Urinary outputs inaccurate.  Echocardiogram was repeated today. The LL PTE has resolved but still has some feet edema.    4/5/2024  Patient seen examined on telemetry floor.  Patient feels little bit better but still complains of shortness of breath with activity.  The patient still complains that her feet are swollen.  Patient has significant dependent edema when she is sitting in chair her legs will swell when she is laying in bed the leg edema does essentially resolve except for the feet edema.  BUN/creatinine 32/1.3 with normal electrolytes with a WBC of 6.6 and hemoglobin 9.0.  Temperature 97.9 with heart rate of 62 and blood pressure 125/65.  O2 sat 97% on 2 L nasal cannula.  Urinary output ranged 200-275 cc this shift.  Patient with 1 bowel movement this shift.  Troponin was 100 yesterday which was improved from April 1 when it was 165.  Cardiology note from yesterday reviewed and still pending echocardiogram this morning.  Discharge to [facility] when okay with cardiology.    4/6/2024  Patient seen examined on telemetry floor.  Patient was thought to be discharged yesterday afternoon by cardiology did not see the patient till late afternoon and progress note 
Date    CARDIAC PROCEDURE N/A 2/5/2024    Left heart cath / coronary angiography performed by Arnav Shaw MD at [facility] CARDIAC CATH LAB    CORONARY ARTERY BYPASS GRAFT N/A 2/7/2024    CORONARY ARTERY BYPASS GRAFTING, EVH performed by Nicolasa Jasso DO at [facility] OR    IR TUNNELED CATHETER PLACEMENT GREATER THAN 5 YEARS  2/13/2024    IR TUNNELED CATHETER PLACEMENT GREATER THAN 5 YEARS 2/13/2024 Cuong, REYNA Holguin MD [facility] SPECIAL PROCEDURES    PARTIAL HYSTERECTOMY (CERVIX NOT REMOVED)      states 1976    VEIN LIGATION AND STRIPPING      states 1972       SUBJECTIVE:    Precautions: Up with assistance, Use lift equipment for lifting patient , and Continuous Pulse Oximetry , falls, alarm, O2, and rule out COVID-19 ,  life vest,sternal precautions ( coronary bypass graft on 2/7/24)    Social history: Patient lives alone in a Independent Living  with no steps  to enter home.  Walk in shower        Equipment owned: Rollator, Shower chair, and Transport chair,       AM-PAC Basic Mobility        AM-PAC Basic Mobility - Inpatient   How much help is needed turning from your back to your side while in a flat bed without using bedrails?: A Little  How much help is needed moving from lying on your back to sitting on the side of a flat bed without using bedrails?: A Little  How much help is needed moving to and from a bed to a chair?: A Little  How much help is needed standing up from a chair using your arms?: A Little  How much help is needed walking in hospital room?: A Little  How much help is needed climbing 3-5 steps with a railing?: A Lot  AM-PAC Inpatient Mobility Raw Score : 17  AM-PAC Inpatient T-Scale Score : 42.13  Mobility Inpatient CMS 0-100% Score: 50.57  Mobility Inpatient CMS G-Code Modifier : CK    Nursing cleared patient for PT treatment.      OBJECTIVE:   Initial Evaluation  Date: 4/2/2024 Treatment Date:  4/4/2024       Short Term/ Long Term   Goals   Was pt agreeable to Eval/treatment? Yes yes To be met in 3 
  Problem Relation Age of Onset    No Known Problems Mother     No Known Problems Father        HOME MEDICATIONS:  Prior to Admission medications    Medication Sig Start Date End Date Taking? Authorizing Provider   magnesium oxide (MAG-OX) 400 (240 Mg) MG tablet Take 1 tablet by mouth 2 times daily for 14 days 4/5/24 4/19/24 Yes Dre Hubbard DO   midodrine (PROAMATINE) 2.5 MG tablet Take 1 tablet by mouth 3 times daily (with meals) 4/5/24  Yes Dre Hubbard DO   ferrous sulfate (IRON 325) 325 (65 Fe) MG tablet Take 1 tablet by mouth 2 times daily   Yes Vesna Mansfield MD   neomycin-polymyxin-dexameth 3.5-83620-1.1 OINT Place 1 each into both eyes nightly   Yes Vesna Mansfield MD   tobramycin-dexAMETHasone (TOBRADEX) 0.3-0.1 % ophthalmic suspension Place 1 drop into both eyes 4 times daily   Yes Vesna Mansfield MD   benzonatate (TESSALON) 100 MG capsule Take 1 capsule by mouth 3 times daily as needed for Cough 3/31/24 4/10/24  Rafiq Hamlin DO   torsemide (DEMADEX) 20 MG tablet Take 2 tablets by mouth daily 3/27/24   Kusum Fonseca APRN - CNP   potassium chloride (KLOR-CON M) 20 MEQ extended release tablet Take 1 tablet by mouth 2 times daily 3/27/24   Kusum Fonseca APRN - CNP   isosorbide dinitrate (ISORDIL) 5 MG tablet Take 1 tablet by mouth 2 times daily 3/22/24   Dre Hubbard DO   hydrALAZINE (APRESOLINE) 10 MG tablet Take 1 tablet by mouth in the morning and at bedtime 3/22/24   Dre Hubbard DO   acetaminophen (TYLENOL) 325 MG tablet Take 2 tablets by mouth every 4 hours as needed for Pain    Vesna Mansfield MD   carboxymethylcellulose 1 % ophthalmic solution Place 1 drop into both eyes every 4 hours as needed for Dry Eyes    Vesna Mansfield MD   metoprolol succinate (TOPROL XL) 25 MG extended release tablet Take 1 tablet by mouth daily 2/23/24   Dre Hubbard DO   atorvastatin (LIPITOR) 40 MG tablet Take 1 tablet by mouth nightly 2/19/24   [place] 
Date:  4/5/2024       Short Term/ Long Term   Goals   Was pt agreeable to Eval/treatment? Yes yes To be met in 3 days   Pain level   0/10    0/10    Bed Mobility  Using rails and head of bed elevated:     Rolling: Minimal assist of 1    Supine to sit: Moderate assist of 1    Sit to supine: Moderate assist of 1    Scooting: Moderate assist of 1   Using rails and head of bed elevated:     Rolling: Not assessed    Supine to sit: Not assessed    Sit to supine: Not assessed    Scooting: Not assessed     Rolling: Supervision     Supine to sit: Supervision     Sit to supine: Supervision     Scooting: Supervision      Transfers Sit to stand: Moderate assist of 1 x from bed and chair x 4 reps  Sit to stand: Minimal assist of 1 Cues for hand placement and safety     Sit to stand: Minimal assist of 1     Ambulation     2 steps using  hand held assist with Maximal assist of 1      3 feet from BSC to the bed; 2 x 60 feet using  wheeled walker with Minimal assist of 1   cues for walker approximation, safety, and pacing     50 feet using  wheeled walker with Minimal assist of 1    ROM Within functional limits    Increase range of motion 10% of affected joints    Strength BUE:   2/5  RLE:  2+/5  LLE:  2+/5  Increase strength in affected mm groups by 1/3 grade   Balance Sitting EOB:  fair    Dynamic Standing:  poor   Sitting EOB: good -  Dynamic Standing: fair + with wheeled walker   Sitting EOB:  good    Dynamic Standing: fair with wheeled walker      Patient is Alert & Oriented x person, place, time, and situation and follows directions    Sensation:  Patient  denies numbness/tingling   Edema:  yes bilateral lower extremities   Endurance: poor      Vitals:  2 liters nasal cannula   Blood Pressure at rest  Blood Pressure during session    Heart Rate at rest   Heart Rate during session     SPO2 at rest 99%  SPO2 during session 97 - 99%     Patient education  Patient educated on role of Physical Therapy, risks of immobility, safety 
be met in 3 days   Pain level   0/10        Bed Mobility  Using rails and head of bed elevated:     Rolling: Minimal assist of 1    Supine to sit: Moderate assist of 1    Sit to supine: Moderate assist of 1    Scooting: Moderate assist of 1    Rolling: Supervision     Supine to sit: Supervision     Sit to supine: Supervision     Scooting: Supervision      Transfers Sit to stand: Moderate assist of 1 x from bed and chair x 4 reps   Sit to stand: Minimal assist of 1     Ambulation     2 steps using  hand held assist with Maximal assist of 1         50 feet using  wheeled walker with Minimal assist of 1    ROM Within functional limits    Increase range of motion 10% of affected joints    Strength BUE:   2/5  RLE:  2+/5  LLE:  2+/5  Increase strength in affected mm groups by 1/3 grade   Balance Sitting EOB:  fair    Dynamic Standing:  poor    Sitting EOB:  good    Dynamic Standing: fair with wheeled walker      Patient is Alert & Oriented x person, place, time, and situation and follows directions    Sensation:  Patient  denies numbness/tingling   Edema:  yes bilateral lower extremities   Endurance: poor      Vitals: room air   Blood Pressure at rest  Blood Pressure during session    Heart Rate at rest   Heart Rate during session     SPO2 at rest 88-90%  SPO2 during session 92-94%     Patient education  Patient educated on role of Physical Therapy, risks of immobility, safety and plan of care, importance of positional changes for oxygen exchange,  importance of mobility while in hospital , safety , and positioning for skin integrity and comfort     Patient response to education:   Pt verbalized understanding Pt demonstrated skill Pt requires further education in this area   Yes Partial Yes      Treatment:  Patient practiced and was instructed/facilitated in the following treatment: Patient   Sat edge of bed 30 minutes with Supervision  to increase dynamic sitting balance and activity tolerance. seated and standing 
swelling    EXTREMITIES:    Peripheral pulses present.  + trace LL edema,+ feet edema,no cyanosis.    LINES/CATHETERS     LABORATORY DATA:  CBC with Differential:    Lab Results   Component Value Date/Time    WBC 6.3 04/04/2024 07:12 AM    RBC 3.16 04/04/2024 07:12 AM    HGB 9.3 04/04/2024 07:12 AM    HCT 31.1 04/04/2024 07:12 AM     04/04/2024 07:12 AM    MCV 98.4 04/04/2024 07:12 AM    MCH 29.4 04/04/2024 07:12 AM    MCHC 29.9 04/04/2024 07:12 AM    RDW 20.0 04/04/2024 07:12 AM    LYMPHOPCT 22 04/04/2024 07:12 AM    MONOPCT 8 04/04/2024 07:12 AM    BASOPCT 0 04/04/2024 07:12 AM    MONOSABS 0.50 04/04/2024 07:12 AM    LYMPHSABS 1.37 04/04/2024 07:12 AM    EOSABS 0.08 04/04/2024 07:12 AM    BASOSABS 0.02 04/04/2024 07:12 AM     CMP:    Lab Results   Component Value Date/Time     04/04/2024 07:12 AM    K 3.8 04/04/2024 07:12 AM    CL 95 04/04/2024 07:12 AM    CO2 24 04/04/2024 07:12 AM    BUN 32 04/04/2024 07:12 AM    CREATININE 1.3 04/04/2024 07:12 AM    LABGLOM 44 04/04/2024 07:12 AM    GLUCOSE 118 04/04/2024 07:12 AM    PROT 7.0 04/04/2024 07:12 AM    LABALBU 2.9 04/04/2024 07:12 AM    CALCIUM 8.1 04/04/2024 07:12 AM    BILITOT 0.6 04/04/2024 07:12 AM    ALKPHOS 99 04/04/2024 07:12 AM    AST 24 04/04/2024 07:12 AM    ALT 13 04/04/2024 07:12 AM       ASSESSMENT/PLAN:   Acute on Chronic systolic congestive heart failure  Failure to thrive  Generalized weakness and fatigue  Elevated troponin  Anemia  Chronic kidney disease  Cardiology status post CABG  Rheumatoid arthritis  Sjogren's disease  History of migraine headaches        Patient presented due to weakness of fatigue as well as increased lower extremity edema and shortness of breath.  Patient lives at home alone.  However she is unable to care for self and is a fall risk.  She is requiring increased assistance and has become weak after multiple hospitalizations.  Plan is for: Rehab.  PT OT will be consulted.  Additionally she does have signs of 
High  CM 8,145 High  CM 9,879 High  CM 13,645 High                   Component  Ref Range & Units 4/1/24 1426 3/31/24 0938 3/18/24 0450 3/16/24 2130 3/16/24 2045 2/20/24 0515 2/20/24 0420   Troponin, High Sensitivity  0 - 9 ng/L 165 High  145 High   High   High   High   High   High  C            CARDIAC TESTING     [facility] 2/5/24 Dr. Shaw STEMI  Severe multivessel atherosclerotic coronary artery disease, in a right dominant system  LMCA has 40% proximal lesion  LAD has 70% ostial lesion and 100% distal occlusion with L-L collaterals from diagonal branch  OM1 has 90% ostial lesion, vessel of smaller caliber  OM2 has 90% diffuse ostial to proximal lesion, vessel of large caliber  Mid RCA has 90% diffuse lesion.  RPL2 branch is small and has ostial 80% stenosis  LVEDP 9 mmHg  LV gram with EF of 30-35%, inferior akinesis  No hemodynamically significant gradient across the aortic valve on LV-Ao pullback.     TTE 2/5/2024 Dr Shaw    Left Ventricle: Severely reduced left ventricular systolic function with a visually estimated EF of 35 - 40%. Left ventricle size is normal. Normal wall thickness. Severe hypokinesis of the following segments: basal inferior, mid anteroseptal, mid inferior, mid inferolateral, mid inferoseptal, apical septal and apical inferior. Grade I diastolic dysfunction with normal LAP.    Right Ventricle: Low normal systolic function. TAPSE is abnormal.    Aortic Valve: Trileaflet valve. Mildly calcified cusp.    Mitral Valve: Mild annular calcification of the mitral valve. Mild regurgitation.    Tricuspid Valve: Mild regurgitation. The estimated RVSP is 27 mmHg.    Right Atrium: Right atrium is mildly dilated.    Image quality is good.     TTE 2/8/24 Dr Shaw    Left Ventricle: Severely reduced left ventricular systolic function with a visually estimated EF of 20 - 25%. Left ventricle size is normal. Normal wall thickness. Septal motion is consistent with post-operative

## 2024-04-08 NOTE — CARE COORDINATION
4/4/2024 136P () SS DC PLANNING: FRED messaged Emi at [facility] and requested precert be started. HENS, 7000 and JOSE completed.   
FRED DC PLANNING: FRDE received notification from  rep Emi at [facility] stating OT will need to see pt before they can officially state if they will accept. FRED reached out to OT and requested they see pt on this date. Per Emi, once pt has used her 20 medicare days at , she will start to have a daily copay of $196 starting on day 21. Information was reviewed with pt. Pt will require a precert be completed prior to dc. Hens and JOSE will need to be completed.   
SS DC PLANNING: Per Emi at , precert obtained and good through Monday (expires at midnight). Charge nurse notified. Shabbir germain.  
Ss note: 4/8/202411:34 AM Discharge order noted. [facility] is [facility] for skilled [facility]. PRECERT expires today at midnight. [facility] for 2 pm today. Hens completed. Pt, nursing, liaison aware,  message left for moisés Plasencia. ADAMA Saleh  
[facility] at [facility].     Additional Case Management Notes: SW met with pt at bedside to discuss dc planning. Pt is A&O. She resides at [facility] and reports being independent with adl's, iadl's and med administration pta. Pharmacy is [facility]. Pt has a sc, transport w/c, rollator and spc but states she was not using them prior. Pt has used [facility] as well as [facility] (most recent). Pt has received inpt rehab at [facility] in [place]. Per pt, she saw pcp Dr. Rea on 4/1/2024 who did not feel pt could return to independent living and is recommending [facility] placement. Pt is agreeable and would like referral made to [facility] (pt refused [facility] list) SW left message for Elsa at facility. Waiting on return call. Pt has [employer] and will require precert. Pt currently OBS. If she remains OBS will need pasr to be completed along with JOSE. If pt made inpt, HENS will need to be completed. FRED notified Elsa via message that pt has a life vest.     SW to continue to follow for dc planning.     **Addendum** SW received message from Elsa at [facility] stating she felt pt's needs may be too great for what they could provide but would continue to follow. SW met with pt to get additional choices. Pt very emotional. List was provided but pt did not want to review it. Pt has requested referral be made to [facility] in [place]. Pt refused to provide additional choices. Referral was made to Emi. Emi to review and let SW know if they can accept.       Dora Broussard, [facility]  Case Management Department

## 2024-04-11 ENCOUNTER — HOSPITAL ENCOUNTER (INPATIENT)
Age: 79
LOS: 16 days | Discharge: ANOTHER HOSPITAL | DRG: 175 | End: 2024-04-28
Attending: EMERGENCY MEDICINE | Admitting: INTERNAL MEDICINE
Payer: MEDICARE

## 2024-04-11 DIAGNOSIS — J90 PLEURAL EFFUSION: ICD-10-CM

## 2024-04-11 DIAGNOSIS — I26.94 MULTIPLE SUBSEGMENTAL PULMONARY EMBOLI WITHOUT ACUTE COR PULMONALE (HCC): Primary | ICD-10-CM

## 2024-04-11 DIAGNOSIS — R07.9 CHEST PAIN, UNSPECIFIED TYPE: ICD-10-CM

## 2024-04-11 DIAGNOSIS — Z93.8 S/P CHEST TUBE PLACEMENT (HCC): ICD-10-CM

## 2024-04-11 PROCEDURE — 99285 EMERGENCY DEPT VISIT HI MDM: CPT

## 2024-04-11 PROCEDURE — 96365 THER/PROPH/DIAG IV INF INIT: CPT

## 2024-04-12 ENCOUNTER — APPOINTMENT (OUTPATIENT)
Dept: INTERVENTIONAL RADIOLOGY/VASCULAR | Age: 79
DRG: 175 | End: 2024-04-12
Payer: MEDICARE

## 2024-04-12 ENCOUNTER — APPOINTMENT (OUTPATIENT)
Dept: GENERAL RADIOLOGY | Age: 79
DRG: 175 | End: 2024-04-12
Payer: MEDICARE

## 2024-04-12 ENCOUNTER — APPOINTMENT (OUTPATIENT)
Dept: CT IMAGING | Age: 79
DRG: 175 | End: 2024-04-12
Payer: MEDICARE

## 2024-04-12 PROBLEM — N18.32 STAGE 3B CHRONIC KIDNEY DISEASE (HCC): Status: ACTIVE | Noted: 2024-04-12

## 2024-04-12 PROBLEM — J90 PLEURAL EFFUSION: Status: ACTIVE | Noted: 2024-04-12

## 2024-04-12 PROBLEM — I50.22 CHRONIC SYSTOLIC (CONGESTIVE) HEART FAILURE (HCC): Status: ACTIVE | Noted: 2024-03-16

## 2024-04-12 PROBLEM — I26.99 ACUTE PULMONARY EMBOLISM WITHOUT ACUTE COR PULMONALE, UNSPECIFIED PULMONARY EMBOLISM TYPE (HCC): Status: ACTIVE | Noted: 2024-04-12

## 2024-04-12 PROBLEM — I25.5 ISCHEMIC CARDIOMYOPATHY: Status: ACTIVE | Noted: 2024-04-12

## 2024-04-12 PROBLEM — I05.9 MITRAL VALVE DISEASE: Status: ACTIVE | Noted: 2024-04-12

## 2024-04-12 PROBLEM — I26.94 MULTIPLE SUBSEGMENTAL PULMONARY EMBOLI WITHOUT ACUTE COR PULMONALE (HCC): Status: ACTIVE | Noted: 2024-04-12

## 2024-04-12 LAB
ANION GAP SERPL CALCULATED.3IONS-SCNC: 10 MMOL/L (ref 7–16)
APPEARANCE FLD: NORMAL
BNP SERPL-MCNC: 9946 PG/ML (ref 0–450)
BODY FLD TYPE: NORMAL
BUN SERPL-MCNC: 28 MG/DL (ref 6–23)
CALCIUM SERPL-MCNC: 8.2 MG/DL (ref 8.6–10.2)
CHLORIDE SERPL-SCNC: 99 MMOL/L (ref 98–107)
CLOT CHECK: NORMAL
CO2 SERPL-SCNC: 26 MMOL/L (ref 22–29)
COLOR FLD: YELLOW
CREAT SERPL-MCNC: 1.6 MG/DL (ref 0.5–1)
CRITICAL: NORMAL
DATE ANALYZED: NORMAL
DATE OF COLLECTION: NORMAL
EKG ATRIAL RATE: 81 BPM
EKG P AXIS: 71 DEGREES
EKG P-R INTERVAL: 134 MS
EKG Q-T INTERVAL: 344 MS
EKG QRS DURATION: 76 MS
EKG QTC CALCULATION (BAZETT): 399 MS
EKG R AXIS: 144 DEGREES
EKG T AXIS: 59 DEGREES
EKG VENTRICULAR RATE: 81 BPM
ERYTHROCYTE [DISTWIDTH] IN BLOOD BY AUTOMATED COUNT: 18 % (ref 11.5–15)
ERYTHROCYTE [DISTWIDTH] IN BLOOD BY AUTOMATED COUNT: 18.5 % (ref 11.5–15)
GFR SERPL CREATININE-BSD FRML MDRD: 33 ML/MIN/1.73M2
GLUCOSE FLD-MCNC: 109 MG/DL
GLUCOSE SERPL-MCNC: 106 MG/DL (ref 74–99)
HCT VFR BLD AUTO: 29.4 % (ref 34–48)
HCT VFR BLD AUTO: 29.8 % (ref 34–48)
HGB BLD-MCNC: 9.1 G/DL (ref 11.5–15.5)
HGB BLD-MCNC: 9.5 G/DL (ref 11.5–15.5)
INR PPP: 1.2
LAB: NORMAL
LDH FLD L TO P-CCNC: 213 U/L
LDH SERPL-CCNC: 302 U/L (ref 135–214)
Lab: 1538
MCH RBC QN AUTO: 29.1 PG (ref 26–35)
MCH RBC QN AUTO: 30.4 PG (ref 26–35)
MCHC RBC AUTO-ENTMCNC: 31 G/DL (ref 32–34.5)
MCHC RBC AUTO-ENTMCNC: 31.9 G/DL (ref 32–34.5)
MCV RBC AUTO: 93.9 FL (ref 80–99.9)
MCV RBC AUTO: 95.5 FL (ref 80–99.9)
MONOCYTES NFR FLD: 76 %
NEUTROPHILS NFR FLD: 24 %
OPERATOR ID: NORMAL
PARTIAL THROMBOPLASTIN TIME: 27.1 SEC (ref 24.5–35.1)
PARTIAL THROMBOPLASTIN TIME: 61 SEC (ref 24.5–35.1)
PARTIAL THROMBOPLASTIN TIME: 86.9 SEC (ref 24.5–35.1)
PH FLUID: 7.3
PLATELET # BLD AUTO: 306 K/UL (ref 130–450)
PLATELET # BLD AUTO: 323 K/UL (ref 130–450)
PMV BLD AUTO: 10.1 FL (ref 7–12)
PMV BLD AUTO: 10.3 FL (ref 7–12)
POTASSIUM SERPL-SCNC: 4.7 MMOL/L (ref 3.5–5)
PROT FLD-MCNC: 5 G/DL
PROT SERPL-MCNC: 7.2 G/DL (ref 6.4–8.3)
PROTHROMBIN TIME: 13 SEC (ref 9.3–12.4)
RBC # BLD AUTO: 3.12 M/UL (ref 3.5–5.5)
RBC # BLD AUTO: 3.13 M/UL (ref 3.5–5.5)
RBC # FLD: <2000 CELLS/UL
SODIUM SERPL-SCNC: 135 MMOL/L (ref 132–146)
SOURCE, BLOOD GAS: NORMAL
SPECIMEN TYPE: NORMAL
TIME ANALYZED: 1546
TROPONIN I SERPL HS-MCNC: 77 NG/L (ref 0–9)
TROPONIN I SERPL HS-MCNC: 78 NG/L (ref 0–9)
WBC # FLD: 465 CELLS/UL
WBC OTHER # BLD: 8.7 K/UL (ref 4.5–11.5)
WBC OTHER # BLD: 8.7 K/UL (ref 4.5–11.5)

## 2024-04-12 PROCEDURE — 89051 BODY FLUID CELL COUNT: CPT

## 2024-04-12 PROCEDURE — 83880 ASSAY OF NATRIURETIC PEPTIDE: CPT

## 2024-04-12 PROCEDURE — 88112 CYTOPATH CELL ENHANCE TECH: CPT

## 2024-04-12 PROCEDURE — C1729 CATH, DRAINAGE: HCPCS

## 2024-04-12 PROCEDURE — 36415 COLL VENOUS BLD VENIPUNCTURE: CPT

## 2024-04-12 PROCEDURE — 85027 COMPLETE CBC AUTOMATED: CPT

## 2024-04-12 PROCEDURE — 1200000000 HC SEMI PRIVATE

## 2024-04-12 PROCEDURE — 6370000000 HC RX 637 (ALT 250 FOR IP)

## 2024-04-12 PROCEDURE — 71045 X-RAY EXAM CHEST 1 VIEW: CPT

## 2024-04-12 PROCEDURE — 71275 CT ANGIOGRAPHY CHEST: CPT

## 2024-04-12 PROCEDURE — 93970 EXTREMITY STUDY: CPT

## 2024-04-12 PROCEDURE — 6360000002 HC RX W HCPCS: Performed by: EMERGENCY MEDICINE

## 2024-04-12 PROCEDURE — APPSS60 APP SPLIT SHARED TIME 46-60 MINUTES: Performed by: PHYSICIAN ASSISTANT

## 2024-04-12 PROCEDURE — 88305 TISSUE EXAM BY PATHOLOGIST: CPT

## 2024-04-12 PROCEDURE — 83986 ASSAY PH BODY FLUID NOS: CPT

## 2024-04-12 PROCEDURE — 6360000004 HC RX CONTRAST MEDICATION: Performed by: RADIOLOGY

## 2024-04-12 PROCEDURE — 87205 SMEAR GRAM STAIN: CPT

## 2024-04-12 PROCEDURE — 93010 ELECTROCARDIOGRAM REPORT: CPT | Performed by: INTERNAL MEDICINE

## 2024-04-12 PROCEDURE — 87070 CULTURE OTHR SPECIMN AEROBIC: CPT

## 2024-04-12 PROCEDURE — 93005 ELECTROCARDIOGRAM TRACING: CPT | Performed by: EMERGENCY MEDICINE

## 2024-04-12 PROCEDURE — 87102 FUNGUS ISOLATION CULTURE: CPT

## 2024-04-12 PROCEDURE — 85730 THROMBOPLASTIN TIME PARTIAL: CPT

## 2024-04-12 PROCEDURE — 84157 ASSAY OF PROTEIN OTHER: CPT

## 2024-04-12 PROCEDURE — 85610 PROTHROMBIN TIME: CPT

## 2024-04-12 PROCEDURE — 0W993ZZ DRAINAGE OF RIGHT PLEURAL CAVITY, PERCUTANEOUS APPROACH: ICD-10-PCS | Performed by: RADIOLOGY

## 2024-04-12 PROCEDURE — 82945 GLUCOSE OTHER FLUID: CPT

## 2024-04-12 PROCEDURE — 83615 LACTATE (LD) (LDH) ENZYME: CPT

## 2024-04-12 PROCEDURE — 84155 ASSAY OF PROTEIN SERUM: CPT

## 2024-04-12 PROCEDURE — 99223 1ST HOSP IP/OBS HIGH 75: CPT | Performed by: INTERNAL MEDICINE

## 2024-04-12 PROCEDURE — 32555 ASPIRATE PLEURA W/ IMAGING: CPT

## 2024-04-12 PROCEDURE — 80048 BASIC METABOLIC PNL TOTAL CA: CPT

## 2024-04-12 PROCEDURE — 84484 ASSAY OF TROPONIN QUANT: CPT

## 2024-04-12 RX ORDER — HEPARIN SODIUM 1000 [USP'U]/ML
80 INJECTION, SOLUTION INTRAVENOUS; SUBCUTANEOUS PRN
Status: DISCONTINUED | OUTPATIENT
Start: 2024-04-12 | End: 2024-04-14

## 2024-04-12 RX ORDER — MIDODRINE HYDROCHLORIDE 5 MG/1
2.5 TABLET ORAL
Status: DISCONTINUED | OUTPATIENT
Start: 2024-04-12 | End: 2024-04-14

## 2024-04-12 RX ORDER — ACETAMINOPHEN 325 MG/1
650 TABLET ORAL EVERY 4 HOURS PRN
Status: DISCONTINUED | OUTPATIENT
Start: 2024-04-12 | End: 2024-04-28 | Stop reason: HOSPADM

## 2024-04-12 RX ORDER — CLOPIDOGREL BISULFATE 75 MG/1
75 TABLET ORAL DAILY
Status: ON HOLD | COMMUNITY
End: 2024-05-08 | Stop reason: HOSPADM

## 2024-04-12 RX ORDER — HEPARIN SODIUM 1000 [USP'U]/ML
70 INJECTION, SOLUTION INTRAVENOUS; SUBCUTANEOUS ONCE
Status: COMPLETED | OUTPATIENT
Start: 2024-04-12 | End: 2024-04-12

## 2024-04-12 RX ORDER — PANTOPRAZOLE SODIUM 40 MG/1
40 TABLET, DELAYED RELEASE ORAL
Status: DISCONTINUED | OUTPATIENT
Start: 2024-04-12 | End: 2024-04-28 | Stop reason: HOSPADM

## 2024-04-12 RX ORDER — BISACODYL 10 MG
10 SUPPOSITORY, RECTAL RECTAL DAILY PRN
Status: ON HOLD | COMMUNITY
End: 2024-04-21 | Stop reason: HOSPADM

## 2024-04-12 RX ORDER — TORSEMIDE 20 MG/1
40 TABLET ORAL DAILY
Status: DISCONTINUED | OUTPATIENT
Start: 2024-04-12 | End: 2024-04-13

## 2024-04-12 RX ORDER — ISOSORBIDE DINITRATE 10 MG/1
5 TABLET ORAL 2 TIMES DAILY
Status: DISCONTINUED | OUTPATIENT
Start: 2024-04-12 | End: 2024-04-13

## 2024-04-12 RX ORDER — MAGNESIUM OXIDE 400 MG/1
400 TABLET ORAL 2 TIMES DAILY
Status: DISCONTINUED | OUTPATIENT
Start: 2024-04-12 | End: 2024-04-28 | Stop reason: HOSPADM

## 2024-04-12 RX ORDER — POTASSIUM CHLORIDE 20 MEQ/1
20 TABLET, EXTENDED RELEASE ORAL 2 TIMES DAILY
Status: DISCONTINUED | OUTPATIENT
Start: 2024-04-12 | End: 2024-04-28 | Stop reason: HOSPADM

## 2024-04-12 RX ORDER — HYDRALAZINE HYDROCHLORIDE 10 MG/1
10 TABLET, FILM COATED ORAL 2 TIMES DAILY
Status: DISCONTINUED | OUTPATIENT
Start: 2024-04-12 | End: 2024-04-13

## 2024-04-12 RX ORDER — LANOLIN ALCOHOL/MO/W.PET/CERES
400 CREAM (GRAM) TOPICAL 2 TIMES DAILY
Status: DISCONTINUED | OUTPATIENT
Start: 2024-04-12 | End: 2024-04-12 | Stop reason: CLARIF

## 2024-04-12 RX ORDER — CLOPIDOGREL BISULFATE 75 MG/1
75 TABLET ORAL DAILY
Status: DISCONTINUED | OUTPATIENT
Start: 2024-04-12 | End: 2024-04-14

## 2024-04-12 RX ORDER — LEVOTHYROXINE SODIUM 0.1 MG/1
100 TABLET ORAL DAILY
Status: DISCONTINUED | OUTPATIENT
Start: 2024-04-12 | End: 2024-04-28 | Stop reason: HOSPADM

## 2024-04-12 RX ORDER — METOPROLOL SUCCINATE 25 MG/1
25 TABLET, EXTENDED RELEASE ORAL DAILY
Status: DISCONTINUED | OUTPATIENT
Start: 2024-04-12 | End: 2024-04-28 | Stop reason: HOSPADM

## 2024-04-12 RX ORDER — HEPARIN SODIUM 1000 [USP'U]/ML
40 INJECTION, SOLUTION INTRAVENOUS; SUBCUTANEOUS PRN
Status: DISCONTINUED | OUTPATIENT
Start: 2024-04-12 | End: 2024-04-14

## 2024-04-12 RX ORDER — HEPARIN SODIUM 10000 [USP'U]/100ML
5-30 INJECTION, SOLUTION INTRAVENOUS CONTINUOUS
Status: DISCONTINUED | OUTPATIENT
Start: 2024-04-12 | End: 2024-04-13

## 2024-04-12 RX ORDER — LEVOTHYROXINE SODIUM 100 UG/1
1 CAPSULE ORAL DAILY
Status: DISCONTINUED | OUTPATIENT
Start: 2024-04-12 | End: 2024-04-12 | Stop reason: SDUPTHER

## 2024-04-12 RX ORDER — BENZONATATE 100 MG/1
100 CAPSULE ORAL 3 TIMES DAILY PRN
COMMUNITY
End: 2024-06-10

## 2024-04-12 RX ORDER — TOBRAMYCIN AND DEXAMETHASONE 3; 1 MG/ML; MG/ML
1 SUSPENSION/ DROPS OPHTHALMIC 4 TIMES DAILY
Status: DISCONTINUED | OUTPATIENT
Start: 2024-04-12 | End: 2024-04-28 | Stop reason: HOSPADM

## 2024-04-12 RX ORDER — ATORVASTATIN CALCIUM 40 MG/1
40 TABLET, FILM COATED ORAL NIGHTLY
Status: DISCONTINUED | OUTPATIENT
Start: 2024-04-12 | End: 2024-04-28 | Stop reason: HOSPADM

## 2024-04-12 RX ADMIN — PANTOPRAZOLE SODIUM 40 MG: 40 TABLET, DELAYED RELEASE ORAL at 11:34

## 2024-04-12 RX ADMIN — POTASSIUM CHLORIDE 20 MEQ: 1500 TABLET, EXTENDED RELEASE ORAL at 22:39

## 2024-04-12 RX ADMIN — ISOSORBIDE DINITRATE 5 MG: 10 TABLET ORAL at 11:37

## 2024-04-12 RX ADMIN — HYDRALAZINE HYDROCHLORIDE 10 MG: 10 TABLET, FILM COATED ORAL at 11:34

## 2024-04-12 RX ADMIN — MAGNESIUM OXIDE 400 MG: 400 TABLET ORAL at 22:39

## 2024-04-12 RX ADMIN — MIDODRINE HYDROCHLORIDE 2.5 MG: 5 TABLET ORAL at 11:34

## 2024-04-12 RX ADMIN — POTASSIUM CHLORIDE 20 MEQ: 1500 TABLET, EXTENDED RELEASE ORAL at 11:34

## 2024-04-12 RX ADMIN — HEPARIN SODIUM 4210 UNITS: 1000 INJECTION INTRAVENOUS; SUBCUTANEOUS at 05:31

## 2024-04-12 RX ADMIN — ATORVASTATIN CALCIUM 40 MG: 40 TABLET, FILM COATED ORAL at 22:40

## 2024-04-12 RX ADMIN — MIDODRINE HYDROCHLORIDE 2.5 MG: 5 TABLET ORAL at 17:22

## 2024-04-12 RX ADMIN — ISOSORBIDE DINITRATE 5 MG: 10 TABLET ORAL at 22:40

## 2024-04-12 RX ADMIN — ACETAMINOPHEN 650 MG: 325 TABLET ORAL at 16:06

## 2024-04-12 RX ADMIN — TORSEMIDE 40 MG: 20 TABLET ORAL at 11:34

## 2024-04-12 RX ADMIN — IOPAMIDOL 80 ML: 755 INJECTION, SOLUTION INTRAVENOUS at 03:41

## 2024-04-12 RX ADMIN — TOBRAMYCIN AND DEXAMETHASONE 1 DROP: 3; 1 SUSPENSION/ DROPS OPHTHALMIC at 11:40

## 2024-04-12 RX ADMIN — METOPROLOL SUCCINATE 25 MG: 25 TABLET, EXTENDED RELEASE ORAL at 11:34

## 2024-04-12 RX ADMIN — CLOPIDOGREL BISULFATE 75 MG: 75 TABLET ORAL at 11:34

## 2024-04-12 RX ADMIN — LEVOTHYROXINE SODIUM 100 MCG: 0.1 TABLET ORAL at 11:34

## 2024-04-12 RX ADMIN — MAGNESIUM OXIDE 400 MG: 400 TABLET ORAL at 11:39

## 2024-04-12 RX ADMIN — HEPARIN SODIUM 2410 UNITS: 1000 INJECTION INTRAVENOUS; SUBCUTANEOUS at 19:28

## 2024-04-12 RX ADMIN — POLYVINYL ALCOHOL, POVIDONE 1 DROP: 14; 6 SOLUTION/ DROPS OPHTHALMIC at 22:37

## 2024-04-12 RX ADMIN — TOBRAMYCIN AND DEXAMETHASONE 1 DROP: 3; 1 SUSPENSION/ DROPS OPHTHALMIC at 22:38

## 2024-04-12 RX ADMIN — HEPARIN SODIUM 18 UNITS/KG/HR: 10000 INJECTION, SOLUTION INTRAVENOUS at 05:36

## 2024-04-12 RX ADMIN — TOBRAMYCIN AND DEXAMETHASONE 1 DROP: 3; 1 SUSPENSION/ DROPS OPHTHALMIC at 17:22

## 2024-04-12 ASSESSMENT — PAIN DESCRIPTION - PAIN TYPE: TYPE: ACUTE PAIN

## 2024-04-12 ASSESSMENT — PAIN DESCRIPTION - LOCATION: LOCATION: BACK

## 2024-04-12 ASSESSMENT — PAIN SCALES - GENERAL
PAINLEVEL_OUTOF10: 5
PAINLEVEL_OUTOF10: 0
PAINLEVEL_OUTOF10: 8

## 2024-04-12 ASSESSMENT — PAIN DESCRIPTION - FREQUENCY: FREQUENCY: CONTINUOUS

## 2024-04-12 ASSESSMENT — PAIN DESCRIPTION - DESCRIPTORS: DESCRIPTORS: ACHING;DISCOMFORT;DULL

## 2024-04-12 ASSESSMENT — PAIN DESCRIPTION - ORIENTATION: ORIENTATION: RIGHT;LEFT

## 2024-04-12 ASSESSMENT — PAIN - FUNCTIONAL ASSESSMENT: PAIN_FUNCTIONAL_ASSESSMENT: ACTIVITIES ARE NOT PREVENTED

## 2024-04-12 ASSESSMENT — PAIN DESCRIPTION - ONSET: ONSET: ON-GOING

## 2024-04-12 NOTE — ED PROVIDER NOTES
Patient is a 79 y/o female who presents to the ED via EMS from the nursing home with chest pain. Patient states she has had chest pain for the past couple days. She states the pain began in the right chest and now radiates across her chest. It worsens when she coughs and with deep inspiration. She has been short of breath since having a CABG two months ago. She has had a nonproductive cough. She denies any fever.          Review of Systems   Constitutional:  Negative for chills and fever.   HENT:  Negative for ear pain, sinus pressure and sore throat.    Eyes:  Negative for pain, discharge and redness.   Respiratory:  Positive for cough and shortness of breath. Negative for wheezing.    Cardiovascular:  Positive for chest pain and leg swelling.   Gastrointestinal:  Negative for abdominal distention, diarrhea, nausea and vomiting.   Genitourinary:  Negative for dysuria and frequency.   Musculoskeletal:  Negative for arthralgias and back pain.   Skin:  Negative for rash and wound.   Neurological:  Negative for weakness and headaches.   Hematological:  Negative for adenopathy.   All other systems reviewed and are negative.       Physical Exam  Vitals and nursing note reviewed.   Constitutional:       General: She is not in acute distress.  HENT:      Head: Normocephalic and atraumatic.      Right Ear: External ear normal.      Left Ear: External ear normal.      Nose: Nose normal.      Mouth/Throat:      Mouth: Mucous membranes are moist.   Eyes:      Conjunctiva/sclera: Conjunctivae normal.      Pupils: Pupils are equal, round, and reactive to light.   Cardiovascular:      Rate and Rhythm: Normal rate and regular rhythm.      Heart sounds: No murmur heard.  Pulmonary:      Effort: Pulmonary effort is normal. No respiratory distress.      Breath sounds: Normal breath sounds. No stridor. No wheezing, rhonchi or rales.   Abdominal:      General: Bowel sounds are normal. There is no distension.      Palpations: Abdomen  reviewed.    Allergies: Penicillins and Doxycycline    -------------------------------------------------- RESULTS -------------------------------------------------    LABS:  Results for orders placed or performed during the hospital encounter of 04/11/24   Basic metabolic panel   Result Value Ref Range    Sodium 135 132 - 146 mmol/L    Potassium 4.7 3.5 - 5.0 mmol/L    Chloride 99 98 - 107 mmol/L    CO2 26 22 - 29 mmol/L    Anion Gap 10 7 - 16 mmol/L    Glucose 106 (H) 74 - 99 mg/dL    BUN 28 (H) 6 - 23 mg/dL    Creatinine 1.6 (H) 0.50 - 1.00 mg/dL    Est, Glom Filt Rate 33 (L) >60 mL/min/1.73m2    Calcium 8.2 (L) 8.6 - 10.2 mg/dL   CBC   Result Value Ref Range    WBC 8.7 4.5 - 11.5 k/uL    RBC 3.12 (L) 3.50 - 5.50 m/uL    Hemoglobin 9.5 (L) 11.5 - 15.5 g/dL    Hematocrit 29.8 (L) 34.0 - 48.0 %    MCV 95.5 80.0 - 99.9 fL    MCH 30.4 26.0 - 35.0 pg    MCHC 31.9 (L) 32.0 - 34.5 g/dL    RDW 18.5 (H) 11.5 - 15.0 %    Platelets 323 130 - 450 k/uL    MPV 10.3 7.0 - 12.0 fL   Troponin   Result Value Ref Range    Troponin, High Sensitivity 78 (H) 0 - 9 ng/L   Brain Natriuretic Peptide   Result Value Ref Range    Pro-BNP 9,946 (H) 0 - 450 pg/mL   Troponin   Result Value Ref Range    Troponin, High Sensitivity 77 (H) 0 - 9 ng/L   CBC   Result Value Ref Range    WBC 8.7 4.5 - 11.5 k/uL    RBC 3.13 (L) 3.50 - 5.50 m/uL    Hemoglobin 9.1 (L) 11.5 - 15.5 g/dL    Hematocrit 29.4 (L) 34.0 - 48.0 %    MCV 93.9 80.0 - 99.9 fL    MCH 29.1 26.0 - 35.0 pg    MCHC 31.0 (L) 32.0 - 34.5 g/dL    RDW 18.0 (H) 11.5 - 15.0 %    Platelets 306 130 - 450 k/uL    MPV 10.1 7.0 - 12.0 fL   APTT   Result Value Ref Range    APTT 27.1 24.5 - 35.1 sec   EKG 12 Lead   Result Value Ref Range    Ventricular Rate 81 BPM    Atrial Rate 81 BPM    P-R Interval 134 ms    QRS Duration 76 ms    Q-T Interval 344 ms    QTc Calculation (Bazett) 399 ms    P Axis 71 degrees    R Axis 144 degrees    T Axis 59 degrees       RADIOLOGY:  CTA CHEST W CONTRAST   Final

## 2024-04-12 NOTE — CARE COORDINATION
Ss note: 4/12/20242:05 PM Will need ss full assessment completed, pt recently discharged from [place] on 4-8-2024 to [facility] skilled rehab, pt presents with chest pain. cristina Middleton is checking to determine if pt will need a PRECERT to return. Attempted to meet with pt, currently has a food tray present and is eating. Therapy ordered, SW/CM will need to follow up to confirm transition of care plan and complete a re admission. ADAMA Saleh    ADDENDUM: 4/12/20243:17 PM Per Emi at [facility], cristina relays that if pt does not admit back to SNF by tomorrow 4-13-24 by midnight, pt would require a NEW PRECERT. SW will need to follow up with pt to confirm if pt is agreeable to return to SNF. ADAMA Saleh

## 2024-04-12 NOTE — PROGRESS NOTES
Patient came down to Special Procedures for ultrasound guided right thoracentesis.    Procedure was explained, questions were answered.    1537   Starting procedure /61 85 20 100% on 4 liters nasal canula     1544   Ending procedure /53 73 22 100% on 4 liters nasal canula     1000 cc of hazy straw color pleural fluid drained from patient, petrolatum dressing folded 4 x 4 and tegaderm applied to right back.     Patients DSD dressing can be removed in 24 hours    Patient tolerated procedure    Post procedure chest xray taken    Respiratory came took specimen for PH    Specimen sent to lab, floor nurse to print labels and send to lab    Nurse to nurse called spoke with Caroline HORTA, nurse notified of above information    Patient transported back to floor.

## 2024-04-12 NOTE — CARE COORDINATION
Ss note:4/12/2024.3:38 PM Pt presents from [facility] rehab, sw was just notified that pt will require a NEW PRECERT to return, therapy has been ordered. SW will need to follow up with pt to confirm final plan with pt. ADAMA Saleh

## 2024-04-12 NOTE — PROGRESS NOTES
Pharmacy Medication Reconciliation    Medications were obtained utilizing an order summary report sent from [facility]. Patient presented to hospital from facility.     Allergy Update: Penicillins and Doxycycline    Recommendations/Findings/Discrepancies:   The following amendments were made to the patient's active medication list on file:   1) Additions:   Bisacodyl 10 mg supp, 1 daily PRN constipation  Magnesium hydroxide 400 mg/5mL, 30mL daily PRN constipation  Benzonatate 100 mg, 1 capsule TID PRN cough    2) Deletions: 0    3) Changes: 0    Total number of discrepancies: 3    Source/s of information: Order Summary Report from [facility].     Thank you,     Leonard Talbert, PharmD, [facility]  PGY-1 Pharmacy Resident  [phone]    4/12/2024, 11:14 AM

## 2024-04-12 NOTE — CONSULTS
INPATIENT CARDIOLOGY CONSULT     Reason for Consult: Dyspnea, recent CABG    Cardiologist: Dr. Altamirano    Requesting Physician: Dr. Bain    Date of Consultation: 4/12/2024    HISTORY OF PRESENT ILLNESS:   Patient is a 78 year old WF known to Dr. Shaw.     She has a known past medical history of CAD s/p CABG x 3 with MALIK ligation/Atriclip 2/7/2024, chronic HFrEF, ischemic cardiomyopathy, VHD, chronically elevated troponin, HLD, T2DM with peripheral neuropathy, hypothyroidism on HRT, RA, Sjogren's disease, CKD, chronic anemia, iron deficiency on oral supplementation, chronic migraines, osteopenia and hearing loss.     PERTINENT ENCOUNTERS:   Hospital Admission Y February 2024.  \"She presented to the emergency room at the beginning of February with complaints of increased chest pain and shortness of breath progressing for 2 weeks prior to presentation.  Upon arrival she was noted to have inferior STEMI, she underwent left heart catheterization with multivessel disease and CT surgery was consulted.  On 2/7/2024 she underwent three-vessel CABG (LIMA to LAD, SVG to RCA, SVG to OM).  She underwent TTE following surgery with LVEF 30-35%, moderate RV dysfunction with mild to moderate MR, moderate TR.  She was placed in a LifeVest and initiated on GDMT however limited given hypotension.  She was discharged to SNF facility for ongoing rehabilitation.\" Per CHF clinic visit 3/4/2024.  KIMBERLEE Fonseca, CHF clinic 3/4/2024.  Admitted to medication compliance.  Continues to require IV diuretic therapy at CHF clinic visits due to peripheral edema.  Chronic TRUJILLO.  Weight stable at 126 pounds.  LifeVest interrogated with no acute events noted.  Obtain labs.  Edison to be hypervolemic, given IV diuresis at CHF clinic.  Stop oral Lasix and start torsemide 20 mg daily.  Continue compression stockings, leg elevation and LifeVest.  Continue Toprol-XL 25 mg daily, Potassium supplementation, Amiodarone per CT surgery, ASA 81 mg daily  sternal border.  A benign abdominal examination is present and minimal lymphedema present.      Diagnostic Assessment and Plan: On a clinical basis, the patient presents with evidence of an ischemic cardiomyopathy and moderately severe left ventricular systolic dysfunction in addition to that of valvular abnormalities in addition to that of an acute pulmonary embolism.  Presently management of pulmonary embolism will be deferred to primary care and in addition on a symptomatic basis potential benefits of a thoracentesis.  Continue careful monitoring of her volume status will be necessary with goals of optimizing medical management as tolerated by blood pressure in addition to that of renal function and if possible conversion of hydralazine and oral nitrates to an angiotensin receptor blocker or sacubitril/valsartan and if permissible on the basis of renal function consider addition of an SGLT2 inhibitor.  Ongoing stabilization will be necessary prior to consideration of additional assessment of her valvular heart disease.  Ongoing aggressive risk factor modification of blood pressure and serum lipids will remain essential to reducing risk of future atherosclerotic development.    I have participated in a substantive portion of the present encounter inclusive of a component of independent history and examination in addition to that of medical decision making regarding patient care.    Thank you for allowing me to participate in your patient's care. Please feel free to contact me if you have any questions or concerns.    Rafiq Altamirano MD  [facility]

## 2024-04-12 NOTE — PROGRESS NOTES
4 Eyes Skin Assessment     NAME:  Katt Diaz  YOB: 1945  MEDICAL RECORD NUMBER:  [id number]    The patient is being assessed for  Admission    I agree that at least one RN has performed a thorough Head to Toe Skin Assessment on the patient. ALL assessment sites listed below have been assessed.      Areas assessed by both nurses:    Head, Face, Ears, Shoulders, Back, Chest, Arms, Elbows, Hands, Sacrum. Buttock, Coccyx, Ischium, Legs. Feet and Heels, and Under Medical Devices         Does the Patient have a Wound? No noted wound(s)       Dani Prevention initiated by RN: No  Wound Care Orders initiated by RN: No    Pressure Injury (Stage 3,4, Unstageable, DTI, NWPT, and Complex wounds) if present, place Wound referral order by RN under : No    New Ostomies, if present place, Ostomy referral order under : No     Nurse 1 eSignature: Electronically signed by Tony De Souza RN on 4/12/24 at 11:33 AM EDT    **SHARE this note so that the co-signing nurse can place an eSignature**    Nurse 2 eSignature: Electronically signed by Yamilet Kunz RN on 4/12/2024 at 12:14 PM

## 2024-04-13 LAB
25(OH)D3 SERPL-MCNC: 16.7 NG/ML (ref 30–100)
ALBUMIN SERPL-MCNC: 2.8 G/DL (ref 3.5–5.2)
ALP SERPL-CCNC: 84 U/L (ref 35–104)
ALT SERPL-CCNC: 9 U/L (ref 0–32)
ANION GAP SERPL CALCULATED.3IONS-SCNC: 13 MMOL/L (ref 7–16)
AST SERPL-CCNC: 18 U/L (ref 0–31)
BASOPHILS # BLD: 0.03 K/UL (ref 0–0.2)
BASOPHILS NFR BLD: 0 % (ref 0–2)
BILIRUB SERPL-MCNC: 0.5 MG/DL (ref 0–1.2)
BUN SERPL-MCNC: 23 MG/DL (ref 6–23)
CALCIUM SERPL-MCNC: 8.1 MG/DL (ref 8.6–10.2)
CHLORIDE SERPL-SCNC: 97 MMOL/L (ref 98–107)
CHOLEST SERPL-MCNC: 89 MG/DL
CO2 SERPL-SCNC: 24 MMOL/L (ref 22–29)
CREAT SERPL-MCNC: 1.4 MG/DL (ref 0.5–1)
EOSINOPHIL # BLD: 0.1 K/UL (ref 0.05–0.5)
EOSINOPHILS RELATIVE PERCENT: 1 % (ref 0–6)
ERYTHROCYTE [DISTWIDTH] IN BLOOD BY AUTOMATED COUNT: 17.9 % (ref 11.5–15)
FOLATE SERPL-MCNC: 16.3 NG/ML (ref 4.8–24.2)
GFR SERPL CREATININE-BSD FRML MDRD: 38 ML/MIN/1.73M2
GLUCOSE SERPL-MCNC: 91 MG/DL (ref 74–99)
HCT VFR BLD AUTO: 30.2 % (ref 34–48)
HDLC SERPL-MCNC: 32 MG/DL
HGB BLD-MCNC: 9.2 G/DL (ref 11.5–15.5)
IMM GRANULOCYTES # BLD AUTO: 0.03 K/UL (ref 0–0.58)
IMM GRANULOCYTES NFR BLD: 0 % (ref 0–5)
IRON SATN MFR SERPL: 8 % (ref 15–50)
IRON SERPL-MCNC: 18 UG/DL (ref 37–145)
LDLC SERPL CALC-MCNC: 41 MG/DL
LYMPHOCYTES NFR BLD: 1.52 K/UL (ref 1.5–4)
LYMPHOCYTES RELATIVE PERCENT: 21 % (ref 20–42)
MCH RBC QN AUTO: 28.8 PG (ref 26–35)
MCHC RBC AUTO-ENTMCNC: 30.5 G/DL (ref 32–34.5)
MCV RBC AUTO: 94.7 FL (ref 80–99.9)
MONOCYTES NFR BLD: 0.5 K/UL (ref 0.1–0.95)
MONOCYTES NFR BLD: 7 % (ref 2–12)
NEUTROPHILS NFR BLD: 70 % (ref 43–80)
NEUTS SEG NFR BLD: 5.12 K/UL (ref 1.8–7.3)
PARTIAL THROMBOPLASTIN TIME: 121.3 SEC (ref 24.5–35.1)
PARTIAL THROMBOPLASTIN TIME: 33 SEC (ref 24.5–35.1)
PARTIAL THROMBOPLASTIN TIME: 35 SEC (ref 24.5–35.1)
PARTIAL THROMBOPLASTIN TIME: 75.6 SEC (ref 24.5–35.1)
PLATELET # BLD AUTO: 321 K/UL (ref 130–450)
PMV BLD AUTO: 10.4 FL (ref 7–12)
POTASSIUM SERPL-SCNC: 4 MMOL/L (ref 3.5–5)
PROT SERPL-MCNC: 6.5 G/DL (ref 6.4–8.3)
RBC # BLD AUTO: 3.19 M/UL (ref 3.5–5.5)
SODIUM SERPL-SCNC: 134 MMOL/L (ref 132–146)
T4 FREE SERPL-MCNC: 1.5 NG/DL (ref 0.9–1.7)
TIBC SERPL-MCNC: 220 UG/DL (ref 250–450)
TRIGL SERPL-MCNC: 81 MG/DL
TROPONIN I SERPL HS-MCNC: 83 NG/L (ref 0–9)
TSH SERPL DL<=0.05 MIU/L-ACNC: 2.26 UIU/ML (ref 0.27–4.2)
VIT B12 SERPL-MCNC: 331 PG/ML (ref 211–946)
VLDLC SERPL CALC-MCNC: 16 MG/DL
WBC OTHER # BLD: 7.3 K/UL (ref 4.5–11.5)

## 2024-04-13 PROCEDURE — 2700000000 HC OXYGEN THERAPY PER DAY

## 2024-04-13 PROCEDURE — P9047 ALBUMIN (HUMAN), 25%, 50ML: HCPCS

## 2024-04-13 PROCEDURE — 84443 ASSAY THYROID STIM HORMONE: CPT

## 2024-04-13 PROCEDURE — 6370000000 HC RX 637 (ALT 250 FOR IP)

## 2024-04-13 PROCEDURE — 97161 PT EVAL LOW COMPLEX 20 MIN: CPT | Performed by: PHYSICAL THERAPIST

## 2024-04-13 PROCEDURE — 80053 COMPREHEN METABOLIC PANEL: CPT

## 2024-04-13 PROCEDURE — 97530 THERAPEUTIC ACTIVITIES: CPT | Performed by: PHYSICAL THERAPIST

## 2024-04-13 PROCEDURE — 82607 VITAMIN B-12: CPT

## 2024-04-13 PROCEDURE — 6360000002 HC RX W HCPCS: Performed by: INTERNAL MEDICINE

## 2024-04-13 PROCEDURE — 85025 COMPLETE CBC W/AUTO DIFF WBC: CPT

## 2024-04-13 PROCEDURE — 36415 COLL VENOUS BLD VENIPUNCTURE: CPT

## 2024-04-13 PROCEDURE — 85730 THROMBOPLASTIN TIME PARTIAL: CPT

## 2024-04-13 PROCEDURE — 80061 LIPID PANEL: CPT

## 2024-04-13 PROCEDURE — 6360000002 HC RX W HCPCS: Performed by: EMERGENCY MEDICINE

## 2024-04-13 PROCEDURE — 83540 ASSAY OF IRON: CPT

## 2024-04-13 PROCEDURE — 84484 ASSAY OF TROPONIN QUANT: CPT

## 2024-04-13 PROCEDURE — 93005 ELECTROCARDIOGRAM TRACING: CPT

## 2024-04-13 PROCEDURE — 6360000002 HC RX W HCPCS

## 2024-04-13 PROCEDURE — 82746 ASSAY OF FOLIC ACID SERUM: CPT

## 2024-04-13 PROCEDURE — 1200000000 HC SEMI PRIVATE

## 2024-04-13 PROCEDURE — 83550 IRON BINDING TEST: CPT

## 2024-04-13 PROCEDURE — 82306 VITAMIN D 25 HYDROXY: CPT

## 2024-04-13 PROCEDURE — 84439 ASSAY OF FREE THYROXINE: CPT

## 2024-04-13 PROCEDURE — 99233 SBSQ HOSP IP/OBS HIGH 50: CPT | Performed by: INTERNAL MEDICINE

## 2024-04-13 RX ORDER — IPRATROPIUM BROMIDE AND ALBUTEROL SULFATE 2.5; .5 MG/3ML; MG/3ML
1 SOLUTION RESPIRATORY (INHALATION)
Status: DISCONTINUED | OUTPATIENT
Start: 2024-04-13 | End: 2024-04-28 | Stop reason: HOSPADM

## 2024-04-13 RX ORDER — ALBUMIN (HUMAN) 12.5 G/50ML
25 SOLUTION INTRAVENOUS EVERY 12 HOURS
Status: COMPLETED | OUTPATIENT
Start: 2024-04-13 | End: 2024-04-14

## 2024-04-13 RX ORDER — FUROSEMIDE 10 MG/ML
20 INJECTION INTRAMUSCULAR; INTRAVENOUS DAILY
Status: DISCONTINUED | OUTPATIENT
Start: 2024-04-14 | End: 2024-04-15

## 2024-04-13 RX ORDER — FUROSEMIDE 10 MG/ML
20 INJECTION INTRAMUSCULAR; INTRAVENOUS 3 TIMES DAILY
Status: DISCONTINUED | OUTPATIENT
Start: 2024-04-13 | End: 2024-04-13

## 2024-04-13 RX ADMIN — LEVOTHYROXINE SODIUM 100 MCG: 0.1 TABLET ORAL at 05:41

## 2024-04-13 RX ADMIN — CLOPIDOGREL BISULFATE 75 MG: 75 TABLET ORAL at 09:44

## 2024-04-13 RX ADMIN — ACETAMINOPHEN 650 MG: 325 TABLET ORAL at 18:39

## 2024-04-13 RX ADMIN — ALBUMIN (HUMAN) 25 G: 0.25 INJECTION, SOLUTION INTRAVENOUS at 12:11

## 2024-04-13 RX ADMIN — SACUBITRIL AND VALSARTAN 0.5 TABLET: 24; 26 TABLET, FILM COATED ORAL at 20:17

## 2024-04-13 RX ADMIN — PANTOPRAZOLE SODIUM 40 MG: 40 TABLET, DELAYED RELEASE ORAL at 05:41

## 2024-04-13 RX ADMIN — TOBRAMYCIN AND DEXAMETHASONE 1 DROP: 3; 1 SUSPENSION/ DROPS OPHTHALMIC at 09:45

## 2024-04-13 RX ADMIN — FUROSEMIDE 20 MG: 10 INJECTION, SOLUTION INTRAMUSCULAR; INTRAVENOUS at 09:44

## 2024-04-13 RX ADMIN — ALBUMIN (HUMAN) 25 G: 0.25 INJECTION, SOLUTION INTRAVENOUS at 23:02

## 2024-04-13 RX ADMIN — TOBRAMYCIN AND DEXAMETHASONE 1 DROP: 3; 1 SUSPENSION/ DROPS OPHTHALMIC at 20:18

## 2024-04-13 RX ADMIN — ATORVASTATIN CALCIUM 40 MG: 40 TABLET, FILM COATED ORAL at 20:17

## 2024-04-13 RX ADMIN — POTASSIUM CHLORIDE 20 MEQ: 1500 TABLET, EXTENDED RELEASE ORAL at 20:17

## 2024-04-13 RX ADMIN — MAGNESIUM OXIDE 400 MG: 400 TABLET ORAL at 20:16

## 2024-04-13 RX ADMIN — TOBRAMYCIN AND DEXAMETHASONE 1 DROP: 3; 1 SUSPENSION/ DROPS OPHTHALMIC at 14:00

## 2024-04-13 RX ADMIN — ACETAMINOPHEN 650 MG: 325 TABLET ORAL at 09:44

## 2024-04-13 RX ADMIN — TOBRAMYCIN AND DEXAMETHASONE 1 DROP: 3; 1 SUSPENSION/ DROPS OPHTHALMIC at 17:19

## 2024-04-13 RX ADMIN — HEPARIN SODIUM 16.94 UNITS/KG/HR: 10000 INJECTION, SOLUTION INTRAVENOUS at 07:28

## 2024-04-13 RX ADMIN — APIXABAN 10 MG: 5 TABLET, FILM COATED ORAL at 12:10

## 2024-04-13 RX ADMIN — MAGNESIUM OXIDE 400 MG: 400 TABLET ORAL at 09:44

## 2024-04-13 RX ADMIN — APIXABAN 10 MG: 5 TABLET, FILM COATED ORAL at 20:16

## 2024-04-13 RX ADMIN — MIDODRINE HYDROCHLORIDE 2.5 MG: 5 TABLET ORAL at 09:44

## 2024-04-13 RX ADMIN — POTASSIUM CHLORIDE 20 MEQ: 1500 TABLET, EXTENDED RELEASE ORAL at 09:44

## 2024-04-13 RX ADMIN — MIDODRINE HYDROCHLORIDE 2.5 MG: 5 TABLET ORAL at 12:10

## 2024-04-13 ASSESSMENT — PAIN DESCRIPTION - ORIENTATION: ORIENTATION: RIGHT

## 2024-04-13 ASSESSMENT — PAIN DESCRIPTION - DESCRIPTORS
DESCRIPTORS: DULL
DESCRIPTORS: STABBING
DESCRIPTORS: DULL

## 2024-04-13 ASSESSMENT — PAIN SCALES - GENERAL
PAINLEVEL_OUTOF10: 3
PAINLEVEL_OUTOF10: 3
PAINLEVEL_OUTOF10: 0
PAINLEVEL_OUTOF10: 0
PAINLEVEL_OUTOF10: 3

## 2024-04-13 ASSESSMENT — PAIN DESCRIPTION - LOCATION
LOCATION: CHEST

## 2024-04-13 NOTE — PLAN OF CARE
Problem: ABCDS Injury Assessment  Goal: Absence of physical injury  Outcome: Progressing     Problem: Safety - Adult  Goal: Free from fall injury  Outcome: Progressing     Problem: Chronic Conditions and Co-morbidities  Goal: Patient's chronic conditions and co-morbidity symptoms are monitored and maintained or improved  Outcome: Progressing     Problem: Pain  Goal: Verbalizes/displays adequate comfort level or baseline comfort level  Outcome: Progressing

## 2024-04-13 NOTE — H&P
[address] [address]                           HISTORY & PHYSICAL      PATIENT NAME: JESSICA SANCHEZ              : 1945  MED REC NO: [id number]                        ROOM: 0439  ACCOUNT NO: [id number]                       ADMIT DATE: 2024  PROVIDER: Luis Angel Bain DO      CHIEF COMPLAINT AND HISTORY OF CHIEF COMPLAINT:  This is a pleasant 78-year-old white female who was admitted to [facility].  The patient presented to the hospital here through the emergency room on 2024.  The patient presented to the hospital with increased amount of shortness of breath and difficulty breathing.  The patient has been hospitalized multiple times in the past.  Apparently last evening, she presented here late in the evening to the nearby nursing home.  The patient was complaining of history of chest pain.  The patient states that the chest pain has been present for the past several days.  It got progressively worse.  The patient presented to the emergency room here, was seen and evaluated.  CT imaging at that time did show a large right pleural effusion with acute pulmonary embolism of the left lower lobe.  The patient had evidence of peripheral edema and was admitted to the hospital at this time.  Consultation obtained with Dr. Hubbard who had seen her and recently discharged her on .  She was admitted under the service of Dr. Bain and Dr. Shirley.  The patient has been hospitalized here multiple times.  She has been seen by Dr. Hubbard on several different admissions.  She was hospitalized here from  through .  She was also hospitalized here prior to that several times.  The patient does relate to having open heart surgery performed at [facility].  Currently, she does complain of chest pain.  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  She has had chronic swelling in the lower extremities.  She does admit to shortness of breath, which could be a combination of bypass surgery and the large pleural effusion.  Respiratory wise, she does have a nonproductive cough.  Gastrointestinal wise, her appetite remains stable.  She has poor intake and does relate to having constipation.  Genitourinary wise, she does relate to frequency.  Neurologically, she denies any history of stroke, TIAs, etc.  Otherwise, she is being admitted at this time with diagnosis of right pleural effusion and left pulmonary embolism.    ALLERGIES:  SHE IS ALLERGIC TO PENICILLIN AND DOXYCYCLINE.      MEDICATIONS:  Prior to admission include Tylenol p.r.n., Lipitor 40 daily, Dulcolax as needed, Omnicef 300 q.12, Plavix 75 daily, iron supplement daily, Apresoline 10 t.i.d., magnesium oxide daily, Toprol-XL 25 daily, midodrine 2.5 p.r.n., potassium 20 daily, and Demadex 20 mg daily.    PAST MEDICAL HISTORY:  Positive for usual childhood diseases; history of acute on chronic kidney injury; coronary artery disease with status post bypass grafting; headache; hearing loss; rheumatoid arthritis with Sjogren's syndrome; chronic migraine; osteopenia.    PAST SURGICAL HISTORY:  Includes cardiac procedure on 2024, a left heart cardiac catheterization.  , she had coronary bypass grafting by Dr. Jasso.  She did have a tunneled dialysis catheter 2024.  Partial hysterectomy and vein ligation.    FAMILY HISTORY:  Parents are .    SOCIAL HISTORY:  The patient is currently , mother of 2 children.  Does not smoke.  Does not drink.  Currently resides in [facility].    LABORATORY AND DIAGNOSTIC DATA:  Review of the chart showed the

## 2024-04-13 NOTE — PROGRESS NOTES
Aptt drawn at 0134 had not resulted as of 0250. Phone call made to lab inquiring results per  sample was complete results to be verified by her in the system in order for nursing to adjust heparin gtt per algorithm. See MAR.

## 2024-04-13 NOTE — PLAN OF CARE
Problem: ABCDS Injury Assessment  Goal: Absence of physical injury  4/13/2024 0117 by Remedios Fritz, RN  Outcome: Progressing  4/13/2024 0038 by Roxanne De Los Santos, RN  Outcome: Progressing

## 2024-04-13 NOTE — PROGRESS NOTES
Physical Therapy Initial Evaluation/Plan of Care    Room #:  0439/0439-01  Patient Name: Katt Diaz  YOB: 1945  MRN: [id number]    Date of Service: 4/13/2024     Tentative placement recommendation: Subacute Rehab  Equipment recommendation: To be determined      Evaluating Physical Therapist: Maria Isabel Bach, PT #[id number]      Specific Provider Orders/Date/Referring Provider :  04/12/24 0845    PT eval and treat  Start:  04/12/24 0845,   End:  04/12/24 0845,   ONE TIME,   Standing Count:  1 Occurrences,   R       Luis Angel Bain DO    Admitting Diagnosis:   Pleural effusion [J90]  Chest pain, unspecified type [R07.9]  Acute pulmonary embolism without acute cor pulmonale, unspecified pulmonary embolism type (HCC) [I26.99]  Multiple subsegmental pulmonary emboli without acute cor pulmonale (HCC) [I26.94]     chest pain. Patient states she has had chest pain for the past couple days. She states the pain began in the right chest and now radiates across her chest. It worsens when she coughs and with deep inspiration.  Surgery:   4/12/2024  PROCEDURE: ULTRASOUNDGUIDED RIGHT THORACENTESIS 4/12/2024       Successful ultrasound guided thoracentesis.   Visit Diagnoses         Codes    Chest pain, unspecified type     R07.9            Patient Active Problem List   Diagnosis    S/P cardiac cath    Coronary artery disease involving native coronary artery of native heart without angina pectoris    ST elevation myocardial infarction (STEMI) (HCC)    Postoperative hypotension    Complication of surgical procedure    Stress hyperglycemia    VERONIQUE (acute kidney injury) (HCC)    Acute on chronic systolic heart failure (HCC)    Acute on chronic congestive heart failure (HCC)    Mild protein-calorie malnutrition (HCC)    Chronic systolic (congestive) heart failure (HCC)    Dyspnea on exertion    Failure to thrive in adult    Multiple subsegmental pulmonary emboli without acute cor pulmonale (HCC)    Ischemic  hygiene and depends change. Returned to bed.      Therapeutic Exercises:  not performed  x   reps.       At end of session, patient in bed with alarm call light and phone within reach,  all lines and tubes intact, nursing notified.      Patient would benefit from continued skilled Physical Therapy to improve functional independence and quality of life.         Patient's/ family goals   home    Time in  1003  Time out  1036    Total Treatment Time  13 minutes    Evaluation time includes thorough review of current medical information, gathering information on past medical history/social history and prior level of function, completion of standardized testing/informal observation of tasks, assessment of data, and development of Plan of care and goals.     CPT codes:  Low Complexity PT evaluation (42378)  Therapeutic activities (11239)   13 minutes  1 unit(s)    Maria Isabel Bach, PT

## 2024-04-13 NOTE — PROGRESS NOTES
INPATIENT CARDIOLOGY FOLLOW-UP    Name: Katt Diaz    Age: 78 y.o.    Date of Admission: 4/11/2024 11:35 PM    Date of Service: 4/13/2024    Primary Cardiologist: Dr Shaw    Chief Complaint: Follow-up for HFrEF, CAD    Interim History:  Does not feel well.  Has continuous chest tightness and feels short of breath and coughing.    Positive 430 mL according to documented I's and O's    Review of Systems:   Negative except as described above    Problem List:  Patient Active Problem List   Diagnosis    S/P cardiac cath    Coronary artery disease involving native coronary artery of native heart without angina pectoris    ST elevation myocardial infarction (STEMI) (HCC)    Postoperative hypotension    Complication of surgical procedure    Stress hyperglycemia    VERONIQUE (acute kidney injury) (HCC)    Acute on chronic systolic heart failure (HCC)    Acute on chronic congestive heart failure (HCC)    Mild protein-calorie malnutrition (HCC)    Chronic systolic (congestive) heart failure (HCC)    Dyspnea on exertion    Failure to thrive in adult    Multiple subsegmental pulmonary emboli without acute cor pulmonale (HCC)    Ischemic cardiomyopathy    Mitral valve disease    Stage 3b chronic kidney disease (HCC)    Pleural effusion       Current Medications:    Current Facility-Administered Medications:     heparin (porcine) injection 4,820 Units, 80 Units/kg, IntraVENous, PRN, Bam Stanley, DO    heparin (porcine) injection 2,410 Units, 40 Units/kg, IntraVENous, PRN, Bam Stanley, , 2,410 Units at 04/12/24 1928    heparin 25,000 units in dextrose 5% 250 mL (premix) infusion, 5-30 Units/kg/hr, IntraVENous, Continuous, Bam Stanley DO, Last Rate: 10.2 mL/hr at 04/13/24 0728, 16.944 Units/kg/hr at 04/13/24 0728    acetaminophen (TYLENOL) tablet 650 mg, 650 mg, Oral, Q4H PRN, Umberto Guevara APRN - CNP, 650 mg at 04/12/24 1606    atorvastatin (LIPITOR) tablet 40 mg, 40 mg, Oral, Nightly, Umberto Guevara APRN - CNP, 40 mg  service    Iam Cabezas MD, [facility]    NOTE: This report was transcribed using voice recognition software. Every effort was made to ensure accuracy; however, inadvertent computerized transcription errors may be present.

## 2024-04-13 NOTE — PROGRESS NOTES
Internal Medicine Progress Note    UNIQUE=[facility]    Luis Angel Bain D.O., SUNDEEP Madden D.O., SUNDEEP Heaton D.O.       Claudia Mccoy, MSN, APRN, NP-C  Chadwick Gomez, MSN, APRN-CNP  Umberto Guevara, MSN, APRN-CNP     Primary Care Physician: Luis Angel Rea DO   Admitting Physician:  Luis Angel Bain DO  Admission date and time: 4/11/2024 11:35 PM    Room:  [address] [place], [place] [address]  Admitting diagnosis: Pleural effusion [J90]  Chest pain, unspecified type [R07.9]  Acute pulmonary embolism without acute cor pulmonale, unspecified pulmonary embolism type (HCC) [I26.99]  Multiple subsegmental pulmonary emboli without acute cor pulmonale (HCC) [I26.94]    Patient Name: Katt Diaz  MRN: [id number]    Date of Service: 4/13/2024     Subjective:  Katt is a 78 y.o. female who was seen and examined today,4/13/2024, at the bedside.  Patient is resting comfortably.  She appears better today with less respiratory distress.  She is maintained on 4 L oxygen by nasal cannula.  She did have a thoracentesis done yesterday on her right side with the 1000 mL of fluid removal.    No family present during my examination.    Review of System:   Constitutional:   Denies fever or chills, weight loss or gain, positive fatigue.  HEENT:   Denies ear pain, sore throat, sinus or eye problems.  Cardiovascular:   Denies any chest pain, irregular heartbeats, or palpitations.   Respiratory:   Denies  coughing, sputum production, hemoptysis, or wheezing.  Improving shortness of breath  Gastrointestinal:   Denies nausea, vomiting, diarrhea, or constipation.  Denies any abdominal pain.  Genitourinary:    Denies any urgency, frequency, hematuria. Voiding  without difficulty.  Extremities:   Denies lower extremity swelling, edema or cyanosis.   Neurology:    Denies any headache or focal neurological deficits, Denies generalized weakness or memory difficulty.   Psch:

## 2024-04-14 LAB
ALBUMIN SERPL-MCNC: 3.3 G/DL (ref 3.5–5.2)
ALP SERPL-CCNC: 73 U/L (ref 35–104)
ALT SERPL-CCNC: 7 U/L (ref 0–32)
ANION GAP SERPL CALCULATED.3IONS-SCNC: 10 MMOL/L (ref 7–16)
AST SERPL-CCNC: 15 U/L (ref 0–31)
BASOPHILS # BLD: 0.04 K/UL (ref 0–0.2)
BASOPHILS NFR BLD: 1 % (ref 0–2)
BILIRUB SERPL-MCNC: 0.7 MG/DL (ref 0–1.2)
BUN SERPL-MCNC: 18 MG/DL (ref 6–23)
CALCIUM SERPL-MCNC: 8.4 MG/DL (ref 8.6–10.2)
CHLORIDE SERPL-SCNC: 99 MMOL/L (ref 98–107)
CO2 SERPL-SCNC: 22 MMOL/L (ref 22–29)
CREAT SERPL-MCNC: 1 MG/DL (ref 0.5–1)
EKG ATRIAL RATE: 73 BPM
EKG P AXIS: 88 DEGREES
EKG P-R INTERVAL: 120 MS
EKG Q-T INTERVAL: 424 MS
EKG QRS DURATION: 90 MS
EKG QTC CALCULATION (BAZETT): 467 MS
EKG R AXIS: 142 DEGREES
EKG T AXIS: -36 DEGREES
EKG VENTRICULAR RATE: 73 BPM
EOSINOPHIL # BLD: 0.06 K/UL (ref 0.05–0.5)
EOSINOPHILS RELATIVE PERCENT: 1 % (ref 0–6)
ERYTHROCYTE [DISTWIDTH] IN BLOOD BY AUTOMATED COUNT: 18.2 % (ref 11.5–15)
GFR SERPL CREATININE-BSD FRML MDRD: 55 ML/MIN/1.73M2
GLUCOSE SERPL-MCNC: 107 MG/DL (ref 74–99)
HCT VFR BLD AUTO: 29.4 % (ref 34–48)
HGB BLD-MCNC: 8.9 G/DL (ref 11.5–15.5)
IMM GRANULOCYTES # BLD AUTO: 0.03 K/UL (ref 0–0.58)
IMM GRANULOCYTES NFR BLD: 0 % (ref 0–5)
LYMPHOCYTES NFR BLD: 1.24 K/UL (ref 1.5–4)
LYMPHOCYTES RELATIVE PERCENT: 18 % (ref 20–42)
MCH RBC QN AUTO: 29 PG (ref 26–35)
MCHC RBC AUTO-ENTMCNC: 30.3 G/DL (ref 32–34.5)
MCV RBC AUTO: 95.8 FL (ref 80–99.9)
MICROORGANISM SPEC CULT: NO GROWTH
MICROORGANISM/AGENT SPEC: NORMAL
MONOCYTES NFR BLD: 0.58 K/UL (ref 0.1–0.95)
MONOCYTES NFR BLD: 8 % (ref 2–12)
NEUTROPHILS NFR BLD: 72 % (ref 43–80)
NEUTS SEG NFR BLD: 4.96 K/UL (ref 1.8–7.3)
PARTIAL THROMBOPLASTIN TIME: 35.1 SEC (ref 24.5–35.1)
PLATELET # BLD AUTO: 322 K/UL (ref 130–450)
PMV BLD AUTO: 10.5 FL (ref 7–12)
POTASSIUM SERPL-SCNC: 4.5 MMOL/L (ref 3.5–5)
PROT SERPL-MCNC: 6.9 G/DL (ref 6.4–8.3)
RBC # BLD AUTO: 3.07 M/UL (ref 3.5–5.5)
SODIUM SERPL-SCNC: 131 MMOL/L (ref 132–146)
SPECIMEN DESCRIPTION: NORMAL
WBC OTHER # BLD: 6.9 K/UL (ref 4.5–11.5)

## 2024-04-14 PROCEDURE — P9047 ALBUMIN (HUMAN), 25%, 50ML: HCPCS

## 2024-04-14 PROCEDURE — 6370000000 HC RX 637 (ALT 250 FOR IP)

## 2024-04-14 PROCEDURE — 6360000002 HC RX W HCPCS

## 2024-04-14 PROCEDURE — 80053 COMPREHEN METABOLIC PANEL: CPT

## 2024-04-14 PROCEDURE — 94640 AIRWAY INHALATION TREATMENT: CPT

## 2024-04-14 PROCEDURE — 2700000000 HC OXYGEN THERAPY PER DAY

## 2024-04-14 PROCEDURE — 85025 COMPLETE CBC W/AUTO DIFF WBC: CPT

## 2024-04-14 PROCEDURE — 1200000000 HC SEMI PRIVATE

## 2024-04-14 PROCEDURE — 97535 SELF CARE MNGMENT TRAINING: CPT

## 2024-04-14 PROCEDURE — 36415 COLL VENOUS BLD VENIPUNCTURE: CPT

## 2024-04-14 PROCEDURE — 85730 THROMBOPLASTIN TIME PARTIAL: CPT

## 2024-04-14 PROCEDURE — 97165 OT EVAL LOW COMPLEX 30 MIN: CPT

## 2024-04-14 PROCEDURE — 99233 SBSQ HOSP IP/OBS HIGH 50: CPT | Performed by: INTERNAL MEDICINE

## 2024-04-14 RX ORDER — OXYCODONE HYDROCHLORIDE AND ACETAMINOPHEN 5; 325 MG/1; MG/1
1 TABLET ORAL EVERY 4 HOURS PRN
Status: DISCONTINUED | OUTPATIENT
Start: 2024-04-14 | End: 2024-04-14

## 2024-04-14 RX ADMIN — POTASSIUM CHLORIDE 20 MEQ: 1500 TABLET, EXTENDED RELEASE ORAL at 20:13

## 2024-04-14 RX ADMIN — SACUBITRIL AND VALSARTAN 0.5 TABLET: 24; 26 TABLET, FILM COATED ORAL at 10:10

## 2024-04-14 RX ADMIN — MAGNESIUM OXIDE 400 MG: 400 TABLET ORAL at 20:17

## 2024-04-14 RX ADMIN — EMPAGLIFLOZIN 10 MG: 10 TABLET, FILM COATED ORAL at 18:52

## 2024-04-14 RX ADMIN — LEVOTHYROXINE SODIUM 100 MCG: 0.1 TABLET ORAL at 06:05

## 2024-04-14 RX ADMIN — IPRATROPIUM BROMIDE AND ALBUTEROL SULFATE 1 DOSE: .5; 2.5 SOLUTION RESPIRATORY (INHALATION) at 12:59

## 2024-04-14 RX ADMIN — ATORVASTATIN CALCIUM 40 MG: 40 TABLET, FILM COATED ORAL at 20:18

## 2024-04-14 RX ADMIN — CLOPIDOGREL BISULFATE 75 MG: 75 TABLET ORAL at 10:10

## 2024-04-14 RX ADMIN — METOPROLOL SUCCINATE 25 MG: 25 TABLET, EXTENDED RELEASE ORAL at 10:10

## 2024-04-14 RX ADMIN — TOBRAMYCIN AND DEXAMETHASONE 1 DROP: 3; 1 SUSPENSION/ DROPS OPHTHALMIC at 10:12

## 2024-04-14 RX ADMIN — SACUBITRIL AND VALSARTAN 0.5 TABLET: 24; 26 TABLET, FILM COATED ORAL at 20:13

## 2024-04-14 RX ADMIN — TOBRAMYCIN AND DEXAMETHASONE 1 DROP: 3; 1 SUSPENSION/ DROPS OPHTHALMIC at 13:22

## 2024-04-14 RX ADMIN — IPRATROPIUM BROMIDE AND ALBUTEROL SULFATE 1 DOSE: .5; 2.5 SOLUTION RESPIRATORY (INHALATION) at 06:38

## 2024-04-14 RX ADMIN — FUROSEMIDE 20 MG: 10 INJECTION, SOLUTION INTRAMUSCULAR; INTRAVENOUS at 10:13

## 2024-04-14 RX ADMIN — APIXABAN 10 MG: 5 TABLET, FILM COATED ORAL at 10:10

## 2024-04-14 RX ADMIN — PANTOPRAZOLE SODIUM 40 MG: 40 TABLET, DELAYED RELEASE ORAL at 06:05

## 2024-04-14 RX ADMIN — POTASSIUM CHLORIDE 20 MEQ: 1500 TABLET, EXTENDED RELEASE ORAL at 10:10

## 2024-04-14 RX ADMIN — TOBRAMYCIN AND DEXAMETHASONE 1 DROP: 3; 1 SUSPENSION/ DROPS OPHTHALMIC at 20:20

## 2024-04-14 RX ADMIN — APIXABAN 10 MG: 5 TABLET, FILM COATED ORAL at 20:17

## 2024-04-14 RX ADMIN — ALBUMIN (HUMAN) 25 G: 0.25 INJECTION, SOLUTION INTRAVENOUS at 13:28

## 2024-04-14 RX ADMIN — MAGNESIUM OXIDE 400 MG: 400 TABLET ORAL at 10:10

## 2024-04-14 RX ADMIN — ALBUMIN (HUMAN) 25 G: 0.25 INJECTION, SOLUTION INTRAVENOUS at 22:21

## 2024-04-14 RX ADMIN — TOBRAMYCIN AND DEXAMETHASONE 1 DROP: 3; 1 SUSPENSION/ DROPS OPHTHALMIC at 17:52

## 2024-04-14 RX ADMIN — MIDODRINE HYDROCHLORIDE 2.5 MG: 5 TABLET ORAL at 10:10

## 2024-04-14 RX ADMIN — IPRATROPIUM BROMIDE AND ALBUTEROL SULFATE 1 DOSE: .5; 2.5 SOLUTION RESPIRATORY (INHALATION) at 19:15

## 2024-04-14 ASSESSMENT — PAIN SCALES - GENERAL: PAINLEVEL_OUTOF10: 0

## 2024-04-14 NOTE — PROGRESS NOTES
Internal Medicine Progress Note    UNIQUE=[facility]    Luis Angel Bain D.O., SUNDEEP Madden D.O., SUNDEEP Heaton D.O.       Claudia Mccoy, MSN, APRN, NP-C  Chadwick Gomez, MSN, APRN-CNP  Umberto Guevara, MSN, APRN-CNP     Primary Care Physician: Luis Angel Rea DO   Admitting Physician:  Luis Angel Bain DO  Admission date and time: 4/11/2024 11:35 PM    Room:  [address] [place] [address]  Admitting diagnosis: Pleural effusion [J90]  Chest pain, unspecified type [R07.9]  Acute pulmonary embolism without acute cor pulmonale, unspecified pulmonary embolism type (HCC) [I26.99]  Multiple subsegmental pulmonary emboli without acute cor pulmonale (HCC) [I26.94]    Patient Name: Katt Diaz  MRN: [id number]    Date of Service: 4/14/2024     Subjective:  Katt is a 78 y.o. female who was seen and examined today,4/14/2024, at the bedside.  Patient sitting up in the chair denies any shortness of breath.  She has been weaned off her oxygen.  She was encouraged to ambulate.  Discontinue midodrine edema significantly improved.    No family present during my examination.    Review of System:   Constitutional:   Denies fever or chills, weight loss or gain, positive fatigue.  HEENT:   Denies ear pain, sore throat, sinus or eye problems.  Cardiovascular:   Denies any chest pain, irregular heartbeats, or palpitations.   Respiratory:   Denies  coughing, sputum production, hemoptysis, or wheezing.  Improving shortness of breath  Gastrointestinal:   Denies nausea, vomiting, diarrhea, or constipation.  Denies any abdominal pain.  Genitourinary:    Denies any urgency, frequency, hematuria. Voiding  without difficulty.  Extremities:   Denies lower extremity swelling, edema or cyanosis.   Neurology:    Denies any headache or focal neurological deficits, Denies generalized weakness or memory difficulty.   Psch:   Denies being anxious or depressed.  Musculoskeletal:   with coronary bypass grafting x3 with LIMA to the LAD, saphenous vein graft to obtuse marginal, saphenous vein graft to right RCA.  3. Acute exacerbation of chronic systolic congestive heart failure with impaired left ventricular ejection fraction of 30% to 35%.  4. Normochromic normocytic anemia.  5. Valvular heart disease with mild mitral regurgitation.  6. Chronically elevated troponin.  7. Hyperlipidemia, on statin agent.  8. Low blood pressure on midodrine.  9. Type 2 diabetes mellitus with associated peripheral neuropathy.  10. Primary hypothyroidism on hormone replacement therapy.  11. Rheumatoid arthritis with Sjogren's syndrome.  12. Chronic kidney disease with stage IIIB.  13. Anemia of chronic disease with iron deficiency component.  14. History of chronic migraines.  15. Hearing impairment.  16. Osteopenia    Plan:  Oxygen has been weaned off  Continue Eliquis   Aerosol treatments  Diuretic therapy with Lasix  Start Jardiance  Blood glucose remaining stable  Cardiology following  Monitor labs and vitals    Continue current therapy.  See orders for further plan of care.    More than 50% of my  time was spent at the bedside counseling/coordinating care with the patient and/or family with face to face contact.  This time was spent reviewing notes and laboratory data as well as instructing and counseling the patient. Time I spent with the family or surrogate(s) is included only if the patient was incapable of providing the necessary information or participating in medical decisions. I also discussed the differential diagnosis and all of the proposed management plans with the patient and individuals accompanying the patient.    The patient was seen, examined and then discussed with Dr. Bain.     PALMER Hewitt CNP  4/14/2024  6:15 PM        I saw and evaluated the patient. I agree with the findings and the plan of care as documented in Umberto CHAKRABORTY-CNP note.    Luis Angel Bain, DO, FACOI  6:26

## 2024-04-14 NOTE — PLAN OF CARE
Problem: ABCDS Injury Assessment  Goal: Absence of physical injury  Outcome: Progressing     Problem: Safety - Adult  Goal: Free from fall injury  Outcome: Progressing     Problem: Chronic Conditions and Co-morbidities  Goal: Patient's chronic conditions and co-morbidity symptoms are monitored and maintained or improved  Outcome: Progressing     Problem: Pain  Goal: Verbalizes/displays adequate comfort level or baseline comfort level  Outcome: Progressing     Problem: Skin/Tissue Integrity  Goal: Absence of new skin breakdown  Description: 1.  Monitor for areas of redness and/or skin breakdown  2.  Assess vascular access sites hourly  3.  Every 4-6 hours minimum:  Change oxygen saturation probe site  4.  Every 4-6 hours:  If on nasal continuous positive airway pressure, respiratory therapy assess nares and determine need for appliance change or resting period.  Outcome: Progressing

## 2024-04-14 NOTE — PROGRESS NOTES
OCCUPATIONAL THERAPY INITIAL EVALUATION    [facility]  [address] [place]. [place]        Date:2024                                                  Patient Name: Katt Diaz    MRN: [id number]    : 1945    Room: [address] [place] [address]      Evaluating OT: Alexia Gonzalez OTR/L; [id number]     Referring Provider and Specific Provider Orders/Date:      24  OT eval and treat  Start:  24,   End:  24,   ONE TIME,   Standing Count:  1 Occurrences,   R         Luis Angel Bain DO      Placement Recommendation: Subacute        Diagnosis:   1. Multiple subsegmental pulmonary emboli without acute cor pulmonale (HCC)    2. Pleural effusion    3. Chest pain, unspecified type         Surgery: none       Pertinent Medical History:       Past Medical History:   Diagnosis Date    VERONIQUE (acute kidney injury) ([facility]) 2024    CAD in native artery 2024    Headache     Hearing loss     Hx of rheumatoid arthritis     Migraine     Osteopenia     S/P CABG x 3     Sjogren's disease ([facility])          Past Surgical History:   Procedure Laterality Date    CARDIAC PROCEDURE N/A 2024    Left heart cath / coronary angiography performed by Arnav Shaw MD [place] [facility] [place] [place]    CORONARY ARTERY BYPASS GRAFT N/A 2024    CORONARY ARTERY BYPASS GRAFTING, EVH performed by Nicolasa Jasso DO at [facility] [place]    IR TUNNELED CATHETER PLACEMENT GREATER THAN 5 YEARS  2024    IR TUNNELED CATHETER PLACEMENT GREATER THAN 5 YEARS 2024 Cuong, REYNA Holguin MD [facility] [place] SPECIAL PROCEDURES    PARTIAL HYSTERECTOMY (CERVIX NOT REMOVED)      states     VEIN LIGATION AND STRIPPING      states       Precautions:  Fall Risk, life vest, s/p thoracentesis      Assessment of current deficits:     [x] Functional mobility  [x]ADLs  [x] Strength               []Cognition    [x] Functional transfers   [x] IADLs         [] Safety Awareness   [x]Endurance    [] Fine      [facility]   AM-PAC Daily Activity - Inpatient   How much help is needed for putting on and taking off regular lower body clothing?: Total  How much help is needed for bathing (which includes washing, rinsing, drying)?: A Lot  How much help is needed for toileting (which includes using toilet, bedpan, or urinal)?: A Lot  How much help is needed for putting on and taking off regular upper body clothing?: A Lot  How much help is needed for taking care of personal grooming?: A Little  How much help for eating meals?: None  AM-[facility] Inpatient Daily Activity Raw Score: 14  AM-PAC Inpatient ADL T-Scale Score : 33.39  ADL Inpatient CMS 0-100% Score: 59.67  ADL Inpatient CMS G-Code Modifier : CK     Functional Assessment:    Initial Eval Status  Date: 4/14/24 Treatment Status  Date: STGs = LTGs  Time frame: 10-14 days   Feeding Independent   Independent    Grooming Minimal Assist   Independent    UB Dressing Moderate Assist to tie gown and for gown management.    Independent    LB Dressing Dependent to doff incontinent garment.  Dependent to thread feet through new incontinent garment while seated on commode, pt then stood while garment was brought up around hips and waist.   Supervision    Bathing Maximal Assist  Minimal Assist    Toileting Moderate Assist for hygiene after having BM  Independent    Bed Mobility  Supine to sit: Supervision   Sit to supine: N/T as pt was up in chair   Rolling:N/T     Supine to sit: Independent   Sit to supine: Independent   Rolling:Independent     Functional Transfers Minimal Assist from EOB, pt declining using wheeled walker.   Minimal Assist for transfer to and from low commode with minimal verbal cues to use grab bar for safe commode transfer.   Minimal Assist for transfer from standing to bedside chair.   Transfer training with verbal cues for hand placement throughout session to improve safety.   Independent    Functional Mobility Minimal Assist with no device to and from bathroom and up

## 2024-04-14 NOTE — PROGRESS NOTES
INPATIENT CARDIOLOGY FOLLOW-UP    Name: Katt Diaz    Age: 78 y.o.    Date of Admission: 4/11/2024 11:35 PM    Date of Service: 4/14/2024    Primary Cardiologist: Dr Shaw    Chief Complaint: Follow-up for HFrEF, CAD    Interim History:  Does not feel well.  Has discomfort at the site of thoracentesis and being aggravated by the wearable defibrillator.  Feels short of breath.    Positive 1.9 L according to documented I's and O's    Review of Systems:   Negative except as described above    Problem List:  Patient Active Problem List   Diagnosis    S/P cardiac cath    Coronary artery disease involving native coronary artery of native heart without angina pectoris    ST elevation myocardial infarction (STEMI) (HCC)    Postoperative hypotension    Complication of surgical procedure    Stress hyperglycemia    VERONIQUE (acute kidney injury) (HCC)    Acute on chronic systolic heart failure (HCC)    Acute on chronic congestive heart failure (HCC)    Mild protein-calorie malnutrition (HCC)    Chronic systolic (congestive) heart failure (HCC)    Dyspnea on exertion    Failure to thrive in adult    Multiple subsegmental pulmonary emboli without acute cor pulmonale (HCC)    Ischemic cardiomyopathy    Mitral valve disease    Stage 3b chronic kidney disease (HCC)    Pleural effusion       Current Medications:    Current Facility-Administered Medications:     apixaban (ELIQUIS) tablet 10 mg, 10 mg, Oral, BID, 10 mg at 04/13/24 2016 **FOLLOWED BY** [START ON 4/20/2024] apixaban (ELIQUIS) tablet 5 mg, 5 mg, Oral, BID, Umberto Guevara APRN - CNP    sacubitril-valsartan (ENTRESTO) 24-26 MG per tablet 0.5 tablet, 0.5 tablet, Oral, BID, Umberto Guevara APRN - CNP, 0.5 tablet at 04/13/24 2017    albumin human 25% IV solution 25 g, 25 g, IntraVENous, Q12H, Umberto Guevara APRN - CNP, Stopped at 04/14/24 0002    furosemide (LASIX) injection 20 mg, 20 mg, IntraVENous, Daily, Umberto Guevara APRN - CNP    ipratropium 0.5 mg-albuterol 2.5 mg

## 2024-04-15 ENCOUNTER — HOSPITAL ENCOUNTER (OUTPATIENT)
Dept: OTHER | Age: 79
Setting detail: THERAPIES SERIES
Discharge: HOME | End: 2024-04-15

## 2024-04-15 ENCOUNTER — APPOINTMENT (OUTPATIENT)
Dept: GENERAL RADIOLOGY | Age: 79
DRG: 175 | End: 2024-04-15
Payer: MEDICARE

## 2024-04-15 LAB
ALBUMIN SERPL-MCNC: 3.5 G/DL (ref 3.5–5.2)
ALP SERPL-CCNC: 67 U/L (ref 35–104)
ALT SERPL-CCNC: 8 U/L (ref 0–32)
ANION GAP SERPL CALCULATED.3IONS-SCNC: 8 MMOL/L (ref 7–16)
AST SERPL-CCNC: 18 U/L (ref 0–31)
BASOPHILS # BLD: 0.03 K/UL (ref 0–0.2)
BASOPHILS NFR BLD: 1 % (ref 0–2)
BILIRUB SERPL-MCNC: 0.5 MG/DL (ref 0–1.2)
BNP SERPL-MCNC: 9070 PG/ML (ref 0–450)
BUN SERPL-MCNC: 15 MG/DL (ref 6–23)
CALCIUM SERPL-MCNC: 8.4 MG/DL (ref 8.6–10.2)
CHLORIDE SERPL-SCNC: 103 MMOL/L (ref 98–107)
CO2 SERPL-SCNC: 21 MMOL/L (ref 22–29)
CREAT SERPL-MCNC: 1 MG/DL (ref 0.5–1)
EOSINOPHIL # BLD: 0.08 K/UL (ref 0.05–0.5)
EOSINOPHILS RELATIVE PERCENT: 1 % (ref 0–6)
ERYTHROCYTE [DISTWIDTH] IN BLOOD BY AUTOMATED COUNT: 18.2 % (ref 11.5–15)
GFR SERPL CREATININE-BSD FRML MDRD: 57 ML/MIN/1.73M2
GLUCOSE SERPL-MCNC: 104 MG/DL (ref 74–99)
HCT VFR BLD AUTO: 26.2 % (ref 34–48)
HGB BLD-MCNC: 8.2 G/DL (ref 11.5–15.5)
IMM GRANULOCYTES # BLD AUTO: 0.03 K/UL (ref 0–0.58)
IMM GRANULOCYTES NFR BLD: 1 % (ref 0–5)
LYMPHOCYTES NFR BLD: 1.3 K/UL (ref 1.5–4)
LYMPHOCYTES RELATIVE PERCENT: 20 % (ref 20–42)
MCH RBC QN AUTO: 29.9 PG (ref 26–35)
MCHC RBC AUTO-ENTMCNC: 31.3 G/DL (ref 32–34.5)
MCV RBC AUTO: 95.6 FL (ref 80–99.9)
MONOCYTES NFR BLD: 0.68 K/UL (ref 0.1–0.95)
MONOCYTES NFR BLD: 10 % (ref 2–12)
NEUTROPHILS NFR BLD: 68 % (ref 43–80)
NEUTS SEG NFR BLD: 4.46 K/UL (ref 1.8–7.3)
PLATELET # BLD AUTO: 306 K/UL (ref 130–450)
PMV BLD AUTO: 9.5 FL (ref 7–12)
POTASSIUM SERPL-SCNC: 5.1 MMOL/L (ref 3.5–5)
PROT SERPL-MCNC: 6.8 G/DL (ref 6.4–8.3)
RBC # BLD AUTO: 2.74 M/UL (ref 3.5–5.5)
SODIUM SERPL-SCNC: 132 MMOL/L (ref 132–146)
WBC OTHER # BLD: 6.6 K/UL (ref 4.5–11.5)

## 2024-04-15 PROCEDURE — 71045 X-RAY EXAM CHEST 1 VIEW: CPT

## 2024-04-15 PROCEDURE — 94640 AIRWAY INHALATION TREATMENT: CPT

## 2024-04-15 PROCEDURE — 2700000000 HC OXYGEN THERAPY PER DAY

## 2024-04-15 PROCEDURE — 83880 ASSAY OF NATRIURETIC PEPTIDE: CPT

## 2024-04-15 PROCEDURE — 99233 SBSQ HOSP IP/OBS HIGH 50: CPT | Performed by: INTERNAL MEDICINE

## 2024-04-15 PROCEDURE — 80053 COMPREHEN METABOLIC PANEL: CPT

## 2024-04-15 PROCEDURE — 6370000000 HC RX 637 (ALT 250 FOR IP)

## 2024-04-15 PROCEDURE — 36415 COLL VENOUS BLD VENIPUNCTURE: CPT

## 2024-04-15 PROCEDURE — 6360000002 HC RX W HCPCS

## 2024-04-15 PROCEDURE — 1200000000 HC SEMI PRIVATE

## 2024-04-15 PROCEDURE — 6360000002 HC RX W HCPCS: Performed by: INTERNAL MEDICINE

## 2024-04-15 PROCEDURE — P9047 ALBUMIN (HUMAN), 25%, 50ML: HCPCS | Performed by: NURSE PRACTITIONER

## 2024-04-15 PROCEDURE — 6360000002 HC RX W HCPCS: Performed by: NURSE PRACTITIONER

## 2024-04-15 PROCEDURE — 85025 COMPLETE CBC W/AUTO DIFF WBC: CPT

## 2024-04-15 RX ORDER — ALBUMIN (HUMAN) 12.5 G/50ML
25 SOLUTION INTRAVENOUS EVERY 12 HOURS
Status: COMPLETED | OUTPATIENT
Start: 2024-04-15 | End: 2024-04-15

## 2024-04-15 RX ORDER — ENOXAPARIN SODIUM 100 MG/ML
1 INJECTION SUBCUTANEOUS 2 TIMES DAILY
Status: DISCONTINUED | OUTPATIENT
Start: 2024-04-15 | End: 2024-04-21

## 2024-04-15 RX ORDER — FUROSEMIDE 10 MG/ML
20 INJECTION INTRAMUSCULAR; INTRAVENOUS ONCE
Status: COMPLETED | OUTPATIENT
Start: 2024-04-15 | End: 2024-04-15

## 2024-04-15 RX ORDER — MAGNESIUM SULFATE 1 G/100ML
1000 INJECTION INTRAVENOUS PRN
Status: DISCONTINUED | OUTPATIENT
Start: 2024-04-15 | End: 2024-04-28 | Stop reason: HOSPADM

## 2024-04-15 RX ORDER — ENOXAPARIN SODIUM 100 MG/ML
1 INJECTION SUBCUTANEOUS 2 TIMES DAILY
Status: DISCONTINUED | OUTPATIENT
Start: 2024-04-15 | End: 2024-04-15

## 2024-04-15 RX ORDER — FUROSEMIDE 10 MG/ML
40 INJECTION INTRAMUSCULAR; INTRAVENOUS 2 TIMES DAILY
Status: DISPENSED | OUTPATIENT
Start: 2024-04-15 | End: 2024-04-20

## 2024-04-15 RX ORDER — POTASSIUM CHLORIDE 20 MEQ/1
40 TABLET, EXTENDED RELEASE ORAL PRN
Status: DISCONTINUED | OUTPATIENT
Start: 2024-04-15 | End: 2024-04-28 | Stop reason: HOSPADM

## 2024-04-15 RX ORDER — POTASSIUM CHLORIDE 7.45 MG/ML
10 INJECTION INTRAVENOUS PRN
Status: DISCONTINUED | OUTPATIENT
Start: 2024-04-15 | End: 2024-04-28 | Stop reason: HOSPADM

## 2024-04-15 RX ADMIN — SACUBITRIL AND VALSARTAN 0.5 TABLET: 24; 26 TABLET, FILM COATED ORAL at 21:52

## 2024-04-15 RX ADMIN — APIXABAN 10 MG: 5 TABLET, FILM COATED ORAL at 08:15

## 2024-04-15 RX ADMIN — ATORVASTATIN CALCIUM 40 MG: 40 TABLET, FILM COATED ORAL at 21:52

## 2024-04-15 RX ADMIN — ENOXAPARIN SODIUM 60 MG: 100 INJECTION SUBCUTANEOUS at 21:52

## 2024-04-15 RX ADMIN — EMPAGLIFLOZIN 10 MG: 10 TABLET, FILM COATED ORAL at 08:14

## 2024-04-15 RX ADMIN — TOBRAMYCIN AND DEXAMETHASONE 1 DROP: 3; 1 SUSPENSION/ DROPS OPHTHALMIC at 21:55

## 2024-04-15 RX ADMIN — ALBUMIN (HUMAN) 25 G: 0.25 INJECTION, SOLUTION INTRAVENOUS at 22:02

## 2024-04-15 RX ADMIN — MAGNESIUM OXIDE 400 MG: 400 TABLET ORAL at 08:15

## 2024-04-15 RX ADMIN — TOBRAMYCIN AND DEXAMETHASONE 1 DROP: 3; 1 SUSPENSION/ DROPS OPHTHALMIC at 13:00

## 2024-04-15 RX ADMIN — ALBUMIN (HUMAN) 25 G: 0.25 INJECTION, SOLUTION INTRAVENOUS at 10:31

## 2024-04-15 RX ADMIN — TOBRAMYCIN AND DEXAMETHASONE 1 DROP: 3; 1 SUSPENSION/ DROPS OPHTHALMIC at 18:09

## 2024-04-15 RX ADMIN — MAGNESIUM OXIDE 400 MG: 400 TABLET ORAL at 21:52

## 2024-04-15 RX ADMIN — PANTOPRAZOLE SODIUM 40 MG: 40 TABLET, DELAYED RELEASE ORAL at 05:57

## 2024-04-15 RX ADMIN — FUROSEMIDE 20 MG: 10 INJECTION, SOLUTION INTRAMUSCULAR; INTRAVENOUS at 10:06

## 2024-04-15 RX ADMIN — SACUBITRIL AND VALSARTAN 0.5 TABLET: 24; 26 TABLET, FILM COATED ORAL at 08:14

## 2024-04-15 RX ADMIN — TOBRAMYCIN AND DEXAMETHASONE 1 DROP: 3; 1 SUSPENSION/ DROPS OPHTHALMIC at 08:14

## 2024-04-15 RX ADMIN — IPRATROPIUM BROMIDE AND ALBUTEROL SULFATE 1 DOSE: .5; 2.5 SOLUTION RESPIRATORY (INHALATION) at 06:45

## 2024-04-15 RX ADMIN — FUROSEMIDE 20 MG: 10 INJECTION, SOLUTION INTRAMUSCULAR; INTRAVENOUS at 08:15

## 2024-04-15 RX ADMIN — POTASSIUM CHLORIDE 20 MEQ: 1500 TABLET, EXTENDED RELEASE ORAL at 08:15

## 2024-04-15 RX ADMIN — METOPROLOL SUCCINATE 25 MG: 25 TABLET, EXTENDED RELEASE ORAL at 08:14

## 2024-04-15 RX ADMIN — FUROSEMIDE 40 MG: 10 INJECTION, SOLUTION INTRAMUSCULAR; INTRAVENOUS at 18:09

## 2024-04-15 RX ADMIN — LEVOTHYROXINE SODIUM 100 MCG: 0.1 TABLET ORAL at 05:56

## 2024-04-15 ASSESSMENT — PAIN SCALES - WONG BAKER: WONGBAKER_NUMERICALRESPONSE: NO HURT

## 2024-04-15 ASSESSMENT — PAIN SCALES - GENERAL: PAINLEVEL_OUTOF10: 0

## 2024-04-15 NOTE — PLAN OF CARE
Problem: ABCDS Injury Assessment  Goal: Absence of physical injury  4/14/2024 2130 by Remedios Fritz, RN  Outcome: Progressing  4/14/2024 1458 by Elsa Mercado, RN  Outcome: Progressing

## 2024-04-15 NOTE — ACP (ADVANCE CARE PLANNING)
Advance Care Planning   Healthcare Decision Maker:    Primary Decision Maker: ROSE - Child - [phone]    Secondary Decision Maker: LOCO SANCHEZ SABRAA - Child - [phone]    Click here to complete Healthcare Decision Makers including selection of the Healthcare Decision Maker Relationship (ie \"Primary\").  Today we documented Decision Maker(s) consistent with ACP documents on file.     Electronically signed by ADAMA Jones on 4/15/2024 at 4:46 PM

## 2024-04-15 NOTE — PROGRESS NOTES
Physical Therapy  Physical Therapy    Room #:   0439/0439-01    Date: 4/15/2024       Patient Name: Katt Diaz  : 1945      MRN: [id number]     Patient unavailable for physical therapy treatment due to  patient wanting to see  with her son present . Will attempt PT treatment tomorrow. Thank you.      Toni Lopez  [facility]  LIC # [id number]

## 2024-04-15 NOTE — CARE COORDINATION
Case Management Assessment  Initial Evaluation    Date/Time of Evaluation: 4/15/2024 4:46 PM  Assessment Completed by: ADAMA Jones    If patient is discharged prior to next notation, then this note serves as note for discharge by case management.    Patient Name: Katt Diaz                   YOB: 1945  Diagnosis: Pleural effusion [J90]  Chest pain, unspecified type [R07.9]  Acute pulmonary embolism without acute cor pulmonale, unspecified pulmonary embolism type (HCC) [I26.99]  Multiple subsegmental pulmonary emboli without acute cor pulmonale (HCC) [I26.94]                   Date / Time: 4/11/2024 11:35 PM    Patient Admission Status: Inpatient   Readmission Risk (Low < 19, Mod (19-27), High > 27): Readmission Risk Score: 31.4    Current PCP: Luis Angel Rea, DO  PCP verified by CM? Yes    Chart Reviewed: Yes      History Provided by: Patient, Medical Record  Patient Orientation: Alert and Oriented, Person, Place, Situation, Self    Patient Cognition: Alert    Hospitalization in the last 30 days (Readmission):  Yes    Readmission Assessment  Number of Days since last admission?: 1-7 days  Previous Disposition: SNF  Who is being Interviewed: Patient  What was the patient's/caregiver's perception as to why they think they needed to return back to the hospital?: Other (Comment) (Pt has fluid in lungs)  Did you visit your Primary Care Physician after you left the hospital, before you returned this time?: No  Why weren't you able to visit your PCP?: Did not have an appointment  Did you see a specialist, such as Cardiac, Pulmonary, Orthopedic Physician, etc. after you left the hospital?: No  Who advised the patient to return to the hospital?: Skilled Unit  Does the patient report anything that got in the way of taking their medications?: No  In our efforts to provide the best possible care to you and others like you, can you think of anything that we could have done to help you after you

## 2024-04-15 NOTE — PROGRESS NOTES
INPATIENT CARDIOLOGY FOLLOW-UP    Name: Katt Diaz    Age: 78 y.o.    Date of Admission: 4/11/2024 11:35 PM    Date of Service: 4/14/2024    Primary Cardiologist: Dr Shaw    Chief Complaint: Follow-up for HFrEF, CAD    Interim History:  Does not feel well.  Still short of breath.    Positive 1 L according to documented I's and O's    Review of Systems:   Negative except as described above    Problem List:  Patient Active Problem List   Diagnosis    S/P cardiac cath    Coronary artery disease involving native coronary artery of native heart without angina pectoris    ST elevation myocardial infarction (STEMI) (HCC)    Postoperative hypotension    Complication of surgical procedure    Stress hyperglycemia    VERONIQUE (acute kidney injury) (HCC)    Acute on chronic systolic heart failure (HCC)    Acute on chronic congestive heart failure (HCC)    Mild protein-calorie malnutrition (HCC)    Chronic systolic (congestive) heart failure (HCC)    Dyspnea on exertion    Failure to thrive in adult    Multiple subsegmental pulmonary emboli without acute cor pulmonale (HCC)    Ischemic cardiomyopathy    Mitral valve disease    Stage 3b chronic kidney disease (HCC)    Pleural effusion       Current Medications:    Current Facility-Administered Medications:     apixaban (ELIQUIS) tablet 10 mg, 10 mg, Oral, BID, 10 mg at 04/13/24 2016 **FOLLOWED BY** [START ON 4/20/2024] apixaban (ELIQUIS) tablet 5 mg, 5 mg, Oral, BID, Umberto Guevara APRN - CNP    sacubitril-valsartan (ENTRESTO) 24-26 MG per tablet 0.5 tablet, 0.5 tablet, Oral, BID, Umberto Guevara APRN - CNP, 0.5 tablet at 04/13/24 2017    albumin human 25% IV solution 25 g, 25 g, IntraVENous, Q12H, Umberto Guevara APRN - CNP, Stopped at 04/14/24 0002    furosemide (LASIX) injection 20 mg, 20 mg, IntraVENous, Daily, Umberto Guevara APRN - CNP    ipratropium 0.5 mg-albuterol 2.5 mg (DUONEB) nebulizer solution 1 Dose, 1 Dose, Inhalation, Q4H WA RT, Umberto Guevara APRN - CNP, 1 Dose at      No results found for: \"EAG\"  Lab Results   Component Value Date    CHOL 89 2024    CHOL 105 2024     Lab Results   Component Value Date    TRIG 81 2024    TRIG 115 2024     Lab Results   Component Value Date    HDL 32 (L) 2024    HDL 31 (L) 2024     Lab Results   Component Value Date    LDLCHOLESTEROL 41 2024    LDLCHOLESTEROL 51 2024     Lab Results   Component Value Date    VLDL 16 2024    VLDL 23 2024     No results found for: \"CHOLHDLRATIO\"  Recent Labs     24  0021   PROBNP 9,946*       Cardiac Tests:    EK2024: Sinus rhythm 81 bpm.  Right axis.  Possible lateral infarct.  Possible inferior infarct    2024: Sinus rhythm 73 bpm with right axis.  Possible inferior infarct.  Possible anterior infarct.    Telemetry: Sinus rhythm 80s    Chest X-ray:   4/15/24  Impression:        Moderate to large volume right pleural effusion with increase in volume from  prior comparison. Probable small left effusion has also developed.     24  FINDINGS:  Right pleural effusion appears decreased status post thoracentesis.  No  pneumothorax is identified.  Lungs are stable in appearance.  Stable  cardiomegaly is noted.      Impression:       No pneumothorax status post right thoracentesis.     CTA chest 2024  Impression:        Acute pulmonary embolus within left lower lobe segmental/subsegmental  branches.  Overall clot burden is minimal.  No clear evidence of right heart  strain.    Large right pleural effusion which is partially loculated. Trace left  effusion.    Collapse of the right middle lobe and atelectasis of the right lower lobe.     Echocardiogram:   TTE 24 KA    Left Ventricle: The EF by visual approximation is 30 - 35%. Left ventricle is moderately dilated. Septal flattening in diastole consistent with right ventricular volume overload. Grade I diastolic dysfunction with normal LAP.    Right Ventricle: Right ventricle

## 2024-04-15 NOTE — PROGRESS NOTES
Internal Medicine Progress Note    UNIQUE=[facility]    Luis Angel Bain D.O., SUNDEEP Madden D.O., SUNDEEP Heaton D.O.    Claudia Mccoy, MSN, APRN, NP-C  Chadwick Gomez, MSN, APRN-CNP  Umberto Guevara, MSN, APRN, NP-C     Primary Care Physician: Luis Angel Rea DO   Admitting Physician:  Luis Angel Bain DO  Admission date and time: 4/11/2024 11:35 PM    Room:  [address] [place] [address]  Admitting diagnosis: Pleural effusion [J90]  Chest pain, unspecified type [R07.9]  Acute pulmonary embolism without acute cor pulmonale, unspecified pulmonary embolism type (HCC) [I26.99]  Multiple subsegmental pulmonary emboli without acute cor pulmonale (HCC) [I26.94]    Patient Name: Katt Diaz  MRN: [id number]    Date of Service: 4/15/2024     Subjective:  Katt is a 78 y.o. female who was seen and examined today,4/15/2024, at the bedside.  She remains edematous.  We have discussed chest x-ray results and she is frustrated with her ongoing fluid accumulation.  She understands that we will have to hold blood thinning medications prepare for eventual thoracentesis repeat if needed.  Cardiology recommendations have been noted as well.  No new symptoms or concerns. No family present during my examination.    Review of systems:  Constitutional:   Positive for fatigue and malaise , - fever/chills  HEENT:   Denies ear pain, sore throat, sinus or eye problems.  Cardiovascular:   Denies any chest pain, irregular heartbeats, or palpitations.   Respiratory:   + dyspnea at rest, + dyspnea on exertion, + coughing, - sputum, - hemoptysis  Gastrointestinal:   - nausea, -vomiting. - diarrhea, - constipation. + poor appetite and poor intake. - abdominal pain.  Genitourinary:    Denies any urgency, frequency, hematuria. Voiding  without difficulty.  Extremities:   Acute on chronic lower extremity swelling which is somewhat improved.   Neurology:    Denies any headache  does require midodrine for blood pressure support as well.  Chronic comorbidities, laboratory values and vital signs are being monitored and addressed accordingly.  Scheduled potassium replacements have been held as the patient has mild hyperkalemia. Continue current therapy.  See orders for further plan of care.    More than 50% of my  time was spent at the bedside counseling/coordinating care with the patient and/or family with face to face contact.  This time was spent reviewing notes and laboratory data as well as instructing and counseling the patient. Time I spent with the family or surrogate(s) is included only if the patient was incapable of providing the necessary information or participating in medical decisions. I also discussed the differential diagnosis and all of the proposed management plans with the patient and individuals accompanying the patient.    Katt requires this high level of physician care and nursing on the IMC/Telemetry unit due the complexity of decision management and chance of rapid decline or death.  Continued cardiac monitoring and higher level of nursing are required. I am readily available for any further decision-making and intervention.     PALMER Woodruff - CNP  4/15/2024  10:09 AM

## 2024-04-15 NOTE — CARE COORDINATION
4/15/2024 1536 CM note: Pt and son Endy at bedside requesting transfer to [facility] regarding her \"heart valve\". CM explained that patients need to have criteria to transfer to higher level of care and that if she has criteria, a [facility] Physician would need to accept her. Outpatient work up explained and pt/son stated she keeps coming back to hospital and they want another opinion. [facility]lead informed and will notify Dr. Madden. Molly HORTA

## 2024-04-16 ENCOUNTER — APPOINTMENT (OUTPATIENT)
Dept: GENERAL RADIOLOGY | Age: 79
DRG: 175 | End: 2024-04-16
Payer: MEDICARE

## 2024-04-16 ENCOUNTER — APPOINTMENT (OUTPATIENT)
Dept: INTERVENTIONAL RADIOLOGY/VASCULAR | Age: 79
DRG: 175 | End: 2024-04-16
Payer: MEDICARE

## 2024-04-16 LAB
ALBUMIN SERPL-MCNC: 3.8 G/DL (ref 3.5–5.2)
ALP SERPL-CCNC: 67 U/L (ref 35–104)
ALT SERPL-CCNC: 9 U/L (ref 0–32)
ANION GAP SERPL CALCULATED.3IONS-SCNC: 13 MMOL/L (ref 7–16)
AST SERPL-CCNC: 21 U/L (ref 0–31)
BASOPHILS # BLD: 0.04 K/UL (ref 0–0.2)
BASOPHILS NFR BLD: 1 % (ref 0–2)
BILIRUB SERPL-MCNC: 0.8 MG/DL (ref 0–1.2)
BUN SERPL-MCNC: 19 MG/DL (ref 6–23)
CALCIUM SERPL-MCNC: 9 MG/DL (ref 8.6–10.2)
CHLORIDE SERPL-SCNC: 99 MMOL/L (ref 98–107)
CO2 SERPL-SCNC: 21 MMOL/L (ref 22–29)
CREAT SERPL-MCNC: 1.1 MG/DL (ref 0.5–1)
EOSINOPHIL # BLD: 0.1 K/UL (ref 0.05–0.5)
EOSINOPHILS RELATIVE PERCENT: 2 % (ref 0–6)
ERYTHROCYTE [DISTWIDTH] IN BLOOD BY AUTOMATED COUNT: 17.9 % (ref 11.5–15)
GFR SERPL CREATININE-BSD FRML MDRD: 52 ML/MIN/1.73M2
GLUCOSE SERPL-MCNC: 103 MG/DL (ref 74–99)
HCT VFR BLD AUTO: 28.8 % (ref 34–48)
HGB BLD-MCNC: 9.1 G/DL (ref 11.5–15.5)
IMM GRANULOCYTES # BLD AUTO: 0.03 K/UL (ref 0–0.58)
IMM GRANULOCYTES NFR BLD: 1 % (ref 0–5)
LYMPHOCYTES NFR BLD: 1.24 K/UL (ref 1.5–4)
LYMPHOCYTES RELATIVE PERCENT: 20 % (ref 20–42)
MCH RBC QN AUTO: 29.7 PG (ref 26–35)
MCHC RBC AUTO-ENTMCNC: 31.6 G/DL (ref 32–34.5)
MCV RBC AUTO: 94.1 FL (ref 80–99.9)
MONOCYTES NFR BLD: 0.55 K/UL (ref 0.1–0.95)
MONOCYTES NFR BLD: 9 % (ref 2–12)
NEUTROPHILS NFR BLD: 68 % (ref 43–80)
NEUTS SEG NFR BLD: 4.2 K/UL (ref 1.8–7.3)
NON-GYN CYTOLOGY REPORT: NORMAL
PLATELET, FLUORESCENCE: 335 K/UL (ref 130–450)
PMV BLD AUTO: 9.7 FL (ref 7–12)
POTASSIUM SERPL-SCNC: 4.1 MMOL/L (ref 3.5–5)
PROT SERPL-MCNC: 7.2 G/DL (ref 6.4–8.3)
RBC # BLD AUTO: 3.06 M/UL (ref 3.5–5.5)
SODIUM SERPL-SCNC: 133 MMOL/L (ref 132–146)
WBC OTHER # BLD: 6.2 K/UL (ref 4.5–11.5)

## 2024-04-16 PROCEDURE — 80053 COMPREHEN METABOLIC PANEL: CPT

## 2024-04-16 PROCEDURE — 0W993ZZ DRAINAGE OF RIGHT PLEURAL CAVITY, PERCUTANEOUS APPROACH: ICD-10-PCS | Performed by: RADIOLOGY

## 2024-04-16 PROCEDURE — 6360000002 HC RX W HCPCS: Performed by: NURSE PRACTITIONER

## 2024-04-16 PROCEDURE — 71045 X-RAY EXAM CHEST 1 VIEW: CPT

## 2024-04-16 PROCEDURE — 97530 THERAPEUTIC ACTIVITIES: CPT

## 2024-04-16 PROCEDURE — 99233 SBSQ HOSP IP/OBS HIGH 50: CPT | Performed by: INTERNAL MEDICINE

## 2024-04-16 PROCEDURE — 6370000000 HC RX 637 (ALT 250 FOR IP)

## 2024-04-16 PROCEDURE — 2700000000 HC OXYGEN THERAPY PER DAY

## 2024-04-16 PROCEDURE — 32555 ASPIRATE PLEURA W/ IMAGING: CPT

## 2024-04-16 PROCEDURE — C1729 CATH, DRAINAGE: HCPCS

## 2024-04-16 PROCEDURE — 6360000002 HC RX W HCPCS: Performed by: INTERNAL MEDICINE

## 2024-04-16 PROCEDURE — 1200000000 HC SEMI PRIVATE

## 2024-04-16 PROCEDURE — 85025 COMPLETE CBC W/AUTO DIFF WBC: CPT

## 2024-04-16 PROCEDURE — 94640 AIRWAY INHALATION TREATMENT: CPT

## 2024-04-16 PROCEDURE — 36415 COLL VENOUS BLD VENIPUNCTURE: CPT

## 2024-04-16 RX ADMIN — TOBRAMYCIN AND DEXAMETHASONE 1 DROP: 3; 1 SUSPENSION/ DROPS OPHTHALMIC at 07:46

## 2024-04-16 RX ADMIN — FUROSEMIDE 40 MG: 10 INJECTION, SOLUTION INTRAMUSCULAR; INTRAVENOUS at 17:20

## 2024-04-16 RX ADMIN — TOBRAMYCIN AND DEXAMETHASONE 1 DROP: 3; 1 SUSPENSION/ DROPS OPHTHALMIC at 17:20

## 2024-04-16 RX ADMIN — MAGNESIUM OXIDE 400 MG: 400 TABLET ORAL at 07:47

## 2024-04-16 RX ADMIN — SACUBITRIL AND VALSARTAN 0.5 TABLET: 24; 26 TABLET, FILM COATED ORAL at 07:47

## 2024-04-16 RX ADMIN — TOBRAMYCIN AND DEXAMETHASONE 1 DROP: 3; 1 SUSPENSION/ DROPS OPHTHALMIC at 21:22

## 2024-04-16 RX ADMIN — IPRATROPIUM BROMIDE AND ALBUTEROL SULFATE 1 DOSE: .5; 2.5 SOLUTION RESPIRATORY (INHALATION) at 19:00

## 2024-04-16 RX ADMIN — SACUBITRIL AND VALSARTAN 0.5 TABLET: 24; 26 TABLET, FILM COATED ORAL at 21:22

## 2024-04-16 RX ADMIN — IPRATROPIUM BROMIDE AND ALBUTEROL SULFATE 1 DOSE: .5; 2.5 SOLUTION RESPIRATORY (INHALATION) at 06:55

## 2024-04-16 RX ADMIN — ATORVASTATIN CALCIUM 40 MG: 40 TABLET, FILM COATED ORAL at 21:22

## 2024-04-16 RX ADMIN — ACETAMINOPHEN 650 MG: 325 TABLET ORAL at 17:22

## 2024-04-16 RX ADMIN — EMPAGLIFLOZIN 10 MG: 10 TABLET, FILM COATED ORAL at 07:48

## 2024-04-16 RX ADMIN — MAGNESIUM OXIDE 400 MG: 400 TABLET ORAL at 21:22

## 2024-04-16 RX ADMIN — ENOXAPARIN SODIUM 60 MG: 100 INJECTION SUBCUTANEOUS at 07:49

## 2024-04-16 RX ADMIN — ENOXAPARIN SODIUM 60 MG: 100 INJECTION SUBCUTANEOUS at 21:22

## 2024-04-16 RX ADMIN — PANTOPRAZOLE SODIUM 40 MG: 40 TABLET, DELAYED RELEASE ORAL at 06:21

## 2024-04-16 RX ADMIN — FUROSEMIDE 40 MG: 10 INJECTION, SOLUTION INTRAMUSCULAR; INTRAVENOUS at 07:49

## 2024-04-16 RX ADMIN — TOBRAMYCIN AND DEXAMETHASONE 1 DROP: 3; 1 SUSPENSION/ DROPS OPHTHALMIC at 12:01

## 2024-04-16 RX ADMIN — LEVOTHYROXINE SODIUM 100 MCG: 0.1 TABLET ORAL at 06:21

## 2024-04-16 RX ADMIN — METOPROLOL SUCCINATE 25 MG: 25 TABLET, EXTENDED RELEASE ORAL at 07:47

## 2024-04-16 ASSESSMENT — PAIN SCALES - GENERAL
PAINLEVEL_OUTOF10: 7
PAINLEVEL_OUTOF10: 0

## 2024-04-16 ASSESSMENT — PAIN SCALES - WONG BAKER: WONGBAKER_NUMERICALRESPONSE: NO HURT

## 2024-04-16 NOTE — PROGRESS NOTES
Physical Therapy Treatment Note/Plan of Care    Room #:  0439/0439-01  Patient Name: Katt Diaz  YOB: 1945  MRN: [id number]    Date of Service: 4/16/2024     Tentative placement recommendation: Subacute Rehab  Equipment recommendation: To be determined      Evaluating Physical Therapist: Maria Isabel Bach, PT #[id number]      Specific Provider Orders/Date/Referring Provider :  04/12/24 0845    PT eval and treat  Start:  04/12/24 0845,   End:  04/12/24 0845,   ONE TIME,   Standing Count:  1 Occurrences,   R       Luis Angel Bain DO    Admitting Diagnosis:   Pleural effusion [J90]  Chest pain, unspecified type [R07.9]  Acute pulmonary embolism without acute cor pulmonale, unspecified pulmonary embolism type (HCC) [I26.99]  Multiple subsegmental pulmonary emboli without acute cor pulmonale (HCC) [I26.94]     chest pain. Patient states she has had chest pain for the past couple days. She states the pain began in the right chest and now radiates across her chest. It worsens when she coughs and with deep inspiration.  Surgery:   4/12/2024  PROCEDURE: ULTRASOUNDGUIDED RIGHT THORACENTESIS 4/12/2024       Successful ultrasound guided thoracentesis.   Visit Diagnoses         Codes    Chest pain, unspecified type     R07.9            Patient Active Problem List   Diagnosis    S/P cardiac cath    Coronary artery disease involving native coronary artery of native heart without angina pectoris    ST elevation myocardial infarction (STEMI) (HCC)    Postoperative hypotension    Complication of surgical procedure    Stress hyperglycemia    VERONIQUE (acute kidney injury) (HCC)    Acute on chronic systolic heart failure (HCC)    Acute on chronic congestive heart failure (HCC)    Mild protein-calorie malnutrition (HCC)    Chronic systolic (congestive) heart failure (HCC)    Dyspnea on exertion    Failure to thrive in adult    Multiple subsegmental pulmonary emboli (HCC)    Ischemic cardiomyopathy    Mitral valve disease     bed elevated:     Rolling: Supervision    Supine to sit: Supervision    Sit to supine: Not assessed patient in chair   Scooting: Supervision     Rolling: Independent    Supine to sit: Independent    Sit to supine: Independent    Scooting: Independent     Transfers Sit to stand: Minimal assist of 1 Cues for hand placement and safety  Sit to stand: Minimal assist of 1 Cues for hand placement and safety     Sit to stand: Independent     Ambulation     4 side steps using  wheeled walker with Minimal assist of 1   for walker control and balance, Patient with shuffling steps and flexed posture, and cues for upright posture and safety 2 x 30 feet using  no device with Minimal assist of 1   cues for upright posture, safety, pacing, and pursed lip breathing     40 feet using  wheeled walker with Supervision       Stair negotiation: ascended and descended   Not assessed          ROM Within functional limits    Increase range of motion 10% of affected joints    Strength BUE:  refer to OT eval  RLE:  3+/5  LLE:  3+/5  Increase strength in affected mm groups by 1/3 grade   Balance Sitting EOB:  good - flexed posture  Dynamic Standing:  fair +wheeled walker  Sitting EOB: good -  Dynamic Standing: fair + using no assistive device   Sitting EOB:  good    Dynamic Standing: good -wheeled walker      Patient is Alert & Oriented x person, place, time, and situation and follows directions    Sensation:  Patient  denies numbness/tingling   Edema:  yes bilateral lower extremities   Endurance: fair       Vitals:  4 liters high flow nasal cannula   Blood Pressure at rest  Blood Pressure during session    Heart Rate at rest  Heart Rate during session    SPO2 at rest 93%  SPO2 during session 93 - 96%     Patient education  Patient educated on role of Physical Therapy, risks of immobility, safety and plan of care,  importance of mobility while in hospital , and positioning for skin integrity and comfort     Patient response to education:   Pt

## 2024-04-16 NOTE — PROGRESS NOTES
Attempted tx with pt, however OOR at specials.  Will attempt tx with pt at later time/date. .mys

## 2024-04-16 NOTE — PROGRESS NOTES
Patient came down to Special Procedures for ultrasound guided right thoracentesis.    Procedure was explained, questions were answered.    1402   Starting procedure /50  85  25  95% on room air    1420   Ending procedure VS 93/44  76  24  95% on room air    900 cc of bloody color pleural fluid drained from patient, petrolatum dressing folded 4 x 4 and tegaderm applied to right back.     Patients DSD dressing can be removed in 24 hours    Patient tolerated procedure    Post procedure chest xray taken    Nurse to nurse called spoke with Kim RN, nurse notified of above information    Patient transported back to floor.

## 2024-04-16 NOTE — PROGRESS NOTES
Internal Medicine Progress Note    UNIQUE=[facility]    Luis Angel Bain D.O., SUNDEEP Madden D.O., SUNDEEP Heaton D.O.    Claudia Mccoy, MSN, APRN, NP-C  Chadwick Gomez, MSN, APRN-CNP  Umberto Guevara, MSN, APRN, NP-C     Primary Care Physician: Luis Angel Rea DO   Admitting Physician:  Luis Angel Bain DO  Admission date and time: 4/11/2024 11:35 PM    Room:  [address] [place] [address]  Admitting diagnosis: Pleural effusion [J90]  Chest pain, unspecified type [R07.9]  Acute pulmonary embolism without acute cor pulmonale, unspecified pulmonary embolism type (HCC) [I26.99]  Multiple subsegmental pulmonary emboli without acute cor pulmonale (HCC) [I26.94]    Patient Name: Katt Diaz  MRN: [id number]    Date of Service: 4/16/2024     Subjective:  Katt is a 78 y.o. female who was seen and examined today,4/16/2024, at the bedside.  Her volume status has improved.  She has multiple questions regarding her cardiac disease process we have answered some of them to her satisfaction at bedside but she has other significant questions that she believes can only be answered well by the cardiologist and we have deferred these to their service.  No new symptoms or concerns. No family present during my examination.    Review of systems:  Constitutional:   Positive for fatigue and malaise , - fever/chills  HEENT:   Denies ear pain, sore throat, sinus or eye problems.  Cardiovascular:   Denies any chest pain, irregular heartbeats, or palpitations.   Respiratory:   - dyspnea at rest, + but improved dyspnea on exertion, - coughing, - sputum, - hemoptysis  Gastrointestinal:   - nausea, -vomiting. - diarrhea, - constipation. + poor appetite and poor intake. - abdominal pain.  Genitourinary:    Denies any urgency, frequency, hematuria. Voiding  without difficulty.  Extremities:   Acute on chronic lower extremity swelling which is somewhat improved.   Neurology:     Denies any headache or focal neurological deficits, positive for generalized weakness and fatigue without focal component  Psch:   Denies being anxious or depressed.  Musculoskeletal:    Denies  myalgias, joint complaints or back pain.   Integumentary:   Denies any rashes, ulcers, or excoriations.  Denies bruising.  Hematologic/Lymphatic:  Denies bruising or bleeding.      Physical Exam:  I/O this shift:  In: 120 [P.O.:120]  Out: 450 [Urine:450]    Intake/Output Summary (Last 24 hours) at 4/16/2024 1013  Last data filed at 4/16/2024 0708  Gross per 24 hour   Intake 180 ml   Output 2300 ml   Net -2120 ml     I/O last 3 completed shifts:  In: 280 [P.O.:180; IV Piggyback:100]  Out: 2550 [Urine:2550]  Patient Vitals for the past 96 hrs (Last 3 readings):   Weight   04/13/24 0111 60 kg (132 lb 4.1 oz)       Vital Signs:   Blood pressure 124/61, pulse 80, temperature 99.2 °F (37.3 °C), temperature source Oral, resp. rate 22, height 1.575 m (5' 2\"), weight 60 kg (132 lb 4.1 oz), SpO2 95 %.    General appearance:  Alert, responsive, oriented to person, place, and time.  Acute on chronic ill appearance, no distress.  Head:  Normocephalic. No masses, lesions or tenderness.  Eyes:  PERRLA.  EOMI.  Sclera clear.    ENT:  Ears normal. Mucosa normal.  Neck:    Supple. Trachea midline. No thyromegaly. No JVD. No bruits.  Heart:    Rhythm regular. Rate controlled.  S1 and S2  Lungs:    Symmetrical.  Air exchange is diminished on the right more so on the left.  No rales on today's exam.    Abdomen:   Soft. Non-tender. Non-distended. Bowel sounds positive. No organomegaly or masses.  No pain on palpation.  Extremities:    Peripheral pulses present.  Interval improvement in degree of lower extremity pitting peripheral edema.  No ulcers. No cyanosis. No clubbing.  Neurologic:    Alert x 3.  Mild generalized weakness without focal deficit.  Cranial nerves grossly intact. No focal weakness.  Psych:   Behavior is normal. Mood appears

## 2024-04-16 NOTE — CARE COORDINATION
SS NOTE 4/16/2024 (SF)- FRED met with pt at bedside to discuss dc planning. Pt is wanting to speak with Dr. Hubbard and Cardiology regarding poss tx options/higher level of care. Pt is from [facility]. SW discussed returning to SNF, which pt is NOT agreeable to. Pt adamant that she wants to return home on dc.  Pt declined to participate with therapy this am. Pt resides at [facility]. Pt did not want to discuss dc planning until she speaks with Dr. Hubbard & Cardiology. SW will follow for dc planning needs.

## 2024-04-17 ENCOUNTER — APPOINTMENT (OUTPATIENT)
Dept: CT IMAGING | Age: 79
DRG: 175 | End: 2024-04-17
Payer: MEDICARE

## 2024-04-17 LAB
ALBUMIN SERPL-MCNC: 3.4 G/DL (ref 3.5–5.2)
ALP SERPL-CCNC: 73 U/L (ref 35–104)
ALT SERPL-CCNC: 16 U/L (ref 0–32)
ANION GAP SERPL CALCULATED.3IONS-SCNC: 15 MMOL/L (ref 7–16)
AST SERPL-CCNC: 34 U/L (ref 0–31)
BASOPHILS # BLD: 0.03 K/UL (ref 0–0.2)
BASOPHILS NFR BLD: 1 % (ref 0–2)
BILIRUB SERPL-MCNC: 0.5 MG/DL (ref 0–1.2)
BUN SERPL-MCNC: 25 MG/DL (ref 6–23)
CALCIUM SERPL-MCNC: 8.5 MG/DL (ref 8.6–10.2)
CHLORIDE SERPL-SCNC: 99 MMOL/L (ref 98–107)
CO2 SERPL-SCNC: 21 MMOL/L (ref 22–29)
CREAT SERPL-MCNC: 1.2 MG/DL (ref 0.5–1)
EOSINOPHIL # BLD: 0.08 K/UL (ref 0.05–0.5)
EOSINOPHILS RELATIVE PERCENT: 1 % (ref 0–6)
ERYTHROCYTE [DISTWIDTH] IN BLOOD BY AUTOMATED COUNT: 17.8 % (ref 11.5–15)
GFR SERPL CREATININE-BSD FRML MDRD: 47 ML/MIN/1.73M2
GLUCOSE SERPL-MCNC: 110 MG/DL (ref 74–99)
HCT VFR BLD AUTO: 27.4 % (ref 34–48)
HGB BLD-MCNC: 8.3 G/DL (ref 11.5–15.5)
IMM GRANULOCYTES # BLD AUTO: 0.03 K/UL (ref 0–0.58)
IMM GRANULOCYTES NFR BLD: 1 % (ref 0–5)
LYMPHOCYTES NFR BLD: 1.13 K/UL (ref 1.5–4)
LYMPHOCYTES RELATIVE PERCENT: 20 % (ref 20–42)
MCH RBC QN AUTO: 28.6 PG (ref 26–35)
MCHC RBC AUTO-ENTMCNC: 30.3 G/DL (ref 32–34.5)
MCV RBC AUTO: 94.5 FL (ref 80–99.9)
MONOCYTES NFR BLD: 0.49 K/UL (ref 0.1–0.95)
MONOCYTES NFR BLD: 9 % (ref 2–12)
NEUTROPHILS NFR BLD: 69 % (ref 43–80)
NEUTS SEG NFR BLD: 3.89 K/UL (ref 1.8–7.3)
PLATELET # BLD AUTO: 327 K/UL (ref 130–450)
PMV BLD AUTO: 10 FL (ref 7–12)
POTASSIUM SERPL-SCNC: 3.8 MMOL/L (ref 3.5–5)
PROT SERPL-MCNC: 6.7 G/DL (ref 6.4–8.3)
RBC # BLD AUTO: 2.9 M/UL (ref 3.5–5.5)
REPORT STATUS: NORMAL
SODIUM SERPL-SCNC: 135 MMOL/L (ref 132–146)
WBC OTHER # BLD: 5.7 K/UL (ref 4.5–11.5)

## 2024-04-17 PROCEDURE — 71250 CT THORAX DX C-: CPT

## 2024-04-17 PROCEDURE — 97530 THERAPEUTIC ACTIVITIES: CPT

## 2024-04-17 PROCEDURE — 36415 COLL VENOUS BLD VENIPUNCTURE: CPT

## 2024-04-17 PROCEDURE — 1200000000 HC SEMI PRIVATE

## 2024-04-17 PROCEDURE — 6360000002 HC RX W HCPCS: Performed by: NURSE PRACTITIONER

## 2024-04-17 PROCEDURE — 97535 SELF CARE MNGMENT TRAINING: CPT

## 2024-04-17 PROCEDURE — 85025 COMPLETE CBC W/AUTO DIFF WBC: CPT

## 2024-04-17 PROCEDURE — 6370000000 HC RX 637 (ALT 250 FOR IP)

## 2024-04-17 PROCEDURE — 99223 1ST HOSP IP/OBS HIGH 75: CPT | Performed by: INTERNAL MEDICINE

## 2024-04-17 PROCEDURE — 6360000002 HC RX W HCPCS: Performed by: INTERNAL MEDICINE

## 2024-04-17 PROCEDURE — 2700000000 HC OXYGEN THERAPY PER DAY

## 2024-04-17 PROCEDURE — 99233 SBSQ HOSP IP/OBS HIGH 50: CPT | Performed by: INTERNAL MEDICINE

## 2024-04-17 PROCEDURE — 94640 AIRWAY INHALATION TREATMENT: CPT

## 2024-04-17 PROCEDURE — 80053 COMPREHEN METABOLIC PANEL: CPT

## 2024-04-17 RX ORDER — MIDODRINE HYDROCHLORIDE 5 MG/1
5 TABLET ORAL
Status: DISCONTINUED | OUTPATIENT
Start: 2024-04-17 | End: 2024-04-28 | Stop reason: HOSPADM

## 2024-04-17 RX ADMIN — PANTOPRAZOLE SODIUM 40 MG: 40 TABLET, DELAYED RELEASE ORAL at 06:13

## 2024-04-17 RX ADMIN — ATORVASTATIN CALCIUM 40 MG: 40 TABLET, FILM COATED ORAL at 21:17

## 2024-04-17 RX ADMIN — TOBRAMYCIN AND DEXAMETHASONE 1 DROP: 3; 1 SUSPENSION/ DROPS OPHTHALMIC at 09:11

## 2024-04-17 RX ADMIN — ACETAMINOPHEN 650 MG: 325 TABLET ORAL at 10:22

## 2024-04-17 RX ADMIN — TOBRAMYCIN AND DEXAMETHASONE 1 DROP: 3; 1 SUSPENSION/ DROPS OPHTHALMIC at 15:35

## 2024-04-17 RX ADMIN — MAGNESIUM OXIDE 400 MG: 400 TABLET ORAL at 21:19

## 2024-04-17 RX ADMIN — ENOXAPARIN SODIUM 60 MG: 100 INJECTION SUBCUTANEOUS at 09:10

## 2024-04-17 RX ADMIN — IPRATROPIUM BROMIDE AND ALBUTEROL SULFATE 1 DOSE: .5; 2.5 SOLUTION RESPIRATORY (INHALATION) at 05:17

## 2024-04-17 RX ADMIN — IPRATROPIUM BROMIDE AND ALBUTEROL SULFATE 1 DOSE: .5; 2.5 SOLUTION RESPIRATORY (INHALATION) at 09:49

## 2024-04-17 RX ADMIN — ENOXAPARIN SODIUM 60 MG: 100 INJECTION SUBCUTANEOUS at 21:17

## 2024-04-17 RX ADMIN — MAGNESIUM OXIDE 400 MG: 400 TABLET ORAL at 09:09

## 2024-04-17 RX ADMIN — ACETAMINOPHEN 650 MG: 325 TABLET ORAL at 21:17

## 2024-04-17 RX ADMIN — ACETAMINOPHEN 650 MG: 325 TABLET ORAL at 03:47

## 2024-04-17 RX ADMIN — FUROSEMIDE 40 MG: 10 INJECTION, SOLUTION INTRAMUSCULAR; INTRAVENOUS at 17:32

## 2024-04-17 RX ADMIN — LEVOTHYROXINE SODIUM 100 MCG: 0.1 TABLET ORAL at 06:13

## 2024-04-17 RX ADMIN — SACUBITRIL AND VALSARTAN 0.5 TABLET: 24; 26 TABLET, FILM COATED ORAL at 21:17

## 2024-04-17 RX ADMIN — EMPAGLIFLOZIN 10 MG: 10 TABLET, FILM COATED ORAL at 09:10

## 2024-04-17 RX ADMIN — METOPROLOL SUCCINATE 25 MG: 25 TABLET, EXTENDED RELEASE ORAL at 09:10

## 2024-04-17 RX ADMIN — SACUBITRIL AND VALSARTAN 0.5 TABLET: 24; 26 TABLET, FILM COATED ORAL at 09:10

## 2024-04-17 RX ADMIN — TOBRAMYCIN AND DEXAMETHASONE 1 DROP: 3; 1 SUSPENSION/ DROPS OPHTHALMIC at 17:32

## 2024-04-17 RX ADMIN — FUROSEMIDE 40 MG: 10 INJECTION, SOLUTION INTRAMUSCULAR; INTRAVENOUS at 09:09

## 2024-04-17 RX ADMIN — TOBRAMYCIN AND DEXAMETHASONE 1 DROP: 3; 1 SUSPENSION/ DROPS OPHTHALMIC at 21:18

## 2024-04-17 ASSESSMENT — PAIN DESCRIPTION - LOCATION
LOCATION: ABDOMEN
LOCATION: ABDOMEN
LOCATION: CHEST

## 2024-04-17 ASSESSMENT — PAIN SCALES - GENERAL
PAINLEVEL_OUTOF10: 6
PAINLEVEL_OUTOF10: 7
PAINLEVEL_OUTOF10: 8

## 2024-04-17 ASSESSMENT — PAIN DESCRIPTION - DESCRIPTORS
DESCRIPTORS: ACHING;DISCOMFORT
DESCRIPTORS: ACHING;DISCOMFORT;PRESSURE
DESCRIPTORS: ACHING;DISCOMFORT

## 2024-04-17 ASSESSMENT — PAIN DESCRIPTION - ONSET: ONSET: ON-GOING

## 2024-04-17 ASSESSMENT — PAIN DESCRIPTION - ORIENTATION
ORIENTATION: UPPER
ORIENTATION: UPPER;RIGHT;LEFT
ORIENTATION: UPPER

## 2024-04-17 ASSESSMENT — PAIN - FUNCTIONAL ASSESSMENT: PAIN_FUNCTIONAL_ASSESSMENT: ACTIVITIES ARE NOT PREVENTED

## 2024-04-17 ASSESSMENT — PAIN DESCRIPTION - PAIN TYPE: TYPE: ACUTE PAIN

## 2024-04-17 ASSESSMENT — PAIN DESCRIPTION - FREQUENCY: FREQUENCY: CONTINUOUS

## 2024-04-17 NOTE — PROGRESS NOTES
Internal Medicine Progress Note         Primary Care Physician: Luis Angel Rea DO   Admitting Physician:  Dre Hubbard DO  Admission date and time: 4/11/2024 11:35 PM    Room:  [address] [place] [address]  Admitting diagnosis: Pleural effusion [J90]  Chest pain, unspecified type [R07.9]  Acute pulmonary embolism without acute cor pulmonale, unspecified pulmonary embolism type (HCC) [I26.99]  Multiple subsegmental pulmonary emboli without acute cor pulmonale (HCC) [I26.94]    Patient Name: Katt Diaz  MRN: [id number]    Date of Service: 4/17/2024     Subjective:  Katt is a 78 y.o. female who was seen and examined today,4/17/2024, at the bedside.  Her volume status has improved.  She has multiple questions regarding her cardiac disease process we have answered some of them to her satisfaction at bedside but she has other significant questions that she believes can only be answered well by the cardiologist and we have deferred these to their service.  No new symptoms or concerns. No family present during my examination.    4/17/2024  Patient seen examined on telemetry floor.  Patient still complains of weakness and shortness of breath.  Patient feels weak and does not feel comfortable going home.  Patient is requesting assisted living.  BUN/creatinine 25/1.2 with normal liver enzymes.  WBC is 5.7 from 8.3.  Temperature 97.8 with heart rate 75 blood pressure 111/72.  O2 sat 99% on 2 L.  Patient still complains shortness of breath with any activity.  Cardiology note reviewed.  CT of the chest showed persistent moderate-large right pleural effusion.  Will consult pulmonology for their input.      Review of systems:  Constitutional:   Positive for fatigue and malaise , - fever/chills  HEENT:   Denies ear pain, sore throat, sinus or eye problems.  Cardiovascular:   Denies any chest pain, irregular heartbeats, or palpitations.   Respiratory:   - dyspnea at rest, + but improved dyspnea on exertion, - coughing, - sputum, -

## 2024-04-17 NOTE — PROGRESS NOTES
Physical Therapy Treatment Note/Plan of Care    Room #:  0439/0439-01  Patient Name: Katt Diaz  YOB: 1945  MRN: [id number]    Date of Service: 4/17/2024     Tentative placement recommendation: Subacute unless patient meets goals then Home Health Physical Therapy  Equipment recommendation: To be determined      Evaluating Physical Therapist: Maria Isabel Bach, PT #[id number]      Specific Provider Orders/Date/Referring Provider :  04/12/24 0845    PT eval and treat  Start:  04/12/24 0845,   End:  04/12/24 0845,   ONE TIME,   Standing Count:  1 Occurrences,   R       Luis Angel Bain DO    Admitting Diagnosis:   Pleural effusion [J90]  Chest pain, unspecified type [R07.9]  Acute pulmonary embolism without acute cor pulmonale, unspecified pulmonary embolism type (HCC) [I26.99]  Multiple subsegmental pulmonary emboli without acute cor pulmonale (HCC) [I26.94]     chest pain. Patient states she has had chest pain for the past couple days. She states the pain began in the right chest and now radiates across her chest. It worsens when she coughs and with deep inspiration.  Surgery:   4/12/2024  PROCEDURE: ULTRASOUNDGUIDED RIGHT THORACENTESIS 4/12/2024       Successful ultrasound guided thoracentesis.   Visit Diagnoses         Codes    Chest pain, unspecified type     R07.9            Patient Active Problem List   Diagnosis    S/P cardiac cath    Coronary artery disease involving native coronary artery of native heart without angina pectoris    ST elevation myocardial infarction (STEMI) (HCC)    Postoperative hypotension    Complication of surgical procedure    Stress hyperglycemia    VERONIQUE (acute kidney injury) (HCC)    Acute on chronic systolic heart failure (HCC)    Acute on chronic congestive heart failure (HCC)    Mild protein-calorie malnutrition (HCC)    Chronic systolic (congestive) heart failure (HCC)    Dyspnea on exertion    Failure to thrive in adult    Multiple subsegmental pulmonary emboli (HCC)  0/10    Bed Mobility  Using rails and head of bed elevated:       Rolling: Supervision     Supine to sit: Supervision     Sit to supine: Supervision     Scooting: Supervision    Using rails and head of bed elevated:     Rolling: Not assessed patient seated edge of bed   Supine to sit: Not assessed patient seated edge of bed   Sit to supine: Supervision    Scooting: Not assessed patient seated edge of bed    Rolling: Independent    Supine to sit: Independent    Sit to supine: Independent    Scooting: Independent     Transfers Sit to stand: Minimal assist of 1 Cues for hand placement and safety  Sit to stand: Minimal assist of 1 Cues for hand placement and safety     Sit to stand: Independent     Ambulation     4 side steps using  wheeled walker with Minimal assist of 1   for walker control and balance, Patient with shuffling steps and flexed posture, and cues for upright posture and safety 2 x 80 feet using  no device with Minimal assist of 1   cues for upright posture, safety, pacing, and pursed lip breathing     40 feet using  wheeled walker with Supervision       Stair negotiation: ascended and descended   Not assessed          ROM Within functional limits    Increase range of motion 10% of affected joints    Strength BUE:  refer to OT eval  RLE:  3+/5  LLE:  3+/5  Increase strength in affected mm groups by 1/3 grade   Balance Sitting EOB:  good - flexed posture  Dynamic Standing:  fair +wheeled walker  Sitting EOB: good -  Dynamic Standing: fair using no assistive device   Sitting EOB:  good    Dynamic Standing: good -wheeled walker      Patient is Alert & Oriented x person, place, time, and situation and follows directions    Sensation:  Patient  denies numbness/tingling   Edema:  yes bilateral lower extremities   Endurance: fair       Vitals:  4 liters high flow nasal cannula   Blood Pressure at rest  Blood Pressure during session    Heart Rate at rest  Heart Rate during session    SPO2 at rest % due to cold

## 2024-04-17 NOTE — CONSULTS
Narrative    Not on file     Social Determinants of Health     Financial Resource Strain: Not on file   Food Insecurity: No Food Insecurity (4/12/2024)    Hunger Vital Sign     Worried About Running Out of Food in the Last Year: Never true     Ran Out of Food in the Last Year: Never true   Transportation Needs: No Transportation Needs (4/12/2024)    PRAPARE - Transportation     Lack of Transportation (Medical): No     Lack of Transportation (Non-Medical): No   Physical Activity: Not on file   Stress: Not on file   Social Connections: Not on file   Intimate Partner Violence: Not on file   Housing Stability: Low Risk  (4/12/2024)    Housing Stability Vital Sign     Unable to Pay for Housing in the Last Year: No     Number of Places Lived in the Last Year: 1     Unstable Housing in the Last Year: No       Current Medications:     Current Facility-Administered Medications:     midodrine (PROAMATINE) tablet 5 mg, 5 mg, Oral, TID WC, Dre Hubbard A, DO    furosemide (LASIX) injection 40 mg, 40 mg, IntraVENous, BID, Иван Cabezas MD, 40 mg at 04/17/24 0909    magnesium sulfate 1000 mg in dextrose 5% 100 mL IVPB, 1,000 mg, IntraVENous, PRN, Chadwick Gomez, PALMER - CNP    potassium chloride (KLOR-CON M) extended release tablet 40 mEq, 40 mEq, Oral, PRN **OR** potassium bicarb-citric acid (EFFER-K) effervescent tablet 40 mEq, 40 mEq, Oral, PRN **OR** potassium chloride 10 mEq/100 mL IVPB (Peripheral Line), 10 mEq, IntraVENous, PRN, Chadwick Gomez, APRN - CNP    enoxaparin (LOVENOX) injection 60 mg, 1 mg/kg, SubCUTAneous, BID, Chadwick Gomez APRN - CNP, 60 mg at 04/17/24 0910    empagliflozin (JARDIANCE) tablet 10 mg, 10 mg, Oral, Daily, Umberto Guevara APRN - CNP, 10 mg at 04/17/24 0910    sacubitril-valsartan (ENTRESTO) 24-26 MG per tablet 0.5 tablet, 0.5 tablet, Oral, BID, Umberto Guevara APRN - CNP, 0.5 tablet at 04/17/24 0910    ipratropium 0.5 mg-albuterol 2.5 mg (DUONEB) nebulizer solution 1 Dose, 1 Dose, Inhalation,  Q4H WA RT, Umberto Guevara APRN - CNP, 1 Dose at 04/17/24 0949    acetaminophen (TYLENOL) tablet 650 mg, 650 mg, Oral, Q4H PRN, Umberto Guevara APRN - CNP, 650 mg at 04/17/24 1022    atorvastatin (LIPITOR) tablet 40 mg, 40 mg, Oral, Nightly, Umberto Guevara APRN - CNP, 40 mg at 04/16/24 2122    metoprolol succinate (TOPROL XL) extended release tablet 25 mg, 25 mg, Oral, Daily, Umberto Guevara APRN - CNP, 25 mg at 04/17/24 0910    tobramycin-dexAMETHasone (TOBRADEX) ophthalmic suspension 1 drop, 1 drop, Both Eyes, 4x Daily, Umberto Guevara APRN - CNP, 1 drop at 04/17/24 0911    [Held by provider] potassium chloride (KLOR-CON M) extended release tablet 20 mEq, 20 mEq, Oral, BID, Umberto Guevara APRN - CNP, 20 mEq at 04/15/24 0815    pantoprazole (PROTONIX) tablet 40 mg, 40 mg, Oral, QAM AC, Umberto Guevara APRN - CNP, 40 mg at 04/17/24 0613    levothyroxine (SYNTHROID) tablet 100 mcg, 100 mcg, Oral, Daily, Umberto Guevara APRN - CNP, 100 mcg at 04/17/24 0613    magnesium oxide (MAG-OX) tablet 400 mg, 400 mg, Oral, BID, Umberto Guevara APRN - CNP, 400 mg at 04/17/24 0909    Polyvinyl Alcohol-Povidone PF (REFRESH) 1.4-0.6 % ophthalmic solution 1 drop, 1 drop, Both Eyes, Q4H PRN, Umberto Guevara APRN - CNP, 1 drop at 04/12/24 2237      Review of Systems:   General: denies weight gain, denies loss of appetite, fever, chills, night sweats.  HEENT: denies headaches, dizziness, head trauma, visual changes, eye pain, tinnitus, nosebleeds, hoarseness or throat pain    Respiratory: denies chest pain, +ve dyspnea, cough but no hemoptysis  Cardiovascular: denies orthopnea, paroxysmal nocturnal dyspnea, leg swelling, and previous heart attack.    Gastrointestinal: denies pain, nausea vomiting, diarrhea, constipation, melena or bleeding.  Genitourinary: denies hematuria, frequency, urgency or dysuria  Neurology: denies syncope, seizures, paralysis, paraesthesia   Endocrine: denies polyuria, polydipsia, skin or hair changes, and heat or cold

## 2024-04-17 NOTE — CARE COORDINATION
4/17/2024 1356 BRENTON note: Pt resides at [facility] and presented to hospital from [facility] for skilled care. She does not want to return to SNF and is requesting information for Assisted Living at [facility] or [facility]). CM spoke with both Assisted facility liaisons. Rosita from [facility] will visit pt today and Dee from [facility] will visit her tomorrow-pt updated. Pt is wearing o2@2L NC and does not have home o2. She is wearing a life vest(had prior to admit). Pt for possible thoracentesis tomorrow. CM will follow for possible Assisted Living at d/c and home o2 needs.  Molly HORTA

## 2024-04-17 NOTE — PROGRESS NOTES
thoracentesis.     CTA chest 4/12/2024  Impression:        Acute pulmonary embolus within left lower lobe segmental/subsegmental  branches.  Overall clot burden is minimal.  No clear evidence of right heart  strain.    Large right pleural effusion which is partially loculated. Trace left  effusion.    Collapse of the right middle lobe and atelectasis of the right lower lobe.     Echocardiogram:   TTE 4/4/24 KA    Left Ventricle: The EF by visual approximation is 30 - 35%. Left ventricle is moderately dilated. Septal flattening in diastole consistent with right ventricular volume overload. Grade I diastolic dysfunction with normal LAP.    Right Ventricle: Right ventricle size is normal. Moderately reduced systolic function.    Aortic Valve: Not well visualized. No stenosis. AV area by continuity VTI is 2.6 cm2. AV area by peak velocity is 2.3 cm2.    Mitral Valve: Moderate regurgitation.    Tricuspid Valve: Severe regurgitation.    Right Atrium: Right atrium is severely dilated.    Image quality is fair.    Stress test:      Cardiac catheterization:   [facility] 2/5/24 FG  Severe multivessel atherosclerotic coronary artery disease, in a right dominant system  LMCA has 40% proximal lesion  LAD has 70% ostial lesion and 100% distal occlusion with L-L collaterals from diagonal branch  OM1 has 90% ostial lesion, vessel of smaller caliber  OM2 has 90% diffuse ostial to proximal lesion, vessel of large caliber  Mid RCA has 90% diffuse lesion.  RPL2 branch is small and has ostial 80% stenosis  LVEDP 9 mmHg  LV gram with EF of 30-35%, inferior akinesis  No hemodynamically significant gradient across the aortic valve on LV-Ao pullback.    ----------------------------------------------------------------------------------------------------------------------------------------------------------------  IMPRESSION:  Acute on chronic HFrEF proBNP 10,000-9000.  Was 20,000 on 4/1/2024  Elevated hs-cTnT 77- 83 ng/L.  Likely acute and chronic

## 2024-04-17 NOTE — PROGRESS NOTES
OCCUPATIONAL THERAPY TREATMENT NOTE    [facility]   [address] [facility]        Date:2024  Patient Name: Katt Diaz  MRN: [id number]  : 1945  Room: [address] [place] [address]     Evaluating OT: Alexia Gonzalez OTR/L; [id number]      Referring Provider and Specific Provider Orders/Date:      24   OT eval and treat  Start:  24,   End:  24,   ONE TIME,   Standing Count:  1 Occurrences,   R         Luis Angel Bain DO       Placement Recommendation: Subacute         Diagnosis:   1. Multiple subsegmental pulmonary emboli without acute cor pulmonale (HCC)    2. Pleural effusion    3. Chest pain, unspecified type         Surgery: none        Pertinent Medical History:       Past Medical History        Past Medical History:   Diagnosis Date    VERONIQUE (acute kidney injury) ([facility]) 2024    CAD in native artery 2024    Headache      Hearing loss      Hx of rheumatoid arthritis      Migraine      Osteopenia      S/P CABG x 3      Sjogren's disease ([facility])              Past Surgical History         Past Surgical History:   Procedure Laterality Date    CARDIAC PROCEDURE N/A 2024     Left heart cath / coronary angiography performed by Arnav Shaw MD at [facility]    CORONARY ARTERY BYPASS GRAFT N/A 2024     CORONARY ARTERY BYPASS GRAFTING, EVH performed by Nicolasa Jasso DO at [facility]    IR TUNNELED CATHETER PLACEMENT GREATER THAN 5 YEARS   2024     IR TUNNELED CATHETER PLACEMENT GREATER THAN 5 YEARS 2024 Cuong, REYNA Holguin MD [facility] SPECIAL PROCEDURES    PARTIAL HYSTERECTOMY (CERVIX NOT REMOVED)         states     VEIN LIGATION AND STRIPPING         states           Precautions:  Fall Risk, life vest, s/p thoracentesis       Assessment of current deficits:     [x] Functional mobility            [x]ADLs           [x] Strength                   []Cognition    [x] Functional transfers          [x]

## 2024-04-18 LAB
ALBUMIN SERPL-MCNC: 3.5 G/DL (ref 3.5–5.2)
ALP SERPL-CCNC: 75 U/L (ref 35–104)
ALT SERPL-CCNC: 16 U/L (ref 0–32)
ANION GAP SERPL CALCULATED.3IONS-SCNC: 13 MMOL/L (ref 7–16)
AST SERPL-CCNC: 37 U/L (ref 0–31)
BASOPHILS # BLD: 0.02 K/UL (ref 0–0.2)
BASOPHILS NFR BLD: 0 % (ref 0–2)
BILIRUB SERPL-MCNC: 0.6 MG/DL (ref 0–1.2)
BUN SERPL-MCNC: 27 MG/DL (ref 6–23)
CALCIUM SERPL-MCNC: 8.7 MG/DL (ref 8.6–10.2)
CHLORIDE SERPL-SCNC: 96 MMOL/L (ref 98–107)
CO2 SERPL-SCNC: 23 MMOL/L (ref 22–29)
CREAT SERPL-MCNC: 1.1 MG/DL (ref 0.5–1)
EOSINOPHIL # BLD: 0.17 K/UL (ref 0.05–0.5)
EOSINOPHILS RELATIVE PERCENT: 3 % (ref 0–6)
ERYTHROCYTE [DISTWIDTH] IN BLOOD BY AUTOMATED COUNT: 17.9 % (ref 11.5–15)
GFR SERPL CREATININE-BSD FRML MDRD: 53 ML/MIN/1.73M2
GLUCOSE SERPL-MCNC: 89 MG/DL (ref 74–99)
HCT VFR BLD AUTO: 31.2 % (ref 34–48)
HGB BLD-MCNC: 9.4 G/DL (ref 11.5–15.5)
IMM GRANULOCYTES # BLD AUTO: 0.03 K/UL (ref 0–0.58)
IMM GRANULOCYTES NFR BLD: 1 % (ref 0–5)
LYMPHOCYTES NFR BLD: 1.78 K/UL (ref 1.5–4)
LYMPHOCYTES RELATIVE PERCENT: 32 % (ref 20–42)
MCH RBC QN AUTO: 28.7 PG (ref 26–35)
MCHC RBC AUTO-ENTMCNC: 30.1 G/DL (ref 32–34.5)
MCV RBC AUTO: 95.1 FL (ref 80–99.9)
MONOCYTES NFR BLD: 0.55 K/UL (ref 0.1–0.95)
MONOCYTES NFR BLD: 10 % (ref 2–12)
NEUTROPHILS NFR BLD: 55 % (ref 43–80)
NEUTS SEG NFR BLD: 3.04 K/UL (ref 1.8–7.3)
PLATELET # BLD AUTO: 383 K/UL (ref 130–450)
PMV BLD AUTO: 10.5 FL (ref 7–12)
POTASSIUM SERPL-SCNC: 4.1 MMOL/L (ref 3.5–5)
PROT SERPL-MCNC: 7.3 G/DL (ref 6.4–8.3)
RBC # BLD AUTO: 3.28 M/UL (ref 3.5–5.5)
SODIUM SERPL-SCNC: 132 MMOL/L (ref 132–146)
WBC OTHER # BLD: 5.6 K/UL (ref 4.5–11.5)

## 2024-04-18 PROCEDURE — 6370000000 HC RX 637 (ALT 250 FOR IP)

## 2024-04-18 PROCEDURE — 80053 COMPREHEN METABOLIC PANEL: CPT

## 2024-04-18 PROCEDURE — 99233 SBSQ HOSP IP/OBS HIGH 50: CPT | Performed by: INTERNAL MEDICINE

## 2024-04-18 PROCEDURE — 1200000000 HC SEMI PRIVATE

## 2024-04-18 PROCEDURE — 97530 THERAPEUTIC ACTIVITIES: CPT

## 2024-04-18 PROCEDURE — 6360000002 HC RX W HCPCS: Performed by: INTERNAL MEDICINE

## 2024-04-18 PROCEDURE — 2700000000 HC OXYGEN THERAPY PER DAY

## 2024-04-18 PROCEDURE — 6360000002 HC RX W HCPCS: Performed by: NURSE PRACTITIONER

## 2024-04-18 PROCEDURE — 6370000000 HC RX 637 (ALT 250 FOR IP): Performed by: INTERNAL MEDICINE

## 2024-04-18 PROCEDURE — 85025 COMPLETE CBC W/AUTO DIFF WBC: CPT

## 2024-04-18 PROCEDURE — 94640 AIRWAY INHALATION TREATMENT: CPT

## 2024-04-18 PROCEDURE — 36415 COLL VENOUS BLD VENIPUNCTURE: CPT

## 2024-04-18 RX ADMIN — TOBRAMYCIN AND DEXAMETHASONE 1 DROP: 3; 1 SUSPENSION/ DROPS OPHTHALMIC at 08:17

## 2024-04-18 RX ADMIN — MAGNESIUM OXIDE 400 MG: 400 TABLET ORAL at 08:16

## 2024-04-18 RX ADMIN — TOBRAMYCIN AND DEXAMETHASONE 1 DROP: 3; 1 SUSPENSION/ DROPS OPHTHALMIC at 17:29

## 2024-04-18 RX ADMIN — ENOXAPARIN SODIUM 60 MG: 100 INJECTION SUBCUTANEOUS at 08:16

## 2024-04-18 RX ADMIN — TOBRAMYCIN AND DEXAMETHASONE 1 DROP: 3; 1 SUSPENSION/ DROPS OPHTHALMIC at 13:25

## 2024-04-18 RX ADMIN — SACUBITRIL AND VALSARTAN 0.5 TABLET: 24; 26 TABLET, FILM COATED ORAL at 20:39

## 2024-04-18 RX ADMIN — SACUBITRIL AND VALSARTAN 0.5 TABLET: 24; 26 TABLET, FILM COATED ORAL at 08:16

## 2024-04-18 RX ADMIN — IPRATROPIUM BROMIDE AND ALBUTEROL SULFATE 1 DOSE: .5; 2.5 SOLUTION RESPIRATORY (INHALATION) at 18:29

## 2024-04-18 RX ADMIN — ATORVASTATIN CALCIUM 40 MG: 40 TABLET, FILM COATED ORAL at 20:39

## 2024-04-18 RX ADMIN — MIDODRINE HYDROCHLORIDE 5 MG: 5 TABLET ORAL at 11:39

## 2024-04-18 RX ADMIN — PANTOPRAZOLE SODIUM 40 MG: 40 TABLET, DELAYED RELEASE ORAL at 05:26

## 2024-04-18 RX ADMIN — EMPAGLIFLOZIN 10 MG: 10 TABLET, FILM COATED ORAL at 08:17

## 2024-04-18 RX ADMIN — IPRATROPIUM BROMIDE AND ALBUTEROL SULFATE 1 DOSE: .5; 2.5 SOLUTION RESPIRATORY (INHALATION) at 10:02

## 2024-04-18 RX ADMIN — LEVOTHYROXINE SODIUM 100 MCG: 0.1 TABLET ORAL at 05:26

## 2024-04-18 RX ADMIN — TOBRAMYCIN AND DEXAMETHASONE 1 DROP: 3; 1 SUSPENSION/ DROPS OPHTHALMIC at 20:40

## 2024-04-18 RX ADMIN — FUROSEMIDE 40 MG: 10 INJECTION, SOLUTION INTRAMUSCULAR; INTRAVENOUS at 19:12

## 2024-04-18 RX ADMIN — MAGNESIUM OXIDE 400 MG: 400 TABLET ORAL at 20:39

## 2024-04-18 RX ADMIN — MIDODRINE HYDROCHLORIDE 5 MG: 5 TABLET ORAL at 17:29

## 2024-04-18 RX ADMIN — MIDODRINE HYDROCHLORIDE 5 MG: 5 TABLET ORAL at 08:17

## 2024-04-18 RX ADMIN — METOPROLOL SUCCINATE 25 MG: 25 TABLET, EXTENDED RELEASE ORAL at 08:17

## 2024-04-18 RX ADMIN — IPRATROPIUM BROMIDE AND ALBUTEROL SULFATE 1 DOSE: .5; 2.5 SOLUTION RESPIRATORY (INHALATION) at 06:48

## 2024-04-18 RX ADMIN — FUROSEMIDE 40 MG: 10 INJECTION, SOLUTION INTRAMUSCULAR; INTRAVENOUS at 08:17

## 2024-04-18 ASSESSMENT — PAIN DESCRIPTION - LOCATION
LOCATION: RIB CAGE
LOCATION: RIB CAGE

## 2024-04-18 ASSESSMENT — PAIN SCALES - GENERAL
PAINLEVEL_OUTOF10: 5
PAINLEVEL_OUTOF10: 5

## 2024-04-18 NOTE — PROGRESS NOTES
Physical Therapy Treatment Note/Plan of Care    Room #:  0439/0439-01  Patient Name: Katt Diaz  YOB: 1945  MRN: [id number]    Date of Service: 4/18/2024     Tentative placement recommendation: Subacute unless patient meets goals then Home Health Physical Therapy  Equipment recommendation: To be determined      Evaluating Physical Therapist: Maria Isabel Bach, PT #[id number]      Specific Provider Orders/Date/Referring Provider :  04/12/24 0845    PT eval and treat  Start:  04/12/24 0845,   End:  04/12/24 0845,   ONE TIME,   Standing Count:  1 Occurrences,   R       Luis Angel Bain DO    Admitting Diagnosis:   Pleural effusion [J90]  Chest pain, unspecified type [R07.9]  Acute pulmonary embolism without acute cor pulmonale, unspecified pulmonary embolism type (HCC) [I26.99]  Multiple subsegmental pulmonary emboli without acute cor pulmonale (HCC) [I26.94]     chest pain. Patient states she has had chest pain for the past couple days. She states the pain began in the right chest and now radiates across her chest. It worsens when she coughs and with deep inspiration.  Surgery:   4/12/2024  PROCEDURE: ULTRASOUNDGUIDED RIGHT THORACENTESIS 4/12/2024       Successful ultrasound guided thoracentesis.   Visit Diagnoses         Codes    Chest pain, unspecified type     R07.9            Patient Active Problem List   Diagnosis    S/P cardiac cath    Coronary artery disease involving native coronary artery of native heart without angina pectoris    ST elevation myocardial infarction (STEMI) (HCC)    Postoperative hypotension    Complication of surgical procedure    Stress hyperglycemia    VERONIQUE (acute kidney injury) (HCC)    Acute on chronic systolic heart failure (HCC)    Acute on chronic congestive heart failure (HCC)    Mild protein-calorie malnutrition (HCC)    Chronic systolic (congestive) heart failure (HCC)    Dyspnea on exertion    Failure to thrive in adult    Multiple subsegmental pulmonary emboli (HCC)

## 2024-04-18 NOTE — CARE COORDINATION
4/18/2024 1506 CM note: Pt resides at [facility] and presented to hospital from [facility](skilled care). She had requested information for [facility]. Lori or Dee from [facility] will speak with patient today. Pt now requesting SNF at d/c and prefers [facility]-referral placed to cristina Becker. For SNF-will need PRECERT, SHAY, signed JOSE. Pt is wearing o2@2L NC and does not have home o2. She is wearing a life vest(had prior to admit). Pt is agreeable for pleurex catheter. CM will follow for SNF acceptance, Assisted Living information, pleurex catheter placement/orders. Molly HORTA      The Plan for Transition of Care is related to the following treatment goals: FLACO    The Patient  was provided with a choice of provider and agrees   with the discharge plan. [x] Yes [] No    Freedom of choice list was provided with basic dialogue that supports the patient's individualized plan of care/goals, treatment preferences and shares the quality data associated with the providers. [] Yes [x] No declines list, prefers [facility]

## 2024-04-18 NOTE — PROGRESS NOTES
stage IIIB.  Anemia of chronic disease with iron deficiency component.    Plan:   Katt is improving somewhat from a volume standpoint with adjustments in diuretic therapy.  She has been transition to therapeutic Lovenox with Eliquis therapy being placed on hold in anticipation of thoracentesis tomorrow.  Fluid analysis has already been performed. Continue to restrict fluid and sodium in the patient's diet.  Activity is certainly important and we have asked the therapy teams to continue to follow.  Social work/case management team for discharge planning assistance.  Cardiac medical optimization is being undertaken in a cautious and stepwise approach as the patient does require midodrine for blood pressure support as well.  Chronic comorbidities, laboratory values and vital signs are being monitored and addressed accordingly.  To note, the patient was requesting transfer to [facility] for his \"second opinion\".  We have asked that she have in-depth conversation with the cardiology team here at this facility as she does not presently have a transferable diagnosis that would require an inpatient transfer to [facility].  She does have an established follow-up appointment with [facility] as an outpatient but was hoping to escalate this by a [id number] facility transfer.  Continue current therapy.  See orders for further plan of care.      Consult pulmonology regarding patient's persistent recurrent pleural effusions and cardiology recommending pulmonary input.  Patient has been off midodrine for over 3 days with patient blood pressure still low at times and will reinstitute.    Dre Hubbard,   4/18/2024  11:36 AM

## 2024-04-18 NOTE — PROGRESS NOTES
INPATIENT CARDIOLOGY FOLLOW-UP    Name: Katt Diaz    Age: 78 y.o.    Date of Admission: 4/11/2024 11:35 PM    Date of Service: 4/18/2024    Primary Cardiologist: Dr Shaw    Chief Complaint: Follow-up for HFrEF, CAD    Interim History:  Still short of breath.  Net -1.9 L.      Review of Systems:   Negative except as described above    Problem List:  Patient Active Problem List   Diagnosis    S/P cardiac cath    Coronary artery disease involving native coronary artery of native heart without angina pectoris    ST elevation myocardial infarction (STEMI) (HCC)    Postoperative hypotension    Complication of surgical procedure    Stress hyperglycemia    VERONIQUE (acute kidney injury) (HCC)    Acute on chronic systolic heart failure (HCC)    Acute on chronic congestive heart failure (HCC)    Mild protein-calorie malnutrition (HCC)    Chronic systolic (congestive) heart failure (HCC)    Dyspnea on exertion    Failure to thrive in adult    Multiple subsegmental pulmonary emboli (HCC)    Ischemic cardiomyopathy    Mitral valve disease    Stage 3b chronic kidney disease (HCC)    Pleural effusion       Current Medications:    Current Facility-Administered Medications:     midodrine (PROAMATINE) tablet 5 mg, 5 mg, Oral, TID WC, Dre Hubbard, DO, 5 mg at 04/18/24 1139    furosemide (LASIX) injection 40 mg, 40 mg, IntraVENous, BID, Иван Cabezas MD, 40 mg at 04/18/24 0817    magnesium sulfate 1000 mg in dextrose 5% 100 mL IVPB, 1,000 mg, IntraVENous, PRN, Chadwick Gomez, PALMER - CNP    potassium chloride (KLOR-CON M) extended release tablet 40 mEq, 40 mEq, Oral, PRN **OR** potassium bicarb-citric acid (EFFER-K) effervescent tablet 40 mEq, 40 mEq, Oral, PRN **OR** potassium chloride 10 mEq/100 mL IVPB (Peripheral Line), 10 mEq, IntraVENous, PRN, Chadwick Gomez APRN - CNP    enoxaparin (LOVENOX) injection 60 mg, 1 mg/kg, SubCUTAneous, BID, Cahdwick Gomez APRN - CNP, 60 mg at 04/18/24 0816    empagliflozin      4/12/24  FINDINGS:  Right pleural effusion appears decreased status post thoracentesis.  No  pneumothorax is identified.  Lungs are stable in appearance.  Stable  cardiomegaly is noted.      Impression:       No pneumothorax status post right thoracentesis.     CTA chest 4/12/2024  Impression:        Acute pulmonary embolus within left lower lobe segmental/subsegmental  branches.  Overall clot burden is minimal.  No clear evidence of right heart  strain.    Large right pleural effusion which is partially loculated. Trace left  effusion.    Collapse of the right middle lobe and atelectasis of the right lower lobe.     Echocardiogram:   TTE 4/4/24 KA    Left Ventricle: The EF by visual approximation is 30 - 35%. Left ventricle is moderately dilated. Septal flattening in diastole consistent with right ventricular volume overload. Grade I diastolic dysfunction with normal LAP.    Right Ventricle: Right ventricle size is normal. Moderately reduced systolic function.    Aortic Valve: Not well visualized. No stenosis. AV area by continuity VTI is 2.6 cm2. AV area by peak velocity is 2.3 cm2.    Mitral Valve: Moderate regurgitation.    Tricuspid Valve: Severe regurgitation.    Right Atrium: Right atrium is severely dilated.    Image quality is fair.    Stress test:      Cardiac catheterization:   [facility] 2/5/24 FG  Severe multivessel atherosclerotic coronary artery disease, in a right dominant system  LMCA has 40% proximal lesion  LAD has 70% ostial lesion and 100% distal occlusion with L-L collaterals from diagonal branch  OM1 has 90% ostial lesion, vessel of smaller caliber  OM2 has 90% diffuse ostial to proximal lesion, vessel of large caliber  Mid RCA has 90% diffuse lesion.  RPL2 branch is small and has ostial 80% stenosis  LVEDP 9 mmHg  LV gram with EF of 30-35%, inferior akinesis  No hemodynamically significant gradient across the aortic valve on LV-Ao

## 2024-04-18 NOTE — PROGRESS NOTES
Assessment and Plan  Patient is a 78 y.o. female with the following medical Problems:   Acute on chronic hypoxic respiratory failure requiring supplemental oxygen.  Bilateral pleural effusions (Right > LefT)  S/P right-sided thoracentesis April 12, 2024 and April 16, 2024. (Borderline exudative)  Heart failure with moderately reduced ejection fraction.  (EF 30 to 35%)(April 4, 2024)  Secondary pulmonary hypertension based on echocardiography from April 4, 2024.  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 Jasso   Acute left lower lobe segmental and subsegmental pulmonary embolism.  CKD stage III.  Rheumatoid arthritis  Anemia of chronic disease    Plan of care:  Patient has chronic right-sided pleural effusion with significant exertional dyspnea.  Effusion continues to worsen despite 2 thoracentesis within 4 days.  Pleurx catheter was offered to the patient as effusion is unlikely to respond to diuresis alone.    Patient is agreeable to proceed with Pleurx catheter.  Order was placed.  Lovenox is on hold.  I would recommend lifelong anticoagulation with Eliquis upon discharge.  Eliquis dose can be reduced after 3 months to 2.5 mg twice daily.  Patient should be lifelong anticoagulated unless there is a contraindication.  Continue with goal-directed heart failure therapy.  Judicious diuresis with close monitoring of kidney function.  GI prophylaxis.  Incentive spirometer.  Supplemental oxygen to keep sat 88 to 94%.  Pleural fluid cytology is negative for malignancy.    History of Present Illness:   Patient is a 78-year-old woman with above-mentioned medical problems who was admitted on April 12, 2024 with progressive shortness of breath and lower extremity swelling.  She had a CTA of the chest which showed bilateral pleural effusion mostly on the right with acute left lower lobe segmental and subsegmental pulmonary embolism.  Patient  dyspnea, leg swelling, and previous heart attack.    Gastrointestinal: denies pain, nausea vomiting, diarrhea, constipation, melena or bleeding.  Genitourinary: denies hematuria, frequency, urgency or dysuria  Neurology: denies syncope, seizures, paralysis, paraesthesia   Endocrine: denies polyuria, polydipsia, skin or hair changes, and heat or cold intolerance  Musculoskeletal: denies joint pain, swelling, arthritis or myalgia  Hematologic: denies bleeding, adenopathy and easy bruising  Skin: denies rashes and skin discoloration  Psychiatry: denies depression    Physical Exam:   Vital Signs:  BP (!) 104/54   Pulse 77   Temp 98.3 °F (36.8 °C) (Oral)   Resp 20   Ht 1.575 m (5' 2\")   Wt 60 kg (132 lb 4.1 oz)   SpO2 96%   BMI 24.19 kg/m²     Input/Output:  In: 120 [P.O.:120]  Out: 800     Oxygen requirements: NC      General appearance: ill looking, frail, not in pain but in mild respiratory distress    HEENT: Atraumatic/normocephalic, EOMI, MOE, pharynx clear, moist mucosa, redness of the uvula appreciated,   Neck: Supple, no jugular venous distension, lymphadenopathy, thyromegaly or carotid bruits  Chest: Decreased breath sounds, no wheezing, +ve crackles and no tenderness over ribs   Cardiovascular: Normal S1 , S2, regular rate and rhythm, no murmur, rub or gallop  Abdomen: Normal sounds present, soft, lax with no tenderness, no hepatosplenomegaly, and no masses  Extremities: +ve edema. Pulses are equally present.   Skin: intact, no rashes   Neurologic: Alert and oriented x 3, No focal deficit     Investigations:  Labs, radiological imaging and cardiac work up were personally reviewed        STAFF PHYSICIAN NOTE OF PERSONAL INVOLVEMENT IN CARE  As the attending physician, I certify that I personally reviewed the patient's history and personally examined the patient to confirm the physical findings described above, and that I reviewed the relevant imaging studies and available reports.  I also discussed the

## 2024-04-18 NOTE — CARE COORDINATION
4/18/2024 1222 CM note: Pt resides at [facility] and presented to hospital from [facility] for skilled care. She does not want to return to SNF and is requesting information for Assisted Living. Albert Becker([facility]) liaison spoke with pt yesterday and pt states she cannot afford cost. Lori or Dee from [facility] will speak with patient today. Pt relays if she cannot afford A.L. ,she prefers to return to Independent Living with [facility]. Referral placed to [facility]-await return call. Pt is wearing o2@2L NC and does not have home o2. She is wearing a life vest(had prior to admit). Per pulmonology note, pleurex catheter is recommended. CM will follow for possible Assisted Living, possible home o2,pleurex catheter, and eliquis needs. Molly HORTA       The Plan for Transition of Care is related to the following treatment goals: [facility]    The Patient  was provided with a choice of provider and agrees   with the discharge plan. [x] Yes [] No    Freedom of choice list was provided with basic dialogue that supports the patient's individualized plan of care/goals, treatment preferences and shares the quality data associated with the providers. [] Yes [x] No declined list, prefers [facility]

## 2024-04-19 ENCOUNTER — APPOINTMENT (OUTPATIENT)
Dept: GENERAL RADIOLOGY | Age: 79
DRG: 175 | End: 2024-04-19
Payer: MEDICARE

## 2024-04-19 ENCOUNTER — APPOINTMENT (OUTPATIENT)
Dept: INTERVENTIONAL RADIOLOGY/VASCULAR | Age: 79
DRG: 175 | End: 2024-04-19
Payer: MEDICARE

## 2024-04-19 LAB
ALBUMIN SERPL-MCNC: 3.2 G/DL (ref 3.5–5.2)
ALP SERPL-CCNC: 70 U/L (ref 35–104)
ALT SERPL-CCNC: 13 U/L (ref 0–32)
ANION GAP SERPL CALCULATED.3IONS-SCNC: 13 MMOL/L (ref 7–16)
AST SERPL-CCNC: 25 U/L (ref 0–31)
BASOPHILS # BLD: 0.02 K/UL (ref 0–0.2)
BASOPHILS NFR BLD: 0 % (ref 0–2)
BILIRUB SERPL-MCNC: 0.4 MG/DL (ref 0–1.2)
BUN SERPL-MCNC: 25 MG/DL (ref 6–23)
CALCIUM SERPL-MCNC: 8 MG/DL (ref 8.6–10.2)
CHLORIDE SERPL-SCNC: 98 MMOL/L (ref 98–107)
CO2 SERPL-SCNC: 22 MMOL/L (ref 22–29)
CREAT SERPL-MCNC: 1 MG/DL (ref 0.5–1)
EOSINOPHIL # BLD: 0.1 K/UL (ref 0.05–0.5)
EOSINOPHILS RELATIVE PERCENT: 2 % (ref 0–6)
ERYTHROCYTE [DISTWIDTH] IN BLOOD BY AUTOMATED COUNT: 17.6 % (ref 11.5–15)
GFR SERPL CREATININE-BSD FRML MDRD: 55 ML/MIN/1.73M2
GLUCOSE SERPL-MCNC: 90 MG/DL (ref 74–99)
HCT VFR BLD AUTO: 27.6 % (ref 34–48)
HGB BLD-MCNC: 8.3 G/DL (ref 11.5–15.5)
IMM GRANULOCYTES # BLD AUTO: <0.03 K/UL (ref 0–0.58)
IMM GRANULOCYTES NFR BLD: 0 % (ref 0–5)
LYMPHOCYTES NFR BLD: 1.43 K/UL (ref 1.5–4)
LYMPHOCYTES RELATIVE PERCENT: 31 % (ref 20–42)
MCH RBC QN AUTO: 28.3 PG (ref 26–35)
MCHC RBC AUTO-ENTMCNC: 30.1 G/DL (ref 32–34.5)
MCV RBC AUTO: 94.2 FL (ref 80–99.9)
MONOCYTES NFR BLD: 0.54 K/UL (ref 0.1–0.95)
MONOCYTES NFR BLD: 12 % (ref 2–12)
NEUTROPHILS NFR BLD: 54 % (ref 43–80)
NEUTS SEG NFR BLD: 2.52 K/UL (ref 1.8–7.3)
PLATELET # BLD AUTO: 359 K/UL (ref 130–450)
PMV BLD AUTO: 10.1 FL (ref 7–12)
POTASSIUM SERPL-SCNC: 3.8 MMOL/L (ref 3.5–5)
PROT SERPL-MCNC: 6.5 G/DL (ref 6.4–8.3)
RBC # BLD AUTO: 2.93 M/UL (ref 3.5–5.5)
SODIUM SERPL-SCNC: 133 MMOL/L (ref 132–146)
WBC OTHER # BLD: 4.6 K/UL (ref 4.5–11.5)

## 2024-04-19 PROCEDURE — 71045 X-RAY EXAM CHEST 1 VIEW: CPT

## 2024-04-19 PROCEDURE — 0JH63XZ INSERTION OF TUNNELED VASCULAR ACCESS DEVICE INTO CHEST SUBCUTANEOUS TISSUE AND FASCIA, PERCUTANEOUS APPROACH: ICD-10-PCS | Performed by: RADIOLOGY

## 2024-04-19 PROCEDURE — 2709999900 IR INSERT TUNNELED PLEURA CATH W CUFF

## 2024-04-19 PROCEDURE — 97530 THERAPEUTIC ACTIVITIES: CPT

## 2024-04-19 PROCEDURE — 0W993ZZ DRAINAGE OF RIGHT PLEURAL CAVITY, PERCUTANEOUS APPROACH: ICD-10-PCS | Performed by: RADIOLOGY

## 2024-04-19 PROCEDURE — 6360000002 HC RX W HCPCS: Performed by: NURSE PRACTITIONER

## 2024-04-19 PROCEDURE — 80053 COMPREHEN METABOLIC PANEL: CPT

## 2024-04-19 PROCEDURE — 36415 COLL VENOUS BLD VENIPUNCTURE: CPT

## 2024-04-19 PROCEDURE — 6370000000 HC RX 637 (ALT 250 FOR IP)

## 2024-04-19 PROCEDURE — 99233 SBSQ HOSP IP/OBS HIGH 50: CPT | Performed by: INTERNAL MEDICINE

## 2024-04-19 PROCEDURE — 97535 SELF CARE MNGMENT TRAINING: CPT

## 2024-04-19 PROCEDURE — 2700000000 HC OXYGEN THERAPY PER DAY

## 2024-04-19 PROCEDURE — 85025 COMPLETE CBC W/AUTO DIFF WBC: CPT

## 2024-04-19 PROCEDURE — 99232 SBSQ HOSP IP/OBS MODERATE 35: CPT | Performed by: INTERNAL MEDICINE

## 2024-04-19 PROCEDURE — 6370000000 HC RX 637 (ALT 250 FOR IP): Performed by: INTERNAL MEDICINE

## 2024-04-19 PROCEDURE — 6360000002 HC RX W HCPCS: Performed by: INTERNAL MEDICINE

## 2024-04-19 PROCEDURE — 32550 INSERT PLEURAL CATH: CPT

## 2024-04-19 PROCEDURE — 94640 AIRWAY INHALATION TREATMENT: CPT

## 2024-04-19 PROCEDURE — 97110 THERAPEUTIC EXERCISES: CPT

## 2024-04-19 PROCEDURE — 1200000000 HC SEMI PRIVATE

## 2024-04-19 PROCEDURE — 75989 ABSCESS DRAINAGE UNDER X-RAY: CPT

## 2024-04-19 RX ORDER — TORSEMIDE 20 MG/1
20 TABLET ORAL 2 TIMES DAILY
Status: DISCONTINUED | OUTPATIENT
Start: 2024-04-20 | End: 2024-04-28 | Stop reason: HOSPADM

## 2024-04-19 RX ORDER — HYDROCODONE BITARTRATE AND ACETAMINOPHEN 5; 325 MG/1; MG/1
1 TABLET ORAL EVERY 6 HOURS PRN
Status: DISCONTINUED | OUTPATIENT
Start: 2024-04-19 | End: 2024-04-28 | Stop reason: HOSPADM

## 2024-04-19 RX ADMIN — TOBRAMYCIN AND DEXAMETHASONE 1 DROP: 3; 1 SUSPENSION/ DROPS OPHTHALMIC at 07:30

## 2024-04-19 RX ADMIN — IPRATROPIUM BROMIDE AND ALBUTEROL SULFATE 1 DOSE: .5; 2.5 SOLUTION RESPIRATORY (INHALATION) at 05:00

## 2024-04-19 RX ADMIN — PANTOPRAZOLE SODIUM 40 MG: 40 TABLET, DELAYED RELEASE ORAL at 06:04

## 2024-04-19 RX ADMIN — MAGNESIUM OXIDE 400 MG: 400 TABLET ORAL at 20:05

## 2024-04-19 RX ADMIN — TOBRAMYCIN AND DEXAMETHASONE 1 DROP: 3; 1 SUSPENSION/ DROPS OPHTHALMIC at 17:55

## 2024-04-19 RX ADMIN — SACUBITRIL AND VALSARTAN 0.5 TABLET: 24; 26 TABLET, FILM COATED ORAL at 07:30

## 2024-04-19 RX ADMIN — LEVOTHYROXINE SODIUM 100 MCG: 0.1 TABLET ORAL at 06:04

## 2024-04-19 RX ADMIN — ATORVASTATIN CALCIUM 40 MG: 40 TABLET, FILM COATED ORAL at 20:05

## 2024-04-19 RX ADMIN — MIDODRINE HYDROCHLORIDE 5 MG: 5 TABLET ORAL at 07:30

## 2024-04-19 RX ADMIN — ENOXAPARIN SODIUM 60 MG: 100 INJECTION SUBCUTANEOUS at 20:05

## 2024-04-19 RX ADMIN — HYDROCODONE BITARTRATE AND ACETAMINOPHEN 1 TABLET: 5; 325 TABLET ORAL at 16:50

## 2024-04-19 RX ADMIN — TOBRAMYCIN AND DEXAMETHASONE 1 DROP: 3; 1 SUSPENSION/ DROPS OPHTHALMIC at 20:06

## 2024-04-19 RX ADMIN — ACETAMINOPHEN 650 MG: 325 TABLET ORAL at 00:47

## 2024-04-19 RX ADMIN — METOPROLOL SUCCINATE 25 MG: 25 TABLET, EXTENDED RELEASE ORAL at 07:30

## 2024-04-19 RX ADMIN — MIDODRINE HYDROCHLORIDE 5 MG: 5 TABLET ORAL at 17:54

## 2024-04-19 RX ADMIN — FUROSEMIDE 40 MG: 10 INJECTION, SOLUTION INTRAMUSCULAR; INTRAVENOUS at 07:30

## 2024-04-19 ASSESSMENT — PAIN DESCRIPTION - LOCATION
LOCATION: RIB CAGE
LOCATION: CHEST

## 2024-04-19 ASSESSMENT — PAIN SCALES - GENERAL
PAINLEVEL_OUTOF10: 9
PAINLEVEL_OUTOF10: 5
PAINLEVEL_OUTOF10: 6

## 2024-04-19 ASSESSMENT — PAIN DESCRIPTION - DESCRIPTORS: DESCRIPTORS: ACHING

## 2024-04-19 NOTE — PLAN OF CARE
Problem: ABCDS Injury Assessment  Goal: Absence of physical injury  4/18/2024 2044 by Kya Cr, RN  Outcome: Progressing     Problem: Safety - Adult  Goal: Free from fall injury  4/18/2024 2044 by Kya Cr, RN  Outcome: Progressing

## 2024-04-19 NOTE — PROGRESS NOTES
Physical Therapy  Physical Therapy    Room #:   0439/0439-01    Date: 2024       Patient Name: Katt Diaz  : 1945      MRN: [id number]     Patient unavailable for physical therapy treatment due to off floor at [facility] for a Pleurx catheter. Will attempt PT treatment tomorrow. Thank you.      Toni Lopez  [facility]  LIC # [id number]

## 2024-04-19 NOTE — PROGRESS NOTES
Assessment and Plan  Patient is a 78 y.o. female with the following medical Problems:   Acute on chronic hypoxic respiratory failure requiring supplemental oxygen.  Bilateral pleural effusions (Right > LefT)  S/P right-sided thoracentesis April 12, 2024 and April 16, 2024. (Borderline exudative)  Heart failure with moderately reduced ejection fraction.  (EF 30 to 35%)(April 4, 2024)  Secondary pulmonary hypertension based on echocardiography from April 4, 2024.  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 Jasso   Acute left lower lobe segmental and subsegmental pulmonary embolism.  CKD stage III.  Rheumatoid arthritis  Anemia of chronic disease    Plan of care:  Patient has chronic right-sided pleural effusion with significant exertional dyspnea.  Effusion continues to worsen despite 2 thoracentesis within 4 days.  Pleurx catheter was offered to the patient as effusion is unlikely to respond to diuresis alone.    Patient is agreeable to proceed with Pleurx catheter.  Order was placed.  Lovenox will be resumed after Pleurx catheter placement  I would recommend lifelong anticoagulation with Eliquis upon discharge.  Eliquis dose can be reduced after 3 months to 2.5 mg twice daily.  Patient should be lifelong anticoagulated unless there is a contraindication.  Continue with goal-directed heart failure therapy.  Judicious diuresis with close monitoring of kidney function.  GI prophylaxis.  Incentive spirometer.  Supplemental oxygen to keep sat 88 to 94%.  Pleural fluid cytology is negative for malignancy.    History of Present Illness:   Patient is a 78-year-old woman with above-mentioned medical problems who was admitted on April 12, 2024 with progressive shortness of breath and lower extremity swelling.  She had a CTA of the chest which showed bilateral pleural effusion mostly on the right with acute left lower lobe segmental and

## 2024-04-19 NOTE — PROGRESS NOTES
Internal Medicine Progress Note         Primary Care Physician: Luis Angel Rea DO   Admitting Physician:  Dre Hubbard DO  Admission date and time: 4/11/2024 11:35 PM    Room:  [address] [place] [address]  Admitting diagnosis: Pleural effusion [J90]  Chest pain, unspecified type [R07.9]  Acute pulmonary embolism without acute cor pulmonale, unspecified pulmonary embolism type (HCC) [I26.99]  Multiple subsegmental pulmonary emboli without acute cor pulmonale (HCC) [I26.94]    Patient Name: Katt Diaz  MRN: [id number]    Date of Service: 4/19/2024     Subjective:  Katt is a 78 y.o. female who was seen and examined today,4/17/2024, at the bedside.  Her volume status has improved.  She has multiple questions regarding her cardiac disease process we have answered some of them to her satisfaction at bedside but she has other significant questions that she believes can only be answered well by the cardiologist and we have deferred these to their service.  No new symptoms or concerns. No family present during my examination.    4/17/2024  Patient seen examined on telemetry floor.  Patient still complains of weakness and shortness of breath.  Patient feels weak and does not feel comfortable going home.  Patient is requesting assisted living.  BUN/creatinine 25/1.2 with normal liver enzymes.  WBC is 5.7 from 8.3.  Temperature 97.8 with heart rate 75 blood pressure 111/72.  O2 sat 99% on 2 L.  Patient still complains shortness of breath with any activity.  Cardiology note reviewed.  CT of the chest showed persistent moderate-large right pleural effusion.  Will consult pulmonology for their input.    4/18/2024  Patient seen examined on telemetry floor.  Patient still complains of some shortness of breath and chest pain.  Patient symptoms always moderately improved from admission.  Patient has no lower leg edema.  Pulmonology was consulted and recommended Pleurx catheter.  BUN/creatinine 27/1.1 with mild elevation in AST at 37.

## 2024-04-19 NOTE — PROGRESS NOTES
Pt. Came to specials for right pleurx catheter placement.     Dr. James explained procedure and answered any questions.  Patient was educated about the amount of radiation used with today's procedure.  Consent signed.     Patient positioned, secured to table.   Emotional support given.  All monitors and safety equipment secured. Prep begun     1313 procedure start /53  77  18  100% on 2LPM by nasal cannula      right Pleurx catheter secured with non absorbable sutures.     two non absorbable suture placed at incision site.    1325 procedure completed VS 96/48  72  18  100% on 2LPM by nasal cannula     300cc of bloody colored pleural fluid drained.  DSD applied to right back. CDI. Vss.       Pt. tolerated well.    Post chest xray taken    Nurse to nurse called to floor spoke with Kaila HORTA, nurse notified of above information.      Patient transferred back to the 4th floor .

## 2024-04-19 NOTE — CARE COORDINATION
4/19/2024 1515 CM note: Pt resides at [facility] and presented to hospital from [facility](skilled care). She had requested information for [facility] and informed that cost is $3,950.00/month-she states it is not affordable.  Pt then requested [facility] at d/c and informed that she is in copay days and cost is $196.00/day(would need PRECERT). Per [facility] liaison,[facility] can accept pt if she returns to Independent Living, however, she will need a caregiver to learn pleurex catheter drainage. [employer] care discussed and informed that private duty cost is approximately $30.00/hr.  Pt did not want CM to call her son at work to discuss above information. She will speak with her son and notify CM of her decision. Pt is wearing o2@2L NC and does not have home o2. She is wearing a life vest(had prior to admit). Pt had pleurex catheter placed today -will need draining frequency/amount orders. CM to follow. Molly HORTA

## 2024-04-19 NOTE — PROGRESS NOTES
OCCUPATIONAL THERAPY BEDSIDE TREATMENT NOTE  [facility]  [address] [place]. [place]    Date:2024  Patient Name: Katt Diaz  MRN: [id number]  : 1945  Room: [address] [place] [address]       Evaluating OT: Alexia Gonzalez OTR/L; [id number]      Referring Provider and Specific Provider Orders/Date:      24   OT eval and treat  Start:  24,   End:  24,   ONE TIME,   Standing Count:  1 Occurrences,   R         Luis Angel Bain DO       Placement Recommendation: Subacute         Diagnosis:   1. Multiple subsegmental pulmonary emboli without acute cor pulmonale (HCC)    2. Pleural effusion    3. Chest pain, unspecified type         Surgery: none        Pertinent Medical History:       Past Medical History        Past Medical History:   Diagnosis Date    VERONIQUE (acute kidney injury) ([facility]) 2024    CAD in native artery 2024    Headache      Hearing loss      Hx of rheumatoid arthritis      Migraine      Osteopenia      S/P CABG x 3      Sjogren's disease ([facility])              Past Surgical History         Past Surgical History:   Procedure Laterality Date    CARDIAC PROCEDURE N/A 2024     Left heart cath / coronary angiography performed by Arnav Shaw MD at [facility]    CORONARY ARTERY BYPASS GRAFT N/A 2024     CORONARY ARTERY BYPASS GRAFTING, EVH performed by Nicolasa Jasso DO at [facility] OR    IR TUNNELED CATHETER PLACEMENT GREATER THAN 5 YEARS   2024     IR TUNNELED CATHETER PLACEMENT GREATER THAN 5 YEARS 2024 Cuong, REYNA Holguin MD [facility] SPECIAL PROCEDURES    PARTIAL HYSTERECTOMY (CERVIX NOT REMOVED)         states     VEIN LIGATION AND STRIPPING         states           Precautions:  Fall Risk, life vest, s/p thoracentesis , pt states she is having another procedure on this date      Assessment of current deficits:     [x] Functional mobility            [x]ADLs           [x] Strength

## 2024-04-19 NOTE — PROGRESS NOTES
INPATIENT CARDIOLOGY FOLLOW-UP    Name: Katt Diaz    Age: 78 y.o.    Date of Admission: 4/11/2024 11:35 PM    Date of Service: 4/19/2024    Primary Cardiologist: Dr Shaw    Chief Complaint: Follow-up for HFrEF, CAD    Interim History:  Still short of breath.  Net -2.8 L.  Agreeable to pleural catheter.      Review of Systems:   Negative except as described above    Problem List:  Patient Active Problem List   Diagnosis    S/P cardiac cath    Coronary artery disease involving native coronary artery of native heart without angina pectoris    ST elevation myocardial infarction (STEMI) (HCC)    Postoperative hypotension    Complication of surgical procedure    Stress hyperglycemia    VERONIQUE (acute kidney injury) (HCC)    Acute on chronic systolic heart failure (HCC)    Acute on chronic congestive heart failure (HCC)    Mild protein-calorie malnutrition (HCC)    Chronic systolic (congestive) heart failure (HCC)    Dyspnea on exertion    Failure to thrive in adult    Multiple subsegmental pulmonary emboli (HCC)    Ischemic cardiomyopathy    Mitral valve disease    Stage 3b chronic kidney disease (HCC)    Pleural effusion       Current Medications:    Current Facility-Administered Medications:     midodrine (PROAMATINE) tablet 5 mg, 5 mg, Oral, TID RAMESH, Dre Hubbard DO, 5 mg at 04/19/24 0730    furosemide (LASIX) injection 40 mg, 40 mg, IntraVENous, BID, Иван Cabezas MD, 40 mg at 04/19/24 0730    magnesium sulfate 1000 mg in dextrose 5% 100 mL IVPB, 1,000 mg, IntraVENous, PRN, Chadwick Gomez, PALMER - CNP    potassium chloride (KLOR-CON M) extended release tablet 40 mEq, 40 mEq, Oral, PRN **OR** potassium bicarb-citric acid (EFFER-K) effervescent tablet 40 mEq, 40 mEq, Oral, PRN **OR** potassium chloride 10 mEq/100 mL IVPB (Peripheral Line), 10 mEq, IntraVENous, PRN, Chadwick Gomez APRN - CNP    [Held by provider] enoxaparin (LOVENOX) injection 60 mg, 1 mg/kg, SubCUTAneous, BID, Chadwick Gomez APRN -  CNP, 60 mg at 04/18/24 0816    empagliflozin (JARDIANCE) tablet 10 mg, 10 mg, Oral, Daily, Umberto Guevara APRN - CNP, 10 mg at 04/18/24 0817    sacubitril-valsartan (ENTRESTO) 24-26 MG per tablet 0.5 tablet, 0.5 tablet, Oral, BID, Umberto Guevara APRN - CNP, 0.5 tablet at 04/19/24 0730    ipratropium 0.5 mg-albuterol 2.5 mg (DUONEB) nebulizer solution 1 Dose, 1 Dose, Inhalation, Q4H WA RT, Umberto Guevara APRN - CNP, 1 Dose at 04/19/24 0500    acetaminophen (TYLENOL) tablet 650 mg, 650 mg, Oral, Q4H PRN, Umberto Guevara APRN - CNP, 650 mg at 04/19/24 0047    atorvastatin (LIPITOR) tablet 40 mg, 40 mg, Oral, Nightly, Umberto Guevara APRN - CNP, 40 mg at 04/18/24 2039    metoprolol succinate (TOPROL XL) extended release tablet 25 mg, 25 mg, Oral, Daily, Umberto Guevara APRN - CNP, 25 mg at 04/19/24 0730    tobramycin-dexAMETHasone (TOBRADEX) ophthalmic suspension 1 drop, 1 drop, Both Eyes, 4x Daily, Umberto Guevara APRN - CNP, 1 drop at 04/19/24 0730    [Held by provider] potassium chloride (KLOR-CON M) extended release tablet 20 mEq, 20 mEq, Oral, BID, Umberto Guevara APRN - CNP, 20 mEq at 04/15/24 0815    pantoprazole (PROTONIX) tablet 40 mg, 40 mg, Oral, QAM AC, Umberto Guevara APRN - CNP, 40 mg at 04/19/24 0604    levothyroxine (SYNTHROID) tablet 100 mcg, 100 mcg, Oral, Daily, Umberto Guevara APRN - CNP, 100 mcg at 04/19/24 0604    magnesium oxide (MAG-OX) tablet 400 mg, 400 mg, Oral, BID, Umberto Guevara APRN - CNP, 400 mg at 04/18/24 2039    Polyvinyl Alcohol-Povidone PF (REFRESH) 1.4-0.6 % ophthalmic solution 1 drop, 1 drop, Both Eyes, Q4H PRN, Umberto Guevara, PALMER - CNP, 1 drop at 04/12/24 7818    Physical Exam:  BP (!) 103/58   Pulse 69   Temp 98.1 °F (36.7 °C) (Oral)   Resp 22   Ht 1.575 m (5' 2\")   Wt 60 kg (132 lb 4.1 oz)   SpO2 100%   BMI 24.19 kg/m²   Wt Readings from Last 3 Encounters:   04/13/24 60 kg (132 lb 4.1 oz)   04/08/24 60.2 kg (132 lb 11.5 oz)   03/31/24 56.7 kg (125 lb)     Appearance: Frail-appearing elderly female

## 2024-04-19 NOTE — PROGRESS NOTES
Comprehensive Nutrition Assessment    Type and Reason for Visit:  Initial, Positive Nutrition Screen (LOS)    Nutrition Recommendations/Plan:   Continue Current Diet  Consult RD as needed     Malnutrition Assessment:  Malnutrition Status:  At risk for malnutrition (Comment) (d/t increased respiratory demands and poor po intake) (04/19/24 1103)    Context:  Acute Illness     Findings of the 6 clinical characteristics of malnutrition:  Energy Intake:  Mild decrease in energy intake (Comment) (poor po intake prior to admission)  Weight Loss:  No significant weight loss     Body Fat Loss:  No significant body fat loss     Muscle Mass Loss:  No significant muscle mass loss    Fluid Accumulation:  Mild Extremities   Strength:  Not Performed    Nutrition Assessment:    Pt admit for multiple pulmonary emboli, bilateral pleural effusions. Pt to have Pleurx catheter placement today, s/p thoracentesis 04/12/ and 04/16. PMHx: CAD, CKD, Rheumatoid arthritis, Sjoren's disease, Migraines, Osteopenia, CABG 02/2024. Pt reports poor appetite w/ minimal intake. Pt declined ONS's. Pt NPO for cath placement. Will continue to monitor, f/up per policy.    Nutrition Related Findings:    A/O x4,I/O -2749 since admit, bowel sounds active x4, abd wdl, 2L/Min O2, BLLE edema trace, BUN 25, GFR 5, Ca+ 8.9, Hgb 8.3, Hct 27.6, Iron 18, TIBC 220 Wound Type: Multiple (Right back wound, left distal thigh, mid -sternum incision)       Current Nutrition Intake & Therapies:    Average Meal Intake: 1-25%  Average Supplements Intake: None Ordered  Diet NPO    Anthropometric Measures:  Height: 157.5 cm (5' 2.01\")  Ideal Body Weight (IBW): 110 lbs (50 kg)    Admission Body Weight: 60 kg (132 lb 4.4 oz)  Current Body Weight: 60 kg (132 lb 4.4 oz), 120.3 % IBW.    Current BMI (kg/m2): 24.2  Usual Body Weight: 52.6 kg (115 lb 15.4 oz) (10/09/23)  % Weight Change (Calculated): 14.1  Weight Adjustment For: No Adjustment         BMI Categories: Normal Weight

## 2024-04-20 LAB
ALBUMIN SERPL-MCNC: 3.3 G/DL (ref 3.5–5.2)
ALP SERPL-CCNC: 66 U/L (ref 35–104)
ALT SERPL-CCNC: 12 U/L (ref 0–32)
ANION GAP SERPL CALCULATED.3IONS-SCNC: 14 MMOL/L (ref 7–16)
AST SERPL-CCNC: 30 U/L (ref 0–31)
BASOPHILS # BLD: 0.02 K/UL (ref 0–0.2)
BASOPHILS NFR BLD: 0 % (ref 0–2)
BILIRUB SERPL-MCNC: 0.4 MG/DL (ref 0–1.2)
BUN SERPL-MCNC: 27 MG/DL (ref 6–23)
CALCIUM SERPL-MCNC: 8.2 MG/DL (ref 8.6–10.2)
CHLORIDE SERPL-SCNC: 97 MMOL/L (ref 98–107)
CO2 SERPL-SCNC: 22 MMOL/L (ref 22–29)
CREAT SERPL-MCNC: 1.1 MG/DL (ref 0.5–1)
EOSINOPHIL # BLD: 0.14 K/UL (ref 0.05–0.5)
EOSINOPHILS RELATIVE PERCENT: 3 % (ref 0–6)
ERYTHROCYTE [DISTWIDTH] IN BLOOD BY AUTOMATED COUNT: 17.6 % (ref 11.5–15)
GFR SERPL CREATININE-BSD FRML MDRD: 53 ML/MIN/1.73M2
GLUCOSE SERPL-MCNC: 94 MG/DL (ref 74–99)
HCT VFR BLD AUTO: 27.4 % (ref 34–48)
HGB BLD-MCNC: 8.5 G/DL (ref 11.5–15.5)
IMM GRANULOCYTES # BLD AUTO: <0.03 K/UL (ref 0–0.58)
IMM GRANULOCYTES NFR BLD: 0 % (ref 0–5)
LYMPHOCYTES NFR BLD: 1.38 K/UL (ref 1.5–4)
LYMPHOCYTES RELATIVE PERCENT: 30 % (ref 20–42)
MCH RBC QN AUTO: 29.1 PG (ref 26–35)
MCHC RBC AUTO-ENTMCNC: 31 G/DL (ref 32–34.5)
MCV RBC AUTO: 93.8 FL (ref 80–99.9)
MONOCYTES NFR BLD: 0.57 K/UL (ref 0.1–0.95)
MONOCYTES NFR BLD: 13 % (ref 2–12)
NEUTROPHILS NFR BLD: 53 % (ref 43–80)
NEUTS SEG NFR BLD: 2.42 K/UL (ref 1.8–7.3)
PLATELET # BLD AUTO: 371 K/UL (ref 130–450)
PMV BLD AUTO: 10.1 FL (ref 7–12)
POTASSIUM SERPL-SCNC: 4.8 MMOL/L (ref 3.5–5)
PROT SERPL-MCNC: 6.5 G/DL (ref 6.4–8.3)
RBC # BLD AUTO: 2.92 M/UL (ref 3.5–5.5)
SODIUM SERPL-SCNC: 133 MMOL/L (ref 132–146)
WBC OTHER # BLD: 4.6 K/UL (ref 4.5–11.5)

## 2024-04-20 PROCEDURE — 6370000000 HC RX 637 (ALT 250 FOR IP): Performed by: INTERNAL MEDICINE

## 2024-04-20 PROCEDURE — 36415 COLL VENOUS BLD VENIPUNCTURE: CPT

## 2024-04-20 PROCEDURE — 6370000000 HC RX 637 (ALT 250 FOR IP)

## 2024-04-20 PROCEDURE — 6360000002 HC RX W HCPCS: Performed by: NURSE PRACTITIONER

## 2024-04-20 PROCEDURE — 94640 AIRWAY INHALATION TREATMENT: CPT

## 2024-04-20 PROCEDURE — 99233 SBSQ HOSP IP/OBS HIGH 50: CPT | Performed by: INTERNAL MEDICINE

## 2024-04-20 PROCEDURE — 80053 COMPREHEN METABOLIC PANEL: CPT

## 2024-04-20 PROCEDURE — 2700000000 HC OXYGEN THERAPY PER DAY

## 2024-04-20 PROCEDURE — 85025 COMPLETE CBC W/AUTO DIFF WBC: CPT

## 2024-04-20 PROCEDURE — 1200000000 HC SEMI PRIVATE

## 2024-04-20 RX ADMIN — HYDROCODONE BITARTRATE AND ACETAMINOPHEN 1 TABLET: 5; 325 TABLET ORAL at 17:56

## 2024-04-20 RX ADMIN — IPRATROPIUM BROMIDE AND ALBUTEROL SULFATE 1 DOSE: .5; 2.5 SOLUTION RESPIRATORY (INHALATION) at 06:39

## 2024-04-20 RX ADMIN — IPRATROPIUM BROMIDE AND ALBUTEROL SULFATE 1 DOSE: .5; 2.5 SOLUTION RESPIRATORY (INHALATION) at 14:11

## 2024-04-20 RX ADMIN — ENOXAPARIN SODIUM 60 MG: 100 INJECTION SUBCUTANEOUS at 08:21

## 2024-04-20 RX ADMIN — TOBRAMYCIN AND DEXAMETHASONE 1 DROP: 3; 1 SUSPENSION/ DROPS OPHTHALMIC at 17:55

## 2024-04-20 RX ADMIN — PANTOPRAZOLE SODIUM 40 MG: 40 TABLET, DELAYED RELEASE ORAL at 05:00

## 2024-04-20 RX ADMIN — MAGNESIUM OXIDE 400 MG: 400 TABLET ORAL at 20:22

## 2024-04-20 RX ADMIN — MIDODRINE HYDROCHLORIDE 5 MG: 5 TABLET ORAL at 17:56

## 2024-04-20 RX ADMIN — ATORVASTATIN CALCIUM 40 MG: 40 TABLET, FILM COATED ORAL at 20:22

## 2024-04-20 RX ADMIN — LEVOTHYROXINE SODIUM 100 MCG: 0.1 TABLET ORAL at 05:00

## 2024-04-20 RX ADMIN — TOBRAMYCIN AND DEXAMETHASONE 1 DROP: 3; 1 SUSPENSION/ DROPS OPHTHALMIC at 12:19

## 2024-04-20 RX ADMIN — MIDODRINE HYDROCHLORIDE 5 MG: 5 TABLET ORAL at 12:19

## 2024-04-20 RX ADMIN — MIDODRINE HYDROCHLORIDE 5 MG: 5 TABLET ORAL at 08:20

## 2024-04-20 RX ADMIN — TORSEMIDE 20 MG: 20 TABLET ORAL at 17:58

## 2024-04-20 RX ADMIN — ENOXAPARIN SODIUM 60 MG: 100 INJECTION SUBCUTANEOUS at 20:22

## 2024-04-20 RX ADMIN — MAGNESIUM OXIDE 400 MG: 400 TABLET ORAL at 08:21

## 2024-04-20 RX ADMIN — HYDROCODONE BITARTRATE AND ACETAMINOPHEN 1 TABLET: 5; 325 TABLET ORAL at 04:53

## 2024-04-20 RX ADMIN — SACUBITRIL AND VALSARTAN 0.5 TABLET: 24; 26 TABLET, FILM COATED ORAL at 20:22

## 2024-04-20 RX ADMIN — TOBRAMYCIN AND DEXAMETHASONE 1 DROP: 3; 1 SUSPENSION/ DROPS OPHTHALMIC at 20:24

## 2024-04-20 RX ADMIN — TOBRAMYCIN AND DEXAMETHASONE 1 DROP: 3; 1 SUSPENSION/ DROPS OPHTHALMIC at 08:21

## 2024-04-20 RX ADMIN — TORSEMIDE 20 MG: 20 TABLET ORAL at 08:20

## 2024-04-20 ASSESSMENT — PAIN SCALES - GENERAL
PAINLEVEL_OUTOF10: 0
PAINLEVEL_OUTOF10: 6
PAINLEVEL_OUTOF10: 7
PAINLEVEL_OUTOF10: 7
PAINLEVEL_OUTOF10: 8
PAINLEVEL_OUTOF10: 3
PAINLEVEL_OUTOF10: 8

## 2024-04-20 ASSESSMENT — PAIN DESCRIPTION - ORIENTATION: ORIENTATION: RIGHT

## 2024-04-20 ASSESSMENT — PAIN DESCRIPTION - LOCATION: LOCATION: BACK

## 2024-04-20 ASSESSMENT — PAIN DESCRIPTION - DESCRIPTORS: DESCRIPTORS: ACHING;TENDER;THROBBING

## 2024-04-20 NOTE — PROGRESS NOTES
Internal Medicine Progress Note         Primary Care Physician: Luis Angel Rea DO   Admitting Physician:  Dre Hubbard DO  Admission date and time: 4/11/2024 11:35 PM    Room:  [address] [place] [address]  Admitting diagnosis: Pleural effusion [J90]  Chest pain, unspecified type [R07.9]  Acute pulmonary embolism without acute cor pulmonale, unspecified pulmonary embolism type (HCC) [I26.99]  Multiple subsegmental pulmonary emboli without acute cor pulmonale (HCC) [I26.94]    Patient Name: Katt Diaz  MRN: [id number]    Date of Service: 4/20/2024     Subjective:  Katt is a 78 y.o. female who was seen and examined today,4/17/2024, at the bedside.  Her volume status has improved.  She has multiple questions regarding her cardiac disease process we have answered some of them to her satisfaction at bedside but she has other significant questions that she believes can only be answered well by the cardiologist and we have deferred these to their service.  No new symptoms or concerns. No family present during my examination.    4/17/2024  Patient seen examined on telemetry floor.  Patient still complains of weakness and shortness of breath.  Patient feels weak and does not feel comfortable going home.  Patient is requesting assisted living.  BUN/creatinine 25/1.2 with normal liver enzymes.  WBC is 5.7 from 8.3.  Temperature 97.8 with heart rate 75 blood pressure 111/72.  O2 sat 99% on 2 L.  Patient still complains shortness of breath with any activity.  Cardiology note reviewed.  CT of the chest showed persistent moderate-large right pleural effusion.  Will consult pulmonology for their input.    4/18/2024  Patient seen examined on telemetry floor.  Patient still complains of some shortness of breath and chest pain.  Patient symptoms always moderately improved from admission.  Patient has no lower leg edema.  Pulmonology was consulted and recommended Pleurx catheter.  BUN/creatinine 27/1.1 with mild elevation in AST at 37.

## 2024-04-20 NOTE — PLAN OF CARE
Problem: ABCDS Injury Assessment  Goal: Absence of physical injury  Outcome: Progressing     Problem: Nutrition Deficit:  Goal: Optimize nutritional status  4/19/2024 1106 by Yuliya Holcomb RD, LD  Outcome: Not Progressing  Flowsheets (Taken 4/19/2024 1106)  Nutrient intake appropriate for improving, restoring, or maintaining nutritional needs:   Assess nutritional status and recommend course of action   Recommend appropriate diets, oral nutritional supplements, and vitamin/mineral supplements   Monitor oral intake, labs, and treatment plans

## 2024-04-20 NOTE — PROGRESS NOTES
Assessment and Plan  Patient is a 78 y.o. female with the following medical Problems:   Acute on chronic hypoxic respiratory failure requiring supplemental oxygen.  Bilateral pleural effusions (Right > LefT)  S/P right-sided thoracentesis April 12, 2024 and April 16, 2024. (Borderline exudative)  Heart failure with moderately reduced ejection fraction.  (EF 30 to 35%)(April 4, 2024)  Secondary pulmonary hypertension based on echocardiography from April 4, 2024.  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 Jasso   Acute left lower lobe segmental and subsegmental pulmonary embolism.  CKD stage III.  Rheumatoid arthritis  Anemia of chronic disease    Plan of care:  Pleurx catheter drainage 3 times weekly Monday, Wednesday, and Friday  I would recommend lifelong anticoagulation with Eliquis upon discharge.  Eliquis dose can be reduced after 3 months to 2.5 mg twice daily.  Patient should be lifelong anticoagulated unless there is a contraindication.  Continue with goal-directed heart failure therapy.  Judicious diuresis with close monitoring of kidney function.  GI prophylaxis.  Incentive spirometer.  Supplemental oxygen to keep sat 88 to 94%.  Pleural fluid cytology is negative for malignancy.  Evaluate the need for home oxygen before discharge.    History of Present Illness:   Patient is a 78-year-old woman with above-mentioned medical problems who was admitted on April 12, 2024 with progressive shortness of breath and lower extremity swelling.  She had a CTA of the chest which showed bilateral pleural effusion mostly on the right with acute left lower lobe segmental and subsegmental pulmonary embolism.  Patient underwent right sided thoracentesis on April 12, 2024 and April 16, 2024.  Fluid was borderline exudative.  Patient has been maintained on anticoagulation.  Symptoms persisted despite diuresis.  I extensively discussed with the  and rhythm, no murmur, rub or gallop  Abdomen: Normal sounds present, soft, lax with no tenderness, no hepatosplenomegaly, and no masses  Extremities: +ve edema. Pulses are equally present.   Skin: intact, no rashes   Neurologic: Alert and oriented x 3, No focal deficit     Investigations:  Labs, radiological imaging and cardiac work up were personally reviewed        STAFF PHYSICIAN NOTE OF PERSONAL INVOLVEMENT IN CARE  As the attending physician, I certify that I personally reviewed the patient's history and personally examined the patient to confirm the physical findings described above, and that I reviewed the relevant imaging studies and available reports.  I also discussed the differential diagnosis and all of the proposed management plans with the patient and individuals accompanying the patient to this visit.  They had the opportunity to ask questions about the proposed management plans and to have those questions answered.      Electronically signed by Norma Priest MD on 4/20/2024 at 1:21 PM

## 2024-04-21 LAB
ALBUMIN SERPL-MCNC: 3.2 G/DL (ref 3.5–5.2)
ALP SERPL-CCNC: 72 U/L (ref 35–104)
ALT SERPL-CCNC: 11 U/L (ref 0–32)
ANION GAP SERPL CALCULATED.3IONS-SCNC: 13 MMOL/L (ref 7–16)
AST SERPL-CCNC: 24 U/L (ref 0–31)
BASOPHILS # BLD: 0.03 K/UL (ref 0–0.2)
BASOPHILS NFR BLD: 1 % (ref 0–2)
BILIRUB SERPL-MCNC: 0.4 MG/DL (ref 0–1.2)
BUN SERPL-MCNC: 29 MG/DL (ref 6–23)
CALCIUM SERPL-MCNC: 8.3 MG/DL (ref 8.6–10.2)
CHLORIDE SERPL-SCNC: 97 MMOL/L (ref 98–107)
CO2 SERPL-SCNC: 23 MMOL/L (ref 22–29)
CREAT SERPL-MCNC: 1.2 MG/DL (ref 0.5–1)
EOSINOPHIL # BLD: 0.12 K/UL (ref 0.05–0.5)
EOSINOPHILS RELATIVE PERCENT: 3 % (ref 0–6)
ERYTHROCYTE [DISTWIDTH] IN BLOOD BY AUTOMATED COUNT: 17.4 % (ref 11.5–15)
GFR SERPL CREATININE-BSD FRML MDRD: 47 ML/MIN/1.73M2
GLUCOSE SERPL-MCNC: 92 MG/DL (ref 74–99)
HCT VFR BLD AUTO: 27.6 % (ref 34–48)
HGB BLD-MCNC: 8.5 G/DL (ref 11.5–15.5)
IMM GRANULOCYTES # BLD AUTO: 0.03 K/UL (ref 0–0.58)
IMM GRANULOCYTES NFR BLD: 1 % (ref 0–5)
LYMPHOCYTES NFR BLD: 1.36 K/UL (ref 1.5–4)
LYMPHOCYTES RELATIVE PERCENT: 30 % (ref 20–42)
MCH RBC QN AUTO: 28.7 PG (ref 26–35)
MCHC RBC AUTO-ENTMCNC: 30.8 G/DL (ref 32–34.5)
MCV RBC AUTO: 93.2 FL (ref 80–99.9)
MONOCYTES NFR BLD: 0.59 K/UL (ref 0.1–0.95)
MONOCYTES NFR BLD: 13 % (ref 2–12)
NEUTROPHILS NFR BLD: 53 % (ref 43–80)
NEUTS SEG NFR BLD: 2.39 K/UL (ref 1.8–7.3)
PLATELET # BLD AUTO: 388 K/UL (ref 130–450)
PMV BLD AUTO: 9.8 FL (ref 7–12)
POTASSIUM SERPL-SCNC: 3.8 MMOL/L (ref 3.5–5)
PROT SERPL-MCNC: 7 G/DL (ref 6.4–8.3)
RBC # BLD AUTO: 2.96 M/UL (ref 3.5–5.5)
SODIUM SERPL-SCNC: 133 MMOL/L (ref 132–146)
WBC OTHER # BLD: 4.5 K/UL (ref 4.5–11.5)

## 2024-04-21 PROCEDURE — 97110 THERAPEUTIC EXERCISES: CPT

## 2024-04-21 PROCEDURE — 6370000000 HC RX 637 (ALT 250 FOR IP): Performed by: INTERNAL MEDICINE

## 2024-04-21 PROCEDURE — 80053 COMPREHEN METABOLIC PANEL: CPT

## 2024-04-21 PROCEDURE — 85025 COMPLETE CBC W/AUTO DIFF WBC: CPT

## 2024-04-21 PROCEDURE — 6370000000 HC RX 637 (ALT 250 FOR IP)

## 2024-04-21 PROCEDURE — 2700000000 HC OXYGEN THERAPY PER DAY

## 2024-04-21 PROCEDURE — 97530 THERAPEUTIC ACTIVITIES: CPT

## 2024-04-21 PROCEDURE — 1200000000 HC SEMI PRIVATE

## 2024-04-21 PROCEDURE — 36415 COLL VENOUS BLD VENIPUNCTURE: CPT

## 2024-04-21 PROCEDURE — 6360000002 HC RX W HCPCS: Performed by: NURSE PRACTITIONER

## 2024-04-21 PROCEDURE — 99233 SBSQ HOSP IP/OBS HIGH 50: CPT | Performed by: INTERNAL MEDICINE

## 2024-04-21 PROCEDURE — 94640 AIRWAY INHALATION TREATMENT: CPT

## 2024-04-21 RX ORDER — MIDODRINE HYDROCHLORIDE 5 MG/1
5 TABLET ORAL
Qty: 90 TABLET | Refills: 3 | Status: SHIPPED | OUTPATIENT
Start: 2024-04-21 | End: 2024-05-20

## 2024-04-21 RX ORDER — PANTOPRAZOLE SODIUM 40 MG/1
40 TABLET, DELAYED RELEASE ORAL
Qty: 30 TABLET | Refills: 3 | Status: SHIPPED | OUTPATIENT
Start: 2024-04-22 | End: 2024-05-20

## 2024-04-21 RX ORDER — TORSEMIDE 20 MG/1
20 TABLET ORAL DAILY
Qty: 90 TABLET | Refills: 3 | COMMUNITY
Start: 2024-04-21

## 2024-04-21 RX ADMIN — ATORVASTATIN CALCIUM 40 MG: 40 TABLET, FILM COATED ORAL at 20:43

## 2024-04-21 RX ADMIN — TORSEMIDE 20 MG: 20 TABLET ORAL at 07:40

## 2024-04-21 RX ADMIN — MAGNESIUM OXIDE 400 MG: 400 TABLET ORAL at 07:40

## 2024-04-21 RX ADMIN — MIDODRINE HYDROCHLORIDE 5 MG: 5 TABLET ORAL at 17:47

## 2024-04-21 RX ADMIN — PANTOPRAZOLE SODIUM 40 MG: 40 TABLET, DELAYED RELEASE ORAL at 06:13

## 2024-04-21 RX ADMIN — MIDODRINE HYDROCHLORIDE 5 MG: 5 TABLET ORAL at 13:18

## 2024-04-21 RX ADMIN — TORSEMIDE 20 MG: 20 TABLET ORAL at 17:47

## 2024-04-21 RX ADMIN — EMPAGLIFLOZIN 10 MG: 10 TABLET, FILM COATED ORAL at 07:36

## 2024-04-21 RX ADMIN — MIDODRINE HYDROCHLORIDE 5 MG: 5 TABLET ORAL at 07:40

## 2024-04-21 RX ADMIN — TOBRAMYCIN AND DEXAMETHASONE 1 DROP: 3; 1 SUSPENSION/ DROPS OPHTHALMIC at 13:20

## 2024-04-21 RX ADMIN — HYDROCODONE BITARTRATE AND ACETAMINOPHEN 1 TABLET: 5; 325 TABLET ORAL at 20:45

## 2024-04-21 RX ADMIN — HYDROCODONE BITARTRATE AND ACETAMINOPHEN 1 TABLET: 5; 325 TABLET ORAL at 07:40

## 2024-04-21 RX ADMIN — APIXABAN 5 MG: 5 TABLET, FILM COATED ORAL at 20:43

## 2024-04-21 RX ADMIN — MAGNESIUM OXIDE 400 MG: 400 TABLET ORAL at 20:43

## 2024-04-21 RX ADMIN — TOBRAMYCIN AND DEXAMETHASONE 1 DROP: 3; 1 SUSPENSION/ DROPS OPHTHALMIC at 20:44

## 2024-04-21 RX ADMIN — TOBRAMYCIN AND DEXAMETHASONE 1 DROP: 3; 1 SUSPENSION/ DROPS OPHTHALMIC at 18:13

## 2024-04-21 RX ADMIN — IPRATROPIUM BROMIDE AND ALBUTEROL SULFATE 1 DOSE: .5; 2.5 SOLUTION RESPIRATORY (INHALATION) at 10:28

## 2024-04-21 RX ADMIN — SACUBITRIL AND VALSARTAN 0.5 TABLET: 24; 26 TABLET, FILM COATED ORAL at 20:43

## 2024-04-21 RX ADMIN — LEVOTHYROXINE SODIUM 100 MCG: 0.1 TABLET ORAL at 06:13

## 2024-04-21 RX ADMIN — ENOXAPARIN SODIUM 60 MG: 100 INJECTION SUBCUTANEOUS at 07:40

## 2024-04-21 RX ADMIN — TOBRAMYCIN AND DEXAMETHASONE 1 DROP: 3; 1 SUSPENSION/ DROPS OPHTHALMIC at 07:41

## 2024-04-21 RX ADMIN — IPRATROPIUM BROMIDE AND ALBUTEROL SULFATE 1 DOSE: .5; 2.5 SOLUTION RESPIRATORY (INHALATION) at 19:05

## 2024-04-21 ASSESSMENT — PAIN SCALES - GENERAL
PAINLEVEL_OUTOF10: 8
PAINLEVEL_OUTOF10: 0
PAINLEVEL_OUTOF10: 5

## 2024-04-21 ASSESSMENT — PAIN DESCRIPTION - DESCRIPTORS: DESCRIPTORS: ACHING;NAGGING;TENDER

## 2024-04-21 ASSESSMENT — PAIN DESCRIPTION - ORIENTATION: ORIENTATION: RIGHT

## 2024-04-21 ASSESSMENT — PAIN DESCRIPTION - LOCATION: LOCATION: BACK

## 2024-04-21 NOTE — PROGRESS NOTES
Assessment and Plan  Patient is a 78 y.o. female with the following medical Problems:   Acute on chronic hypoxic respiratory failure requiring supplemental oxygen.  Bilateral pleural effusions (Right > LefT)  S/P right-sided thoracentesis April 12, 2024 and April 16, 2024. (Borderline exudative)  Heart failure with moderately reduced ejection fraction.  (EF 30 to 35%)(April 4, 2024)  Secondary pulmonary hypertension based on echocardiography from April 4, 2024.  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 Jasso   Acute left lower lobe segmental and subsegmental pulmonary embolism.  CKD stage III.  Rheumatoid arthritis  Anemia of chronic disease    Plan of care:  Pleurx catheter drainage 3 times weekly Monday, Wednesday, and Friday  I would recommend lifelong anticoagulation with Eliquis upon discharge.  Eliquis dose can be reduced after 3 months to 2.5 mg twice daily.  Patient should be lifelong anticoagulated unless there is a contraindication.  Continue with goal-directed heart failure therapy.  Judicious diuresis with close monitoring of kidney function.  GI prophylaxis.  Incentive spirometer.  Supplemental oxygen to keep sat 88 to 94%.  Pleural fluid cytology is negative for malignancy.  Evaluate the need for home oxygen before discharge.    History of Present Illness:   Patient is a 78-year-old woman with above-mentioned medical problems who was admitted on April 12, 2024 with progressive shortness of breath and lower extremity swelling.  She had a CTA of the chest which showed bilateral pleural effusion mostly on the right with acute left lower lobe segmental and subsegmental pulmonary embolism.  Patient underwent right sided thoracentesis on April 12, 2024 and April 16, 2024.  Fluid was borderline exudative.  Patient has been maintained on anticoagulation.  Symptoms persisted despite diuresis.  I extensively discussed with the  ARTERY BYPASS GRAFTING, EVH performed by Nicolasa Jasso DO at [facility]    IR INSERT TUNNELED PLEURA CATH W CUFF  4/19/2024    IR INSERT TUNNELED PLEURA CATH W CUFF 4/19/2024 [facility] CARDIAC CATH/IR LAB    IR TUNNELED CATHETER PLACEMENT GREATER THAN 5 YEARS  2/13/2024    IR TUNNELED CATHETER PLACEMENT GREATER THAN 5 YEARS 2/13/2024 CuongREYNA MD [facility] SPECIAL PROCEDURES    PARTIAL HYSTERECTOMY (CERVIX NOT REMOVED)      states 1976    VEIN LIGATION AND STRIPPING      states 1972       Family History:   Family History   Problem Relation Age of Onset    No Known Problems Mother     No Known Problems Father        Allergies:         Penicillins and Doxycycline    Social history:  Social History     Socioeconomic History    Marital status:      Spouse name: Not on file    Number of children: Not on file    Years of education: Not on file    Highest education level: Not on file   Occupational History    Not on file   Tobacco Use    Smoking status: Never    Smokeless tobacco: Never   Vaping Use    Vaping Use: Never used   Substance and Sexual Activity    Alcohol use: Not Currently    Drug use: Never    Sexual activity: Not Currently   Other Topics Concern    Not on file   Social History Narrative    Not on file     Social Determinants of Health     Financial Resource Strain: Not on file   Food Insecurity: No Food Insecurity (4/12/2024)    Hunger Vital Sign     Worried About Running Out of Food in the Last Year: Never true     Ran Out of Food in the Last Year: Never true   Transportation Needs: No Transportation Needs (4/12/2024)    PRAPARE - Transportation     Lack of Transportation (Medical): No     Lack of Transportation (Non-Medical): No   Physical Activity: Not on file   Stress: Not on file   Social Connections: Not on file   Intimate Partner Violence: Not on file   Housing Stability: Low Risk  (4/12/2024)    Housing Stability Vital Sign     Unable to Pay for Housing in the Last Year: No     Number of

## 2024-04-21 NOTE — PROGRESS NOTES
06:12 AM    MCH 28.7 04/21/2024 06:12 AM    MCHC 30.8 04/21/2024 06:12 AM    RDW 17.4 04/21/2024 06:12 AM    LYMPHOPCT 30 04/21/2024 06:12 AM    MONOPCT 13 04/21/2024 06:12 AM    BASOPCT 1 04/21/2024 06:12 AM    MONOSABS 0.59 04/21/2024 06:12 AM    LYMPHSABS 1.36 04/21/2024 06:12 AM    EOSABS 0.12 04/21/2024 06:12 AM    BASOSABS 0.03 04/21/2024 06:12 AM     BMP:    Lab Results   Component Value Date/Time     04/21/2024 06:12 AM    K 3.8 04/21/2024 06:12 AM    CL 97 04/21/2024 06:12 AM    CO2 23 04/21/2024 06:12 AM    BUN 29 04/21/2024 06:12 AM    CREATININE 1.2 04/21/2024 06:12 AM    CALCIUM 8.3 04/21/2024 06:12 AM    LABGLOM 47 04/21/2024 06:12 AM    GLUCOSE 92 04/21/2024 06:12 AM       Wound Documentation:   Puncture 02/07/24 Thigh (Active)   Number of days: 68       Wound 04/12/24 Back Right (Active)   Dressing Status Clean;Dry;Intact 04/15/24 0815   Dressing/Treatment Gauze dressing/dressing sponge 04/15/24 0815   Drainage Amount None (dry) 04/15/24 0815   Number of days: 2       Assessment:  Acute respiratory failure with hypoxia, multifactorial  Acute left lower lobe pulmonary embolism with plan for Eliquis starter pack upon discharge  Acutely decompensated systolic congestive heart failure, LVEF 30 to 35%, with associated moderate to large right-sided pleural effusion  Coronary artery disease with status post coronary bypass grafting on February 7, 2024, consistent with coronary bypass grafting x3 with LIMA to the LAD, saphenous vein graft to obtuse marginal, saphenous vein graft to right RCA.   Acute exacerbation of chronic systolic congestive heart failure with impaired left ventricular ejection fraction of 30% to 35%.  Normochromic normocytic anemia.  Valvular heart disease with mild mitral regurgitation.  Chronically elevated troponin.  Hyperlipidemia, on statin agent.  Low blood pressure on midodrine.  Type 2 diabetes mellitus with associated peripheral neuropathy.  Primary hypothyroidism on  DO  4/21/2024  12:29 PM

## 2024-04-21 NOTE — PROGRESS NOTES
Physical Therapy Treatment Note/Plan of Care    Room #:  0439/0439-01  Patient Name: Katt Diaz  YOB: 1945  MRN: [id number]    Date of Service: 4/21/2024     Tentative placement recommendation: Subacute unless patient meets goals then Home Health Physical Therapy  Equipment recommendation: To be determined      Evaluating Physical Therapist: Maria Isabel Bach, PT #[id number]      Specific Provider Orders/Date/Referring Provider :  04/12/24 0845    PT eval and treat  Start:  04/12/24 0845,   End:  04/12/24 0845,   ONE TIME,   Standing Count:  1 Occurrences,   R       Luis Angel Bain DO    Admitting Diagnosis:   Pleural effusion [J90]  Chest pain, unspecified type [R07.9]  Acute pulmonary embolism without acute cor pulmonale, unspecified pulmonary embolism type (HCC) [I26.99]  Multiple subsegmental pulmonary emboli without acute cor pulmonale (HCC) [I26.94]     chest pain. Patient states she has had chest pain for the past couple days. She states the pain began in the right chest and now radiates across her chest. It worsens when she coughs and with deep inspiration.  Surgery:   4/12/2024  PROCEDURE: ULTRASOUNDGUIDED RIGHT THORACENTESIS 4/12/2024       Successful ultrasound guided thoracentesis.   Visit Diagnoses         Codes    Chest pain, unspecified type     R07.9    S/P chest tube placement (HCC)     Z93.8            Patient Active Problem List   Diagnosis    S/P cardiac cath    Coronary artery disease involving native coronary artery of native heart without angina pectoris    ST elevation myocardial infarction (STEMI) ([facility])    Postoperative hypotension    Complication of surgical procedure    Stress hyperglycemia    VERONIQUE (acute kidney injury) (HCC)    Acute on chronic systolic heart failure (HCC)    Acute on chronic congestive heart failure (HCC)    Mild protein-calorie malnutrition (HCC)    Chronic systolic (congestive) heart failure (HCC)    Dyspnea on exertion    Failure to thrive in adult     follows directions    Sensation:  Patient  denies numbness/tingling   Edema:  yes bilateral lower extremities   Endurance: fair       Vitals: room air   Blood Pressure at rest  Blood Pressure during session    Heart Rate at rest  Heart Rate during session    SPO2 at rest 95%  SPO2 during session 90-94% during activity and ambulation.      Patient education  Patient educated on role of Physical Therapy, risks of immobility, safety and plan of care,  importance of mobility while in hospital , and positioning for skin integrity and comfort     Patient response to education:   Pt verbalized understanding Pt demonstrated skill Pt requires further education in this area   Yes Partial Yes      Treatment:  Patient practiced and was instructed/facilitated in the following treatment: Patient assisted to EOB.  Sat edge of bed 12 minutes with Supervision  to increase dynamic sitting balance and activity tolerance. Pt educated on the importance of mobility, pursed lip breathing, pacing, maintaining strength, endurance, and safety. Pt stood, ambulated in the hallway, and back to the edge of the bed. Performed seated exercise, stood took steps to chair with no device/ HHA as needed.  repositioned to her comfort level.     Therapeutic Exercises:  ankle pumps, heel raises, long arc quad, and seated marching x 12 reps.        At end of session, patient in chair with     call light and phone within reach,  all lines and tubes intact, nursing notified.      Patient would benefit from continued skilled Physical Therapy to improve functional independence and quality of life.         Patient's/ family goals   home    Time in 820  Time out 845    Total Treatment Time  25 minutes        CPT codes:    Therapeutic activities (31275)   17 minutes  1 unit(s)  Therapeutic exercises (47727)   8 minutes  1 unit(s)    [facility]  LIC # [id number]

## 2024-04-22 ENCOUNTER — APPOINTMENT (OUTPATIENT)
Dept: CT IMAGING | Age: 79
DRG: 175 | End: 2024-04-22
Payer: MEDICARE

## 2024-04-22 LAB
ALBUMIN SERPL-MCNC: 3.5 G/DL (ref 3.5–5.2)
ALP SERPL-CCNC: 75 U/L (ref 35–104)
ALT SERPL-CCNC: 11 U/L (ref 0–32)
ANION GAP SERPL CALCULATED.3IONS-SCNC: 15 MMOL/L (ref 7–16)
AST SERPL-CCNC: 27 U/L (ref 0–31)
BASOPHILS # BLD: 0.02 K/UL (ref 0–0.2)
BASOPHILS NFR BLD: 0 % (ref 0–2)
BILIRUB SERPL-MCNC: 0.3 MG/DL (ref 0–1.2)
BUN SERPL-MCNC: 33 MG/DL (ref 6–23)
CALCIUM SERPL-MCNC: 8.4 MG/DL (ref 8.6–10.2)
CHLORIDE SERPL-SCNC: 95 MMOL/L (ref 98–107)
CO2 SERPL-SCNC: 24 MMOL/L (ref 22–29)
CREAT SERPL-MCNC: 1.4 MG/DL (ref 0.5–1)
EOSINOPHIL # BLD: 0.07 K/UL (ref 0.05–0.5)
EOSINOPHILS RELATIVE PERCENT: 2 % (ref 0–6)
ERYTHROCYTE [DISTWIDTH] IN BLOOD BY AUTOMATED COUNT: 17.4 % (ref 11.5–15)
GFR SERPL CREATININE-BSD FRML MDRD: 37 ML/MIN/1.73M2
GLUCOSE SERPL-MCNC: 103 MG/DL (ref 74–99)
HCT VFR BLD AUTO: 28.3 % (ref 34–48)
HGB BLD-MCNC: 8.5 G/DL (ref 11.5–15.5)
IMM GRANULOCYTES # BLD AUTO: 0.03 K/UL (ref 0–0.58)
IMM GRANULOCYTES NFR BLD: 1 % (ref 0–5)
LYMPHOCYTES NFR BLD: 1.19 K/UL (ref 1.5–4)
LYMPHOCYTES RELATIVE PERCENT: 27 % (ref 20–42)
MCH RBC QN AUTO: 28.3 PG (ref 26–35)
MCHC RBC AUTO-ENTMCNC: 30 G/DL (ref 32–34.5)
MCV RBC AUTO: 94.3 FL (ref 80–99.9)
MONOCYTES NFR BLD: 0.65 K/UL (ref 0.1–0.95)
MONOCYTES NFR BLD: 15 % (ref 2–12)
NEUTROPHILS NFR BLD: 56 % (ref 43–80)
NEUTS SEG NFR BLD: 2.49 K/UL (ref 1.8–7.3)
PLATELET # BLD AUTO: 404 K/UL (ref 130–450)
PMV BLD AUTO: 9.9 FL (ref 7–12)
POTASSIUM SERPL-SCNC: 4 MMOL/L (ref 3.5–5)
PROT SERPL-MCNC: 6.9 G/DL (ref 6.4–8.3)
RBC # BLD AUTO: 3 M/UL (ref 3.5–5.5)
SODIUM SERPL-SCNC: 134 MMOL/L (ref 132–146)
WBC OTHER # BLD: 4.5 K/UL (ref 4.5–11.5)

## 2024-04-22 PROCEDURE — 6370000000 HC RX 637 (ALT 250 FOR IP): Performed by: INTERNAL MEDICINE

## 2024-04-22 PROCEDURE — 6370000000 HC RX 637 (ALT 250 FOR IP)

## 2024-04-22 PROCEDURE — 1200000000 HC SEMI PRIVATE

## 2024-04-22 PROCEDURE — 80053 COMPREHEN METABOLIC PANEL: CPT

## 2024-04-22 PROCEDURE — 97530 THERAPEUTIC ACTIVITIES: CPT

## 2024-04-22 PROCEDURE — 36415 COLL VENOUS BLD VENIPUNCTURE: CPT

## 2024-04-22 PROCEDURE — 97535 SELF CARE MNGMENT TRAINING: CPT

## 2024-04-22 PROCEDURE — 99233 SBSQ HOSP IP/OBS HIGH 50: CPT | Performed by: INTERNAL MEDICINE

## 2024-04-22 PROCEDURE — 94640 AIRWAY INHALATION TREATMENT: CPT

## 2024-04-22 PROCEDURE — 71250 CT THORAX DX C-: CPT

## 2024-04-22 PROCEDURE — 85025 COMPLETE CBC W/AUTO DIFF WBC: CPT

## 2024-04-22 RX ADMIN — PANTOPRAZOLE SODIUM 40 MG: 40 TABLET, DELAYED RELEASE ORAL at 05:30

## 2024-04-22 RX ADMIN — MAGNESIUM OXIDE 400 MG: 400 TABLET ORAL at 21:40

## 2024-04-22 RX ADMIN — MIDODRINE HYDROCHLORIDE 5 MG: 5 TABLET ORAL at 09:11

## 2024-04-22 RX ADMIN — MAGNESIUM OXIDE 400 MG: 400 TABLET ORAL at 09:11

## 2024-04-22 RX ADMIN — TOBRAMYCIN AND DEXAMETHASONE 1 DROP: 3; 1 SUSPENSION/ DROPS OPHTHALMIC at 12:32

## 2024-04-22 RX ADMIN — SACUBITRIL AND VALSARTAN 0.5 TABLET: 24; 26 TABLET, FILM COATED ORAL at 21:41

## 2024-04-22 RX ADMIN — ACETAMINOPHEN 650 MG: 325 TABLET ORAL at 21:40

## 2024-04-22 RX ADMIN — SACUBITRIL AND VALSARTAN 0.5 TABLET: 24; 26 TABLET, FILM COATED ORAL at 09:11

## 2024-04-22 RX ADMIN — EMPAGLIFLOZIN 10 MG: 10 TABLET, FILM COATED ORAL at 09:11

## 2024-04-22 RX ADMIN — TOBRAMYCIN AND DEXAMETHASONE 1 DROP: 3; 1 SUSPENSION/ DROPS OPHTHALMIC at 17:46

## 2024-04-22 RX ADMIN — TORSEMIDE 20 MG: 20 TABLET ORAL at 17:47

## 2024-04-22 RX ADMIN — ACETAMINOPHEN 650 MG: 325 TABLET ORAL at 09:10

## 2024-04-22 RX ADMIN — TORSEMIDE 20 MG: 20 TABLET ORAL at 09:11

## 2024-04-22 RX ADMIN — IPRATROPIUM BROMIDE AND ALBUTEROL SULFATE 1 DOSE: .5; 2.5 SOLUTION RESPIRATORY (INHALATION) at 06:31

## 2024-04-22 RX ADMIN — TOBRAMYCIN AND DEXAMETHASONE 1 DROP: 3; 1 SUSPENSION/ DROPS OPHTHALMIC at 23:29

## 2024-04-22 RX ADMIN — IPRATROPIUM BROMIDE AND ALBUTEROL SULFATE 1 DOSE: .5; 2.5 SOLUTION RESPIRATORY (INHALATION) at 16:44

## 2024-04-22 RX ADMIN — METOPROLOL SUCCINATE 25 MG: 25 TABLET, EXTENDED RELEASE ORAL at 09:11

## 2024-04-22 RX ADMIN — TOBRAMYCIN AND DEXAMETHASONE 1 DROP: 3; 1 SUSPENSION/ DROPS OPHTHALMIC at 09:15

## 2024-04-22 RX ADMIN — IPRATROPIUM BROMIDE AND ALBUTEROL SULFATE 1 DOSE: .5; 2.5 SOLUTION RESPIRATORY (INHALATION) at 09:30

## 2024-04-22 RX ADMIN — APIXABAN 5 MG: 5 TABLET, FILM COATED ORAL at 21:41

## 2024-04-22 RX ADMIN — MIDODRINE HYDROCHLORIDE 5 MG: 5 TABLET ORAL at 17:47

## 2024-04-22 RX ADMIN — LEVOTHYROXINE SODIUM 100 MCG: 0.1 TABLET ORAL at 05:30

## 2024-04-22 RX ADMIN — ATORVASTATIN CALCIUM 40 MG: 40 TABLET, FILM COATED ORAL at 21:41

## 2024-04-22 RX ADMIN — MIDODRINE HYDROCHLORIDE 5 MG: 5 TABLET ORAL at 12:31

## 2024-04-22 RX ADMIN — APIXABAN 5 MG: 5 TABLET, FILM COATED ORAL at 09:12

## 2024-04-22 ASSESSMENT — PAIN DESCRIPTION - ORIENTATION: ORIENTATION: RIGHT

## 2024-04-22 ASSESSMENT — PAIN DESCRIPTION - DESCRIPTORS: DESCRIPTORS: THROBBING

## 2024-04-22 ASSESSMENT — PAIN SCALES - WONG BAKER: WONGBAKER_NUMERICALRESPONSE: NO HURT

## 2024-04-22 ASSESSMENT — PAIN DESCRIPTION - LOCATION
LOCATION: OTHER (COMMENT)
LOCATION: BACK

## 2024-04-22 ASSESSMENT — PAIN SCALES - GENERAL
PAINLEVEL_OUTOF10: 5
PAINLEVEL_OUTOF10: 8

## 2024-04-22 NOTE — CARE COORDINATION
4/22/2024 1130 [place] note: CM met with patient for transition of care needs plan. Pt relays she spoke with her son Endy. She can not afford Assisted Living or private duty care. Plan is for pt to return to [facility] with [facility], orders in epic. Awaiting return call from [facility] liaison for start of care. Per nursing, pt to have pleurex catheter drained today. Nursing informed to send 2 pleurex kits home with pt per [facility] request. Pt on room air. She is requesting ww, no DME preference. [facility] to deliver ww to pt's room. Referral faxed to [employer] for home delivered meals per request and written [employer] information provided. Pt agreeable for eliquis free 30 day trial offer from meds to bed. Pt informed that monthly eliquis copay is $35.00 per [facility] pharmacist,pt agreeable. . Pt's family to transport home. Molly HORTA

## 2024-04-22 NOTE — PROGRESS NOTES
Internal Medicine Discharge Note         Primary Care Physician: Luis Angel Rea DO   Admitting Physician:  Dre Hubbard DO  Admission date and time: 4/11/2024 11:35 PM    Room:  [address] [place] [address]  Admitting diagnosis: Pleural effusion [J90]  Chest pain, unspecified type [R07.9]  Acute pulmonary embolism without acute cor pulmonale, unspecified pulmonary embolism type (HCC) [I26.99]  Multiple subsegmental pulmonary emboli without acute cor pulmonale (HCC) [I26.94]    Patient Name: Katt Diaz  MRN: [id number]    Date of Service: 4/22/2024     Subjective:  Katt is a 78 y.o. female who was seen and examined today,4/17/2024, at the bedside.  Her volume status has improved.  She has multiple questions regarding her cardiac disease process we have answered some of them to her satisfaction at bedside but she has other significant questions that she believes can only be answered well by the cardiologist and we have deferred these to their service.  No new symptoms or concerns. No family present during my examination.    4/17/2024  Patient seen examined on telemetry floor.  Patient still complains of weakness and shortness of breath.  Patient feels weak and does not feel comfortable going home.  Patient is requesting assisted living.  BUN/creatinine 25/1.2 with normal liver enzymes.  WBC is 5.7 from 8.3.  Temperature 97.8 with heart rate 75 blood pressure 111/72.  O2 sat 99% on 2 L.  Patient still complains shortness of breath with any activity.  Cardiology note reviewed.  CT of the chest showed persistent moderate-large right pleural effusion.  Will consult pulmonology for their input.    4/18/2024  Patient seen examined on telemetry floor.  Patient still complains of some shortness of breath and chest pain.  Patient symptoms always moderately improved from admission.  Patient has no lower leg edema.  Pulmonology was consulted and recommended Pleurx catheter.  BUN/creatinine 27/1.1 with mild elevation in AST at 37.

## 2024-04-22 NOTE — PROGRESS NOTES
Physical Therapy  Physical Therapy    Room #:   0439/0439-01    Date: 2024       Patient Name: Katt Diaz  : 1945      MRN: [id number]     Patient unavailable for physical therapy treatment due to off floor at CT. Will attempt PT treatment tomorrow. Thank you.      Toni Lopez  [facility]  LIC # [id number]

## 2024-04-22 NOTE — PROGRESS NOTES
OCCUPATIONAL THERAPY BEDSIDE TREATMENT NOTE  [facility]  [address] [place]. [place]    Date:2024  Patient Name: Katt Diaz  MRN: [id number]  : 1945  Room: [address] [place] [address]       Evaluating OT: Alexia Gonzalez OTR/L; [id number]      Referring Provider and Specific Provider Orders/Date:      24   OT eval and treat  Start:  24,   End:  24,   ONE TIME,   Standing Count:  1 Occurrences,   R         Luis Angel Bain DO       Placement Recommendation: HHOT with family supervision/assist       Diagnosis:   1. Multiple subsegmental pulmonary emboli without acute cor pulmonale (HCC)    2. Pleural effusion    3. Chest pain, unspecified type         Surgery: none        Pertinent Medical History:       Past Medical History           Past Medical History:   Diagnosis Date    VERONIQUE (acute kidney injury) (HCC) 2024    CAD in native artery 2024    Headache      Hearing loss      Hx of rheumatoid arthritis      Migraine      Osteopenia      S/P CABG x 3      Sjogren's disease (HCC)              Past Surgical History             Past Surgical History:   Procedure Laterality Date    CARDIAC PROCEDURE N/A 2024     Left heart cath / coronary angiography performed by Arnav Shaw MD at [facility] CARDIAC CATH LAB    CORONARY ARTERY BYPASS GRAFT N/A 2024     CORONARY ARTERY BYPASS GRAFTING, EVH performed by Nicolasa Jasso DO at [facility] OR    IR TUNNELED CATHETER PLACEMENT GREATER THAN 5 YEARS   2024     IR TUNNELED CATHETER PLACEMENT GREATER THAN 5 YEARS 2024 CuongREYNA MD [facility] SPECIAL PROCEDURES    PARTIAL HYSTERECTOMY (CERVIX NOT REMOVED)         states     VEIN LIGATION AND STRIPPING         states           Precautions:  Fall Risk, life vest, s/p thoracentesis , pt has pleurex catheter      Assessment of current deficits:     [x] Functional mobility            [x]ADLs           [x] Strength          28-May-2023 00:10

## 2024-04-22 NOTE — PLAN OF CARE
Problem: ABCDS Injury Assessment  Goal: Absence of physical injury  Outcome: Progressing     Problem: Safety - Adult  Goal: Free from fall injury  Outcome: Progressing     Problem: Chronic Conditions and Co-morbidities  Goal: Patient's chronic conditions and co-morbidity symptoms are monitored and maintained or improved  Outcome: Progressing     Problem: Pain  Goal: Verbalizes/displays adequate comfort level or baseline comfort level  Outcome: Progressing     Problem: Skin/Tissue Integrity  Goal: Absence of new skin breakdown  Description: 1.  Monitor for areas of redness and/or skin breakdown  2.  Assess vascular access sites hourly  3.  Every 4-6 hours minimum:  Change oxygen saturation probe site  4.  Every 4-6 hours:  If on nasal continuous positive airway pressure, respiratory therapy assess nares and determine need for appliance change or resting period.  Outcome: Progressing     Problem: Nutrition Deficit:  Goal: Optimize nutritional status  Outcome: Progressing

## 2024-04-22 NOTE — PROGRESS NOTES
Assessment and Plan  Patient is a 78 y.o. female with the following medical Problems:   Acute on chronic hypoxic respiratory failure requiring supplemental oxygen.  Bilateral pleural effusions (Right > LefT)  S/P right-sided thoracentesis April 12, 2024 and April 16, 2024. (Borderline exudative)  Heart failure with moderately reduced ejection fraction.  (EF 30 to 35%)(April 4, 2024)  Secondary pulmonary hypertension based on echocardiography from April 4, 2024.  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 Jasso   Acute left lower lobe segmental and subsegmental pulmonary embolism.  CKD stage III.  Rheumatoid arthritis  Anemia of chronic disease    Plan of care:  Pleurx catheter could not be drained today.  CT scan of the chest was ordered to evaluate for any residual effusion.  If there is no effusion Pleurx catheter will be drained at least once weekly.,  However if patient has large pleural effusion thrombolytic therapy may be needed to declog the Pleurx catheter.  I would recommend lifelong anticoagulation with Eliquis upon discharge.  Eliquis dose can be reduced after 3 months to 2.5 mg twice daily.  Patient should be lifelong anticoagulated unless there is a contraindication.  Continue with goal-directed heart failure therapy.  Judicious diuresis with close monitoring of kidney function.  GI prophylaxis.  Incentive spirometer.  Supplemental oxygen to keep sat 88 to 94%.  Pleural fluid cytology is negative for malignancy.  Evaluate the need for home oxygen before discharge.    History of Present Illness:   Patient is a 78-year-old woman with above-mentioned medical problems who was admitted on April 12, 2024 with progressive shortness of breath and lower extremity swelling.  She had a CTA of the chest which showed bilateral pleural effusion mostly on the right with acute left lower lobe segmental and subsegmental pulmonary embolism.  Strain: Not on file   Food Insecurity: No Food Insecurity (4/12/2024)    Hunger Vital Sign     Worried About Running Out of Food in the Last Year: Never true     Ran Out of Food in the Last Year: Never true   Transportation Needs: No Transportation Needs (4/12/2024)    PRAPARE - Transportation     Lack of Transportation (Medical): No     Lack of Transportation (Non-Medical): No   Physical Activity: Not on file   Stress: Not on file   Social Connections: Not on file   Intimate Partner Violence: Not on file   Housing Stability: Low Risk  (4/12/2024)    Housing Stability Vital Sign     Unable to Pay for Housing in the Last Year: No     Number of Places Lived in the Last Year: 1     Unstable Housing in the Last Year: No       Current Medications:     Current Facility-Administered Medications:     apixaban (ELIQUIS) tablet 5 mg, 5 mg, Oral, BID, Norma Priest MD, 5 mg at 04/22/24 0912    torsemide (DEMADEX) tablet 20 mg, 20 mg, Oral, BID, Иван Cabezas MD, 20 mg at 04/22/24 0911    HYDROcodone-acetaminophen (NORCO) 5-325 MG per tablet 1 tablet, 1 tablet, Oral, Q6H PRN, Hubbard, Dre A, DO, 1 tablet at 04/21/24 2045    midodrine (PROAMATINE) tablet 5 mg, 5 mg, Oral, TID WC, Stevie, Dre A, DO, 5 mg at 04/22/24 1231    magnesium sulfate 1000 mg in dextrose 5% 100 mL IVPB, 1,000 mg, IntraVENous, PRN, Chadwick Gomez APRN - CNP    potassium chloride (KLOR-CON M) extended release tablet 40 mEq, 40 mEq, Oral, PRN **OR** potassium bicarb-citric acid (EFFER-K) effervescent tablet 40 mEq, 40 mEq, Oral, PRN **OR** potassium chloride 10 mEq/100 mL IVPB (Peripheral Line), 10 mEq, IntraVENous, PRN, Chadwick Gomez APRN - CNP    empagliflozin (JARDIANCE) tablet 10 mg, 10 mg, Oral, Daily, Umberto Guevara APRN - CNP, 10 mg at 04/22/24 0911    sacubitril-valsartan (ENTRESTO) 24-26 MG per tablet 0.5 tablet, 0.5 tablet, Oral, BID, Umberto Guevara APRN - CNP, 0.5 tablet at 04/22/24 0911    ipratropium 0.5 mg-albuterol 2.5

## 2024-04-23 ENCOUNTER — APPOINTMENT (OUTPATIENT)
Dept: GENERAL RADIOLOGY | Age: 79
DRG: 175 | End: 2024-04-23
Payer: MEDICARE

## 2024-04-23 ENCOUNTER — APPOINTMENT (OUTPATIENT)
Dept: INTERVENTIONAL RADIOLOGY/VASCULAR | Age: 79
DRG: 175 | End: 2024-04-23
Payer: MEDICARE

## 2024-04-23 LAB
ALBUMIN SERPL-MCNC: 3.4 G/DL (ref 3.5–5.2)
ALP SERPL-CCNC: 76 U/L (ref 35–104)
ALT SERPL-CCNC: 9 U/L (ref 0–32)
ANION GAP SERPL CALCULATED.3IONS-SCNC: 14 MMOL/L (ref 7–16)
APPEARANCE FLD: NORMAL
AST SERPL-CCNC: 21 U/L (ref 0–31)
BASOPHILS # BLD: 0.01 K/UL (ref 0–0.2)
BASOPHILS NFR BLD: 0 % (ref 0–2)
BILIRUB SERPL-MCNC: 0.4 MG/DL (ref 0–1.2)
BODY FLD TYPE: NORMAL
BUN SERPL-MCNC: 31 MG/DL (ref 6–23)
CALCIUM SERPL-MCNC: 8.4 MG/DL (ref 8.6–10.2)
CHLORIDE SERPL-SCNC: 94 MMOL/L (ref 98–107)
CLOT CHECK: NORMAL
CO2 SERPL-SCNC: 27 MMOL/L (ref 22–29)
COLOR FLD: NORMAL
CREAT SERPL-MCNC: 1.3 MG/DL (ref 0.5–1)
EOSINOPHIL # BLD: 0.08 K/UL (ref 0.05–0.5)
EOSINOPHILS RELATIVE PERCENT: 2 % (ref 0–6)
ERYTHROCYTE [DISTWIDTH] IN BLOOD BY AUTOMATED COUNT: 17.3 % (ref 11.5–15)
GFR SERPL CREATININE-BSD FRML MDRD: 40 ML/MIN/1.73M2
GLUCOSE FLD-MCNC: 83 MG/DL
GLUCOSE SERPL-MCNC: 96 MG/DL (ref 74–99)
HCT VFR BLD AUTO: 28.8 % (ref 34–48)
HGB BLD-MCNC: 8.9 G/DL (ref 11.5–15.5)
IMM GRANULOCYTES # BLD AUTO: <0.03 K/UL (ref 0–0.58)
IMM GRANULOCYTES NFR BLD: 1 % (ref 0–5)
LDH FLD L TO P-CCNC: 362 U/L
LYMPHOCYTES NFR BLD: 1.14 K/UL (ref 1.5–4)
LYMPHOCYTES RELATIVE PERCENT: 26 % (ref 20–42)
MAGNESIUM SERPL-MCNC: 2.6 MG/DL (ref 1.6–2.6)
MCH RBC QN AUTO: 28.5 PG (ref 26–35)
MCHC RBC AUTO-ENTMCNC: 30.9 G/DL (ref 32–34.5)
MCV RBC AUTO: 92.3 FL (ref 80–99.9)
MONOCYTES NFR BLD: 0.52 K/UL (ref 0.1–0.95)
MONOCYTES NFR BLD: 12 % (ref 2–12)
MONOCYTES NFR FLD: 94 %
NEUTROPHILS NFR BLD: 59 % (ref 43–80)
NEUTROPHILS NFR FLD: 6 %
NEUTS SEG NFR BLD: 2.57 K/UL (ref 1.8–7.3)
PLATELET # BLD AUTO: 433 K/UL (ref 130–450)
PMV BLD AUTO: 9.9 FL (ref 7–12)
POTASSIUM SERPL-SCNC: 3.3 MMOL/L (ref 3.5–5)
PROT FLD-MCNC: 5.5 G/DL
PROT SERPL-MCNC: 7.3 G/DL (ref 6.4–8.3)
RBC # BLD AUTO: 3.12 M/UL (ref 3.5–5.5)
RBC # FLD: NORMAL CELLS/UL
SODIUM SERPL-SCNC: 135 MMOL/L (ref 132–146)
SPECIMEN TYPE: NORMAL
WBC # FLD: 156 CELLS/UL
WBC OTHER # BLD: 4.3 K/UL (ref 4.5–11.5)

## 2024-04-23 PROCEDURE — 1200000000 HC SEMI PRIVATE

## 2024-04-23 PROCEDURE — 80053 COMPREHEN METABOLIC PANEL: CPT

## 2024-04-23 PROCEDURE — 6370000000 HC RX 637 (ALT 250 FOR IP): Performed by: INTERNAL MEDICINE

## 2024-04-23 PROCEDURE — 6370000000 HC RX 637 (ALT 250 FOR IP)

## 2024-04-23 PROCEDURE — C1729 CATH, DRAINAGE: HCPCS

## 2024-04-23 PROCEDURE — 82945 GLUCOSE OTHER FLUID: CPT

## 2024-04-23 PROCEDURE — 88305 TISSUE EXAM BY PATHOLOGIST: CPT

## 2024-04-23 PROCEDURE — 2700000000 HC OXYGEN THERAPY PER DAY

## 2024-04-23 PROCEDURE — 87205 SMEAR GRAM STAIN: CPT

## 2024-04-23 PROCEDURE — 99233 SBSQ HOSP IP/OBS HIGH 50: CPT | Performed by: INTERNAL MEDICINE

## 2024-04-23 PROCEDURE — 83735 ASSAY OF MAGNESIUM: CPT

## 2024-04-23 PROCEDURE — 36415 COLL VENOUS BLD VENIPUNCTURE: CPT

## 2024-04-23 PROCEDURE — 89051 BODY FLUID CELL COUNT: CPT

## 2024-04-23 PROCEDURE — 84157 ASSAY OF PROTEIN OTHER: CPT

## 2024-04-23 PROCEDURE — 88112 CYTOPATH CELL ENHANCE TECH: CPT

## 2024-04-23 PROCEDURE — 71045 X-RAY EXAM CHEST 1 VIEW: CPT

## 2024-04-23 PROCEDURE — 32561 LYSE CHEST FIBRIN INIT DAY: CPT | Performed by: INTERNAL MEDICINE

## 2024-04-23 PROCEDURE — 3E03317 INTRODUCTION OF OTHER THROMBOLYTIC INTO PERIPHERAL VEIN, PERCUTANEOUS APPROACH: ICD-10-PCS | Performed by: INTERNAL MEDICINE

## 2024-04-23 PROCEDURE — 83615 LACTATE (LD) (LDH) ENZYME: CPT

## 2024-04-23 PROCEDURE — 85025 COMPLETE CBC W/AUTO DIFF WBC: CPT

## 2024-04-23 PROCEDURE — 6360000002 HC RX W HCPCS: Performed by: INTERNAL MEDICINE

## 2024-04-23 PROCEDURE — 0W9930Z DRAINAGE OF RIGHT PLEURAL CAVITY WITH DRAINAGE DEVICE, PERCUTANEOUS APPROACH: ICD-10-PCS | Performed by: RADIOLOGY

## 2024-04-23 PROCEDURE — 87070 CULTURE OTHR SPECIMN AEROBIC: CPT

## 2024-04-23 PROCEDURE — 2580000003 HC RX 258: Performed by: INTERNAL MEDICINE

## 2024-04-23 PROCEDURE — 94640 AIRWAY INHALATION TREATMENT: CPT

## 2024-04-23 RX ORDER — SODIUM CHLORIDE 0.9 % (FLUSH) 0.9 %
5-40 SYRINGE (ML) INJECTION PRN
Status: DISCONTINUED | OUTPATIENT
Start: 2024-04-23 | End: 2024-04-28 | Stop reason: HOSPADM

## 2024-04-23 RX ORDER — SODIUM CHLORIDE 0.9 % (FLUSH) 0.9 %
5-40 SYRINGE (ML) INJECTION EVERY 12 HOURS SCHEDULED
Status: DISCONTINUED | OUTPATIENT
Start: 2024-04-23 | End: 2024-04-28 | Stop reason: HOSPADM

## 2024-04-23 RX ORDER — SODIUM CHLORIDE 9 MG/ML
INJECTION, SOLUTION INTRAVENOUS PRN
Status: DISCONTINUED | OUTPATIENT
Start: 2024-04-23 | End: 2024-04-28 | Stop reason: HOSPADM

## 2024-04-23 RX ADMIN — TORSEMIDE 20 MG: 20 TABLET ORAL at 08:47

## 2024-04-23 RX ADMIN — SACUBITRIL AND VALSARTAN 0.5 TABLET: 24; 26 TABLET, FILM COATED ORAL at 08:46

## 2024-04-23 RX ADMIN — TOBRAMYCIN AND DEXAMETHASONE 1 DROP: 3; 1 SUSPENSION/ DROPS OPHTHALMIC at 20:31

## 2024-04-23 RX ADMIN — APIXABAN 5 MG: 5 TABLET, FILM COATED ORAL at 08:47

## 2024-04-23 RX ADMIN — IPRATROPIUM BROMIDE AND ALBUTEROL SULFATE 1 DOSE: .5; 2.5 SOLUTION RESPIRATORY (INHALATION) at 21:00

## 2024-04-23 RX ADMIN — TOBRAMYCIN AND DEXAMETHASONE 1 DROP: 3; 1 SUSPENSION/ DROPS OPHTHALMIC at 12:46

## 2024-04-23 RX ADMIN — MIDODRINE HYDROCHLORIDE 5 MG: 5 TABLET ORAL at 08:47

## 2024-04-23 RX ADMIN — Medication 10 ML: at 20:31

## 2024-04-23 RX ADMIN — EMPAGLIFLOZIN 10 MG: 10 TABLET, FILM COATED ORAL at 08:47

## 2024-04-23 RX ADMIN — MEROPENEM 1000 MG: 1 INJECTION, POWDER, FOR SOLUTION INTRAVENOUS at 17:06

## 2024-04-23 RX ADMIN — SACUBITRIL AND VALSARTAN 0.5 TABLET: 24; 26 TABLET, FILM COATED ORAL at 20:32

## 2024-04-23 RX ADMIN — WATER 5 MG: 1 INJECTION INTRAMUSCULAR; INTRAVENOUS; SUBCUTANEOUS at 16:51

## 2024-04-23 RX ADMIN — LEVOTHYROXINE SODIUM 100 MCG: 0.1 TABLET ORAL at 06:15

## 2024-04-23 RX ADMIN — ATORVASTATIN CALCIUM 40 MG: 40 TABLET, FILM COATED ORAL at 20:32

## 2024-04-23 RX ADMIN — IPRATROPIUM BROMIDE AND ALBUTEROL SULFATE 1 DOSE: .5; 2.5 SOLUTION RESPIRATORY (INHALATION) at 06:23

## 2024-04-23 RX ADMIN — HYDROCODONE BITARTRATE AND ACETAMINOPHEN 1 TABLET: 5; 325 TABLET ORAL at 16:11

## 2024-04-23 RX ADMIN — ACETAMINOPHEN 650 MG: 325 TABLET ORAL at 20:42

## 2024-04-23 RX ADMIN — MIDODRINE HYDROCHLORIDE 5 MG: 5 TABLET ORAL at 11:16

## 2024-04-23 RX ADMIN — MAGNESIUM OXIDE 400 MG: 400 TABLET ORAL at 20:32

## 2024-04-23 RX ADMIN — IPRATROPIUM BROMIDE AND ALBUTEROL SULFATE 1 DOSE: .5; 2.5 SOLUTION RESPIRATORY (INHALATION) at 09:34

## 2024-04-23 RX ADMIN — PANTOPRAZOLE SODIUM 40 MG: 40 TABLET, DELAYED RELEASE ORAL at 06:15

## 2024-04-23 RX ADMIN — MAGNESIUM OXIDE 400 MG: 400 TABLET ORAL at 08:46

## 2024-04-23 ASSESSMENT — PAIN DESCRIPTION - LOCATION
LOCATION: BACK

## 2024-04-23 ASSESSMENT — PAIN SCALES - GENERAL
PAINLEVEL_OUTOF10: 8

## 2024-04-23 ASSESSMENT — PAIN DESCRIPTION - DESCRIPTORS: DESCRIPTORS: ACHING

## 2024-04-23 ASSESSMENT — PAIN DESCRIPTION - ORIENTATION: ORIENTATION: RIGHT;MID

## 2024-04-23 NOTE — PLAN OF CARE
Problem: ABCDS Injury Assessment  Goal: Absence of physical injury  4/22/2024 2015 by Milagro Holman RN  Outcome: Progressing  4/22/2024 1604 by Milagro Holman RN  Outcome: Progressing     Problem: Safety - Adult  Goal: Free from fall injury  4/22/2024 2015 by Milagro Holman RN  Outcome: Progressing  4/22/2024 1604 by Milagro Holman RN  Outcome: Progressing     Problem: Chronic Conditions and Co-morbidities  Goal: Patient's chronic conditions and co-morbidity symptoms are monitored and maintained or improved  4/22/2024 2015 by Milagro Holman RN  Outcome: Progressing  4/22/2024 1604 by Milagro Holman RN  Outcome: Progressing     Problem: Pain  Goal: Verbalizes/displays adequate comfort level or baseline comfort level  4/22/2024 2015 by Milagro Holman RN  Outcome: Progressing  4/22/2024 1604 by Milagro Holman RN  Outcome: Progressing     Problem: Skin/Tissue Integrity  Goal: Absence of new skin breakdown  Description: 1.  Monitor for areas of redness and/or skin breakdown  2.  Assess vascular access sites hourly  3.  Every 4-6 hours minimum:  Change oxygen saturation probe site  4.  Every 4-6 hours:  If on nasal continuous positive airway pressure, respiratory therapy assess nares and determine need for appliance change or resting period.  4/22/2024 2015 by Milagro Holman RN  Outcome: Progressing  4/22/2024 1604 by Milagro Holman RN  Outcome: Progressing     Problem: Nutrition Deficit:  Goal: Optimize nutritional status  4/22/2024 2015 by Milagro Holman RN  Outcome: Progressing  4/22/2024 1604 by Milagro Holman RN  Outcome: Progressing

## 2024-04-23 NOTE — PROGRESS NOTES
at rest this morning and O2 sat 90-95% with activity.  Patient has no lower leg edema.  Patient states she will check with her son and family if they are ready to to help take care of her today at home.  Possible discharge home later today.    4/22/2024  Patient seen and examined on telemetry floor.  Nursing tried to drain Pleurx catheter and just very scant amounts of burgundy colored blood came out.  Case discussed with pulmonology today.  BUN/creatinine 33/1.4 with normal liver enzymes.  W BC is 4.5 with hemoglobin 8.5.  Temperature is 98.2 with heart rate 84 and blood pressure 105/59.  O2 sat 96% on room air at rest.  CAT scan of the lung today and if only small amount patient probably will be discharged home and drain just once a week.    4/23/2024  Patient seen examined on telemetry floor.  Patient was to be discharged yesterday but CT scan of the lungs showed stable right pleural effusion with loculated gas collection in the lateral superior aspect of the right pleural space.  Pending further recommendations per pulmonology.  Temperature 97.7 with heart rate of 71 blood pressure 107/57.  O2 sat 94% on room air at rest.  BUN/creatinine was 31/1.3 with serum potassium 3.3.  Liver enzymes normal with WBC 4.3 and hemoglobin 8.9.  Case discussed with pulmonology today.  He is going to talk to IR about possibly trying to lyse the remaining effusion/fibrinous tissue in the right lung before discharge.      Review of systems:  Constitutional:   Positive for fatigue and malaise , - fever/chills  HEENT:   Denies ear pain, sore throat, sinus or eye problems.  Cardiovascular:   Denies any chest pain, irregular heartbeats, or palpitations.   Respiratory:   - dyspnea at rest, + but improved dyspnea on exertion, - coughing, - sputum, - hemoptysis  Gastrointestinal:   - nausea, -vomiting. - diarrhea, - constipation. + poor appetite and poor intake. - abdominal pain.  Genitourinary:    Denies any urgency, frequency,  aspirin 81 mg daily, hydralazine 10 mg, Imdur    Follow-up with cardiologist in 2 weeks    Follow-up with PCP in 1 week      Dre Hubbard DO  4/23/2024  12:25 PM

## 2024-04-23 NOTE — PLAN OF CARE
Problem: ABCDS Injury Assessment  Goal: Absence of physical injury  Outcome: Progressing     Problem: Safety - Adult  Goal: Free from fall injury  4/23/2024 1133 by Jelly Reynoso RN  Outcome: Progressing  4/23/2024 0533 by Opal Martines RN  Outcome: Progressing     Problem: Chronic Conditions and Co-morbidities  Goal: Patient's chronic conditions and co-morbidity symptoms are monitored and maintained or improved  Outcome: Progressing     Problem: Pain  Goal: Verbalizes/displays adequate comfort level or baseline comfort level  4/23/2024 1133 by Jelly Reynoso RN  Outcome: Progressing  4/23/2024 0533 by Opal Martines RN  Outcome: Progressing     Problem: Skin/Tissue Integrity  Goal: Absence of new skin breakdown  Description: 1.  Monitor for areas of redness and/or skin breakdown  2.  Assess vascular access sites hourly  3.  Every 4-6 hours minimum:  Change oxygen saturation probe site  4.  Every 4-6 hours:  If on nasal continuous positive airway pressure, respiratory therapy assess nares and determine need for appliance change or resting period.  Outcome: Progressing     Problem: Nutrition Deficit:  Goal: Optimize nutritional status  Outcome: Progressing

## 2024-04-23 NOTE — PROGRESS NOTES
Physical Therapy      Physical Therapy    Room #:   0439/0439-01    Date: 2024       Patient Name: Katt Diaz  : 1945      MRN: [id number]     Patient unavailable for physical therapy treatment due to adamant refusal . Will attempt PT treatment  on a later date . Thank you.      Sammie Elizondo, PTA  #[id number]

## 2024-04-23 NOTE — PROGRESS NOTES
Chest tube placed to -30 degree continuous suction 1 hour post TPA administration as per orders by Dr. Priest. Line stop cock placed to \"flow\" position with immediate bright red output noted in tube.

## 2024-04-23 NOTE — PLAN OF CARE
Problem: ABCDS Injury Assessment  Goal: Absence of physical injury  4/22/2024 2015 by Milagro Holman RN  Outcome: Progressing  4/22/2024 1604 by Milagro Holman RN  Outcome: Progressing     Problem: Safety - Adult  Goal: Free from fall injury  4/23/2024 0533 by Opal Martines RN  Outcome: Progressing  4/22/2024 2015 by Milagro Holman RN  Outcome: Progressing  4/22/2024 1604 by Milagro Holman RN  Outcome: Progressing     Problem: Chronic Conditions and Co-morbidities  Goal: Patient's chronic conditions and co-morbidity symptoms are monitored and maintained or improved  4/22/2024 2015 by Milagro Holman RN  Outcome: Progressing  4/22/2024 1604 by Milagro Holman RN  Outcome: Progressing     Problem: Pain  Goal: Verbalizes/displays adequate comfort level or baseline comfort level  4/23/2024 0533 by Opal Martines RN  Outcome: Progressing  4/22/2024 2015 by Milagro Holman RN  Outcome: Progressing  4/22/2024 1604 by Milagro Holman RN  Outcome: Progressing     Problem: Skin/Tissue Integrity  Goal: Absence of new skin breakdown  Description: 1.  Monitor for areas of redness and/or skin breakdown  2.  Assess vascular access sites hourly  3.  Every 4-6 hours minimum:  Change oxygen saturation probe site  4.  Every 4-6 hours:  If on nasal continuous positive airway pressure, respiratory therapy assess nares and determine need for appliance change or resting period.  4/22/2024 2015 by Milagro Holman RN  Outcome: Progressing  4/22/2024 1604 by Milagro Holman RN  Outcome: Progressing     Problem: Nutrition Deficit:  Goal: Optimize nutritional status  4/22/2024 2015 by Milagro Holman RN  Outcome: Progressing  4/22/2024 1604 by Milagro Holman RN  Outcome: Progressing

## 2024-04-23 NOTE — PROGRESS NOTES
Patient returned to unit from pleurex cath removal, chest tube insertion. Chest tube currently to [place], Dr. Priest notified, reporting to bedside at this time. Pharmacy notified Dr. Priest request for TPA. Pharmacy to prepare and bring to unit.

## 2024-04-23 NOTE — PROGRESS NOTES
Assessment and Plan  Patient is a 78 y.o. female with the following medical Problems:   Acute on chronic hypoxic respiratory failure requiring supplemental oxygen.  Bilateral pleural effusions (Right > LefT)  S/P right-sided thoracentesis April 12, 2024 and April 16, 2024. (Borderline exudative)  Heart failure with moderately reduced ejection fraction.  (EF 30 to 35%)(April 4, 2024)  Secondary pulmonary hypertension based on echocardiography from April 4, 2024.  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 Jasso   Acute left lower lobe segmental and subsegmental pulmonary embolism.  CKD stage III.  Rheumatoid arthritis  Anemia of chronic disease    Plan of care:  Patient CT scan of the chest was reviewed with the interventional radiologist.  Patient's pleural effusion seems to loculated.  A decision was made to proceed with exchanging Pleurx catheter with chest tube with proceeding with tPA and dornase alpha.  This was discussed with the primary MD as well.  I would recommend lifelong anticoagulation with Eliquis upon discharge.  Eliquis dose can be reduced after 3 months to 2.5 mg twice daily.  Patient should be lifelong anticoagulated unless there is a contraindication.  Continue with goal-directed heart failure therapy.  Judicious diuresis with close monitoring of kidney function.  GI prophylaxis.  Incentive spirometer.  Supplemental oxygen to keep sat 88 to 94%.  Pleural fluid cytology is negative for malignancy.  Evaluate the need for home oxygen before discharge.    History of Present Illness:   Patient is a 78-year-old woman with above-mentioned medical problems who was admitted on April 12, 2024 with progressive shortness of breath and lower extremity swelling.  She had a CTA of the chest which showed bilateral pleural effusion mostly on the right with acute left lower lobe segmental and subsegmental pulmonary embolism.  Patient

## 2024-04-23 NOTE — PROGRESS NOTES
This patient is on medication that requires renal, weight, and/or indication dose adjustment.      Date Body Weight IBW  Adjusted BW SCr  CrCl Dialysis status BMI   4/23/2024 54.1 kg (119 lb 4.3 oz) Ideal body weight: 50.1 kg (110 lb 7.9 oz)  Adjusted ideal body weight: 51.7 kg (114 lb) Serum creatinine: 1.3 mg/dL (H) 04/23/24 0755  Estimated creatinine clearance: 28 mL/min (A) N/a Body mass index is 21.81 kg/m².       Pharmacy has dose-adjusted the following medication(s):    Ordered Medication: Meropenem 1000mg q8h     Order Changed/converted to: Meropenem 1000mg q12h    These changes were made per protocol according to the [facility]   [facility] Dose Adjustment Policy.     *Please note this dose may need readjusted if patient's condition changes.    Please contact pharmacy with any questions regarding these changes.    Katie Keller, PharmD.  4/23/2024 4:44 PM    SJW: [phone]

## 2024-04-23 NOTE — PROGRESS NOTES
Pt. Came to specials for image guided removal of pleurex catheter and insertion of pleural drainage tube.      Dr. James explained procedure and answered any questions.  Patient was educated about the amount of radiation used with today's procedure.  Consent signed.     Patient positioned, secured to table.   Emotional support given.  All monitors and safety equipment secured. Prep begun    1532 procedure start /63 82 16 92% on room air.     Pleural drainage tube secured with one non absorbable suture.     Pleural drainage tube connected to atrium.      1543 procedure completed /43 81 16 91% on room air.    DSD applied to right lower back. CDI. VSS.      Pt. tolerated well.    Post chest xray taken.    Nurse to nurse called to floor spoke with  Garret HORTA, nurse notified of above information.      Patient transferred back to the 4th floor.

## 2024-04-23 NOTE — CARE COORDINATION
4/23/2024 1329 CM note: Plan is for pt to return to [facility] with [facility], orders in epic. Nursing informed to send 2 pleurex kits home with pt per [facility] request(kits placed in room). Pt on room air.  [facility] delivered ww to pt's room. Referral faxed to [employer] for home delivered meals per request and written [employer] information provided. Free 30 day trial coupon for eliquis explained and given to patient. Patient instructed to take this coupon with  prescription to retail pharmacy to obtain 30 day supply for free. Pt informed that monthly eliquis copay is $35.00 per [facility] pharmacist,pt agreeable.  Pt's family to transport home. Molly HORTA

## 2024-04-24 LAB
ALBUMIN SERPL-MCNC: 3.1 G/DL (ref 3.5–5.2)
ALP SERPL-CCNC: 72 U/L (ref 35–104)
ALT SERPL-CCNC: 8 U/L (ref 0–32)
ANION GAP SERPL CALCULATED.3IONS-SCNC: 15 MMOL/L (ref 7–16)
AST SERPL-CCNC: 22 U/L (ref 0–31)
BASOPHILS # BLD: 0.02 K/UL (ref 0–0.2)
BASOPHILS NFR BLD: 0 % (ref 0–2)
BILIRUB SERPL-MCNC: 0.4 MG/DL (ref 0–1.2)
BUN SERPL-MCNC: 34 MG/DL (ref 6–23)
CALCIUM SERPL-MCNC: 8 MG/DL (ref 8.6–10.2)
CHLORIDE SERPL-SCNC: 94 MMOL/L (ref 98–107)
CO2 SERPL-SCNC: 23 MMOL/L (ref 22–29)
CREAT SERPL-MCNC: 1.5 MG/DL (ref 0.5–1)
EOSINOPHIL # BLD: 0.03 K/UL (ref 0.05–0.5)
EOSINOPHILS RELATIVE PERCENT: 1 % (ref 0–6)
ERYTHROCYTE [DISTWIDTH] IN BLOOD BY AUTOMATED COUNT: 17 % (ref 11.5–15)
GFR SERPL CREATININE-BSD FRML MDRD: 35 ML/MIN/1.73M2
GLUCOSE SERPL-MCNC: 135 MG/DL (ref 74–99)
HCT VFR BLD AUTO: 27.3 % (ref 34–48)
HGB BLD-MCNC: 8.9 G/DL (ref 11.5–15.5)
IMM GRANULOCYTES # BLD AUTO: 0.04 K/UL (ref 0–0.58)
IMM GRANULOCYTES NFR BLD: 1 % (ref 0–5)
LDH SERPL-CCNC: 175 U/L (ref 135–214)
LYMPHOCYTES NFR BLD: 1.26 K/UL (ref 1.5–4)
LYMPHOCYTES RELATIVE PERCENT: 26 % (ref 20–42)
MCH RBC QN AUTO: 29.2 PG (ref 26–35)
MCHC RBC AUTO-ENTMCNC: 32.6 G/DL (ref 32–34.5)
MCV RBC AUTO: 89.5 FL (ref 80–99.9)
MONOCYTES NFR BLD: 0.58 K/UL (ref 0.1–0.95)
MONOCYTES NFR BLD: 12 % (ref 2–12)
NEUTROPHILS NFR BLD: 60 % (ref 43–80)
NEUTS SEG NFR BLD: 2.89 K/UL (ref 1.8–7.3)
PLATELET # BLD AUTO: 463 K/UL (ref 130–450)
PMV BLD AUTO: 9.8 FL (ref 7–12)
POTASSIUM SERPL-SCNC: 3.7 MMOL/L (ref 3.5–5)
PROCALCITONIN SERPL-MCNC: 0.13 NG/ML (ref 0–0.08)
PROT SERPL-MCNC: 6.9 G/DL (ref 6.4–8.3)
RBC # BLD AUTO: 3.05 M/UL (ref 3.5–5.5)
SODIUM SERPL-SCNC: 132 MMOL/L (ref 132–146)
WBC OTHER # BLD: 4.8 K/UL (ref 4.5–11.5)

## 2024-04-24 PROCEDURE — 85025 COMPLETE CBC W/AUTO DIFF WBC: CPT

## 2024-04-24 PROCEDURE — 6370000000 HC RX 637 (ALT 250 FOR IP)

## 2024-04-24 PROCEDURE — 94640 AIRWAY INHALATION TREATMENT: CPT

## 2024-04-24 PROCEDURE — 1200000000 HC SEMI PRIVATE

## 2024-04-24 PROCEDURE — 84145 PROCALCITONIN (PCT): CPT

## 2024-04-24 PROCEDURE — 6370000000 HC RX 637 (ALT 250 FOR IP): Performed by: INTERNAL MEDICINE

## 2024-04-24 PROCEDURE — 83615 LACTATE (LD) (LDH) ENZYME: CPT

## 2024-04-24 PROCEDURE — 99233 SBSQ HOSP IP/OBS HIGH 50: CPT | Performed by: INTERNAL MEDICINE

## 2024-04-24 PROCEDURE — 2580000003 HC RX 258: Performed by: INTERNAL MEDICINE

## 2024-04-24 PROCEDURE — 80053 COMPREHEN METABOLIC PANEL: CPT

## 2024-04-24 PROCEDURE — 32562 LYSE CHEST FIBRIN SUBQ DAY: CPT | Performed by: INTERNAL MEDICINE

## 2024-04-24 PROCEDURE — 36415 COLL VENOUS BLD VENIPUNCTURE: CPT

## 2024-04-24 PROCEDURE — 4077F DOC T-PA ADMIN CONSIDERED: CPT | Performed by: INTERNAL MEDICINE

## 2024-04-24 PROCEDURE — 6360000002 HC RX W HCPCS: Performed by: INTERNAL MEDICINE

## 2024-04-24 RX ORDER — FENTANYL CITRATE 0.05 MG/ML
25 INJECTION, SOLUTION INTRAMUSCULAR; INTRAVENOUS EVERY 4 HOURS PRN
Status: DISCONTINUED | OUTPATIENT
Start: 2024-04-24 | End: 2024-04-28 | Stop reason: HOSPADM

## 2024-04-24 RX ORDER — LIDOCAINE 4 G/G
1 PATCH TOPICAL DAILY
Status: DISCONTINUED | OUTPATIENT
Start: 2024-04-24 | End: 2024-04-28 | Stop reason: HOSPADM

## 2024-04-24 RX ADMIN — TOBRAMYCIN AND DEXAMETHASONE 1 DROP: 3; 1 SUSPENSION/ DROPS OPHTHALMIC at 21:02

## 2024-04-24 RX ADMIN — IPRATROPIUM BROMIDE AND ALBUTEROL SULFATE 1 DOSE: .5; 2.5 SOLUTION RESPIRATORY (INHALATION) at 19:02

## 2024-04-24 RX ADMIN — SACUBITRIL AND VALSARTAN 0.5 TABLET: 24; 26 TABLET, FILM COATED ORAL at 21:02

## 2024-04-24 RX ADMIN — Medication 5 ML: at 19:54

## 2024-04-24 RX ADMIN — MEROPENEM 1000 MG: 1 INJECTION, POWDER, FOR SOLUTION INTRAVENOUS at 05:34

## 2024-04-24 RX ADMIN — TORSEMIDE 20 MG: 20 TABLET ORAL at 17:42

## 2024-04-24 RX ADMIN — APIXABAN 5 MG: 5 TABLET, FILM COATED ORAL at 21:03

## 2024-04-24 RX ADMIN — ATORVASTATIN CALCIUM 40 MG: 40 TABLET, FILM COATED ORAL at 21:03

## 2024-04-24 RX ADMIN — HYDROCODONE BITARTRATE AND ACETAMINOPHEN 1 TABLET: 5; 325 TABLET ORAL at 14:46

## 2024-04-24 RX ADMIN — HYDROCODONE BITARTRATE AND ACETAMINOPHEN 1 TABLET: 5; 325 TABLET ORAL at 21:02

## 2024-04-24 RX ADMIN — WATER 5 MG: 1 INJECTION INTRAMUSCULAR; INTRAVENOUS; SUBCUTANEOUS at 14:43

## 2024-04-24 RX ADMIN — MAGNESIUM OXIDE 400 MG: 400 TABLET ORAL at 08:48

## 2024-04-24 RX ADMIN — EMPAGLIFLOZIN 10 MG: 10 TABLET, FILM COATED ORAL at 08:48

## 2024-04-24 RX ADMIN — IPRATROPIUM BROMIDE AND ALBUTEROL SULFATE 1 DOSE: .5; 2.5 SOLUTION RESPIRATORY (INHALATION) at 10:02

## 2024-04-24 RX ADMIN — TORSEMIDE 20 MG: 20 TABLET ORAL at 10:49

## 2024-04-24 RX ADMIN — ALTEPLASE 10 MG: 2.2 INJECTION, POWDER, LYOPHILIZED, FOR SOLUTION INTRAVENOUS at 14:43

## 2024-04-24 RX ADMIN — ACETAMINOPHEN 650 MG: 325 TABLET ORAL at 04:14

## 2024-04-24 RX ADMIN — HYDROCODONE BITARTRATE AND ACETAMINOPHEN 1 TABLET: 5; 325 TABLET ORAL at 07:56

## 2024-04-24 RX ADMIN — LEVOTHYROXINE SODIUM 100 MCG: 0.1 TABLET ORAL at 05:37

## 2024-04-24 RX ADMIN — MIDODRINE HYDROCHLORIDE 5 MG: 5 TABLET ORAL at 17:42

## 2024-04-24 RX ADMIN — MAGNESIUM OXIDE 400 MG: 400 TABLET ORAL at 21:02

## 2024-04-24 RX ADMIN — IPRATROPIUM BROMIDE AND ALBUTEROL SULFATE 1 DOSE: .5; 2.5 SOLUTION RESPIRATORY (INHALATION) at 14:29

## 2024-04-24 RX ADMIN — METOPROLOL SUCCINATE 25 MG: 25 TABLET, EXTENDED RELEASE ORAL at 10:49

## 2024-04-24 RX ADMIN — TOBRAMYCIN AND DEXAMETHASONE 1 DROP: 3; 1 SUSPENSION/ DROPS OPHTHALMIC at 17:42

## 2024-04-24 RX ADMIN — HYDROCODONE BITARTRATE AND ACETAMINOPHEN 1 TABLET: 5; 325 TABLET ORAL at 01:41

## 2024-04-24 RX ADMIN — MIDODRINE HYDROCHLORIDE 5 MG: 5 TABLET ORAL at 08:48

## 2024-04-24 RX ADMIN — SACUBITRIL AND VALSARTAN 0.5 TABLET: 24; 26 TABLET, FILM COATED ORAL at 10:49

## 2024-04-24 RX ADMIN — PANTOPRAZOLE SODIUM 40 MG: 40 TABLET, DELAYED RELEASE ORAL at 05:37

## 2024-04-24 RX ADMIN — Medication 10 ML: at 08:49

## 2024-04-24 RX ADMIN — IPRATROPIUM BROMIDE AND ALBUTEROL SULFATE 1 DOSE: .5; 2.5 SOLUTION RESPIRATORY (INHALATION) at 07:19

## 2024-04-24 RX ADMIN — TOBRAMYCIN AND DEXAMETHASONE 1 DROP: 3; 1 SUSPENSION/ DROPS OPHTHALMIC at 13:16

## 2024-04-24 RX ADMIN — MEROPENEM 1000 MG: 1 INJECTION, POWDER, FOR SOLUTION INTRAVENOUS at 17:48

## 2024-04-24 ASSESSMENT — PAIN DESCRIPTION - ORIENTATION
ORIENTATION: RIGHT;MID
ORIENTATION: RIGHT;MID
ORIENTATION: RIGHT
ORIENTATION: RIGHT
ORIENTATION: RIGHT;MID
ORIENTATION: RIGHT
ORIENTATION: RIGHT
ORIENTATION: RIGHT;MID

## 2024-04-24 ASSESSMENT — PAIN SCALES - GENERAL
PAINLEVEL_OUTOF10: 10
PAINLEVEL_OUTOF10: 9
PAINLEVEL_OUTOF10: 8
PAINLEVEL_OUTOF10: 8
PAINLEVEL_OUTOF10: 10
PAINLEVEL_OUTOF10: 9

## 2024-04-24 ASSESSMENT — PAIN DESCRIPTION - LOCATION
LOCATION: CHEST
LOCATION: CHEST
LOCATION: BACK
LOCATION: CHEST
LOCATION: BACK

## 2024-04-24 ASSESSMENT — PAIN DESCRIPTION - DESCRIPTORS
DESCRIPTORS: STABBING
DESCRIPTORS: STABBING
DESCRIPTORS: PRESSURE
DESCRIPTORS: STABBING

## 2024-04-24 NOTE — PROGRESS NOTES
2/13/2024    IR TUNNELED CATHETER PLACEMENT GREATER THAN 5 YEARS 2/13/2024 REYNA Hutchins MD [facility] SPECIAL PROCEDURES    PARTIAL HYSTERECTOMY (CERVIX NOT REMOVED)      states 1976    VEIN LIGATION AND STRIPPING      states 1972       Family History:   Family History   Problem Relation Age of Onset    No Known Problems Mother     No Known Problems Father        Allergies:         Penicillins and Doxycycline    Social history:  Social History     Socioeconomic History    Marital status:      Spouse name: Not on file    Number of children: Not on file    Years of education: Not on file    Highest education level: Not on file   Occupational History    Not on file   Tobacco Use    Smoking status: Never    Smokeless tobacco: Never   Vaping Use    Vaping Use: Never used   Substance and Sexual Activity    Alcohol use: Not Currently    Drug use: Never    Sexual activity: Not Currently   Other Topics Concern    Not on file   Social History Narrative    Not on file     Social Determinants of Health     Financial Resource Strain: Not on file   Food Insecurity: No Food Insecurity (4/12/2024)    Hunger Vital Sign     Worried About Running Out of Food in the Last Year: Never true     Ran Out of Food in the Last Year: Never true   Transportation Needs: No Transportation Needs (4/12/2024)    PRAPARE - Transportation     Lack of Transportation (Medical): No     Lack of Transportation (Non-Medical): No   Physical Activity: Not on file   Stress: Not on file   Social Connections: Not on file   Intimate Partner Violence: Not on file   Housing Stability: Low Risk  (4/12/2024)    Housing Stability Vital Sign     Unable to Pay for Housing in the Last Year: No     Number of Places Lived in the Last Year: 1     Unstable Housing in the Last Year: No       Current Medications:     Current Facility-Administered Medications:     fentaNYL (SUBLIMAZE) injection 25 mcg, 25 mcg, IntraVENous, Q4H PRN, Dre Hubbard DO    ALTEplase (CATHFLO)  10 mg in sodium chloride 0.9 % 30 mL, 10 mg, IntraPLEUral, Q12H **AND** dornase alpha (PULMOZYME) 5 mg in sterile water 30 mL, 5 mg, IntraPLEUral, Q12H, Norma Priest MD, 5 mg at 04/23/24 1651    sodium chloride flush 0.9 % injection 5-40 mL, 5-40 mL, IntraVENous, 2 times per day, Norma Priest MD, 10 mL at 04/24/24 0849    sodium chloride flush 0.9 % injection 5-40 mL, 5-40 mL, IntraVENous, PRN, Norma Priest MD    0.9 % sodium chloride infusion, , IntraVENous, PRN, Norma Priest MD    meropenem (MERREM) 1,000 mg in sodium chloride 0.9 % 100 mL IVPB ([id number]), 1,000 mg, IntraVENous, Q12H, Norma Priest MD, Stopped at 04/24/24 0849    [Held by provider] apixaban (ELIQUIS) tablet 5 mg, 5 mg, Oral, BID, Norma Priest MD, 5 mg at 04/23/24 0847    torsemide (DEMADEX) tablet 20 mg, 20 mg, Oral, BID, Иван Cabezas MD, 20 mg at 04/24/24 1049    HYDROcodone-acetaminophen (NORCO) 5-325 MG per tablet 1 tablet, 1 tablet, Oral, Q6H PRN, Dre Hubbard A, DO, 1 tablet at 04/24/24 0756    midodrine (PROAMATINE) tablet 5 mg, 5 mg, Oral, TID RAMESH, True Hubbardlas A, DO, 5 mg at 04/24/24 0848    magnesium sulfate 1000 mg in dextrose 5% 100 mL IVPB, 1,000 mg, IntraVENous, PRN, Chadwick Gomez APRN - CNP    potassium chloride (KLOR-CON M) extended release tablet 40 mEq, 40 mEq, Oral, PRN **OR** potassium bicarb-citric acid (EFFER-K) effervescent tablet 40 mEq, 40 mEq, Oral, PRN **OR** potassium chloride 10 mEq/100 mL IVPB (Peripheral Line), 10 mEq, IntraVENous, PRN, Chadwick Gomez APRN - CNP    empagliflozin (JARDIANCE) tablet 10 mg, 10 mg, Oral, Daily, Umberto Guevara APRN - CNP, 10 mg at 04/24/24 0848    sacubitril-valsartan (ENTRESTO) 24-26 MG per tablet 0.5 tablet, 0.5 tablet, Oral, BID, Umberto Guevara APRN - CNP, 0.5 tablet at 04/24/24 1049    ipratropium 0.5 mg-albuterol 2.5 mg (DUONEB) nebulizer solution 1 Dose, 1 Dose, Inhalation, Q4H WA

## 2024-04-24 NOTE — CARE COORDINATION
4/24/2024 1322 CM note: Pleurex catheter removed 4/23/24 and pleurex drainage tube inserted and connected to atrium. Pt on room air.  She plans to return to [facility] with [facility]. [facility] accepted pt and received orders. [facility] delivered ww to pt's room. Referral faxed to [employer] for home delivered meals per request and written [employer] information provided. Free 30 day trial coupon for eliquis explained and given to patient. . Pt informed that monthly eliquis copay is $35.00 per [facility] pharmacist,pt agreeable. Pt's family to transport home. Molly HORTA

## 2024-04-24 NOTE — PROGRESS NOTES
Physical Therapy    Room #:   0439/0439-01    Date: 2024       Patient Name: Katt Diaz  : 1945      MRN: [id number]     Patient unavailable for physical therapy treatment due to adamant refusal . States that her lungs hurt and doesn't want to get up.  respiratory therapist exiting room, patient had just gotten a breathing treatment. Will attempt PT treatment tomorrow. Thank you.      Vesna Noe, PTA

## 2024-04-24 NOTE — PROCEDURES
Procedure: Intrapleural tPA and dornase alpha  Indication: Loculated right-sided hemothorax    Under a complete aseptic technique, 10 mg of tPA and 5 mg of dornase alpha were injected in the right-sided chest tube.  Chest tube was clamped for 1 hour and then was connected to underwater seal chamber with suction.

## 2024-04-24 NOTE — PROGRESS NOTES
Internal Medicine Discharge Note         Primary Care Physician: Luis Angel Rea DO   Admitting Physician:  Dre Hubbard DO  Admission date and time: 4/11/2024 11:35 PM    Room:  [address] [place] [address]  Admitting diagnosis: Pleural effusion [J90]  Chest pain, unspecified type [R07.9]  Acute pulmonary embolism without acute cor pulmonale, unspecified pulmonary embolism type (HCC) [I26.99]  Multiple subsegmental pulmonary emboli without acute cor pulmonale (HCC) [I26.94]    Patient Name: Katt Diaz  MRN: [id number]    Date of Service: 4/24/2024     Subjective:  Katt is a 78 y.o. female who was seen and examined today,4/17/2024, at the bedside.  Her volume status has improved.  She has multiple questions regarding her cardiac disease process we have answered some of them to her satisfaction at bedside but she has other significant questions that she believes can only be answered well by the cardiologist and we have deferred these to their service.  No new symptoms or concerns. No family present during my examination.    4/17/2024  Patient seen examined on telemetry floor.  Patient still complains of weakness and shortness of breath.  Patient feels weak and does not feel comfortable going home.  Patient is requesting assisted living.  BUN/creatinine 25/1.2 with normal liver enzymes.  WBC is 5.7 from 8.3.  Temperature 97.8 with heart rate 75 blood pressure 111/72.  O2 sat 99% on 2 L.  Patient still complains shortness of breath with any activity.  Cardiology note reviewed.  CT of the chest showed persistent moderate-large right pleural effusion.  Will consult pulmonology for their input.    4/18/2024  Patient seen examined on telemetry floor.  Patient still complains of some shortness of breath and chest pain.  Patient symptoms always moderately improved from admission.  Patient has no lower leg edema.  Pulmonology was consulted and recommended Pleurx catheter.  BUN/creatinine 27/1.1 with mild elevation in AST at 37.   WBC 5.6 and hemoglobin 9.4.  Temperature is 98.3 with heart rate of 77 blood pressure 104/54.  O2 sat 96% on 2 L nasal cannula.  Pending evaluation with IR for Pleurx catheter.  Case discussed with  in detail today.    4/19/2024  Patient seen and examined on telemetry floor.  Patient states that she feels about the same.  Patient though has definitely improved from admission.  Patient is set up for Pleurx catheter and Lovenox is on hold.  Patient understands that she may have to go home with home health so there are other options with assisted living or subacute rehab will be too expensive for her.  BUN/creatinine 25/1.0.  Transaminase normal with a WBC 4.6 and hemoglobin 8.3.  Temperature 98.1 with heart rate 69 blood pressure 103/58.  O2 sat 100% on 2 L nasal cannula at rest.  Patient is supposed to have Pleurx catheter placed today for drainage of persistent/recurrent pleural effusion.    4/20/2024  Patient seen examined on telemetry floor.  Patient complains of some pain around her Pleurx catheter site.  Patient otherwise is slowly improving.  BUN/creatinine was 27/1.1.  Transaminase normal with a WBC 4.6 and hemoglobin 8.5.  Temperature is 98.4 with heart rate 60 and blood pressure 102/39.  O2 sat 98% on 2 L.  Urine output is adequate.  Patient Pleurx catheter inserted in the right pleural space yesterday.  It is noted the patient has right pleural effusion is very complex with mult septated areas.  300 cc of fluid was initially removed.   note from yesterday reviewed.  Will have nursing check O2 saturation on room air at rest and if greater than 88% with activity.    4/21/2024  Patient seen examined on telemetry floor.  Patient feels better.  Patient denies any chest or abdominal pain today.  BUN/creatinine was 29/1.2 with normal electrolytes with normal transaminase.  WBC 4.5 with heme 8.5.  Temperature 98.8 with heart rate 74 blood pressure ranges 93//70.  O2 sat 95% on room air

## 2024-04-24 NOTE — PROGRESS NOTES
Chest placed to 30 cm suction as per order of Dr davis.  Stop cock opened to flow position scant amount clear drainage noted in tubing.

## 2024-04-24 NOTE — PROCEDURES
Procedure: Intrapleural tPA and dornase alpha  Indication: Loculated right-sided hemothorax   Second dose     Under a complete aseptic technique, 10 mg of tPA and 5 mg of dornase alpha were injected in the right-sided chest tube.  Chest tube was clamped for 1 hour and then was connected to underwater seal chamber with suction.

## 2024-04-25 ENCOUNTER — APPOINTMENT (OUTPATIENT)
Dept: GENERAL RADIOLOGY | Age: 79
DRG: 175 | End: 2024-04-25
Payer: MEDICARE

## 2024-04-25 LAB
ALBUMIN SERPL-MCNC: 3.2 G/DL (ref 3.5–5.2)
ALP SERPL-CCNC: 72 U/L (ref 35–104)
ALT SERPL-CCNC: 8 U/L (ref 0–32)
ANION GAP SERPL CALCULATED.3IONS-SCNC: 11 MMOL/L (ref 7–16)
AST SERPL-CCNC: 19 U/L (ref 0–31)
BASOPHILS # BLD: 0.02 K/UL (ref 0–0.2)
BASOPHILS NFR BLD: 0 % (ref 0–2)
BILIRUB SERPL-MCNC: 0.3 MG/DL (ref 0–1.2)
BUN SERPL-MCNC: 34 MG/DL (ref 6–23)
CALCIUM SERPL-MCNC: 8.1 MG/DL (ref 8.6–10.2)
CHLORIDE SERPL-SCNC: 95 MMOL/L (ref 98–107)
CO2 SERPL-SCNC: 29 MMOL/L (ref 22–29)
CREAT SERPL-MCNC: 1.5 MG/DL (ref 0.5–1)
EOSINOPHIL # BLD: 0.08 K/UL (ref 0.05–0.5)
EOSINOPHILS RELATIVE PERCENT: 1 % (ref 0–6)
ERYTHROCYTE [DISTWIDTH] IN BLOOD BY AUTOMATED COUNT: 17 % (ref 11.5–15)
GFR SERPL CREATININE-BSD FRML MDRD: 34 ML/MIN/1.73M2
GLUCOSE SERPL-MCNC: 105 MG/DL (ref 74–99)
HCT VFR BLD AUTO: 29.1 % (ref 34–48)
HGB BLD-MCNC: 9.4 G/DL (ref 11.5–15.5)
IMM GRANULOCYTES # BLD AUTO: 0.08 K/UL (ref 0–0.58)
IMM GRANULOCYTES NFR BLD: 1 % (ref 0–5)
LYMPHOCYTES NFR BLD: 1.32 K/UL (ref 1.5–4)
LYMPHOCYTES RELATIVE PERCENT: 24 % (ref 20–42)
MCH RBC QN AUTO: 29.6 PG (ref 26–35)
MCHC RBC AUTO-ENTMCNC: 32.3 G/DL (ref 32–34.5)
MCV RBC AUTO: 91.5 FL (ref 80–99.9)
MONOCYTES NFR BLD: 0.64 K/UL (ref 0.1–0.95)
MONOCYTES NFR BLD: 11 % (ref 2–12)
NEUTROPHILS NFR BLD: 62 % (ref 43–80)
NEUTS SEG NFR BLD: 3.47 K/UL (ref 1.8–7.3)
NON-GYN CYTOLOGY REPORT: NORMAL
PLATELET # BLD AUTO: 473 K/UL (ref 130–450)
PMV BLD AUTO: 9.7 FL (ref 7–12)
POTASSIUM SERPL-SCNC: 4.1 MMOL/L (ref 3.5–5)
PROT SERPL-MCNC: 6.8 G/DL (ref 6.4–8.3)
RBC # BLD AUTO: 3.18 M/UL (ref 3.5–5.5)
SODIUM SERPL-SCNC: 135 MMOL/L (ref 132–146)
WBC OTHER # BLD: 5.6 K/UL (ref 4.5–11.5)

## 2024-04-25 PROCEDURE — 6370000000 HC RX 637 (ALT 250 FOR IP)

## 2024-04-25 PROCEDURE — 6360000002 HC RX W HCPCS: Performed by: INTERNAL MEDICINE

## 2024-04-25 PROCEDURE — 6370000000 HC RX 637 (ALT 250 FOR IP): Performed by: INTERNAL MEDICINE

## 2024-04-25 PROCEDURE — 2580000003 HC RX 258: Performed by: INTERNAL MEDICINE

## 2024-04-25 PROCEDURE — 99233 SBSQ HOSP IP/OBS HIGH 50: CPT | Performed by: INTERNAL MEDICINE

## 2024-04-25 PROCEDURE — 85025 COMPLETE CBC W/AUTO DIFF WBC: CPT

## 2024-04-25 PROCEDURE — 2700000000 HC OXYGEN THERAPY PER DAY

## 2024-04-25 PROCEDURE — 71046 X-RAY EXAM CHEST 2 VIEWS: CPT

## 2024-04-25 PROCEDURE — 80053 COMPREHEN METABOLIC PANEL: CPT

## 2024-04-25 PROCEDURE — 36415 COLL VENOUS BLD VENIPUNCTURE: CPT

## 2024-04-25 PROCEDURE — 1200000000 HC SEMI PRIVATE

## 2024-04-25 PROCEDURE — 32562 LYSE CHEST FIBRIN SUBQ DAY: CPT | Performed by: INTERNAL MEDICINE

## 2024-04-25 PROCEDURE — 94640 AIRWAY INHALATION TREATMENT: CPT

## 2024-04-25 RX ADMIN — MEROPENEM 1000 MG: 1 INJECTION, POWDER, FOR SOLUTION INTRAVENOUS at 05:19

## 2024-04-25 RX ADMIN — SACUBITRIL AND VALSARTAN 0.5 TABLET: 24; 26 TABLET, FILM COATED ORAL at 10:47

## 2024-04-25 RX ADMIN — TOBRAMYCIN AND DEXAMETHASONE 1 DROP: 3; 1 SUSPENSION/ DROPS OPHTHALMIC at 20:29

## 2024-04-25 RX ADMIN — Medication 10 ML: at 20:31

## 2024-04-25 RX ADMIN — MAGNESIUM OXIDE 400 MG: 400 TABLET ORAL at 10:47

## 2024-04-25 RX ADMIN — HYDROCODONE BITARTRATE AND ACETAMINOPHEN 1 TABLET: 5; 325 TABLET ORAL at 10:02

## 2024-04-25 RX ADMIN — MIDODRINE HYDROCHLORIDE 5 MG: 5 TABLET ORAL at 10:48

## 2024-04-25 RX ADMIN — LEVOTHYROXINE SODIUM 100 MCG: 0.1 TABLET ORAL at 05:26

## 2024-04-25 RX ADMIN — APIXABAN 5 MG: 5 TABLET, FILM COATED ORAL at 20:29

## 2024-04-25 RX ADMIN — ATORVASTATIN CALCIUM 40 MG: 40 TABLET, FILM COATED ORAL at 20:29

## 2024-04-25 RX ADMIN — TOBRAMYCIN AND DEXAMETHASONE 1 DROP: 3; 1 SUSPENSION/ DROPS OPHTHALMIC at 14:08

## 2024-04-25 RX ADMIN — HYDROCODONE BITARTRATE AND ACETAMINOPHEN 1 TABLET: 5; 325 TABLET ORAL at 17:23

## 2024-04-25 RX ADMIN — APIXABAN 5 MG: 5 TABLET, FILM COATED ORAL at 10:47

## 2024-04-25 RX ADMIN — IPRATROPIUM BROMIDE AND ALBUTEROL SULFATE 1 DOSE: .5; 2.5 SOLUTION RESPIRATORY (INHALATION) at 06:15

## 2024-04-25 RX ADMIN — WATER 5 MG: 1 INJECTION INTRAMUSCULAR; INTRAVENOUS; SUBCUTANEOUS at 11:13

## 2024-04-25 RX ADMIN — ALTEPLASE 10 MG: 2.2 INJECTION, POWDER, LYOPHILIZED, FOR SOLUTION INTRAVENOUS at 17:07

## 2024-04-25 RX ADMIN — METOPROLOL SUCCINATE 25 MG: 25 TABLET, EXTENDED RELEASE ORAL at 10:48

## 2024-04-25 RX ADMIN — EMPAGLIFLOZIN 10 MG: 10 TABLET, FILM COATED ORAL at 10:51

## 2024-04-25 RX ADMIN — FENTANYL CITRATE 25 MCG: 0.05 INJECTION, SOLUTION INTRAMUSCULAR; INTRAVENOUS at 20:29

## 2024-04-25 RX ADMIN — PANTOPRAZOLE SODIUM 40 MG: 40 TABLET, DELAYED RELEASE ORAL at 05:26

## 2024-04-25 RX ADMIN — TORSEMIDE 20 MG: 20 TABLET ORAL at 10:48

## 2024-04-25 RX ADMIN — MIDODRINE HYDROCHLORIDE 5 MG: 5 TABLET ORAL at 14:11

## 2024-04-25 RX ADMIN — SACUBITRIL AND VALSARTAN 0.5 TABLET: 24; 26 TABLET, FILM COATED ORAL at 20:29

## 2024-04-25 RX ADMIN — ALTEPLASE 10 MG: 2.2 INJECTION, POWDER, LYOPHILIZED, FOR SOLUTION INTRAVENOUS at 11:13

## 2024-04-25 RX ADMIN — TOBRAMYCIN AND DEXAMETHASONE 1 DROP: 3; 1 SUSPENSION/ DROPS OPHTHALMIC at 17:05

## 2024-04-25 RX ADMIN — TOBRAMYCIN AND DEXAMETHASONE 1 DROP: 3; 1 SUSPENSION/ DROPS OPHTHALMIC at 10:51

## 2024-04-25 RX ADMIN — MAGNESIUM OXIDE 400 MG: 400 TABLET ORAL at 20:29

## 2024-04-25 RX ADMIN — WATER 5 MG: 1 INJECTION INTRAMUSCULAR; INTRAVENOUS; SUBCUTANEOUS at 17:08

## 2024-04-25 RX ADMIN — MIDODRINE HYDROCHLORIDE 5 MG: 5 TABLET ORAL at 17:09

## 2024-04-25 ASSESSMENT — PAIN - FUNCTIONAL ASSESSMENT: PAIN_FUNCTIONAL_ASSESSMENT: PREVENTS OR INTERFERES SOME ACTIVE ACTIVITIES AND ADLS

## 2024-04-25 ASSESSMENT — PAIN SCALES - GENERAL
PAINLEVEL_OUTOF10: 7
PAINLEVEL_OUTOF10: 9

## 2024-04-25 ASSESSMENT — PAIN DESCRIPTION - DESCRIPTORS
DESCRIPTORS: ACHING
DESCRIPTORS: ACHING;SORE;DISCOMFORT
DESCRIPTORS: DISCOMFORT

## 2024-04-25 ASSESSMENT — PAIN DESCRIPTION - ORIENTATION
ORIENTATION: RIGHT;MID
ORIENTATION: RIGHT

## 2024-04-25 ASSESSMENT — PAIN DESCRIPTION - ONSET: ONSET: ON-GOING

## 2024-04-25 ASSESSMENT — PAIN SCALES - WONG BAKER: WONGBAKER_NUMERICALRESPONSE: NO HURT

## 2024-04-25 ASSESSMENT — PAIN DESCRIPTION - FREQUENCY: FREQUENCY: CONTINUOUS

## 2024-04-25 ASSESSMENT — PAIN DESCRIPTION - LOCATION
LOCATION: BACK
LOCATION: BACK;RIB CAGE
LOCATION: BACK
LOCATION: BACK

## 2024-04-25 ASSESSMENT — PAIN DESCRIPTION - PAIN TYPE: TYPE: ACUTE PAIN;SURGICAL PAIN

## 2024-04-25 NOTE — CARE COORDINATION
4/25/2024 1127 CM note: Pleurex catheter removed on 4/23/24, pleurex drainage tube inserted, connected to atrium and she has received intrapleural tpa for loculated right hemothorax. Pt on room air. She has life vest that she had pta and self manages. [facility] delivered ww to pt's room. Referral faxed to [employer] and written [employer] information provided. Free 30 day trial coupon for eliquis explained and given to patient.  Pt informed that monthly eliquis copay is $35.00 per [facility] pharmacist,pt agreeable. Pt's family to transport home. CM will follow.  Molly HORTA

## 2024-04-25 NOTE — PROCEDURES
PULMONARY PROCEDURE:  Procedure: Intrapleural tPA and dornase alpha  Fourth dose     INDICATION:   Right-sided hemothorax     PROCEDURE :   Norma Palacios MD     CONSENT:  Consent was obtained prior to the procedure and placed in the paper chart.  Indications, risks, and benefits were explained at length.      PROCEDURE SUMMARY:            Under a complete aseptic technique, 10 mg of tPA and 5 mg of dornase alpha were injected in the right-sided chest tube.  Chest tube will be clamped for 1 hour and then was connected to underwater seal chamber with suction.

## 2024-04-25 NOTE — PROCEDURES
Procedure: Intrapleural tPA and dornase alpha  Indication: Loculated right-sided hemothorax   Third  dose     Under a complete aseptic technique, 10 mg of tPA and 5 mg of dornase alpha were injected in the right-sided chest tube.  Chest tube was clamped for 1 hour and then was connected to underwater seal chamber with suction.

## 2024-04-25 NOTE — PROGRESS NOTES
drop at 04/12/24 0727      Review of Systems:   General: denies weight gain, denies loss of appetite, fever, chills, night sweats.  HEENT: denies headaches, dizziness, head trauma, visual changes, eye pain, tinnitus, nosebleeds, hoarseness or throat pain    Respiratory: +ve chest pain around chest tube site., +ve dyspnea, cough but no hemoptysis  Cardiovascular: denies orthopnea, paroxysmal nocturnal dyspnea, leg swelling, and previous heart attack.    Gastrointestinal: denies pain, nausea vomiting, diarrhea, constipation, melena or bleeding.  Genitourinary: denies hematuria, frequency, urgency or dysuria  Neurology: denies syncope, seizures, paralysis, paraesthesia   Endocrine: denies polyuria, polydipsia, skin or hair changes, and heat or cold intolerance  Musculoskeletal: denies joint pain, swelling, arthritis or myalgia  Hematologic: denies bleeding, adenopathy and easy bruising  Skin: denies rashes and skin discoloration  Psychiatry: denies depression    Physical Exam:   Vital Signs:  BP (!) 104/50   Pulse 75   Temp 98.7 °F (37.1 °C) (Oral)   Resp 18   Ht 1.575 m (5' 2.01\")   Wt 54.1 kg (119 lb 4.3 oz)   SpO2 96%   BMI 21.81 kg/m²     Input/Output:  In: 240 [P.O.:240]  Out: 590     Oxygen requirements: RA      General appearance: ill looking, frail, not in pain but in mild respiratory distress    HEENT: Atraumatic/normocephalic, EOMI, MOE, pharynx clear, moist mucosa, redness of the uvula appreciated,   Neck: Supple, no jugular venous distension, lymphadenopathy, thyromegaly or carotid bruits  Chest: Decreased breath sounds, no wheezing, +ve crackles and no tenderness over ribs   Cardiovascular: Normal S1 , S2, regular rate and rhythm, no murmur, rub or gallop  Abdomen: Normal sounds present, soft, lax with no tenderness, no hepatosplenomegaly, and no masses  Extremities: no  edema. Pulses are equally present.   Skin: intact, no rashes   Neurologic: Alert and oriented x 3, No focal deficit      Investigations:  Labs, radiological imaging and cardiac work up were personally reviewed        STAFF PHYSICIAN NOTE OF PERSONAL INVOLVEMENT IN CARE  As the attending physician, I certify that I personally reviewed the patient's history and personally examined the patient to confirm the physical findings described above, and that I reviewed the relevant imaging studies and available reports.  I also discussed the differential diagnosis and all of the proposed management plans with the patient and individuals accompanying the patient to this visit.  They had the opportunity to ask questions about the proposed management plans and to have those questions answered.      Electronically signed by Norma Priest MD on 4/25/2024 at 3:14 PM

## 2024-04-25 NOTE — PROGRESS NOTES
Chest tube stop cock returned to open position and chest tube at -30 cm suction.  Small amount blood tinged fluid returned immediately. Atrium at 1550 cc level prior to opening stop cock.

## 2024-04-25 NOTE — PROGRESS NOTES
Internal Medicine Discharge Note         Primary Care Physician: Luis Angel Rea DO   Admitting Physician:  Dre Hubbard DO  Admission date and time: 4/11/2024 11:35 PM    Room:  [address] [place] [address]  Admitting diagnosis: Pleural effusion [J90]  Chest pain, unspecified type [R07.9]  Acute pulmonary embolism without acute cor pulmonale, unspecified pulmonary embolism type (HCC) [I26.99]  Multiple subsegmental pulmonary emboli without acute cor pulmonale (HCC) [I26.94]    Patient Name: Katt Diaz  MRN: [id number]    Date of Service: 4/25/2024     Subjective:  Katt is a 78 y.o. female who was seen and examined today,4/17/2024, at the bedside.  Her volume status has improved.  She has multiple questions regarding her cardiac disease process we have answered some of them to her satisfaction at bedside but she has other significant questions that she believes can only be answered well by the cardiologist and we have deferred these to their service.  No new symptoms or concerns. No family present during my examination.    4/17/2024  Patient seen examined on telemetry floor.  Patient still complains of weakness and shortness of breath.  Patient feels weak and does not feel comfortable going home.  Patient is requesting assisted living.  BUN/creatinine 25/1.2 with normal liver enzymes.  WBC is 5.7 from 8.3.  Temperature 97.8 with heart rate 75 blood pressure 111/72.  O2 sat 99% on 2 L.  Patient still complains shortness of breath with any activity.  Cardiology note reviewed.  CT of the chest showed persistent moderate-large right pleural effusion.  Will consult pulmonology for their input.    4/18/2024  Patient seen examined on telemetry floor.  Patient still complains of some shortness of breath and chest pain.  Patient symptoms always moderately improved from admission.  Patient has no lower leg edema.  Pulmonology was consulted and recommended Pleurx catheter.  BUN/creatinine 27/1.1 with mild elevation in AST at 37.   room air at rest.  Chest tube has about 300-1000 cc a shift noted.  Serum LDH was ordered along with procalcitonin today.    4/25/2024  Patient seen examined on telemetry floor.  Patient complains of some discomfort around her chest tube site.  Patient denies any other new problems.  BUN/creatinine was 30/1.5 with normal transaminase.  WBC is 5.6 with hemoglobin 9.4.  Temperature 98.7 with heart rate 75 blood pressure 129/60.  O2 sat 96% on room air at rest.  Pending reevaluation of pleural fluid.      Review of systems:  Constitutional:   Positive for fatigue and malaise , - fever/chills  HEENT:   Denies ear pain, sore throat, sinus or eye problems.  Cardiovascular:   Denies any chest pain, irregular heartbeats, or palpitations.   Respiratory:   - dyspnea at rest, + but improved dyspnea on exertion, - coughing, - sputum, - hemoptysis  Gastrointestinal:   - nausea, -vomiting. - diarrhea, - constipation. + poor appetite and poor intake. - abdominal pain.  Genitourinary:    Denies any urgency, frequency, hematuria. Voiding  without difficulty.  Extremities:   Acute on chronic lower extremity swelling which is somewhat improved.   Neurology:    Denies any headache or focal neurological deficits, positive for generalized weakness and fatigue without focal component  Psch:   Denies being anxious or depressed.  Musculoskeletal:    Denies  myalgias, joint complaints or back pain.   Integumentary:   Denies any rashes, ulcers, or excoriations.  Denies bruising.  Hematologic/Lymphatic:  Denies bruising or bleeding.      Physical Exam:  No intake/output data recorded.    Intake/Output Summary (Last 24 hours) at 4/25/2024 1231  Last data filed at 4/25/2024 0607  Gross per 24 hour   Intake 240 ml   Output 590 ml   Net -350 ml     I/O last 3 completed shifts:  In: 240 [P.O.:240]  Out: 1890 [Urine:700; Chest Tube:1190]  No data found.    Vital Signs:   Blood pressure 129/60, pulse 75, temperature 98.7 °F (37.1 °C), temperature

## 2024-04-25 NOTE — PROGRESS NOTES
Physical Therapy    Room #:   0439/0439-01    Date: 2024       Patient Name: Katt Diaz  : 1945      MRN: [id number]     Patient unavailable for physical therapy treatment due to refusal. Pt encouraged to get up to chair, but declined this morning. Will attempt PT treatment at a later time. Thank you.      Milena Diaz, PT

## 2024-04-25 NOTE — PROGRESS NOTES
Chest returned to 30 cm suction and stop cock turned to open position.  No drainage noted in tubing

## 2024-04-25 NOTE — PROGRESS NOTES
This  met with the patient for a length of stay visit. The patient was reclining in the bed. The patient stated that she has had a number of medical problems since her bypass surgery. This  had prayer with the patient and informed her that [facility] will follow up as requested.    Chaplain Tim Matias [place], [place] [address]

## 2024-04-25 NOTE — PROGRESS NOTES
Comprehensive Nutrition Assessment    Type and Reason for Visit:  Reassess    Nutrition Recommendations/Plan:   Continue Current Diet  Consult RD as needed     Malnutrition Assessment:  Malnutrition Status:  At risk for malnutrition (Comment) (d/t increased respiratory demands and poor po intake) (04/25/24 1152)    Context:  Acute Illness     Findings of the 6 clinical characteristics of malnutrition:  Energy Intake:  Mild decrease in energy intake (Comment) (poor po intake prior to admission)  Weight Loss:  1% to 2% over 1 week (most likely d/t fluid)     Body Fat Loss:  No significant body fat loss     Muscle Mass Loss:  No significant muscle mass loss    Fluid Accumulation:  Mild Extremities   Strength:  Not Performed    Nutrition Assessment:    Pt continues w/ pumonary emboli w/ TPA administered 04/24/24. Pt has right side chest tube in place, pleural effusions stable. Pt discharge pending. PT appetite increasing and eating 50-75% of all meals.Pt declines ONS. Continue curent diet, continue inpatient monitoring, f/up per policy.    Nutrition Related Findings:    A/O x4, I/O -6,649 since admit, bowel sounds active x4, abd wdl, BLLE edema trace, elevated BUN/Creatinine, GFR 34, Ca+ 8.1, Hgb 9.4, Hct 29.1 Wound Type: Multiple (Right back wound, left distal thigh, mid -sternum incision)       Current Nutrition Intake & Therapies:    Average Meal Intake: 51-75%  Average Supplements Intake: None Ordered  ADULT DIET; Regular; Low Sodium (2 gm); 2000 ml    Anthropometric Measures:  Height: 157.5 cm (5' 2.01\")  Ideal Body Weight (IBW): 110 lbs (50 kg)    Admission Body Weight: 60 kg (132 lb 4.4 oz)  Current Body Weight: 54.1 kg (119 lb 4.3 oz), 108.4 % IBW.    Current BMI (kg/m2): 21.8  Usual Body Weight: 52.6 kg (115 lb 15.4 oz) (10/09/23)  % Weight Change (Calculated): 2.9  Weight Adjustment For: No Adjustment         BMI Categories: Underweight (BMI less than 22) age over 65    Estimated Daily Nutrient

## 2024-04-26 LAB
ALBUMIN SERPL-MCNC: 3.4 G/DL (ref 3.5–5.2)
ALP SERPL-CCNC: 75 U/L (ref 35–104)
ALT SERPL-CCNC: 8 U/L (ref 0–32)
ANION GAP SERPL CALCULATED.3IONS-SCNC: 15 MMOL/L (ref 7–16)
AST SERPL-CCNC: 23 U/L (ref 0–31)
BASOPHILS # BLD: 0.02 K/UL (ref 0–0.2)
BASOPHILS NFR BLD: 0 % (ref 0–2)
BILIRUB SERPL-MCNC: 0.5 MG/DL (ref 0–1.2)
BILIRUB UR QL STRIP: NEGATIVE
BUN SERPL-MCNC: 45 MG/DL (ref 6–23)
CALCIUM SERPL-MCNC: 8.4 MG/DL (ref 8.6–10.2)
CHLORIDE SERPL-SCNC: 92 MMOL/L (ref 98–107)
CLARITY UR: CLEAR
CO2 SERPL-SCNC: 25 MMOL/L (ref 22–29)
COLOR UR: YELLOW
COMMENT: NORMAL
CREAT SERPL-MCNC: 2 MG/DL (ref 0.5–1)
EOSINOPHIL # BLD: 0.01 K/UL (ref 0.05–0.5)
EOSINOPHILS RELATIVE PERCENT: 0 % (ref 0–6)
ERYTHROCYTE [DISTWIDTH] IN BLOOD BY AUTOMATED COUNT: 17.2 % (ref 11.5–15)
GFR SERPL CREATININE-BSD FRML MDRD: 25 ML/MIN/1.73M2
GLUCOSE SERPL-MCNC: 122 MG/DL (ref 74–99)
GLUCOSE UR STRIP-MCNC: NEGATIVE MG/DL
HCT VFR BLD AUTO: 31.5 % (ref 34–48)
HGB BLD-MCNC: 9.6 G/DL (ref 11.5–15.5)
HGB UR QL STRIP.AUTO: NEGATIVE
IMM GRANULOCYTES # BLD AUTO: 0.08 K/UL (ref 0–0.58)
IMM GRANULOCYTES NFR BLD: 1 % (ref 0–5)
KETONES UR STRIP-MCNC: NEGATIVE MG/DL
LEUKOCYTE ESTERASE UR QL STRIP: NEGATIVE
LYMPHOCYTES NFR BLD: 2.03 K/UL (ref 1.5–4)
LYMPHOCYTES RELATIVE PERCENT: 22 % (ref 20–42)
MCH RBC QN AUTO: 27.9 PG (ref 26–35)
MCHC RBC AUTO-ENTMCNC: 30.5 G/DL (ref 32–34.5)
MCV RBC AUTO: 91.6 FL (ref 80–99.9)
MONOCYTES NFR BLD: 0.81 K/UL (ref 0.1–0.95)
MONOCYTES NFR BLD: 9 % (ref 2–12)
NEUTROPHILS NFR BLD: 68 % (ref 43–80)
NEUTS SEG NFR BLD: 6.33 K/UL (ref 1.8–7.3)
NITRITE UR QL STRIP: NEGATIVE
PH UR STRIP: 5.5 [PH] (ref 5–9)
PLATELET # BLD AUTO: 564 K/UL (ref 130–450)
PMV BLD AUTO: 10.1 FL (ref 7–12)
POTASSIUM SERPL-SCNC: 4.2 MMOL/L (ref 3.5–5)
PROT SERPL-MCNC: 7.4 G/DL (ref 6.4–8.3)
PROT UR STRIP-MCNC: NEGATIVE MG/DL
RBC # BLD AUTO: 3.44 M/UL (ref 3.5–5.5)
SODIUM SERPL-SCNC: 132 MMOL/L (ref 132–146)
SP GR UR STRIP: 1.01 (ref 1–1.03)
URATE SERPL-MCNC: 11.4 MG/DL (ref 2.4–5.7)
UROBILINOGEN UR STRIP-ACNC: 0.2 EU/DL (ref 0–1)
WBC OTHER # BLD: 9.3 K/UL (ref 4.5–11.5)

## 2024-04-26 PROCEDURE — 80053 COMPREHEN METABOLIC PANEL: CPT

## 2024-04-26 PROCEDURE — 2580000003 HC RX 258: Performed by: INTERNAL MEDICINE

## 2024-04-26 PROCEDURE — 94640 AIRWAY INHALATION TREATMENT: CPT

## 2024-04-26 PROCEDURE — 6370000000 HC RX 637 (ALT 250 FOR IP)

## 2024-04-26 PROCEDURE — 36415 COLL VENOUS BLD VENIPUNCTURE: CPT

## 2024-04-26 PROCEDURE — 1200000000 HC SEMI PRIVATE

## 2024-04-26 PROCEDURE — 97530 THERAPEUTIC ACTIVITIES: CPT

## 2024-04-26 PROCEDURE — 6370000000 HC RX 637 (ALT 250 FOR IP): Performed by: INTERNAL MEDICINE

## 2024-04-26 PROCEDURE — 81003 URINALYSIS AUTO W/O SCOPE: CPT

## 2024-04-26 PROCEDURE — 99233 SBSQ HOSP IP/OBS HIGH 50: CPT | Performed by: INTERNAL MEDICINE

## 2024-04-26 PROCEDURE — 6360000002 HC RX W HCPCS: Performed by: INTERNAL MEDICINE

## 2024-04-26 PROCEDURE — 32562 LYSE CHEST FIBRIN SUBQ DAY: CPT | Performed by: INTERNAL MEDICINE

## 2024-04-26 PROCEDURE — 84550 ASSAY OF BLOOD/URIC ACID: CPT

## 2024-04-26 PROCEDURE — 85025 COMPLETE CBC W/AUTO DIFF WBC: CPT

## 2024-04-26 RX ORDER — SODIUM CHLORIDE 9 MG/ML
INJECTION, SOLUTION INTRAVENOUS CONTINUOUS
Status: DISCONTINUED | OUTPATIENT
Start: 2024-04-26 | End: 2024-04-28 | Stop reason: HOSPADM

## 2024-04-26 RX ORDER — 0.9 % SODIUM CHLORIDE 0.9 %
500 INTRAVENOUS SOLUTION INTRAVENOUS ONCE
Status: COMPLETED | OUTPATIENT
Start: 2024-04-26 | End: 2024-04-26

## 2024-04-26 RX ADMIN — HYDROCODONE BITARTRATE AND ACETAMINOPHEN 1 TABLET: 5; 325 TABLET ORAL at 04:01

## 2024-04-26 RX ADMIN — Medication 10 ML: at 16:10

## 2024-04-26 RX ADMIN — METOPROLOL SUCCINATE 25 MG: 25 TABLET, EXTENDED RELEASE ORAL at 10:45

## 2024-04-26 RX ADMIN — TOBRAMYCIN AND DEXAMETHASONE 1 DROP: 3; 1 SUSPENSION/ DROPS OPHTHALMIC at 17:22

## 2024-04-26 RX ADMIN — SODIUM CHLORIDE: 9 INJECTION, SOLUTION INTRAVENOUS at 16:09

## 2024-04-26 RX ADMIN — HYDROCODONE BITARTRATE AND ACETAMINOPHEN 1 TABLET: 5; 325 TABLET ORAL at 10:56

## 2024-04-26 RX ADMIN — APIXABAN 5 MG: 5 TABLET, FILM COATED ORAL at 20:40

## 2024-04-26 RX ADMIN — ALTEPLASE 10 MG: 2.2 INJECTION, POWDER, LYOPHILIZED, FOR SOLUTION INTRAVENOUS at 14:43

## 2024-04-26 RX ADMIN — MIDODRINE HYDROCHLORIDE 5 MG: 5 TABLET ORAL at 17:23

## 2024-04-26 RX ADMIN — SODIUM CHLORIDE 500 ML: 9 INJECTION, SOLUTION INTRAVENOUS at 14:07

## 2024-04-26 RX ADMIN — TOBRAMYCIN AND DEXAMETHASONE 1 DROP: 3; 1 SUSPENSION/ DROPS OPHTHALMIC at 14:02

## 2024-04-26 RX ADMIN — TORSEMIDE 20 MG: 20 TABLET ORAL at 10:45

## 2024-04-26 RX ADMIN — PANTOPRAZOLE SODIUM 40 MG: 40 TABLET, DELAYED RELEASE ORAL at 06:34

## 2024-04-26 RX ADMIN — HYDROCODONE BITARTRATE AND ACETAMINOPHEN 1 TABLET: 5; 325 TABLET ORAL at 20:39

## 2024-04-26 RX ADMIN — MIDODRINE HYDROCHLORIDE 5 MG: 5 TABLET ORAL at 14:08

## 2024-04-26 RX ADMIN — IPRATROPIUM BROMIDE AND ALBUTEROL SULFATE 1 DOSE: .5; 2.5 SOLUTION RESPIRATORY (INHALATION) at 09:34

## 2024-04-26 RX ADMIN — MAGNESIUM OXIDE 400 MG: 400 TABLET ORAL at 10:44

## 2024-04-26 RX ADMIN — MIDODRINE HYDROCHLORIDE 5 MG: 5 TABLET ORAL at 10:44

## 2024-04-26 RX ADMIN — SACUBITRIL AND VALSARTAN 0.5 TABLET: 24; 26 TABLET, FILM COATED ORAL at 10:44

## 2024-04-26 RX ADMIN — TOBRAMYCIN AND DEXAMETHASONE 1 DROP: 3; 1 SUSPENSION/ DROPS OPHTHALMIC at 10:44

## 2024-04-26 RX ADMIN — WATER 5 MG: 1 INJECTION INTRAMUSCULAR; INTRAVENOUS; SUBCUTANEOUS at 14:44

## 2024-04-26 RX ADMIN — LEVOTHYROXINE SODIUM 100 MCG: 0.1 TABLET ORAL at 06:34

## 2024-04-26 RX ADMIN — ATORVASTATIN CALCIUM 40 MG: 40 TABLET, FILM COATED ORAL at 20:39

## 2024-04-26 RX ADMIN — APIXABAN 5 MG: 5 TABLET, FILM COATED ORAL at 10:45

## 2024-04-26 RX ADMIN — MAGNESIUM OXIDE 400 MG: 400 TABLET ORAL at 20:39

## 2024-04-26 RX ADMIN — IPRATROPIUM BROMIDE AND ALBUTEROL SULFATE 1 DOSE: .5; 2.5 SOLUTION RESPIRATORY (INHALATION) at 06:01

## 2024-04-26 RX ADMIN — EMPAGLIFLOZIN 10 MG: 10 TABLET, FILM COATED ORAL at 10:45

## 2024-04-26 RX ADMIN — TOBRAMYCIN AND DEXAMETHASONE 1 DROP: 3; 1 SUSPENSION/ DROPS OPHTHALMIC at 20:39

## 2024-04-26 RX ADMIN — FENTANYL CITRATE 25 MCG: 0.05 INJECTION, SOLUTION INTRAMUSCULAR; INTRAVENOUS at 23:03

## 2024-04-26 ASSESSMENT — PAIN DESCRIPTION - ORIENTATION
ORIENTATION: RIGHT;LEFT;MID;LOWER
ORIENTATION: RIGHT
ORIENTATION: RIGHT;POSTERIOR

## 2024-04-26 ASSESSMENT — PAIN DESCRIPTION - LOCATION
LOCATION: BACK
LOCATION: BACK
LOCATION: RIB CAGE

## 2024-04-26 ASSESSMENT — PAIN SCALES - WONG BAKER
WONGBAKER_NUMERICALRESPONSE: NO HURT
WONGBAKER_NUMERICALRESPONSE: NO HURT

## 2024-04-26 ASSESSMENT — PAIN DESCRIPTION - DESCRIPTORS
DESCRIPTORS: ACHING;DISCOMFORT;NAGGING
DESCRIPTORS: THROBBING;STABBING;SHOOTING

## 2024-04-26 ASSESSMENT — PAIN SCALES - GENERAL
PAINLEVEL_OUTOF10: 9
PAINLEVEL_OUTOF10: 7
PAINLEVEL_OUTOF10: 8
PAINLEVEL_OUTOF10: 10

## 2024-04-26 NOTE — PROCEDURES
PULMONARY PROCEDURE:  Procedure: Intrapleural tPA and dornase alpha  5th dose     INDICATION:   Right-sided hemothorax     PROCEDURE :   Norma Palacios MD     CONSENT:  Consent was obtained prior to the procedure and placed in the paper chart.  Indications, risks, and benefits were explained at length.      PROCEDURE SUMMARY:            Under a complete aseptic technique, 10 mg of tPA and 5 mg of dornase alpha were injected in the right-sided chest tube.  Chest tube will be clamped for 1 hour and then was connected to underwater seal chamber with suction.

## 2024-04-26 NOTE — PROGRESS NOTES
Physical Therapy Treatment Note/Plan of Care    Room #:  0439/0439-01  Patient Name: Katt Diaz  YOB: 1945  MRN: [id number]    Date of Service: 4/26/2024     Tentative placement recommendation: Subacute unless patient meets goals then Home Health Physical Therapy  Equipment recommendation: To be determined      Evaluating Physical Therapist: Maria Isabel Bach, PT #[id number]      Specific Provider Orders/Date/Referring Provider :  04/12/24 0845    PT eval and treat  Start:  04/12/24 0845,   End:  04/12/24 0845,   ONE TIME,   Standing Count:  1 Occurrences,   R       Luis Angel Bain DO    Admitting Diagnosis:   Pleural effusion [J90]  Chest pain, unspecified type [R07.9]  Acute pulmonary embolism without acute cor pulmonale, unspecified pulmonary embolism type (HCC) [I26.99]  Multiple subsegmental pulmonary emboli without acute cor pulmonale (HCC) [I26.94]     chest pain. Patient states she has had chest pain for the past couple days. She states the pain began in the right chest and now radiates across her chest. It worsens when she coughs and with deep inspiration.  Surgery:   4/12/2024  PROCEDURE: ULTRASOUNDGUIDED RIGHT THORACENTESIS 4/12/2024       Successful ultrasound guided thoracentesis.   Visit Diagnoses         Codes    Chest pain, unspecified type     R07.9    S/P chest tube placement (HCC)     Z93.8            Patient Active Problem List   Diagnosis    S/P cardiac cath    Coronary artery disease involving native coronary artery of native heart without angina pectoris    ST elevation myocardial infarction (STEMI) ([facility])    Postoperative hypotension    Complication of surgical procedure    Stress hyperglycemia    VERONIQUE (acute kidney injury) (HCC)    Acute on chronic systolic heart failure (HCC)    Acute on chronic congestive heart failure (HCC)    Mild protein-calorie malnutrition (HCC)    Chronic systolic (congestive) heart failure (HCC)    Dyspnea on exertion    Failure to thrive in adult

## 2024-04-26 NOTE — CARE COORDINATION
4/26/2024 1140 CM note:Pleurex catheter removed on 4/23/24, pleurex drainage tube inserted, connected to atrium and she has received intrapleural tpa for loculated right hemothorax. Pt on room air. She has life vest that she had pta and self manages. Per CHF Clinic, pt has appt regarding life vest on 5/6/24. She plans to return to [facility] with [facility] upon d/c. [facility] accepted pt and received orders. (If pt would discharge on weekend, please call [facility] office for  scheduling start of care). Pt encouraged to work with therapy to ensure she can return home. (As per previous CM notes, pt is in copay days for SNF and cannot afford copay or afford Assisted Living).  [facility] delivered ww to pt's room. Referral faxed to [employer] and written [employer] information provided. Free 30 day trial coupon for eliquis explained and given to patient.  Pt informed that monthly eliquis copay is $35.00 per [facility] pharmacist,pt agreeable. Pt's family to transport home. CM will follow. Molly HORTA

## 2024-04-26 NOTE — DISCHARGE INSTRUCTIONS
HEART FAILURE  / CONGESTIVE HEART FAILURE  DISCHARGE INSTRUCTIONS:  GUIDELINES TO FOLLOW AT HOME    Self- Managed Care:     MEDICATIONS:  Take your medication as directed. If you are experiencing any side effects, inform your doctor, Do not stop taking any of your medications without letting your doctor know.   Check with your doctor before taking any over-the-counter medications / herbal / or dietary supplements. They may interfere with your other medications.  Do not take ibuprofen (Advil or Motrin) and naproxen (Aleve) without talking to your doctor first. They could make your heart failure worse.         WEIGHT MONITORING:   Weigh yourself everyday (with the same scale) around the same time of the day and write it down. (you can chart them on a calendar or keep track of them on paper.   Notify your doctor of a weight gain of 3 pounds or more in 1 day   OR a total of 5 pounds or more in 1 week    Take your weight record to your doctor visits  Also, the same goes if you loose more than 3# in one day, let your heart doctor know.         DIET:   Cardiac heart healthy diet- Low saturated / low trans fat, no added salt, caffeine restricted, Low sodium diet-   No more than 2,000mg (2 grams) of salt / sodium per day (which equals to a little less than  a teaspoon of salt)  If your doctor wants you on a fluid restriction...it is usually recommended a fluid limit of 2,000cc -  Fluid restriction- 2,000 ml (milliliters) = 64 ounces = you can have 8 glasses of fluid per day (each glass 8 ounces)    Follow a low salt diet - avoid using salt at the table, avoid / limit use of canned soups, processed / packaged foods, salted snacks, olives and pickles.  Do not use a salt substitute without checking with your doctor, they may contain a high amount of potassioum. (Mrs. Dash is safe to use).    Limit the use of alcohol       CALL YOUR DOCTOR THE FIRST DAY YOU NOTICE ANY OF THESE   SYMPTOMS:  You have a

## 2024-04-26 NOTE — CONSULTS
Nephrology Consult  [facility]      CC:   Acute kidney injury    HPI:   Patient is a 78-year-old female patient we are asked to see in regards to acute kidney injury.  Patient has been admitted since 4/11/2024.  The patient has been hospitalized numerous times over the past several months.  In February she was admitted for STEMI with subsequent cardiac catheterization and urgent CABG x 3.  She was discharged early in February after CABG.  She was then readmitted again in mid to late February for acute on chronic systolic/diastolic CHF.  Patient then was discharged and was again admitted in mid March for acute on chronic systolic congestive heart failure.  During that admission she had a LifeVest applied which she continues to wear today.  She was again admitted in early April from 4/1 through 4/8 for failure to thrive.  And then subsequently return for another admission on 4/11 which is her current admission.  Her serum creatinine earlier this month was noted to be 1.0 to 1.2 mg/dL.  The patient was started on Jardiance on 4/14 and Entresto on 4/13.  Serum creatinines began to elevate on 4/22 with creatinine of 1.4 mg/dL and creatinine today of 2.0 mg/dL prompting renal consultation.  On examination patient is understandably frustrated with her prolonged hospital stay as well as recent health issues.  Also of note patient has been somewhat hypotensive with blood pressures on 4/23 and 4/24 with systolic blood pressures in the 80s and 90s at times.  The patient has a chest tube and does complain of pain at the site of insertion.  She did have a hemothorax which was the reason for placement of chest tube.      PMH:    Past Medical History:   Diagnosis Date    VERONIQUE (acute kidney injury) ([facility]) 02/13/2024    CAD in native artery 02/05/2024    Headache     Hearing loss     Hx of rheumatoid arthritis     Migraine     Osteopenia     S/P CABG x 3     Sjogren's disease ([facility])        Patient Active Problem List   Diagnosis     S/P cardiac cath    Coronary artery disease involving native coronary artery of native heart without angina pectoris    ST elevation myocardial infarction (STEMI) (HCC)    Postoperative hypotension    Complication of surgical procedure    Stress hyperglycemia    VERONIQUE (acute kidney injury) (HCC)    Acute on chronic systolic heart failure (HCC)    Acute on chronic congestive heart failure (HCC)    Mild protein-calorie malnutrition (HCC)    Chronic systolic (congestive) heart failure (HCC)    Dyspnea on exertion    Failure to thrive in adult    Multiple subsegmental pulmonary emboli (HCC)    Ischemic cardiomyopathy    Mitral valve disease    Stage 3b chronic kidney disease (HCC)    Pleural effusion       Meds:     sodium chloride  500 mL IntraVENous Once    lidocaine  1 patch TransDERmal Daily    ALTEplase (CATHFLO) 10 mg in sodium chloride 0.9 % 30 mL  10 mg IntraPLEUral Q12H    And    dornase alpha (PULMOZYME) 5 mg in sterile water 30 mL  5 mg IntraPLEUral Q12H    sodium chloride flush  5-40 mL IntraVENous 2 times per day    apixaban  5 mg Oral BID    [Held by provider] torsemide  20 mg Oral BID    midodrine  5 mg Oral TID WC    empagliflozin  10 mg Oral Daily    [Held by provider] sacubitril-valsartan  0.5 tablet Oral BID    ipratropium 0.5 mg-albuterol 2.5 mg  1 Dose Inhalation Q4H WA RT    atorvastatin  40 mg Oral Nightly    metoprolol succinate  25 mg Oral Daily    tobramycin-dexAMETHasone  1 drop Both Eyes 4x Daily    [Held by provider] potassium chloride  20 mEq Oral BID    pantoprazole  40 mg Oral QAM AC    levothyroxine  100 mcg Oral Daily    magnesium oxide  400 mg Oral BID        sodium chloride      sodium chloride         Meds prn:     fentanNYL, sodium chloride flush, sodium chloride, HYDROcodone 5 mg - acetaminophen, magnesium sulfate, potassium chloride **OR** potassium alternative oral replacement **OR** potassium chloride, acetaminophen, Polyvinyl Alcohol-Povidone PF    Meds prior to admission:

## 2024-04-26 NOTE — CONSULTS
HFNN Note   Met with patient at the bedside. Reinforced HF education and the need to continued WCD. Patient scheduled in the CHF clinic for [facility] on May 6th at 12:15. Reviewed HFU appointments. Patient plans to return to [facility] with [facility]. I will consult CHW to assist with SDOH needs and possible transportation needs to the CHF clinic if needed. HF Navigator will continue to follow.     Future Appointments   Date Time Provider Department Center   5/6/2024 12:15 PM Kusum Fonseca APRN - CNP [facility]   5/13/2024  1:00 PM [facility] CARDIAC REHAB EVAL ROOM [facility] CAR REUBEN [place]   5/14/2024  8:15 AM [facility] CHF ROOM 1 [facility]

## 2024-04-26 NOTE — PROGRESS NOTES
Assessment and Plan  Patient is a 78 y.o. female with the following medical Problems:   Acute on chronic hypoxic respiratory failure requiring supplemental oxygen.  Bilateral pleural effusions (Right > LefT)  Right-sided hemothorax after PleurX catheter insertion.  S/P right-sided thoracentesis April 12, 2024 and April 16, 2024. (Borderline exudative)  Heart failure with moderately reduced ejection fraction.  (EF 30 to 35%)(April 4, 2024)  Secondary pulmonary hypertension based on echocardiography from April 4, 2024.  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 Jasso   Acute left lower lobe segmental and subsegmental pulmonary embolism.  CKD stage III.  Rheumatoid arthritis  Anemia of chronic disease    Plan of care:  Will continue with twice daily TPN dornase delmar to help with clearance of effusion.  I would recommend lifelong anticoagulation with Eliquis upon discharge.  Eliquis dose can be reduced after 3 months to 2.5 mg twice daily.  Patient should be lifelong anticoagulated unless there is a contraindication.  Continue with goal-directed heart failure therapy.  Judicious diuresis with close monitoring of kidney function.  GI prophylaxis.  Incentive spirometer.  Supplemental oxygen to keep sat 88 to 94%.  Pleural fluid cytology is negative for malignancy.  Evaluate the need for home oxygen before discharge.  Will discontinue antibiotics as there is no clear evidence of infection.  Norco for pain.  Chest x-ray today showed mild improvement.  No evidence of infection.  Meropenem was discontinued.    History of Present Illness:   Patient is a 78-year-old woman with above-mentioned medical problems who was admitted on April 12, 2024 with progressive shortness of breath and lower extremity swelling.  She had a CTA of the chest which showed bilateral pleural effusion mostly on the right with acute left lower lobe segmental and  sounds, no wheezing, +ve crackles and no tenderness over ribs   Cardiovascular: Normal S1 , S2, regular rate and rhythm, no murmur, rub or gallop  Abdomen: Normal sounds present, soft, lax with no tenderness, no hepatosplenomegaly, and no masses  Extremities: no  edema. Pulses are equally present.   Skin: intact, no rashes   Neurologic: Alert and oriented x 3, No focal deficit     Investigations:  Labs, radiological imaging and cardiac work up were personally reviewed        STAFF PHYSICIAN NOTE OF PERSONAL INVOLVEMENT IN CARE  As the attending physician, I certify that I personally reviewed the patient's history and personally examined the patient to confirm the physical findings described above, and that I reviewed the relevant imaging studies and available reports.  I also discussed the differential diagnosis and all of the proposed management plans with the patient and individuals accompanying the patient to this visit.  They had the opportunity to ask questions about the proposed management plans and to have those questions answered.      Electronically signed by Norma Priest MD on 4/26/2024 at 3:21 PM

## 2024-04-26 NOTE — PROGRESS NOTES
Chest tube stop cock returned to the open position and chest tube to -30 cm suction.  Small amount serosanguinous drainage returned.  Atrium at 1720 cc level.

## 2024-04-26 NOTE — PROGRESS NOTES
Internal Medicine Discharge Note         Primary Care Physician: Luis Angel Rea DO   Admitting Physician:  Dre Hubbard DO  Admission date and time: 4/11/2024 11:35 PM    Room:  [address] [place] [address]  Admitting diagnosis: Pleural effusion [J90]  Chest pain, unspecified type [R07.9]  Acute pulmonary embolism without acute cor pulmonale, unspecified pulmonary embolism type (HCC) [I26.99]  Multiple subsegmental pulmonary emboli without acute cor pulmonale (HCC) [I26.94]    Patient Name: Katt Diaz  MRN: [id number]    Date of Service: 4/26/2024     Subjective:  Katt is a 78 y.o. female who was seen and examined today,4/17/2024, at the bedside.  Her volume status has improved.  She has multiple questions regarding her cardiac disease process we have answered some of them to her satisfaction at bedside but she has other significant questions that she believes can only be answered well by the cardiologist and we have deferred these to their service.  No new symptoms or concerns. No family present during my examination.    4/17/2024  Patient seen examined on telemetry floor.  Patient still complains of weakness and shortness of breath.  Patient feels weak and does not feel comfortable going home.  Patient is requesting assisted living.  BUN/creatinine 25/1.2 with normal liver enzymes.  WBC is 5.7 from 8.3.  Temperature 97.8 with heart rate 75 blood pressure 111/72.  O2 sat 99% on 2 L.  Patient still complains shortness of breath with any activity.  Cardiology note reviewed.  CT of the chest showed persistent moderate-large right pleural effusion.  Will consult pulmonology for their input.    4/18/2024  Patient seen examined on telemetry floor.  Patient still complains of some shortness of breath and chest pain.  Patient symptoms always moderately improved from admission.  Patient has no lower leg edema.  Pulmonology was consulted and recommended Pleurx catheter.  BUN/creatinine 27/1.1 with mild elevation in AST at 37.

## 2024-04-26 NOTE — PROGRESS NOTES
CC: S/P CABG post-operative follow up visit      HPI:  Katt Diaz is a 78 y.o. female whom presents to the office today for routine post-operative follow-up status post: urgent sternotomy, urgent CABG x3 (LIMA-LAD, SVG-OM, SVG-RCA), EVH LLE, left atrial appendage ligation with 35mm Atriclip on 2/7/24. Currently, there are no complaints of any CP, SOB, major concerns, or incisional issues. She was discharged to the rehab facility on 2/20 and when she got there she stated she did not like it and told the nursing home she wanted to go back to emergency room because of her shortness of breath. She was admitted by IM, underwent further diuresis, and was discharged to a different rehab facility on 2/22/24. She continues to wear her WCD, and has outpatient follow up with the CHF clinic.     Review of Systems:   Constitutional: Negative for fever and chills.   Respiratory: Negative for cough, chest tightness and shortness of breath.    Cardiovascular: Negative for chest pain, palpitations and leg swelling.   Gastrointestinal: Negative for nausea, diarrhea and constipation.   Musculoskeletal: Negative for back pain.   Skin: Negative for color change.   Neurological: Negative for dizziness, syncope and light-headedness.          Objective:   Physical Exam   Constitutional: oriented to person, place, and time. No distress.   Cardiovascular: Normal rate.    Pulmonary/Chest: Effort normal. No respiratory distress.   midsternal and chest tube incisions well healed without evidence of infection. Sternum stable.   Abdominal: Soft. Bowel sounds are normal.   Musculoskeletal: Normal range of motion. Exhibits no edema.   Neurological: alert and oriented to person, place, and time.   Skin: Skin is warm and dry. No erythema.   Vein harvest incisions intact without evidence of infection   Psychiatric: normal mood and affect.       Assessment:      S/P CABG        Plan:      Remove sternal precautions on 3/20/24  Patient is to  Kavita Griggs - CRNA

## 2024-04-26 NOTE — PROGRESS NOTES
OT SESSION ATTEMPT     Date:2024  Patient Name: Katt Diaz  MRN: [id number]  : 1945  Room: [address] [place] [address]     OCCUPATIONAL THERAPY TREATMENT NOTE    [facility]      Attempted OT session this date:    [] unavailable due to other medical staff currently with pt   [] on hold per nursing staff   [] on hold per nursing staff secondary to lab / radiology results    [x] declined treatment  this date due to c/o not feeling well.  Benefits of participation in therapy reviewed with pt.    [] off unit   [] Other:     Continue with current OT P.O.C at a later date/time.      Roberto ACOSTA/REYNA [id number]

## 2024-04-27 ENCOUNTER — APPOINTMENT (OUTPATIENT)
Dept: GENERAL RADIOLOGY | Age: 79
DRG: 175 | End: 2024-04-27
Payer: MEDICARE

## 2024-04-27 LAB
25(OH)D3 SERPL-MCNC: 16.5 NG/ML (ref 30–100)
ALBUMIN SERPL-MCNC: 2.7 G/DL (ref 3.5–5.2)
ALP SERPL-CCNC: 74 U/L (ref 35–104)
ALT SERPL-CCNC: 7 U/L (ref 0–32)
ANION GAP SERPL CALCULATED.3IONS-SCNC: 14 MMOL/L (ref 7–16)
AST SERPL-CCNC: 25 U/L (ref 0–31)
BASOPHILS # BLD: 0.01 K/UL (ref 0–0.2)
BASOPHILS NFR BLD: 0 % (ref 0–2)
BILIRUB SERPL-MCNC: 0.5 MG/DL (ref 0–1.2)
BUN SERPL-MCNC: 51 MG/DL (ref 6–23)
CALCIUM SERPL-MCNC: 8.2 MG/DL (ref 8.6–10.2)
CHLORIDE SERPL-SCNC: 94 MMOL/L (ref 98–107)
CO2 SERPL-SCNC: 23 MMOL/L (ref 22–29)
CREAT SERPL-MCNC: 1.6 MG/DL (ref 0.5–1)
EOSINOPHIL # BLD: 0.01 K/UL (ref 0.05–0.5)
EOSINOPHILS RELATIVE PERCENT: 0 % (ref 0–6)
ERYTHROCYTE [DISTWIDTH] IN BLOOD BY AUTOMATED COUNT: 17.1 % (ref 11.5–15)
GFR SERPL CREATININE-BSD FRML MDRD: 33 ML/MIN/1.73M2
GLUCOSE SERPL-MCNC: 95 MG/DL (ref 74–99)
HCT VFR BLD AUTO: 29.4 % (ref 34–48)
HGB BLD-MCNC: 9.1 G/DL (ref 11.5–15.5)
IMM GRANULOCYTES # BLD AUTO: 0.12 K/UL (ref 0–0.58)
IMM GRANULOCYTES NFR BLD: 1 % (ref 0–5)
LYMPHOCYTES NFR BLD: 1.54 K/UL (ref 1.5–4)
LYMPHOCYTES RELATIVE PERCENT: 12 % (ref 20–42)
MCH RBC QN AUTO: 28.3 PG (ref 26–35)
MCHC RBC AUTO-ENTMCNC: 31 G/DL (ref 32–34.5)
MCV RBC AUTO: 91.3 FL (ref 80–99.9)
MICROORGANISM SPEC CULT: NO GROWTH
MICROORGANISM/AGENT SPEC: NORMAL
MONOCYTES NFR BLD: 0.96 K/UL (ref 0.1–0.95)
MONOCYTES NFR BLD: 8 % (ref 2–12)
NEUTROPHILS NFR BLD: 79 % (ref 43–80)
NEUTS SEG NFR BLD: 10.21 K/UL (ref 1.8–7.3)
PHOSPHATE SERPL-MCNC: 4.4 MG/DL (ref 2.5–4.5)
PLATELET # BLD AUTO: 526 K/UL (ref 130–450)
PMV BLD AUTO: 9.7 FL (ref 7–12)
POTASSIUM SERPL-SCNC: 4.1 MMOL/L (ref 3.5–5)
PROT SERPL-MCNC: 6.6 G/DL (ref 6.4–8.3)
PTH-INTACT SERPL-MCNC: 37.2 PG/ML (ref 15–65)
RBC # BLD AUTO: 3.22 M/UL (ref 3.5–5.5)
SODIUM SERPL-SCNC: 131 MMOL/L (ref 132–146)
SPECIMEN DESCRIPTION: NORMAL
WBC OTHER # BLD: 12.9 K/UL (ref 4.5–11.5)

## 2024-04-27 PROCEDURE — 32562 LYSE CHEST FIBRIN SUBQ DAY: CPT | Performed by: INTERNAL MEDICINE

## 2024-04-27 PROCEDURE — 85025 COMPLETE CBC W/AUTO DIFF WBC: CPT

## 2024-04-27 PROCEDURE — 94640 AIRWAY INHALATION TREATMENT: CPT

## 2024-04-27 PROCEDURE — 2580000003 HC RX 258: Performed by: INTERNAL MEDICINE

## 2024-04-27 PROCEDURE — 80053 COMPREHEN METABOLIC PANEL: CPT

## 2024-04-27 PROCEDURE — 1200000000 HC SEMI PRIVATE

## 2024-04-27 PROCEDURE — 6370000000 HC RX 637 (ALT 250 FOR IP)

## 2024-04-27 PROCEDURE — 51798 US URINE CAPACITY MEASURE: CPT

## 2024-04-27 PROCEDURE — 6360000002 HC RX W HCPCS: Performed by: INTERNAL MEDICINE

## 2024-04-27 PROCEDURE — 71045 X-RAY EXAM CHEST 1 VIEW: CPT

## 2024-04-27 PROCEDURE — 99233 SBSQ HOSP IP/OBS HIGH 50: CPT | Performed by: INTERNAL MEDICINE

## 2024-04-27 PROCEDURE — 6370000000 HC RX 637 (ALT 250 FOR IP): Performed by: INTERNAL MEDICINE

## 2024-04-27 PROCEDURE — 82306 VITAMIN D 25 HYDROXY: CPT

## 2024-04-27 PROCEDURE — 83970 ASSAY OF PARATHORMONE: CPT

## 2024-04-27 PROCEDURE — 36415 COLL VENOUS BLD VENIPUNCTURE: CPT

## 2024-04-27 PROCEDURE — 84100 ASSAY OF PHOSPHORUS: CPT

## 2024-04-27 RX ORDER — 0.9 % SODIUM CHLORIDE 0.9 %
250 INTRAVENOUS SOLUTION INTRAVENOUS ONCE
Status: COMPLETED | OUTPATIENT
Start: 2024-04-27 | End: 2024-04-27

## 2024-04-27 RX ORDER — ALLOPURINOL 100 MG/1
100 TABLET ORAL DAILY
Status: DISCONTINUED | OUTPATIENT
Start: 2024-04-27 | End: 2024-04-28 | Stop reason: HOSPADM

## 2024-04-27 RX ADMIN — HYDROCODONE BITARTRATE AND ACETAMINOPHEN 1 TABLET: 5; 325 TABLET ORAL at 23:50

## 2024-04-27 RX ADMIN — MAGNESIUM OXIDE 400 MG: 400 TABLET ORAL at 08:24

## 2024-04-27 RX ADMIN — IPRATROPIUM BROMIDE AND ALBUTEROL SULFATE 1 DOSE: .5; 2.5 SOLUTION RESPIRATORY (INHALATION) at 17:29

## 2024-04-27 RX ADMIN — APIXABAN 5 MG: 5 TABLET, FILM COATED ORAL at 08:24

## 2024-04-27 RX ADMIN — MIDODRINE HYDROCHLORIDE 5 MG: 5 TABLET ORAL at 13:17

## 2024-04-27 RX ADMIN — Medication 10 ML: at 20:31

## 2024-04-27 RX ADMIN — MIDODRINE HYDROCHLORIDE 5 MG: 5 TABLET ORAL at 18:44

## 2024-04-27 RX ADMIN — HYDROCODONE BITARTRATE AND ACETAMINOPHEN 1 TABLET: 5; 325 TABLET ORAL at 15:24

## 2024-04-27 RX ADMIN — TOBRAMYCIN AND DEXAMETHASONE 1 DROP: 3; 1 SUSPENSION/ DROPS OPHTHALMIC at 18:44

## 2024-04-27 RX ADMIN — TOBRAMYCIN AND DEXAMETHASONE 1 DROP: 3; 1 SUSPENSION/ DROPS OPHTHALMIC at 13:12

## 2024-04-27 RX ADMIN — APIXABAN 5 MG: 5 TABLET, FILM COATED ORAL at 20:31

## 2024-04-27 RX ADMIN — ATORVASTATIN CALCIUM 40 MG: 40 TABLET, FILM COATED ORAL at 20:31

## 2024-04-27 RX ADMIN — ALLOPURINOL 100 MG: 100 TABLET ORAL at 13:17

## 2024-04-27 RX ADMIN — IPRATROPIUM BROMIDE AND ALBUTEROL SULFATE 1 DOSE: .5; 2.5 SOLUTION RESPIRATORY (INHALATION) at 09:29

## 2024-04-27 RX ADMIN — HYDROCODONE BITARTRATE AND ACETAMINOPHEN 1 TABLET: 5; 325 TABLET ORAL at 08:24

## 2024-04-27 RX ADMIN — METOPROLOL SUCCINATE 25 MG: 25 TABLET, EXTENDED RELEASE ORAL at 09:54

## 2024-04-27 RX ADMIN — LEVOTHYROXINE SODIUM 100 MCG: 0.1 TABLET ORAL at 05:25

## 2024-04-27 RX ADMIN — MAGNESIUM OXIDE 400 MG: 400 TABLET ORAL at 20:31

## 2024-04-27 RX ADMIN — SODIUM CHLORIDE 250 ML: 9 INJECTION, SOLUTION INTRAVENOUS at 10:50

## 2024-04-27 RX ADMIN — ALTEPLASE 10 MG: 2.2 INJECTION, POWDER, LYOPHILIZED, FOR SOLUTION INTRAVENOUS at 13:29

## 2024-04-27 RX ADMIN — EMPAGLIFLOZIN 10 MG: 10 TABLET, FILM COATED ORAL at 08:24

## 2024-04-27 RX ADMIN — PANTOPRAZOLE SODIUM 40 MG: 40 TABLET, DELAYED RELEASE ORAL at 05:25

## 2024-04-27 RX ADMIN — FENTANYL CITRATE 25 MCG: 0.05 INJECTION, SOLUTION INTRAMUSCULAR; INTRAVENOUS at 20:30

## 2024-04-27 RX ADMIN — WATER 5 MG: 1 INJECTION INTRAMUSCULAR; INTRAVENOUS; SUBCUTANEOUS at 13:31

## 2024-04-27 RX ADMIN — MIDODRINE HYDROCHLORIDE 5 MG: 5 TABLET ORAL at 08:24

## 2024-04-27 RX ADMIN — TOBRAMYCIN AND DEXAMETHASONE 1 DROP: 3; 1 SUSPENSION/ DROPS OPHTHALMIC at 08:27

## 2024-04-27 ASSESSMENT — PAIN DESCRIPTION - DESCRIPTORS
DESCRIPTORS: ACHING
DESCRIPTORS: DISCOMFORT
DESCRIPTORS: DISCOMFORT
DESCRIPTORS: ACHING

## 2024-04-27 ASSESSMENT — PAIN DESCRIPTION - ORIENTATION
ORIENTATION: RIGHT
ORIENTATION: RIGHT;MID

## 2024-04-27 ASSESSMENT — PAIN DESCRIPTION - LOCATION
LOCATION: BACK
LOCATION: CHEST
LOCATION: BACK
LOCATION: CHEST

## 2024-04-27 ASSESSMENT — PAIN SCALES - GENERAL
PAINLEVEL_OUTOF10: 7
PAINLEVEL_OUTOF10: 7
PAINLEVEL_OUTOF10: 9
PAINLEVEL_OUTOF10: 3
PAINLEVEL_OUTOF10: 9

## 2024-04-27 NOTE — PROGRESS NOTES
Nephrology Note  Surendra Temple MD          Patient seen and examined.  No family member is present at bedside.  Chart reviewed.  Patient sitting up in the bed.  She is less short of breath.  Appetite is slowly improving.  No nausea or dysguesia.   Awake and alert .   In no acute distress.    Vital SignsBP (!) 88/49   Pulse 79   Temp 98.8 °F (37.1 °C) (Oral)   Resp 20   Ht 1.575 m (5' 2.01\")   Wt 55.8 kg (123 lb 0.3 oz)   SpO2 91%   BMI 22.49 kg/m²   24HR INTAKE/OUTPUT:    Intake/Output Summary (Last 24 hours) at 4/27/2024 2111  Last data filed at 4/27/2024 1929  Gross per 24 hour   Intake 240 ml   Output 450 ml   Net -210 ml         PHYSICAL EXAM        Neck: No JVD  Lungs: Breath sounds decreased at the bases. No rhonchi a few basal rales are present.  Heart: Regular rate and rhythm. No S3 gallop. No  murmrur.  Abdomen: Soft non distended, non tender and normal bowel sounds.  Extremeties:   No edema.            Current Facility-Administered Medications   Medication Dose Route Frequency Provider Last Rate Last Admin    0.9 % sodium chloride infusion   IntraVENous Continuous Dre Hubbard DO 65 mL/hr at 04/26/24 1609 New Bag at 04/26/24 1609    ALTEplase (CATHFLO) 10 mg in sodium chloride 0.9 % 30 mL  10 mg IntraPLEUral Q24H Norma Priest MD   10 mg at 04/26/24 1443    And    dornase alpha (PULMOZYME) 5 mg in sterile water 30 mL  5 mg IntraPLEUral Q24H Norma Priest MD   5 mg at 04/26/24 1444    fentaNYL (SUBLIMAZE) injection 25 mcg  25 mcg IntraVENous Q4H PRN Dre Hubbard DO   25 mcg at 04/26/24 2303    lidocaine 4 % external patch 1 patch  1 patch TransDERmal Daily Norma Priest MD   1 patch at 04/24/24 1446    sodium chloride flush 0.9 % injection 5-40 mL  5-40 mL IntraVENous 2 times per day Norma Priest MD   10 mL at 04/26/24 1610    sodium chloride flush 0.9 % injection 5-40 mL  5-40 mL IntraVENous PRN Cem,

## 2024-04-27 NOTE — PROGRESS NOTES
MD Balwinder    0.9 % sodium chloride infusion, , IntraVENous, PRN, Norma Priest MD    apixaban (ELIQUIS) tablet 5 mg, 5 mg, Oral, BID, Norma Priest MD, 5 mg at 04/27/24 0824    [Held by provider] torsemide (DEMADEX) tablet 20 mg, 20 mg, Oral, BID, Иван Cabezas MD, 20 mg at 04/26/24 1045    HYDROcodone-acetaminophen (NORCO) 5-325 MG per tablet 1 tablet, 1 tablet, Oral, Q6H PRN, Hubbard, Dre A, DO, 1 tablet at 04/27/24 0824    midodrine (PROAMATINE) tablet 5 mg, 5 mg, Oral, TID WC, Hubbard, Dre A, DO, 5 mg at 04/27/24 1317    magnesium sulfate 1000 mg in dextrose 5% 100 mL IVPB, 1,000 mg, IntraVENous, PRN, Chadwick Gomez APRN - CNP    potassium chloride (KLOR-CON M) extended release tablet 40 mEq, 40 mEq, Oral, PRN **OR** potassium bicarb-citric acid (EFFER-K) effervescent tablet 40 mEq, 40 mEq, Oral, PRN **OR** potassium chloride 10 mEq/100 mL IVPB (Peripheral Line), 10 mEq, IntraVENous, PRN, Chadwick Gomez APRN - CNP    empagliflozin (JARDIANCE) tablet 10 mg, 10 mg, Oral, Daily, Umberto Guevara APRN - CNP, 10 mg at 04/27/24 0824    [Held by provider] sacubitril-valsartan (ENTRESTO) 24-26 MG per tablet 0.5 tablet, 0.5 tablet, Oral, BID, Umberto Guevara APRN - CNP, 0.5 tablet at 04/26/24 1044    ipratropium 0.5 mg-albuterol 2.5 mg (DUONEB) nebulizer solution 1 Dose, 1 Dose, Inhalation, Q4H WA RTIsmael James, APRN - CNP, 1 Dose at 04/27/24 0929    acetaminophen (TYLENOL) tablet 650 mg, 650 mg, Oral, Q4H PRN, Umberto Guevara APRN - CNP, 650 mg at 04/24/24 0414    atorvastatin (LIPITOR) tablet 40 mg, 40 mg, Oral, Nightly, Umberto Guevara APRN - CNP, 40 mg at 04/26/24 2039    metoprolol succinate (TOPROL XL) extended release tablet 25 mg, 25 mg, Oral, Daily, Umberto Guevara APRN - CNP, 25 mg at 04/27/24 0954    tobramycin-dexAMETHasone (TOBRADEX) ophthalmic suspension 1 drop, 1 drop, Both Eyes, 4x Daily, Umberto Guevara APRN - CNP, 1 drop at 04/27/24 1312    [Held by provider] potassium  Atraumatic/normocephalic, EOMI, MOE, pharynx clear, moist mucosa, redness of the uvula appreciated,   Neck: Supple, no jugular venous distension, lymphadenopathy, thyromegaly or carotid bruits  Chest: Decreased breath sounds, no wheezing, +ve crackles and no tenderness over ribs   Cardiovascular: Normal S1 , S2, regular rate and rhythm, no murmur, rub or gallop  Abdomen: Normal sounds present, soft, lax with no tenderness, no hepatosplenomegaly, and no masses  Extremities: no  edema. Pulses are equally present.   Skin: intact, no rashes   Neurologic: Alert and oriented x 3, No focal deficit     Investigations:  Labs, radiological imaging and cardiac work up were personally reviewed        STAFF PHYSICIAN NOTE OF PERSONAL INVOLVEMENT IN CARE  As the attending physician, I certify that I personally reviewed the patient's history and personally examined the patient to confirm the physical findings described above, and that I reviewed the relevant imaging studies and available reports.  I also discussed the differential diagnosis and all of the proposed management plans with the patient and individuals accompanying the patient to this visit.  They had the opportunity to ask questions about the proposed management plans and to have those questions answered.      Electronically signed by Norma Priest MD on 4/27/2024 at 1:47 PM

## 2024-04-27 NOTE — PROCEDURES
PULMONARY PROCEDURE:  Procedure: Intrapleural tPA and dornase alpha  6th dose     INDICATION:   Right-sided hemothorax     PROCEDURE :   Norma Palacios MD     CONSENT:  Consent was obtained prior to the procedure and placed in the paper chart.  Indications, risks, and benefits were explained at length.      PROCEDURE SUMMARY:            Under a complete aseptic technique, 10 mg of tPA and 5 mg of dornase alpha were injected in the right-sided chest tube.  Chest tube will be clamped for 1 hour and then was connected to underwater seal chamber with suction.

## 2024-04-27 NOTE — PROGRESS NOTES
Internal Medicine Discharge Note         Primary Care Physician: Luis Angel Rea DO   Admitting Physician:  Dre Hubbard DO  Admission date and time: 4/11/2024 11:35 PM    Room:  [address] [place] [address]  Admitting diagnosis: Pleural effusion [J90]  Chest pain, unspecified type [R07.9]  Acute pulmonary embolism without acute cor pulmonale, unspecified pulmonary embolism type (HCC) [I26.99]  Multiple subsegmental pulmonary emboli without acute cor pulmonale (HCC) [I26.94]    Patient Name: Katt Diaz  MRN: [id number]    Date of Service: 4/27/2024     Subjective:  Katt is a 78 y.o. female who was seen and examined today,4/17/2024, at the bedside.  Her volume status has improved.  She has multiple questions regarding her cardiac disease process we have answered some of them to her satisfaction at bedside but she has other significant questions that she believes can only be answered well by the cardiologist and we have deferred these to their service.  No new symptoms or concerns. No family present during my examination.    4/17/2024  Patient seen examined on telemetry floor.  Patient still complains of weakness and shortness of breath.  Patient feels weak and does not feel comfortable going home.  Patient is requesting assisted living.  BUN/creatinine 25/1.2 with normal liver enzymes.  WBC is 5.7 from 8.3.  Temperature 97.8 with heart rate 75 blood pressure 111/72.  O2 sat 99% on 2 L.  Patient still complains shortness of breath with any activity.  Cardiology note reviewed.  CT of the chest showed persistent moderate-large right pleural effusion.  Will consult pulmonology for their input.    4/18/2024  Patient seen examined on telemetry floor.  Patient still complains of some shortness of breath and chest pain.  Patient symptoms always moderately improved from admission.  Patient has no lower leg edema.  Pulmonology was consulted and recommended Pleurx catheter.  BUN/creatinine 27/1.1 with mild elevation in AST at 37.   room air at rest.  Chest tube has about 300-1000 cc a shift noted.  Serum LDH was ordered along with procalcitonin today.    4/25/2024  Patient seen examined on telemetry floor.  Patient complains of some discomfort around her chest tube site.  Patient denies any other new problems.  BUN/creatinine was 30/1.5 with normal transaminase.  WBC is 5.6 with hemoglobin 9.4.  Temperature 98.7 with heart rate 75 blood pressure 129/60.  O2 sat 96% on room air at rest.  Pending reevaluation of pleural fluid.    4/26/2024  Patient seen examined on telemetry floor.  Patient still with back pain where chest tube site is at.  Case discussed with pulmonologist.  Patient denies any fever/chills, nausea/vomiting, chest pain and states her breathing is improved.  BUN/creatinine 45/2.0.  Liver enzymes normal with a WBC of 9.3 and hemoglobin 9.6.  Temperature is 98.3 with heart rate 71 blood pressure 105/49.  Eyes and nose are irregular/in accurate.  With increasing creatinine over the last 48 hours we will give bolus normal saline 500 cc over 2 hours and start low-level fluid.  Will consult nephrology and check urine for osmolality and creatinine and a UA now.    4/27/2024  Patient seen examined on telemetry floor.  Case discussed with patient's son at the bedside today.  Patient seems to be doing little bit better.  Patient was encouraged to increase activity today.  Case discussed with pulmonologist today.  BUN/creatinine 51/1.6 today with serum sodium 131.  Uric acid was up to 11.4.  Procalcitonin was 0.13, liver enzymes normal with a WBC 12.9 hemoglobin 9.1.  Temperature is 98.8 with heart rate 72 and blood pressure 110/71.  O2 sat 91-93% on room air at rest.  IV fluids were continued today along with 250 cc bolus normal saline.      Review of systems:  Constitutional:   Positive for fatigue and malaise , - fever/chills  HEENT:   Denies ear pain, sore throat, sinus or eye problems.  Cardiovascular:   Denies any chest pain,

## 2024-04-28 ENCOUNTER — APPOINTMENT (OUTPATIENT)
Dept: CT IMAGING | Age: 79
DRG: 175 | End: 2024-04-28
Payer: MEDICARE

## 2024-04-28 ENCOUNTER — HOSPITAL ENCOUNTER (INPATIENT)
Age: 79
LOS: 10 days | Discharge: HOME HEALTH | DRG: 166 | End: 2024-05-08
Attending: INTERNAL MEDICINE | Admitting: INTERNAL MEDICINE
Payer: MEDICARE

## 2024-04-28 VITALS
OXYGEN SATURATION: 96 % | SYSTOLIC BLOOD PRESSURE: 87 MMHG | DIASTOLIC BLOOD PRESSURE: 51 MMHG | WEIGHT: 116.1 LBS | HEART RATE: 69 BPM | TEMPERATURE: 98.7 F | RESPIRATION RATE: 19 BRPM | HEIGHT: 62 IN | BODY MASS INDEX: 21.36 KG/M2

## 2024-04-28 DIAGNOSIS — J90 RECURRENT RIGHT PLEURAL EFFUSION: ICD-10-CM

## 2024-04-28 DIAGNOSIS — G89.18 ACUTE POST-OPERATIVE PAIN: Primary | ICD-10-CM

## 2024-04-28 DIAGNOSIS — J90 LOCULATED PLEURAL EFFUSION: ICD-10-CM

## 2024-04-28 LAB
ALBUMIN SERPL-MCNC: 2.3 G/DL (ref 3.5–5.2)
ALP SERPL-CCNC: 75 U/L (ref 35–104)
ALT SERPL-CCNC: 8 U/L (ref 0–32)
ANION GAP SERPL CALCULATED.3IONS-SCNC: 12 MMOL/L (ref 7–16)
AST SERPL-CCNC: 37 U/L (ref 0–31)
BASOPHILS # BLD: 0.01 K/UL (ref 0–0.2)
BASOPHILS NFR BLD: 0 % (ref 0–2)
BILIRUB SERPL-MCNC: 0.4 MG/DL (ref 0–1.2)
BUN SERPL-MCNC: 47 MG/DL (ref 6–23)
CALCIUM SERPL-MCNC: 7.5 MG/DL (ref 8.6–10.2)
CHLORIDE SERPL-SCNC: 95 MMOL/L (ref 98–107)
CO2 SERPL-SCNC: 20 MMOL/L (ref 22–29)
CREAT SERPL-MCNC: 1.2 MG/DL (ref 0.5–1)
EOSINOPHIL # BLD: 0.04 K/UL (ref 0.05–0.5)
EOSINOPHILS RELATIVE PERCENT: 0 % (ref 0–6)
ERYTHROCYTE [DISTWIDTH] IN BLOOD BY AUTOMATED COUNT: 17.2 % (ref 11.5–15)
GFR SERPL CREATININE-BSD FRML MDRD: 44 ML/MIN/1.73M2
GLUCOSE SERPL-MCNC: 101 MG/DL (ref 74–99)
HCT VFR BLD AUTO: 25.8 % (ref 34–48)
HGB BLD-MCNC: 8.2 G/DL (ref 11.5–15.5)
IMM GRANULOCYTES # BLD AUTO: 0.11 K/UL (ref 0–0.58)
IMM GRANULOCYTES NFR BLD: 1 % (ref 0–5)
LYMPHOCYTES NFR BLD: 1.09 K/UL (ref 1.5–4)
LYMPHOCYTES RELATIVE PERCENT: 12 % (ref 20–42)
MCH RBC QN AUTO: 28.8 PG (ref 26–35)
MCHC RBC AUTO-ENTMCNC: 31.8 G/DL (ref 32–34.5)
MCV RBC AUTO: 90.5 FL (ref 80–99.9)
MONOCYTES NFR BLD: 0.7 K/UL (ref 0.1–0.95)
MONOCYTES NFR BLD: 7 % (ref 2–12)
NEUTROPHILS NFR BLD: 79 % (ref 43–80)
NEUTS SEG NFR BLD: 7.48 K/UL (ref 1.8–7.3)
OSMOLALITY UR: 396 MOSM/KG (ref 300–900)
PHOSPHATE SERPL-MCNC: 3.3 MG/DL (ref 2.5–4.5)
PLATELET # BLD AUTO: 483 K/UL (ref 130–450)
PMV BLD AUTO: 9.6 FL (ref 7–12)
POTASSIUM SERPL-SCNC: 3.6 MMOL/L (ref 3.5–5)
PROT SERPL-MCNC: 6 G/DL (ref 6.4–8.3)
RBC # BLD AUTO: 2.85 M/UL (ref 3.5–5.5)
SODIUM SERPL-SCNC: 127 MMOL/L (ref 132–146)
SODIUM UR-SCNC: <20 MMOL/L
URATE SERPL-MCNC: 10.7 MG/DL (ref 2.4–5.7)
WBC OTHER # BLD: 9.4 K/UL (ref 4.5–11.5)

## 2024-04-28 PROCEDURE — 2580000003 HC RX 258: Performed by: INTERNAL MEDICINE

## 2024-04-28 PROCEDURE — 2140000000 HC CCU INTERMEDIATE R&B

## 2024-04-28 PROCEDURE — 80053 COMPREHEN METABOLIC PANEL: CPT

## 2024-04-28 PROCEDURE — 84100 ASSAY OF PHOSPHORUS: CPT

## 2024-04-28 PROCEDURE — 83935 ASSAY OF URINE OSMOLALITY: CPT

## 2024-04-28 PROCEDURE — 85025 COMPLETE CBC W/AUTO DIFF WBC: CPT

## 2024-04-28 PROCEDURE — 6370000000 HC RX 637 (ALT 250 FOR IP): Performed by: INTERNAL MEDICINE

## 2024-04-28 PROCEDURE — 94640 AIRWAY INHALATION TREATMENT: CPT

## 2024-04-28 PROCEDURE — 6370000000 HC RX 637 (ALT 250 FOR IP)

## 2024-04-28 PROCEDURE — 99233 SBSQ HOSP IP/OBS HIGH 50: CPT | Performed by: INTERNAL MEDICINE

## 2024-04-28 PROCEDURE — 6370000000 HC RX 637 (ALT 250 FOR IP): Performed by: NURSE PRACTITIONER

## 2024-04-28 PROCEDURE — 84300 ASSAY OF URINE SODIUM: CPT

## 2024-04-28 PROCEDURE — 36415 COLL VENOUS BLD VENIPUNCTURE: CPT

## 2024-04-28 PROCEDURE — 84550 ASSAY OF BLOOD/URIC ACID: CPT

## 2024-04-28 PROCEDURE — 2580000003 HC RX 258: Performed by: NURSE PRACTITIONER

## 2024-04-28 PROCEDURE — 71250 CT THORAX DX C-: CPT

## 2024-04-28 RX ORDER — CLOPIDOGREL BISULFATE 75 MG/1
75 TABLET ORAL DAILY
Status: DISCONTINUED | OUTPATIENT
Start: 2024-04-29 | End: 2024-05-04

## 2024-04-28 RX ORDER — ALLOPURINOL 100 MG/1
100 TABLET ORAL DAILY
Status: DISCONTINUED | OUTPATIENT
Start: 2024-04-29 | End: 2024-05-08 | Stop reason: HOSPADM

## 2024-04-28 RX ORDER — LIDOCAINE 4 G/G
1 PATCH TOPICAL DAILY
Status: DISCONTINUED | OUTPATIENT
Start: 2024-04-29 | End: 2024-05-08 | Stop reason: HOSPADM

## 2024-04-28 RX ORDER — MIDODRINE HYDROCHLORIDE 5 MG/1
5 TABLET ORAL
Status: DISCONTINUED | OUTPATIENT
Start: 2024-04-29 | End: 2024-05-08 | Stop reason: HOSPADM

## 2024-04-28 RX ORDER — LEVOTHYROXINE SODIUM 0.1 MG/1
100 TABLET ORAL
Status: DISCONTINUED | OUTPATIENT
Start: 2024-04-29 | End: 2024-05-08 | Stop reason: HOSPADM

## 2024-04-28 RX ORDER — PROCHLORPERAZINE EDISYLATE 5 MG/ML
5 INJECTION INTRAMUSCULAR; INTRAVENOUS EVERY 6 HOURS PRN
Status: DISCONTINUED | OUTPATIENT
Start: 2024-04-28 | End: 2024-04-29

## 2024-04-28 RX ORDER — POTASSIUM CHLORIDE 20 MEQ/1
20 TABLET, EXTENDED RELEASE ORAL 2 TIMES DAILY
Status: DISCONTINUED | OUTPATIENT
Start: 2024-04-28 | End: 2024-05-08 | Stop reason: HOSPADM

## 2024-04-28 RX ORDER — BISACODYL 10 MG
10 SUPPOSITORY, RECTAL RECTAL DAILY PRN
Status: DISCONTINUED | OUTPATIENT
Start: 2024-04-28 | End: 2024-04-29

## 2024-04-28 RX ORDER — ALLOPURINOL 100 MG/1
100 TABLET ORAL DAILY
Qty: 30 TABLET | Refills: 3 | COMMUNITY
Start: 2024-04-29 | End: 2024-05-20

## 2024-04-28 RX ORDER — SODIUM CHLORIDE 0.9 % (FLUSH) 0.9 %
5-40 SYRINGE (ML) INJECTION PRN
Status: DISCONTINUED | OUTPATIENT
Start: 2024-04-28 | End: 2024-04-29

## 2024-04-28 RX ORDER — METOPROLOL SUCCINATE 25 MG/1
25 TABLET, EXTENDED RELEASE ORAL DAILY
Status: DISCONTINUED | OUTPATIENT
Start: 2024-04-29 | End: 2024-05-08 | Stop reason: HOSPADM

## 2024-04-28 RX ORDER — PANTOPRAZOLE SODIUM 40 MG/1
40 TABLET, DELAYED RELEASE ORAL
Status: DISCONTINUED | OUTPATIENT
Start: 2024-04-29 | End: 2024-05-08 | Stop reason: HOSPADM

## 2024-04-28 RX ORDER — FERROUS SULFATE 325(65) MG
325 TABLET ORAL 2 TIMES DAILY
Status: DISCONTINUED | OUTPATIENT
Start: 2024-04-28 | End: 2024-05-08 | Stop reason: HOSPADM

## 2024-04-28 RX ORDER — TORSEMIDE 20 MG/1
20 TABLET ORAL 2 TIMES DAILY
Status: DISCONTINUED | OUTPATIENT
Start: 2024-04-29 | End: 2024-05-08 | Stop reason: HOSPADM

## 2024-04-28 RX ORDER — ACETAMINOPHEN 325 MG/1
650 TABLET ORAL EVERY 6 HOURS PRN
Status: DISCONTINUED | OUTPATIENT
Start: 2024-04-28 | End: 2024-04-29

## 2024-04-28 RX ORDER — ATORVASTATIN CALCIUM 40 MG/1
40 TABLET, FILM COATED ORAL NIGHTLY
Status: DISCONTINUED | OUTPATIENT
Start: 2024-04-28 | End: 2024-05-08 | Stop reason: HOSPADM

## 2024-04-28 RX ORDER — TOBRAMYCIN AND DEXAMETHASONE 3; 1 MG/ML; MG/ML
1 SUSPENSION/ DROPS OPHTHALMIC 4 TIMES DAILY
Status: DISCONTINUED | OUTPATIENT
Start: 2024-04-28 | End: 2024-05-08 | Stop reason: HOSPADM

## 2024-04-28 RX ORDER — NEOMYCIN SULFATE, POLYMYXIN B SULFATE, AND DEXAMETHASONE 3.5; 10000; 1 MG/G; [USP'U]/G; MG/G
1 OINTMENT OPHTHALMIC NIGHTLY
Status: DISCONTINUED | OUTPATIENT
Start: 2024-04-28 | End: 2024-04-28

## 2024-04-28 RX ORDER — SODIUM CHLORIDE 0.9 % (FLUSH) 0.9 %
5-40 SYRINGE (ML) INJECTION EVERY 12 HOURS SCHEDULED
Status: DISCONTINUED | OUTPATIENT
Start: 2024-04-28 | End: 2024-04-29

## 2024-04-28 RX ORDER — ACETAMINOPHEN 650 MG/1
650 SUPPOSITORY RECTAL EVERY 6 HOURS PRN
Status: DISCONTINUED | OUTPATIENT
Start: 2024-04-28 | End: 2024-04-29

## 2024-04-28 RX ORDER — SODIUM CHLORIDE 9 MG/ML
INJECTION, SOLUTION INTRAVENOUS PRN
Status: DISCONTINUED | OUTPATIENT
Start: 2024-04-28 | End: 2024-04-29

## 2024-04-28 RX ORDER — LIDOCAINE 4 G/G
1 PATCH TOPICAL DAILY
COMMUNITY
Start: 2024-04-29 | End: 2024-06-10

## 2024-04-28 RX ADMIN — EMPAGLIFLOZIN 10 MG: 10 TABLET, FILM COATED ORAL at 08:38

## 2024-04-28 RX ADMIN — TOBRAMYCIN AND DEXAMETHASONE 1 DROP: 3; 1 SUSPENSION/ DROPS OPHTHALMIC at 23:42

## 2024-04-28 RX ADMIN — ACETAMINOPHEN 650 MG: 325 TABLET ORAL at 13:55

## 2024-04-28 RX ADMIN — SODIUM CHLORIDE: 9 INJECTION, SOLUTION INTRAVENOUS at 04:45

## 2024-04-28 RX ADMIN — ACETAMINOPHEN 650 MG: 325 TABLET ORAL at 22:03

## 2024-04-28 RX ADMIN — MIDODRINE HYDROCHLORIDE 5 MG: 5 TABLET ORAL at 12:54

## 2024-04-28 RX ADMIN — SODIUM CHLORIDE, PRESERVATIVE FREE 10 ML: 5 INJECTION INTRAVENOUS at 21:54

## 2024-04-28 RX ADMIN — MIDODRINE HYDROCHLORIDE 5 MG: 5 TABLET ORAL at 08:38

## 2024-04-28 RX ADMIN — HYDROCODONE BITARTRATE AND ACETAMINOPHEN 1 TABLET: 5; 325 TABLET ORAL at 08:38

## 2024-04-28 RX ADMIN — HYDROCODONE BITARTRATE AND ACETAMINOPHEN 1 TABLET: 5; 325 TABLET ORAL at 16:03

## 2024-04-28 RX ADMIN — ATORVASTATIN CALCIUM 40 MG: 40 TABLET, FILM COATED ORAL at 21:53

## 2024-04-28 RX ADMIN — METOPROLOL SUCCINATE 25 MG: 25 TABLET, EXTENDED RELEASE ORAL at 08:38

## 2024-04-28 RX ADMIN — ALLOPURINOL 100 MG: 100 TABLET ORAL at 08:38

## 2024-04-28 RX ADMIN — FERROUS SULFATE TAB 325 MG (65 MG ELEMENTAL FE) 325 MG: 325 (65 FE) TAB at 21:53

## 2024-04-28 RX ADMIN — APIXABAN 5 MG: 5 TABLET, FILM COATED ORAL at 08:38

## 2024-04-28 RX ADMIN — MAGNESIUM OXIDE 400 MG: 400 TABLET ORAL at 08:40

## 2024-04-28 RX ADMIN — IPRATROPIUM BROMIDE AND ALBUTEROL SULFATE 1 DOSE: .5; 2.5 SOLUTION RESPIRATORY (INHALATION) at 10:37

## 2024-04-28 RX ADMIN — POTASSIUM CHLORIDE 20 MEQ: 1500 TABLET, EXTENDED RELEASE ORAL at 21:53

## 2024-04-28 RX ADMIN — IPRATROPIUM BROMIDE AND ALBUTEROL SULFATE 1 DOSE: .5; 2.5 SOLUTION RESPIRATORY (INHALATION) at 06:16

## 2024-04-28 RX ADMIN — TOBRAMYCIN AND DEXAMETHASONE 1 DROP: 3; 1 SUSPENSION/ DROPS OPHTHALMIC at 08:38

## 2024-04-28 RX ADMIN — IPRATROPIUM BROMIDE AND ALBUTEROL SULFATE 1 DOSE: .5; 2.5 SOLUTION RESPIRATORY (INHALATION) at 13:59

## 2024-04-28 RX ADMIN — LEVOTHYROXINE SODIUM 100 MCG: 0.1 TABLET ORAL at 07:10

## 2024-04-28 RX ADMIN — MIDODRINE HYDROCHLORIDE 5 MG: 5 TABLET ORAL at 17:27

## 2024-04-28 RX ADMIN — TOBRAMYCIN AND DEXAMETHASONE 1 DROP: 3; 1 SUSPENSION/ DROPS OPHTHALMIC at 12:56

## 2024-04-28 RX ADMIN — PANTOPRAZOLE SODIUM 40 MG: 40 TABLET, DELAYED RELEASE ORAL at 07:10

## 2024-04-28 ASSESSMENT — PAIN SCALES - GENERAL
PAINLEVEL_OUTOF10: 8
PAINLEVEL_OUTOF10: 0
PAINLEVEL_OUTOF10: 9

## 2024-04-28 ASSESSMENT — PAIN DESCRIPTION - LOCATION
LOCATION: INCISION;BACK
LOCATION: BACK

## 2024-04-28 ASSESSMENT — PAIN DESCRIPTION - ORIENTATION
ORIENTATION: RIGHT;LOWER
ORIENTATION: RIGHT

## 2024-04-28 NOTE — PROGRESS NOTES
Report given to nurse on [facility]  over at [facility]. [facility] ambulance here to  patient now.

## 2024-04-28 NOTE — PROGRESS NOTES
Assessment and Plan  Patient is a 78 y.o. female with the following medical Problems:   Acute on chronic hypoxic respiratory failure requiring supplemental oxygen.  Bilateral pleural effusions (Right > LefT)  Right-sided hemothorax after PleurX catheter insertion.  S/P right-sided thoracentesis April 12, 2024 and April 16, 2024. (Borderline exudative)  Heart failure with moderately reduced ejection fraction.  (EF 30 to 35%)(April 4, 2024)  Secondary pulmonary hypertension based on echocardiography from April 4, 2024.  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 Jasso   Acute left lower lobe segmental and subsegmental pulmonary embolism.  CKD stage III.  Rheumatoid arthritis  Anemia of chronic disease    Plan of care:  Patient received 6 doses of tPA and dornase alpha.  Chest tube output has been minimal  CT scan of the chest today showed loculated right-sided pleural effusion/hemothorax.  Case was discussed with CT surgery and hospitalist service from [facility].  Patient will be transferred to [facility] for possible VATS.  I would recommend lifelong anticoagulation with Eliquis upon discharge.  Eliquis dose can be reduced after 3 months to 2.5 mg twice daily.  Patient should be lifelong anticoagulated unless there is a contraindication.  Continue with goal-directed heart failure therapy.  Judicious diuresis with close monitoring of kidney function.  GI prophylaxis.  Incentive spirometer.  Supplemental oxygen to keep sat 88 to 94%.  Pleural fluid cytology is negative for malignancy.  Evaluate the need for home oxygen before discharge.  Will discontinue antibiotics as there is no clear evidence of infection.  Norco for pain.  Chest x-ray today showed mild improvement.  No evidence of infection.  Meropenem was discontinued.    History of Present Illness:   Patient is a 78-year-old woman with above-mentioned  mg-albuterol 2.5 mg (DUONEB) nebulizer solution 1 Dose, 1 Dose, Inhalation, Q4H WA RT, Umberto Guevara APRN - CNP, 1 Dose at 04/28/24 1037    acetaminophen (TYLENOL) tablet 650 mg, 650 mg, Oral, Q4H PRN, Umberto Guevara APRN - CNP, 650 mg at 04/24/24 0414    atorvastatin (LIPITOR) tablet 40 mg, 40 mg, Oral, Nightly, Umberto Guevara APRN - CNP, 40 mg at 04/27/24 2031    metoprolol succinate (TOPROL XL) extended release tablet 25 mg, 25 mg, Oral, Daily, Umberto Guevara APRN - CNP, 25 mg at 04/28/24 0838    tobramycin-dexAMETHasone (TOBRADEX) ophthalmic suspension 1 drop, 1 drop, Both Eyes, 4x Daily, Umberto Guevara APRN - CNP, 1 drop at 04/28/24 1256    [Held by provider] potassium chloride (KLOR-CON M) extended release tablet 20 mEq, 20 mEq, Oral, BID, Umberto Guevara APRN - CNP, 20 mEq at 04/15/24 0815    pantoprazole (PROTONIX) tablet 40 mg, 40 mg, Oral, QAM AC, Umberto Guevara APRN - CNP, 40 mg at 04/28/24 0710    levothyroxine (SYNTHROID) tablet 100 mcg, 100 mcg, Oral, Daily, Umberto Guevara APRN - CNP, 100 mcg at 04/28/24 0710    magnesium oxide (MAG-OX) tablet 400 mg, 400 mg, Oral, BID, Umberto Guevara APRN - CNP, 400 mg at 04/28/24 0840    Polyvinyl Alcohol-Povidone PF (REFRESH) 1.4-0.6 % ophthalmic solution 1 drop, 1 drop, Both Eyes, Q4H PRN, Umberto Guevara APRN - CNP, 1 drop at 04/12/24 2237      Review of Systems:   General: denies weight gain, denies loss of appetite, fever, chills, night sweats.  HEENT: denies headaches, dizziness, head trauma, visual changes, eye pain, tinnitus, nosebleeds, hoarseness or throat pain    Respiratory: +ve chest pain around chest tube site., +ve dyspnea, cough but no hemoptysis  Cardiovascular: denies orthopnea, paroxysmal nocturnal dyspnea, leg swelling, and previous heart attack.    Gastrointestinal: denies pain, nausea vomiting, diarrhea, constipation, melena or bleeding.  Genitourinary: denies hematuria, frequency, urgency or dysuria  Neurology: denies syncope, seizures, paralysis, paraesthesia

## 2024-04-28 NOTE — PROGRESS NOTES
Attempted to call report over at [facility] 3 times.. Number [phone]. No answer, phone just rings.

## 2024-04-28 NOTE — PROGRESS NOTES
guided thoracentesis.         XR CHEST PORTABLE   Final Result   Moderate to large volume right pleural effusion with increase in volume from   prior comparison. Probable small left effusion has also developed.         IR GUIDED THORACENTESIS PLEURAL   Final Result   Successful ultrasound guided thoracentesis.         XR CHEST 1 VIEW   Final Result   No pneumothorax status post right thoracentesis.         Vascular duplex lower extremity venous bilateral   Final Result   Postoperative changes, without evidence of deep vein thrombosis.         CTA CHEST W CONTRAST   Final Result   Acute pulmonary embolus within left lower lobe segmental/subsegmental   branches.  Overall clot burden is minimal.  No clear evidence of right heart   strain.      Large right pleural effusion which is partially loculated. Trace left   effusion.      Collapse of the right middle lobe and atelectasis of the right lower lobe.      6 mm nodular density within the lingula.  Follow-up chest CT advised within   12 months.         XR CHEST PORTABLE   Final Result   1.  Moderate right pleural effusion.  Prominent interstitial opacities   bilaterally.  No pneumothorax.      2.  Atherosclerotic disease and cardiomegaly.  Postsurgical changes   consistent with prior CABG.               LAB DATA     BMP, Mg, Phos    Lab Results   Component Value Date    CREATININE 1.2 (H) 04/28/2024    CREATININE 1.6 (H) 04/27/2024    CREATININE 2.0 (H) 04/26/2024    CREATININE 1.5 (H) 04/25/2024    CREATININE 1.5 (H) 04/24/2024    CREATININE 1.3 (H) 04/23/2024    CREATININE 1.4 (H) 04/22/2024       CBC:   Recent Labs     04/26/24  0648 04/27/24  0712 04/28/24  0841   WBC 9.3 12.9* 9.4   HGB 9.6* 9.1* 8.2*   * 526* 483*        Lab Results   Component Value Date    IRON 18 (L) 04/13/2024    TIBC 220 (L) 04/13/2024       Lab Results   Component Value Date    IPTH 37.2 04/27/2024       Lab Results   Component Value Date    VITD25 16.5 (L) 04/27/2024    VITD25 16.7  (L) 04/13/2024    VITD25 29.6 (L) 01/30/2024       Lab Results   Component Value Date    CALCIUM 7.5 (L) 04/28/2024    CALCIUM 8.2 (L) 04/27/2024    CALCIUM 8.4 (L) 04/26/2024    CAION 1.12 (L) 02/14/2024    CAION 1.13 (L) 02/13/2024    CAION 1.10 (L) 02/12/2024    PHOS 3.3 04/28/2024    PHOS 4.4 04/27/2024    PHOS 4.3 04/02/2024    MG 2.6 04/23/2024    MG 2.2 04/02/2024    MG 2.0 03/17/2024         BMP:   Recent Labs     04/26/24  0648 04/27/24  0712 04/28/24  0841    131* 127*   K 4.2 4.1 3.6   CL 92* 94* 95*   CO2 25 23 20*   BUN 45* 51* 47*   CREATININE 2.0* 1.6* 1.2*       Serum Osmolality:   No results for input(s): \"OSMOS\" in the last 72 hours.      Urine Chemisrty:    No results for input(s): \"CLUR\", \"EDILIA\", \"KUR\", \"LABCREA\", \"UREAUR\", \"OSMOU\" in the last 72 hours.                                     No results for input(s): \"MALBCR\", \"LABPROT\" in the last 72 hours.          Assessment: / Plan:             Acute kidney injury.  Acute kidney injury secondary renal hypoperfusion in the setting  of aggressive diuresis with concurrent use of Entresto .  Serum creatinine is lower.  Patient does have ongoing borderline hypotension which may compromise the recovery of renal function.  We'll continue to follow renal function closely..    Continue gentle hydration.        Hypotension .  We'll continue the patient on midodrine and titrate dose as needed.  Continue gentle hydration.       Hyperuricemia.  Initial  uric acid level 11.4 mg/dL.  Repeat uric acid level is 10.7 mg/dL.  Although the initial hyperuricemia may have been secondary to aggressive diuresis the patient's uric acid levels have not improved despite hydration.  Will start the patient on allopurinol.      Hyponatremia.  Hyponatremia of undetermined etiology.  May be related to congestive heart failure.  Will check urine sodium and osmolality..  Will follow serial sodium levels.      Anemia secondary to iron deficiency.  We'll supplement iron

## 2024-04-28 NOTE — PROGRESS NOTES
Internal Medicine Discharge Note         Primary Care Physician: Luis Angel Rea DO   Admitting Physician:  Dre Hubbard DO  Admission date and time: 4/11/2024 11:35 PM    Room:  [address] [place] [address]  Admitting diagnosis: Pleural effusion [J90]  Chest pain, unspecified type [R07.9]  Acute pulmonary embolism without acute cor pulmonale, unspecified pulmonary embolism type (HCC) [I26.99]  Multiple subsegmental pulmonary emboli without acute cor pulmonale (HCC) [I26.94]    Patient Name: Katt Diaz  MRN: [id number]    Date of Service: 4/28/2024     Subjective:  Katt is a 78 y.o. female who was seen and examined today,4/17/2024, at the bedside.  Her volume status has improved.  She has multiple questions regarding her cardiac disease process we have answered some of them to her satisfaction at bedside but she has other significant questions that she believes can only be answered well by the cardiologist and we have deferred these to their service.  No new symptoms or concerns. No family present during my examination.    4/17/2024  Patient seen examined on telemetry floor.  Patient still complains of weakness and shortness of breath.  Patient feels weak and does not feel comfortable going home.  Patient is requesting assisted living.  BUN/creatinine 25/1.2 with normal liver enzymes.  WBC is 5.7 from 8.3.  Temperature 97.8 with heart rate 75 blood pressure 111/72.  O2 sat 99% on 2 L.  Patient still complains shortness of breath with any activity.  Cardiology note reviewed.  CT of the chest showed persistent moderate-large right pleural effusion.  Will consult pulmonology for their input.    4/18/2024  Patient seen examined on telemetry floor.  Patient still complains of some shortness of breath and chest pain.  Patient symptoms always moderately improved from admission.  Patient has no lower leg edema.  Pulmonology was consulted and recommended Pleurx catheter.  BUN/creatinine 27/1.1 with mild elevation in AST at 37.   pantoprazole  40 mg Oral QAM AC    levothyroxine  100 mcg Oral Daily    magnesium oxide  400 mg Oral BID     Continuous Infusions:   sodium chloride 65 mL/hr at 04/28/24 0445    sodium chloride         Objective Data:  CBC with Differential:    Lab Results   Component Value Date/Time    WBC 9.4 04/28/2024 08:41 AM    RBC 2.85 04/28/2024 08:41 AM    HGB 8.2 04/28/2024 08:41 AM    HCT 25.8 04/28/2024 08:41 AM     04/28/2024 08:41 AM    MCV 90.5 04/28/2024 08:41 AM    MCH 28.8 04/28/2024 08:41 AM    MCHC 31.8 04/28/2024 08:41 AM    RDW 17.2 04/28/2024 08:41 AM    LYMPHOPCT 12 04/28/2024 08:41 AM    MONOPCT 7 04/28/2024 08:41 AM    BASOPCT 0 04/28/2024 08:41 AM    MONOSABS 0.70 04/28/2024 08:41 AM    LYMPHSABS 1.09 04/28/2024 08:41 AM    EOSABS 0.04 04/28/2024 08:41 AM    BASOSABS 0.01 04/28/2024 08:41 AM     BMP:    Lab Results   Component Value Date/Time     04/28/2024 08:41 AM    K 3.6 04/28/2024 08:41 AM    CL 95 04/28/2024 08:41 AM    CO2 20 04/28/2024 08:41 AM    BUN 47 04/28/2024 08:41 AM    CREATININE 1.2 04/28/2024 08:41 AM    CALCIUM 7.5 04/28/2024 08:41 AM    LABGLOM 44 04/28/2024 08:41 AM    GLUCOSE 101 04/28/2024 08:41 AM       Wound Documentation:   Puncture 02/07/24 Thigh (Active)   Number of days: 68       Wound 04/12/24 Back Right (Active)   Dressing Status Clean;Dry;Intact 04/15/24 0815   Dressing/Treatment Gauze dressing/dressing sponge 04/15/24 0815   Drainage Amount None (dry) 04/15/24 0815   Number of days: 2       Assessment:  Acute respiratory failure with hypoxia, multifactorial  Acute left lower lobe pulmonary embolism with plan for Eliquis starter pack upon discharge  Acutely decompensated systolic congestive heart failure, LVEF 30 to 35%, with associated moderate to large right-sided pleural effusion  Coronary artery disease with status post coronary bypass grafting on February 7, 2024, consistent with coronary bypass grafting x3 with LIMA to the LAD, saphenous vein graft to

## 2024-04-28 NOTE — PROGRESS NOTES
nAa from [facility] called - bed at [facility] [address]; nurse to nurse call [phone].  [facility]  within the hour.  RN notified.

## 2024-04-29 ENCOUNTER — HOSPITAL ENCOUNTER (OUTPATIENT)
Dept: OTHER | Age: 79
Discharge: HOME | End: 2024-04-29

## 2024-04-29 ENCOUNTER — APPOINTMENT (OUTPATIENT)
Dept: GENERAL RADIOLOGY | Age: 79
DRG: 166 | End: 2024-04-29
Attending: INTERNAL MEDICINE
Payer: MEDICARE

## 2024-04-29 ENCOUNTER — ANESTHESIA (OUTPATIENT)
Dept: OPERATING ROOM | Age: 79
End: 2024-04-29
Payer: MEDICARE

## 2024-04-29 ENCOUNTER — ANESTHESIA EVENT (OUTPATIENT)
Dept: OPERATING ROOM | Age: 79
End: 2024-04-29
Payer: MEDICARE

## 2024-04-29 PROBLEM — J90 RECURRENT RIGHT PLEURAL EFFUSION: Status: ACTIVE | Noted: 2024-04-28

## 2024-04-29 LAB
ANION GAP SERPL CALCULATED.3IONS-SCNC: 10 MMOL/L (ref 7–16)
BASOPHILS # BLD: 0.02 K/UL (ref 0–0.2)
BASOPHILS NFR BLD: 0 % (ref 0–2)
BUN SERPL-MCNC: 31 MG/DL (ref 6–23)
BUN SERPL-MCNC: 39 MG/DL (ref 6–23)
CALCIUM SERPL-MCNC: 6.8 MG/DL (ref 8.6–10.2)
CALCIUM SERPL-MCNC: 7.3 MG/DL (ref 8.6–10.2)
CHLORIDE SERPL-SCNC: 103 MMOL/L (ref 98–107)
CHLORIDE SERPL-SCNC: 106 MMOL/L (ref 98–107)
CO2 SERPL-SCNC: 20 MMOL/L (ref 22–29)
CO2 SERPL-SCNC: 21 MMOL/L (ref 22–29)
CREAT SERPL-MCNC: 1 MG/DL (ref 0.5–1)
CREAT SERPL-MCNC: 1.1 MG/DL (ref 0.5–1)
EOSINOPHIL # BLD: 0.08 K/UL (ref 0.05–0.5)
EOSINOPHILS RELATIVE PERCENT: 1 % (ref 0–6)
ERYTHROCYTE [DISTWIDTH] IN BLOOD BY AUTOMATED COUNT: 17.6 % (ref 11.5–15)
ERYTHROCYTE [DISTWIDTH] IN BLOOD BY AUTOMATED COUNT: 18.1 % (ref 11.5–15)
GFR SERPL CREATININE-BSD FRML MDRD: 52 ML/MIN/1.73M2
GFR SERPL CREATININE-BSD FRML MDRD: 59 ML/MIN/1.73M2
GLUCOSE SERPL-MCNC: 155 MG/DL (ref 74–99)
GLUCOSE SERPL-MCNC: 90 MG/DL (ref 74–99)
HCT VFR BLD AUTO: 23.2 % (ref 34–48)
HCT VFR BLD AUTO: 23.2 % (ref 34–48)
HCT VFR BLD AUTO: 25.1 % (ref 34–48)
HGB BLD-MCNC: 7.2 G/DL (ref 11.5–15.5)
HGB BLD-MCNC: 7.2 G/DL (ref 11.5–15.5)
HGB BLD-MCNC: 7.6 G/DL (ref 11.5–15.5)
IMM GRANULOCYTES # BLD AUTO: 0.05 K/UL (ref 0–0.58)
IMM GRANULOCYTES NFR BLD: 1 % (ref 0–5)
INR PPP: 1.8
LYMPHOCYTES NFR BLD: 1.06 K/UL (ref 1.5–4)
LYMPHOCYTES RELATIVE PERCENT: 17 % (ref 20–42)
MAGNESIUM SERPL-MCNC: 2.9 MG/DL (ref 1.6–2.6)
MCH RBC QN AUTO: 28.2 PG (ref 26–35)
MCH RBC QN AUTO: 28.6 PG (ref 26–35)
MCHC RBC AUTO-ENTMCNC: 30.3 G/DL (ref 32–34.5)
MCHC RBC AUTO-ENTMCNC: 31 G/DL (ref 32–34.5)
MCV RBC AUTO: 91 FL (ref 80–99.9)
MCV RBC AUTO: 94.4 FL (ref 80–99.9)
MONOCYTES NFR BLD: 0.51 K/UL (ref 0.1–0.95)
MONOCYTES NFR BLD: 8 % (ref 2–12)
NEUTROPHILS NFR BLD: 72 % (ref 43–80)
NEUTS SEG NFR BLD: 4.45 K/UL (ref 1.8–7.3)
PARTIAL THROMBOPLASTIN TIME: 33.1 SEC (ref 24.5–35.1)
PLATELET # BLD AUTO: 474 K/UL (ref 130–450)
PLATELET # BLD AUTO: 551 K/UL (ref 130–450)
PMV BLD AUTO: 9.6 FL (ref 7–12)
PMV BLD AUTO: 9.6 FL (ref 7–12)
POTASSIUM SERPL-SCNC: 3.8 MMOL/L (ref 3.5–5)
POTASSIUM SERPL-SCNC: 4.7 MMOL/L (ref 3.5–5)
PROTHROMBIN TIME: 20.1 SEC (ref 9.3–12.4)
RBC # BLD AUTO: 2.55 M/UL (ref 3.5–5.5)
RBC # BLD AUTO: 2.66 M/UL (ref 3.5–5.5)
SODIUM SERPL-SCNC: 133 MMOL/L (ref 132–146)
SODIUM SERPL-SCNC: 135 MMOL/L (ref 132–146)
WBC OTHER # BLD: 6.2 K/UL (ref 4.5–11.5)
WBC OTHER # BLD: 9.2 K/UL (ref 4.5–11.5)

## 2024-04-29 PROCEDURE — 87070 CULTURE OTHR SPECIMN AEROBIC: CPT

## 2024-04-29 PROCEDURE — 6370000000 HC RX 637 (ALT 250 FOR IP)

## 2024-04-29 PROCEDURE — 32550 INSERT PLEURAL CATH: CPT | Performed by: STUDENT IN AN ORGANIZED HEALTH CARE EDUCATION/TRAINING PROGRAM

## 2024-04-29 PROCEDURE — 86901 BLOOD TYPING SEROLOGIC RH(D): CPT

## 2024-04-29 PROCEDURE — 85730 THROMBOPLASTIN TIME PARTIAL: CPT

## 2024-04-29 PROCEDURE — 85014 HEMATOCRIT: CPT

## 2024-04-29 PROCEDURE — 0W9940Z DRAINAGE OF RIGHT PLEURAL CAVITY WITH DRAINAGE DEVICE, PERCUTANEOUS ENDOSCOPIC APPROACH: ICD-10-PCS | Performed by: STUDENT IN AN ORGANIZED HEALTH CARE EDUCATION/TRAINING PROGRAM

## 2024-04-29 PROCEDURE — 2500000003 HC RX 250 WO HCPCS: Performed by: ANESTHESIOLOGY

## 2024-04-29 PROCEDURE — 86923 COMPATIBILITY TEST ELECTRIC: CPT

## 2024-04-29 PROCEDURE — 87176 TISSUE HOMOGENIZATION CULTR: CPT

## 2024-04-29 PROCEDURE — 6370000000 HC RX 637 (ALT 250 FOR IP): Performed by: NURSE PRACTITIONER

## 2024-04-29 PROCEDURE — 3700000000 HC ANESTHESIA ATTENDED CARE: Performed by: STUDENT IN AN ORGANIZED HEALTH CARE EDUCATION/TRAINING PROGRAM

## 2024-04-29 PROCEDURE — 32551 INSERTION OF CHEST TUBE: CPT

## 2024-04-29 PROCEDURE — 3600000008 HC SURGERY OHS BASE: Performed by: STUDENT IN AN ORGANIZED HEALTH CARE EDUCATION/TRAINING PROGRAM

## 2024-04-29 PROCEDURE — 2140000000 HC CCU INTERMEDIATE R&B

## 2024-04-29 PROCEDURE — 87075 CULTR BACTERIA EXCEPT BLOOD: CPT

## 2024-04-29 PROCEDURE — 3700000001 HC ADD 15 MINUTES (ANESTHESIA): Performed by: STUDENT IN AN ORGANIZED HEALTH CARE EDUCATION/TRAINING PROGRAM

## 2024-04-29 PROCEDURE — 85027 COMPLETE CBC AUTOMATED: CPT

## 2024-04-29 PROCEDURE — 87205 SMEAR GRAM STAIN: CPT

## 2024-04-29 PROCEDURE — 3600000018 HC SURGERY OHS ADDTL 15MIN: Performed by: STUDENT IN AN ORGANIZED HEALTH CARE EDUCATION/TRAINING PROGRAM

## 2024-04-29 PROCEDURE — 6360000002 HC RX W HCPCS: Performed by: ANESTHESIOLOGY

## 2024-04-29 PROCEDURE — 85610 PROTHROMBIN TIME: CPT

## 2024-04-29 PROCEDURE — 94640 AIRWAY INHALATION TREATMENT: CPT

## 2024-04-29 PROCEDURE — 80048 BASIC METABOLIC PNL TOTAL CA: CPT

## 2024-04-29 PROCEDURE — 86850 RBC ANTIBODY SCREEN: CPT

## 2024-04-29 PROCEDURE — 85025 COMPLETE CBC W/AUTO DIFF WBC: CPT

## 2024-04-29 PROCEDURE — 6370000000 HC RX 637 (ALT 250 FOR IP): Performed by: STUDENT IN AN ORGANIZED HEALTH CARE EDUCATION/TRAINING PROGRAM

## 2024-04-29 PROCEDURE — 2709999900 HC NON-CHARGEABLE SUPPLY: Performed by: STUDENT IN AN ORGANIZED HEALTH CARE EDUCATION/TRAINING PROGRAM

## 2024-04-29 PROCEDURE — 99222 1ST HOSP IP/OBS MODERATE 55: CPT | Performed by: STUDENT IN AN ORGANIZED HEALTH CARE EDUCATION/TRAINING PROGRAM

## 2024-04-29 PROCEDURE — 2580000003 HC RX 258: Performed by: NURSE PRACTITIONER

## 2024-04-29 PROCEDURE — C1729 CATH, DRAINAGE: HCPCS | Performed by: STUDENT IN AN ORGANIZED HEALTH CARE EDUCATION/TRAINING PROGRAM

## 2024-04-29 PROCEDURE — 87116 MYCOBACTERIA CULTURE: CPT

## 2024-04-29 PROCEDURE — 86900 BLOOD TYPING SEROLOGIC ABO: CPT

## 2024-04-29 PROCEDURE — 76942 ECHO GUIDE FOR BIOPSY: CPT | Performed by: ANESTHESIOLOGY

## 2024-04-29 PROCEDURE — 7100000000 HC PACU RECOVERY - FIRST 15 MIN: Performed by: STUDENT IN AN ORGANIZED HEALTH CARE EDUCATION/TRAINING PROGRAM

## 2024-04-29 PROCEDURE — 3E0L4GC INTRODUCTION OF OTHER THERAPEUTIC SUBSTANCE INTO PLEURAL CAVITY, PERCUTANEOUS ENDOSCOPIC APPROACH: ICD-10-PCS | Performed by: STUDENT IN AN ORGANIZED HEALTH CARE EDUCATION/TRAINING PROGRAM

## 2024-04-29 PROCEDURE — 87015 SPECIMEN INFECT AGNT CONCNTJ: CPT

## 2024-04-29 PROCEDURE — 83735 ASSAY OF MAGNESIUM: CPT

## 2024-04-29 PROCEDURE — 32650 THORACOSCOPY W/PLEURODESIS: CPT | Performed by: STUDENT IN AN ORGANIZED HEALTH CARE EDUCATION/TRAINING PROGRAM

## 2024-04-29 PROCEDURE — 36415 COLL VENOUS BLD VENIPUNCTURE: CPT

## 2024-04-29 PROCEDURE — 87206 SMEAR FLUORESCENT/ACID STAI: CPT

## 2024-04-29 PROCEDURE — 85018 HEMOGLOBIN: CPT

## 2024-04-29 PROCEDURE — 6370000000 HC RX 637 (ALT 250 FOR IP): Performed by: ANESTHESIOLOGY

## 2024-04-29 PROCEDURE — 2580000003 HC RX 258

## 2024-04-29 PROCEDURE — 87102 FUNGUS ISOLATION CULTURE: CPT

## 2024-04-29 PROCEDURE — 7100000001 HC PACU RECOVERY - ADDTL 15 MIN: Performed by: STUDENT IN AN ORGANIZED HEALTH CARE EDUCATION/TRAINING PROGRAM

## 2024-04-29 PROCEDURE — 71045 X-RAY EXAM CHEST 1 VIEW: CPT

## 2024-04-29 PROCEDURE — 6360000002 HC RX W HCPCS

## 2024-04-29 PROCEDURE — 2500000003 HC RX 250 WO HCPCS: Performed by: STUDENT IN AN ORGANIZED HEALTH CARE EDUCATION/TRAINING PROGRAM

## 2024-04-29 RX ORDER — BISACODYL 5 MG/1
5 TABLET, DELAYED RELEASE ORAL DAILY PRN
Status: DISCONTINUED | OUTPATIENT
Start: 2024-04-29 | End: 2024-05-08 | Stop reason: HOSPADM

## 2024-04-29 RX ORDER — BUPIVACAINE HYDROCHLORIDE AND EPINEPHRINE 2.5; 5 MG/ML; UG/ML
INJECTION, SOLUTION EPIDURAL; INFILTRATION; INTRACAUDAL; PERINEURAL PRN
Status: DISCONTINUED | OUTPATIENT
Start: 2024-04-29 | End: 2024-04-29 | Stop reason: ALTCHOICE

## 2024-04-29 RX ORDER — SODIUM CHLORIDE 9 MG/ML
INJECTION, SOLUTION INTRAVENOUS PRN
Status: DISCONTINUED | OUTPATIENT
Start: 2024-04-29 | End: 2024-05-08 | Stop reason: HOSPADM

## 2024-04-29 RX ORDER — ONDANSETRON 2 MG/ML
INJECTION INTRAMUSCULAR; INTRAVENOUS PRN
Status: DISCONTINUED | OUTPATIENT
Start: 2024-04-29 | End: 2024-04-29 | Stop reason: SDUPTHER

## 2024-04-29 RX ORDER — MORPHINE SULFATE 2 MG/ML
2 INJECTION, SOLUTION INTRAMUSCULAR; INTRAVENOUS EVERY 4 HOURS PRN
Status: DISCONTINUED | OUTPATIENT
Start: 2024-04-29 | End: 2024-05-08 | Stop reason: HOSPADM

## 2024-04-29 RX ORDER — ONDANSETRON 4 MG/1
4 TABLET, ORALLY DISINTEGRATING ORAL EVERY 8 HOURS PRN
Status: DISCONTINUED | OUTPATIENT
Start: 2024-04-29 | End: 2024-05-08 | Stop reason: HOSPADM

## 2024-04-29 RX ORDER — MEPERIDINE HYDROCHLORIDE 25 MG/ML
12.5 INJECTION INTRAMUSCULAR; INTRAVENOUS; SUBCUTANEOUS
Status: DISCONTINUED | OUTPATIENT
Start: 2024-04-29 | End: 2024-04-29 | Stop reason: HOSPADM

## 2024-04-29 RX ORDER — HYDROMORPHONE HYDROCHLORIDE 1 MG/ML
0.25 INJECTION, SOLUTION INTRAMUSCULAR; INTRAVENOUS; SUBCUTANEOUS EVERY 5 MIN PRN
Status: DISCONTINUED | OUTPATIENT
Start: 2024-04-29 | End: 2024-04-29 | Stop reason: HOSPADM

## 2024-04-29 RX ORDER — DEXAMETHASONE SODIUM PHOSPHATE 10 MG/ML
INJECTION INTRAMUSCULAR; INTRAVENOUS PRN
Status: DISCONTINUED | OUTPATIENT
Start: 2024-04-29 | End: 2024-04-29 | Stop reason: SDUPTHER

## 2024-04-29 RX ORDER — ACETAMINOPHEN 325 MG/1
650 TABLET ORAL EVERY 4 HOURS PRN
Status: DISCONTINUED | OUTPATIENT
Start: 2024-05-01 | End: 2024-05-08 | Stop reason: HOSPADM

## 2024-04-29 RX ORDER — SENNA AND DOCUSATE SODIUM 50; 8.6 MG/1; MG/1
1 TABLET, FILM COATED ORAL 2 TIMES DAILY
Status: DISCONTINUED | OUTPATIENT
Start: 2024-04-29 | End: 2024-05-08 | Stop reason: HOSPADM

## 2024-04-29 RX ORDER — DEXAMETHASONE SODIUM PHOSPHATE 10 MG/ML
4 INJECTION, SOLUTION INTRAMUSCULAR; INTRAVENOUS ONCE
Status: DISCONTINUED | OUTPATIENT
Start: 2024-04-29 | End: 2024-04-29 | Stop reason: HOSPADM

## 2024-04-29 RX ORDER — ROPIVACAINE HYDROCHLORIDE 5 MG/ML
INJECTION, SOLUTION EPIDURAL; INFILTRATION; PERINEURAL
Status: COMPLETED | OUTPATIENT
Start: 2024-04-29 | End: 2024-04-29

## 2024-04-29 RX ORDER — CHLORHEXIDINE GLUCONATE ORAL RINSE 1.2 MG/ML
15 SOLUTION DENTAL ONCE
Status: COMPLETED | OUTPATIENT
Start: 2024-04-29 | End: 2024-04-29

## 2024-04-29 RX ORDER — LIDOCAINE HYDROCHLORIDE 20 MG/ML
INJECTION, SOLUTION INTRAVENOUS PRN
Status: DISCONTINUED | OUTPATIENT
Start: 2024-04-29 | End: 2024-04-29 | Stop reason: SDUPTHER

## 2024-04-29 RX ORDER — OXYCODONE HYDROCHLORIDE 10 MG/1
10 TABLET ORAL EVERY 4 HOURS PRN
Status: DISCONTINUED | OUTPATIENT
Start: 2024-04-29 | End: 2024-05-08 | Stop reason: HOSPADM

## 2024-04-29 RX ORDER — ETOMIDATE 2 MG/ML
INJECTION INTRAVENOUS PRN
Status: DISCONTINUED | OUTPATIENT
Start: 2024-04-29 | End: 2024-04-29 | Stop reason: SDUPTHER

## 2024-04-29 RX ORDER — SODIUM CHLORIDE 0.9 % (FLUSH) 0.9 %
5-40 SYRINGE (ML) INJECTION PRN
Status: DISCONTINUED | OUTPATIENT
Start: 2024-04-29 | End: 2024-04-29 | Stop reason: HOSPADM

## 2024-04-29 RX ORDER — ONDANSETRON 2 MG/ML
4 INJECTION INTRAMUSCULAR; INTRAVENOUS
Status: DISCONTINUED | OUTPATIENT
Start: 2024-04-29 | End: 2024-04-29 | Stop reason: HOSPADM

## 2024-04-29 RX ORDER — SODIUM CHLORIDE 9 MG/ML
INJECTION, SOLUTION INTRAVENOUS PRN
Status: DISCONTINUED | OUTPATIENT
Start: 2024-04-29 | End: 2024-04-29 | Stop reason: HOSPADM

## 2024-04-29 RX ORDER — ENOXAPARIN SODIUM 100 MG/ML
30 INJECTION SUBCUTANEOUS DAILY
Status: DISCONTINUED | OUTPATIENT
Start: 2024-04-29 | End: 2024-05-04 | Stop reason: ALTCHOICE

## 2024-04-29 RX ORDER — SODIUM CHLORIDE 0.9 % (FLUSH) 0.9 %
5-40 SYRINGE (ML) INJECTION EVERY 12 HOURS SCHEDULED
Status: DISCONTINUED | OUTPATIENT
Start: 2024-04-29 | End: 2024-04-29 | Stop reason: HOSPADM

## 2024-04-29 RX ORDER — ACETAMINOPHEN 500 MG
1000 TABLET ORAL ONCE
Status: COMPLETED | OUTPATIENT
Start: 2024-04-29 | End: 2024-04-29

## 2024-04-29 RX ORDER — ROCURONIUM BROMIDE 10 MG/ML
INJECTION, SOLUTION INTRAVENOUS PRN
Status: DISCONTINUED | OUTPATIENT
Start: 2024-04-29 | End: 2024-04-29 | Stop reason: SDUPTHER

## 2024-04-29 RX ORDER — MIDAZOLAM HYDROCHLORIDE 2 MG/2ML
1 INJECTION, SOLUTION INTRAMUSCULAR; INTRAVENOUS PRN
Status: DISCONTINUED | OUTPATIENT
Start: 2024-04-29 | End: 2024-04-29 | Stop reason: HOSPADM

## 2024-04-29 RX ORDER — SODIUM CHLORIDE 0.9 % (FLUSH) 0.9 %
5-40 SYRINGE (ML) INJECTION PRN
Status: DISCONTINUED | OUTPATIENT
Start: 2024-04-29 | End: 2024-05-08 | Stop reason: HOSPADM

## 2024-04-29 RX ORDER — IPRATROPIUM BROMIDE AND ALBUTEROL SULFATE 2.5; .5 MG/3ML; MG/3ML
1 SOLUTION RESPIRATORY (INHALATION)
Status: DISCONTINUED | OUTPATIENT
Start: 2024-04-29 | End: 2024-05-08 | Stop reason: HOSPADM

## 2024-04-29 RX ORDER — DEXAMETHASONE SODIUM PHOSPHATE 4 MG/ML
INJECTION, SOLUTION INTRA-ARTICULAR; INTRALESIONAL; INTRAMUSCULAR; INTRAVENOUS; SOFT TISSUE
Status: COMPLETED | OUTPATIENT
Start: 2024-04-29 | End: 2024-04-29

## 2024-04-29 RX ORDER — FENTANYL CITRATE 50 UG/ML
INJECTION, SOLUTION INTRAMUSCULAR; INTRAVENOUS PRN
Status: DISCONTINUED | OUTPATIENT
Start: 2024-04-29 | End: 2024-04-29 | Stop reason: SDUPTHER

## 2024-04-29 RX ORDER — SODIUM CHLORIDE 0.9 % (FLUSH) 0.9 %
5-40 SYRINGE (ML) INJECTION EVERY 12 HOURS SCHEDULED
Status: DISCONTINUED | OUTPATIENT
Start: 2024-04-29 | End: 2024-05-08 | Stop reason: HOSPADM

## 2024-04-29 RX ORDER — ROPIVACAINE HYDROCHLORIDE 5 MG/ML
30 INJECTION, SOLUTION EPIDURAL; INFILTRATION; PERINEURAL ONCE
Status: DISCONTINUED | OUTPATIENT
Start: 2024-04-29 | End: 2024-04-29 | Stop reason: HOSPADM

## 2024-04-29 RX ORDER — ACETAMINOPHEN 325 MG/1
650 TABLET ORAL EVERY 6 HOURS
Status: COMPLETED | OUTPATIENT
Start: 2024-04-29 | End: 2024-05-01

## 2024-04-29 RX ORDER — HYDROMORPHONE HYDROCHLORIDE 1 MG/ML
0.5 INJECTION, SOLUTION INTRAMUSCULAR; INTRAVENOUS; SUBCUTANEOUS EVERY 5 MIN PRN
Status: DISCONTINUED | OUTPATIENT
Start: 2024-04-29 | End: 2024-04-29 | Stop reason: HOSPADM

## 2024-04-29 RX ORDER — ONDANSETRON 2 MG/ML
4 INJECTION INTRAMUSCULAR; INTRAVENOUS EVERY 6 HOURS PRN
Status: DISCONTINUED | OUTPATIENT
Start: 2024-04-29 | End: 2024-05-08 | Stop reason: HOSPADM

## 2024-04-29 RX ORDER — OXYCODONE HYDROCHLORIDE 5 MG/1
5 TABLET ORAL EVERY 4 HOURS PRN
Status: DISCONTINUED | OUTPATIENT
Start: 2024-04-29 | End: 2024-05-08 | Stop reason: HOSPADM

## 2024-04-29 RX ORDER — CEFAZOLIN SODIUM 1 G/3ML
INJECTION, POWDER, FOR SOLUTION INTRAMUSCULAR; INTRAVENOUS PRN
Status: DISCONTINUED | OUTPATIENT
Start: 2024-04-29 | End: 2024-04-29 | Stop reason: SDUPTHER

## 2024-04-29 RX ORDER — PROCHLORPERAZINE EDISYLATE 5 MG/ML
10 INJECTION INTRAMUSCULAR; INTRAVENOUS EVERY 6 HOURS PRN
Status: DISCONTINUED | OUTPATIENT
Start: 2024-04-29 | End: 2024-05-08 | Stop reason: HOSPADM

## 2024-04-29 RX ORDER — NALOXONE HYDROCHLORIDE 0.4 MG/ML
INJECTION, SOLUTION INTRAMUSCULAR; INTRAVENOUS; SUBCUTANEOUS PRN
Status: DISCONTINUED | OUTPATIENT
Start: 2024-04-29 | End: 2024-04-29 | Stop reason: HOSPADM

## 2024-04-29 RX ADMIN — HYDROMORPHONE HYDROCHLORIDE 0.5 MG: 1 INJECTION, SOLUTION INTRAMUSCULAR; INTRAVENOUS; SUBCUTANEOUS at 14:54

## 2024-04-29 RX ADMIN — POTASSIUM CHLORIDE 20 MEQ: 1500 TABLET, EXTENDED RELEASE ORAL at 22:09

## 2024-04-29 RX ADMIN — POTASSIUM CHLORIDE 20 MEQ: 1500 TABLET, EXTENDED RELEASE ORAL at 08:32

## 2024-04-29 RX ADMIN — SODIUM CHLORIDE: 9 INJECTION, SOLUTION INTRAVENOUS at 13:10

## 2024-04-29 RX ADMIN — HYDROMORPHONE HYDROCHLORIDE 0.5 MG: 1 INJECTION, SOLUTION INTRAMUSCULAR; INTRAVENOUS; SUBCUTANEOUS at 15:28

## 2024-04-29 RX ADMIN — SACUBITRIL AND VALSARTAN 0.5 TABLET: 24; 26 TABLET, FILM COATED ORAL at 22:09

## 2024-04-29 RX ADMIN — SODIUM CHLORIDE, PRESERVATIVE FREE 10 ML: 5 INJECTION INTRAVENOUS at 17:39

## 2024-04-29 RX ADMIN — ACETAMINOPHEN 650 MG: 325 TABLET ORAL at 22:11

## 2024-04-29 RX ADMIN — PHENYLEPHRINE HYDROCHLORIDE 100 MCG: 10 INJECTION INTRAVENOUS at 13:40

## 2024-04-29 RX ADMIN — ROPIVACAINE HYDROCHLORIDE 30 ML: 5 INJECTION EPIDURAL; INFILTRATION; PERINEURAL at 13:08

## 2024-04-29 RX ADMIN — ALLOPURINOL 100 MG: 100 TABLET ORAL at 08:32

## 2024-04-29 RX ADMIN — ATORVASTATIN CALCIUM 40 MG: 40 TABLET, FILM COATED ORAL at 22:09

## 2024-04-29 RX ADMIN — ENOXAPARIN SODIUM 30 MG: 100 INJECTION SUBCUTANEOUS at 18:27

## 2024-04-29 RX ADMIN — SODIUM CHLORIDE, PRESERVATIVE FREE 10 ML: 5 INJECTION INTRAVENOUS at 22:09

## 2024-04-29 RX ADMIN — ACETAMINOPHEN 1000 MG: 500 TABLET ORAL at 11:06

## 2024-04-29 RX ADMIN — 0.12% CHLORHEXIDINE GLUCONATE 15 ML: 1.2 RINSE ORAL at 10:03

## 2024-04-29 RX ADMIN — OXYCODONE HYDROCHLORIDE 10 MG: 10 TABLET ORAL at 18:39

## 2024-04-29 RX ADMIN — DEXAMETHASONE SODIUM PHOSPHATE 10 MG: 10 INJECTION INTRAMUSCULAR; INTRAVENOUS at 13:32

## 2024-04-29 RX ADMIN — MIDODRINE HYDROCHLORIDE 5 MG: 5 TABLET ORAL at 17:38

## 2024-04-29 RX ADMIN — TOBRAMYCIN AND DEXAMETHASONE 1 DROP: 3; 1 SUSPENSION/ DROPS OPHTHALMIC at 17:40

## 2024-04-29 RX ADMIN — IPRATROPIUM BROMIDE AND ALBUTEROL SULFATE 1 DOSE: .5; 2.5 SOLUTION RESPIRATORY (INHALATION) at 20:47

## 2024-04-29 RX ADMIN — ETOMIDATE INJECTION 10 MG: 2 SOLUTION INTRAVENOUS at 13:12

## 2024-04-29 RX ADMIN — SODIUM CHLORIDE, PRESERVATIVE FREE 10 ML: 5 INJECTION INTRAVENOUS at 08:32

## 2024-04-29 RX ADMIN — MORPHINE SULFATE 2 MG: 2 INJECTION, SOLUTION INTRAMUSCULAR; INTRAVENOUS at 22:01

## 2024-04-29 RX ADMIN — ROCURONIUM BROMIDE 50 MG: 10 INJECTION INTRAVENOUS at 13:12

## 2024-04-29 RX ADMIN — TOBRAMYCIN AND DEXAMETHASONE 1 DROP: 3; 1 SUSPENSION/ DROPS OPHTHALMIC at 08:32

## 2024-04-29 RX ADMIN — DEXAMETHASONE SODIUM PHOSPHATE 4 MG: 4 INJECTION, SOLUTION INTRAMUSCULAR; INTRAVENOUS at 13:08

## 2024-04-29 RX ADMIN — PHENYLEPHRINE HYDROCHLORIDE 200 MCG: 10 INJECTION INTRAVENOUS at 13:20

## 2024-04-29 RX ADMIN — FENTANYL CITRATE 50 MCG: 50 INJECTION, SOLUTION INTRAMUSCULAR; INTRAVENOUS at 13:12

## 2024-04-29 RX ADMIN — SENNOSIDES AND DOCUSATE SODIUM 1 TABLET: 50; 8.6 TABLET ORAL at 22:10

## 2024-04-29 RX ADMIN — ONDANSETRON 4 MG: 2 INJECTION INTRAMUSCULAR; INTRAVENOUS at 14:11

## 2024-04-29 RX ADMIN — MUPIROCIN: 20 OINTMENT TOPICAL at 22:10

## 2024-04-29 RX ADMIN — TOBRAMYCIN AND DEXAMETHASONE 1 DROP: 3; 1 SUSPENSION/ DROPS OPHTHALMIC at 22:19

## 2024-04-29 RX ADMIN — LEVOTHYROXINE SODIUM 100 MCG: 0.1 TABLET ORAL at 05:31

## 2024-04-29 RX ADMIN — HYDROMORPHONE HYDROCHLORIDE 0.5 MG: 1 INJECTION, SOLUTION INTRAMUSCULAR; INTRAVENOUS; SUBCUTANEOUS at 15:03

## 2024-04-29 RX ADMIN — WATER 2000 MG: 1 INJECTION INTRAMUSCULAR; INTRAVENOUS; SUBCUTANEOUS at 22:02

## 2024-04-29 RX ADMIN — FERROUS SULFATE TAB 325 MG (65 MG ELEMENTAL FE) 325 MG: 325 (65 FE) TAB at 08:31

## 2024-04-29 RX ADMIN — CEFAZOLIN 2 G: 1 INJECTION, POWDER, FOR SOLUTION INTRAMUSCULAR; INTRAVENOUS at 13:30

## 2024-04-29 RX ADMIN — MIDODRINE HYDROCHLORIDE 5 MG: 5 TABLET ORAL at 08:32

## 2024-04-29 RX ADMIN — PHENYLEPHRINE HYDROCHLORIDE 100 MCG: 10 INJECTION INTRAVENOUS at 13:59

## 2024-04-29 RX ADMIN — LIDOCAINE HYDROCHLORIDE 100 MG: 20 INJECTION, SOLUTION INTRAVENOUS at 13:12

## 2024-04-29 RX ADMIN — FERROUS SULFATE TAB 325 MG (65 MG ELEMENTAL FE) 325 MG: 325 (65 FE) TAB at 22:09

## 2024-04-29 RX ADMIN — PANTOPRAZOLE SODIUM 40 MG: 40 TABLET, DELAYED RELEASE ORAL at 05:31

## 2024-04-29 RX ADMIN — SUGAMMADEX 100 MG: 100 INJECTION, SOLUTION INTRAVENOUS at 14:36

## 2024-04-29 RX ADMIN — ACETAMINOPHEN 650 MG: 325 TABLET ORAL at 17:38

## 2024-04-29 ASSESSMENT — PAIN DESCRIPTION - ORIENTATION
ORIENTATION: RIGHT

## 2024-04-29 ASSESSMENT — PAIN DESCRIPTION - LOCATION
LOCATION: RIB CAGE
LOCATION: RIB CAGE;INCISION
LOCATION: RIB CAGE
LOCATION: CHEST
LOCATION: RIB CAGE

## 2024-04-29 ASSESSMENT — PAIN DESCRIPTION - DESCRIPTORS
DESCRIPTORS: DISCOMFORT;ACHING;SORE
DESCRIPTORS: DISCOMFORT;ACHING;SORE
DESCRIPTORS: SORE;DISCOMFORT;TENDER
DESCRIPTORS: SHARP
DESCRIPTORS: TENDER;DISCOMFORT;ACHING
DESCRIPTORS: SHARP
DESCRIPTORS: SHARP;TENDER
DESCRIPTORS: ACHING;DISCOMFORT;SORE

## 2024-04-29 ASSESSMENT — PAIN DESCRIPTION - PAIN TYPE
TYPE: SURGICAL PAIN

## 2024-04-29 ASSESSMENT — PAIN - FUNCTIONAL ASSESSMENT: PAIN_FUNCTIONAL_ASSESSMENT: ADULT NONVERBAL PAIN SCALE (NPVS)

## 2024-04-29 ASSESSMENT — PAIN SCALES - GENERAL
PAINLEVEL_OUTOF10: 9
PAINLEVEL_OUTOF10: 6
PAINLEVEL_OUTOF10: 10
PAINLEVEL_OUTOF10: 10
PAINLEVEL_OUTOF10: 9
PAINLEVEL_OUTOF10: 10
PAINLEVEL_OUTOF10: 9
PAINLEVEL_OUTOF10: 10

## 2024-04-29 ASSESSMENT — ENCOUNTER SYMPTOMS: SHORTNESS OF BREATH: 1

## 2024-04-29 ASSESSMENT — PAIN DESCRIPTION - FREQUENCY
FREQUENCY: CONTINUOUS
FREQUENCY: CONTINUOUS

## 2024-04-29 ASSESSMENT — PAIN DESCRIPTION - ONSET: ONSET: AWAKENED FROM SLEEP

## 2024-04-29 NOTE — BRIEF OP NOTE
Brief Postoperative Note    Katt SARAH BETH Diaz  YOB: 1945  [id number]    Pre-operative Diagnosis: loculated pleural effusion    Post-operative Diagnosis: Same    Operation: R VATS, talc pleurodesis, PleurX catheter placement    Anesthesia: General    Surgeon: Louisa    Assistants: Matt    Estimated Blood Loss: less than 50     Complications: none apparent    Implants: PleurX catheter

## 2024-04-29 NOTE — DISCHARGE SUMMARY
Internal Medicine Discharge Note         Primary Care Physician: Luis Angel Rea DO   Admitting Physician:  Dre Hubbard DO  Admission date and time: 4/11/2024 11:35 PM    Room:  [address] [place] [address]  Admitting diagnosis: Pleural effusion [J90]  Chest pain, unspecified type [R07.9]  Acute pulmonary embolism without acute cor pulmonale, unspecified pulmonary embolism type (HCC) [I26.99]  Multiple subsegmental pulmonary emboli without acute cor pulmonale (HCC) [I26.94]    Patient Name: Katt Diaz  MRN: [id number]    Date of Service: 4/29/2024     Subjective:  Katt is a 78 y.o. female who was seen and examined today,4/17/2024, at the bedside.  Her volume status has improved.  She has multiple questions regarding her cardiac disease process we have answered some of them to her satisfaction at bedside but she has other significant questions that she believes can only be answered well by the cardiologist and we have deferred these to their service.  No new symptoms or concerns. No family present during my examination.    4/17/2024  Patient seen examined on telemetry floor.  Patient still complains of weakness and shortness of breath.  Patient feels weak and does not feel comfortable going home.  Patient is requesting assisted living.  BUN/creatinine 25/1.2 with normal liver enzymes.  WBC is 5.7 from 8.3.  Temperature 97.8 with heart rate 75 blood pressure 111/72.  O2 sat 99% on 2 L.  Patient still complains shortness of breath with any activity.  Cardiology note reviewed.  CT of the chest showed persistent moderate-large right pleural effusion.  Will consult pulmonology for their input.    4/18/2024  Patient seen examined on telemetry floor.  Patient still complains of some shortness of breath and chest pain.  Patient symptoms always moderately improved from admission.  Patient has no lower leg edema.  Pulmonology was consulted and recommended Pleurx catheter.  BUN/creatinine 27/1.1 with mild elevation in AST at 37.   05:13 AM    MONOPCT 8 04/29/2024 05:13 AM    BASOPCT 0 04/29/2024 05:13 AM    MONOSABS 0.51 04/29/2024 05:13 AM    LYMPHSABS 1.06 04/29/2024 05:13 AM    EOSABS 0.08 04/29/2024 05:13 AM    BASOSABS 0.02 04/29/2024 05:13 AM     BMP:    Lab Results   Component Value Date/Time     04/29/2024 05:13 AM    K 3.8 04/29/2024 05:13 AM     04/29/2024 05:13 AM    CO2 20 04/29/2024 05:13 AM    BUN 39 04/29/2024 05:13 AM    CREATININE 1.1 04/29/2024 05:13 AM    CALCIUM 7.3 04/29/2024 05:13 AM    LABGLOM 52 04/29/2024 05:13 AM    GLUCOSE 90 04/29/2024 05:13 AM       Wound Documentation:   Puncture 02/07/24 Thigh (Active)   Number of days: 68       Wound 04/12/24 Back Right (Active)   Dressing Status Clean;Dry;Intact 04/15/24 0815   Dressing/Treatment Gauze dressing/dressing sponge 04/15/24 0815   Drainage Amount None (dry) 04/15/24 0815   Number of days: 2       Assessment:  Acute respiratory failure with hypoxia, multifactorial  Acute left lower lobe pulmonary embolism with plan for Eliquis starter pack upon discharge  Acutely decompensated systolic congestive heart failure, LVEF 30 to 35%, with associated moderate to large right-sided pleural effusion  Coronary artery disease with status post coronary bypass grafting on February 7, 2024, consistent with coronary bypass grafting x3 with LIMA to the LAD, saphenous vein graft to obtuse marginal, saphenous vein graft to right RCA.   Acute exacerbation of chronic systolic congestive heart failure with impaired left ventricular ejection fraction of 30% to 35%.  Normochromic normocytic anemia.  Valvular heart disease with mild mitral regurgitation.  Chronically elevated troponin.  Hyperlipidemia, on statin agent.  Low blood pressure on midodrine.  Type 2 diabetes mellitus with associated peripheral neuropathy.  Primary hypothyroidism on hormone replacement therapy.  Rheumatoid arthritis with Sjogren's syndrome.  Chronic kidney disease with stage IIIB.  Anemia of chronic

## 2024-04-29 NOTE — ANESTHESIA PROCEDURE NOTES
Arterial Line:    An arterial line was placed using ultrasound guidance, in the pre-op for the following indication(s): continuous blood pressure monitoring.    A 22 gauge (size), 4.45 cm (length), Angiocath (type) catheter was placed, Seldinger technique used, into the left radial artery, secured by Tegaderm.  Anesthesia type: Local  Local infiltration: Injection    Events:  EBL < 5mL.4/29/2024 12:45 PM4/29/2024 12:48 PM  Anesthesiologist: Gorge Mcmahon DO  Other anesthesia staff: Dale Cuevas RN  Performed: Other anesthesia staff   Preanesthetic Checklist  Completed: patient identified, IV checked, site marked, risks and benefits discussed, surgical/procedural consents, equipment checked, pre-op evaluation, timeout performed, anesthesia consent given, oxygen available, monitors applied/VS acknowledged, fire risk safety assessment completed and verbalized and blood product R/B/A discussed and consented

## 2024-04-29 NOTE — ANESTHESIA PROCEDURE NOTES
Peripheral Block    Patient location during procedure: pre-op  Reason for block: post-op pain management and at surgeon's request  Start time: 4/29/2024 1:08 PM  End time: 4/29/2024 1:10 PM  Staffing  Performed: anesthesiologist   Anesthesiologist: Gorge Mcmahon DO  Performed by: Gorge Mcmahon DO  Authorized by: Gorge Mcmahon DO    Preanesthetic Checklist  Completed: patient identified, IV checked, site marked, risks and benefits discussed, surgical/procedural consents, equipment checked, pre-op evaluation, timeout performed, anesthesia consent given, oxygen available and monitors applied/VS acknowledged  Peripheral Block   Patient position: prone  Prep: ChloraPrep  Provider prep: mask  Patient monitoring: cardiac monitor, continuous pulse ox, IV access and frequent blood pressure checks  Block type: Erector spinae  Laterality: right  Injection technique: single-shot  Guidance: ultrasound guided  Local infiltration: lidocaine  Infiltration strength: 2 %  Local infiltration: lidocaine  Dose: 5 mL    Needle   Needle type: combined needle/nerve stimulator   Needle gauge: 22 G  Needle localization: ultrasound guidance  Needle length: 8 cm  Assessment   Injection assessment: negative aspiration for heme, no paresthesia on injection and local visualized surrounding nerve on ultrasound  Slow fractionated injection: yes  Hemodynamics: stable    Additional Notes  Time out performed.   Well tolerated  Medications Administered  dexAMETHasone (DECADRON) injection 4 mg/mL - Perineural   4 mg - 4/29/2024 1:08:00 PM  ropivacaine (NAROPIN) injection 0.5% - Perineural   30 mL - 4/29/2024 1:08:00 PM

## 2024-04-29 NOTE — PLAN OF CARE
Problem: Chronic Conditions and Co-morbidities  Goal: Patient's chronic conditions and co-morbidity symptoms are monitored and maintained or improved  Outcome: Progressing     Problem: Discharge Planning  Goal: Discharge to home or other facility with appropriate resources  Recent Flowsheet Documentation  Taken 4/28/2024 2023 by Hammad Phipps RN  Discharge to home or other facility with appropriate resources:   Identify barriers to discharge with patient and caregiver   Identify discharge learning needs (meds, wound care, etc)     Problem: Cardiovascular - Adult  Goal: Maintains optimal cardiac output and hemodynamic stability  Outcome: Progressing

## 2024-04-29 NOTE — ANESTHESIA PRE PROCEDURE
Department of Anesthesiology  Preprocedure Note       Name:  Katt Diaz   Age:  78 y.o.  :  1945                                          MRN:  [id number]         Date:  2024      Surgeon: Surgeon(s):  Nicolasa Jasso DO    Procedure: Procedure(s):  Right Video Assisted Thoracoscopic Surgery, Pleurodesis, Possible PleurX    Medications prior to admission:   Prior to Admission medications    Medication Sig Start Date End Date Taking? Authorizing Provider   lidocaine 4 % external patch Place 1 patch onto the skin daily 24   Dre Hubbard DO   allopurinol (ZYLOPRIM) 100 MG tablet Take 1 tablet by mouth daily 24   Dre Hubbard DO   apixaban (ELIQUIS) 5 MG TABS tablet Take 1 tablet by mouth 2 times daily 24   Dre Hubbard DO   empagliflozin (JARDIANCE) 10 MG tablet Take 1 tablet by mouth daily 24   Dre Hubbard DO   sacubitril-valsartan (ENTRESTO) 24-26 MG per tablet Take 0.5 tablets by mouth 2 times daily 24   Dre Hubbard DO   torsemide (DEMADEX) 20 MG tablet Take 1 tablet by mouth 2 times daily at 0800 and 1400 24   Dre Hubbard DO   pantoprazole (PROTONIX) 40 MG tablet Take 1 tablet by mouth every morning (before breakfast) 24   Dre Hubbard DO   midodrine (PROAMATINE) 5 MG tablet Take 1 tablet by mouth 3 times daily (with meals) 24   Dre Hubbard DO   clopidogrel (PLAVIX) 75 MG tablet Take 1 tablet by mouth daily    Vesna Mansfield MD   benzonatate (TESSALON) 100 MG capsule Take 1 capsule by mouth 3 times daily as needed for Cough    Vesna Mansfield MD   magnesium oxide (MAG-OX) 400 (240 Mg) MG tablet Take 1 tablet by mouth 2 times daily for 14 days 24  Dre Hubbard DO   ferrous sulfate (IRON 325) 325 (65 Fe) MG tablet Take 1 tablet by mouth 2 times daily    Vesna Mansfield MD   neomycin-polymyxin-dexameth 3.5-78166-6.1 OINT Place 1 each into both eyes nightly    Provider,

## 2024-04-29 NOTE — CARE COORDINATION
4/29:  Transition of care:  Pt was a transfer from [facility] for CTS - management of loculated pleural effusion from [facility].  Pt is on 2L/NC at 94%, Ancef on call to OR & Po Eliquis - new medication.  Pt is for a VATS today.  CM attempted to see the pt bedside but she is off the floor.  Cm left message for son Luis Angel.  Per previous CM's notes:  Pt's dc plan is to return to [facility] with [facility] -orders placed - family to transport.  Pt will need 2 pleurex kits for when pt returns home.  Will need 02 testing -If needed pt choice [facility].  Pt requested ww & order placed.  Pt request home delivered meals & referral faxed to [employer].  Will need to make sure pt receives 30 day free Eliquis & monthly refill is $35.00. Please see previous CM's note for cost if needed SNF or AL.  Sw/CM will continue to follow.  Electronically signed by Tena Wise RN on 4/29/2024 at 1:31 PM    Case Management Assessment  Initial Evaluation    Date/Time of Evaluation: 4/29/2024 1:33 PM  Assessment Completed by: Tena Wise RN    If patient is discharged prior to next notation, then this note serves as note for discharge by case management.    Patient Name: Katt Diaz                   YOB: 1945  Diagnosis: Loculated pleural effusion [J90]                   Date / Time: 4/28/2024  7:00 PM    Patient Admission Status: Inpatient   Readmission Risk (Low < 19, Mod (19-27), High > 27): Readmission Risk Score: 32.1    Current PCP: Luis Angel Rea, DO  PCP verified by ? Yes    Chart Reviewed: Yes      History Provided by: Medical Record  Patient Orientation: Alert and Oriented, Person, Place, Situation    Patient Cognition: Alert    Hospitalization in the last 30 days (Readmission):  Yes    If yes, Readmission Assessment in  Navigator will be completed.    Advance Directives:      Code Status: Full Code   Patient's Primary Decision Maker is: Legal Next of

## 2024-04-29 NOTE — OP NOTE
inferiorly and posteriorly along the diaphragm. The right lung was allowed to inflate with gentle Valsalva. There were no obvious air leaks identified with lung expansion. The lung expanded well to fill the chest cavity with good approximation to the chest wall. The chest tube was secured in place. The Alexandru retractor was removed and the access incision was closed in multiple layers. At the conclusion of the procedure. All needle, sponge and instrument counts were correct. She was extubated and transferred to the PACU in stable condition.      Electronically signed by Nicolasa Jasso DO on 4/29/2024 at 3:10 PM

## 2024-04-29 NOTE — H&P
Hospital Medicine History & Physical      PCP: Luis Angel Rea DO    Date of Admission: 4/28/2024    Date of Service: .APRIL 29, 2024    Chief Complaint:   RIGHT PLEURAL EFFUSION      History Of Present Illness:     78 y.o. female presented with  RIGHT PLEURAL EFFUSION. SHE WAS TRANSFERRED FROM [facility] , AFTER HAVING A RIGHT CHEST TUBE INSERTED AND  NOT IMPROVING.  SHE WAS SENT HERE TO BE SEEN BY CTS FOR POSSIBLE VATS AND DECORTICATION.   SHE WAS SEEN IN PACU , RIGHT AFTER SURGERY, UNABLE TO GIVE A HISTORY     Past Medical History:          Diagnosis Date    VERONIQUE (acute kidney injury) (HCC) 02/13/2024    CAD in native artery 02/05/2024    Headache     Hearing loss     Hx of rheumatoid arthritis     Migraine     Osteopenia     S/P CABG x 3     Sjogren's disease (HCC)        Past Surgical History:          Procedure Laterality Date    CARDIAC PROCEDURE N/A 2/5/2024    Left heart cath / coronary angiography performed by Arnav Shaw MD at [facility] CARDIAC CATH LAB    CORONARY ARTERY BYPASS GRAFT N/A 2/7/2024    CORONARY ARTERY BYPASS GRAFTING, EVH performed by Nicolasa Jasso DO at [facility] OR    IR CHEST TUBE INSERTION  4/23/2024    IR CHEST TUBE INSERTION 4/23/2024 [facility] CARDIAC CATH/IR LAB    IR INSERT TUNNELED PLEURA CATH W CUFF  4/19/2024    IR INSERT TUNNELED PLEURA CATH W CUFF 4/19/2024 [facility] CARDIAC CATH/IR LAB    IR TUNNELED CATHETER PLACEMENT GREATER THAN 5 YEARS  2/13/2024    IR TUNNELED CATHETER PLACEMENT GREATER THAN 5 YEARS 2/13/2024 CuongREYNA MD [facility] SPECIAL PROCEDURES    PARTIAL HYSTERECTOMY (CERVIX NOT REMOVED)      states 1976    VEIN LIGATION AND STRIPPING      states 1972       Medications Prior to Admission:      Prior to Admission medications    Medication Sig Start Date End Date Taking? Authorizing Provider   lidocaine 4 % external patch Place 1 patch onto the skin daily 4/29/24

## 2024-04-30 ENCOUNTER — APPOINTMENT (OUTPATIENT)
Dept: GENERAL RADIOLOGY | Age: 79
DRG: 166 | End: 2024-04-30
Attending: INTERNAL MEDICINE
Payer: MEDICARE

## 2024-04-30 PROBLEM — E43 SEVERE PROTEIN-CALORIE MALNUTRITION (HCC): Status: ACTIVE | Noted: 2024-02-21

## 2024-04-30 LAB
ANION GAP SERPL CALCULATED.3IONS-SCNC: 12 MMOL/L (ref 7–16)
BUN SERPL-MCNC: 29 MG/DL (ref 6–23)
CALCIUM SERPL-MCNC: 7.8 MG/DL (ref 8.6–10.2)
CHLORIDE SERPL-SCNC: 108 MMOL/L (ref 98–107)
CO2 SERPL-SCNC: 18 MMOL/L (ref 22–29)
CREAT SERPL-MCNC: 1.1 MG/DL (ref 0.5–1)
ERYTHROCYTE [DISTWIDTH] IN BLOOD BY AUTOMATED COUNT: 18.3 % (ref 11.5–15)
GFR SERPL CREATININE-BSD FRML MDRD: 51 ML/MIN/1.73M2
GLUCOSE SERPL-MCNC: 124 MG/DL (ref 74–99)
HCT VFR BLD AUTO: 27.6 % (ref 34–48)
HGB BLD-MCNC: 8.2 G/DL (ref 11.5–15.5)
MCH RBC QN AUTO: 28.4 PG (ref 26–35)
MCHC RBC AUTO-ENTMCNC: 29.7 G/DL (ref 32–34.5)
MCV RBC AUTO: 95.5 FL (ref 80–99.9)
PLATELET # BLD AUTO: 518 K/UL (ref 130–450)
PMV BLD AUTO: 10 FL (ref 7–12)
POTASSIUM SERPL-SCNC: 5.4 MMOL/L (ref 3.5–5)
POTASSIUM SERPL-SCNC: 5.5 MMOL/L (ref 3.5–5)
RBC # BLD AUTO: 2.89 M/UL (ref 3.5–5.5)
SODIUM SERPL-SCNC: 138 MMOL/L (ref 132–146)
WBC OTHER # BLD: 8.6 K/UL (ref 4.5–11.5)

## 2024-04-30 PROCEDURE — 97530 THERAPEUTIC ACTIVITIES: CPT

## 2024-04-30 PROCEDURE — 94640 AIRWAY INHALATION TREATMENT: CPT

## 2024-04-30 PROCEDURE — 6360000002 HC RX W HCPCS

## 2024-04-30 PROCEDURE — 2580000003 HC RX 258

## 2024-04-30 PROCEDURE — 6370000000 HC RX 637 (ALT 250 FOR IP)

## 2024-04-30 PROCEDURE — 97161 PT EVAL LOW COMPLEX 20 MIN: CPT

## 2024-04-30 PROCEDURE — 2140000000 HC CCU INTERMEDIATE R&B

## 2024-04-30 PROCEDURE — 71045 X-RAY EXAM CHEST 1 VIEW: CPT

## 2024-04-30 PROCEDURE — 6370000000 HC RX 637 (ALT 250 FOR IP): Performed by: INTERNAL MEDICINE

## 2024-04-30 PROCEDURE — 84132 ASSAY OF SERUM POTASSIUM: CPT

## 2024-04-30 PROCEDURE — 36415 COLL VENOUS BLD VENIPUNCTURE: CPT

## 2024-04-30 PROCEDURE — 80048 BASIC METABOLIC PNL TOTAL CA: CPT

## 2024-04-30 PROCEDURE — 85027 COMPLETE CBC AUTOMATED: CPT

## 2024-04-30 PROCEDURE — 97165 OT EVAL LOW COMPLEX 30 MIN: CPT

## 2024-04-30 RX ORDER — PETROLATUM 42 G/100G
OINTMENT TOPICAL 2 TIMES DAILY
Status: DISCONTINUED | OUTPATIENT
Start: 2024-05-01 | End: 2024-05-08 | Stop reason: HOSPADM

## 2024-04-30 RX ORDER — HYDROXYZINE PAMOATE 25 MG/1
25 CAPSULE ORAL 3 TIMES DAILY PRN
Status: DISCONTINUED | OUTPATIENT
Start: 2024-04-30 | End: 2024-05-08 | Stop reason: HOSPADM

## 2024-04-30 RX ADMIN — SACUBITRIL AND VALSARTAN 0.5 TABLET: 24; 26 TABLET, FILM COATED ORAL at 08:44

## 2024-04-30 RX ADMIN — TOBRAMYCIN AND DEXAMETHASONE 1 DROP: 3; 1 SUSPENSION/ DROPS OPHTHALMIC at 13:32

## 2024-04-30 RX ADMIN — PANTOPRAZOLE SODIUM 40 MG: 40 TABLET, DELAYED RELEASE ORAL at 05:00

## 2024-04-30 RX ADMIN — FERROUS SULFATE TAB 325 MG (65 MG ELEMENTAL FE) 325 MG: 325 (65 FE) TAB at 08:44

## 2024-04-30 RX ADMIN — ATORVASTATIN CALCIUM 40 MG: 40 TABLET, FILM COATED ORAL at 21:48

## 2024-04-30 RX ADMIN — FERROUS SULFATE TAB 325 MG (65 MG ELEMENTAL FE) 325 MG: 325 (65 FE) TAB at 21:49

## 2024-04-30 RX ADMIN — SODIUM ZIRCONIUM CYCLOSILICATE 5 G: 5 POWDER, FOR SUSPENSION ORAL at 11:57

## 2024-04-30 RX ADMIN — ACETAMINOPHEN 650 MG: 325 TABLET ORAL at 11:58

## 2024-04-30 RX ADMIN — ACETAMINOPHEN 650 MG: 325 TABLET ORAL at 16:57

## 2024-04-30 RX ADMIN — SODIUM ZIRCONIUM CYCLOSILICATE 5 G: 5 POWDER, FOR SUSPENSION ORAL at 21:49

## 2024-04-30 RX ADMIN — SENNOSIDES AND DOCUSATE SODIUM 1 TABLET: 50; 8.6 TABLET ORAL at 21:48

## 2024-04-30 RX ADMIN — MUPIROCIN: 20 OINTMENT TOPICAL at 21:56

## 2024-04-30 RX ADMIN — ALLOPURINOL 100 MG: 100 TABLET ORAL at 08:45

## 2024-04-30 RX ADMIN — TOBRAMYCIN AND DEXAMETHASONE 1 DROP: 3; 1 SUSPENSION/ DROPS OPHTHALMIC at 08:46

## 2024-04-30 RX ADMIN — OXYCODONE 5 MG: 5 TABLET ORAL at 14:47

## 2024-04-30 RX ADMIN — TOBRAMYCIN AND DEXAMETHASONE 1 DROP: 3; 1 SUSPENSION/ DROPS OPHTHALMIC at 16:57

## 2024-04-30 RX ADMIN — HYDROXYZINE PAMOATE 25 MG: 25 CAPSULE ORAL at 01:32

## 2024-04-30 RX ADMIN — OXYCODONE 5 MG: 5 TABLET ORAL at 19:27

## 2024-04-30 RX ADMIN — WATER 2000 MG: 1 INJECTION INTRAMUSCULAR; INTRAVENOUS; SUBCUTANEOUS at 05:41

## 2024-04-30 RX ADMIN — SODIUM CHLORIDE, PRESERVATIVE FREE 10 ML: 5 INJECTION INTRAVENOUS at 08:44

## 2024-04-30 RX ADMIN — ACETAMINOPHEN 650 MG: 325 TABLET ORAL at 05:00

## 2024-04-30 RX ADMIN — OXYCODONE 5 MG: 5 TABLET ORAL at 05:00

## 2024-04-30 RX ADMIN — MIDODRINE HYDROCHLORIDE 5 MG: 5 TABLET ORAL at 11:58

## 2024-04-30 RX ADMIN — SODIUM CHLORIDE, PRESERVATIVE FREE 10 ML: 5 INJECTION INTRAVENOUS at 21:56

## 2024-04-30 RX ADMIN — IPRATROPIUM BROMIDE AND ALBUTEROL SULFATE 1 DOSE: .5; 2.5 SOLUTION RESPIRATORY (INHALATION) at 20:13

## 2024-04-30 RX ADMIN — MUPIROCIN: 20 OINTMENT TOPICAL at 08:46

## 2024-04-30 RX ADMIN — EMPAGLIFLOZIN 10 MG: 10 TABLET, FILM COATED ORAL at 08:45

## 2024-04-30 RX ADMIN — OXYCODONE 5 MG: 5 TABLET ORAL at 09:00

## 2024-04-30 RX ADMIN — IPRATROPIUM BROMIDE AND ALBUTEROL SULFATE 1 DOSE: .5; 2.5 SOLUTION RESPIRATORY (INHALATION) at 12:41

## 2024-04-30 RX ADMIN — METOPROLOL SUCCINATE 25 MG: 25 TABLET, EXTENDED RELEASE ORAL at 08:45

## 2024-04-30 RX ADMIN — MIDODRINE HYDROCHLORIDE 5 MG: 5 TABLET ORAL at 16:57

## 2024-04-30 RX ADMIN — ACETAMINOPHEN 650 MG: 325 TABLET ORAL at 21:51

## 2024-04-30 RX ADMIN — TORSEMIDE 20 MG: 20 TABLET ORAL at 08:58

## 2024-04-30 RX ADMIN — MIDODRINE HYDROCHLORIDE 5 MG: 5 TABLET ORAL at 08:45

## 2024-04-30 RX ADMIN — TOBRAMYCIN AND DEXAMETHASONE 1 DROP: 3; 1 SUSPENSION/ DROPS OPHTHALMIC at 21:56

## 2024-04-30 RX ADMIN — SENNOSIDES AND DOCUSATE SODIUM 1 TABLET: 50; 8.6 TABLET ORAL at 08:45

## 2024-04-30 RX ADMIN — LEVOTHYROXINE SODIUM 100 MCG: 0.1 TABLET ORAL at 05:00

## 2024-04-30 RX ADMIN — IPRATROPIUM BROMIDE AND ALBUTEROL SULFATE 1 DOSE: .5; 2.5 SOLUTION RESPIRATORY (INHALATION) at 08:53

## 2024-04-30 RX ADMIN — ENOXAPARIN SODIUM 30 MG: 100 INJECTION SUBCUTANEOUS at 08:44

## 2024-04-30 RX ADMIN — TORSEMIDE 20 MG: 20 TABLET ORAL at 13:32

## 2024-04-30 RX ADMIN — WATER 2000 MG: 1 INJECTION INTRAMUSCULAR; INTRAVENOUS; SUBCUTANEOUS at 13:33

## 2024-04-30 ASSESSMENT — PAIN DESCRIPTION - LOCATION
LOCATION: RIB CAGE
LOCATION: ABDOMEN;INCISION
LOCATION: INCISION;CHEST
LOCATION: ABDOMEN;INCISION
LOCATION: RIB CAGE

## 2024-04-30 ASSESSMENT — PAIN DESCRIPTION - ORIENTATION
ORIENTATION: RIGHT

## 2024-04-30 ASSESSMENT — PAIN - FUNCTIONAL ASSESSMENT
PAIN_FUNCTIONAL_ASSESSMENT: ACTIVITIES ARE NOT PREVENTED
PAIN_FUNCTIONAL_ASSESSMENT: PREVENTS OR INTERFERES SOME ACTIVE ACTIVITIES AND ADLS
PAIN_FUNCTIONAL_ASSESSMENT: PREVENTS OR INTERFERES SOME ACTIVE ACTIVITIES AND ADLS

## 2024-04-30 ASSESSMENT — PAIN SCALES - GENERAL
PAINLEVEL_OUTOF10: 6
PAINLEVEL_OUTOF10: 6
PAINLEVEL_OUTOF10: 7
PAINLEVEL_OUTOF10: 6
PAINLEVEL_OUTOF10: 1
PAINLEVEL_OUTOF10: 3
PAINLEVEL_OUTOF10: 8

## 2024-04-30 ASSESSMENT — PAIN DESCRIPTION - DESCRIPTORS
DESCRIPTORS: ACHING;THROBBING;DISCOMFORT
DESCRIPTORS: ACHING;DISCOMFORT;SORE
DESCRIPTORS: ACHING;DISCOMFORT;TENDER
DESCRIPTORS: ACHING;DISCOMFORT;THROBBING
DESCRIPTORS: ACHING;DISCOMFORT;SORE

## 2024-04-30 NOTE — PLAN OF CARE
Problem: Safety - Adult  Goal: Free from fall injury  4/30/2024 0036 by Umberto Pritchard RN  Outcome: Progressing     Problem: Chronic Conditions and Co-morbidities  Goal: Patient's chronic conditions and co-morbidity symptoms are monitored and maintained or improved  4/30/2024 0036 by Umberto Pritchard, RN  Outcome: Progressing     Problem: Discharge Planning  Goal: Discharge to home or other facility with appropriate resources  4/30/2024 0036 by Umberto Pritchard, RN  Outcome: Progressing     Problem: Cardiovascular - Adult  Goal: Maintains optimal cardiac output and hemodynamic stability  4/30/2024 0036 by Umberto Pritchard, RN  Outcome: Progressing     Problem: Pain  Goal: Verbalizes/displays adequate comfort level or baseline comfort level  4/30/2024 0036 by Umberto Pritchard, RN  Outcome: Progressing

## 2024-04-30 NOTE — PLAN OF CARE
Problem: Safety - Adult  Goal: Free from fall injury  Outcome: Progressing  Flowsheets (Taken 4/29/2024 0815)  Free From Fall Injury: Instruct family/caregiver on patient safety     Problem: Chronic Conditions and Co-morbidities  Goal: Patient's chronic conditions and co-morbidity symptoms are monitored and maintained or improved  Outcome: Progressing  Flowsheets (Taken 4/29/2024 0815)  Care Plan - Patient's Chronic Conditions and Co-Morbidity Symptoms are Monitored and Maintained or Improved: Monitor and assess patient's chronic conditions and comorbid symptoms for stability, deterioration, or improvement     Problem: Discharge Planning  Goal: Discharge to home or other facility with appropriate resources  Outcome: Progressing  Flowsheets (Taken 4/29/2024 0815)  Discharge to home or other facility with appropriate resources:   Identify barriers to discharge with patient and caregiver   Arrange for needed discharge resources and transportation as appropriate   Identify discharge learning needs (meds, wound care, etc)   Refer to discharge planning if patient needs post-hospital services based on physician order or complex needs related to functional status, cognitive ability or social support system     Problem: Cardiovascular - Adult  Goal: Maintains optimal cardiac output and hemodynamic stability  Outcome: Progressing  Flowsheets (Taken 4/29/2024 0815)  Maintains optimal cardiac output and hemodynamic stability: Monitor blood pressure and heart rate     Problem: Pain  Goal: Verbalizes/displays adequate comfort level or baseline comfort level  Outcome: Progressing

## 2024-04-30 NOTE — ANESTHESIA POSTPROCEDURE EVALUATION
Department of Anesthesiology  Postprocedure Note    Patient: Katt Diaz  MRN: [id number]  YOB: 1945  Date of evaluation: 4/30/2024    Procedure Summary       Date: 04/29/24 Room / Location: [address]    Anesthesia Start: 1306 Anesthesia Stop: 1451    Procedure: Right Video Assisted Thoracoscopic Surgery, Pleurodesis, PleurX (Right) Diagnosis:       Recurrent right pleural effusion      (Recurrent right pleural effusion [J90])    Surgeons: Nicolasa Jasso DO Responsible Provider: Gorge Mcmahon DO    Anesthesia Type: General ASA Status: 4            Anesthesia Type: General    Alis Phase I: Alis Score: 9    Alis Phase II:      Anesthesia Post Evaluation    Patient location during evaluation: PACU  Patient participation: complete - patient participated  Level of consciousness: awake and alert  Airway patency: patent  Nausea & Vomiting: no nausea and no vomiting  Cardiovascular status: blood pressure returned to baseline  Respiratory status: acceptable  Hydration status: euvolemic  Multimodal analgesia pain management approach  Pain management: adequate    No notable events documented.

## 2024-04-30 NOTE — CARE COORDINATION
4/30/24  Spoke with patient regarding transition of care. Patient is POD #1 for  R VATS, pleurodesis, and insertion pleur X.  Patient has 1 chest tube in place.  Patient is on room air. Discharge goal is to return to [facility].  PT/OT updates requested.  PT saw with AM-PAC of 14/24.  Patient states she is not going to a [facility] because she cannot afford the co-pay of $196 a day.  Patient is planned to go home with [facility] following .  Patient will discharge with a pleur-X and will needs kits prior to discharge.  Patient states her son and cousin Dhara will help with Pleur-X drain care on the days [facility] is not there.  Patient has a life vest in her room for discharge.  Will need to make sure pt receives 30 day free Eliquis & monthly refill  assist at discharge.   Patient states A walker was ordered at the \"other hospital \" and sent home with son.  A referral was placed to [employer] per the previous case manger to help with home delivered meals. Discharge goal is home.  Family to provide transport home at discharge.  SW/[place] to follow.    Electronically signed by ADAMA Shirley on 4/30/2024 at 3:24 PM

## 2024-04-30 NOTE — PATIENT CARE CONFERENCE
P Quality Flow/Interdisciplinary Rounds Progress Note        Quality Flow Rounds held on April 30, 2024    Disciplines Attending:  Bedside Nurse, , , and Nursing Unit Leadership    Kattcirilo Diaz was admitted on 4/28/2024  7:00 PM    Anticipated Discharge Date:       Disposition:    Dani Score:  Dani Scale Score: 16    Readmission Risk              Risk of Unplanned Readmission:  48           Discussed patient goal for the day, patient clinical progression, and barriers to discharge.  The following Goal(s) of the Day/Commitment(s) have been identified:  report labs/diagnostics      Dalila Mcqueen RN  April 30, 2024

## 2024-04-30 NOTE — PLAN OF CARE
Problem: Safety - Adult  Goal: Free from fall injury  4/30/2024 0917 by Olga Lidia Low RN  Outcome: Progressing     Problem: Chronic Conditions and Co-morbidities  Goal: Patient's chronic conditions and co-morbidity symptoms are monitored and maintained or improved  4/30/2024 0917 by Olga Lidia Low RN  Outcome: Progressing     Problem: Discharge Planning  Goal: Discharge to home or other facility with appropriate resources  4/30/2024 0917 by Olga Lidia Low RN  Outcome: Progressing     Problem: Cardiovascular - Adult  Goal: Maintains optimal cardiac output and hemodynamic stability  4/30/2024 0917 by Olga Lidia Low RN  Outcome: Progressing     Problem: Pain  Goal: Verbalizes/displays adequate comfort level or baseline comfort level  4/30/2024 0917 by Olga Lidia Low RN  Outcome: Progressing     Problem: Skin/Tissue Integrity  Goal: Absence of new skin breakdown  Description: 1.  Monitor for areas of redness and/or skin breakdown  2.  Assess vascular access sites hourly  3.  Every 4-6 hours minimum:  Change oxygen saturation probe site  4.  Every 4-6 hours:  If on nasal continuous positive airway pressure, respiratory therapy assess nares and determine need for appliance change or resting period.  Outcome: Progressing     Problem: ABCDS Injury Assessment  Goal: Absence of physical injury  Outcome: Progressing

## 2024-05-01 ENCOUNTER — APPOINTMENT (OUTPATIENT)
Dept: GENERAL RADIOLOGY | Age: 79
DRG: 166 | End: 2024-05-01
Attending: INTERNAL MEDICINE
Payer: MEDICARE

## 2024-05-01 LAB
ANION GAP SERPL CALCULATED.3IONS-SCNC: 13 MMOL/L (ref 7–16)
BUN SERPL-MCNC: 32 MG/DL (ref 6–23)
CALCIUM SERPL-MCNC: 7.6 MG/DL (ref 8.6–10.2)
CHLORIDE SERPL-SCNC: 104 MMOL/L (ref 98–107)
CO2 SERPL-SCNC: 19 MMOL/L (ref 22–29)
CREAT SERPL-MCNC: 1.4 MG/DL (ref 0.5–1)
ERYTHROCYTE [DISTWIDTH] IN BLOOD BY AUTOMATED COUNT: 18.4 % (ref 11.5–15)
GFR, ESTIMATED: 39 ML/MIN/1.73M2
GLUCOSE SERPL-MCNC: 105 MG/DL (ref 74–99)
HCT VFR BLD AUTO: 23.8 % (ref 34–48)
HGB BLD-MCNC: 7.1 G/DL (ref 11.5–15.5)
MCH RBC QN AUTO: 28.6 PG (ref 26–35)
MCHC RBC AUTO-ENTMCNC: 29.8 G/DL (ref 32–34.5)
MCV RBC AUTO: 96 FL (ref 80–99.9)
PLATELET # BLD AUTO: 488 K/UL (ref 130–450)
PMV BLD AUTO: 9.7 FL (ref 7–12)
POTASSIUM SERPL-SCNC: 4.9 MMOL/L (ref 3.5–5)
RBC # BLD AUTO: 2.48 M/UL (ref 3.5–5.5)
SODIUM SERPL-SCNC: 136 MMOL/L (ref 132–146)
WBC OTHER # BLD: 7.8 K/UL (ref 4.5–11.5)

## 2024-05-01 PROCEDURE — 71045 X-RAY EXAM CHEST 1 VIEW: CPT

## 2024-05-01 PROCEDURE — 6360000002 HC RX W HCPCS

## 2024-05-01 PROCEDURE — 80048 BASIC METABOLIC PNL TOTAL CA: CPT

## 2024-05-01 PROCEDURE — 6370000000 HC RX 637 (ALT 250 FOR IP): Performed by: INTERNAL MEDICINE

## 2024-05-01 PROCEDURE — 6370000000 HC RX 637 (ALT 250 FOR IP)

## 2024-05-01 PROCEDURE — 99232 SBSQ HOSP IP/OBS MODERATE 35: CPT | Performed by: NURSE PRACTITIONER

## 2024-05-01 PROCEDURE — 2580000003 HC RX 258

## 2024-05-01 PROCEDURE — 2580000003 HC RX 258: Performed by: INTERNAL MEDICINE

## 2024-05-01 PROCEDURE — 36415 COLL VENOUS BLD VENIPUNCTURE: CPT

## 2024-05-01 PROCEDURE — 2140000000 HC CCU INTERMEDIATE R&B

## 2024-05-01 PROCEDURE — 94640 AIRWAY INHALATION TREATMENT: CPT

## 2024-05-01 PROCEDURE — 85027 COMPLETE CBC AUTOMATED: CPT

## 2024-05-01 RX ORDER — 0.9 % SODIUM CHLORIDE 0.9 %
500 INTRAVENOUS SOLUTION INTRAVENOUS ONCE
Status: COMPLETED | OUTPATIENT
Start: 2024-05-01 | End: 2024-05-01

## 2024-05-01 RX ORDER — 0.9 % SODIUM CHLORIDE 0.9 %
250 INTRAVENOUS SOLUTION INTRAVENOUS ONCE
Status: COMPLETED | OUTPATIENT
Start: 2024-05-01 | End: 2024-05-01

## 2024-05-01 RX ORDER — VITAMIN B COMPLEX
1000 TABLET ORAL DAILY
Status: DISCONTINUED | OUTPATIENT
Start: 2024-05-02 | End: 2024-05-08 | Stop reason: HOSPADM

## 2024-05-01 RX ADMIN — ACETAMINOPHEN 650 MG: 325 TABLET ORAL at 21:08

## 2024-05-01 RX ADMIN — PANTOPRAZOLE SODIUM 40 MG: 40 TABLET, DELAYED RELEASE ORAL at 06:41

## 2024-05-01 RX ADMIN — ACETAMINOPHEN 650 MG: 325 TABLET ORAL at 17:06

## 2024-05-01 RX ADMIN — MUPIROCIN: 20 OINTMENT TOPICAL at 09:42

## 2024-05-01 RX ADMIN — ALLOPURINOL 100 MG: 100 TABLET ORAL at 09:36

## 2024-05-01 RX ADMIN — SODIUM CHLORIDE 500 ML: 9 INJECTION, SOLUTION INTRAVENOUS at 15:40

## 2024-05-01 RX ADMIN — HYDROXYZINE PAMOATE 25 MG: 25 CAPSULE ORAL at 21:22

## 2024-05-01 RX ADMIN — IPRATROPIUM BROMIDE AND ALBUTEROL SULFATE 1 DOSE: .5; 2.5 SOLUTION RESPIRATORY (INHALATION) at 21:38

## 2024-05-01 RX ADMIN — ACETAMINOPHEN 650 MG: 325 TABLET ORAL at 06:41

## 2024-05-01 RX ADMIN — SODIUM CHLORIDE 250 ML: 9 INJECTION, SOLUTION INTRAVENOUS at 01:04

## 2024-05-01 RX ADMIN — SODIUM CHLORIDE, PRESERVATIVE FREE 10 ML: 5 INJECTION INTRAVENOUS at 21:11

## 2024-05-01 RX ADMIN — IPRATROPIUM BROMIDE AND ALBUTEROL SULFATE 1 DOSE: .5; 2.5 SOLUTION RESPIRATORY (INHALATION) at 16:42

## 2024-05-01 RX ADMIN — TOBRAMYCIN AND DEXAMETHASONE 1 DROP: 3; 1 SUSPENSION/ DROPS OPHTHALMIC at 21:12

## 2024-05-01 RX ADMIN — EMPAGLIFLOZIN 10 MG: 10 TABLET, FILM COATED ORAL at 09:42

## 2024-05-01 RX ADMIN — PETROLATUM: 42 OINTMENT TOPICAL at 04:15

## 2024-05-01 RX ADMIN — MIDODRINE HYDROCHLORIDE 5 MG: 5 TABLET ORAL at 17:06

## 2024-05-01 RX ADMIN — TOBRAMYCIN AND DEXAMETHASONE 1 DROP: 3; 1 SUSPENSION/ DROPS OPHTHALMIC at 10:51

## 2024-05-01 RX ADMIN — SODIUM CHLORIDE, PRESERVATIVE FREE 10 ML: 5 INJECTION INTRAVENOUS at 09:36

## 2024-05-01 RX ADMIN — MIDODRINE HYDROCHLORIDE 5 MG: 5 TABLET ORAL at 06:41

## 2024-05-01 RX ADMIN — TOBRAMYCIN AND DEXAMETHASONE 1 DROP: 3; 1 SUSPENSION/ DROPS OPHTHALMIC at 17:06

## 2024-05-01 RX ADMIN — PETROLATUM: 42 OINTMENT TOPICAL at 21:11

## 2024-05-01 RX ADMIN — FERROUS SULFATE TAB 325 MG (65 MG ELEMENTAL FE) 325 MG: 325 (65 FE) TAB at 21:11

## 2024-05-01 RX ADMIN — SODIUM ZIRCONIUM CYCLOSILICATE 5 G: 5 POWDER, FOR SUSPENSION ORAL at 09:41

## 2024-05-01 RX ADMIN — ENOXAPARIN SODIUM 30 MG: 100 INJECTION SUBCUTANEOUS at 09:36

## 2024-05-01 RX ADMIN — FERROUS SULFATE TAB 325 MG (65 MG ELEMENTAL FE) 325 MG: 325 (65 FE) TAB at 09:36

## 2024-05-01 RX ADMIN — PETROLATUM: 42 OINTMENT TOPICAL at 09:42

## 2024-05-01 RX ADMIN — SENNOSIDES AND DOCUSATE SODIUM 1 TABLET: 50; 8.6 TABLET ORAL at 09:36

## 2024-05-01 RX ADMIN — SENNOSIDES AND DOCUSATE SODIUM 1 TABLET: 50; 8.6 TABLET ORAL at 21:11

## 2024-05-01 RX ADMIN — ACETAMINOPHEN 650 MG: 325 TABLET ORAL at 12:50

## 2024-05-01 RX ADMIN — MUPIROCIN: 20 OINTMENT TOPICAL at 21:12

## 2024-05-01 RX ADMIN — TOBRAMYCIN AND DEXAMETHASONE 1 DROP: 3; 1 SUSPENSION/ DROPS OPHTHALMIC at 12:51

## 2024-05-01 RX ADMIN — LEVOTHYROXINE SODIUM 100 MCG: 0.1 TABLET ORAL at 06:41

## 2024-05-01 RX ADMIN — MIDODRINE HYDROCHLORIDE 5 MG: 5 TABLET ORAL at 12:50

## 2024-05-01 RX ADMIN — ATORVASTATIN CALCIUM 40 MG: 40 TABLET, FILM COATED ORAL at 21:11

## 2024-05-01 RX ADMIN — SODIUM CHLORIDE 500 ML: 9 INJECTION, SOLUTION INTRAVENOUS at 10:50

## 2024-05-01 ASSESSMENT — PAIN DESCRIPTION - LOCATION: LOCATION: RIB CAGE

## 2024-05-01 ASSESSMENT — PAIN DESCRIPTION - DESCRIPTORS: DESCRIPTORS: ACHING;DISCOMFORT;SORE

## 2024-05-01 ASSESSMENT — PAIN SCALES - GENERAL: PAINLEVEL_OUTOF10: 8

## 2024-05-01 ASSESSMENT — PAIN DESCRIPTION - ORIENTATION: ORIENTATION: RIGHT

## 2024-05-01 NOTE — PATIENT CARE CONFERENCE
P Quality Flow/Interdisciplinary Rounds Progress Note        Quality Flow Rounds held on May 1, 2024    Disciplines Attending:  Bedside Nurse, , , and Nursing Unit Leadership    Kattcirilo Diaz was admitted on 4/28/2024  7:00 PM    Anticipated Discharge Date:       Disposition:    Dani Score:  Dani Scale Score: 16    Readmission Risk              Risk of Unplanned Readmission:  51           Discussed patient goal for the day, patient clinical progression, and barriers to discharge.  The following Goal(s) of the Day/Commitment(s) have been identified:   increase activity and work on plan for discharge      Jena Locke RN  May 1, 2024

## 2024-05-01 NOTE — PLAN OF CARE
Problem: Safety - Adult  Goal: Free from fall injury  5/1/2024 1111 by Olga Lidia Low RN  Outcome: Progressing     Problem: Chronic Conditions and Co-morbidities  Goal: Patient's chronic conditions and co-morbidity symptoms are monitored and maintained or improved  5/1/2024 1111 by Olga Lidia Low RN  Outcome: Progressing     Problem: Discharge Planning  Goal: Discharge to home or other facility with appropriate resources  5/1/2024 1111 by Olga Lidia Low RN  Outcome: Progressing     Problem: Cardiovascular - Adult  Goal: Maintains optimal cardiac output and hemodynamic stability  5/1/2024 1111 by Olga Lidia Low RN  Outcome: Progressing     Problem: Pain  Goal: Verbalizes/displays adequate comfort level or baseline comfort level  5/1/2024 1111 by Olga Lidia Low RN  Outcome: Progressing     Problem: Skin/Tissue Integrity  Goal: Absence of new skin breakdown  Description: 1.  Monitor for areas of redness and/or skin breakdown  2.  Assess vascular access sites hourly  3.  Every 4-6 hours minimum:  Change oxygen saturation probe site  4.  Every 4-6 hours:  If on nasal continuous positive airway pressure, respiratory therapy assess nares and determine need for appliance change or resting period.  5/1/2024 1111 by Olga Lidia Low RN  Outcome: Progressing

## 2024-05-01 NOTE — CARE COORDINATION
5/1/24  Transition of Care update.  Patient is POD #2 for  R VATS, pleurodesis, and insertion pleur X. Patient noted to have an air leak.  Chest tube to remain in place until air leak resolves.  Patient noted to have BP of 84/44, and patients creatinine of 1.4 with update to attending per nursing. Patient is pending a nephrology consult. PT/OT updates complete with AM-PAC of 14/24 for both PT as well as OT.  Discussed a FLACO with patient but Patient states she is not going to a FLACO because she cannot afford the co-pay of $196 a day.  Patient is planned to go home with [facility] following .  Patient will discharge with a pleur-X and will needs kits prior to discharge.  Patient states her son and cousin Dhara will help with Pleur-X drain care on the days [facility] is not there.  Patient has a life vest in her room for discharge.  Will need to make sure pt receives 30 day free Eliquis & monthly refill  assist at discharge.   Patient states A walker was ordered at the \"other hospital \" and sent home with son.  A referral was placed to [employer] per the previous case manger to help with home delivered meals. Discharge goal is home.  Family to provide transport home at discharge.  [facility] to follow.     Electronically signed by ADAMA Shirley on 5/1/2024 at 1:19 PM

## 2024-05-01 NOTE — PLAN OF CARE
Problem: Safety - Adult  Goal: Free from fall injury  Outcome: Progressing     Problem: Chronic Conditions and Co-morbidities  Goal: Patient's chronic conditions and co-morbidity symptoms are monitored and maintained or improved  Outcome: Progressing     Problem: Discharge Planning  Goal: Discharge to home or other facility with appropriate resources  Outcome: Progressing     Problem: Cardiovascular - Adult  Goal: Maintains optimal cardiac output and hemodynamic stability  Outcome: Progressing     Problem: Pain  Goal: Verbalizes/displays adequate comfort level or baseline comfort level  Outcome: Progressing     Problem: Skin/Tissue Integrity  Goal: Absence of new skin breakdown  Description: 1.  Monitor for areas of redness and/or skin breakdown  2.  Assess vascular access sites hourly  3.  Every 4-6 hours minimum:  Change oxygen saturation probe site  4.  Every 4-6 hours:  If on nasal continuous positive airway pressure, respiratory therapy assess nares and determine need for appliance change or resting period.  Outcome: Progressing     Problem: ABCDS Injury Assessment  Goal: Absence of physical injury  Outcome: Progressing     Problem: Nutrition Deficit:  Goal: Optimize nutritional status  Outcome: Progressing  Flowsheets (Taken 4/30/2024 1244 by Eli Hathaway, RD, LD)  Nutrient intake appropriate for improving, restoring, or maintaining nutritional needs:   Assess nutritional status and recommend course of action   Recommend, monitor, and adjust tube feedings and TPN/PPN based on assessed needs

## 2024-05-02 ENCOUNTER — APPOINTMENT (OUTPATIENT)
Dept: GENERAL RADIOLOGY | Age: 79
DRG: 166 | End: 2024-05-02
Attending: INTERNAL MEDICINE
Payer: MEDICARE

## 2024-05-02 LAB
ABO/RH: NORMAL
ANION GAP SERPL CALCULATED.3IONS-SCNC: 9 MMOL/L (ref 7–16)
ANTIBODY SCREEN: NEGATIVE
ARM BAND NUMBER: NORMAL
BLOOD BANK DISPENSE STATUS: NORMAL
BLOOD BANK DISPENSE STATUS: NORMAL
BLOOD BANK SAMPLE EXPIRATION: NORMAL
BPU ID: NORMAL
BPU ID: NORMAL
BUN SERPL-MCNC: 35 MG/DL (ref 6–23)
CALCIUM SERPL-MCNC: 7.5 MG/DL (ref 8.6–10.2)
CHLORIDE SERPL-SCNC: 104 MMOL/L (ref 98–107)
CO2 SERPL-SCNC: 20 MMOL/L (ref 22–29)
COMPONENT: NORMAL
COMPONENT: NORMAL
CREAT SERPL-MCNC: 1.4 MG/DL (ref 0.5–1)
CROSSMATCH RESULT: NORMAL
CROSSMATCH RESULT: NORMAL
ERYTHROCYTE [DISTWIDTH] IN BLOOD BY AUTOMATED COUNT: 18.6 % (ref 11.5–15)
GFR, ESTIMATED: 37 ML/MIN/1.73M2
GLUCOSE SERPL-MCNC: 80 MG/DL (ref 74–99)
HCT VFR BLD AUTO: 20.9 % (ref 34–48)
HCT VFR BLD AUTO: 26.3 % (ref 34–48)
HGB BLD-MCNC: 6.4 G/DL (ref 11.5–15.5)
HGB BLD-MCNC: 8.2 G/DL (ref 11.5–15.5)
MAGNESIUM SERPL-MCNC: 2.7 MG/DL (ref 1.6–2.6)
MCH RBC QN AUTO: 28.6 PG (ref 26–35)
MCHC RBC AUTO-ENTMCNC: 30.6 G/DL (ref 32–34.5)
MCV RBC AUTO: 93.3 FL (ref 80–99.9)
MICROORGANISM SPEC CULT: NO GROWTH
MICROORGANISM/AGENT SPEC: NORMAL
PHOSPHATE SERPL-MCNC: 2.7 MG/DL (ref 2.5–4.5)
PLATELET # BLD AUTO: 484 K/UL (ref 130–450)
PMV BLD AUTO: 9.6 FL (ref 7–12)
POTASSIUM SERPL-SCNC: 4.2 MMOL/L (ref 3.5–5)
RBC # BLD AUTO: 2.24 M/UL (ref 3.5–5.5)
SODIUM SERPL-SCNC: 133 MMOL/L (ref 132–146)
SPECIMEN DESCRIPTION: NORMAL
TRANSFUSION STATUS: NORMAL
TRANSFUSION STATUS: NORMAL
UNIT DIVISION: 0
UNIT DIVISION: 0
WBC OTHER # BLD: 6.2 K/UL (ref 4.5–11.5)

## 2024-05-02 PROCEDURE — 6360000002 HC RX W HCPCS

## 2024-05-02 PROCEDURE — 86901 BLOOD TYPING SEROLOGIC RH(D): CPT

## 2024-05-02 PROCEDURE — 30233N1 TRANSFUSION OF NONAUTOLOGOUS RED BLOOD CELLS INTO PERIPHERAL VEIN, PERCUTANEOUS APPROACH: ICD-10-PCS | Performed by: SPECIALIST

## 2024-05-02 PROCEDURE — 83735 ASSAY OF MAGNESIUM: CPT

## 2024-05-02 PROCEDURE — 6370000000 HC RX 637 (ALT 250 FOR IP)

## 2024-05-02 PROCEDURE — 2580000003 HC RX 258

## 2024-05-02 PROCEDURE — 82330 ASSAY OF CALCIUM: CPT

## 2024-05-02 PROCEDURE — 80048 BASIC METABOLIC PNL TOTAL CA: CPT

## 2024-05-02 PROCEDURE — 85018 HEMOGLOBIN: CPT

## 2024-05-02 PROCEDURE — 99232 SBSQ HOSP IP/OBS MODERATE 35: CPT | Performed by: NURSE PRACTITIONER

## 2024-05-02 PROCEDURE — 86850 RBC ANTIBODY SCREEN: CPT

## 2024-05-02 PROCEDURE — 36415 COLL VENOUS BLD VENIPUNCTURE: CPT

## 2024-05-02 PROCEDURE — 84100 ASSAY OF PHOSPHORUS: CPT

## 2024-05-02 PROCEDURE — 71045 X-RAY EXAM CHEST 1 VIEW: CPT

## 2024-05-02 PROCEDURE — 2500000003 HC RX 250 WO HCPCS

## 2024-05-02 PROCEDURE — 6360000002 HC RX W HCPCS: Performed by: INTERNAL MEDICINE

## 2024-05-02 PROCEDURE — P9016 RBC LEUKOCYTES REDUCED: HCPCS

## 2024-05-02 PROCEDURE — 2140000000 HC CCU INTERMEDIATE R&B

## 2024-05-02 PROCEDURE — 36430 TRANSFUSION BLD/BLD COMPNT: CPT

## 2024-05-02 PROCEDURE — 86923 COMPATIBILITY TEST ELECTRIC: CPT

## 2024-05-02 PROCEDURE — 85027 COMPLETE CBC AUTOMATED: CPT

## 2024-05-02 PROCEDURE — 94640 AIRWAY INHALATION TREATMENT: CPT

## 2024-05-02 PROCEDURE — 85014 HEMATOCRIT: CPT

## 2024-05-02 PROCEDURE — 86900 BLOOD TYPING SEROLOGIC ABO: CPT

## 2024-05-02 RX ORDER — LORAZEPAM 0.5 MG/1
0.5 TABLET ORAL NIGHTLY PRN
Status: DISCONTINUED | OUTPATIENT
Start: 2024-05-02 | End: 2024-05-08 | Stop reason: HOSPADM

## 2024-05-02 RX ORDER — FUROSEMIDE 10 MG/ML
20 INJECTION INTRAMUSCULAR; INTRAVENOUS ONCE
Status: COMPLETED | OUTPATIENT
Start: 2024-05-02 | End: 2024-05-02

## 2024-05-02 RX ORDER — SODIUM CHLORIDE 9 MG/ML
INJECTION, SOLUTION INTRAVENOUS PRN
Status: DISCONTINUED | OUTPATIENT
Start: 2024-05-02 | End: 2024-05-08 | Stop reason: HOSPADM

## 2024-05-02 RX ORDER — CALCIUM GLUCONATE 10 MG/ML
1000 INJECTION, SOLUTION INTRAVENOUS ONCE
Status: COMPLETED | OUTPATIENT
Start: 2024-05-02 | End: 2024-05-02

## 2024-05-02 RX ORDER — PETROLATUM 42 G/100G
OINTMENT TOPICAL 3 TIMES DAILY PRN
Status: DISCONTINUED | OUTPATIENT
Start: 2024-05-02 | End: 2024-05-08 | Stop reason: HOSPADM

## 2024-05-02 RX ADMIN — MUPIROCIN: 20 OINTMENT TOPICAL at 09:11

## 2024-05-02 RX ADMIN — ALLOPURINOL 100 MG: 100 TABLET ORAL at 09:02

## 2024-05-02 RX ADMIN — SENNOSIDES AND DOCUSATE SODIUM 1 TABLET: 50; 8.6 TABLET ORAL at 09:01

## 2024-05-02 RX ADMIN — IPRATROPIUM BROMIDE AND ALBUTEROL SULFATE 1 DOSE: .5; 2.5 SOLUTION RESPIRATORY (INHALATION) at 17:05

## 2024-05-02 RX ADMIN — FERROUS SULFATE TAB 325 MG (65 MG ELEMENTAL FE) 325 MG: 325 (65 FE) TAB at 20:57

## 2024-05-02 RX ADMIN — TORSEMIDE 20 MG: 20 TABLET ORAL at 09:02

## 2024-05-02 RX ADMIN — TOBRAMYCIN AND DEXAMETHASONE 1 DROP: 3; 1 SUSPENSION/ DROPS OPHTHALMIC at 13:06

## 2024-05-02 RX ADMIN — ACETAMINOPHEN 650 MG: 325 TABLET ORAL at 21:23

## 2024-05-02 RX ADMIN — PETROLATUM: 42 OINTMENT TOPICAL at 20:58

## 2024-05-02 RX ADMIN — FERROUS SULFATE TAB 325 MG (65 MG ELEMENTAL FE) 325 MG: 325 (65 FE) TAB at 09:01

## 2024-05-02 RX ADMIN — PETROLATUM: 42 OINTMENT TOPICAL at 09:11

## 2024-05-02 RX ADMIN — SODIUM CHLORIDE, PRESERVATIVE FREE 10 ML: 5 INJECTION INTRAVENOUS at 09:02

## 2024-05-02 RX ADMIN — MIDODRINE HYDROCHLORIDE 5 MG: 5 TABLET ORAL at 17:15

## 2024-05-02 RX ADMIN — ATORVASTATIN CALCIUM 40 MG: 40 TABLET, FILM COATED ORAL at 20:57

## 2024-05-02 RX ADMIN — ENOXAPARIN SODIUM 30 MG: 100 INJECTION SUBCUTANEOUS at 09:02

## 2024-05-02 RX ADMIN — Medication 1000 UNITS: at 09:01

## 2024-05-02 RX ADMIN — METOPROLOL SUCCINATE 25 MG: 25 TABLET, EXTENDED RELEASE ORAL at 09:01

## 2024-05-02 RX ADMIN — SODIUM CHLORIDE, PRESERVATIVE FREE 10 ML: 5 INJECTION INTRAVENOUS at 20:58

## 2024-05-02 RX ADMIN — ACETAMINOPHEN 650 MG: 325 TABLET ORAL at 01:46

## 2024-05-02 RX ADMIN — TOBRAMYCIN AND DEXAMETHASONE 1 DROP: 3; 1 SUSPENSION/ DROPS OPHTHALMIC at 18:55

## 2024-05-02 RX ADMIN — IPRATROPIUM BROMIDE AND ALBUTEROL SULFATE 1 DOSE: .5; 2.5 SOLUTION RESPIRATORY (INHALATION) at 19:34

## 2024-05-02 RX ADMIN — IPRATROPIUM BROMIDE AND ALBUTEROL SULFATE 1 DOSE: .5; 2.5 SOLUTION RESPIRATORY (INHALATION) at 13:09

## 2024-05-02 RX ADMIN — PANTOPRAZOLE SODIUM 40 MG: 40 TABLET, DELAYED RELEASE ORAL at 06:06

## 2024-05-02 RX ADMIN — LEVOTHYROXINE SODIUM 100 MCG: 0.1 TABLET ORAL at 06:06

## 2024-05-02 RX ADMIN — TOBRAMYCIN AND DEXAMETHASONE 1 DROP: 3; 1 SUSPENSION/ DROPS OPHTHALMIC at 09:11

## 2024-05-02 RX ADMIN — EMPAGLIFLOZIN 10 MG: 10 TABLET, FILM COATED ORAL at 09:01

## 2024-05-02 RX ADMIN — TOBRAMYCIN AND DEXAMETHASONE 1 DROP: 3; 1 SUSPENSION/ DROPS OPHTHALMIC at 20:57

## 2024-05-02 RX ADMIN — SENNOSIDES AND DOCUSATE SODIUM 1 TABLET: 50; 8.6 TABLET ORAL at 20:56

## 2024-05-02 RX ADMIN — MIDODRINE HYDROCHLORIDE 5 MG: 5 TABLET ORAL at 06:06

## 2024-05-02 RX ADMIN — IPRATROPIUM BROMIDE AND ALBUTEROL SULFATE 1 DOSE: .5; 2.5 SOLUTION RESPIRATORY (INHALATION) at 08:57

## 2024-05-02 RX ADMIN — MIDODRINE HYDROCHLORIDE 5 MG: 5 TABLET ORAL at 12:28

## 2024-05-02 RX ADMIN — OXYCODONE 5 MG: 5 TABLET ORAL at 13:11

## 2024-05-02 RX ADMIN — ACETAMINOPHEN 650 MG: 325 TABLET ORAL at 06:06

## 2024-05-02 RX ADMIN — FUROSEMIDE 20 MG: 10 INJECTION, SOLUTION INTRAMUSCULAR; INTRAVENOUS at 17:19

## 2024-05-02 RX ADMIN — CALCIUM GLUCONATE 1000 MG: 10 INJECTION, SOLUTION INTRAVENOUS at 09:00

## 2024-05-02 ASSESSMENT — PAIN DESCRIPTION - DESCRIPTORS
DESCRIPTORS: ACHING;DISCOMFORT;SORE

## 2024-05-02 ASSESSMENT — PAIN SCALES - GENERAL
PAINLEVEL_OUTOF10: 6
PAINLEVEL_OUTOF10: 0
PAINLEVEL_OUTOF10: 9
PAINLEVEL_OUTOF10: 7
PAINLEVEL_OUTOF10: 6

## 2024-05-02 ASSESSMENT — PAIN DESCRIPTION - LOCATION
LOCATION: RIB CAGE
LOCATION: CHEST
LOCATION: RIB CAGE
LOCATION: CHEST

## 2024-05-02 ASSESSMENT — PAIN DESCRIPTION - ORIENTATION
ORIENTATION: RIGHT

## 2024-05-02 ASSESSMENT — PAIN SCALES - WONG BAKER: WONGBAKER_NUMERICALRESPONSE: NO HURT

## 2024-05-02 NOTE — FLOWSHEET NOTE
Inpatient Wound Care(initial evaluation) 6505a    Admit Date: 4/28/2024  7:00 PM    Reason for consult:  coccyx, buttocks    Significant history:  per H & P  Presented with  RIGHT PLEURAL EFFUSION. SHE WAS TRANSFERRED FROM [facility] , AFTER HAVING A RIGHT CHEST TUBE INSERTED AND  NOT IMPROVING.  SHE WAS SENT HERE TO BE SEEN BY CTS FOR POSSIBLE VATS AND DECORTICATION.   SHE WAS SEEN IN PACU , RIGHT AFTER SURGERY    Findings:     05/02/24 1010   Skin Integumentary    Skin Integrity Ecchymosis   Location BUE   Skin Integrity Site 2   Skin Integrity Location 2   (dry flaky, edema)   Location 2 feet,legs   Skin Integrity Site 3   Skin Integrity Location 3 Abrasion    Location 3 suprapubic area   Skin Integrity Site 4   Skin Integrity Location 4 Redness  (blanchable)   Location 4 coccyx, buttocks   Preventative Dressing   (nurse to apply)     .chest tube right side  **Informed Consent**    photos taken of coccyx and inserted into their chart as part of their permanent medical record for purposes of documentation, treatment management and/or medical review.   All Images taken on 5/2/24 of patient name: Katt BURLESON  were transmitted and stored on secured Epic  Site located within Media Folder Tab by a registered Epic-[employer] Mobile Application Device.     Impression:  no pressure injuries    Plan:  Seat cushion  Low air loss module  Will need continued preventative care  Aquaphor to buttocks, dry skin      Daisy Moses RN 5/2/2024 10:31 AM

## 2024-05-02 NOTE — CARE COORDINATION
5/2/24  Transition of Care update.  Patient is POD #3 for  R VATS, pleurodesis, and insertion pleur X. Chest tube and pleurX cath remain in place with air leak noted. Patient hgb is is 6.4 today and patient will have a transfusion. Creatinine is 1.4.  PT/OT updates requested.  Discharge goal is home when medically stable.  Patient has declined a FLACO  because she cannot afford the co-pay of $196 a day.  Patient is planned to go home with [facility] following .  Patient will discharge with a pleur-X and will needs kits prior to discharge.  Patient states her son and cousin Dhara will help with Pleur-X drain care on the days [facility] is not there.  Patient has a life vest in her room for discharge.  Will need to make sure pt receives 30 day free Eliquis & monthly refill  assist at discharge.   Patient states A walker was ordered at the \"other hospital \" and sent home with son.  A referral was placed to [employer] per the previous case manger to help with home delivered meals. [facility] to follow.    Electronically signed by ADAMA Shirley on 5/2/2024 at 11:28 AM

## 2024-05-02 NOTE — PLAN OF CARE
Problem: Safety - Adult  Goal: Free from fall injury  5/1/2024 2304 by Umberto Pritchard RN  Outcome: Progressing     Problem: Chronic Conditions and Co-morbidities  Goal: Patient's chronic conditions and co-morbidity symptoms are monitored and maintained or improved  5/1/2024 2304 by Umberto Pritchard RN  Outcome: Progressing     Problem: Discharge Planning  Goal: Discharge to home or other facility with appropriate resources  5/1/2024 2304 by Umberto Pritchard RN  Outcome: Progressing     Problem: Cardiovascular - Adult  Goal: Maintains optimal cardiac output and hemodynamic stability  5/1/2024 2304 by Umberto Pritchard RN  Outcome: Progressing     Problem: Pain  Goal: Verbalizes/displays adequate comfort level or baseline comfort level  5/1/2024 2304 by Umberto Pritchard RN  Outcome: Progressing     Problem: Skin/Tissue Integrity  Goal: Absence of new skin breakdown  Description: 1.  Monitor for areas of redness and/or skin breakdown  2.  Assess vascular access sites hourly  3.  Every 4-6 hours minimum:  Change oxygen saturation probe site  4.  Every 4-6 hours:  If on nasal continuous positive airway pressure, respiratory therapy assess nares and determine need for appliance change or resting period.  5/1/2024 2304 by Umberto Pritchard RN  Outcome: Progressing     Problem: ABCDS Injury Assessment  Goal: Absence of physical injury  5/1/2024 2304 by Umberto Pritchard RN  Outcome: Progressing     Problem: Nutrition Deficit:  Goal: Optimize nutritional status  5/1/2024 2304 by Umberto Pritchard RN  Outcome: Progressing

## 2024-05-02 NOTE — PLAN OF CARE
Problem: Cardiovascular - Adult  Goal: Maintains optimal cardiac output and hemodynamic stability  5/2/2024 0947 by Minerva Aden, RN  Outcome: Progressing     Problem: Pain  Goal: Verbalizes/displays adequate comfort level or baseline comfort level  5/2/2024 0947 by Minerva Aden, RN  Outcome: Progressing     Problem: Skin/Tissue Integrity  Goal: Absence of new skin breakdown  Description: 1.  Monitor for areas of redness and/or skin breakdown  2.  Assess vascular access sites hourly  3.  Every 4-6 hours minimum:  Change oxygen saturation probe site  4.  Every 4-6 hours:  If on nasal continuous positive airway pressure, respiratory therapy assess nares and determine need for appliance change or resting period.  5/2/2024 0947 by Minerva Aden, RN  Outcome: Progressing     Problem: ABCDS Injury Assessment  Goal: Absence of physical injury  5/2/2024 0947 by Minerva Aden, RN  Outcome: Progressing     Problem: Nutrition Deficit:  Goal: Optimize nutritional status  5/2/2024 0947 by Minerva Aden, RN  Outcome: Progressing

## 2024-05-02 NOTE — PATIENT CARE CONFERENCE
P Quality Flow/Interdisciplinary Rounds Progress Note        Quality Flow Rounds held on May 2, 2024    Disciplines Attending:  Bedside Nurse, , , and Nursing Unit Leadership    Kattcirilo Diaz was admitted on 4/28/2024  7:00 PM    Anticipated Discharge Date:       Disposition:    Dani Score:  Dani Scale Score: 15    Readmission Risk              Risk of Unplanned Readmission:  48           Discussed patient goal for the day, patient clinical progression, and barriers to discharge.  The following Goal(s) of the Day/Commitment(s) have been identified:  downgrade/discharge planning       Dalila Mcqueen RN  May 2, 2024

## 2024-05-03 ENCOUNTER — APPOINTMENT (OUTPATIENT)
Dept: GENERAL RADIOLOGY | Age: 79
DRG: 166 | End: 2024-05-03
Attending: INTERNAL MEDICINE
Payer: MEDICARE

## 2024-05-03 LAB
ABO/RH: NORMAL
ANION GAP SERPL CALCULATED.3IONS-SCNC: 13 MMOL/L (ref 7–16)
ANTIBODY SCREEN: NEGATIVE
ARM BAND NUMBER: NORMAL
BLOOD BANK BLOOD PRODUCT EXPIRATION DATE: NORMAL
BLOOD BANK DISPENSE STATUS: NORMAL
BLOOD BANK ISBT PRODUCT BLOOD TYPE: 9500
BLOOD BANK PRODUCT CODE: NORMAL
BLOOD BANK SAMPLE EXPIRATION: NORMAL
BLOOD BANK UNIT TYPE AND RH: NORMAL
BPU ID: NORMAL
BUN SERPL-MCNC: 33 MG/DL (ref 6–23)
CA-I BLD-SCNC: 1.16 MMOL/L (ref 1.15–1.33)
CALCIUM SERPL-MCNC: 8.1 MG/DL (ref 8.6–10.2)
CHLORIDE SERPL-SCNC: 105 MMOL/L (ref 98–107)
CO2 SERPL-SCNC: 20 MMOL/L (ref 22–29)
COMPONENT: NORMAL
CREAT SERPL-MCNC: 1.4 MG/DL (ref 0.5–1)
CROSSMATCH RESULT: NORMAL
ERYTHROCYTE [DISTWIDTH] IN BLOOD BY AUTOMATED COUNT: 18.1 % (ref 11.5–15)
GFR, ESTIMATED: 40 ML/MIN/1.73M2
GLUCOSE SERPL-MCNC: 82 MG/DL (ref 74–99)
HCT VFR BLD AUTO: 26.1 % (ref 34–48)
HGB BLD-MCNC: 8.1 G/DL (ref 11.5–15.5)
MAGNESIUM SERPL-MCNC: 2.4 MG/DL (ref 1.6–2.6)
MCH RBC QN AUTO: 28.5 PG (ref 26–35)
MCHC RBC AUTO-ENTMCNC: 31 G/DL (ref 32–34.5)
MCV RBC AUTO: 91.9 FL (ref 80–99.9)
PHOSPHATE SERPL-MCNC: 3.1 MG/DL (ref 2.5–4.5)
PLATELET # BLD AUTO: 456 K/UL (ref 130–450)
PMV BLD AUTO: 9.4 FL (ref 7–12)
POTASSIUM SERPL-SCNC: 3.7 MMOL/L (ref 3.5–5)
RBC # BLD AUTO: 2.84 M/UL (ref 3.5–5.5)
SODIUM SERPL-SCNC: 138 MMOL/L (ref 132–146)
TRANSFUSION STATUS: NORMAL
UNIT DIVISION: 0
UNIT ISSUE DATE/TIME: NORMAL
WBC OTHER # BLD: 5.9 K/UL (ref 4.5–11.5)

## 2024-05-03 PROCEDURE — 6370000000 HC RX 637 (ALT 250 FOR IP)

## 2024-05-03 PROCEDURE — 71045 X-RAY EXAM CHEST 1 VIEW: CPT

## 2024-05-03 PROCEDURE — 85027 COMPLETE CBC AUTOMATED: CPT

## 2024-05-03 PROCEDURE — 84100 ASSAY OF PHOSPHORUS: CPT

## 2024-05-03 PROCEDURE — 83735 ASSAY OF MAGNESIUM: CPT

## 2024-05-03 PROCEDURE — 6360000002 HC RX W HCPCS

## 2024-05-03 PROCEDURE — 80048 BASIC METABOLIC PNL TOTAL CA: CPT

## 2024-05-03 PROCEDURE — 97530 THERAPEUTIC ACTIVITIES: CPT

## 2024-05-03 PROCEDURE — 2580000003 HC RX 258

## 2024-05-03 PROCEDURE — 2140000000 HC CCU INTERMEDIATE R&B

## 2024-05-03 PROCEDURE — 36415 COLL VENOUS BLD VENIPUNCTURE: CPT

## 2024-05-03 PROCEDURE — 97535 SELF CARE MNGMENT TRAINING: CPT

## 2024-05-03 PROCEDURE — 82330 ASSAY OF CALCIUM: CPT

## 2024-05-03 PROCEDURE — 94640 AIRWAY INHALATION TREATMENT: CPT

## 2024-05-03 RX ORDER — LACTULOSE 10 G/15ML
20 SOLUTION ORAL ONCE
Status: COMPLETED | OUTPATIENT
Start: 2024-05-03 | End: 2024-05-03

## 2024-05-03 RX ORDER — HYDROCORTISONE 25 MG/G
CREAM TOPICAL 2 TIMES DAILY
Status: DISCONTINUED | OUTPATIENT
Start: 2024-05-04 | End: 2024-05-08 | Stop reason: HOSPADM

## 2024-05-03 RX ADMIN — TORSEMIDE 20 MG: 20 TABLET ORAL at 14:50

## 2024-05-03 RX ADMIN — OXYCODONE HYDROCHLORIDE 10 MG: 10 TABLET ORAL at 09:37

## 2024-05-03 RX ADMIN — OXYCODONE 5 MG: 5 TABLET ORAL at 22:55

## 2024-05-03 RX ADMIN — MIDODRINE HYDROCHLORIDE 5 MG: 5 TABLET ORAL at 12:32

## 2024-05-03 RX ADMIN — LEVOTHYROXINE SODIUM 100 MCG: 0.1 TABLET ORAL at 05:57

## 2024-05-03 RX ADMIN — TORSEMIDE 20 MG: 20 TABLET ORAL at 09:22

## 2024-05-03 RX ADMIN — TOBRAMYCIN AND DEXAMETHASONE 1 DROP: 3; 1 SUSPENSION/ DROPS OPHTHALMIC at 20:30

## 2024-05-03 RX ADMIN — SENNOSIDES AND DOCUSATE SODIUM 1 TABLET: 50; 8.6 TABLET ORAL at 09:24

## 2024-05-03 RX ADMIN — PETROLATUM: 42 OINTMENT TOPICAL at 20:31

## 2024-05-03 RX ADMIN — SENNOSIDES AND DOCUSATE SODIUM 1 TABLET: 50; 8.6 TABLET ORAL at 20:31

## 2024-05-03 RX ADMIN — POTASSIUM CHLORIDE 20 MEQ: 1500 TABLET, EXTENDED RELEASE ORAL at 20:31

## 2024-05-03 RX ADMIN — PETROLATUM: 42 OINTMENT TOPICAL at 09:27

## 2024-05-03 RX ADMIN — MIDODRINE HYDROCHLORIDE 5 MG: 5 TABLET ORAL at 17:32

## 2024-05-03 RX ADMIN — ALLOPURINOL 100 MG: 100 TABLET ORAL at 09:24

## 2024-05-03 RX ADMIN — TOBRAMYCIN AND DEXAMETHASONE 1 DROP: 3; 1 SUSPENSION/ DROPS OPHTHALMIC at 17:34

## 2024-05-03 RX ADMIN — IPRATROPIUM BROMIDE AND ALBUTEROL SULFATE 1 DOSE: .5; 2.5 SOLUTION RESPIRATORY (INHALATION) at 21:37

## 2024-05-03 RX ADMIN — Medication 1000 UNITS: at 09:22

## 2024-05-03 RX ADMIN — SODIUM CHLORIDE, PRESERVATIVE FREE 10 ML: 5 INJECTION INTRAVENOUS at 20:31

## 2024-05-03 RX ADMIN — PANTOPRAZOLE SODIUM 40 MG: 40 TABLET, DELAYED RELEASE ORAL at 05:57

## 2024-05-03 RX ADMIN — IPRATROPIUM BROMIDE AND ALBUTEROL SULFATE 1 DOSE: .5; 2.5 SOLUTION RESPIRATORY (INHALATION) at 09:44

## 2024-05-03 RX ADMIN — CLOPIDOGREL BISULFATE 75 MG: 75 TABLET ORAL at 09:22

## 2024-05-03 RX ADMIN — TOBRAMYCIN AND DEXAMETHASONE 1 DROP: 3; 1 SUSPENSION/ DROPS OPHTHALMIC at 09:27

## 2024-05-03 RX ADMIN — ATORVASTATIN CALCIUM 40 MG: 40 TABLET, FILM COATED ORAL at 20:31

## 2024-05-03 RX ADMIN — EMPAGLIFLOZIN 10 MG: 10 TABLET, FILM COATED ORAL at 09:24

## 2024-05-03 RX ADMIN — IPRATROPIUM BROMIDE AND ALBUTEROL SULFATE 1 DOSE: .5; 2.5 SOLUTION RESPIRATORY (INHALATION) at 13:32

## 2024-05-03 RX ADMIN — FERROUS SULFATE TAB 325 MG (65 MG ELEMENTAL FE) 325 MG: 325 (65 FE) TAB at 09:24

## 2024-05-03 RX ADMIN — IPRATROPIUM BROMIDE AND ALBUTEROL SULFATE 1 DOSE: .5; 2.5 SOLUTION RESPIRATORY (INHALATION) at 16:18

## 2024-05-03 RX ADMIN — FERROUS SULFATE TAB 325 MG (65 MG ELEMENTAL FE) 325 MG: 325 (65 FE) TAB at 20:31

## 2024-05-03 RX ADMIN — METOPROLOL SUCCINATE 25 MG: 25 TABLET, EXTENDED RELEASE ORAL at 09:23

## 2024-05-03 RX ADMIN — TOBRAMYCIN AND DEXAMETHASONE 1 DROP: 3; 1 SUSPENSION/ DROPS OPHTHALMIC at 12:32

## 2024-05-03 RX ADMIN — ENOXAPARIN SODIUM 30 MG: 100 INJECTION SUBCUTANEOUS at 09:21

## 2024-05-03 RX ADMIN — MIDODRINE HYDROCHLORIDE 5 MG: 5 TABLET ORAL at 09:23

## 2024-05-03 RX ADMIN — LACTULOSE 20 G: 20 SOLUTION ORAL at 09:22

## 2024-05-03 RX ADMIN — SODIUM CHLORIDE, PRESERVATIVE FREE 10 ML: 5 INJECTION INTRAVENOUS at 09:24

## 2024-05-03 RX ADMIN — POTASSIUM CHLORIDE 20 MEQ: 1500 TABLET, EXTENDED RELEASE ORAL at 09:22

## 2024-05-03 ASSESSMENT — PAIN SCALES - GENERAL
PAINLEVEL_OUTOF10: 8
PAINLEVEL_OUTOF10: 9

## 2024-05-03 ASSESSMENT — PAIN DESCRIPTION - LOCATION
LOCATION: CHEST
LOCATION: RIB CAGE

## 2024-05-03 ASSESSMENT — PAIN - FUNCTIONAL ASSESSMENT: PAIN_FUNCTIONAL_ASSESSMENT: PREVENTS OR INTERFERES SOME ACTIVE ACTIVITIES AND ADLS

## 2024-05-03 ASSESSMENT — PAIN DESCRIPTION - ORIENTATION
ORIENTATION: RIGHT
ORIENTATION: RIGHT

## 2024-05-03 ASSESSMENT — PAIN DESCRIPTION - DESCRIPTORS
DESCRIPTORS: ACHING;DISCOMFORT;SORE
DESCRIPTORS: ACHING;CRAMPING;NAGGING

## 2024-05-03 NOTE — CARE COORDINATION
5/3/24  Transition of care update.  Patient is POD#4 for right Vats. Patient still has chest tube and Pleur-X.  Chest tube to continue per CTS until airleak resolves.  Patient is on room air.  PT/OT updates complete with improvements noted. Patient had AM-PAC of 16/24 for both PT and OT. Patient ambulated 50 feet SBA.  Patient is planned to go home with [facility] following . Patient will discharge with a pleur-X and will needs kits prior to discharge. Patient states her son and cousin Dhara will help with Pleur-X drain care on the days [facility] is not there. CTS will need to place specific orders regarding drain care prior to discharge for home care. Pleur-X log given to Patient and [facility] has a copy.  Patient has a life vest in her room for discharge.  Will need to make sure pt receives 30 day free Eliquis & monthly refill  assist at discharge.   Patient states A walker was ordered at the \"other hospital \" and sent home with son.  A referral was placed to [employer] per the previous case manger to help with home delivered meals. [facility] to follow.     Electronically signed by ADAMA Shirley on 5/3/2024 at 12:21 PM

## 2024-05-03 NOTE — PLAN OF CARE
Problem: Safety - Adult  Goal: Free from fall injury  Outcome: Progressing     Problem: Chronic Conditions and Co-morbidities  Goal: Patient's chronic conditions and co-morbidity symptoms are monitored and maintained or improved  Outcome: Progressing     Problem: Cardiovascular - Adult  Goal: Maintains optimal cardiac output and hemodynamic stability  5/2/2024 0947 by Minerva Aden RN  Outcome: Progressing     Problem: Pain  Goal: Verbalizes/displays adequate comfort level or baseline comfort level  5/2/2024 0947 by Minerva Aden RN  Outcome: Progressing     Problem: Skin/Tissue Integrity  Goal: Absence of new skin breakdown  Description: 1.  Monitor for areas of redness and/or skin breakdown  2.  Assess vascular access sites hourly  3.  Every 4-6 hours minimum:  Change oxygen saturation probe site  4.  Every 4-6 hours:  If on nasal continuous positive airway pressure, respiratory therapy assess nares and determine need for appliance change or resting period.  5/2/2024 0947 by Minerva Aden RN  Outcome: Progressing     Problem: ABCDS Injury Assessment  Goal: Absence of physical injury  5/2/2024 0947 by Minerva Aden RN  Outcome: Progressing     Problem: Nutrition Deficit:  Goal: Optimize nutritional status  5/2/2024 0947 by Minerva Aden RN  Outcome: Progressing

## 2024-05-03 NOTE — PLAN OF CARE
Problem: Safety - Adult  Goal: Free from fall injury  Outcome: Progressing     Problem: Chronic Conditions and Co-morbidities  Goal: Patient's chronic conditions and co-morbidity symptoms are monitored and maintained or improved  Outcome: Progressing     Problem: Discharge Planning  Goal: Discharge to home or other facility with appropriate resources  Outcome: Progressing     Problem: Cardiovascular - Adult  Goal: Maintains optimal cardiac output and hemodynamic stability  Outcome: Progressing     Problem: Pain  Goal: Verbalizes/displays adequate comfort level or baseline comfort level  Outcome: Progressing     Problem: Skin/Tissue Integrity  Goal: Absence of new skin breakdown  Description: 1.  Monitor for areas of redness and/or skin breakdown  2.  Assess vascular access sites hourly  3.  Every 4-6 hours minimum:  Change oxygen saturation probe site  4.  Every 4-6 hours:  If on nasal continuous positive airway pressure, respiratory therapy assess nares and determine need for appliance change or resting period.  Outcome: Progressing     Problem: ABCDS Injury Assessment  Goal: Absence of physical injury  Outcome: Progressing     Problem: Nutrition Deficit:  Goal: Optimize nutritional status  Outcome: Progressing

## 2024-05-04 ENCOUNTER — APPOINTMENT (OUTPATIENT)
Dept: GENERAL RADIOLOGY | Age: 79
DRG: 166 | End: 2024-05-04
Attending: INTERNAL MEDICINE
Payer: MEDICARE

## 2024-05-04 PROBLEM — K64.3 GRADE IV HEMORRHOIDS: Status: ACTIVE | Noted: 2024-05-04

## 2024-05-04 LAB
ANION GAP SERPL CALCULATED.3IONS-SCNC: 10 MMOL/L (ref 7–16)
BUN SERPL-MCNC: 25 MG/DL (ref 6–23)
CA-I BLD-SCNC: 1.15 MMOL/L (ref 1.15–1.33)
CALCIUM SERPL-MCNC: 8.2 MG/DL (ref 8.6–10.2)
CHLORIDE SERPL-SCNC: 101 MMOL/L (ref 98–107)
CO2 SERPL-SCNC: 26 MMOL/L (ref 22–29)
CREAT SERPL-MCNC: 1.3 MG/DL (ref 0.5–1)
ERYTHROCYTE [DISTWIDTH] IN BLOOD BY AUTOMATED COUNT: 18.5 % (ref 11.5–15)
GFR, ESTIMATED: 41 ML/MIN/1.73M2
GLUCOSE SERPL-MCNC: 83 MG/DL (ref 74–99)
HCT VFR BLD AUTO: 29.2 % (ref 34–48)
HGB BLD-MCNC: 8.9 G/DL (ref 11.5–15.5)
MAGNESIUM SERPL-MCNC: 2 MG/DL (ref 1.6–2.6)
MCH RBC QN AUTO: 28.4 PG (ref 26–35)
MCHC RBC AUTO-ENTMCNC: 30.5 G/DL (ref 32–34.5)
MCV RBC AUTO: 93.3 FL (ref 80–99.9)
PHOSPHATE SERPL-MCNC: 2.8 MG/DL (ref 2.5–4.5)
PLATELET # BLD AUTO: 507 K/UL (ref 130–450)
PMV BLD AUTO: 9.5 FL (ref 7–12)
POTASSIUM SERPL-SCNC: 3.7 MMOL/L (ref 3.5–5)
RBC # BLD AUTO: 3.13 M/UL (ref 3.5–5.5)
SODIUM SERPL-SCNC: 137 MMOL/L (ref 132–146)
WBC OTHER # BLD: 6.3 K/UL (ref 4.5–11.5)

## 2024-05-04 PROCEDURE — 84100 ASSAY OF PHOSPHORUS: CPT

## 2024-05-04 PROCEDURE — 2580000003 HC RX 258

## 2024-05-04 PROCEDURE — 80048 BASIC METABOLIC PNL TOTAL CA: CPT

## 2024-05-04 PROCEDURE — 6370000000 HC RX 637 (ALT 250 FOR IP)

## 2024-05-04 PROCEDURE — 83735 ASSAY OF MAGNESIUM: CPT

## 2024-05-04 PROCEDURE — 71045 X-RAY EXAM CHEST 1 VIEW: CPT

## 2024-05-04 PROCEDURE — 94640 AIRWAY INHALATION TREATMENT: CPT

## 2024-05-04 PROCEDURE — 36415 COLL VENOUS BLD VENIPUNCTURE: CPT

## 2024-05-04 PROCEDURE — 85027 COMPLETE CBC AUTOMATED: CPT

## 2024-05-04 PROCEDURE — 82330 ASSAY OF CALCIUM: CPT

## 2024-05-04 PROCEDURE — 99232 SBSQ HOSP IP/OBS MODERATE 35: CPT | Performed by: INTERNAL MEDICINE

## 2024-05-04 PROCEDURE — 99222 1ST HOSP IP/OBS MODERATE 55: CPT | Performed by: SURGERY

## 2024-05-04 PROCEDURE — 2140000000 HC CCU INTERMEDIATE R&B

## 2024-05-04 PROCEDURE — 6360000002 HC RX W HCPCS

## 2024-05-04 RX ORDER — HEPARIN SODIUM 5000 [USP'U]/ML
5000 INJECTION, SOLUTION INTRAVENOUS; SUBCUTANEOUS 2 TIMES DAILY
Status: DISCONTINUED | OUTPATIENT
Start: 2024-05-05 | End: 2024-05-08 | Stop reason: HOSPADM

## 2024-05-04 RX ADMIN — PANTOPRAZOLE SODIUM 40 MG: 40 TABLET, DELAYED RELEASE ORAL at 05:58

## 2024-05-04 RX ADMIN — TOBRAMYCIN AND DEXAMETHASONE 1 DROP: 3; 1 SUSPENSION/ DROPS OPHTHALMIC at 12:38

## 2024-05-04 RX ADMIN — Medication 1000 UNITS: at 08:53

## 2024-05-04 RX ADMIN — ENOXAPARIN SODIUM 30 MG: 100 INJECTION SUBCUTANEOUS at 08:52

## 2024-05-04 RX ADMIN — SODIUM CHLORIDE, PRESERVATIVE FREE 10 ML: 5 INJECTION INTRAVENOUS at 20:51

## 2024-05-04 RX ADMIN — PETROLATUM: 42 OINTMENT TOPICAL at 09:00

## 2024-05-04 RX ADMIN — METOPROLOL SUCCINATE 25 MG: 25 TABLET, EXTENDED RELEASE ORAL at 08:53

## 2024-05-04 RX ADMIN — TOBRAMYCIN AND DEXAMETHASONE 1 DROP: 3; 1 SUSPENSION/ DROPS OPHTHALMIC at 20:52

## 2024-05-04 RX ADMIN — MIDODRINE HYDROCHLORIDE 5 MG: 5 TABLET ORAL at 12:37

## 2024-05-04 RX ADMIN — OXYCODONE HYDROCHLORIDE 10 MG: 10 TABLET ORAL at 20:48

## 2024-05-04 RX ADMIN — TORSEMIDE 20 MG: 20 TABLET ORAL at 08:53

## 2024-05-04 RX ADMIN — IPRATROPIUM BROMIDE AND ALBUTEROL SULFATE 1 DOSE: .5; 2.5 SOLUTION RESPIRATORY (INHALATION) at 16:49

## 2024-05-04 RX ADMIN — TOBRAMYCIN AND DEXAMETHASONE 1 DROP: 3; 1 SUSPENSION/ DROPS OPHTHALMIC at 17:14

## 2024-05-04 RX ADMIN — EMPAGLIFLOZIN 10 MG: 10 TABLET, FILM COATED ORAL at 08:53

## 2024-05-04 RX ADMIN — MIDODRINE HYDROCHLORIDE 5 MG: 5 TABLET ORAL at 08:53

## 2024-05-04 RX ADMIN — PETROLATUM: 42 OINTMENT TOPICAL at 20:52

## 2024-05-04 RX ADMIN — OXYCODONE 5 MG: 5 TABLET ORAL at 14:32

## 2024-05-04 RX ADMIN — IPRATROPIUM BROMIDE AND ALBUTEROL SULFATE 1 DOSE: .5; 2.5 SOLUTION RESPIRATORY (INHALATION) at 09:18

## 2024-05-04 RX ADMIN — ALLOPURINOL 100 MG: 100 TABLET ORAL at 08:52

## 2024-05-04 RX ADMIN — SENNOSIDES AND DOCUSATE SODIUM 1 TABLET: 50; 8.6 TABLET ORAL at 20:51

## 2024-05-04 RX ADMIN — IPRATROPIUM BROMIDE AND ALBUTEROL SULFATE 1 DOSE: .5; 2.5 SOLUTION RESPIRATORY (INHALATION) at 20:15

## 2024-05-04 RX ADMIN — LEVOTHYROXINE SODIUM 100 MCG: 0.1 TABLET ORAL at 05:58

## 2024-05-04 RX ADMIN — HYDROCORTISONE: 25 CREAM TOPICAL at 20:56

## 2024-05-04 RX ADMIN — MORPHINE SULFATE 2 MG: 2 INJECTION, SOLUTION INTRAMUSCULAR; INTRAVENOUS at 08:59

## 2024-05-04 RX ADMIN — TOBRAMYCIN AND DEXAMETHASONE 1 DROP: 3; 1 SUSPENSION/ DROPS OPHTHALMIC at 09:00

## 2024-05-04 RX ADMIN — TORSEMIDE 20 MG: 20 TABLET ORAL at 12:37

## 2024-05-04 RX ADMIN — POTASSIUM CHLORIDE 20 MEQ: 1500 TABLET, EXTENDED RELEASE ORAL at 08:53

## 2024-05-04 RX ADMIN — MIDODRINE HYDROCHLORIDE 5 MG: 5 TABLET ORAL at 17:14

## 2024-05-04 RX ADMIN — SODIUM CHLORIDE, PRESERVATIVE FREE 10 ML: 5 INJECTION INTRAVENOUS at 08:59

## 2024-05-04 RX ADMIN — SENNOSIDES AND DOCUSATE SODIUM 1 TABLET: 50; 8.6 TABLET ORAL at 08:53

## 2024-05-04 RX ADMIN — OXYCODONE 5 MG: 5 TABLET ORAL at 06:15

## 2024-05-04 RX ADMIN — FERROUS SULFATE TAB 325 MG (65 MG ELEMENTAL FE) 325 MG: 325 (65 FE) TAB at 08:53

## 2024-05-04 RX ADMIN — POTASSIUM CHLORIDE 20 MEQ: 1500 TABLET, EXTENDED RELEASE ORAL at 20:51

## 2024-05-04 RX ADMIN — FERROUS SULFATE TAB 325 MG (65 MG ELEMENTAL FE) 325 MG: 325 (65 FE) TAB at 20:51

## 2024-05-04 RX ADMIN — ATORVASTATIN CALCIUM 40 MG: 40 TABLET, FILM COATED ORAL at 20:51

## 2024-05-04 ASSESSMENT — PAIN SCALES - GENERAL
PAINLEVEL_OUTOF10: 6
PAINLEVEL_OUTOF10: 8
PAINLEVEL_OUTOF10: 8
PAINLEVEL_OUTOF10: 3

## 2024-05-04 ASSESSMENT — PAIN DESCRIPTION - PAIN TYPE: TYPE: SURGICAL PAIN

## 2024-05-04 ASSESSMENT — PAIN DESCRIPTION - LOCATION
LOCATION: CHEST
LOCATION: INCISION;FLANK
LOCATION: CHEST

## 2024-05-04 ASSESSMENT — PAIN DESCRIPTION - FREQUENCY: FREQUENCY: CONTINUOUS

## 2024-05-04 ASSESSMENT — PAIN DESCRIPTION - DESCRIPTORS
DESCRIPTORS: ACHING;DISCOMFORT;SORE
DESCRIPTORS: ACHING
DESCRIPTORS: ACHING;DISCOMFORT;SORE;TENDER

## 2024-05-04 ASSESSMENT — PAIN DESCRIPTION - ORIENTATION: ORIENTATION: RIGHT

## 2024-05-04 ASSESSMENT — PAIN DESCRIPTION - ONSET: ONSET: ON-GOING

## 2024-05-04 NOTE — CONSULTS
Comprehensive Nutrition Assessment    Type and Reason for Visit:  Initial, Positive Nutrition Screen    Nutrition Recommendations/Plan:   Continue current diet  Start ONS to promote oral intake and optimize wound healing   Will monitor     Malnutrition Assessment:  Malnutrition Status:  Severe malnutrition (04/30/24 1301)    Context:  Acute Illness     Findings of the 6 clinical characteristics of malnutrition:  Energy Intake:  75% or less of estimated energy requirements for 7 or more days  Weight Loss:  Unable to assess (d/t wt flux per EMR wt hx- will continue to monitor wt trends)     Body Fat Loss:  Moderate body fat loss Orbital, Triceps, Fat Overlying Ribs   Muscle Mass Loss:  Moderate muscle mass loss Temples (temporalis), Clavicles (pectoralis & deltoids), Calf (gastrocnemius)  Fluid Accumulation:  No significant fluid accumulation     Strength:  Not Performed    Nutrition Assessment:    pt adm d/t R-pleural effusion, transf from [facility] for VATS- now s/p VATS; PMhx of Sjogren's disease, CAD s/p CABG (02/2024), DM, RA; pt meets criteria for severe malnutrition; will start ONS to promote oral intake & aid in wound healing; will monitor.    Nutrition Related Findings:    muscle/fat wasting; -I/O; A&Ox4; active BS; trace edema; hyperkalemia Wound Type: Multiple, Deep Tissue Injury, Surgical Incision       Current Nutrition Intake & Therapies:    Average Meal Intake: 26-50%  Average Supplements Intake: None Ordered  ADULT DIET; Regular  ADULT ORAL NUTRITION SUPPLEMENT; Breakfast, Lunch; Wound Healing Oral Supplement  ADULT ORAL NUTRITION SUPPLEMENT; Lunch; Fortified Gelatin Oral Supplement  ADULT ORAL NUTRITION SUPPLEMENT; Dinner; Frozen Oral Supplement    Anthropometric Measures:  Height: 160 cm (5' 2.99\")  Ideal Body Weight (IBW): 115 lbs (52 kg)       Current Body Weight: 54.9 kg (121 lb 0.5 oz) (4/6-SS; wt of 49kg via BS on 4/28 appears to be an outlier... UTO updated measured wt as pt in chair at bedside ; 
cyanosis      LABS:  CBC  Recent Labs     05/03/24  0543   WBC 5.9   HGB 8.1*   HCT 26.1*   *     BMP  Recent Labs     05/03/24  0543      K 3.7      CO2 20*   BUN 33*   CREATININE 1.4*   CALCIUM 8.1*     Liver Function  No results for input(s): \"AMYLASE\", \"LIPASE\", \"BILITOT\", \"BILIDIR\", \"AST\", \"ALT\", \"ALKPHOS\", \"PROT\", \"LABALBU\" in the last 72 hours.  No results for input(s): \"LACTATE\" in the last 72 hours.  No results for input(s): \"INR\" in the last 72 hours.    Invalid input(s): \"PT\", \"PTT\"    RADIOLOGY  I have personally reviewed all relevant imaging    ASSESSMENT:      Patient Active Problem List   Diagnosis    S/P cardiac cath    Coronary artery disease involving native coronary artery of native heart without angina pectoris    ST elevation myocardial infarction (STEMI) (HCC)    Postoperative hypotension    Complication of surgical procedure    Stress hyperglycemia    VERONIQUE (acute kidney injury) (HCC)    Acute on chronic systolic heart failure (HCC)    Acute on chronic congestive heart failure (HCC)    Severe protein-calorie malnutrition (HCC)    Chronic systolic (congestive) heart failure (HCC)    Dyspnea on exertion    Failure to thrive in adult    Multiple subsegmental pulmonary emboli (HCC)    Ischemic cardiomyopathy    Mitral valve disease    Stage 3b chronic kidney disease (HCC)    Pleural effusion    Loculated pleural effusion    Recurrent right pleural effusion       78 y.o. female with Recurrent right pleural effusion s/p CABG x 3 on 2/7/24  s/p RVATS, pleurodesis, insertion pleurX on 4/29/24 now presents with 1 episode of bright red blood per rectum.    PLAN:    -Patient with external hemorrhoids noted on rectal exam, likely the source of bright red blood per rectum.  -Will add stool softeners and Anusol  -No further plans for general surgery intervention    Plan will be discussed with Dr. Chava Montez, DO  General Surgery Resident, PGY-1    Electronically signed by 
right-sided hemothorax     FINDINGS:  Mediastinum: Thyroid is homogeneous in attenuation. No bulky mediastinal  adenopathy. Central airways are patent. Esophagus with normal course and  caliber.  Cardiac size enlarged without pericardial effusion.     Lungs/pleura: Mixed density right pleural fluid collection with  posterolateral small bore drainage catheter in the pleural space identified  similar appearance to prior with mixed densities in overall slightly  increased volume moderate right effusion may be partially loculated with  adjacent compressive atelectasis.  No new consolidation in the interim     Upper Abdomen: Visualized portions of the upper abdomen unremarkable.     Soft Tissues/Bones: No acute osseous or soft tissue findings. No aggressive  osseous lesion.     IMPRESSION:  Mixed density right pleural fluid collection with posterolateral small bore  drainage catheter in the pleural space identified similar appearance to prior  with mixed densities in overall slightly increased volume moderate right  effusion may be partially loculated with adjacent compressive atelectasis.  Considerations for mixed collection empyema versus evolving hemothorax/mixed  pleural effusion.  No new consolidation in the interim.    Assessment:  77yo F with PMH CAD s/p CABG x3 2/7/2024, ICM,  presents as a transfer from OSH for management of loculated pleural effusion. She intitially presented to OSH 4/12 for SOB. She was found to have a large right pleural effusion as well as a left lower lobe pulmonary embolism.   Per chart review, she underwent thoracentesis 4/12 with drainage of 1000 cc, 4/16 with drainage of 900 cc. Repeat imaging complete 4/17 demonstrated continued pleural effusion with loculations. She then underwent placement of PleurX catheter on 4/19. Repeat imaging on 4/22 demonstrated continued right pleural effusion with no change and continued loculations. TPA-dornase was given on 4/23. The PleurX catheter was 
aorta measuring 3.7 cm.  Minimal burden of acute pulmonary emboli seen within left lower lobe segmental/subsegmental branches. HEART: Mildly enlarged.  No pericardial effusion.  Coronary calcification. Median sternotomy. MEDIASTINUM: No obvious lymphadenopathy. LUNGS: Large right pleural effusion which is partially loculated.  Trace left pleural effusion.  Collapse right middle lobe and atelectasis right lower lobe.  6 mm nodular density in the lingula axial image 130. BONES: No aggressive osseous lesion. ADDITIONAL FINDINGS: Small hiatal hernia.     Acute pulmonary embolus within left lower lobe segmental/subsegmental branches.  Overall clot burden is minimal.  No clear evidence of right heart strain. Large right pleural effusion which is partially loculated. Trace left effusion. Collapse of the right middle lobe and atelectasis of the right lower lobe. 6 mm nodular density within the lingula.  Follow-up chest CT advised within 12 months.         Echo (TTE) complete (PRN contrast/bubble/strain/3D)    Result Date: 4/5/2024    Left Ventricle: The EF by visual approximation is 30 - 35%. Left ventricle is moderately dilated. Septal flattening in diastole consistent with right ventricular volume overload. Grade I diastolic dysfunction with normal LAP.   Right Ventricle: Right ventricle size is normal. Moderately reduced systolic function.   Aortic Valve: Not well visualized. No stenosis. AV area by continuity VTI is 2.6 cm2. AV area by peak velocity is 2.3 cm2.   Mitral Valve: Moderate regurgitation.   Tricuspid Valve: Severe regurgitation.   Right Atrium: Right atrium is severely dilated.   Image quality is fair.           Assessment:     Loculated/complex right sided effusion, s/p VATS, talc pleurodesis with pleurx cathter placement  Pulmonary embolism, apixaban on hold-currently on lovenox  CAD, s/p CABG X 2/7/2024  HFrEF  30-35%  Cardiomyopathy  Sjogrens disease  Hyperkalemia, lokelma         Plan:     Chest tube 
none

## 2024-05-04 NOTE — PATIENT CARE CONFERENCE
P Quality Flow/Interdisciplinary Rounds Progress Note        Quality Flow Rounds held on May 4, 2024    Disciplines Attending:  Bedside Nurse, , , and Nursing Unit Leadership    Kattcirilo Diaz was admitted on 4/28/2024  7:00 PM    Anticipated Discharge Date:       Disposition:    Dani Score:  Dani Scale Score: 16    Readmission Risk              Risk of Unplanned Readmission:  53           Discussed patient goal for the day, patient clinical progression, and barriers to discharge.  The following Goal(s) of the Day/Commitment(s) have been identified:  downgrade/discharge plan      Dalila Mcqueen RN  May 4, 2024

## 2024-05-04 NOTE — PLAN OF CARE
Problem: Safety - Adult  Goal: Free from fall injury  5/3/2024 2027 by Maritza Glez RN  Outcome: Progressing  5/3/2024 1024 by Sonido Reynolds RN  Outcome: Progressing     Problem: Chronic Conditions and Co-morbidities  Goal: Patient's chronic conditions and co-morbidity symptoms are monitored and maintained or improved  5/3/2024 2027 by Maritza Glez RN  Outcome: Progressing  5/3/2024 1024 by Sonido Reynolds RN  Outcome: Progressing     Problem: Discharge Planning  Goal: Discharge to home or other facility with appropriate resources  5/3/2024 1024 by Sonido Reynolds RN  Outcome: Progressing     Problem: Cardiovascular - Adult  Goal: Maintains optimal cardiac output and hemodynamic stability  5/3/2024 1024 by Sonido Reynolds RN  Outcome: Progressing     Problem: Pain  Goal: Verbalizes/displays adequate comfort level or baseline comfort level  5/3/2024 1024 by Sonido Reynolds RN  Outcome: Progressing     Problem: Skin/Tissue Integrity  Goal: Absence of new skin breakdown  Description: 1.  Monitor for areas of redness and/or skin breakdown  2.  Assess vascular access sites hourly  3.  Every 4-6 hours minimum:  Change oxygen saturation probe site  4.  Every 4-6 hours:  If on nasal continuous positive airway pressure, respiratory therapy assess nares and determine need for appliance change or resting period.  5/3/2024 1024 by Sonido Reynolds RN  Outcome: Progressing     Problem: ABCDS Injury Assessment  Goal: Absence of physical injury  5/3/2024 1024 by Sonido Reynolds RN  Outcome: Progressing     Problem: Nutrition Deficit:  Goal: Optimize nutritional status  5/3/2024 1024 by Sonido Reynolds RN  Outcome: Progressing

## 2024-05-05 ENCOUNTER — APPOINTMENT (OUTPATIENT)
Dept: GENERAL RADIOLOGY | Age: 79
DRG: 166 | End: 2024-05-05
Attending: INTERNAL MEDICINE
Payer: MEDICARE

## 2024-05-05 LAB
ANION GAP SERPL CALCULATED.3IONS-SCNC: 13 MMOL/L (ref 7–16)
BUN SERPL-MCNC: 22 MG/DL (ref 6–23)
CA-I BLD-SCNC: 1.17 MMOL/L (ref 1.15–1.33)
CALCIUM SERPL-MCNC: 8.2 MG/DL (ref 8.6–10.2)
CHLORIDE SERPL-SCNC: 103 MMOL/L (ref 98–107)
CO2 SERPL-SCNC: 21 MMOL/L (ref 22–29)
CREAT SERPL-MCNC: 1.2 MG/DL (ref 0.5–1)
ERYTHROCYTE [DISTWIDTH] IN BLOOD BY AUTOMATED COUNT: 18.4 % (ref 11.5–15)
GFR, ESTIMATED: 46 ML/MIN/1.73M2
GLUCOSE SERPL-MCNC: 85 MG/DL (ref 74–99)
HCT VFR BLD AUTO: 26.8 % (ref 34–48)
HGB BLD-MCNC: 8.3 G/DL (ref 11.5–15.5)
MAGNESIUM SERPL-MCNC: 1.7 MG/DL (ref 1.6–2.6)
MCH RBC QN AUTO: 28.5 PG (ref 26–35)
MCHC RBC AUTO-ENTMCNC: 31 G/DL (ref 32–34.5)
MCV RBC AUTO: 92.1 FL (ref 80–99.9)
MICROORGANISM SPEC CULT: NORMAL
MICROORGANISM/AGENT SPEC: NORMAL
PHOSPHATE SERPL-MCNC: 3 MG/DL (ref 2.5–4.5)
PLATELET # BLD AUTO: 442 K/UL (ref 130–450)
PMV BLD AUTO: 9.6 FL (ref 7–12)
POTASSIUM SERPL-SCNC: 4.1 MMOL/L (ref 3.5–5)
RBC # BLD AUTO: 2.91 M/UL (ref 3.5–5.5)
SODIUM SERPL-SCNC: 137 MMOL/L (ref 132–146)
SPECIMEN DESCRIPTION: NORMAL
WBC OTHER # BLD: 5.9 K/UL (ref 4.5–11.5)

## 2024-05-05 PROCEDURE — 80048 BASIC METABOLIC PNL TOTAL CA: CPT

## 2024-05-05 PROCEDURE — 6370000000 HC RX 637 (ALT 250 FOR IP)

## 2024-05-05 PROCEDURE — 6360000002 HC RX W HCPCS

## 2024-05-05 PROCEDURE — 82330 ASSAY OF CALCIUM: CPT

## 2024-05-05 PROCEDURE — 83735 ASSAY OF MAGNESIUM: CPT

## 2024-05-05 PROCEDURE — 99232 SBSQ HOSP IP/OBS MODERATE 35: CPT | Performed by: INTERNAL MEDICINE

## 2024-05-05 PROCEDURE — 84100 ASSAY OF PHOSPHORUS: CPT

## 2024-05-05 PROCEDURE — 94640 AIRWAY INHALATION TREATMENT: CPT

## 2024-05-05 PROCEDURE — 2140000000 HC CCU INTERMEDIATE R&B

## 2024-05-05 PROCEDURE — 36415 COLL VENOUS BLD VENIPUNCTURE: CPT

## 2024-05-05 PROCEDURE — 71045 X-RAY EXAM CHEST 1 VIEW: CPT

## 2024-05-05 PROCEDURE — 85027 COMPLETE CBC AUTOMATED: CPT

## 2024-05-05 PROCEDURE — 2580000003 HC RX 258

## 2024-05-05 RX ADMIN — TORSEMIDE 20 MG: 20 TABLET ORAL at 08:31

## 2024-05-05 RX ADMIN — POTASSIUM CHLORIDE 20 MEQ: 1500 TABLET, EXTENDED RELEASE ORAL at 20:22

## 2024-05-05 RX ADMIN — OXYCODONE HYDROCHLORIDE 10 MG: 10 TABLET ORAL at 06:00

## 2024-05-05 RX ADMIN — TORSEMIDE 20 MG: 20 TABLET ORAL at 13:02

## 2024-05-05 RX ADMIN — HYDROCORTISONE: 25 CREAM TOPICAL at 20:25

## 2024-05-05 RX ADMIN — SENNOSIDES AND DOCUSATE SODIUM 1 TABLET: 50; 8.6 TABLET ORAL at 08:31

## 2024-05-05 RX ADMIN — METOPROLOL SUCCINATE 25 MG: 25 TABLET, EXTENDED RELEASE ORAL at 08:30

## 2024-05-05 RX ADMIN — TOBRAMYCIN AND DEXAMETHASONE 1 DROP: 3; 1 SUSPENSION/ DROPS OPHTHALMIC at 16:59

## 2024-05-05 RX ADMIN — TOBRAMYCIN AND DEXAMETHASONE 1 DROP: 3; 1 SUSPENSION/ DROPS OPHTHALMIC at 09:21

## 2024-05-05 RX ADMIN — SACUBITRIL AND VALSARTAN 0.5 TABLET: 24; 26 TABLET, FILM COATED ORAL at 20:22

## 2024-05-05 RX ADMIN — ALLOPURINOL 100 MG: 100 TABLET ORAL at 08:31

## 2024-05-05 RX ADMIN — TOBRAMYCIN AND DEXAMETHASONE 1 DROP: 3; 1 SUSPENSION/ DROPS OPHTHALMIC at 20:25

## 2024-05-05 RX ADMIN — EMPAGLIFLOZIN 10 MG: 10 TABLET, FILM COATED ORAL at 08:31

## 2024-05-05 RX ADMIN — IPRATROPIUM BROMIDE AND ALBUTEROL SULFATE 1 DOSE: .5; 2.5 SOLUTION RESPIRATORY (INHALATION) at 15:32

## 2024-05-05 RX ADMIN — LEVOTHYROXINE SODIUM 100 MCG: 0.1 TABLET ORAL at 05:47

## 2024-05-05 RX ADMIN — OXYCODONE 5 MG: 5 TABLET ORAL at 10:29

## 2024-05-05 RX ADMIN — IPRATROPIUM BROMIDE AND ALBUTEROL SULFATE 1 DOSE: .5; 2.5 SOLUTION RESPIRATORY (INHALATION) at 11:18

## 2024-05-05 RX ADMIN — POTASSIUM CHLORIDE 20 MEQ: 1500 TABLET, EXTENDED RELEASE ORAL at 08:30

## 2024-05-05 RX ADMIN — ATORVASTATIN CALCIUM 40 MG: 40 TABLET, FILM COATED ORAL at 20:22

## 2024-05-05 RX ADMIN — PETROLATUM: 42 OINTMENT TOPICAL at 20:24

## 2024-05-05 RX ADMIN — HYDROCORTISONE: 25 CREAM TOPICAL at 09:20

## 2024-05-05 RX ADMIN — HEPARIN SODIUM 5000 UNITS: 5000 INJECTION INTRAVENOUS; SUBCUTANEOUS at 08:31

## 2024-05-05 RX ADMIN — OXYCODONE HYDROCHLORIDE 10 MG: 10 TABLET ORAL at 20:24

## 2024-05-05 RX ADMIN — TOBRAMYCIN AND DEXAMETHASONE 1 DROP: 3; 1 SUSPENSION/ DROPS OPHTHALMIC at 13:03

## 2024-05-05 RX ADMIN — ACETAMINOPHEN 650 MG: 325 TABLET ORAL at 08:33

## 2024-05-05 RX ADMIN — PANTOPRAZOLE SODIUM 40 MG: 40 TABLET, DELAYED RELEASE ORAL at 05:47

## 2024-05-05 RX ADMIN — Medication 1000 UNITS: at 08:30

## 2024-05-05 RX ADMIN — SODIUM CHLORIDE, PRESERVATIVE FREE 10 ML: 5 INJECTION INTRAVENOUS at 20:26

## 2024-05-05 RX ADMIN — HEPARIN SODIUM 5000 UNITS: 5000 INJECTION INTRAVENOUS; SUBCUTANEOUS at 20:22

## 2024-05-05 RX ADMIN — IPRATROPIUM BROMIDE AND ALBUTEROL SULFATE 1 DOSE: .5; 2.5 SOLUTION RESPIRATORY (INHALATION) at 07:32

## 2024-05-05 RX ADMIN — MIDODRINE HYDROCHLORIDE 5 MG: 5 TABLET ORAL at 13:02

## 2024-05-05 RX ADMIN — FERROUS SULFATE TAB 325 MG (65 MG ELEMENTAL FE) 325 MG: 325 (65 FE) TAB at 20:22

## 2024-05-05 RX ADMIN — IPRATROPIUM BROMIDE AND ALBUTEROL SULFATE 1 DOSE: .5; 2.5 SOLUTION RESPIRATORY (INHALATION) at 21:02

## 2024-05-05 RX ADMIN — PETROLATUM: 42 OINTMENT TOPICAL at 09:21

## 2024-05-05 RX ADMIN — MIDODRINE HYDROCHLORIDE 5 MG: 5 TABLET ORAL at 16:59

## 2024-05-05 RX ADMIN — FERROUS SULFATE TAB 325 MG (65 MG ELEMENTAL FE) 325 MG: 325 (65 FE) TAB at 08:30

## 2024-05-05 RX ADMIN — MIDODRINE HYDROCHLORIDE 5 MG: 5 TABLET ORAL at 09:23

## 2024-05-05 RX ADMIN — SODIUM CHLORIDE, PRESERVATIVE FREE 10 ML: 5 INJECTION INTRAVENOUS at 09:21

## 2024-05-05 ASSESSMENT — PAIN DESCRIPTION - ORIENTATION
ORIENTATION: RIGHT

## 2024-05-05 ASSESSMENT — PAIN SCALES - GENERAL
PAINLEVEL_OUTOF10: 5
PAINLEVEL_OUTOF10: 6
PAINLEVEL_OUTOF10: 3
PAINLEVEL_OUTOF10: 8
PAINLEVEL_OUTOF10: 4
PAINLEVEL_OUTOF10: 8
PAINLEVEL_OUTOF10: 8
PAINLEVEL_OUTOF10: 3
PAINLEVEL_OUTOF10: 3

## 2024-05-05 ASSESSMENT — PAIN DESCRIPTION - LOCATION
LOCATION: CHEST
LOCATION: FLANK
LOCATION: FLANK;INCISION
LOCATION: FLANK;INCISION
LOCATION: CHEST

## 2024-05-05 ASSESSMENT — PAIN DESCRIPTION - PAIN TYPE
TYPE: SURGICAL PAIN
TYPE: SURGICAL PAIN

## 2024-05-05 ASSESSMENT — PAIN DESCRIPTION - DESCRIPTORS
DESCRIPTORS: ACHING;DISCOMFORT;SORE;TENDER
DESCRIPTORS: ACHING
DESCRIPTORS: ACHING;DISCOMFORT;SORE;TENDER
DESCRIPTORS: ACHING
DESCRIPTORS: ACHING;DISCOMFORT;SORE;TENDER

## 2024-05-05 ASSESSMENT — PAIN DESCRIPTION - ONSET
ONSET: ON-GOING
ONSET: ON-GOING

## 2024-05-05 ASSESSMENT — PAIN DESCRIPTION - FREQUENCY
FREQUENCY: CONTINUOUS
FREQUENCY: CONTINUOUS

## 2024-05-05 NOTE — PATIENT CARE CONFERENCE
P Quality Flow/Interdisciplinary Rounds Progress Note        Quality Flow Rounds held on May 5, 2024    Disciplines Attending:  Bedside Nurse, , , and Nursing Unit Leadership    Kattcirilo Diaz was admitted on 4/28/2024  7:00 PM    Anticipated Discharge Date:       Disposition:    Dani Score:  Dani Scale Score: 20    Readmission Risk              Risk of Unplanned Readmission:  53           Discussed patient goal for the day, patient clinical progression, and barriers to discharge.  The following Goal(s) of the Day/Commitment(s) have been identified:  discharge planning/downgrade      Dalila Mcqueen RN  May 5, 2024

## 2024-05-05 NOTE — PLAN OF CARE
Problem: Safety - Adult  Goal: Free from fall injury  Outcome: Progressing     Problem: Chronic Conditions and Co-morbidities  Goal: Patient's chronic conditions and co-morbidity symptoms are monitored and maintained or improved  Outcome: Progressing     Problem: Discharge Planning  Goal: Discharge to home or other facility with appropriate resources  Outcome: Progressing     Problem: Cardiovascular - Adult  Goal: Maintains optimal cardiac output and hemodynamic stability  Outcome: Progressing     Problem: Pain  Goal: Verbalizes/displays adequate comfort level or baseline comfort level  Outcome: Progressing     Problem: Skin/Tissue Integrity  Goal: Absence of new skin breakdown  Description: 1.  Monitor for areas of redness and/or skin breakdown  2.  Assess vascular access sites hourly  3.  Every 4-6 hours minimum:  Change oxygen saturation probe site  4.  Every 4-6 hours:  If on nasal continuous positive airway pressure, respiratory therapy assess nares and determine need for appliance change or resting period.  Outcome: Progressing     Problem: ABCDS Injury Assessment  Goal: Absence of physical injury  Outcome: Progressing

## 2024-05-05 NOTE — PLAN OF CARE
Problem: Safety - Adult  Goal: Free from fall injury  5/5/2024 1536 by Tiesha Melgar RN  Outcome: Progressing  5/5/2024 0200 by Toni Sharp RN  Outcome: Progressing     Problem: Chronic Conditions and Co-morbidities  Goal: Patient's chronic conditions and co-morbidity symptoms are monitored and maintained or improved  5/5/2024 1536 by Tiesha Melgar RN  Outcome: Progressing  5/5/2024 0200 by Toni Sharp RN  Outcome: Progressing     Problem: Discharge Planning  Goal: Discharge to home or other facility with appropriate resources  5/5/2024 1536 by Tiesha Melgar RN  Outcome: Progressing  5/5/2024 0200 by Toni Sharp RN  Outcome: Progressing     Problem: Cardiovascular - Adult  Goal: Maintains optimal cardiac output and hemodynamic stability  5/5/2024 1536 by Tiesha Melgar RN  Outcome: Progressing  5/5/2024 0200 by Toni Sharp RN  Outcome: Progressing     Problem: Pain  Goal: Verbalizes/displays adequate comfort level or baseline comfort level  5/5/2024 1536 by Tiesha Melgar RN  Outcome: Progressing  5/5/2024 0200 by Toni Sharp RN  Outcome: Progressing     Problem: Skin/Tissue Integrity  Goal: Absence of new skin breakdown  Description: 1.  Monitor for areas of redness and/or skin breakdown  2.  Assess vascular access sites hourly  3.  Every 4-6 hours minimum:  Change oxygen saturation probe site  4.  Every 4-6 hours:  If on nasal continuous positive airway pressure, respiratory therapy assess nares and determine need for appliance change or resting period.  5/5/2024 1536 by Tiesha Melgar RN  Outcome: Progressing  5/5/2024 0200 by Toni Sharp RN  Outcome: Progressing     Problem: ABCDS Injury Assessment  Goal: Absence of physical injury  5/5/2024 1536 by Tiesha Melgar RN  Outcome: Progressing  5/5/2024 0200 by Toni Sharp RN  Outcome: Progressing     Problem: Nutrition Deficit:  Goal: Optimize nutritional status  Outcome:

## 2024-05-06 ENCOUNTER — APPOINTMENT (OUTPATIENT)
Dept: GENERAL RADIOLOGY | Age: 79
DRG: 166 | End: 2024-05-06
Attending: INTERNAL MEDICINE
Payer: MEDICARE

## 2024-05-06 LAB
ANION GAP SERPL CALCULATED.3IONS-SCNC: 12 MMOL/L (ref 7–16)
BUN SERPL-MCNC: 21 MG/DL (ref 6–23)
CA-I BLD-SCNC: 1.14 MMOL/L (ref 1.15–1.33)
CALCIUM SERPL-MCNC: 8.4 MG/DL (ref 8.6–10.2)
CHLORIDE SERPL-SCNC: 100 MMOL/L (ref 98–107)
CO2 SERPL-SCNC: 25 MMOL/L (ref 22–29)
CREAT SERPL-MCNC: 1.2 MG/DL (ref 0.5–1)
ERYTHROCYTE [DISTWIDTH] IN BLOOD BY AUTOMATED COUNT: 18.6 % (ref 11.5–15)
GFR, ESTIMATED: 44 ML/MIN/1.73M2
GLUCOSE SERPL-MCNC: 85 MG/DL (ref 74–99)
HCT VFR BLD AUTO: 26.7 % (ref 34–48)
HGB BLD-MCNC: 8.3 G/DL (ref 11.5–15.5)
MAGNESIUM SERPL-MCNC: 1.6 MG/DL (ref 1.6–2.6)
MCH RBC QN AUTO: 28.9 PG (ref 26–35)
MCHC RBC AUTO-ENTMCNC: 31.1 G/DL (ref 32–34.5)
MCV RBC AUTO: 93 FL (ref 80–99.9)
PHOSPHATE SERPL-MCNC: 3.8 MG/DL (ref 2.5–4.5)
PLATELET # BLD AUTO: 428 K/UL (ref 130–450)
PMV BLD AUTO: 9.6 FL (ref 7–12)
POTASSIUM SERPL-SCNC: 4.2 MMOL/L (ref 3.5–5)
RBC # BLD AUTO: 2.87 M/UL (ref 3.5–5.5)
SODIUM SERPL-SCNC: 137 MMOL/L (ref 132–146)
WBC OTHER # BLD: 6.1 K/UL (ref 4.5–11.5)

## 2024-05-06 PROCEDURE — 6370000000 HC RX 637 (ALT 250 FOR IP)

## 2024-05-06 PROCEDURE — 71045 X-RAY EXAM CHEST 1 VIEW: CPT

## 2024-05-06 PROCEDURE — 83735 ASSAY OF MAGNESIUM: CPT

## 2024-05-06 PROCEDURE — 84100 ASSAY OF PHOSPHORUS: CPT

## 2024-05-06 PROCEDURE — 2580000003 HC RX 258

## 2024-05-06 PROCEDURE — 99232 SBSQ HOSP IP/OBS MODERATE 35: CPT | Performed by: INTERNAL MEDICINE

## 2024-05-06 PROCEDURE — 94640 AIRWAY INHALATION TREATMENT: CPT

## 2024-05-06 PROCEDURE — 85027 COMPLETE CBC AUTOMATED: CPT

## 2024-05-06 PROCEDURE — 82330 ASSAY OF CALCIUM: CPT

## 2024-05-06 PROCEDURE — 6360000002 HC RX W HCPCS

## 2024-05-06 PROCEDURE — 36415 COLL VENOUS BLD VENIPUNCTURE: CPT

## 2024-05-06 PROCEDURE — 2140000000 HC CCU INTERMEDIATE R&B

## 2024-05-06 PROCEDURE — 80048 BASIC METABOLIC PNL TOTAL CA: CPT

## 2024-05-06 RX ADMIN — OXYCODONE HYDROCHLORIDE 10 MG: 10 TABLET ORAL at 14:55

## 2024-05-06 RX ADMIN — PETROLATUM: 42 OINTMENT TOPICAL at 21:32

## 2024-05-06 RX ADMIN — IPRATROPIUM BROMIDE AND ALBUTEROL SULFATE 1 DOSE: .5; 2.5 SOLUTION RESPIRATORY (INHALATION) at 16:11

## 2024-05-06 RX ADMIN — EMPAGLIFLOZIN 10 MG: 10 TABLET, FILM COATED ORAL at 08:27

## 2024-05-06 RX ADMIN — POTASSIUM CHLORIDE 20 MEQ: 1500 TABLET, EXTENDED RELEASE ORAL at 21:32

## 2024-05-06 RX ADMIN — SACUBITRIL AND VALSARTAN 0.5 TABLET: 24; 26 TABLET, FILM COATED ORAL at 21:31

## 2024-05-06 RX ADMIN — MIDODRINE HYDROCHLORIDE 5 MG: 5 TABLET ORAL at 17:13

## 2024-05-06 RX ADMIN — MIDODRINE HYDROCHLORIDE 5 MG: 5 TABLET ORAL at 08:25

## 2024-05-06 RX ADMIN — TOBRAMYCIN AND DEXAMETHASONE 1 DROP: 3; 1 SUSPENSION/ DROPS OPHTHALMIC at 12:32

## 2024-05-06 RX ADMIN — OXYCODONE HYDROCHLORIDE 10 MG: 10 TABLET ORAL at 21:30

## 2024-05-06 RX ADMIN — HYDROCORTISONE: 25 CREAM TOPICAL at 08:28

## 2024-05-06 RX ADMIN — SENNOSIDES AND DOCUSATE SODIUM 1 TABLET: 50; 8.6 TABLET ORAL at 21:31

## 2024-05-06 RX ADMIN — TORSEMIDE 20 MG: 20 TABLET ORAL at 14:27

## 2024-05-06 RX ADMIN — SODIUM CHLORIDE, PRESERVATIVE FREE 10 ML: 5 INJECTION INTRAVENOUS at 08:26

## 2024-05-06 RX ADMIN — LEVOTHYROXINE SODIUM 100 MCG: 0.1 TABLET ORAL at 05:39

## 2024-05-06 RX ADMIN — IPRATROPIUM BROMIDE AND ALBUTEROL SULFATE 1 DOSE: .5; 2.5 SOLUTION RESPIRATORY (INHALATION) at 19:43

## 2024-05-06 RX ADMIN — METOPROLOL SUCCINATE 25 MG: 25 TABLET, EXTENDED RELEASE ORAL at 08:25

## 2024-05-06 RX ADMIN — ALLOPURINOL 100 MG: 100 TABLET ORAL at 08:25

## 2024-05-06 RX ADMIN — PETROLATUM: 42 OINTMENT TOPICAL at 08:27

## 2024-05-06 RX ADMIN — ATORVASTATIN CALCIUM 40 MG: 40 TABLET, FILM COATED ORAL at 21:31

## 2024-05-06 RX ADMIN — SACUBITRIL AND VALSARTAN 0.5 TABLET: 24; 26 TABLET, FILM COATED ORAL at 08:25

## 2024-05-06 RX ADMIN — HEPARIN SODIUM 5000 UNITS: 5000 INJECTION INTRAVENOUS; SUBCUTANEOUS at 21:32

## 2024-05-06 RX ADMIN — IPRATROPIUM BROMIDE AND ALBUTEROL SULFATE 1 DOSE: .5; 2.5 SOLUTION RESPIRATORY (INHALATION) at 08:53

## 2024-05-06 RX ADMIN — IPRATROPIUM BROMIDE AND ALBUTEROL SULFATE 1 DOSE: .5; 2.5 SOLUTION RESPIRATORY (INHALATION) at 12:03

## 2024-05-06 RX ADMIN — TOBRAMYCIN AND DEXAMETHASONE 1 DROP: 3; 1 SUSPENSION/ DROPS OPHTHALMIC at 08:28

## 2024-05-06 RX ADMIN — FERROUS SULFATE TAB 325 MG (65 MG ELEMENTAL FE) 325 MG: 325 (65 FE) TAB at 08:25

## 2024-05-06 RX ADMIN — HYDROCORTISONE: 25 CREAM TOPICAL at 21:32

## 2024-05-06 RX ADMIN — SODIUM CHLORIDE, PRESERVATIVE FREE 10 ML: 5 INJECTION INTRAVENOUS at 21:31

## 2024-05-06 RX ADMIN — SENNOSIDES AND DOCUSATE SODIUM 1 TABLET: 50; 8.6 TABLET ORAL at 08:25

## 2024-05-06 RX ADMIN — FERROUS SULFATE TAB 325 MG (65 MG ELEMENTAL FE) 325 MG: 325 (65 FE) TAB at 21:31

## 2024-05-06 RX ADMIN — HEPARIN SODIUM 5000 UNITS: 5000 INJECTION INTRAVENOUS; SUBCUTANEOUS at 08:27

## 2024-05-06 RX ADMIN — TOBRAMYCIN AND DEXAMETHASONE 1 DROP: 3; 1 SUSPENSION/ DROPS OPHTHALMIC at 17:13

## 2024-05-06 RX ADMIN — MIDODRINE HYDROCHLORIDE 5 MG: 5 TABLET ORAL at 12:32

## 2024-05-06 RX ADMIN — PANTOPRAZOLE SODIUM 40 MG: 40 TABLET, DELAYED RELEASE ORAL at 05:39

## 2024-05-06 RX ADMIN — TORSEMIDE 20 MG: 20 TABLET ORAL at 08:25

## 2024-05-06 RX ADMIN — POTASSIUM CHLORIDE 20 MEQ: 1500 TABLET, EXTENDED RELEASE ORAL at 08:25

## 2024-05-06 RX ADMIN — Medication 1000 UNITS: at 08:25

## 2024-05-06 RX ADMIN — TOBRAMYCIN AND DEXAMETHASONE 1 DROP: 3; 1 SUSPENSION/ DROPS OPHTHALMIC at 21:32

## 2024-05-06 ASSESSMENT — PAIN SCALES - GENERAL
PAINLEVEL_OUTOF10: 9
PAINLEVEL_OUTOF10: 9
PAINLEVEL_OUTOF10: 0

## 2024-05-06 ASSESSMENT — PAIN DESCRIPTION - ORIENTATION: ORIENTATION: RIGHT

## 2024-05-06 ASSESSMENT — PAIN DESCRIPTION - LOCATION: LOCATION: INCISION

## 2024-05-06 ASSESSMENT — PAIN DESCRIPTION - DESCRIPTORS: DESCRIPTORS: ACHING;SORE;THROBBING

## 2024-05-06 NOTE — PLAN OF CARE
Problem: Safety - Adult  Goal: Free from fall injury  5/6/2024 0355 by Toni Sharp RN  Outcome: Progressing     Problem: Chronic Conditions and Co-morbidities  Goal: Patient's chronic conditions and co-morbidity symptoms are monitored and maintained or improved  5/6/2024 0355 by Toni Sharp RN  Outcome: Progressing     Problem: Discharge Planning  Goal: Discharge to home or other facility with appropriate resources  5/6/2024 0355 by Toni Sharp RN  Outcome: Progressing     Problem: Cardiovascular - Adult  Goal: Maintains optimal cardiac output and hemodynamic stability  5/6/2024 0356 by Toni Sharp RN  Outcome: Progressing     Problem: Pain  Goal: Verbalizes/displays adequate comfort level or baseline comfort level  5/6/2024 0355 by Toni Sharp RN  Outcome: Progressing     Problem: Skin/Tissue Integrity  Goal: Absence of new skin breakdown  Description: 1.  Monitor for areas of redness and/or skin breakdown  2.  Assess vascular access sites hourly  3.  Every 4-6 hours minimum:  Change oxygen saturation probe site  4.  Every 4-6 hours:  If on nasal continuous positive airway pressure, respiratory therapy assess nares and determine need for appliance change or resting period.  5/6/2024 0355 by Toni Sharp RN  Outcome: Progressing     Problem: ABCDS Injury Assessment  Goal: Absence of physical injury  5/6/2024 0355 by Toni Sharp RN  Outcome: Progressing

## 2024-05-06 NOTE — CARE COORDINATION
5/6/24  Transition of Care Update.  Patient is POD #7 for right VATS, pleurodesis and insertion of Pleur-X.  Chest tube continues to water seal with small air leak.  Discharge goal remains home when medically stable.  Patient is being followed by [facility] for post discharge support.  Patient will discharge with a pleur-X and will needs kits prior to discharge. Patient states her son and cousin Dhara will help with Pleur-X drain care on the days [facility] is not there. CTS will need to place specific orders regarding drain care prior to discharge for home care. Pleur-X log given to Patient and [facility].  Patient has a life vest in her room for discharge. Will need to make sure pt receives 30 day free Eliquis & monthly refill  assist at discharge.  [facility] to follow.    Electronically signed by ADAMA Shirley on 5/6/2024 at 12:38 PM

## 2024-05-07 ENCOUNTER — APPOINTMENT (OUTPATIENT)
Dept: GENERAL RADIOLOGY | Age: 79
DRG: 166 | End: 2024-05-07
Attending: INTERNAL MEDICINE
Payer: MEDICARE

## 2024-05-07 LAB
ANION GAP SERPL CALCULATED.3IONS-SCNC: 13 MMOL/L (ref 7–16)
BUN SERPL-MCNC: 18 MG/DL (ref 6–23)
CA-I BLD-SCNC: 1.13 MMOL/L (ref 1.15–1.33)
CALCIUM SERPL-MCNC: 8.4 MG/DL (ref 8.6–10.2)
CHLORIDE SERPL-SCNC: 96 MMOL/L (ref 98–107)
CO2 SERPL-SCNC: 29 MMOL/L (ref 22–29)
CREAT SERPL-MCNC: 1.2 MG/DL (ref 0.5–1)
ERYTHROCYTE [DISTWIDTH] IN BLOOD BY AUTOMATED COUNT: 18.5 % (ref 11.5–15)
GFR, ESTIMATED: 44 ML/MIN/1.73M2
GLUCOSE SERPL-MCNC: 91 MG/DL (ref 74–99)
HCT VFR BLD AUTO: 30.9 % (ref 34–48)
HGB BLD-MCNC: 9.7 G/DL (ref 11.5–15.5)
MAGNESIUM SERPL-MCNC: 1.6 MG/DL (ref 1.6–2.6)
MCH RBC QN AUTO: 28.9 PG (ref 26–35)
MCHC RBC AUTO-ENTMCNC: 31.4 G/DL (ref 32–34.5)
MCV RBC AUTO: 92 FL (ref 80–99.9)
PHOSPHATE SERPL-MCNC: 3.6 MG/DL (ref 2.5–4.5)
PLATELET # BLD AUTO: 438 K/UL (ref 130–450)
PMV BLD AUTO: 9.5 FL (ref 7–12)
POTASSIUM SERPL-SCNC: 4 MMOL/L (ref 3.5–5)
RBC # BLD AUTO: 3.36 M/UL (ref 3.5–5.5)
SODIUM SERPL-SCNC: 138 MMOL/L (ref 132–146)
WBC OTHER # BLD: 6.2 K/UL (ref 4.5–11.5)

## 2024-05-07 PROCEDURE — 36415 COLL VENOUS BLD VENIPUNCTURE: CPT

## 2024-05-07 PROCEDURE — 99233 SBSQ HOSP IP/OBS HIGH 50: CPT | Performed by: INTERNAL MEDICINE

## 2024-05-07 PROCEDURE — 97535 SELF CARE MNGMENT TRAINING: CPT

## 2024-05-07 PROCEDURE — 84100 ASSAY OF PHOSPHORUS: CPT

## 2024-05-07 PROCEDURE — 6360000002 HC RX W HCPCS

## 2024-05-07 PROCEDURE — 85027 COMPLETE CBC AUTOMATED: CPT

## 2024-05-07 PROCEDURE — 6370000000 HC RX 637 (ALT 250 FOR IP)

## 2024-05-07 PROCEDURE — 2580000003 HC RX 258

## 2024-05-07 PROCEDURE — 82330 ASSAY OF CALCIUM: CPT

## 2024-05-07 PROCEDURE — 71045 X-RAY EXAM CHEST 1 VIEW: CPT

## 2024-05-07 PROCEDURE — 97530 THERAPEUTIC ACTIVITIES: CPT

## 2024-05-07 PROCEDURE — 94640 AIRWAY INHALATION TREATMENT: CPT

## 2024-05-07 PROCEDURE — 80048 BASIC METABOLIC PNL TOTAL CA: CPT

## 2024-05-07 PROCEDURE — 2140000000 HC CCU INTERMEDIATE R&B

## 2024-05-07 PROCEDURE — 83735 ASSAY OF MAGNESIUM: CPT

## 2024-05-07 RX ORDER — HYDROCODONE BITARTRATE AND ACETAMINOPHEN 5; 325 MG/1; MG/1
1 TABLET ORAL EVERY 6 HOURS PRN
Qty: 28 TABLET | Refills: 0 | Status: SHIPPED | OUTPATIENT
Start: 2024-05-07 | End: 2024-05-08

## 2024-05-07 RX ADMIN — SACUBITRIL AND VALSARTAN 0.5 TABLET: 24; 26 TABLET, FILM COATED ORAL at 08:19

## 2024-05-07 RX ADMIN — POTASSIUM CHLORIDE 20 MEQ: 1500 TABLET, EXTENDED RELEASE ORAL at 20:14

## 2024-05-07 RX ADMIN — TORSEMIDE 20 MG: 20 TABLET ORAL at 14:53

## 2024-05-07 RX ADMIN — OXYCODONE HYDROCHLORIDE 10 MG: 10 TABLET ORAL at 20:22

## 2024-05-07 RX ADMIN — METOPROLOL SUCCINATE 25 MG: 25 TABLET, EXTENDED RELEASE ORAL at 08:18

## 2024-05-07 RX ADMIN — PETROLATUM: 42 OINTMENT TOPICAL at 20:14

## 2024-05-07 RX ADMIN — TOBRAMYCIN AND DEXAMETHASONE 1 DROP: 3; 1 SUSPENSION/ DROPS OPHTHALMIC at 20:14

## 2024-05-07 RX ADMIN — EMPAGLIFLOZIN 10 MG: 10 TABLET, FILM COATED ORAL at 08:19

## 2024-05-07 RX ADMIN — IPRATROPIUM BROMIDE AND ALBUTEROL SULFATE 1 DOSE: .5; 2.5 SOLUTION RESPIRATORY (INHALATION) at 19:52

## 2024-05-07 RX ADMIN — SACUBITRIL AND VALSARTAN 0.5 TABLET: 24; 26 TABLET, FILM COATED ORAL at 20:14

## 2024-05-07 RX ADMIN — MIDODRINE HYDROCHLORIDE 5 MG: 5 TABLET ORAL at 12:17

## 2024-05-07 RX ADMIN — SENNOSIDES AND DOCUSATE SODIUM 1 TABLET: 50; 8.6 TABLET ORAL at 08:19

## 2024-05-07 RX ADMIN — LEVOTHYROXINE SODIUM 100 MCG: 0.1 TABLET ORAL at 06:42

## 2024-05-07 RX ADMIN — OXYCODONE 5 MG: 5 TABLET ORAL at 12:59

## 2024-05-07 RX ADMIN — IPRATROPIUM BROMIDE AND ALBUTEROL SULFATE 1 DOSE: .5; 2.5 SOLUTION RESPIRATORY (INHALATION) at 15:52

## 2024-05-07 RX ADMIN — PETROLATUM: 42 OINTMENT TOPICAL at 08:20

## 2024-05-07 RX ADMIN — TORSEMIDE 20 MG: 20 TABLET ORAL at 08:19

## 2024-05-07 RX ADMIN — TOBRAMYCIN AND DEXAMETHASONE 1 DROP: 3; 1 SUSPENSION/ DROPS OPHTHALMIC at 17:42

## 2024-05-07 RX ADMIN — ATORVASTATIN CALCIUM 40 MG: 40 TABLET, FILM COATED ORAL at 20:15

## 2024-05-07 RX ADMIN — PANTOPRAZOLE SODIUM 40 MG: 40 TABLET, DELAYED RELEASE ORAL at 06:42

## 2024-05-07 RX ADMIN — TOBRAMYCIN AND DEXAMETHASONE 1 DROP: 3; 1 SUSPENSION/ DROPS OPHTHALMIC at 12:17

## 2024-05-07 RX ADMIN — OXYCODONE HYDROCHLORIDE 10 MG: 10 TABLET ORAL at 08:18

## 2024-05-07 RX ADMIN — TOBRAMYCIN AND DEXAMETHASONE 1 DROP: 3; 1 SUSPENSION/ DROPS OPHTHALMIC at 08:20

## 2024-05-07 RX ADMIN — MIDODRINE HYDROCHLORIDE 5 MG: 5 TABLET ORAL at 08:18

## 2024-05-07 RX ADMIN — SODIUM CHLORIDE, PRESERVATIVE FREE 10 ML: 5 INJECTION INTRAVENOUS at 20:13

## 2024-05-07 RX ADMIN — FERROUS SULFATE TAB 325 MG (65 MG ELEMENTAL FE) 325 MG: 325 (65 FE) TAB at 08:18

## 2024-05-07 RX ADMIN — Medication 1000 UNITS: at 08:19

## 2024-05-07 RX ADMIN — FERROUS SULFATE TAB 325 MG (65 MG ELEMENTAL FE) 325 MG: 325 (65 FE) TAB at 20:14

## 2024-05-07 RX ADMIN — MIDODRINE HYDROCHLORIDE 5 MG: 5 TABLET ORAL at 17:42

## 2024-05-07 RX ADMIN — HEPARIN SODIUM 5000 UNITS: 5000 INJECTION INTRAVENOUS; SUBCUTANEOUS at 20:13

## 2024-05-07 RX ADMIN — SODIUM CHLORIDE, PRESERVATIVE FREE 10 ML: 5 INJECTION INTRAVENOUS at 08:19

## 2024-05-07 RX ADMIN — HEPARIN SODIUM 5000 UNITS: 5000 INJECTION INTRAVENOUS; SUBCUTANEOUS at 08:19

## 2024-05-07 RX ADMIN — ALLOPURINOL 100 MG: 100 TABLET ORAL at 08:19

## 2024-05-07 RX ADMIN — HYDROCORTISONE: 25 CREAM TOPICAL at 08:20

## 2024-05-07 RX ADMIN — POTASSIUM CHLORIDE 20 MEQ: 1500 TABLET, EXTENDED RELEASE ORAL at 08:19

## 2024-05-07 RX ADMIN — IPRATROPIUM BROMIDE AND ALBUTEROL SULFATE 1 DOSE: .5; 2.5 SOLUTION RESPIRATORY (INHALATION) at 11:48

## 2024-05-07 RX ADMIN — IPRATROPIUM BROMIDE AND ALBUTEROL SULFATE 1 DOSE: .5; 2.5 SOLUTION RESPIRATORY (INHALATION) at 08:15

## 2024-05-07 ASSESSMENT — PAIN DESCRIPTION - ORIENTATION
ORIENTATION: RIGHT
ORIENTATION: RIGHT

## 2024-05-07 ASSESSMENT — PAIN DESCRIPTION - DESCRIPTORS
DESCRIPTORS: ACHING;DISCOMFORT;SORE
DESCRIPTORS: STABBING;THROBBING;SORE

## 2024-05-07 ASSESSMENT — PAIN SCALES - GENERAL
PAINLEVEL_OUTOF10: 8
PAINLEVEL_OUTOF10: 7
PAINLEVEL_OUTOF10: 6

## 2024-05-07 ASSESSMENT — PAIN DESCRIPTION - LOCATION
LOCATION: BACK
LOCATION: INCISION

## 2024-05-07 NOTE — PLAN OF CARE
Problem: Safety - Adult  Goal: Free from fall injury  5/6/2024 2339 by Janelle Macario RN  Outcome: Progressing  5/6/2024 0939 by Antoinette Shaw RN  Outcome: Progressing     Problem: Chronic Conditions and Co-morbidities  Goal: Patient's chronic conditions and co-morbidity symptoms are monitored and maintained or improved  5/6/2024 2339 by Janelle Macario RN  Outcome: Progressing  5/6/2024 0939 by Antoinette Shaw RN  Outcome: Progressing     Problem: Discharge Planning  Goal: Discharge to home or other facility with appropriate resources  5/6/2024 2339 by Janelle Macario RN  Outcome: Progressing  5/6/2024 0939 by Antoinette Shaw RN  Outcome: Progressing     Problem: Cardiovascular - Adult  Goal: Maintains optimal cardiac output and hemodynamic stability  5/6/2024 2339 by Janelle Macario RN  Outcome: Progressing  5/6/2024 0939 by Antoinette Shaw RN  Outcome: Progressing     Problem: Pain  Goal: Verbalizes/displays adequate comfort level or baseline comfort level  5/6/2024 2339 by Janelle Macario RN  Outcome: Progressing  Flowsheets (Taken 5/6/2024 1930)  Verbalizes/displays adequate comfort level or baseline comfort level: Encourage patient to monitor pain and request assistance  5/6/2024 0939 by Antoinette Shaw RN  Outcome: Progressing     Problem: Skin/Tissue Integrity  Goal: Absence of new skin breakdown  Description: 1.  Monitor for areas of redness and/or skin breakdown  2.  Assess vascular access sites hourly  3.  Every 4-6 hours minimum:  Change oxygen saturation probe site  4.  Every 4-6 hours:  If on nasal continuous positive airway pressure, respiratory therapy assess nares and determine need for appliance change or resting period.  5/6/2024 2339 by Janelle Macario, RN  Outcome: Progressing  5/6/2024 0939 by Antoinette Shaw RN  Outcome: Progressing     Problem: ABCDS Injury Assessment  Goal: Absence of physical injury  5/6/2024 2339 by Janelle Macario RN  Outcome:

## 2024-05-07 NOTE — PLAN OF CARE
Problem: Safety - Adult  Goal: Free from fall injury  5/7/2024 1948 by Janelle Macario RN  Outcome: Progressing  5/7/2024 0922 by Antoinette Shaw RN  Outcome: Progressing     Problem: Chronic Conditions and Co-morbidities  Goal: Patient's chronic conditions and co-morbidity symptoms are monitored and maintained or improved  5/7/2024 1948 by Janelle Macario RN  Outcome: Progressing  5/7/2024 0922 by Antoinette Shaw RN  Outcome: Progressing     Problem: Discharge Planning  Goal: Discharge to home or other facility with appropriate resources  5/7/2024 1948 by Janelle Macario RN  Outcome: Progressing  5/7/2024 0922 by Antoinette Shaw RN  Outcome: Progressing     Problem: Cardiovascular - Adult  Goal: Maintains optimal cardiac output and hemodynamic stability  5/7/2024 1948 by Janelle Macario RN  Outcome: Progressing  5/7/2024 0922 by Antoinette Shaw RN  Outcome: Progressing     Problem: Skin/Tissue Integrity  Goal: Absence of new skin breakdown  Description: 1.  Monitor for areas of redness and/or skin breakdown  2.  Assess vascular access sites hourly  3.  Every 4-6 hours minimum:  Change oxygen saturation probe site  4.  Every 4-6 hours:  If on nasal continuous positive airway pressure, respiratory therapy assess nares and determine need for appliance change or resting period.  5/7/2024 1948 by Janelle Macario RN  Outcome: Progressing  5/7/2024 0922 by Antoinette Shaw RN  Outcome: Progressing     Problem: ABCDS Injury Assessment  Goal: Absence of physical injury  5/7/2024 1948 by Janelle Macario RN  Outcome: Progressing  5/7/2024 0922 by Antoinette Shaw RN  Outcome: Progressing     Problem: Nutrition Deficit:  Goal: Optimize nutritional status  5/7/2024 1948 by Janelle Macario RN  Outcome: Progressing  5/7/2024 0922 by Antoinette Shaw RN  Outcome: Progressing     Problem: Pain  Goal: Verbalizes/displays adequate comfort level or baseline comfort level  5/7/2024 1948 by Janelle Macario

## 2024-05-07 NOTE — CARE COORDINATION
5/7/24  Transition of Care update.  Patient is POD #8 for right VATS, pleurodesis and insertion of Pleur-X. 1 chest tube and pleur-X in place.  Chest tube has been clamped awaiting X-ray to determine if chest tube can be removed.  Discharge goal remains home when medically stable. Updated PT/OT evaluations requested.  Patient is being followed by [facility] for post discharge support.  Patient will discharge with a pleur-X and will needs kits prior to discharge. Patient states her son and cousin Dhara will help with Pleur-X drain care on the days [facility] is not there. CTS will need to place specific orders regarding drain care prior to discharge for home care. Pleur-X log given to Patient and [facility].  Patient has a life vest in her room for discharge. Will need to make sure pt receives 30 day free Eliquis & monthly refill  assist at discharge.  [facility] to follow.     Electronically signed by ADAMA Shirley on 5/7/2024 at 10:43 AM

## 2024-05-07 NOTE — DISCHARGE INSTRUCTIONS
high fiber diet to avoid constipation and straining during bowel movements (whole grains, raw veggies, fruits)    Diabetics:  Check blood sugars twice a day.  Contact your primary care physician if your blood sugar is consistently above 130.  Good tissue healing occurs when blood sugars are less than 130.      Depression:  It is not unusual to have feelings of anxiety, fear or depression after surgery.  If you need help with these feelings, call your primary care physician.  There are medications to help and healing usually occurs sooner is you’re not depressed.    Sexual Activity: When you can climb 2 flights of stairs without chest pain, shortness of breath or fatigue, it is generally OK to resume sexual activity.        IF YOU ARE SENT HOME WITH A PLEURX CATHETER DRAIN IN PLACE: Drain pleurx catheter every day until drainage is less than 50cc for 3 consecutive days, then drain every other day. Once the drainage is less than 50cc every other day for 3 consecutive drainings then call Dr. Manzano's office to schedule removal.                When to call the doctor:  [phone]    If you have sudden, severe shortness of breath or shortness of breath while resting.  Pain, tenderness, warmth or redness in your calf.  If your heart rate is below 50 or over 110 beats per minute, or symptoms of fluttering in your chest or irregular pulse.  Temperature (taken daily) greater than 101?.  If your bowels have not moved by the third day home.  Persistent diarrhea, nausea or vomiting.  If you are unable to eat, or unable to drink 5 glasses of fluid a day for more than one day.  For redness, warmth, tenderness, or swelling of any incision.   If you have pain not relieved by pain medication.  If you experience the same pain or other symptoms that brought you to the hospital or doctor before surgery.  Diabetics:  Call Primary Care Physician for blood sugars consistently above 130.  Depression:  Call Primary Care Physician if

## 2024-05-07 NOTE — PATIENT CARE CONFERENCE
P Quality Flow/Interdisciplinary Rounds Progress Note        Quality Flow Rounds held on May 7, 2024    Disciplines Attending:  Bedside Nurse, , , and Nursing Unit Leadership    Kattcirilo Diaz was admitted on 4/28/2024  7:00 PM    Anticipated Discharge Date:       Disposition:    Dani Score:  Dani Scale Score: 19    Readmission Risk              Risk of Unplanned Readmission:  50           Discussed patient goal for the day, patient clinical progression, and barriers to discharge.  The following Goal(s) of the Day/Commitment(s) have been identified:   increase activity and sit up for all meals      Jena Locke RN  May 7, 2024

## 2024-05-08 VITALS
SYSTOLIC BLOOD PRESSURE: 84 MMHG | BODY MASS INDEX: 17.58 KG/M2 | RESPIRATION RATE: 18 BRPM | HEART RATE: 78 BPM | WEIGHT: 99.2 LBS | TEMPERATURE: 97.8 F | OXYGEN SATURATION: 95 % | DIASTOLIC BLOOD PRESSURE: 49 MMHG | HEIGHT: 63 IN

## 2024-05-08 LAB
ANION GAP SERPL CALCULATED.3IONS-SCNC: 18 MMOL/L (ref 7–16)
BUN SERPL-MCNC: 17 MG/DL (ref 6–23)
CA-I BLD-SCNC: 1.13 MMOL/L (ref 1.15–1.33)
CALCIUM SERPL-MCNC: 8.9 MG/DL (ref 8.6–10.2)
CHLORIDE SERPL-SCNC: 95 MMOL/L (ref 98–107)
CO2 SERPL-SCNC: 24 MMOL/L (ref 22–29)
CREAT SERPL-MCNC: 1.3 MG/DL (ref 0.5–1)
ERYTHROCYTE [DISTWIDTH] IN BLOOD BY AUTOMATED COUNT: 18.8 % (ref 11.5–15)
GFR, ESTIMATED: 41 ML/MIN/1.73M2
GLUCOSE SERPL-MCNC: 101 MG/DL (ref 74–99)
HCT VFR BLD AUTO: 32 % (ref 34–48)
HGB BLD-MCNC: 10.1 G/DL (ref 11.5–15.5)
MAGNESIUM SERPL-MCNC: 1.6 MG/DL (ref 1.6–2.6)
MCH RBC QN AUTO: 28.9 PG (ref 26–35)
MCHC RBC AUTO-ENTMCNC: 31.6 G/DL (ref 32–34.5)
MCV RBC AUTO: 91.7 FL (ref 80–99.9)
MICROORGANISM SPEC CULT: NORMAL
MICROORGANISM SPEC CULT: NORMAL
MICROORGANISM/AGENT SPEC: NORMAL
PHOSPHATE SERPL-MCNC: 3.5 MG/DL (ref 2.5–4.5)
PLATELET # BLD AUTO: 430 K/UL (ref 130–450)
PMV BLD AUTO: 9.6 FL (ref 7–12)
POTASSIUM SERPL-SCNC: 4.5 MMOL/L (ref 3.5–5)
RBC # BLD AUTO: 3.49 M/UL (ref 3.5–5.5)
SODIUM SERPL-SCNC: 137 MMOL/L (ref 132–146)
SPECIMEN DESCRIPTION: NORMAL
SPECIMEN DESCRIPTION: NORMAL
WBC OTHER # BLD: 8.1 K/UL (ref 4.5–11.5)

## 2024-05-08 PROCEDURE — 80048 BASIC METABOLIC PNL TOTAL CA: CPT

## 2024-05-08 PROCEDURE — 2580000003 HC RX 258

## 2024-05-08 PROCEDURE — 85027 COMPLETE CBC AUTOMATED: CPT

## 2024-05-08 PROCEDURE — 6360000002 HC RX W HCPCS

## 2024-05-08 PROCEDURE — 6370000000 HC RX 637 (ALT 250 FOR IP)

## 2024-05-08 PROCEDURE — 84100 ASSAY OF PHOSPHORUS: CPT

## 2024-05-08 PROCEDURE — 99232 SBSQ HOSP IP/OBS MODERATE 35: CPT | Performed by: INTERNAL MEDICINE

## 2024-05-08 PROCEDURE — 94640 AIRWAY INHALATION TREATMENT: CPT

## 2024-05-08 PROCEDURE — 36415 COLL VENOUS BLD VENIPUNCTURE: CPT

## 2024-05-08 PROCEDURE — 83735 ASSAY OF MAGNESIUM: CPT

## 2024-05-08 PROCEDURE — 82330 ASSAY OF CALCIUM: CPT

## 2024-05-08 RX ORDER — HYDROCODONE BITARTRATE AND ACETAMINOPHEN 5; 325 MG/1; MG/1
1 TABLET ORAL EVERY 6 HOURS PRN
Qty: 28 TABLET | Refills: 0 | Status: SHIPPED | OUTPATIENT
Start: 2024-05-08 | End: 2024-05-15

## 2024-05-08 RX ADMIN — SODIUM CHLORIDE, PRESERVATIVE FREE 10 ML: 5 INJECTION INTRAVENOUS at 07:59

## 2024-05-08 RX ADMIN — MIDODRINE HYDROCHLORIDE 5 MG: 5 TABLET ORAL at 16:12

## 2024-05-08 RX ADMIN — PANTOPRAZOLE SODIUM 40 MG: 40 TABLET, DELAYED RELEASE ORAL at 06:18

## 2024-05-08 RX ADMIN — METOPROLOL SUCCINATE 25 MG: 25 TABLET, EXTENDED RELEASE ORAL at 07:54

## 2024-05-08 RX ADMIN — HYDROXYZINE PAMOATE 25 MG: 25 CAPSULE ORAL at 07:54

## 2024-05-08 RX ADMIN — ALLOPURINOL 100 MG: 100 TABLET ORAL at 07:54

## 2024-05-08 RX ADMIN — OXYCODONE 5 MG: 5 TABLET ORAL at 06:19

## 2024-05-08 RX ADMIN — MIDODRINE HYDROCHLORIDE 5 MG: 5 TABLET ORAL at 12:26

## 2024-05-08 RX ADMIN — IPRATROPIUM BROMIDE AND ALBUTEROL SULFATE 1 DOSE: .5; 2.5 SOLUTION RESPIRATORY (INHALATION) at 17:16

## 2024-05-08 RX ADMIN — ACETAMINOPHEN 650 MG: 325 TABLET ORAL at 16:12

## 2024-05-08 RX ADMIN — PETROLATUM: 42 OINTMENT TOPICAL at 07:55

## 2024-05-08 RX ADMIN — SENNOSIDES AND DOCUSATE SODIUM 1 TABLET: 50; 8.6 TABLET ORAL at 07:54

## 2024-05-08 RX ADMIN — TORSEMIDE 20 MG: 20 TABLET ORAL at 07:54

## 2024-05-08 RX ADMIN — FERROUS SULFATE TAB 325 MG (65 MG ELEMENTAL FE) 325 MG: 325 (65 FE) TAB at 07:54

## 2024-05-08 RX ADMIN — EMPAGLIFLOZIN 10 MG: 10 TABLET, FILM COATED ORAL at 07:54

## 2024-05-08 RX ADMIN — IPRATROPIUM BROMIDE AND ALBUTEROL SULFATE 1 DOSE: .5; 2.5 SOLUTION RESPIRATORY (INHALATION) at 09:40

## 2024-05-08 RX ADMIN — SACUBITRIL AND VALSARTAN 0.5 TABLET: 24; 26 TABLET, FILM COATED ORAL at 07:54

## 2024-05-08 RX ADMIN — POTASSIUM CHLORIDE 20 MEQ: 1500 TABLET, EXTENDED RELEASE ORAL at 07:54

## 2024-05-08 RX ADMIN — Medication 1000 UNITS: at 07:54

## 2024-05-08 RX ADMIN — TOBRAMYCIN AND DEXAMETHASONE 1 DROP: 3; 1 SUSPENSION/ DROPS OPHTHALMIC at 16:12

## 2024-05-08 RX ADMIN — IPRATROPIUM BROMIDE AND ALBUTEROL SULFATE 1 DOSE: .5; 2.5 SOLUTION RESPIRATORY (INHALATION) at 13:04

## 2024-05-08 RX ADMIN — MIDODRINE HYDROCHLORIDE 5 MG: 5 TABLET ORAL at 07:54

## 2024-05-08 RX ADMIN — TOBRAMYCIN AND DEXAMETHASONE 1 DROP: 3; 1 SUSPENSION/ DROPS OPHTHALMIC at 07:55

## 2024-05-08 RX ADMIN — LEVOTHYROXINE SODIUM 100 MCG: 0.1 TABLET ORAL at 06:18

## 2024-05-08 RX ADMIN — HEPARIN SODIUM 5000 UNITS: 5000 INJECTION INTRAVENOUS; SUBCUTANEOUS at 07:56

## 2024-05-08 RX ADMIN — TOBRAMYCIN AND DEXAMETHASONE 1 DROP: 3; 1 SUSPENSION/ DROPS OPHTHALMIC at 12:26

## 2024-05-08 ASSESSMENT — PAIN SCALES - WONG BAKER: WONGBAKER_NUMERICALRESPONSE: NO HURT

## 2024-05-08 ASSESSMENT — PAIN SCALES - GENERAL: PAINLEVEL_OUTOF10: 5

## 2024-05-08 NOTE — PROGRESS NOTES
Hospitalist Progress Note      PCP: Luis Angel Rea DO    Date of Admission: 4/28/2024        Hospital Course:  78 y.o. female presented with  RIGHT PLEURAL EFFUSION. SHE WAS TRANSFERRED FROM [facility] , AFTER HAVING A RIGHT CHEST TUBE INSERTED AND  NOT IMPROVING.  SHE WAS SENT HERE TO BE SEEN BY CTS FOR POSSIBLE VATS AND DECORTICATION.   SHE WAS SEEN IN PACU , RIGHT AFTER SURGERY, UNABLE TO GIVE A HISTORY            Subjective:  POST OP PAIN           Medications:  Reviewed    Infusion Medications    sodium chloride       Scheduled Medications    sodium zirconium cyclosilicate  5 g Oral TID    sodium chloride flush  5-40 mL IntraVENous 2 times per day    acetaminophen  650 mg Oral Q6H    sennosides-docusate sodium  1 tablet Oral BID    ipratropium 0.5 mg-albuterol 2.5 mg  1 Dose Inhalation Q4H WA RT    enoxaparin  30 mg SubCUTAneous Daily    mupirocin   Topical BID    allopurinol  100 mg Oral Daily    atorvastatin  40 mg Oral Nightly    ferrous sulfate  325 mg Oral BID    levothyroxine  100 mcg Oral QAM AC    metoprolol succinate  25 mg Oral Daily    midodrine  5 mg Oral TID WC    pantoprazole  40 mg Oral QAM AC    potassium chloride  20 mEq Oral BID    [Held by provider] sacubitril-valsartan  0.5 tablet Oral BID    torsemide  20 mg Oral BID    [Held by provider] apixaban  5 mg Oral BID    [Held by provider] clopidogrel  75 mg Oral Daily    empagliflozin  10 mg Oral Daily    lidocaine  1 patch TransDERmal Daily    tobramycin-dexAMETHasone  1 drop Both Eyes 4x Daily     PRN Meds: hydrOXYzine pamoate, sodium chloride flush, sodium chloride, oxyCODONE **OR** oxyCODONE, ondansetron **OR** ondansetron, bisacodyl, benzocaine-menthol, [START ON 5/1/2024] acetaminophen, morphine, sodium chloride, prochlorperazine, Polyvinyl Alcohol-Povidone PF      Intake/Output Summary (Last 24 hours) at 4/30/2024 1424  Last data filed at 4/30/2024 0900  Gross per 24 hour   Intake 240 ml   Output 1342 ml   Net -1102 ml       Exam:    BP 
      Hospitalist Progress Note      PCP: Luis Angel Rea DO    Date of Admission: 4/28/2024        Hospital Course:  78 y.o. female presented with RIGHT PLEURAL EFFUSION. SHE WAS TRANSFERRED FROM [facility] , AFTER HAVING A RIGHT CHEST TUBE INSERTED AND NOT IMPROVING. SHE WAS SENT HERE TO BE SEEN BY CTS FOR POSSIBLE VATS AND DECORTICATION. SHE WAS SEEN IN PACU , RIGHT AFTER SURGERY, UNABLE TO GIVE A HISTORY         Subjective:   WANTS SOMETHING FOR ANXIETY             Medications:  Reviewed    Infusion Medications    sodium chloride      sodium chloride       Scheduled Medications    furosemide  20 mg IntraVENous Once    Vitamin D  1,000 Units Oral Daily    mineral oil-hydrophilic petrolatum   Topical BID    sodium chloride flush  5-40 mL IntraVENous 2 times per day    sennosides-docusate sodium  1 tablet Oral BID    ipratropium 0.5 mg-albuterol 2.5 mg  1 Dose Inhalation Q4H WA RT    enoxaparin  30 mg SubCUTAneous Daily    allopurinol  100 mg Oral Daily    atorvastatin  40 mg Oral Nightly    ferrous sulfate  325 mg Oral BID    levothyroxine  100 mcg Oral QAM AC    metoprolol succinate  25 mg Oral Daily    midodrine  5 mg Oral TID WC    pantoprazole  40 mg Oral QAM AC    potassium chloride  20 mEq Oral BID    [Held by provider] sacubitril-valsartan  0.5 tablet Oral BID    torsemide  20 mg Oral BID    [Held by provider] apixaban  5 mg Oral BID    [Held by provider] clopidogrel  75 mg Oral Daily    empagliflozin  10 mg Oral Daily    lidocaine  1 patch TransDERmal Daily    tobramycin-dexAMETHasone  1 drop Both Eyes 4x Daily     PRN Meds: sodium chloride, mineral oil-hydrophilic petrolatum, LORazepam, hydrOXYzine pamoate, sodium chloride flush, sodium chloride, oxyCODONE **OR** oxyCODONE, ondansetron **OR** ondansetron, bisacodyl, benzocaine-menthol, acetaminophen, morphine, sodium chloride, prochlorperazine, Polyvinyl Alcohol-Povidone PF      Intake/Output Summary (Last 24 hours) at 5/2/2024 9381  Last data filed at 
      Hospitalist Progress Note      PCP: Luis Angel Rea DO    Date of Admission: 4/28/2024        Hospital Course:  78 y.o. female presented with RIGHT PLEURAL EFFUSION. SHE WAS TRANSFERRED FROM [facility] , AFTER HAVING A RIGHT CHEST TUBE INSERTED AND NOT IMPROVING. SHE WAS SENT HERE TO BE SEEN BY CTS FOR POSSIBLE VATS AND DECORTICATION. SHE WAS SEEN IN PACU , RIGHT AFTER SURGERY, UNABLE TO GIVE A HISTORY        5/3 UP IN CHAIR, NOT AS ANXIOUS. PAIN MEDS WORKING     Right-sided chest tube remains in place-no acute complaints today  Patient sitting up in chair eating lunch.    Medications:  Reviewed    Infusion Medications    sodium chloride      sodium chloride       Scheduled Medications    heparin (porcine)  5,000 Units SubCUTAneous BID    hydrocortisone   Rectal BID    Vitamin D  1,000 Units Oral Daily    mineral oil-hydrophilic petrolatum   Topical BID    sodium chloride flush  5-40 mL IntraVENous 2 times per day    sennosides-docusate sodium  1 tablet Oral BID    ipratropium 0.5 mg-albuterol 2.5 mg  1 Dose Inhalation Q4H WA RT    allopurinol  100 mg Oral Daily    atorvastatin  40 mg Oral Nightly    ferrous sulfate  325 mg Oral BID    levothyroxine  100 mcg Oral QAM AC    metoprolol succinate  25 mg Oral Daily    midodrine  5 mg Oral TID WC    pantoprazole  40 mg Oral QAM AC    potassium chloride  20 mEq Oral BID    sacubitril-valsartan  0.5 tablet Oral BID    torsemide  20 mg Oral BID    [Held by provider] apixaban  5 mg Oral BID    empagliflozin  10 mg Oral Daily    lidocaine  1 patch TransDERmal Daily    tobramycin-dexAMETHasone  1 drop Both Eyes 4x Daily     PRN Meds: sodium chloride, mineral oil-hydrophilic petrolatum, LORazepam, hydrOXYzine pamoate, sodium chloride flush, sodium chloride, oxyCODONE **OR** oxyCODONE, ondansetron **OR** ondansetron, bisacodyl, benzocaine-menthol, acetaminophen, morphine, sodium chloride, prochlorperazine, Polyvinyl Alcohol-Povidone PF      Intake/Output Summary (Last 24 hours) 
      Hospitalist Progress Note      PCP: Luis Angel Rea DO    Date of Admission: 4/28/2024        Hospital Course:  78 y.o. female presented with RIGHT PLEURAL EFFUSION. SHE WAS TRANSFERRED FROM [facility] , AFTER HAVING A RIGHT CHEST TUBE INSERTED AND NOT IMPROVING. SHE WAS SENT HERE TO BE SEEN BY CTS FOR POSSIBLE VATS AND DECORTICATION. SHE WAS SEEN IN PACU , RIGHT AFTER SURGERY, UNABLE TO GIVE A HISTORY        5/3 UP IN CHAIR, NOT AS ANXIOUS. PAIN MEDS WORKING     Subjective:  NO COMPLAINTS           Medications:  Reviewed    Infusion Medications    sodium chloride      sodium chloride       Scheduled Medications    Vitamin D  1,000 Units Oral Daily    mineral oil-hydrophilic petrolatum   Topical BID    sodium chloride flush  5-40 mL IntraVENous 2 times per day    sennosides-docusate sodium  1 tablet Oral BID    ipratropium 0.5 mg-albuterol 2.5 mg  1 Dose Inhalation Q4H WA RT    enoxaparin  30 mg SubCUTAneous Daily    allopurinol  100 mg Oral Daily    atorvastatin  40 mg Oral Nightly    ferrous sulfate  325 mg Oral BID    levothyroxine  100 mcg Oral QAM AC    metoprolol succinate  25 mg Oral Daily    midodrine  5 mg Oral TID WC    pantoprazole  40 mg Oral QAM AC    potassium chloride  20 mEq Oral BID    [Held by provider] sacubitril-valsartan  0.5 tablet Oral BID    torsemide  20 mg Oral BID    [Held by provider] apixaban  5 mg Oral BID    clopidogrel  75 mg Oral Daily    empagliflozin  10 mg Oral Daily    lidocaine  1 patch TransDERmal Daily    tobramycin-dexAMETHasone  1 drop Both Eyes 4x Daily     PRN Meds: sodium chloride, mineral oil-hydrophilic petrolatum, LORazepam, hydrOXYzine pamoate, sodium chloride flush, sodium chloride, oxyCODONE **OR** oxyCODONE, ondansetron **OR** ondansetron, bisacodyl, benzocaine-menthol, acetaminophen, morphine, sodium chloride, prochlorperazine, Polyvinyl Alcohol-Povidone PF      Intake/Output Summary (Last 24 hours) at 5/3/2024 1322  Last data filed at 5/3/2024 1105  Gross per 24 
      Hospitalist Progress Note      PCP: Luis Angel Rea DO    Date of Admission: 4/28/2024        Hospital Course:  78 y.o. female presented with RIGHT PLEURAL EFFUSION. SHE WAS TRANSFERRED FROM [facility] , AFTER HAVING A RIGHT CHEST TUBE INSERTED AND NOT IMPROVING. SHE WAS SENT HERE TO BE SEEN BY CTS FOR POSSIBLE VATS AND DECORTICATION. SHE WAS SEEN IN PACU , RIGHT AFTER SURGERY, UNABLE TO GIVE A HISTORY        5/3 UP IN CHAIR, NOT AS ANXIOUS. PAIN MEDS WORKING     Subjective  Sitting up in chair and having breakfast on evaluation  No shortness of breath chest pain or any other complaints today  Resting comfortably  Right-sided chest tube remains in place-she does complain of pain at site of tube    Medications:  Reviewed    Infusion Medications    sodium chloride      sodium chloride       Scheduled Medications    [START ON 5/5/2024] heparin (porcine)  5,000 Units SubCUTAneous BID    hydrocortisone   Rectal BID    Vitamin D  1,000 Units Oral Daily    mineral oil-hydrophilic petrolatum   Topical BID    sodium chloride flush  5-40 mL IntraVENous 2 times per day    sennosides-docusate sodium  1 tablet Oral BID    ipratropium 0.5 mg-albuterol 2.5 mg  1 Dose Inhalation Q4H WA RT    allopurinol  100 mg Oral Daily    atorvastatin  40 mg Oral Nightly    ferrous sulfate  325 mg Oral BID    levothyroxine  100 mcg Oral QAM AC    metoprolol succinate  25 mg Oral Daily    midodrine  5 mg Oral TID WC    pantoprazole  40 mg Oral QAM AC    potassium chloride  20 mEq Oral BID    [Held by provider] sacubitril-valsartan  0.5 tablet Oral BID    torsemide  20 mg Oral BID    [Held by provider] apixaban  5 mg Oral BID    empagliflozin  10 mg Oral Daily    lidocaine  1 patch TransDERmal Daily    tobramycin-dexAMETHasone  1 drop Both Eyes 4x Daily     PRN Meds: sodium chloride, mineral oil-hydrophilic petrolatum, LORazepam, hydrOXYzine pamoate, sodium chloride flush, sodium chloride, oxyCODONE **OR** oxyCODONE, ondansetron **OR** 
      Hospitalist Progress Note      PCP: Luis Angel Rea DO    Date of Admission: 4/28/2024        Hospital Course:  78 y.o. female presented with RIGHT PLEURAL EFFUSION. SHE WAS TRANSFERRED FROM [facility] , AFTER HAVING A RIGHT CHEST TUBE INSERTED AND NOT IMPROVING. SHE WAS SENT HERE TO BE SEEN BY CTS FOR POSSIBLE VATS AND DECORTICATION. SHE WAS SEEN IN PACU , RIGHT AFTER SURGERY, UNABLE TO GIVE A HISTORY        5/3 UP IN CHAIR, NOT AS ANXIOUS. PAIN MEDS WORKING     Subjective    No shortness of breath chest pain or any other complaints today  Resting comfortably  Right-sided chest tube remains in place-no acute complaints today    Medications:  Reviewed    Infusion Medications    sodium chloride      sodium chloride       Scheduled Medications    heparin (porcine)  5,000 Units SubCUTAneous BID    hydrocortisone   Rectal BID    Vitamin D  1,000 Units Oral Daily    mineral oil-hydrophilic petrolatum   Topical BID    sodium chloride flush  5-40 mL IntraVENous 2 times per day    sennosides-docusate sodium  1 tablet Oral BID    ipratropium 0.5 mg-albuterol 2.5 mg  1 Dose Inhalation Q4H WA RT    allopurinol  100 mg Oral Daily    atorvastatin  40 mg Oral Nightly    ferrous sulfate  325 mg Oral BID    levothyroxine  100 mcg Oral QAM AC    metoprolol succinate  25 mg Oral Daily    midodrine  5 mg Oral TID WC    pantoprazole  40 mg Oral QAM AC    potassium chloride  20 mEq Oral BID    [Held by provider] sacubitril-valsartan  0.5 tablet Oral BID    torsemide  20 mg Oral BID    [Held by provider] apixaban  5 mg Oral BID    empagliflozin  10 mg Oral Daily    lidocaine  1 patch TransDERmal Daily    tobramycin-dexAMETHasone  1 drop Both Eyes 4x Daily     PRN Meds: sodium chloride, mineral oil-hydrophilic petrolatum, LORazepam, hydrOXYzine pamoate, sodium chloride flush, sodium chloride, oxyCODONE **OR** oxyCODONE, ondansetron **OR** ondansetron, bisacodyl, benzocaine-menthol, acetaminophen, morphine, sodium chloride, 
      Hospitalist Progress Note      PCP: Luis Angel Rea DO    Date of Admission: 4/28/2024        Hospital Course:  78 y.o. female presented with RIGHT PLEURAL EFFUSION. SHE WAS TRANSFERRED FROM [facility] , AFTER HAVING A RIGHT CHEST TUBE INSERTED AND NOT IMPROVING. SHE WAS SENT HERE TO BE SEEN BY CTS FOR POSSIBLE VATS AND DECORTICATION. SHE WAS SEEN IN PACU , RIGHT AFTER SURGERY, UNABLE TO GIVE A HISTORY         Subjective: CRYING STATES NO DOCTOR WILL EXPLAIN ANYTHING TO HER            Medications:  Reviewed    Infusion Medications    sodium chloride       Scheduled Medications    [START ON 5/2/2024] Vitamin D  1,000 Units Oral Daily    mineral oil-hydrophilic petrolatum   Topical BID    sodium chloride flush  5-40 mL IntraVENous 2 times per day    acetaminophen  650 mg Oral Q6H    sennosides-docusate sodium  1 tablet Oral BID    ipratropium 0.5 mg-albuterol 2.5 mg  1 Dose Inhalation Q4H WA RT    enoxaparin  30 mg SubCUTAneous Daily    mupirocin   Topical BID    allopurinol  100 mg Oral Daily    atorvastatin  40 mg Oral Nightly    ferrous sulfate  325 mg Oral BID    levothyroxine  100 mcg Oral QAM AC    metoprolol succinate  25 mg Oral Daily    midodrine  5 mg Oral TID WC    pantoprazole  40 mg Oral QAM AC    potassium chloride  20 mEq Oral BID    [Held by provider] sacubitril-valsartan  0.5 tablet Oral BID    torsemide  20 mg Oral BID    [Held by provider] apixaban  5 mg Oral BID    [Held by provider] clopidogrel  75 mg Oral Daily    empagliflozin  10 mg Oral Daily    lidocaine  1 patch TransDERmal Daily    tobramycin-dexAMETHasone  1 drop Both Eyes 4x Daily     PRN Meds: hydrOXYzine pamoate, sodium chloride flush, sodium chloride, oxyCODONE **OR** oxyCODONE, ondansetron **OR** ondansetron, bisacodyl, benzocaine-menthol, acetaminophen, morphine, sodium chloride, prochlorperazine, Polyvinyl Alcohol-Povidone PF      Intake/Output Summary (Last 24 hours) at 5/1/2024 1519  Last data filed at 5/1/2024 1350  Gross per 24 
    Pharmacist Review and Automatic Dose Adjustment of Prophylactic Enoxaparin    Reviewed reason(s) for admission/hospital problem list    The reviewing pharmacist has made an adjustment to the ordered enoxaparin dose or converted to UFH per the approved [facility] protocol and table as identified below.        Katt Diaz is a 78 y.o. female.     Recent Labs     05/02/24  0538 05/03/24  0543   CREATININE 1.4* 1.4*       Estimated Creatinine Clearance: 25 mL/min (A) (based on SCr of 1.4 mg/dL (H)).    Recent Labs     05/02/24  0538 05/02/24  1719 05/03/24  0543   HGB 6.4* 8.2* 8.1*   HCT 20.9* 26.3* 26.1*   *  --  456*     No results for input(s): \"INR\" in the last 72 hours.    Height:   Ht Readings from Last 1 Encounters:   04/30/24 1.6 m (5' 2.99\")     Weight:  Wt Readings from Last 1 Encounters:   05/04/24 49.1 kg (108 lb 3.2 oz)               Plan: Based upon the patient's weight and renal function    Ordered: Enoxaparin 30mg SUBQ Daily    Changed/converted to    New Order: Heparin 5,000 units SUBQ BID      Thank you,  Libby Lima, [facility]  5/4/2024, 9:16 AM    
  [facility]  Department of Internal Medicine  Division of Pulmonary, Critical Care and Sleep Medicine  Progress note      SUBJECTIVE:   We are following Ms. Katt Diaz for loculated effusion POD#5 VATS.   She appears much more comfortable today.   Chest tube remains with air leak. No new concerns or complaints.   Oxygen at 2 liters      OBJECTIVE:     PHYSICAL EXAM:   VITALS:   Vitals:    05/04/24 0918 05/04/24 0929 05/04/24 1156 05/04/24 1502   BP:   (!) 102/50 (!) 108/59   Pulse:   69 70   Resp:  18 16 20   Temp:   98.3 °F (36.8 °C) 98.7 °F (37.1 °C)   TempSrc:   Temporal Temporal   SpO2: 94%  96% 93%   Weight:       Height:            Intake/Output Summary (Last 24 hours) at 5/4/2024 1604  Last data filed at 5/4/2024 1407  Gross per 24 hour   Intake 480 ml   Output 1560 ml   Net -1080 ml          CONSTITUTIONAL:   A&O x 3, NAD  SKIN:     Skin discoloration  HEENT:     EOMI, MMM, No thrush  NECK:    No bruits, No JVP appreciated  CV:      No rubs, No gallops  PULMONARY:   Clear/diminished BS,  No Wheezing, No Rales,       Right chest tube present +air leak  ABDOMEN:     Soft, non-tender. BS normal. No R/R/G  EXT:    No deformities .  No clubbing.       No lower extremity edema, No venous stasis  PULSE:   Appears equal and palpable.  PSYCHIATRIC:  No acute psychosis  MS:    No fractures, No gross weakness  NEUROLOGIC:   The clinical assessment is non-focal     DATA: IMAGING & TESTING:     LABORATORY TESTS:    CBC with Differential:    Lab Results   Component Value Date/Time    WBC 6.3 05/04/2024 07:07 AM    RBC 3.13 05/04/2024 07:07 AM    HGB 8.9 05/04/2024 07:07 AM    HCT 29.2 05/04/2024 07:07 AM     05/04/2024 07:07 AM    MCV 93.3 05/04/2024 07:07 AM    MCH 28.4 05/04/2024 07:07 AM    MCHC 30.5 05/04/2024 07:07 AM    RDW 18.5 05/04/2024 07:07 AM    LYMPHOPCT 17 04/29/2024 05:13 AM    MONOPCT 8 04/29/2024 05:13 AM    EOSPCT 1 04/29/2024 05:13 AM    BASOPCT 0 
  [facility]  Department of Internal Medicine  Division of Pulmonary, Critical Care and Sleep Medicine  Consult Note      Carlos Ronquillo, APRN-CNP      SUBJECTIVE: We are following Ms. Katt Diaz for loculated effusion POD#3 VATS. She appears much more comfortable today. Remains on room air. Chest tube remains with air leak. No new concerns or complaints. Receiving blood for drop in hemoglobin overnight.        OBJECTIVE:     PHYSICAL EXAM:   VITALS:   Vitals:    05/02/24 1229 05/02/24 1244 05/02/24 1308 05/02/24 1309   BP: 113/73 112/63 (!) 109/56    Pulse:  77 66 66   Resp:  16 16 18   Temp:  97.7 °F (36.5 °C) 97.5 °F (36.4 °C)    TempSrc:   Temporal    SpO2:  97% 100% 100%   Weight:       Height:            Intake/Output Summary (Last 24 hours) at 5/2/2024 1320  Last data filed at 5/2/2024 1258  Gross per 24 hour   Intake 240 ml   Output 1170 ml   Net -930 ml          CONSTITUTIONAL:    A&O x 3, NAD  SKIN:     No rash, No suspicious lesions, No skin discoloration  HEENT:     EOMI, MMM, No thrush  NECK:     No bruits, No JVP appreciated  CV:      Sinus,  No murmur, No rubs, No gallops  PULMONARY:    Clear/diminished BS,  No Wheezing, No Rales, No Rhonchi      No noted egophony. Right chest tube present +air leak  ABDOMEN:     Soft, non-tender. BS normal. No R/R/G  EXT:    No deformities .  No clubbing.       No lower extremity edema, No venous stasis  PULSE:    Appears equal and palpable.  PSYCHIATRIC:   Seems appropriate, No acute psychosis  MS:    No fractures, No gross weakness  NEUROLOGIC:   The clinical assessment is non-focal     DATA: IMAGING & TESTING:     LABORATORY TESTS:    CBC with Differential:    Lab Results   Component Value Date/Time    WBC 6.2 05/02/2024 05:38 AM    RBC 2.24 05/02/2024 05:38 AM    HGB 6.4 05/02/2024 05:38 AM    HCT 
  [facility]  Department of Internal Medicine  Division of Pulmonary, Critical Care and Sleep Medicine  Progress note      SUBJECTIVE:   We are following Ms. Katt Diaz for loculated effusion status post VATS.   PleurX remains in place. Remains on room air.  Hoping to be discharged today.    OBJECTIVE:     PHYSICAL EXAM:   VITALS:   Vitals:    05/08/24 0615 05/08/24 0700 05/08/24 1043 05/08/24 1430   BP:  104/61 (!) 96/54 (!) 96/52   Pulse:  76 83    Resp:  16 16    Temp:  97.5 °F (36.4 °C) 98.2 °F (36.8 °C)    TempSrc:  Infrared Temporal    SpO2:  96% 98%    Weight: 45 kg (99 lb 3.2 oz)      Height:            Intake/Output Summary (Last 24 hours) at 5/8/2024 1550  Last data filed at 5/8/2024 1535  Gross per 24 hour   Intake 620 ml   Output 1955 ml   Net -1335 ml        CONSTITUTIONAL:   A&O x 3, NAD  SKIN:     Skin discoloration  HEENT:     EOMI, MMM, No thrush  NECK:    No bruits, No JVP appreciated  CV:      No rubs, No gallops  PULMONARY:   Dressing intact to chest.  Breath sounds clear bilaterally.        ABDOMEN:     Soft, non-tender. BS normal. No R/R/G  EXT:    No deformities .  No clubbing.       No lower extremity edema, No venous stasis  PULSE:   Appears equal and palpable.  PSYCHIATRIC:  No acute psychosis  MS:    No fractures, No gross weakness  NEUROLOGIC:   The clinical assessment is non-focal     DATA: IMAGING & TESTING:     LABORATORY TESTS:    CBC with Differential:    Lab Results   Component Value Date/Time    WBC 8.1 05/08/2024 06:30 AM    RBC 3.49 05/08/2024 06:30 AM    HGB 10.1 05/08/2024 06:30 AM    HCT 32.0 05/08/2024 06:30 AM     05/08/2024 06:30 AM    MCV 91.7 05/08/2024 06:30 AM    MCH 28.9 05/08/2024 06:30 AM    MCHC 31.6 05/08/2024 06:30 AM    RDW 18.8 05/08/2024 06:30 AM    LYMPHOPCT 17 04/29/2024 05:13 AM    MONOPCT 8 04/29/2024 05:13 AM    EOSPCT 1 04/29/2024 05:13 AM    BASOPCT 0 04/29/2024 05:13 AM    MONOSABS 0.51 04/29/2024 05:13 AM 
  [facility]  Department of Internal Medicine  Division of Pulmonary, Critical Care and Sleep Medicine  Progress note      SUBJECTIVE:   We are following Ms. Katt Diaz for loculated effusion status post VATS.  She still has 1 chest tube in place.  Currently she is on room air.  She denies any symptoms of coughing, shortness of breath, wheezing.      OBJECTIVE:     PHYSICAL EXAM:   VITALS:   Vitals:    05/06/24 1045 05/06/24 1205 05/06/24 1300 05/06/24 1427   BP: (!) 110/58   110/60   Pulse: 71      Resp: 16      Temp: 97.1 °F (36.2 °C)      TempSrc: Temporal      SpO2: 95% 94%     Weight:       Height:   1.6 m (5' 2.99\")         Intake/Output Summary (Last 24 hours) at 5/6/2024 1506  Last data filed at 5/6/2024 1421  Gross per 24 hour   Intake 750 ml   Output 3310 ml   Net -2560 ml        CONSTITUTIONAL:   A&O x 3, NAD  SKIN:     Skin discoloration  HEENT:     EOMI, MMM, No thrush  NECK:    No bruits, No JVP appreciated  CV:      No rubs, No gallops  PULMONARY:   Clear/diminished BS,  No Wheezing, No Rales,       Right chest tube present no airleak present at time of my evaluation this afternoon.  ABDOMEN:     Soft, non-tender. BS normal. No R/R/G  EXT:    No deformities .  No clubbing.       No lower extremity edema, No venous stasis  PULSE:   Appears equal and palpable.  PSYCHIATRIC:  No acute psychosis  MS:    No fractures, No gross weakness  NEUROLOGIC:   The clinical assessment is non-focal     DATA: IMAGING & TESTING:     LABORATORY TESTS:    CBC with Differential:    Lab Results   Component Value Date/Time    WBC 6.1 05/06/2024 05:31 AM    RBC 2.87 05/06/2024 05:31 AM    HGB 8.3 05/06/2024 05:31 AM    HCT 26.7 05/06/2024 05:31 AM     05/06/2024 05:31 AM    MCV 93.0 05/06/2024 05:31 AM    MCH 28.9 05/06/2024 05:31 AM    MCHC 31.1 05/06/2024 05:31 AM    RDW 18.6 05/06/2024 05:31 AM    LYMPHOPCT 17 04/29/2024 05:13 AM    MONOPCT 8 04/29/2024 05:13 AM    EOSPCT 1 
  OCCUPATIONAL THERAPY BEDSIDE TREATMENT NOTE   [facility]  [address], [place]      Date:2024  Patient Name: Katt Diaz  MRN: [id number]  : 1945  Room: [address] [place], [place] [address]A      Evaluating OT: Jelena Alaniz OTR/L; [id number]        Referring Provider: Nery Barillas APRN - CNP     Specific Provider Orders/Date: OT Eval and Treat 24        Diagnosis: Loculated pleural effusion      Surgery: 24 Right Video Assisted Thoracoscopic Surgery, Pleurodesis, PleurX      Pertinent Medical History:  has a past medical history of VERONIQUE (acute kidney injury) ([facility]), CAD in native artery, Headache, Hearing loss, Hx of rheumatoid arthritis, Migraine, Osteopenia, S/P CABG x 3, and Sjogren's disease ([facility]).      Recommended Adaptive Equipment: BSC, AE for LE bathing and dressing PRN      Precautions:  Fall Risk, chest tube, emotional lability, +alarms, low vision, continuous pulse ox      Assessment of current deficits    [x] Functional mobility            [x]ADLs           [x] Strength                  [x]Cognition    [x] Functional transfers          [x] IADLs         [x] Safety Awareness   [x]Endurance    [x] Fine Coordination                         [x] Balance      [] Vision/perception   []Sensation      []Gross Motor Coordination             [] ROM           [] Delirium                   [] Motor Control      OT PLAN OF CARE   OT POC based on physician orders, patient diagnosis and results of clinical assessment     Frequency/Duration 1-3 days/wk for 2 weeks PRN   Specific OT Treatment Interventions to include:   * Instruction/training on adapted ADL techniques and AE recommendations to increase functional independence within precautions       * Training on energy conservation strategies, correct breathing pattern and techniques to improve independence/tolerance for self-care routine  * Functional transfer/mobility training/DME 
  OCCUPATIONAL THERAPY INITIAL EVALUATION    [facility]  [address], [place]        Date:2024                                                  Patient Name: Katt Diaz    MRN: [id number]    : 1945    Room: [address] [place], [place] [address]      Evaluating OT: Jelena Alaniz OTR/L; [id number]       Referring Provider: Nery Barillas APRN - CNP     Specific Provider Orders/Date: OT Eval and Treat 24       Diagnosis: Loculated pleural effusion      Surgery: 24 Right Video Assisted Thoracoscopic Surgery, Pleurodesis, PleurX      Pertinent Medical History:  has a past medical history of VERONIQUE (acute kidney injury) ([facility]), CAD in native artery, Headache, Hearing loss, Hx of rheumatoid arthritis, Migraine, Osteopenia, S/P CABG x 3, and Sjogren's disease ([facility]).     Recommended Adaptive Equipment: BSC, AE for LE bathing and dressing PRN     Precautions:  Fall Risk, chest tube, emotional lability, +alarms, low vision, continuous pulse ox     Assessment of current deficits    [x] Functional mobility  [x]ADLs  [x] Strength               [x]Cognition    [x] Functional transfers   [x] IADLs         [x] Safety Awareness   [x]Endurance    [x] Fine Coordination              [x] Balance      [] Vision/perception   []Sensation     []Gross Motor Coordination  [] ROM  [] Delirium                   [] Motor Control     OT PLAN OF CARE   OT POC based on physician orders, patient diagnosis and results of clinical assessment    Frequency/Duration 1-3 days/wk for 2 weeks PRN   Specific OT Treatment Interventions to include:   * Instruction/training on adapted ADL techniques and AE recommendations to increase functional independence within precautions       * Training on energy conservation strategies, correct breathing pattern and techniques to improve independence/tolerance for self-care routine  * Functional transfer/mobility training/DME recommendations for 
  [facility]  Department of Internal Medicine  Division of Pulmonary, Critical Care and Sleep Medicine  Progress note      SUBJECTIVE:   We are following Ms. Katt Diaz for loculated effusion POD#6 VATS.   She appears much more comfortable today.   Chest tube remains with air leak. No new concerns or complaints.   Oxygen at 2 liters  CXR stable      OBJECTIVE:     PHYSICAL EXAM:   VITALS:   Vitals:    05/05/24 0740 05/05/24 1059 05/05/24 1138 05/05/24 1502   BP: (!) 107/58  (!) 116/59 125/61   Pulse: 74  68 91   Resp: 14 16 18 16   Temp: 97.6 °F (36.4 °C)  97.5 °F (36.4 °C) 97.3 °F (36.3 °C)   TempSrc: Temporal  Temporal Temporal   SpO2: 95%  96% 96%   Weight:       Height:            Intake/Output Summary (Last 24 hours) at 5/5/2024 1607  Last data filed at 5/5/2024 1345  Gross per 24 hour   Intake 660 ml   Output 1060 ml   Net -400 ml          CONSTITUTIONAL:   A&O x 3, NAD  SKIN:     Skin discoloration  HEENT:     EOMI, MMM, No thrush  NECK:    No bruits, No JVP appreciated  CV:      No rubs, No gallops  PULMONARY:   Clear/diminished BS,  No Wheezing, No Rales,       Right chest tube present +air leak  ABDOMEN:     Soft, non-tender. BS normal. No R/R/G  EXT:    No deformities .  No clubbing.       No lower extremity edema, No venous stasis  PULSE:   Appears equal and palpable.  PSYCHIATRIC:  No acute psychosis  MS:    No fractures, No gross weakness  NEUROLOGIC:   The clinical assessment is non-focal     DATA: IMAGING & TESTING:     LABORATORY TESTS:    CBC with Differential:    Lab Results   Component Value Date/Time    WBC 5.9 05/05/2024 05:45 AM    RBC 2.91 05/05/2024 05:45 AM    HGB 8.3 05/05/2024 05:45 AM    HCT 26.8 05/05/2024 05:45 AM     05/05/2024 05:45 AM    MCV 92.1 05/05/2024 05:45 AM    MCH 28.5 05/05/2024 05:45 AM    MCHC 31.0 05/05/2024 05:45 AM    RDW 18.4 05/05/2024 05:45 AM    LYMPHOPCT 17 04/29/2024 05:13 AM    MONOPCT 8 04/29/2024 05:13 AM    
  P Quality Flow/Interdisciplinary Rounds Progress Note        Quality Flow Rounds held on May 3, 2024    Disciplines Attending:  Bedside Nurse, , , and Nursing Unit Leadership    Kattcirilo Diaz was admitted on 4/28/2024  7:00 PM    Anticipated Discharge Date:       Disposition:    Dani Score:  Dani Scale Score: 15    Readmission Risk              Risk of Unplanned Readmission:  48           Discussed patient goal for the day, patient clinical progression, and barriers to discharge.  The following Goal(s) of the Day/Commitment(s) have been identified:  Diagnostics - Report Results and Labs - Report Results      Minerva Aden, RN  May 3, 2024        
  P Quality Flow/Interdisciplinary Rounds Progress Note        Quality Flow Rounds held on May 6, 2024    Disciplines Attending:  Bedside Nurse, , , and Nursing Unit Leadership    Kattcirilo Diaz was admitted on 4/28/2024  7:00 PM    Anticipated Discharge Date:       Disposition:    Dani Score:  Dani Scale Score: 20    Readmission Risk              Risk of Unplanned Readmission:  50           Discussed patient goal for the day, patient clinical progression, and barriers to discharge.  The following Goal(s) of the Day/Commitment(s) have been identified:  Diagnostics - Report Results and Labs - Report Results      Aleah Marie RN  May 6, 2024        
  [facility]  Department of Internal Medicine  Division of Pulmonary, Critical Care and Sleep Medicine  Consult Note      Carlos Ronquillo, APRN-CNP      SUBJECTIVE: We are following Ms. Katt Diaz for loculated effusion POD#2 VATS. She appears much more comfortable today. Remains on room air. Chest tube remains with air leak. No new concerns or complaints. BP running on the low side.       OBJECTIVE:     PHYSICAL EXAM:   VITALS:   Vitals:    05/01/24 0943 05/01/24 1150 05/01/24 1407 05/01/24 1519   BP: (!) 84/44 (!) 99/51 (!) 90/48 (!) 97/55   Pulse:  61 66 58   Resp:  20  16   Temp:  96.9 °F (36.1 °C)  (!) 96.6 °F (35.9 °C)   TempSrc:  Temporal  Temporal   SpO2:  95%  98%   Weight:       Height:            Intake/Output Summary (Last 24 hours) at 5/1/2024 1532  Last data filed at 5/1/2024 1350  Gross per 24 hour   Intake 240 ml   Output 920 ml   Net -680 ml          CONSTITUTIONAL:    A&O x 3, NAD  SKIN:     No rash, No suspicious lesions, No skin discoloration  HEENT:     EOMI, MMM, No thrush  NECK:     No bruits, No JVP appreciated  CV:      Sinus,  No murmur, No rubs, No gallops  PULMONARY:    Clear/diminished BS,  No Wheezing, No Rales, No Rhonchi      No noted egophony. Right chest tube present +air leak  ABDOMEN:     Soft, non-tender. BS normal. No R/R/G  EXT:    No deformities .  No clubbing.       No lower extremity edema, No venous stasis  PULSE:    Appears equal and palpable.  PSYCHIATRIC:   Seems appropriate, No acute psychosis  MS:    No fractures, No gross weakness  NEUROLOGIC:   The clinical assessment is non-focal     DATA: IMAGING & TESTING:     LABORATORY TESTS:    CBC with Differential:    Lab Results   Component Value Date/Time    WBC 7.8 05/01/2024 06:19 AM    RBC 2.48 05/01/2024 06:19 AM    HGB 7.1 05/01/2024 06:19 AM    HCT 23.8 
  [facility]  Department of Internal Medicine  Division of Pulmonary, Critical Care and Sleep Medicine  Progress note      SUBJECTIVE:   We are following Ms. Katt Diaz for loculated effusion status post VATS.  Chest tube removed today, PleurX remains in place. Remains on room air. Feeling better since removal of chest tube.     OBJECTIVE:     PHYSICAL EXAM:   VITALS:   Vitals:    05/07/24 1130 05/07/24 1148 05/07/24 1329 05/07/24 1453   BP: 106/65   104/66   Pulse: 76 61  76   Resp: 16 15 16 16   Temp: 97.4 °F (36.3 °C)   98.6 °F (37 °C)   TempSrc: Temporal   Temporal   SpO2: 93% 93%  95%   Weight:       Height:            Intake/Output Summary (Last 24 hours) at 5/7/2024 1554  Last data filed at 5/7/2024 1500  Gross per 24 hour   Intake 540 ml   Output 1440 ml   Net -900 ml          CONSTITUTIONAL:   A&O x 3, NAD  SKIN:     Skin discoloration  HEENT:     EOMI, MMM, No thrush  NECK:    No bruits, No JVP appreciated  CV:      No rubs, No gallops  PULMONARY:   Clear/diminished BS,  No Wheezing, No Rales- pleurx in place. Dressing to chest tube removal site        ABDOMEN:     Soft, non-tender. BS normal. No R/R/G  EXT:    No deformities .  No clubbing.       No lower extremity edema, No venous stasis  PULSE:   Appears equal and palpable.  PSYCHIATRIC:  No acute psychosis  MS:    No fractures, No gross weakness  NEUROLOGIC:   The clinical assessment is non-focal     DATA: IMAGING & TESTING:     LABORATORY TESTS:    CBC with Differential:    Lab Results   Component Value Date/Time    WBC 6.2 05/07/2024 06:01 AM    RBC 3.36 05/07/2024 06:01 AM    HGB 9.7 05/07/2024 06:01 AM    HCT 30.9 05/07/2024 06:01 AM     05/07/2024 06:01 AM    MCV 92.0 05/07/2024 06:01 AM    MCH 28.9 05/07/2024 06:01 AM    MCHC 31.4 05/07/2024 06:01 AM    RDW 18.5 05/07/2024 06:01 AM    LYMPHOPCT 17 04/29/2024 05:13 AM    MONOPCT 8 04/29/2024 05:13 AM    EOSPCT 1 04/29/2024 05:13 AM    BASOPCT 0 
4 Eyes Skin Assessment     NAME:  Katt Diaz  YOB: 1945  MEDICAL RECORD NUMBER:  [id number]    The patient is being assessed for  Admission    I agree that at least one RN has performed a thorough Head to Toe Skin Assessment on the patient. ALL assessment sites listed below have been assessed.      Areas assessed by both nurses:    Head, Face, Ears, Shoulders, Back, Chest, Arms, Elbows, Hands, Sacrum. Buttock, Coccyx, Ischium, and Legs. Feet and Heels        Does the Patient have a Wound? Yes wound(s) were present on assessment. LDA wound assessment was Initiated and completed by RN,        Dani Prevention initiated by RN: No  Wound Care Orders initiated by RN: No    Pressure Injury (Stage 3,4, Unstageable, DTI, NWPT, and Complex wounds) if present, place Wound referral order by RN under : No    New Ostomies, if present place, Ostomy referral order under : No     Nurse 1 eSignature: Electronically signed by Karthik Shafer RN on 4/29/24 at 4:59 AM EDT    **SHARE this note so that the co-signing nurse can place an eSignature**    Nurse 2 eSignature: Electronically signed by Pedrito Lobo RN on 4/29/24 at 5:02 AM EDT    
4 Eyes Skin Assessment     NAME:  Katt Diaz  YOB: 1945  MEDICAL RECORD NUMBER:  [id number]    The patient is being assessed for  Post-Op Surgical    I agree that at least one RN has performed a thorough Head to Toe Skin Assessment on the patient. ALL assessment sites listed below have been assessed.      Areas assessed by both nurses:    Head, Face, Ears, Shoulders, Back, Chest, Arms, Elbows, Hands, Sacrum. Buttock, Coccyx, Ischium, and Legs. Feet and Heels        Does the Patient have a Wound? Yes wound(s) were present on assessment. LDA wound assessment was Initiated and completed by RN       Dani Prevention initiated by RN: Yes  Wound Care Orders initiated by RN: Yes    Pressure Injury (Stage 3,4, Unstageable, DTI, NWPT, and Complex wounds) if present, place Wound referral order by RN under : Yes redden area to left buttock non blanchable noted    New Ostomies, if present place, Ostomy referral order under : No     Nurse 1 eSignature: Electronically signed by Jena Locke RN on 4/29/24 at 6:34 PM EDT    **SHARE this note so that the co-signing nurse can place an eSignature**    Nurse 2 eSignature: Electronically signed by Soto Lopez RN on 4/29/24 at 6:37 PM EDT  
Asked pt if family member would be able to be present for teaching of plurex cath draining, pt stated no one is available. Instructed pt on how to drain plurex cath. Instructed pt step by step and what to look for when draining catheter. Pt verbalized understanding.   
Attending and CTS notified Hgb 6.4  
Blood administered. Present at bedside while the first fifteen minutes of blood administering. VSS and no adverse reactions at this time. Time blood reached vein 1253  
CTS PACU Progress Note:      Brief HPI: Awake, alert. Patient complaining of pain - just received pain medicine from bedside nurse.     Vitals:    04/29/24 1245 04/29/24 1445 04/29/24 1450 04/29/24 1500   BP: (!) 103/58 128/65 128/65 113/66   Pulse: 60 72 71 69   Resp: 20 18 14 14   Temp:  97.2 °F (36.2 °C) 98 °F (36.7 °C)    TempSrc:  Temporal Temporal    SpO2: 96% 99% 100% 98%   Weight:       Height:               Intake/Output Summary (Last 24 hours) at 4/29/2024 1519  Last data filed at 4/29/2024 1441  Gross per 24 hour   Intake 1010 ml   Output 1100 ml   Net -90 ml       CURRENT CT output:   Pleural: 80mL          Chest tube output color: bloody and non-clotting    Recent Labs     04/29/24  0513 04/29/24  1450   WBC 6.2 9.2   HGB 7.2* 7.6*   HCT 23.2* 25.1*   * 551*     Recent Labs     04/28/24  0841 04/29/24  0513   BUN 47* 39*   CREATININE 1.2* 1.1*           PE  Cardiac: RRR  Lungs: decreased bases  Chest incision with DSD C/D/I. Chest tubes x 1 and PleurX catheter present and secure.   Abd: Soft, nontender, +BS  Ext: ARROYO        A/P: Day of Surgery     Stable s/p R VATS, talc pleurodesis, PleurX catheter placement   Immediate post-operative hgb stable at 7.6  Remaining labs reviewed  Maintaining NSR  VSS  CXR reviewed; chest tubes in appropriate position, trace PTX on reading, improved pleural effusion  CT drainage adequate for immediate post operative period--currently at 80mL. Small airleak noted      Dispo: continue transfer to PCCU      This patient's case and care plan was discussed with the attending surgeon    
Comprehensive Nutrition Assessment    Type and Reason for Visit:  Reassess    Nutrition Recommendations/Plan:   Continue current diet regimen  Will continue to monitor     Malnutrition Assessment:  Malnutrition Status:  Severe malnutrition (04/30/24 1301)    Context:  Acute Illness     Findings of the 6 clinical characteristics of malnutrition:  Energy Intake:  Mild decrease in energy intake (Comment) (sporadic)  Weight Loss:  Unable to assess (d/t wt flux per EMR wt hx- will continue to monitor wt trends)     Body Fat Loss:  Moderate body fat loss Orbital, Triceps, Fat Overlying Ribs   Muscle Mass Loss:  Moderate muscle mass loss Temples (temporalis), Clavicles (pectoralis & deltoids), Calf (gastrocnemius)  Fluid Accumulation:  No significant fluid accumulation     Strength:  Not Performed    Nutrition Assessment:    pt adm d/t R-pleural effusion, transf from [facility] for VATS- now s/p VATS/Pleurx cath placement- pt w/ small airleak from CT; VERONIQUE noted; PMhx of Sjogren's disease, CAD s/p CABG (02/2024), DM, RA; pt meets criteria for severe malnutrition; pt c/o of poor appetite at times; continue current diet regimen; will continue to monitor.    Nutrition Related Findings:    elevated Cr; A&Ox4; active BS; +1 edema; -I/O; K WNL Wound Type: Surgical Incision (no PI per wound care)       Current Nutrition Intake & Therapies:    Average Meal Intake: 51-75%, 26-50% (sporadic)  Average Supplements Intake: Unable to assess (lack of intakes recorded per EMR)  ADULT ORAL NUTRITION SUPPLEMENT; Breakfast, Lunch; Wound Healing Oral Supplement  ADULT ORAL NUTRITION SUPPLEMENT; Lunch; Fortified Gelatin Oral Supplement  ADULT ORAL NUTRITION SUPPLEMENT; Dinner; Frozen Oral Supplement  ADULT DIET; Regular; Low Potassium (Less than 3000 mg/day)    Anthropometric Measures:  Height: 160 cm (5' 2.99\")  Ideal Body Weight (IBW): 115 lbs (52 kg)       Current Body Weight: 47.7 kg (105 lb 2.6 oz) (5/6-SS), 91.4 % IBW. Weight Source: 
[facility] patient information book given to patient for reference.    Electronically signed by Dalila Mcqueen RN on 5/8/2024 at 2:41 PM    
Day shift RULA Vargas called RN for report on patient.  
General surgery consult placed via perfect serve  
Handoff report received. Transfer from [facility]. Here for possible VATS.  Patient axox3. Chest tube in placed and not draining. Found the dial to be in the \"off\" position. Life vest removed and replaced with heart monitor. Large amount of serosanguinous drainage from incision noted. Dressing changed but saturated right after.    11pm :  Large amount of output in the past 4 hours. Payton King NP made aware.   
Morning BB held d/t hold parameters for BP; Danielle in surgery notified. Danielle also notified pt has not received her 2nd hibiclens bath. Per Danielle, okay to bring pt down to pre-op at this time.   
Nephrology consult sent via perfect serve.   
[facility]  PROGRESS NOTE  ATTENDING NOTE    I was able to do a better exam on her today.  Grade 2 hemorrhoids.  Anusol has helped.  No further bleeding  General surgery will s/o, please call if needed    Mara Larsen MD, MSc, FACS  5/5/2024  3:31 PM    
Notified Dr. Diaz of patents K of 5.4.  Olga Lidia Low RN    
Notified Dr. Diaz of patient BP of 90/48 and HR of 66. Patient is requesting pain medication.     Olga Lidia Low RN    
Notified Dr. Diaz of patients BP of 84/44, and patients creatinine of 1.4.     Olga Lidia Low RN    
Notified Dr. Diaz of patients stat K of 5.5.       
Occupational Therapy  OT BEDSIDE TREATMENT NOTE   [facility]  [address] [place], [place]      Date:2024  Patient Name: Katt Diaz  MRN: [id number]  : 1945  Room: [address] [place] [address]A       Attempted OT session this date:    [] unavailable due to other medical staff currently with pt   [] on hold per nursing staff   [x] on hold per nursing staff secondary to lab results, hgb 6.4, will re attempt when appropriate   [] declined treatment  this date due to ____.  Benefits of participation in therapy reviewed with pt.    [] off unit   [] Other:      Continue with current OT P.O.C      Kisha ACOSTA/L [id number]   
Occupational Therapy  OT BEDSIDE TREATMENT NOTE   [facility]  [address] [place], [place]      Date:5/3/2024  Patient Name: Katt Diaz  MRN: [id number]  : 1945  Room: [address] [place], [place] [address]      Evaluating OT: Jelena Alaniz OTR/L; [id number]        Referring Provider: Nery Barillas APRN - CNP     Specific Provider Orders/Date: OT Eval and Treat 24        Diagnosis: Loculated pleural effusion      Surgery: 24 Right Video Assisted Thoracoscopic Surgery, Pleurodesis, PleurX      Pertinent Medical History:  has a past medical history of VERONIQUE (acute kidney injury) ([facility]), CAD in native artery, Headache, Hearing loss, Hx of rheumatoid arthritis, Migraine, Osteopenia, S/P CABG x 3, and Sjogren's disease ([facility]).      Recommended Adaptive Equipment: BSC, AE for LE bathing and dressing PRN      Precautions:  Fall Risk, chest tube, emotional lability, +alarms, low vision, continuous pulse ox      Assessment of current deficits    [x] Functional mobility            [x]ADLs           [x] Strength                  [x]Cognition    [x] Functional transfers          [x] IADLs         [x] Safety Awareness   [x]Endurance    [x] Fine Coordination                         [x] Balance      [] Vision/perception   []Sensation      []Gross Motor Coordination             [] ROM           [] Delirium                   [] Motor Control      OT PLAN OF CARE   OT POC based on physician orders, patient diagnosis and results of clinical assessment     Frequency/Duration 1-3 days/wk for 2 weeks PRN   Specific OT Treatment Interventions to include:   * Instruction/training on adapted ADL techniques and AE recommendations to increase functional independence within precautions       * Training on energy conservation strategies, correct breathing pattern and techniques to improve independence/tolerance for self-care routine  * Functional transfer/mobility training/DME 
POD#1 Awake, alert. Significant amount of post op discomfort. Struggles to find a position of comfort.    Vitals:    04/29/24 2310 04/30/24 0107 04/30/24 0445 04/30/24 0813   BP: (!) 95/43 100/63 (!) 101/56 (!) 161/63   Pulse: 64  75 70   Resp: 20  18 16   Temp: 97.9 °F (36.6 °C)  97.5 °F (36.4 °C) 97.2 °F (36.2 °C)   TempSrc: Temporal  Temporal Temporal   SpO2: 100% 96% 96% 100%   Weight:       Height:         O2: RA      Intake/Output Summary (Last 24 hours) at 4/30/2024 0836  Last data filed at 4/30/2024 0540  Gross per 24 hour   Intake 1000 ml   Output 1302 ml   Net -302 ml             Recent Labs     04/29/24  0513 04/29/24  1450 04/30/24  0515   WBC 6.2 9.2 8.6   HGB 7.2* 7.6* 8.2*   HCT 23.2* 25.1* 27.6*   * 551* 518*      Recent Labs     04/29/24  0513 04/29/24  1450 04/30/24  0515   BUN 39* 31* 29*   CREATININE 1.1* 1.0 1.1*         Telemetry: NSR      PE  Cardiac: RRR  Lungs: decreased bases  Chest incision dressing C/D/I, approximated, no erythema. Chest tubes  present and secure.  + airleak  Abd: Soft, nontender, +BS  Ext: ARROYO          POD#1    A/P:     1) Recurrent right pleural effusion s/p CABG x 3 2/7/24  --Stable s/p RVATS, pleurodesis, insertion pleurX  --VSS  --Tolerating RA  --chest tube(s) without significant output and WITH AIRLEAK. Continue chest tubes today  --Increase activity as tolerated  --Encourage incentive spirometry   --lovenox for dvt prophylaxis      2) Expected acute post operative anemia secondary to surgery  --stable        3) Constipation--expected delayed return of bowel function  --secondary to anesthesia, narcotics, decreased oral intake, and decreased physical activity   --continue ordered bowel regimen        This patient's case and care plan was discussed with the attending surgeon    
POD#2 Awake, alert. Complains of chest tube discomfort. Denies CP, palpitations, SOB at rest, dizziness/lightheadedness.    Vitals:    04/30/24 2014 04/30/24 2151 05/01/24 0029 05/01/24 0318   BP: (!) 90/51  (!) 84/40 (!) 88/44   Pulse: 64  68 60   Resp: 18  20 19   Temp: 97.4 °F (36.3 °C)  97.5 °F (36.4 °C) 97.6 °F (36.4 °C)   TempSrc: Temporal  Temporal Temporal   SpO2: 100% 100% 96% 90%   Weight:       Height:         O2: none      Intake/Output Summary (Last 24 hours) at 5/1/2024 0743  Last data filed at 5/1/2024 0639  Gross per 24 hour   Intake 480 ml   Output 1040 ml   Net -560 ml         UO: 400mL/8hr   CT output:    Pleural and pleurx: 60mL/8hr (140mL/24hrs)      Recent Labs     04/29/24  1450 04/30/24  0515 05/01/24  0619   WBC 9.2 8.6 7.8   HGB 7.6* 8.2* 7.1*   HCT 25.1* 27.6* 23.8*   * 518* 488*      Recent Labs     04/29/24  0513 04/29/24  1450 04/30/24  0515   BUN 39* 31* 29*   CREATININE 1.1* 1.0 1.1*         Telemetry: NSR        PE  Cardiac: RRR  Lungs: decreased bases  Chest incision dressing C/D/I. Chest tube and pleurx catheter present and secure.  + airleak  Abd: Soft, nontender, +BS  Ext: ARROYO         POD#2    A/P:   1) Recurrent right pleural effusion s/p CABG x 3 on 2/7/24  --Stable s/p RVATS, pleurodesis, insertion pleurX on 4/29/24  --BP soft overnight; received 250mL bolus per attending  --Tolerating RA  --chest tube(s) without significant output and WITH AIRLEAK. Continue formal chest tube until resolution in airleak  --Increase activity as tolerated  --Encourage incentive spirometry   --lovenox for dvt prophylaxis      2) Anemia; multifactorial  --pre-op hgb 7.2; post op 7.6  --stable; today 7.1      3) Constipation--expected delayed return of bowel function  --secondary to anesthesia, narcotics, decreased oral intake, and decreased physical activity   --continue ordered bowel regimen              This patient's case and care plan was discussed with the attending surgeon    
POD#3 Awake, alert. Complains of chest tube discomfort. Denies CP, palpitations, SOB at rest, dizziness/lightheadedness.    Vitals:    05/01/24 2300 05/02/24 0333 05/02/24 0621 05/02/24 0730   BP: (!) 96/44 (!) 94/50  (!) 108/55   Pulse: 70 68  70   Resp: 16 17  18   Temp: 96.9 °F (36.1 °C) 97.2 °F (36.2 °C)  97.4 °F (36.3 °C)   TempSrc: Infrared Temporal  Temporal   SpO2: 93% 97%  99%   Weight:   51.1 kg (112 lb 9.6 oz)    Height:         O2: none      Intake/Output Summary (Last 24 hours) at 5/2/2024 0802  Last data filed at 5/2/2024 0621  Gross per 24 hour   Intake 240 ml   Output 670 ml   Net -430 ml         UO: 350mL/8hr   CT output:    Pleural and pleurx: 35mL/8hr (120mL/24hrs)      Recent Labs     04/30/24  0515 05/01/24  0619 05/02/24  0538   WBC 8.6 7.8 6.2   HGB 8.2* 7.1* 6.4*   HCT 27.6* 23.8* 20.9*   * 488* 484*      Recent Labs     04/30/24  0515 05/01/24  0619 05/02/24  0538   BUN 29* 32* 35*   CREATININE 1.1* 1.4* 1.4*         Telemetry: SR        PE  Cardiac: RRR  Lungs: decreased bases  Chest incision dressing C/D/I. Chest tube and pleurx catheter present and secure.  + airleak  Abd: Soft, nontender, +BS  Ext: ARROYO         POD#3    A/P:     A/P:   1) Recurrent right pleural effusion s/p CABG x 3 on 2/7/24  --Stable s/p RVATS, pleurodesis, insertion pleurX on 4/29/24  --BP soft yesterday; received 1 L bolus per attending  --Tolerating RA  --chest tube(s) without significant output and WITH AIRLEAK. Continue formal chest tube until resolution in airleak  --Increase activity as tolerated  --Encourage incentive spirometry   --lovenox for dvt prophylaxis        2) Anemia; multifactorial  --pre-op hgb 7.2; post op 7.6  --hgb today 6.4; yesterday 7.1; received 1 liter IVF for hypotension per attending; likely dilutional--defer transfusions to attending         3) Constipation--expected delayed return of bowel function  --secondary to anesthesia, narcotics, decreased oral intake, and decreased 
POD#4 Awake, alert. Remains with chest tube discomfort. States she feels better today. Denies CP, palpitations, SOB at rest, dizziness/lightheadedness.    Vitals:    05/02/24 2249 05/03/24 0319 05/03/24 0444 05/03/24 0745   BP: 107/68 102/63  120/77   Pulse: 75 81  75   Resp: 18 18  18   Temp: 97 °F (36.1 °C) 97.6 °F (36.4 °C)  97.6 °F (36.4 °C)   TempSrc: Temporal Temporal  Temporal   SpO2: 98% 98%  98%   Weight:   51.2 kg (112 lb 12.8 oz)    Height:         O2: none      Intake/Output Summary (Last 24 hours) at 5/3/2024 0824  Last data filed at 5/3/2024 0320  Gross per 24 hour   Intake 937.33 ml   Output 870 ml   Net 67.33 ml           UO: 350mL/8hr   CT output:    Pleural and pleurx: 10mL/8hr (70mL/24hrs)      Recent Labs     05/01/24  0619 05/02/24  0538 05/02/24  1719 05/03/24  0543   WBC 7.8 6.2  --  5.9   HGB 7.1* 6.4* 8.2* 8.1*   HCT 23.8* 20.9* 26.3* 26.1*   * 484*  --  456*        Recent Labs     05/01/24  0619 05/02/24  0538 05/03/24  0543   BUN 32* 35* 33*   CREATININE 1.4* 1.4* 1.4*           Telemetry: SR        PE  Cardiac: RRR  Lungs: decreased bases  Chest incision dressing C/D/I. Chest tube and pleurx catheter present and secure.  + airleak  Abd: Soft, nontender, +BS  Ext: ARROYO         POD#4    A/P:     A/P:   1) Recurrent right pleural effusion s/p CABG x 3 on 2/7/24  --Stable s/p RVATS, pleurodesis, insertion pleurX on 4/29/24  --BP soft yesterday; received 1 L bolus per attending  --Tolerating RA  --chest tube(s) without significant output and with small AIRLEAK. Continue formal chest tube until resolution in airleak  --Increase activity as tolerated  --Encourage incentive spirometry   --lovenox for dvt prophylaxis        2) Anemia; multifactorial  --pre-op hgb 7.2; post op 7.6  --hgb yesterday 6.4 -- s/p 1u PRBC yesterday   --hgb 8.1 today         3) Constipation--expected delayed return of bowel function  --secondary to anesthesia, narcotics, decreased oral intake, and decreased physical 
POD#5 Awake, alert. No complaints, in good spirits. Denies CP, palpitations, SOB at rest, dizziness/lightheadedness.    Vitals:    05/03/24 1618 05/03/24 1733 05/03/24 1916 05/03/24 1925   BP:  109/81 (!) 120/56 120/81   Pulse:   75 57   Resp:   18 18   Temp:   97.8 °F (36.6 °C) 97.1 °F (36.2 °C)   TempSrc:   Temporal Temporal   SpO2: 97%  94% 96%   Weight:       Height:         O2: none      Intake/Output Summary (Last 24 hours) at 5/3/2024 1947  Last data filed at 5/3/2024 1827  Gross per 24 hour   Intake 360 ml   Output 1090 ml   Net -730 ml           UO: 450mL/8hr   CT output:    Pleural and pleurx: 10mL/8hr (80mL/24hrs)      Recent Labs     05/01/24  0619 05/02/24  0538 05/02/24  1719 05/03/24  0543   WBC 7.8 6.2  --  5.9   HGB 7.1* 6.4* 8.2* 8.1*   HCT 23.8* 20.9* 26.3* 26.1*   * 484*  --  456*        Recent Labs     05/01/24  0619 05/02/24  0538 05/03/24  0543   BUN 32* 35* 33*   CREATININE 1.4* 1.4* 1.4*           Telemetry: SR        PE  Cardiac: RRR  Lungs: decreased bases  Chest incision dressing C/D/I. Chest tube and pleurx catheter present and secure.  + airleak  Abd: Soft, nontender, +BS  Ext: ARROYO         POD#5    A/P:   1) Recurrent right pleural effusion s/p CABG x 3 on 2/7/24  --Stable s/p RVATS, pleurodesis, insertion pleurX on 4/29/24  --Tolerating RA  --chest tube(s) without significant output and with small AIRLEAK. Continue formal chest tube until resolution in airleak  --Increase activity as tolerated  --Encourage incentive spirometry   --lovenox for dvt prophylaxis        2) Anemia; multifactorial  --pre-op hgb 7.2; post op 7.6  --hgb 5/2 6.4 -- s/p 1u PRBC   --hgb pending for today - will review          3) Constipation--expected delayed return of bowel function  --secondary to anesthesia, narcotics, decreased oral intake, and decreased physical activity   --continue ordered bowel regimen  --(+) BM 5/3        This patient's case and care plan was discussed with the attending 
POD#6 Awake, alert. No complaints, in good spirits. Denies CP, palpitations, SOB at rest, dizziness/lightheadedness.    Vitals:    05/04/24 2312 05/05/24 0334 05/05/24 0630 05/05/24 0740   BP: 108/65 107/60  (!) 107/58   Pulse: 73 77  74   Resp: 20 20  14   Temp: 97.8 °F (36.6 °C) 98.2 °F (36.8 °C)  97.6 °F (36.4 °C)   TempSrc: Infrared Infrared  Temporal   SpO2: 97% 97%  95%   Weight:   47.8 kg (105 lb 6.4 oz)    Height:         O2: none      Intake/Output Summary (Last 24 hours) at 5/5/2024 0950  Last data filed at 5/5/2024 0944  Gross per 24 hour   Intake 840 ml   Output 1700 ml   Net -860 ml           UO: 350mL/8hr   CT output:    Pleural and pleurx: 10mL/8hr (50mL/24hrs)      Recent Labs     05/03/24  0543 05/04/24  0707 05/05/24  0545   WBC 5.9 6.3 5.9   HGB 8.1* 8.9* 8.3*   HCT 26.1* 29.2* 26.8*   * 507* 442        Recent Labs     05/03/24  0543 05/04/24  0707 05/05/24  0545   BUN 33* 25* 22   CREATININE 1.4* 1.3* 1.2*           Telemetry: SR        PE  Cardiac: RRR  Lungs: decreased bases  Chest incision dressing C/D/I. Chest tube x1 and pleurx catheter present and secure.  + airleak  Abd: Soft, nontender, +BS  Ext: ARROYO         POD#6    A/P:   1) Recurrent right pleural effusion s/p CABG x 3 on 2/7/24  --Stable s/p RVATS, pleurodesis, insertion pleurX on 4/29/24  --Tolerating RA  --chest tube(s) without significant output and with small AIRLEAK. Continue formal chest tube until resolution in airleak -- switch to waterseal this AM and evaluate CXR in AM   --Increase activity as tolerated  --Encourage incentive spirometry   --lovenox for dvt prophylaxis        2) Anemia; multifactorial  --pre-op hgb 7.2; post op 7.6  --hgb 5/2 6.4 -- s/p 1u PRBC   --hgb 8.3 today        3) Constipation--expected delayed return of bowel function  --secondary to anesthesia, narcotics, decreased oral intake, and decreased physical activity   --continue ordered bowel regimen  --(+) BM 5/3        This patient's case and care 
POD#7 Awake, alert. No complaints. Denies CP, palpitations, SOB at rest, dizziness/lightheadedness.    Vitals:    05/05/24 2004 05/05/24 2302 05/06/24 0350 05/06/24 0615   BP: 107/74 99/61 119/69    Pulse: 77 77 78    Resp: 16 16 16    Temp: 96.9 °F (36.1 °C) 97.8 °F (36.6 °C) 97.8 °F (36.6 °C)    TempSrc: Infrared Infrared Infrared    SpO2: 99% 94% 95%    Weight:    47.7 kg (105 lb 3.2 oz)   Height:         O2: None       Intake/Output Summary (Last 24 hours) at 5/6/2024 0717  Last data filed at 5/6/2024 0615  Gross per 24 hour   Intake 870 ml   Output 2310 ml   Net -1440 ml     CT output: Pleural and pleurx: 10mL/8hr (10mL/24hrs)     Recent Labs     05/04/24  0707 05/05/24  0545   WBC 6.3 5.9   HGB 8.9* 8.3*   HCT 29.2* 26.8*   * 442      Recent Labs     05/04/24  0707 05/05/24  0545   BUN 25* 22   CREATININE 1.3* 1.2*         Telemetry: SR      PE  Cardiac: RRR  Lungs: decreased bases  Chest incision dressing C/D/I. Chest tube x1 and pleurx catheter present and secure.  + airleak  Abd: Soft, nontender, +BS  Ext: ARROYO              A/P: POD# 7    A/P:   1) Recurrent right pleural effusion s/p CABG x 3 on 2/7/24  --Stable s/p RVATS, pleurodesis, insertion pleurX on 4/29/24  --Tolerating RA  --chest tube(s) without significant output and with small AIRLEAK. Continue formal chest tube until resolution in airleak -- Currently on WS- CXR pending for this AM - Cont CT today   --Increase activity as tolerated  --Encourage incentive spirometry   --lovenox for dvt prophylaxis        2) Anemia; multifactorial  --pre-op hgb 7.2; post op 7.6  --hgb 5/2 6.4 -- s/p 1u PRBC   --hgb 8.3 yesterday- CBC pending for today, will follow         3) Constipation--expected delayed return of bowel function  --secondary to anesthesia, narcotics, decreased oral intake, and decreased physical activity   --continue ordered bowel regimen  --(+) BM 5/3           This patient's case and care plan was discussed with the attending surgeon  
Physical Therapy    Patient received for PT treatment update. Hemoglobin 6.4 this AM, pt receiving blood this PM. Will hold therapy per RN. CM notified.   Will follow up as appropriate.     Farida Pederson, PT, DPT  [id number]      
Physical Therapy  Physical Therapy Initial Assessment     Name: Katt Diaz  : 1945  MRN: [id number]      Date of Service: 2024    Evaluating PT:  Toni Flores PT, DPT [id number]    Room #:  0015/6505-A  Diagnosis:  Loculated pleural effusion [J90]  PMHx/PSHx:    Past Medical History:   Diagnosis Date    VERONIQUE (acute kidney injury) (HCC) 2024    CAD in native artery 2024    Headache     Hearing loss     Hx of rheumatoid arthritis     Migraine     Osteopenia     S/P CABG x 3     Sjogren's disease (HCC)      Procedure/Surgery:   R VATS  Precautions:  Falls, chest tube, bed/chair alarms, continuous pulse ox, emotional  Equipment Needs:  TBD    SUBJECTIVE:    Pt was admitted from [facility].     Pt ambulated with WW PTA.    OBJECTIVE:   Initial Evaluation  Date: 24 Treatment Short Term/ Long Term   Goals   AM-PAC 6 Clicks      Was pt agreeable to Eval/treatment? Yes     Does pt have pain? Surgical pain but no rating given     Bed Mobility  Rolling: NT  Supine to sit: Bekah with HOB elevated  Sit to supine: NT  Scooting: Bekah  Mod Independent   Transfers Sit to stand: Bekah  Stand to sit: Bekah  Stand pivot: Bekah with WW  Mod Independent with WW   Ambulation   A few steps to chair with Bekah with WW  >150 feet with Mod Independent with WW   Stair negotiation: ascended and descended NT  >4 steps with 1 rail Mod Independent   ROM BUE:  WFL  BLE:  WFL     Strength BUE:  WFL  BLE:  4/5  Increase by 1/3 MMT grade   Balance Sitting EOB:  SBA  Dynamic Standing:  Bekah with WW  Sitting EOB:  Independent  Dynamic Standing:  Mod Independent with WW     Pt is A & O x 4  Sensation:  no reported paresthesias  Edema:  none    Therapeutic Exercises:  NA    Patient education  Pt educated on safety    Patient response to education:   Pt verbalized understanding Pt demonstrated skill Pt requires further education in this area   yes yes yes     ASSESSMENT:    Conditions Requiring Skilled Therapeutic 
Physical Therapy  Physical Therapy treatment note     Name: Katt Diaz  : 1945  MRN: [id number]      Date of Service: 5/3/2024    Evaluating PT:  Toni Flores, PT, DPT [id number]    Room #:  6505/6505-A  Diagnosis:  Loculated pleural effusion [J90]  PMHx/PSHx:    Past Medical History:   Diagnosis Date    VERONIQUE (acute kidney injury) (HCC) 2024    CAD in native artery 2024    Headache     Hearing loss     Hx of rheumatoid arthritis     Migraine     Osteopenia     S/P CABG x 3     Sjogren's disease (HCC)      Procedure/Surgery:   R VATS  Hx CABG x 3 on 24   Precautions:  Falls, chest tube, bed/chair alarms, , emotional  Equipment Needs:  TBD    SUBJECTIVE:    Pt was admitted from [facility].     Pt ambulated with WW PTA.    OBJECTIVE:   Initial Evaluation  Date: 24 Treatment 5/3/24 Short Term/ Long Term   Goals   AM-PAC 6 Clicks     Was pt agreeable to Eval/treatment? Yes Yes     Does pt have pain? Surgical pain but no rating given R side at chest tube site 9/10     Bed Mobility  Rolling: NT  Supine to sit: Bekah with HOB elevated  Sit to supine: NT  Scooting: Bekah Rolling: NT  Supine to sit: SBA  Sit to supine: NT  Scooting: SBA Mod Independent   Transfers Sit to stand: Bekah  Stand to sit: Bekah  Stand pivot: Bekah with WW Sit to stand: SBA  Stand to sit: SBA  Stand pivot: SBA with WW light touch Mod Independent with WW   Ambulation   A few steps to chair with Bekah with WW 50 feet with WW light touch and Bekah >150 feet with Mod Independent with WW   Stair negotiation: ascended and descended NT NT d/t fatigue  >4 steps with 1 rail Mod Independent   ROM BUE:  WFL  BLE:  WFL     Strength BUE:  WFL  BLE:  4/5  Increase by 1/3 MMT grade   Balance Sitting EOB:  SBA  Dynamic Standing:  Bekah with WW Sitting EOB:  SBA  Dynamic Standing:  Bekah with WW Sitting EOB:  Independent  Dynamic Standing:  Mod Independent with WW   Pt is A & O x grossly oriented, not formally assessed   Sensation:  Pt 
Physical Therapy  Physical Therapy treatment note     Name: Katt Diaz  : 1945  MRN: [id number]      Date of Service: 2024    Evaluating PT:  Toni Flores, PT, DPT [id number]    Room #:  6505/6505-A  Diagnosis:  Loculated pleural effusion [J90]  PMHx/PSHx:    Past Medical History:   Diagnosis Date    VERONIQUE (acute kidney injury) (HCC) 2024    CAD in native artery 2024    Headache     Hearing loss     Hx of rheumatoid arthritis     Migraine     Osteopenia     S/P CABG x 3     Sjogren's disease (HCC)      Procedure/Surgery:   R VATS  Hx CABG x 3 on 24   Precautions:  Falls, chest tube, bed/chair alarms, Equipment Needs:  TBD    SUBJECTIVE:    Pt was admitted from [facility].     Pt ambulated with WW PTA.    OBJECTIVE:   Initial Evaluation  Date: 24 Treatment 24 Short Term/ Long Term   Goals   AM-PAC 6 Clicks     Was pt agreeable to Eval/treatment? Yes Yes     Does pt have pain? Surgical pain but no rating given R side at chest tube site    Bed Mobility  Rolling: NT  Supine to sit: Bekah with HOB elevated  Sit to supine: NT  Scooting: Bekah Rolling: NT  Supine to sit: SBA  Sit to supine: NT  Scooting: SBA Mod Independent   Transfers Sit to stand: Bekah  Stand to sit: Bekah  Stand pivot: Bekah with WW Sit to stand: SBA  Stand to sit: SBA  Stand pivot: SBA with WW light touch Mod Independent with WW   Ambulation   A few steps to chair with Bekah with WW 50 feet with WW light touch and SBA >150 feet with Mod Independent with WW   Stair negotiation: ascended and descended NT NT d/t fatigue  >4 steps with 1 rail Mod Independent   ROM BUE:  WFL  BLE:  WFL     Strength BUE:  WFL  BLE:  4/5  Increase by 1/3 MMT grade   Balance Sitting EOB:  SBA  Dynamic Standing:  Bekah with WW Sitting EOB:  SBA  Dynamic Standing:  SBA with WW Sitting EOB:  Independent  Dynamic Standing:  Mod Independent with WW   Pt is A & O x grossly oriented, not formally assessed   Sensation:  Pt denies numbness and 
PleurX cath removed from atrium, drained for 20cc, then capped. Patient educated on how to drain PleurX cath. Sons will be in tomorrow to watch how to drain Pleurx cath as well.    Antoinette Shaw RN    
Pt is refusing turns to her R side. Say's she doesn't feel comfortable with the chest tubes. RN told pt she is OK to turn on R side as it will not hurt the chest tubes and it is best to help with wound prevention. Pt states she would like to talk to the Doctor about that and continues to refuse.  
Pulm. Consult sent via perfect serve.  
RN left a VM to NP Jessica Learn as patient is crying, saying she is anxious and cannot sleep  
RN noted new multiple areas of non blanchable redness on buttocks. Wound care/dietary consults already placed yesterday. Low airloss module ordered again. Hydrophor ordered. Pt has nadira order already in. Pt agreeable to turning on right side as she refused before.  
RN notified NP Leti Lopez via phone of patient manual BP of 84/40. Pt is not symptomatic.     250 ml Bolus ordered    Repeat BP 88/44 manual. Pt does appear a bit lethargic. RN left voicemail to NP Leti Lopez with an update on situation.     Upon farther assessment, patient more awake and alert. No new orders at this time.  
RN unable to perform thorough skin assessment on patient due to CTS being in patient room for consultation and patient leaving the unit prior to VATS procedure.   
Spoke with Danielle in surgery who states that they have Bactroban in surgery to apply to pt nares. Danielle notified, per blood bank, that type and screen will be completed in 20-30 minutes. Danielle also notified that the pt is A&O x 4 and will have to speak with anesthesia for their consent for procedure.   
[facility]  Nephrology Progress Note    Patient's Name: Katt BURLESON Slovenian    History of Present Illness from 4/26 Consult Note:    \"Patient is a 78-year-old female patient we are asked to see in regards to acute kidney injury.  Patient has been admitted since 4/11/2024.  The patient has been hospitalized numerous times over the past several months.  In February she was admitted for STEMI with subsequent cardiac catheterization and urgent CABG x 3.  She was discharged early in February after CABG.  She was then readmitted again in mid to late February for acute on chronic systolic/diastolic CHF.  Patient then was discharged and was again admitted in mid March for acute on chronic systolic congestive heart failure.  During that admission she had a LifeVest applied which she continues to wear today.  She was again admitted in early April from 4/1 through 4/8 for failure to thrive.  And then subsequently return for another admission on 4/11 which is her current admission.  Her serum creatinine earlier this month was noted to be 1.0 to 1.2 mg/dL.  The patient was started on Jardiance on 4/14 and Entresto on 4/13.  Serum creatinines began to elevate on 4/22 with creatinine of 1.4 mg/dL and creatinine today of 2.0 mg/dL prompting renal consultation.  On examination patient is understandably frustrated with her prolonged hospital stay as well as recent health issues.  Also of note patient has been somewhat hypotensive with blood pressures on 4/23 and 4/24 with systolic blood pressures in the 80s and 90s at times.  The patient has a chest tube and does complain of pain at the site of insertion.  She did have a hemothorax which was the reason for placement of chest tube.\"  Update HPI 5/1:   \"Patient presented to [facility] as a transfer from [facility] of hospital on 4/28 for possible VATS. She was seen by cardiothoracic surgery and underwent right VATS, pleurodesis, Pleurx on 
Yuliya Carbone notified that pt had a large bright red bloody BM. Pt denies any dizziness/SOB/weakness.Lovenox last given at 0921. Last hgb 8.1.Vitals WDL. Per Yuliya Carbone, notify her if pt has another bloody BM  
Alcohol-Povidone PF      Intake/Output Summary (Last 24 hours) at 5/7/2024 1505  Last data filed at 5/7/2024 1329  Gross per 24 hour   Intake 540 ml   Output 1420 ml   Net -880 ml       Exam:    /66   Pulse 76   Temp 98.6 °F (37 °C) (Temporal)   Resp 16   Ht 1.6 m (5' 2.99\")   Wt 46 kg (101 lb 6.4 oz)   SpO2 95%   BMI 17.97 kg/m²       General appearance:  No apparent distress,   HEENT:  Normal cephalic,   Neck: Supple, with full range of motion. No jugular venous distention. Trachea midline.  Respiratory:  Normal respiratory effort. DIMINISHED BILATERALLY, CT ON THE RIGHT   Cardiovascular:  RRR  Abdomen: Soft, non-tender, non-distended with normal bowel sounds.  Musculoskeletal:  No clubbing, cyanosis or edema bilaterally.    Skin: smooth and dry   Neurologic:   Cranial nerves: II-XII intact,   Psychiatric:  Alert and oriented x 3     Labs:   Recent Labs     05/05/24  0545 05/06/24  0531 05/07/24  0601   WBC 5.9 6.1 6.2   HGB 8.3* 8.3* 9.7*   HCT 26.8* 26.7* 30.9*    428 438     Recent Labs     05/05/24  0545 05/06/24  0531 05/07/24  0601    137 138   K 4.1 4.2 4.0    100 96*   CO2 21* 25 29   BUN 22 21 18   CREATININE 1.2* 1.2* 1.2*   CALCIUM 8.2* 8.4* 8.4*   PHOS 3.0 3.8 3.6     No results for input(s): \"AST\", \"ALT\", \"BILIDIR\", \"BILITOT\", \"ALKPHOS\" in the last 72 hours.  No results for input(s): \"INR\" in the last 72 hours.  No results for input(s): \"CKTOTAL\", \"TROPONINI\" in the last 72 hours.  No results for input(s): \"AST\", \"ALT\", \"BILIDIR\", \"BILITOT\", \"ALKPHOS\" in the last 72 hours.    Invalid input(s): \"ALB\"  No results for input(s): \"LACTA\" in the last 72 hours.  Lab Results   Component Value Date    URICACID 10.7 (H) 04/28/2024     No results found for: \"AMMONIA\"    Assessment:    Active Hospital Problems    Diagnosis Date Noted    Grade IV hemorrhoids [K64.3] 05/04/2024    Loculated pleural effusion [J90] 04/28/2024    Recurrent right pleural effusion [J90] 04/28/2024    
Alcohol-Povidone PF      Intake/Output Summary (Last 24 hours) at 5/8/2024 1650  Last data filed at 5/8/2024 1535  Gross per 24 hour   Intake 620 ml   Output 1955 ml   Net -1335 ml       Exam:    BP (!) 94/52   Pulse 75   Temp 97.3 °F (36.3 °C) (Temporal)   Resp 18   Ht 1.6 m (5' 2.99\")   Wt 45 kg (99 lb 3.2 oz)   SpO2 91%   BMI 17.58 kg/m²       General appearance:  No apparent distress,   HEENT:  Normal cephalic,   Neck: Supple, with full range of motion. No jugular venous distention. Trachea midline.  Respiratory:  Normal respiratory effort. DIMINISHED BILATERALLY, CT ON THE RIGHT   Cardiovascular:  RRR  Abdomen: Soft, non-tender, non-distended with normal bowel sounds.  Musculoskeletal:  No clubbing, cyanosis or edema bilaterally.    Skin: smooth and dry   Neurologic:   Cranial nerves: II-XII intact,   Psychiatric:  Alert and oriented x 3     Labs:   Recent Labs     05/06/24  0531 05/07/24  0601 05/08/24  0630   WBC 6.1 6.2 8.1   HGB 8.3* 9.7* 10.1*   HCT 26.7* 30.9* 32.0*    438 430     Recent Labs     05/06/24  0531 05/07/24  0601 05/08/24  0630    138 137   K 4.2 4.0 4.5    96* 95*   CO2 25 29 24   BUN 21 18 17   CREATININE 1.2* 1.2* 1.3*   CALCIUM 8.4* 8.4* 8.9   PHOS 3.8 3.6 3.5     No results for input(s): \"AST\", \"ALT\", \"BILIDIR\", \"BILITOT\", \"ALKPHOS\" in the last 72 hours.  No results for input(s): \"INR\" in the last 72 hours.  No results for input(s): \"CKTOTAL\", \"TROPONINI\" in the last 72 hours.  No results for input(s): \"AST\", \"ALT\", \"BILIDIR\", \"BILITOT\", \"ALKPHOS\" in the last 72 hours.    Invalid input(s): \"ALB\"  No results for input(s): \"LACTA\" in the last 72 hours.  Lab Results   Component Value Date    URICACID 10.7 (H) 04/28/2024     No results found for: \"AMMONIA\"    Assessment:    Active Hospital Problems    Diagnosis Date Noted    Grade IV hemorrhoids [K64.3] 05/04/2024    Loculated pleural effusion [J90] 04/28/2024    Recurrent right pleural effusion [J90] 04/28/2024    
drain over 1000cc at a time.  Please teach patient how to self-drain Pleur-X catheter.      Dispo: CTS will sign off. Pt may be discharged with Pleurx supplies from CTS standpoint.       Future Appointments   Date Time Provider Department Center   5/13/2024  1:00 PM [facility] CARDIAC REHAB EVAL ROOM [facility] CAR REUBEN [place]   5/14/2024  8:15 AM [facility] CHF ROOM 1 [facility]   5/16/2024 10:15 AM Nicolasa Jasso, DO CARDIO SURG [facility]   5/20/2024 10:15 AM Sharita Wood, APRN - CNS [facility]         This patient's case and care plan was discussed with the attending surgeon    
  05/06/24 1612 -- -- -- -- -- 97 % -- --   05/06/24 1525 102/60 97.9 °F (36.6 °C) Temporal 69 16 98 % -- --   05/06/24 1427 110/60 -- -- -- -- -- -- --   05/06/24 1300 -- -- -- -- -- -- 1.6 m (5' 2.99\") --   05/06/24 1205 -- -- -- -- -- 94 % -- --   05/06/24 1045 (!) 110/58 97.1 °F (36.2 °C) Temporal 71 16 95 % -- --           Intake/Output Summary (Last 24 hours) at 5/7/2024 0857  Last data filed at 5/7/2024 0640  Gross per 24 hour   Intake 720 ml   Output 2320 ml   Net -1600 ml     General: Awake, alert, no acute distress  Neck: No JVD noted  Lungs: Clear bilaterally upper, diminished to the bases bilaterally.  Unlabored  CV: Regular rate and rhythm.  No rub  Abd: Soft, nontender, nondistended.  Active bowel sounds  Skin: Warm and dry.  No rash on exposed extremities  Ext: 1-2+ BLE edema   Neuro: Awake, answers questions appropriately    Data:    Recent Labs     05/05/24  0545 05/06/24  0531 05/07/24  0601   WBC 5.9 6.1 6.2   HGB 8.3* 8.3* 9.7*   HCT 26.8* 26.7* 30.9*   MCV 92.1 93.0 92.0    428 438         Recent Labs     05/05/24  0545 05/06/24  0531 05/07/24  0601    137 138   K 4.1 4.2 4.0    100 96*   CO2 21* 25 29   CREATININE 1.2* 1.2* 1.2*   BUN 22 21 18   LABGLOM 46* 44* 44*   GLUCOSE 85 85 91   CALCIUM 8.2* 8.4* 8.4*   PHOS 3.0 3.8 3.6   MG 1.7 1.6 1.6         Vit D, 25-Hydroxy   Date Value Ref Range Status   04/27/2024 16.5 (L) 30.0 - 100.0 ng/mL Final       No results found for: \"PTH\"    No results for input(s): \"ALT\", \"AST\", \"ALKPHOS\", \"BILITOT\", \"BILIDIR\" in the last 72 hours.    No results for input(s): \"LABALBU\" in the last 72 hours.    Iron   Date Value Ref Range Status   04/13/2024 18 (L) 37 - 145 ug/dL Final     TIBC   Date Value Ref Range Status   04/13/2024 220 (L) 250 - 450 ug/dL Final       Vitamin B-12   Date Value Ref Range Status   04/13/2024 331 211 - 946 pg/mL Final       Folate   Date Value Ref Range Status   04/13/2024 16.3 4.8 - 24.2 ng/mL Final       Lab Results 
(36.6 °C) Temporal 75 18 94 % --   05/03/24 1733 109/81 -- -- -- -- -- --   05/03/24 1618 -- -- -- -- -- 97 % --   05/03/24 1530 122/67 97.2 °F (36.2 °C) Temporal 77 18 100 % --   05/03/24 1450 (!) 109/56 -- -- 80 -- -- --   05/03/24 1132 96/84 97 °F (36.1 °C) Temporal 80 18 100 % --   05/03/24 0937 -- -- -- -- 18 -- --   05/03/24 0922 -- -- -- 81 -- -- --         Intake/Output Summary (Last 24 hours) at 5/4/2024 0908  Last data filed at 5/4/2024 0557  Gross per 24 hour   Intake 360 ml   Output 1630 ml   Net -1270 ml       General: Awake, alert, no acute distress  Neck: No JVD noted  Lungs: Clear bilaterally upper, diminished to the bases bilaterally.  Unlabored  CV: Regular rate and rhythm.  No rub  Abd: Soft, nontender, nondistended.  Active bowel sounds  Skin: Warm and dry.  No rash on exposed extremities  Ext: Trace to 1+ BLE edema   Neuro: Awake, answers questions appropriately    Data:    Recent Labs     05/02/24  0538 05/02/24  1719 05/03/24  0543   WBC 6.2  --  5.9   HGB 6.4* 8.2* 8.1*   HCT 20.9* 26.3* 26.1*   MCV 93.3  --  91.9   *  --  456*       Recent Labs     05/02/24  0538 05/03/24  0543    138   K 4.2 3.7    105   CO2 20* 20*   CREATININE 1.4* 1.4*   BUN 35* 33*   LABGLOM 37* 40*   GLUCOSE 80 82   CALCIUM 7.5* 8.1*   PHOS 2.7 3.1   MG 2.7* 2.4       Vit D, 25-Hydroxy   Date Value Ref Range Status   04/27/2024 16.5 (L) 30.0 - 100.0 ng/mL Final       No results found for: \"PTH\"    No results for input(s): \"ALT\", \"AST\", \"ALKPHOS\", \"BILITOT\", \"BILIDIR\" in the last 72 hours.    No results for input(s): \"LABALBU\" in the last 72 hours.    Iron   Date Value Ref Range Status   04/13/2024 18 (L) 37 - 145 ug/dL Final     TIBC   Date Value Ref Range Status   04/13/2024 220 (L) 250 - 450 ug/dL Final       Vitamin B-12   Date Value Ref Range Status   04/13/2024 331 211 - 946 pg/mL Final       Folate   Date Value Ref Range Status   04/13/2024 16.3 4.8 - 24.2 ng/mL Final       Lab Results 
NEGATIVE 04/26/2024 07:00 PM    GLUCOSEU NEGATIVE 04/26/2024 07:00 PM    KETUA NEGATIVE 04/26/2024 07:00 PM    UROBILINOGEN 0.2 04/26/2024 07:00 PM    BILIRUBINUR NEGATIVE 04/26/2024 07:00 PM       Lab Results   Component Value Date/Time    PROTEIN 6.0 (L) 04/28/2024 08:41 AM    OSMOU 396 04/28/2024 04:00 PM       No components found for: \"URIC\"    No results found for: \"LIPIDPAN\"    Assessment and Plans:    VERONIQUE   Presumed likely secondary to renal hypoperfusion in the setting of aggressive diuresis with concurrent use of Entresto  Concerned that intermittent hypotension may compromise recovery of renal function  Baseline serum creatinine 1-1.2  Creatinine peaked at 2 on 4/26--> 1.3 today  Avoid nephrotoxins/NSAIDs  Strict I&O, daily weights  torsemide 20 mg oral twice daily  Monitor labs    2.  Intermittent hypotension  On midodrine 5 mg oral 3 times daily  Hold parameters on torsemide  Monitor BPs    3.  HFrEF/right pleural effusion/pulmonary embolism  Echo 4/2024 EF 30-35%, grade 1 diastolic dysfunction, severe tricuspid regurg, moderate mitral regurg  CTA chest 4/2024 acute PE within LLL segmental/subsegmental branches, large right pleural effusion which is partially loculated, trace left pleural effusion  S/p right VATS, pleurodesis, Pleurx on 4/29  torsemide 20 mg oral twice daily   On Entresto  Strict I&O, daily weights  Monitor volume status  CTS and pulmonology following    4.  Anemia   with iron deficiency  Hemoglobin 10.1 today  Iron studies 4/2024 iron 18, TIBC 220, iron sat 8%, folate 16.3, B12 331  On oral ferrous sulfate  Transfuse for hemoglobin<7  Monitor H&H    5.  Hyponatremia -resolved  Of undetermined etiology  May be related to CHF  Sodium 127 on 4/28--> 137 today  Urine osmolality 396, urine sodium<20  Strict I&O  Monitor labs    6.  Hypocalcemia  May be secondary to hypoproteinemia and vitamin D deficiency  PTH 37.2 and vitamin D16.5 on 4/27  Supplement calcium as needed  Vitamin d supplement 
CNP  Pt seen and examined on rounds with mary stroud  Agree with above  Sp vats  Follow cr on entresto and torsemide  Juma Gilliam MD       
secondary to hypoproteinemia and vitamin D deficiency  PTH 37.2 and vitamin D16.5 on 4/27  Check ionized calcium in the a.m.  Vitamin d supplement   Monitor labs     7.  Hyperuricemia-->Uric acid 10.7 on 4/28--> On allopurinol 100 mg oral daily    8.  Hyperkalemia  With VERONIQUE  Low potassium diet  Monitor labs    PALMER Will - CNP    Patient seen and examined all key components of the physical performed independently , case discussed with NP, all pertinent labs and radiologic tests personally reviewed agree with above.        Tad Ribera MD        
sodium<20  Strict I&O  Monitor labs    6.  Hypocalcemia  May be secondary to hypoproteinemia and vitamin D deficiency  PTH 37.2 and vitamin D16.5 on 4/27  Ionized calcium 1.12 today  Supplement ionized calcium  Vitamin d supplement   Monitor labs     7.  Hyperuricemia-->Uric acid 10.7 on 4/28--> On allopurinol 100 mg oral daily    8.  Hyperkalemia  With VERONIQUE  Low potassium diet  Monitor labs    Jacey Ellsworth APRN - CNP    Patient seen and examined all key components of the physical performed independently , case discussed with NP, all pertinent labs and radiologic tests personally reviewed agree with above.      Tad Ribera MD      
today  Urine osmolality 396, urine sodium<20  Strict I&O  Monitor labs    6.  Hypocalcemia  May be secondary to hypoproteinemia and vitamin D deficiency  PTH 37.2 and vitamin D16.5 on 4/27  Ionized calcium 1.17 today  Supplement ionized calcium as needed  Vitamin D supplement   Monitor labs     7.  Hyperuricemia-->Uric acid 10.7 on 4/28--> On allopurinol 100 mg oral daily      Janelle Calabrese, APRN - CNP        Patient seen and examined all key components of the physical performed independently , case discussed with NP, all pertinent labs and radiologic tests personally reviewed agree with above.   Improved kidney function; creatinine continues to be stable  Okay to reintroduce Entresto from renal standpoint; her EF is only 30-35%; will resume     Tad Ribera MD

## 2024-05-08 NOTE — CARE COORDINATION
5/8/24  Transition of Care Update.  Patient is POD #9 for Right VATS.  Chest tube has been pulled and pleur-X capped.  PT.OT updates complete yesterday with AM-PAC of 16/24 for PT and 18/24 for OT.  Discharge goal remains home when medically stable.  [facility] is following for post discharge support.   Patient will discharge with a pleur-X and will needs kits prior to discharge. Patient states her son and cousin Dhara will help with Pleur-X drain care on the days [facility] is not there. CTS will need to place specific orders regarding drain care prior to discharge for home care. Pleur-X log given to Patient and [facility].  Patient has a life vest in her room for discharge. Will need to make sure pt receives 30 day free Eliquis & monthly refill  assist at discharge.  [facility] to follow.     Electronically signed by ADAMA Shirley on 5/8/2024 at 3:02 PM

## 2024-05-08 NOTE — PATIENT CARE CONFERENCE
P Quality Flow/Interdisciplinary Rounds Progress Note        Quality Flow Rounds held on May 8, 2024    Disciplines Attending:  Bedside Nurse, , , and Nursing Unit Leadership    Kattcirilo Diaz was admitted on 4/28/2024  7:00 PM    Anticipated Discharge Date:       Disposition:    Dani Score:  Dani Scale Score: 19    Readmission Risk              Risk of Unplanned Readmission:  47           Discussed patient goal for the day, patient clinical progression, and barriers to discharge.  The following Goal(s) of the Day/Commitment(s) have been identified:  Diagnostics - Report Results and educate patient and son how to drain pleurx.      Jena Locke, RN  May 8, 2024

## 2024-05-10 NOTE — DISCHARGE SUMMARY
resolution of small airleak  No further bleeding per rectum  Hemoglobin stable at 8.3/26.8  Discharge plan once chest tube is out-may be tomorrow  Should resume Eliquis since hemoglobin stable  BUN/creatinine 22/1.2-renal on board  Blood pressures are optimally controlled     5/6/2024  Patient continues with chest tube-continues with small airleak  Chest x-ray reveals trace right pneumothorax  Patient sitting up in chair encourage incentive spirometer  H&H-8.3/26 point  Patient appears very frustrated  Appreciate all consultants and plan.     5/7/2024  Chest tube clamped  CXR-pending  Creatinine remains the same 1.2  WBC-6.2  Continue with breathing treatments  Patient resting on exam  Vital signs stable     5/8/2024  Patient resting comfortably in chair  She is ready for discharge  If no further plans by other consultants, she can be discharged from medicine standpoint  She tells me she will be going home with home health care    Recent Labs     05/08/24  0630   WBC 8.1   HGB 10.1*   HCT 32.0*          Recent Labs     05/08/24  0630      K 4.5   CL 95*   CO2 24   BUN 17   CREATININE 1.3*   CALCIUM 8.9       XR CHEST PORTABLE    Result Date: 4/30/2024  EXAMINATION: ONE XRAY VIEW OF THE CHEST 4/30/2024 10:07 am COMPARISON: The previous study performed 04/29/2024. HISTORY: ORDERING SYSTEM PROVIDED HISTORY: s/p vats, evaluate lung fields and tube(s) TECHNOLOGIST PROVIDED HISTORY: Reason for exam:->s/p vats, evaluate lung fields and tube(s) FINDINGS: 2 right-sided chest tubes are again identified in place.  No discrete pneumothorax is seen.  Small bilateral pleural effusions are again noted. There has been an interval decrease in opacities involving the medial right lower lung field.  The cardiac silhouette and mediastinal structures are without significant interval change.  The patient is again status post cardiac surgery.     1. 2 right-sided chest tubes again in place with no discrete pneumothorax and

## 2024-05-12 LAB
MICROORGANISM SPEC CULT: NORMAL
MICROORGANISM SPEC CULT: NORMAL
MICROORGANISM/AGENT SPEC: NORMAL
MICROORGANISM/AGENT SPEC: NORMAL
SPECIMEN DESCRIPTION: NORMAL
SPECIMEN DESCRIPTION: NORMAL

## 2024-05-13 ASSESSMENT — ENCOUNTER SYMPTOMS
COUGH: 0
SHORTNESS OF BREATH: 0

## 2024-05-13 NOTE — PROGRESS NOTES
Subjective   Patient ID: Katt Diaz is a 78 y.o. female with hx of Recurrent right pleural effusion s/p CABG x 3 on 2/7/24 who underwent  RVATS, pleurodesis, insertion pleurX on 4/29/24.  CT was removed on POD 8 and pleurx capped.    She states she is doing well and denies any fevers, chills, SOB, or CP.  She continued to drain pleurx per instructions     HPI    Review of Systems   Constitutional:  Negative for chills and fever.   Respiratory:  Negative for cough and shortness of breath.    Cardiovascular:  Negative for chest pain and palpitations.   Neurological:  Negative for light-headedness.          Objective   Physical Exam  Constitutional:       Appearance: Normal appearance.   Cardiovascular:      Rate and Rhythm: Normal rate and regular rhythm.   Pulmonary:      Effort: Pulmonary effort is normal.      Breath sounds: Normal breath sounds.      Comments: RVATS and CT sites healing well without evidence of infection  Right pleurx in place   Abdominal:      General: Bowel sounds are normal.   Musculoskeletal:         General: Normal range of motion.      Cervical back: Normal range of motion and neck supple.   Neurological:      Mental Status: She is alert and oriented to person, place, and time.            Assessment   S/p  RVATS, pleurodesis, insertion pleurX on 4/29/24       Plan   Progressing well  Will see prn    PleurX no longer draining  Site cleaned with betadine  5cc 2% lidocaine injected at entrance site  PleurX removed w/o difficulty    Nicolasa Jasso DO  Cardiothoracic Surgery

## 2024-05-14 ENCOUNTER — HOSPITAL ENCOUNTER (OUTPATIENT)
Dept: OTHER | Age: 79
Discharge: HOME | End: 2024-05-14

## 2024-05-14 LAB
MICROORGANISM SPEC CULT: NORMAL
MICROORGANISM/AGENT SPEC: NORMAL
SPECIMEN DESCRIPTION: NORMAL

## 2024-05-15 LAB
MICROORGANISM SPEC CULT: NORMAL
MICROORGANISM/AGENT SPEC: NORMAL
SPECIMEN DESCRIPTION: NORMAL

## 2024-05-16 ENCOUNTER — OFFICE VISIT (OUTPATIENT)
Dept: CARDIOTHORACIC SURGERY | Age: 79
End: 2024-05-16

## 2024-05-16 VITALS
HEART RATE: 83 BPM | BODY MASS INDEX: 18.03 KG/M2 | WEIGHT: 98 LBS | HEIGHT: 62 IN | DIASTOLIC BLOOD PRESSURE: 61 MMHG | SYSTOLIC BLOOD PRESSURE: 100 MMHG

## 2024-05-16 DIAGNOSIS — J90 PLEURAL EFFUSION: Primary | ICD-10-CM

## 2024-05-19 LAB
MICROORGANISM SPEC CULT: NORMAL
MICROORGANISM/AGENT SPEC: NORMAL
SPECIMEN DESCRIPTION: NORMAL

## 2024-05-20 ENCOUNTER — HOSPITAL ENCOUNTER (OUTPATIENT)
Dept: OTHER | Age: 79
Setting detail: THERAPIES SERIES
Discharge: HOME | End: 2024-05-20
Payer: MEDICARE

## 2024-05-20 VITALS
SYSTOLIC BLOOD PRESSURE: 93 MMHG | HEART RATE: 84 BPM | WEIGHT: 94 LBS | BODY MASS INDEX: 17.19 KG/M2 | OXYGEN SATURATION: 97 % | RESPIRATION RATE: 18 BRPM | DIASTOLIC BLOOD PRESSURE: 52 MMHG

## 2024-05-20 LAB
ANION GAP SERPL CALCULATED.3IONS-SCNC: 15 MMOL/L (ref 7–16)
BNP SERPL-MCNC: 4227 PG/ML (ref 0–450)
BUN SERPL-MCNC: 27 MG/DL (ref 6–23)
CALCIUM SERPL-MCNC: 9 MG/DL (ref 8.6–10.2)
CHLORIDE SERPL-SCNC: 93 MMOL/L (ref 98–107)
CO2 SERPL-SCNC: 26 MMOL/L (ref 22–29)
CREAT SERPL-MCNC: 1.4 MG/DL (ref 0.5–1)
GFR, ESTIMATED: 39 ML/MIN/1.73M2
GLUCOSE SERPL-MCNC: 123 MG/DL (ref 74–99)
POTASSIUM SERPL-SCNC: 4 MMOL/L (ref 3.5–5)
SODIUM SERPL-SCNC: 134 MMOL/L (ref 132–146)

## 2024-05-20 PROCEDURE — 80048 BASIC METABOLIC PNL TOTAL CA: CPT

## 2024-05-20 PROCEDURE — 99214 OFFICE O/P EST MOD 30 MIN: CPT

## 2024-05-20 PROCEDURE — 83880 ASSAY OF NATRIURETIC PEPTIDE: CPT

## 2024-05-20 PROCEDURE — 36415 COLL VENOUS BLD VENIPUNCTURE: CPT

## 2024-05-20 PROCEDURE — 99214 OFFICE O/P EST MOD 30 MIN: CPT | Performed by: CLINICAL NURSE SPECIALIST

## 2024-05-20 RX ORDER — POTASSIUM CITRATE 10 MEQ/1
10 TABLET, EXTENDED RELEASE ORAL DAILY
COMMUNITY
Start: 2024-03-11

## 2024-05-20 RX ORDER — LEVOTHYROXINE SODIUM 0.1 MG/1
100 TABLET ORAL DAILY
COMMUNITY
Start: 2024-03-11

## 2024-05-20 NOTE — RESULT ENCOUNTER NOTE
Can we please confirm with Dr. Luis Angel Rea's office which medications she is currently prescribed?   The list she had did not include Eliquis, Jardiance, Entresto, or midodrine     BNP 9070>>4227  BUN 27, Cr 1.4    Please ask her to decrease torsemide (Demadex) to 20 mg daily     Follow up next week as scheduled    Thank you!

## 2024-05-20 NOTE — DISCHARGE INSTRUCTIONS
-We will call later today with results of your blood work and any medicine changes     -Referral placed to cardiac rehab, as your prior appointments were cancelled due to readmission     -We will confirm your current medications with Dr. Rea     -Follow up with Dr. Shaw as scheduled    -Follow up with CHF clinic as scheduled    -Weigh yourself daily    -Stay Hydrated, 64 oz     -Diet should sodium restricted to 2 grams    -Again watch your daily weight trends and if you gain water weight please follow below instructions.    -If you gain 3-5 pounds in 2-3 days OR notice that you are retaining fluid in anyway just like you did before then take an extra dose of your water pill (Demadex/Torsemide) every day until you lose the weight or feel better.     -If you notice that you have taken more than 2 extra doses in 1 week then please call and let us know.    -If at any time you feel that you are retaining fluid, your medications are not working, or you feel ill in anyway, then please call us for either same day appointment or the next day, and for instructions. Our goal is to keep you out of the emergency room and the hospital and we have ways to do it.     -If you become sick for other reasons, and notice that you are not urinating as much, the urine is very dark, you have significant diarrhea or vomiting, then please DO NOT take your water pill and CALL US immediately.

## 2024-05-20 NOTE — PROGRESS NOTES
heart surgery.  The left lung remains clear.     IMPRESSION:  Small residual right apical pneumothorax with chest tube in the right apex  and lung base.      Telemetry   Sinus Rhythm 80s    ASSESSMENT:  Chronic HFrEF with reduced RV function   ACC stage C / NYHA class III  GDMT limited by hypotension, recent VERONIQUE    Ischemic cardiomyopathy  CAD s/p CABG on 2/7/2024 with LIMA to LAD, SVG to RCA, SVG to OM, left atrial appendage ligation with 35mm Atriclip. Intraoperative YOVANY 2/7/2024  LVEF 20-25% >>Post op 57%>>30-35% in 4/2024    VHD: moderate MR, severe TR     T2DM with neuropathy    RA    Sjogren's disease     7.   PE 4/2024    8. Recurrent right pleural effusion s/p VATS/pleurodesis/Pleurx catheter 4/29/2024    9. Iron deficiency anemia with history of transfusions    10. Recent VERONIQUE    11. History of hyponatremia    12. Deconditioning    13. Depression      PLAN:  BMP, BNP today  We will confirm her current medications with Dr. Rea' office  If she truly isn't taking Eliquis, will resume given recommendation for life long anticoagulation by pulmonary/critical care medicine   Continue Plavix  Attempt to reintroduce GDMT as renal function/blood pressure allows  Continue to wear Lifevest  Follow up in CHF clinic in 1 week   Follow up with Dr. Shaw as scheduled  Referral to cardiac rehab    -Weigh yourself daily    -Stay Hydrated, 64 oz     -Diet should sodium restricted to 2 grams    -If at any time you feel that you are retaining fluid, your medications are not working, or you feel ill in anyway, then please call us for either same day appointment or the next day, and for instructions. Our goal is to keep you out of the emergency room and the hospital and we have ways to do it. You just need to call us in a timely manner.     -If you become sick for other reasons, and notice that you are not urinating as much, the urine is very dark, you have significant diarrhea or vomiting, then please DO NOT take your water

## 2024-05-22 ENCOUNTER — HOSPITAL ENCOUNTER (OUTPATIENT)
Age: 79
Discharge: HOME | End: 2024-05-22
Payer: MEDICARE

## 2024-05-22 LAB
ANION GAP SERPL CALCULATED.3IONS-SCNC: 14 MMOL/L (ref 7–16)
BILIRUB UR QL STRIP: NEGATIVE
BUN SERPL-MCNC: 28 MG/DL (ref 6–23)
CALCIUM SERPL-MCNC: 9.2 MG/DL (ref 8.6–10.2)
CHLORIDE SERPL-SCNC: 93 MMOL/L (ref 98–107)
CLARITY UR: CLEAR
CO2 SERPL-SCNC: 25 MMOL/L (ref 22–29)
COLOR UR: YELLOW
CREAT SERPL-MCNC: 1.3 MG/DL (ref 0.5–1)
CREAT UR-MCNC: 32 MG/DL (ref 29–226)
EPI CELLS #/AREA URNS HPF: ABNORMAL /HPF
ERYTHROCYTE [DISTWIDTH] IN BLOOD BY AUTOMATED COUNT: 18.6 % (ref 11.5–15)
GFR, ESTIMATED: 41 ML/MIN/1.73M2
GLUCOSE SERPL-MCNC: 99 MG/DL (ref 74–99)
GLUCOSE UR STRIP-MCNC: NEGATIVE MG/DL
HCT VFR BLD AUTO: 30.9 % (ref 34–48)
HGB BLD-MCNC: 9.9 G/DL (ref 11.5–15.5)
HGB UR QL STRIP.AUTO: NEGATIVE
KETONES UR STRIP-MCNC: NEGATIVE MG/DL
LEUKOCYTE ESTERASE UR QL STRIP: ABNORMAL
MAGNESIUM SERPL-MCNC: 2.1 MG/DL (ref 1.6–2.6)
MCH RBC QN AUTO: 29.6 PG (ref 26–35)
MCHC RBC AUTO-ENTMCNC: 32 G/DL (ref 32–34.5)
MCV RBC AUTO: 92.2 FL (ref 80–99.9)
MICROALBUMIN UR-MCNC: <12 MG/L (ref 0–19)
MICROALBUMIN/CREAT UR-RTO: NORMAL MCG/MG CREAT (ref 0–30)
MICROORGANISM SPEC CULT: NORMAL
MICROORGANISM/AGENT SPEC: NORMAL
NITRITE UR QL STRIP: NEGATIVE
PH UR STRIP: 6.5 [PH] (ref 5–9)
PHOSPHATE SERPL-MCNC: 3.8 MG/DL (ref 2.5–4.5)
PLATELET # BLD AUTO: 355 K/UL (ref 130–450)
PMV BLD AUTO: 9.7 FL (ref 7–12)
POTASSIUM SERPL-SCNC: 4.5 MMOL/L (ref 3.5–5)
PROT UR STRIP-MCNC: NEGATIVE MG/DL
RBC # BLD AUTO: 3.35 M/UL (ref 3.5–5.5)
RBC #/AREA URNS HPF: ABNORMAL /HPF
SODIUM SERPL-SCNC: 132 MMOL/L (ref 132–146)
SP GR UR STRIP: 1.01 (ref 1–1.03)
SPECIMEN DESCRIPTION: NORMAL
UROBILINOGEN UR STRIP-ACNC: 0.2 EU/DL (ref 0–1)
WBC #/AREA URNS HPF: ABNORMAL /HPF
WBC OTHER # BLD: 5.1 K/UL (ref 4.5–11.5)

## 2024-05-22 PROCEDURE — 83735 ASSAY OF MAGNESIUM: CPT

## 2024-05-22 PROCEDURE — 81001 URINALYSIS AUTO W/SCOPE: CPT

## 2024-05-22 PROCEDURE — 36415 COLL VENOUS BLD VENIPUNCTURE: CPT

## 2024-05-22 PROCEDURE — 82043 UR ALBUMIN QUANTITATIVE: CPT

## 2024-05-22 PROCEDURE — 84100 ASSAY OF PHOSPHORUS: CPT

## 2024-05-22 PROCEDURE — 82570 ASSAY OF URINE CREATININE: CPT

## 2024-05-22 PROCEDURE — 85027 COMPLETE CBC AUTOMATED: CPT

## 2024-05-22 PROCEDURE — 80048 BASIC METABOLIC PNL TOTAL CA: CPT

## 2024-05-26 LAB
MICROORGANISM SPEC CULT: NORMAL
MICROORGANISM/AGENT SPEC: NORMAL
SPECIMEN DESCRIPTION: NORMAL

## 2024-05-29 ENCOUNTER — TELEPHONE (OUTPATIENT)
Dept: OTHER | Age: 79
End: 2024-05-29

## 2024-05-29 NOTE — TELEPHONE ENCOUNTER
3:23 PM 5/29/2024 Called patient re: new medication changes. Confirmed with Sharita CHAKRABORTY-CNS patient should be on plavix and eliquis.  I have reviewed the provider's instructions with the patient, answering all questions to her satisfaction.                Message  Kusum Fonseca, APRN - CNP  Shayla Rashid, RULA; Sharita Wood, APRN - CNS; Dyana Aden RN  Hold on that -- I am sorry her discharge summary stated stop plavix and then is looks like lifelong Eliquis. Let me confirm with Sharita cazares          Previous Messages       ----- Message -----  From: Shayla Rashid RN  Sent: 5/24/2024  10:39 AM EDT  To: Dyana Aden RN; *    Hello!    Following up on this message from Sharita (Sharita's off today). When I called patient to tell her... she is taking PLAVIX prescribed by her PCP Dr. Rea.        Her PCP does not have epic so were unable to see script.      Please advise.    Shayla BENOIT  ----- Message -----  From: Sharita Wood APRN - CNS  Sent: 5/23/2024   4:13 PM EDT  To: Shayla Rashid RN    Hi,  Can you please let Mrs. Diaz know to resume her Eliquis at 5 mg twice daily. The critical care physician who was following during admission recommended life long anticoagulation. I'll send her a prescription for Eliquis but she needs to follow up with a lung doctor and her PCP.    Thank you!           Future Appointments   Date Time Provider Department Center   6/4/2024 10:15 AM Arnav Shaw MD [facility] CARDIO [facility]   6/10/2024 10:30 AM [facility] CHF ROOM 3 [facility]   6/17/2024  9:30 AM [facility] CARDIAC REHAB EVAL ROOM [facility] CAR [facility]

## 2024-06-04 ENCOUNTER — OFFICE VISIT (OUTPATIENT)
Dept: CARDIOLOGY CLINIC | Age: 79
End: 2024-06-04
Payer: MEDICARE

## 2024-06-04 VITALS
HEIGHT: 62 IN | HEART RATE: 83 BPM | BODY MASS INDEX: 17.3 KG/M2 | DIASTOLIC BLOOD PRESSURE: 58 MMHG | SYSTOLIC BLOOD PRESSURE: 98 MMHG | WEIGHT: 94 LBS | RESPIRATION RATE: 18 BRPM

## 2024-06-04 DIAGNOSIS — I50.9 CONGESTIVE HEART FAILURE, UNSPECIFIED HF CHRONICITY, UNSPECIFIED HEART FAILURE TYPE (HCC): Primary | ICD-10-CM

## 2024-06-04 DIAGNOSIS — I42.9 CARDIOMYOPATHY, UNSPECIFIED TYPE (HCC): ICD-10-CM

## 2024-06-04 PROCEDURE — G8400 PT W/DXA NO RESULTS DOC: HCPCS | Performed by: INTERNAL MEDICINE

## 2024-06-04 PROCEDURE — 1036F TOBACCO NON-USER: CPT | Performed by: INTERNAL MEDICINE

## 2024-06-04 PROCEDURE — 1111F DSCHRG MED/CURRENT MED MERGE: CPT | Performed by: INTERNAL MEDICINE

## 2024-06-04 PROCEDURE — 93000 ELECTROCARDIOGRAM COMPLETE: CPT | Performed by: INTERNAL MEDICINE

## 2024-06-04 PROCEDURE — 99214 OFFICE O/P EST MOD 30 MIN: CPT | Performed by: INTERNAL MEDICINE

## 2024-06-04 PROCEDURE — 1123F ACP DISCUSS/DSCN MKR DOCD: CPT | Performed by: INTERNAL MEDICINE

## 2024-06-04 PROCEDURE — 1090F PRES/ABSN URINE INCON ASSESS: CPT | Performed by: INTERNAL MEDICINE

## 2024-06-04 PROCEDURE — G8427 DOCREV CUR MEDS BY ELIG CLIN: HCPCS | Performed by: INTERNAL MEDICINE

## 2024-06-04 PROCEDURE — G8419 CALC BMI OUT NRM PARAM NOF/U: HCPCS | Performed by: INTERNAL MEDICINE

## 2024-06-04 RX ORDER — CLOPIDOGREL BISULFATE 75 MG/1
75 TABLET ORAL DAILY
COMMUNITY

## 2024-06-04 RX ORDER — LISINOPRIL 2.5 MG/1
2.5 TABLET ORAL DAILY
Qty: 30 TABLET | Refills: 3 | Status: SHIPPED | OUTPATIENT
Start: 2024-06-04

## 2024-06-04 ASSESSMENT — ENCOUNTER SYMPTOMS
ABDOMINAL PAIN: 0
NAUSEA: 0
COUGH: 0
SHORTNESS OF BREATH: 0
CHEST TIGHTNESS: 0
BACK PAIN: 0
VOMITING: 0
BLOOD IN STOOL: 0

## 2024-06-04 NOTE — PROGRESS NOTES
OFFICE VISIT    NAME: Katt Diaz     YOB: 1945   AGE: 78 y.o.   MEDICAL RECORD NUMBER: [id number]       CONSULTATION INFORMATION:   DATE OF CLINIC CONSULTATION: 6/4/2024   PRINCIPLE DIAGNOSIS / reason for visit: Follow-up CHF, CAD    CARE TEAM MEMBERS    PCP : Luis Angel Rea DO     REFERRING PROVIDER: No ref. provider found     HISTORY OF PRESENT ILLNESS:   Katt Diaz is a 78 y.o. female who presents to follow up for her CAD, HFrEf. Past medical history of CAD (STEMI presentation 2/2024, found with 3VD s/p CABG with LIMA-LAD, SVG-d.RCA, SVG-OM and MALIK clip on 2/7/24), HFrEF (EF 30-35%), mitral regurgitation and tricuspid regurgitation, PE (on Eliquis), recurrent pleural effusion s/p VATS, CKD3b, rheumatoid arthritis, Sjogren's disease.      Patient had recurrent hospitalizations with SOB and was found to have PE and loculated pleural effusion s/p Pleurx catheter placement that was exchanged into chest tube with tPA injections, eventually underwent VATS. She has been following with CHF clinic with attempts to optimize her GDMT.  However, optimization with limited by hypotension and VERONIQUE.   Today patient presents to follow-up.  Denies having chest pain, shortness of breath or lower extremity edema.  She has some fatigue but she is able to walk around her house.  She has not tried going to the shopping store yet but she feels that she will probably can.  Her Entresto was stopped due to hypotension and VERONIQUE    PERTINENT RADIOGRAPHIC/DIAGNOSTICS:   -TTE 4/5/24:    Left Ventricle: The EF by visual approximation is 30 - 35%. Left ventricle is moderately dilated. Septal flattening in diastole consistent with right ventricular volume overload. Grade I diastolic dysfunction with normal LAP.    Right Ventricle: Right ventricle size is normal. Moderately reduced systolic function.    Aortic Valve: Not well visualized. No stenosis. AV area by continuity VTI is 2.6 cm2. AV area by peak velocity is 2.3

## 2024-06-05 LAB
MICROORGANISM SPEC CULT: NORMAL
MICROORGANISM/AGENT SPEC: NORMAL
SPECIMEN DESCRIPTION: NORMAL

## 2024-06-10 ENCOUNTER — HOSPITAL ENCOUNTER (OUTPATIENT)
Dept: OTHER | Age: 79
Setting detail: THERAPIES SERIES
Discharge: HOME | End: 2024-06-10
Payer: MEDICARE

## 2024-06-10 VITALS
OXYGEN SATURATION: 99 % | SYSTOLIC BLOOD PRESSURE: 91 MMHG | DIASTOLIC BLOOD PRESSURE: 48 MMHG | BODY MASS INDEX: 17.16 KG/M2 | WEIGHT: 93.8 LBS | RESPIRATION RATE: 18 BRPM | HEART RATE: 68 BPM

## 2024-06-10 LAB
ANION GAP SERPL CALCULATED.3IONS-SCNC: 17 MMOL/L (ref 7–16)
BNP SERPL-MCNC: 3381 PG/ML (ref 0–450)
BUN SERPL-MCNC: 25 MG/DL (ref 6–23)
CALCIUM SERPL-MCNC: 9.6 MG/DL (ref 8.6–10.2)
CHLORIDE SERPL-SCNC: 96 MMOL/L (ref 98–107)
CO2 SERPL-SCNC: 23 MMOL/L (ref 22–29)
CREAT SERPL-MCNC: 1.3 MG/DL (ref 0.5–1)
GFR, ESTIMATED: 41 ML/MIN/1.73M2
GLUCOSE SERPL-MCNC: 118 MG/DL (ref 74–99)
POTASSIUM SERPL-SCNC: 4.7 MMOL/L (ref 3.5–5)
SODIUM SERPL-SCNC: 136 MMOL/L (ref 132–146)

## 2024-06-10 PROCEDURE — 80048 BASIC METABOLIC PNL TOTAL CA: CPT

## 2024-06-10 PROCEDURE — 99214 OFFICE O/P EST MOD 30 MIN: CPT

## 2024-06-10 PROCEDURE — 36415 COLL VENOUS BLD VENIPUNCTURE: CPT

## 2024-06-10 PROCEDURE — 83880 ASSAY OF NATRIURETIC PEPTIDE: CPT

## 2024-06-10 ASSESSMENT — PATIENT HEALTH QUESTIONNAIRE - PHQ9
SUM OF ALL RESPONSES TO PHQ QUESTIONS 1-9: 12
SUM OF ALL RESPONSES TO PHQ QUESTIONS 1-9: 12
7. TROUBLE CONCENTRATING ON THINGS, SUCH AS READING THE NEWSPAPER OR WATCHING TELEVISION: NOT AT ALL
2. FEELING DOWN, DEPRESSED OR HOPELESS: NEARLY EVERY DAY
SUM OF ALL RESPONSES TO PHQ QUESTIONS 1-9: 12
1. LITTLE INTEREST OR PLEASURE IN DOING THINGS: NEARLY EVERY DAY
8. MOVING OR SPEAKING SO SLOWLY THAT OTHER PEOPLE COULD HAVE NOTICED. OR THE OPPOSITE, BEING SO FIGETY OR RESTLESS THAT YOU HAVE BEEN MOVING AROUND A LOT MORE THAN USUAL: NOT AT ALL
9. THOUGHTS THAT YOU WOULD BE BETTER OFF DEAD, OR OF HURTING YOURSELF: NOT AT ALL
SUM OF ALL RESPONSES TO PHQ QUESTIONS 1-9: 12
5. POOR APPETITE OR OVEREATING: NEARLY EVERY DAY
4. FEELING TIRED OR HAVING LITTLE ENERGY: NEARLY EVERY DAY
3. TROUBLE FALLING OR STAYING ASLEEP: NOT AT ALL
6. FEELING BAD ABOUT YOURSELF - OR THAT YOU ARE A FAILURE OR HAVE LET YOURSELF OR YOUR FAMILY DOWN: NOT AT ALL
10. IF YOU CHECKED OFF ANY PROBLEMS, HOW DIFFICULT HAVE THESE PROBLEMS MADE IT FOR YOU TO DO YOUR WORK, TAKE CARE OF THINGS AT HOME, OR GET ALONG WITH OTHER PEOPLE: NOT DIFFICULT AT ALL
SUM OF ALL RESPONSES TO PHQ9 QUESTIONS 1 & 2: 6

## 2024-06-10 NOTE — PROGRESS NOTES
(L)     Hematocrit (%)   Date Value   05/22/2024 30.9 (L)     Platelets (k/uL)   Date Value   05/22/2024 355     Iron Studies:  Iron (ug/dL)   Date Value   04/13/2024 18 (L)     TIBC (ug/dL)   Date Value   04/13/2024 220 (L)     Hepatic:  AST (U/L)   Date Value   04/28/2024 37 (H)     ALT (U/L)   Date Value   04/28/2024 8     Total Bilirubin (mg/dL)   Date Value   04/28/2024 0.4     Alkaline Phosphatase (U/L)   Date Value   04/28/2024 75     INR:  INR (no units)   Date Value   04/29/2024 1.8         Wt Readings from Last 3 Encounters:   06/10/24 42.5 kg (93 lb 12.8 oz)   06/04/24 42.6 kg (94 lb)   05/20/24 42.6 kg (94 lb)           ASSESSMENT/PLAN:    [x] Euvolemic          [] Hypervolemic, with increase from baseline:  [] Shortness of breath/TRUJILLO  [] JVD  [] HJR  [] Abnormal lung assessment:   [] Orthopnea  [] PND  [] Decreased urinary response to oral diuretic   [] Scrotal swelling   [] Lower extremity edema  [] Compression stockings off but states she has been wearing them  [] Decline in functional capacity (unable to perform activities they had previously been able to do)  [] Weight gain     [] IV diuretics given    [] Provider notified of recurrent IV diuretic use    Additional Notes:          [x]Lab work obtained-    [x] Patient/Family Educated On:  [x] HF zones (Green, Yellow, Red) and aware of when to take action   [x] Daily weights  [] Scale provided   [x] Low sodium diet (2000 mg)  Barriers to compliance  [] Refuses to monitor diet  [] Socioeconomic difficulties  [] Unable to cook for self (use of frozen meals, can goods, etc)  [] CHF CHW consulted  [] Low sodium meal delivery options given to patient  [] Dietician consulted   [] Low sodium recipes provided  [] Sodium free seasoning provided   [x] Fluid intake 6-8 cups (around 64 oz)  [x] Reviewed currently prescribed medications with patient, educated on importance of compliance and answered any questions regarding their medication  [] Pill box provided to

## 2024-06-10 NOTE — RESULT ENCOUNTER NOTE
CHF clinic visit and labs reviewed  VS: 91/48, 68  Euvolemic on RN exam   ? Cardiac cachexia (weight 93 lbs 12 oz)    BNP down to 3381  Cr stable at 1.3     GDMT has been limited by hypotension and VERONIQUE  Lisinopril 2.5 mg daily (recently started  Toprol XL 25 mg daily     Continue same medications for now  To start cardiac rehab next week   Follow up as scheduled

## 2024-06-12 LAB
MICROORGANISM SPEC CULT: NORMAL
MICROORGANISM/AGENT SPEC: NORMAL
SPECIMEN DESCRIPTION: NORMAL

## 2024-07-10 ENCOUNTER — HOSPITAL ENCOUNTER (OUTPATIENT)
Dept: CARDIAC REHAB | Age: 79
Setting detail: THERAPIES SERIES
Discharge: HOME | End: 2024-07-10
Payer: MEDICARE

## 2024-07-10 VITALS
OXYGEN SATURATION: 97 % | HEIGHT: 62 IN | DIASTOLIC BLOOD PRESSURE: 56 MMHG | WEIGHT: 85.2 LBS | SYSTOLIC BLOOD PRESSURE: 88 MMHG | BODY MASS INDEX: 15.68 KG/M2 | RESPIRATION RATE: 20 BRPM | HEART RATE: 72 BPM

## 2024-07-10 PROCEDURE — 93797 PHYS/QHP OP CAR RHAB WO ECG: CPT

## 2024-07-10 PROCEDURE — 93798 PHYS/QHP OP CAR RHAB W/ECG: CPT

## 2024-07-10 ASSESSMENT — PATIENT HEALTH QUESTIONNAIRE - PHQ9
SUM OF ALL RESPONSES TO PHQ QUESTIONS 1-9: 19
5. POOR APPETITE OR OVEREATING: NEARLY EVERY DAY
9. THOUGHTS THAT YOU WOULD BE BETTER OFF DEAD, OR OF HURTING YOURSELF: NOT AT ALL
SUM OF ALL RESPONSES TO PHQ QUESTIONS 1-9: 19
3. TROUBLE FALLING OR STAYING ASLEEP: SEVERAL DAYS
10. IF YOU CHECKED OFF ANY PROBLEMS, HOW DIFFICULT HAVE THESE PROBLEMS MADE IT FOR YOU TO DO YOUR WORK, TAKE CARE OF THINGS AT HOME, OR GET ALONG WITH OTHER PEOPLE: VERY DIFFICULT
7. TROUBLE CONCENTRATING ON THINGS, SUCH AS READING THE NEWSPAPER OR WATCHING TELEVISION: SEVERAL DAYS
6. FEELING BAD ABOUT YOURSELF - OR THAT YOU ARE A FAILURE OR HAVE LET YOURSELF OR YOUR FAMILY DOWN: MORE THAN HALF THE DAYS
SUM OF ALL RESPONSES TO PHQ QUESTIONS 1-9: 19
8. MOVING OR SPEAKING SO SLOWLY THAT OTHER PEOPLE COULD HAVE NOTICED. OR THE OPPOSITE, BEING SO FIGETY OR RESTLESS THAT YOU HAVE BEEN MOVING AROUND A LOT MORE THAN USUAL: NEARLY EVERY DAY
4. FEELING TIRED OR HAVING LITTLE ENERGY: NEARLY EVERY DAY
SUM OF ALL RESPONSES TO PHQ9 QUESTIONS 1 & 2: 6
SUM OF ALL RESPONSES TO PHQ QUESTIONS 1-9: 19
2. FEELING DOWN, DEPRESSED OR HOPELESS: NEARLY EVERY DAY
1. LITTLE INTEREST OR PLEASURE IN DOING THINGS: NEARLY EVERY DAY

## 2024-07-10 ASSESSMENT — EJECTION FRACTION: EF_VALUE: 20

## 2024-07-22 ENCOUNTER — HOSPITAL ENCOUNTER (OUTPATIENT)
Dept: CARDIOLOGY | Age: 79
Discharge: HOME | End: 2024-07-24
Attending: INTERNAL MEDICINE
Payer: MEDICARE

## 2024-07-22 VITALS — HEIGHT: 62 IN | BODY MASS INDEX: 15.64 KG/M2 | WEIGHT: 85 LBS

## 2024-07-22 DIAGNOSIS — I42.9 CARDIOMYOPATHY, UNSPECIFIED TYPE (HCC): ICD-10-CM

## 2024-07-22 LAB
LEFT VENTRICULAR EJECTION FRACTION HIGH VALUE: 35 %
LEFT VENTRICULAR EJECTION FRACTION MODE: NORMAL
LV EF: 30 %

## 2024-07-22 PROCEDURE — 93306 TTE W/DOPPLER COMPLETE: CPT

## 2024-07-24 ENCOUNTER — APPOINTMENT (OUTPATIENT)
Dept: CARDIAC REHAB | Age: 79
End: 2024-07-24
Payer: MEDICARE

## 2024-07-24 LAB
ECHO AV AREA PEAK VELOCITY: 1.9 CM2
ECHO AV AREA VTI: 1.8 CM2
ECHO AV AREA/BSA PEAK VELOCITY: 1.4 CM2/M2
ECHO AV AREA/BSA VTI: 1.4 CM2/M2
ECHO AV CUSP MM: 2 CM
ECHO AV MEAN GRADIENT: 2 MMHG
ECHO AV MEAN VELOCITY: 0.8 M/S
ECHO AV PEAK GRADIENT: 4 MMHG
ECHO AV PEAK VELOCITY: 1 M/S
ECHO AV VELOCITY RATIO: 0.7
ECHO AV VTI: 20.2 CM
ECHO BSA: 1.3 M2
ECHO EST RA PRESSURE: 3 MMHG
ECHO LA DIAMETER INDEX: 2.63 CM/M2
ECHO LA DIAMETER: 3.5 CM
ECHO LA VOL A-L A2C: 33 ML (ref 22–52)
ECHO LA VOL A-L A4C: 29 ML (ref 22–52)
ECHO LA VOL MOD A2C: 32 ML (ref 22–52)
ECHO LA VOL MOD A4C: 27 ML (ref 22–52)
ECHO LA VOLUME AREA LENGTH: 37 ML
ECHO LA VOLUME INDEX A-L A2C: 25 ML/M2 (ref 16–34)
ECHO LA VOLUME INDEX A-L A4C: 22 ML/M2 (ref 16–34)
ECHO LA VOLUME INDEX AREA LENGTH: 28 ML/M2 (ref 16–34)
ECHO LA VOLUME INDEX MOD A2C: 24 ML/M2 (ref 16–34)
ECHO LA VOLUME INDEX MOD A4C: 20 ML/M2 (ref 16–34)
ECHO LV EJECTION FRACTION A2C: 30 %
ECHO LV EJECTION FRACTION A4C: 36 %
ECHO LV FRACTIONAL SHORTENING: 15 % (ref 28–44)
ECHO LV INTERNAL DIMENSION DIASTOLE INDEX: 3.53 CM/M2
ECHO LV INTERNAL DIMENSION DIASTOLIC: 4.7 CM (ref 3.9–5.3)
ECHO LV INTERNAL DIMENSION SYSTOLIC INDEX: 3.01 CM/M2
ECHO LV INTERNAL DIMENSION SYSTOLIC: 4 CM
ECHO LV ISOVOLUMETRIC RELAXATION TIME (IVRT): 131.5 MS
ECHO LV IVSD: 0.8 CM (ref 0.6–0.9)
ECHO LV IVSS: 1 CM
ECHO LV MASS 2D: 132.3 G (ref 67–162)
ECHO LV MASS INDEX 2D: 99.5 G/M2 (ref 43–95)
ECHO LV POSTERIOR WALL DIASTOLIC: 0.9 CM (ref 0.6–0.9)
ECHO LV POSTERIOR WALL SYSTOLIC: 0.9 CM
ECHO LV RELATIVE WALL THICKNESS RATIO: 0.38
ECHO LVOT AREA: 2.8 CM2
ECHO LVOT AV VTI INDEX: 0.64
ECHO LVOT DIAM: 1.9 CM
ECHO LVOT MEAN GRADIENT: 1 MMHG
ECHO LVOT PEAK GRADIENT: 2 MMHG
ECHO LVOT PEAK VELOCITY: 0.7 M/S
ECHO LVOT STROKE VOLUME INDEX: 27.7 ML/M2
ECHO LVOT SV: 36.8 ML
ECHO LVOT VTI: 13 CM
ECHO MV "A" WAVE DURATION: 135 MSEC
ECHO MV A VELOCITY: 0.76 M/S
ECHO MV AREA PHT: 3.6 CM2
ECHO MV AREA VTI: 2.4 CM2
ECHO MV E DECELERATION TIME (DT): 254 MS
ECHO MV E VELOCITY: 0.44 M/S
ECHO MV E/A RATIO: 0.58
ECHO MV LVOT VTI INDEX: 1.18
ECHO MV MAX VELOCITY: 0.9 M/S
ECHO MV MEAN GRADIENT: 1 MMHG
ECHO MV MEAN VELOCITY: 0.5 M/S
ECHO MV PEAK GRADIENT: 3 MMHG
ECHO MV PRESSURE HALF TIME (PHT): 60.6 MS
ECHO MV VTI: 15.4 CM
ECHO PV MAX VELOCITY: 0.8 M/S
ECHO PV MEAN GRADIENT: 1 MMHG
ECHO PV MEAN VELOCITY: 0.6 M/S
ECHO PV PEAK GRADIENT: 2 MMHG
ECHO PV VTI: 13.1 CM
ECHO PVEIN A DURATION: 117.7 MS
ECHO PVEIN A VELOCITY: 0.4 M/S
ECHO PVEIN PEAK D VELOCITY: 0.4 M/S
ECHO PVEIN PEAK S VELOCITY: 0.5 M/S
ECHO PVEIN S/D RATIO: 1.3
ECHO RIGHT VENTRICULAR SYSTOLIC PRESSURE (RVSP): 24 MMHG
ECHO RV INTERNAL DIMENSION: 2.7 CM
ECHO TV REGURGITANT MAX VELOCITY: 2.27 M/S
ECHO TV REGURGITANT PEAK GRADIENT: 21 MMHG

## 2024-07-24 PROCEDURE — 93306 TTE W/DOPPLER COMPLETE: CPT | Performed by: INTERNAL MEDICINE

## 2024-07-25 ENCOUNTER — HOSPITAL ENCOUNTER (OUTPATIENT)
Dept: CARDIAC REHAB | Age: 79
Setting detail: THERAPIES SERIES
Discharge: HOME | End: 2024-07-25
Payer: MEDICARE

## 2024-07-31 ENCOUNTER — APPOINTMENT (OUTPATIENT)
Dept: CARDIAC REHAB | Age: 79
End: 2024-07-31
Payer: MEDICARE

## 2024-08-02 ENCOUNTER — OFFICE VISIT (OUTPATIENT)
Dept: CARDIOLOGY CLINIC | Age: 79
End: 2024-08-02
Payer: MEDICARE

## 2024-08-02 VITALS
RESPIRATION RATE: 16 BRPM | DIASTOLIC BLOOD PRESSURE: 60 MMHG | BODY MASS INDEX: 16.59 KG/M2 | HEART RATE: 78 BPM | HEIGHT: 63 IN | SYSTOLIC BLOOD PRESSURE: 113 MMHG | WEIGHT: 93.6 LBS

## 2024-08-02 DIAGNOSIS — I25.10 CORONARY ARTERY DISEASE INVOLVING NATIVE CORONARY ARTERY OF NATIVE HEART WITHOUT ANGINA PECTORIS: Primary | ICD-10-CM

## 2024-08-02 PROCEDURE — G8400 PT W/DXA NO RESULTS DOC: HCPCS | Performed by: INTERNAL MEDICINE

## 2024-08-02 PROCEDURE — G8427 DOCREV CUR MEDS BY ELIG CLIN: HCPCS | Performed by: INTERNAL MEDICINE

## 2024-08-02 PROCEDURE — 1036F TOBACCO NON-USER: CPT | Performed by: INTERNAL MEDICINE

## 2024-08-02 PROCEDURE — 99214 OFFICE O/P EST MOD 30 MIN: CPT | Performed by: INTERNAL MEDICINE

## 2024-08-02 PROCEDURE — 93000 ELECTROCARDIOGRAM COMPLETE: CPT | Performed by: INTERNAL MEDICINE

## 2024-08-02 PROCEDURE — 1090F PRES/ABSN URINE INCON ASSESS: CPT | Performed by: INTERNAL MEDICINE

## 2024-08-02 PROCEDURE — G8419 CALC BMI OUT NRM PARAM NOF/U: HCPCS | Performed by: INTERNAL MEDICINE

## 2024-08-02 PROCEDURE — 1123F ACP DISCUSS/DSCN MKR DOCD: CPT | Performed by: INTERNAL MEDICINE

## 2024-08-02 RX ORDER — ASPIRIN 81 MG/1
81 TABLET ORAL DAILY
Qty: 90 TABLET | Refills: 5 | Status: SHIPPED | OUTPATIENT
Start: 2024-08-02

## 2024-08-02 ASSESSMENT — ENCOUNTER SYMPTOMS
VOMITING: 0
ABDOMINAL PAIN: 0
NAUSEA: 0
CHEST TIGHTNESS: 0
BLOOD IN STOOL: 0
SHORTNESS OF BREATH: 0
BACK PAIN: 0
COUGH: 0

## 2024-08-02 NOTE — PROGRESS NOTES
and not distended. No tenderness.    EXTREMITIES: There is no pedal edema.   MUSCULOSKELETAL: No joint swelling. Normal range of motion    SKIN: Skin is moist. No ulcers or lesions.   NEUROLOGICAL: No evidence of obvious neurological deficits.    PSYCHOLOGICAL: Patient is in normal mood.          LABORATORY DATA:   Lab Results   Component Value Date/Time    WBC 5.1 05/22/2024 02:35 PM    HGB 9.9 05/22/2024 02:35 PM    HCT 30.9 05/22/2024 02:35 PM     05/22/2024 02:35 PM      Lab Results   Component Value Date/Time     06/10/2024 11:17 AM    K 4.7 06/10/2024 11:17 AM    CO2 23 06/10/2024 11:17 AM    BUN 25 06/10/2024 11:17 AM    CREATININE 1.3 06/10/2024 11:17 AM    MG 2.1 05/22/2024 02:35 PM      No components found for: \"HGBA1C\"   Lab Results   Component Value Date/Time    TSH 2.26 04/13/2024 08:06 AM      No components found for: \"LDLCALC\"           ASSESSMENT & PLAN:     78 y.o. female who presents to follow up for her CAD, HFrEf. Past medical history of CAD (STEMI presentation 2/2024, found with 3VD s/p CABG with LIMA-LAD, SVG-d.RCA, SVG-OM and MALIK clip on 2/7/24), HFrEF (EF 30-35%), mitral regurgitation and tricuspid regurgitation, PE (on Eliquis), recurrent pleural effusion s/p VATS, CKD3b, rheumatoid arthritis, Sjogren's disease.    HFrEF (EF 25-30%):  Likely Ischemic in etiology, hasn't improved after CABG  GDMT is limited by hypotension and VERONIQUE  Her Entresto was discontinued due to VERONIQUE and hypotension, tolerating lisinopril 2.5 mg daily   Was taking metoprolol XL 12.5 mg daily, will increase to 25 mg daily  Continue to follow-up with CHF clinic for optimization of her GDMT  Referred to EP for assessment of implantable defibrillator  Repeat TTE in 3 months from the date of her maximal tolerated GDMT  Patient took off her LifeVest and prefers to defer defibrillator consideration for now.  We can repeat another echocardiogram in 4 months after optimizing her GDMT to the max and if continues to

## 2024-09-09 ENCOUNTER — HOSPITAL ENCOUNTER (OUTPATIENT)
Dept: OTHER | Age: 79
Setting detail: THERAPIES SERIES
Discharge: HOME | End: 2024-09-09

## 2024-09-23 ENCOUNTER — TELEPHONE (OUTPATIENT)
Dept: OTHER | Age: 79
End: 2024-09-23

## 2024-09-23 ENCOUNTER — HOSPITAL ENCOUNTER (OUTPATIENT)
Dept: OTHER | Age: 79
Setting detail: THERAPIES SERIES
Discharge: HOME | End: 2024-09-23
Payer: MEDICARE

## 2024-09-23 VITALS
WEIGHT: 99 LBS | HEART RATE: 73 BPM | OXYGEN SATURATION: 100 % | RESPIRATION RATE: 18 BRPM | DIASTOLIC BLOOD PRESSURE: 80 MMHG | BODY MASS INDEX: 17.54 KG/M2 | SYSTOLIC BLOOD PRESSURE: 136 MMHG

## 2024-09-23 LAB
ANION GAP SERPL CALCULATED.3IONS-SCNC: 13 MMOL/L (ref 7–16)
BNP SERPL-MCNC: 2192 PG/ML (ref 0–450)
BUN SERPL-MCNC: 35 MG/DL (ref 6–23)
CALCIUM SERPL-MCNC: 9.1 MG/DL (ref 8.6–10.2)
CHLORIDE SERPL-SCNC: 99 MMOL/L (ref 98–107)
CO2 SERPL-SCNC: 25 MMOL/L (ref 22–29)
CREAT SERPL-MCNC: 1.3 MG/DL (ref 0.5–1)
GFR, ESTIMATED: 41 ML/MIN/1.73M2
GLUCOSE SERPL-MCNC: 87 MG/DL (ref 74–99)
POTASSIUM SERPL-SCNC: 3.4 MMOL/L (ref 3.5–5)
SODIUM SERPL-SCNC: 137 MMOL/L (ref 132–146)

## 2024-09-23 PROCEDURE — 80048 BASIC METABOLIC PNL TOTAL CA: CPT

## 2024-09-23 PROCEDURE — 83880 ASSAY OF NATRIURETIC PEPTIDE: CPT

## 2024-09-23 PROCEDURE — 36415 COLL VENOUS BLD VENIPUNCTURE: CPT

## 2024-09-23 PROCEDURE — 99214 OFFICE O/P EST MOD 30 MIN: CPT

## 2024-09-23 RX ORDER — POTASSIUM CHLORIDE 1500 MG/1
40 TABLET, EXTENDED RELEASE ORAL DAILY
Qty: 4 TABLET | Refills: 0 | Status: SHIPPED | OUTPATIENT
Start: 2024-09-23 | End: 2024-09-25

## 2024-09-23 RX ORDER — MIDODRINE HYDROCHLORIDE 5 MG/1
5 TABLET ORAL DAILY
COMMUNITY

## 2024-09-23 ASSESSMENT — PATIENT HEALTH QUESTIONNAIRE - PHQ9
SUM OF ALL RESPONSES TO PHQ QUESTIONS 1-9: 0
SUM OF ALL RESPONSES TO PHQ QUESTIONS 1-9: 0
SUM OF ALL RESPONSES TO PHQ9 QUESTIONS 1 & 2: 0
2. FEELING DOWN, DEPRESSED OR HOPELESS: NOT AT ALL
1. LITTLE INTEREST OR PLEASURE IN DOING THINGS: NOT AT ALL
SUM OF ALL RESPONSES TO PHQ QUESTIONS 1-9: 0
SUM OF ALL RESPONSES TO PHQ QUESTIONS 1-9: 0

## 2024-11-18 ENCOUNTER — HOSPITAL ENCOUNTER (OUTPATIENT)
Dept: OTHER | Age: 79
Setting detail: THERAPIES SERIES
Discharge: HOME | End: 2024-11-18
Payer: MEDICARE

## 2024-11-18 VITALS
DIASTOLIC BLOOD PRESSURE: 68 MMHG | BODY MASS INDEX: 18.42 KG/M2 | HEART RATE: 67 BPM | SYSTOLIC BLOOD PRESSURE: 127 MMHG | WEIGHT: 104 LBS | OXYGEN SATURATION: 100 % | RESPIRATION RATE: 18 BRPM

## 2024-11-18 LAB
ANION GAP SERPL CALCULATED.3IONS-SCNC: 11 MMOL/L (ref 7–16)
BNP SERPL-MCNC: 3660 PG/ML (ref 0–450)
BUN SERPL-MCNC: 37 MG/DL (ref 6–23)
CALCIUM SERPL-MCNC: 9.1 MG/DL (ref 8.6–10.2)
CHLORIDE SERPL-SCNC: 102 MMOL/L (ref 98–107)
CO2 SERPL-SCNC: 25 MMOL/L (ref 22–29)
CREAT SERPL-MCNC: 1.4 MG/DL (ref 0.5–1)
GFR, ESTIMATED: 39 ML/MIN/1.73M2
GLUCOSE SERPL-MCNC: 102 MG/DL (ref 74–99)
POTASSIUM SERPL-SCNC: 4.2 MMOL/L (ref 3.5–5)
SODIUM SERPL-SCNC: 138 MMOL/L (ref 132–146)

## 2024-11-18 PROCEDURE — 80048 BASIC METABOLIC PNL TOTAL CA: CPT

## 2024-11-18 PROCEDURE — 36415 COLL VENOUS BLD VENIPUNCTURE: CPT

## 2024-11-18 PROCEDURE — 83880 ASSAY OF NATRIURETIC PEPTIDE: CPT

## 2024-11-18 PROCEDURE — 99214 OFFICE O/P EST MOD 30 MIN: CPT

## 2024-11-18 ASSESSMENT — PATIENT HEALTH QUESTIONNAIRE - PHQ9
SUM OF ALL RESPONSES TO PHQ9 QUESTIONS 1 & 2: 0
1. LITTLE INTEREST OR PLEASURE IN DOING THINGS: NOT AT ALL
2. FEELING DOWN, DEPRESSED OR HOPELESS: NOT AT ALL
SUM OF ALL RESPONSES TO PHQ QUESTIONS 1-9: 0

## 2024-11-18 NOTE — PLAN OF CARE
Problem: Chronic Conditions and Co-morbidities  Goal: Patient's chronic conditions and co-morbidity symptoms are monitored and maintained or improved  11/18/2024 1514 by Eli Manning, RN  Outcome: Progressing  11/18/2024 1500 by Eli Manning, RN  Outcome: Progressing

## 2024-11-18 NOTE — PROGRESS NOTES
Behavorial health consultant called    Scheduled to follow up in CHF clinic on:     Future Appointments   Date Time Provider Department Center   12/5/2024 11:45 AM Arnav Shaw MD [facility] CARDIO [facility]   2/14/2025 10:30 AM [facility] CHF ROOM 2 [facility]

## 2024-11-19 ENCOUNTER — TELEPHONE (OUTPATIENT)
Dept: OTHER | Age: 79
End: 2024-11-19

## 2024-11-19 NOTE — RESULT ENCOUNTER NOTE
Labs and CHF clinic note reviewed   Increase Torsemide to 40 mg x 2 days and then reduce to 20 mg daily    Follow up as scheduled

## 2024-11-19 NOTE — TELEPHONE ENCOUNTER
----- Message from PALMER Flores CNP sent at 11/18/2024 10:19 PM EST -----  Labs and CHF clinic note reviewed   Increase Torsemide to 40 mg x 2 days and then reduce to 20 mg daily    Follow up as scheduled    Called and left voicemail with patient with the above orders. Instructed patient to call the CHF Clinic back to confirm she received and understood the instructions.

## 2024-11-19 NOTE — TELEPHONE ENCOUNTER
Patient called the CHF Clinic back and gave her the order to increase Torsemide to 40 mg x 2 days and then reduce to 20 mg daily. Patient repeated the instructions back to me and verbalized an understanding.

## 2025-01-10 ENCOUNTER — OFFICE VISIT (OUTPATIENT)
Dept: CARDIOLOGY CLINIC | Age: 80
End: 2025-01-10
Payer: MEDICARE

## 2025-01-10 VITALS
HEART RATE: 74 BPM | BODY MASS INDEX: 18.61 KG/M2 | RESPIRATION RATE: 18 BRPM | WEIGHT: 105 LBS | SYSTOLIC BLOOD PRESSURE: 104 MMHG | DIASTOLIC BLOOD PRESSURE: 60 MMHG | HEIGHT: 63 IN

## 2025-01-10 DIAGNOSIS — I25.10 CORONARY ARTERY DISEASE INVOLVING NATIVE CORONARY ARTERY OF NATIVE HEART WITHOUT ANGINA PECTORIS: Primary | ICD-10-CM

## 2025-01-10 DIAGNOSIS — I42.9 CARDIOMYOPATHY, UNSPECIFIED TYPE (HCC): ICD-10-CM

## 2025-01-10 PROCEDURE — 1159F MED LIST DOCD IN RCRD: CPT | Performed by: INTERNAL MEDICINE

## 2025-01-10 PROCEDURE — 93000 ELECTROCARDIOGRAM COMPLETE: CPT | Performed by: INTERNAL MEDICINE

## 2025-01-10 PROCEDURE — 99214 OFFICE O/P EST MOD 30 MIN: CPT | Performed by: INTERNAL MEDICINE

## 2025-01-10 PROCEDURE — 1123F ACP DISCUSS/DSCN MKR DOCD: CPT | Performed by: INTERNAL MEDICINE

## 2025-01-10 ASSESSMENT — ENCOUNTER SYMPTOMS
COUGH: 0
VOMITING: 0
CHEST TIGHTNESS: 0
BLOOD IN STOOL: 0
BACK PAIN: 0
ABDOMINAL PAIN: 0
NAUSEA: 0
SHORTNESS OF BREATH: 0

## 2025-01-10 NOTE — PROGRESS NOTES
OFFICE VISIT    NAME: Katt Diaz     YOB: 1945   AGE: 79 y.o.   MEDICAL RECORD NUMBER: [id number]       CONSULTATION INFORMATION:   DATE OF CLINIC CONSULTATION: 1/10/2025   PRINCIPLE DIAGNOSIS / reason for visit: Follow-up CHF, CAD    CARE TEAM MEMBERS    PCP : Luis Angel Rea DO     REFERRING PROVIDER: No ref. provider found     HISTORY OF PRESENT ILLNESS:   Katt Diaz is a 79 y.o. female who presents to follow up for her CAD, HFrEf. Past medical history of CAD (STEMI presentation 2/2024, found with 3VD s/p CABG with LIMA-LAD, SVG-d.RCA, SVG-OM and MALIK clip on 2/7/24), HFrEF (EF 30-35%), mitral regurgitation and tricuspid regurgitation, PE 4/2024 (on Eliquis), recurrent pleural effusion s/p VATS, CKD3b, rheumatoid arthritis, Sjogren's disease.      Patient had recurrent hospitalizations with SOB and was found to have PE and loculated pleural effusion s/p Pleurx catheter placement that was exchanged into chest tube with tPA injections, eventually underwent VATS. She has been following with CHF clinic with attempts to optimize her GDMT.  However, optimization with limited by hypotension and VERONIQUE.     I put her on Lisinopril 2.5 mg daily in the past which she tolerated for a bit, but it was taken off by her PCP due to hypotension and she was put on Midodrine. She takes Metoprolol Xl 25 mg daily.    Today patient presents to follow-up.  Denies having chest pain, shortness of breath or lower extremity edema. No dizziness.    PERTINENT RADIOGRAPHIC/DIAGNOSTICS:   -TTE 7/24/2024:    Left Ventricle: Severely reduced left ventricular systolic function with a visually estimated EF of 30 - 35%. Left ventricle size is normal. Normal wall thickness. Severe global hypokinesis present. Grade I diastolic dysfunction with normal LAP.    Right Ventricle: Moderately reduced systolic function.    Aortic Valve: Trileaflet valve. Mildly calcified cusps.    Tricuspid Valve: Mild regurgitation. The estimated

## 2025-01-16 ENCOUNTER — HOSPITAL ENCOUNTER (OUTPATIENT)
Dept: CARDIOLOGY | Age: 80
Discharge: HOME | End: 2025-01-18
Attending: INTERNAL MEDICINE
Payer: MEDICARE

## 2025-01-16 VITALS
HEIGHT: 63 IN | DIASTOLIC BLOOD PRESSURE: 60 MMHG | SYSTOLIC BLOOD PRESSURE: 104 MMHG | WEIGHT: 103 LBS | BODY MASS INDEX: 18.25 KG/M2

## 2025-01-16 DIAGNOSIS — I42.9 CARDIOMYOPATHY, UNSPECIFIED TYPE (HCC): ICD-10-CM

## 2025-01-16 LAB
ECHO AR MAX VEL PISA: 3.3 M/S
ECHO AV CUSP MM: 1.9 CM
ECHO AV MEAN GRADIENT: 1 MMHG
ECHO AV MEAN VELOCITY: 0.6 M/S
ECHO AV PEAK GRADIENT: 3 MMHG
ECHO AV PEAK VELOCITY: 0.9 M/S
ECHO AV REGURGITANT PHT: 568.2 MS
ECHO AV VTI: 18 CM
ECHO BSA: 1.44 M2
ECHO EST RA PRESSURE: 3 MMHG
ECHO LA DIAMETER INDEX: 2.74 CM/M2
ECHO LA DIAMETER: 4 CM
ECHO LA VOL A-L A2C: 50 ML (ref 22–52)
ECHO LA VOL A-L A4C: 49 ML (ref 22–52)
ECHO LA VOL MOD A2C: 46 ML (ref 22–52)
ECHO LA VOL MOD A4C: 45 ML (ref 22–52)
ECHO LA VOLUME AREA LENGTH: 51 ML
ECHO LA VOLUME INDEX A-L A2C: 34 ML/M2 (ref 16–34)
ECHO LA VOLUME INDEX A-L A4C: 34 ML/M2 (ref 16–34)
ECHO LA VOLUME INDEX AREA LENGTH: 35 ML/M2 (ref 16–34)
ECHO LA VOLUME INDEX MOD A2C: 32 ML/M2 (ref 16–34)
ECHO LA VOLUME INDEX MOD A4C: 31 ML/M2 (ref 16–34)
ECHO LV EDV A2C: 99 ML
ECHO LV EDV A4C: 94 ML
ECHO LV EDV BP: 97 ML (ref 56–104)
ECHO LV EDV INDEX A4C: 64 ML/M2
ECHO LV EDV INDEX BP: 66 ML/M2
ECHO LV EDV NDEX A2C: 68 ML/M2
ECHO LV EJECTION FRACTION A2C: 30 %
ECHO LV EJECTION FRACTION A4C: 27 %
ECHO LV EJECTION FRACTION BIPLANE: 29 % (ref 55–100)
ECHO LV ESV A2C: 69 ML
ECHO LV ESV A4C: 68 ML
ECHO LV ESV BP: 69 ML (ref 19–49)
ECHO LV ESV INDEX A2C: 47 ML/M2
ECHO LV ESV INDEX A4C: 47 ML/M2
ECHO LV ESV INDEX BP: 47 ML/M2
ECHO LV FRACTIONAL SHORTENING: 16 % (ref 28–44)
ECHO LV INTERNAL DIMENSION DIASTOLE INDEX: 3.42 CM/M2
ECHO LV INTERNAL DIMENSION DIASTOLIC: 5 CM (ref 3.9–5.3)
ECHO LV INTERNAL DIMENSION SYSTOLIC INDEX: 2.88 CM/M2
ECHO LV INTERNAL DIMENSION SYSTOLIC: 4.2 CM
ECHO LV IVSD: 1 CM (ref 0.6–0.9)
ECHO LV MASS 2D: 182 G (ref 67–162)
ECHO LV MASS INDEX 2D: 124.6 G/M2 (ref 43–95)
ECHO LV POSTERIOR WALL DIASTOLIC: 1 CM (ref 0.6–0.9)
ECHO LV RELATIVE WALL THICKNESS RATIO: 0.4
ECHO MV A VELOCITY: 0.75 M/S
ECHO MV E DECELERATION TIME (DT): 215.3 MS
ECHO MV E VELOCITY: 0.6 M/S
ECHO MV E/A RATIO: 0.8
ECHO MV EROA PISA: 0.2 CM2
ECHO MV MAX VELOCITY: 0.7 M/S
ECHO MV MEAN GRADIENT: 1 MMHG
ECHO MV MEAN VELOCITY: 0.4 M/S
ECHO MV PEAK GRADIENT: 2 MMHG
ECHO MV REGURGITANT ALIASING (NYQUIST) VELOCITY: 25 CM/S
ECHO MV REGURGITANT RADIUS PISA: 0.78 CM
ECHO MV REGURGITANT VELOCITY PISA: 5.4 M/S
ECHO MV REGURGITANT VOLUME PISA: 32.72 ML
ECHO MV REGURGITANT VTIA: 185 CM
ECHO MV VTI: 20.9 CM
ECHO RIGHT VENTRICULAR SYSTOLIC PRESSURE (RVSP): 29 MMHG
ECHO RV INTERNAL DIMENSION: 2.2 CM
ECHO TV REGURGITANT MAX VELOCITY: 2.53 M/S
ECHO TV REGURGITANT PEAK GRADIENT: 26 MMHG

## 2025-01-16 PROCEDURE — 93306 TTE W/DOPPLER COMPLETE: CPT

## 2025-02-14 ENCOUNTER — HOSPITAL ENCOUNTER (OUTPATIENT)
Dept: OTHER | Age: 80
Setting detail: THERAPIES SERIES
Discharge: HOME | End: 2025-02-14
Payer: MEDICARE

## 2025-02-14 VITALS
HEART RATE: 65 BPM | RESPIRATION RATE: 17 BRPM | OXYGEN SATURATION: 98 % | SYSTOLIC BLOOD PRESSURE: 106 MMHG | DIASTOLIC BLOOD PRESSURE: 57 MMHG | BODY MASS INDEX: 18.95 KG/M2 | WEIGHT: 107 LBS

## 2025-02-14 LAB
ANION GAP SERPL CALCULATED.3IONS-SCNC: 9 MMOL/L (ref 7–16)
BNP SERPL-MCNC: 2191 PG/ML (ref 0–450)
BUN SERPL-MCNC: 33 MG/DL (ref 6–23)
CALCIUM SERPL-MCNC: 9.9 MG/DL (ref 8.6–10.2)
CHLORIDE SERPL-SCNC: 99 MMOL/L (ref 98–107)
CO2 SERPL-SCNC: 30 MMOL/L (ref 22–29)
CREAT SERPL-MCNC: 1.5 MG/DL (ref 0.5–1)
GFR, ESTIMATED: 36 ML/MIN/1.73M2
GLUCOSE SERPL-MCNC: 103 MG/DL (ref 74–99)
POTASSIUM SERPL-SCNC: 3.9 MMOL/L (ref 3.5–5)
SODIUM SERPL-SCNC: 138 MMOL/L (ref 132–146)

## 2025-02-14 PROCEDURE — 36415 COLL VENOUS BLD VENIPUNCTURE: CPT

## 2025-02-14 PROCEDURE — 80048 BASIC METABOLIC PNL TOTAL CA: CPT

## 2025-02-14 PROCEDURE — 83880 ASSAY OF NATRIURETIC PEPTIDE: CPT

## 2025-02-14 PROCEDURE — 99214 OFFICE O/P EST MOD 30 MIN: CPT

## 2025-02-14 ASSESSMENT — PATIENT HEALTH QUESTIONNAIRE - PHQ9
SUM OF ALL RESPONSES TO PHQ QUESTIONS 1-9: 0
1. LITTLE INTEREST OR PLEASURE IN DOING THINGS: NOT AT ALL
SUM OF ALL RESPONSES TO PHQ QUESTIONS 1-9: 0
2. FEELING DOWN, DEPRESSED OR HOPELESS: NOT AT ALL
SUM OF ALL RESPONSES TO PHQ9 QUESTIONS 1 & 2: 0

## 2025-02-14 NOTE — PROGRESS NOTES
Congestive Heart Failure Clinic   [facility]      Referring Provider: Dr. Marcus/ Heart Failure Navigator  Primary Care Physician: Luis Angel Rea DO   Cardiologist: Dr. Marcus - iker establishing with/ Sarita CHAKRABORTY-CNP, Sharita CHAKRABORTY-CNS  Nephrologist: N/A      HISTORY OF PRESENT ILLNESS:     Katt Diaz is a 79 y.o. (1945) female with a history of HFrEF (EF < 40%)  Pre Cupid:     Lab Results   Component Value Date    LVEF 30 07/22/2024     Post Cupid:    Lab Results   Component Value Date    EFBP 29 (A) 01/16/2025     HFrEF 2/15/2024 30-35% on TTE;   Echo 7/22/24 30-35%    She presents to the CHF clinic for ongoing evaluation and monitoring of heart failure.    In the CHF clinic today she denies any adverse symptoms except:  [] Dizziness or lightheadedness   [] Syncope or near syncope  [] Recent Fall  [] Shortness of breath at rest   [] Dyspnea with exertion  [] Decline in functional capacity (unable to perform activities they had previously been able to do)  [] Fatigue   [] Orthopnea  [] PND  [] Nocturnal cough  [] Hemoptysis  [] Chest pain, pressure, or discomfort  [] Palpitations  [] Abdominal distention  [] Abdominal bloating  [] Early satiety  [] Blood in stool   [] Diarrhea  [] Constipation  [] Nausea/Vomiting  [] Decreased urinary response to oral diuretic   [] Scrotal swelling   [] Lower extremity edema  [] Used PRN doses of oral diuretic   [] Weight gain    Wt Readings from Last 3 Encounters:   02/14/25 48.5 kg (107 lb)   01/16/25 46.7 kg (103 lb)   01/10/25 47.6 kg (105 lb)     SOCIAL HISTORY:  [x] Denies tobacco, alcohol or illicit drug abuse  [] Tobacco use:  [] ETOH use:  [] Illicit drug use:        MEDICATIONS:    Allergies   Allergen Reactions    Penicillins Hives    Doxycycline Hives and Rash     Prior to Visit Medications    Medication Sig Taking? Authorizing Provider   midodrine (PROAMATINE) 5 MG tablet Take 1 tablet by mouth

## 2025-03-13 ENCOUNTER — OFFICE VISIT (OUTPATIENT)
Dept: CARDIOLOGY CLINIC | Age: 80
End: 2025-03-13
Payer: MEDICARE

## 2025-03-13 VITALS
HEART RATE: 76 BPM | WEIGHT: 105 LBS | RESPIRATION RATE: 20 BRPM | HEIGHT: 63 IN | SYSTOLIC BLOOD PRESSURE: 118 MMHG | DIASTOLIC BLOOD PRESSURE: 60 MMHG | BODY MASS INDEX: 18.61 KG/M2

## 2025-03-13 DIAGNOSIS — I42.9 CARDIOMYOPATHY, UNSPECIFIED TYPE (HCC): Primary | ICD-10-CM

## 2025-03-13 PROCEDURE — 93000 ELECTROCARDIOGRAM COMPLETE: CPT | Performed by: INTERNAL MEDICINE

## 2025-03-13 PROCEDURE — 1159F MED LIST DOCD IN RCRD: CPT | Performed by: INTERNAL MEDICINE

## 2025-03-13 PROCEDURE — 1123F ACP DISCUSS/DSCN MKR DOCD: CPT | Performed by: INTERNAL MEDICINE

## 2025-03-13 PROCEDURE — 99214 OFFICE O/P EST MOD 30 MIN: CPT | Performed by: INTERNAL MEDICINE

## 2025-03-13 ASSESSMENT — ENCOUNTER SYMPTOMS
NAUSEA: 0
CHEST TIGHTNESS: 0
COUGH: 0
ABDOMINAL PAIN: 0
BLOOD IN STOOL: 0
BACK PAIN: 0
VOMITING: 0
SHORTNESS OF BREATH: 0

## 2025-03-13 NOTE — PROGRESS NOTES
OFFICE VISIT    NAME: Katt Diaz     YOB: 1945   AGE: 79 y.o.   MEDICAL RECORD NUMBER: [id number]       CONSULTATION INFORMATION:   DATE OF CLINIC CONSULTATION: 3/13/2025   PRINCIPLE DIAGNOSIS / reason for visit: Follow-up CHF, CAD    CARE TEAM MEMBERS    PCP : Luis Angel Rea DO     REFERRING PROVIDER: No ref. provider found     HISTORY OF PRESENT ILLNESS:   Katt Diaz is a 79 y.o. female who presents to follow up for her CAD, HFrEf. Past medical history of CAD (STEMI presentation 2/2024, found with 3VD s/p CABG with LIMA-LAD, SVG-d.RCA, SVG-OM and MALIK clip on 2/7/24), HFrEF (EF 30-35%), mitral regurgitation and tricuspid regurgitation, PE 4/2024 (on Eliquis), recurrent pleural effusion s/p VATS, CKD3b, rheumatoid arthritis, Sjogren's disease.      Patient had recurrent hospitalizations with SOB and was found to have PE and loculated pleural effusion s/p Pleurx catheter placement that was exchanged into chest tube with tPA injections, eventually underwent VATS. She has been following with CHF clinic with attempts to optimize her GDMT.  However, optimization with limited by hypotension and VERONIQUE.     I put her on Lisinopril 2.5 mg daily in the past which she tolerated for a bit, but it was taken off by her PCP due to hypotension and she was put on Midodrine. She takes Metoprolol Xl 25 mg daily.    Today patient presents to follow-up.  Denies having chest pain, shortness of breath or lower extremity edema. No dizziness.  She is completely asymptomatic    PERTINENT RADIOGRAPHIC/DIAGNOSTICS:   -TTE 7/24/2024:    Left Ventricle: Severely reduced left ventricular systolic function with a visually estimated EF of 30 - 35%. Left ventricle size is normal. Normal wall thickness. Severe global hypokinesis present. Grade I diastolic dysfunction with normal LAP.    Right Ventricle: Moderately reduced systolic function.    Aortic Valve: Trileaflet valve. Mildly calcified cusps.    Tricuspid Valve:

## 2025-06-10 ENCOUNTER — HOSPITAL ENCOUNTER (OUTPATIENT)
Dept: OTHER | Age: 80
Setting detail: THERAPIES SERIES
Discharge: HOME | End: 2025-06-10
Payer: MEDICARE

## 2025-06-10 VITALS
OXYGEN SATURATION: 97 % | DIASTOLIC BLOOD PRESSURE: 67 MMHG | HEART RATE: 82 BPM | BODY MASS INDEX: 18.95 KG/M2 | RESPIRATION RATE: 17 BRPM | WEIGHT: 107 LBS | SYSTOLIC BLOOD PRESSURE: 128 MMHG

## 2025-06-10 LAB
ANION GAP SERPL CALCULATED.3IONS-SCNC: 11 MMOL/L (ref 7–16)
BNP SERPL-MCNC: 1184 PG/ML (ref 0–450)
BUN SERPL-MCNC: 33 MG/DL (ref 8–23)
CALCIUM SERPL-MCNC: 9.5 MG/DL (ref 8.8–10.2)
CHLORIDE SERPL-SCNC: 100 MMOL/L (ref 98–107)
CO2 SERPL-SCNC: 26 MMOL/L (ref 22–29)
CREAT SERPL-MCNC: 1.6 MG/DL (ref 0.5–1)
GFR, ESTIMATED: 32 ML/MIN/1.73M2
GLUCOSE SERPL-MCNC: 92 MG/DL (ref 74–99)
POTASSIUM SERPL-SCNC: 4.7 MMOL/L (ref 3.5–5.1)
SODIUM SERPL-SCNC: 137 MMOL/L (ref 136–145)

## 2025-06-10 PROCEDURE — 36415 COLL VENOUS BLD VENIPUNCTURE: CPT

## 2025-06-10 PROCEDURE — 83880 ASSAY OF NATRIURETIC PEPTIDE: CPT

## 2025-06-10 PROCEDURE — 99214 OFFICE O/P EST MOD 30 MIN: CPT

## 2025-06-10 PROCEDURE — 80048 BASIC METABOLIC PNL TOTAL CA: CPT

## 2025-06-10 ASSESSMENT — PATIENT HEALTH QUESTIONNAIRE - PHQ9
SUM OF ALL RESPONSES TO PHQ QUESTIONS 1-9: 0
SUM OF ALL RESPONSES TO PHQ QUESTIONS 1-9: 0
2. FEELING DOWN, DEPRESSED OR HOPELESS: NOT AT ALL
SUM OF ALL RESPONSES TO PHQ QUESTIONS 1-9: 0
SUM OF ALL RESPONSES TO PHQ QUESTIONS 1-9: 0
1. LITTLE INTEREST OR PLEASURE IN DOING THINGS: NOT AT ALL

## 2025-07-01 ENCOUNTER — OFFICE VISIT (OUTPATIENT)
Dept: CARDIOLOGY CLINIC | Age: 80
End: 2025-07-01
Payer: MEDICARE

## 2025-07-01 VITALS
HEIGHT: 63 IN | RESPIRATION RATE: 18 BRPM | BODY MASS INDEX: 19 KG/M2 | HEART RATE: 70 BPM | WEIGHT: 107.2 LBS | DIASTOLIC BLOOD PRESSURE: 78 MMHG | SYSTOLIC BLOOD PRESSURE: 110 MMHG

## 2025-07-01 DIAGNOSIS — I42.9 CARDIOMYOPATHY, UNSPECIFIED TYPE (HCC): Primary | ICD-10-CM

## 2025-07-01 DIAGNOSIS — I25.10 CORONARY ARTERY DISEASE INVOLVING NATIVE CORONARY ARTERY OF NATIVE HEART WITHOUT ANGINA PECTORIS: ICD-10-CM

## 2025-07-01 PROCEDURE — G2211 COMPLEX E/M VISIT ADD ON: HCPCS | Performed by: INTERNAL MEDICINE

## 2025-07-01 PROCEDURE — 1159F MED LIST DOCD IN RCRD: CPT | Performed by: INTERNAL MEDICINE

## 2025-07-01 PROCEDURE — 99214 OFFICE O/P EST MOD 30 MIN: CPT | Performed by: INTERNAL MEDICINE

## 2025-07-01 PROCEDURE — 93000 ELECTROCARDIOGRAM COMPLETE: CPT | Performed by: INTERNAL MEDICINE

## 2025-07-01 PROCEDURE — 1123F ACP DISCUSS/DSCN MKR DOCD: CPT | Performed by: INTERNAL MEDICINE

## 2025-07-01 RX ORDER — MULTIVITAMIN WITH IRON
1 TABLET ORAL DAILY
COMMUNITY

## 2025-07-01 ASSESSMENT — ENCOUNTER SYMPTOMS
NAUSEA: 0
COUGH: 0
SHORTNESS OF BREATH: 0
BACK PAIN: 0
VOMITING: 0
BLOOD IN STOOL: 0
CHEST TIGHTNESS: 0
ABDOMINAL PAIN: 0

## 2025-07-01 NOTE — PROGRESS NOTES
OFFICE VISIT    NAME: Katt Diaz     YOB: 1945   AGE: 79 y.o.   MEDICAL RECORD NUMBER: [id number]       CONSULTATION INFORMATION:   DATE OF CLINIC CONSULTATION: 7/1/2025   PRINCIPLE DIAGNOSIS / reason for visit: Follow-up CHF, CAD    CARE TEAM MEMBERS    PCP : Luis Angel Rea DO     REFERRING PROVIDER: No ref. provider found     HISTORY OF PRESENT ILLNESS:   Katt Diaz is a 79 y.o. female who presents to follow up for her CAD, HFrEf. Past medical history of CAD (STEMI presentation 2/2024, found with 3VD s/p CABG with LIMA-LAD, SVG-d.RCA, SVG-OM and MALIK clip on 2/7/24), HFrEF (EF 30-35%), mitral regurgitation and tricuspid regurgitation, PE 4/2024 (on Eliquis), recurrent pleural effusion s/p VATS, CKD3b, rheumatoid arthritis, Sjogren's disease.      Patient had recurrent hospitalizations with SOB and was found to have PE and loculated pleural effusion s/p Pleurx catheter placement that was exchanged into chest tube with tPA injections, eventually underwent VATS. She has been following with CHF clinic with attempts to optimize her GDMT.  However, optimization with limited by hypotension and VERONIQUE.     I put her on Lisinopril 2.5 mg daily in the past which she tolerated for a bit, but it was taken off by her PCP due to hypotension and she was put on Midodrine. She takes Metoprolol Xl 25 mg daily.    Today patient presents to follow-up.  Denies having chest pain, shortness of breath or lower extremity edema. No dizziness.  She is completely asymptomatic    PERTINENT RADIOGRAPHIC/DIAGNOSTICS:   -TTE 7/24/2024:    Left Ventricle: Severely reduced left ventricular systolic function with a visually estimated EF of 30 - 35%. Left ventricle size is normal. Normal wall thickness. Severe global hypokinesis present. Grade I diastolic dysfunction with normal LAP.    Right Ventricle: Moderately reduced systolic function.    Aortic Valve: Trileaflet valve. Mildly calcified cusps.    Tricuspid Valve: Mild

## (undated) DEVICE — PACK HEART OPEN

## (undated) DEVICE — GLOVE SURG SZ 65 THK91MIL LTX FREE SYN POLYISOPRENE

## (undated) DEVICE — BLOWER COR ART L16.5CM PLAS SHFT MAL W/ MIST IV SET AXIUS

## (undated) DEVICE — SURGICAL BRA: Brand: DEROYAL

## (undated) DEVICE — SOLUTION IV 50ML 0.9% SOD CHL PLAS CONT USP VIAFLX

## (undated) DEVICE — DRAIN SURG SGL COLL PT TB FOR ATS BG OASIS

## (undated) DEVICE — CANNULA NSL CANN NSL L25FT TBNG AD O2 SUP SFT UC

## (undated) DEVICE — THORACIC: Brand: MEDLINE INDUSTRIES, INC.

## (undated) DEVICE — TUBING, SUCTION, 3/16" X 12', STRAIGHT: Brand: MEDLINE

## (undated) DEVICE — WOUND RETRACTOR AND PROTECTOR: Brand: ALEXIS WOUND PROTECTOR-RETRACTOR

## (undated) DEVICE — KIT ANGIO W/ AT P65 PREM HND CTRL FOR CNTRST DEL ANGIOTOUCH

## (undated) DEVICE — TROCAR: Brand: KII FIOS FIRST ENTRY

## (undated) DEVICE — DRAIN SURG L3/8-1/2IN DIA3/16IN SIL CARD CONN 1:1 BLAK

## (undated) DEVICE — Device: Brand: VIRTUOSAPH PLUS WITH RADIAL INDICATION

## (undated) DEVICE — GUIDEWIRE VASC L260CM 0.035IN J TIP L3MM PTFE FIX COR NAMIC

## (undated) DEVICE — AVID DUAL STAGE VENOUS DRAINAGE CANNULA: Brand: AVID DUAL STAGE VENOUS DRAINAGE CANNULA

## (undated) DEVICE — CATHETER THOR 32FR L23IN PVC 6 EYELET STR ATRAUM

## (undated) DEVICE — CONNECTOR DRNGE W3/8-0.5XH3/16XL3/16IN 2:1 SIL CARD STR

## (undated) DEVICE — STERILE LATEX POWDER FREE SURGICAL GLOVES WITH HYDROGEL COATING: Brand: PROTEXIS

## (undated) DEVICE — SYRINGE MED 50ML LUERLOCK TIP

## (undated) DEVICE — 3M™ MEDIPORE™ + PAD 3564: Brand: 3M™ MEDIPORE™

## (undated) DEVICE — AORTIC PUNCHES ARE USED TO CREATE A UNIFORM OPENING IN BLOOD VESSELS DURING CARDIOVASCULAR SURGERY. THE VESSEL GRAFT IS INSERTED INTO THE CREATED OPENING AND SUTURED TO THE VESSEL WALL. AORTIC LANCETS ARE USED TO MAKE A SMALL UNIFORM CUT IN A BLOOD VESSEL TO FACILITATE INSERTION OF AN AORTIC PUNCH.  PUNCHES COME IN VARIOUS LENGTHS, DIAMETERS AND TIP CONFIGURATIONS.: Brand: CLEANCUT ROTATING AORTIC PUNCH

## (undated) DEVICE — PACK SURG CARDIAC CATH

## (undated) DEVICE — GOWN,SIRUS,FABRNF,L,20/CS: Brand: MEDLINE

## (undated) DEVICE — CANNULA INJ L2.5IN BLNT TIP 3MM CLR BODY W/ 1 W VLV DLP

## (undated) DEVICE — LIQUIBAND RAPID ADHESIVE 36/CS 0.8ML: Brand: MEDLINE

## (undated) DEVICE — TOWEL,OR,DSP,ST,WHITE,DLX,4/PK,20PK/CS: Brand: MEDLINE

## (undated) DEVICE — STANDARD POROUS TAPE: Brand: KENDALL

## (undated) DEVICE — CONNECTOR PERF W3 8XH3 8XL3 8IN BASE EQL Y SHP W O LUERLOCK

## (undated) DEVICE — KIT MFLD ISOLATN NACL CNTRST PRT TBNG SPIK W/ PRSS TRNSDUC

## (undated) DEVICE — KIT CATH PLEUR CHLORAPREP FEN DRP FLTR STRW FOAM CATH PD

## (undated) DEVICE — 6 FOOT DISPOSABLE EXTENSION CABLE WITH SAFE CONNECT / SCREW-DOWN

## (undated) DEVICE — ELECTRODE PT RET AD L9FT HI MOIST COND ADH HYDRGEL CORDED

## (undated) DEVICE — TRAP,MUCUS SPECIMEN,40CC: Brand: MEDLINE

## (undated) DEVICE — DOUBLE BASIN SET: Brand: MEDLINE INDUSTRIES, INC.

## (undated) DEVICE — GOWN,SIRUS,FABRNF,XL,20/CS: Brand: MEDLINE

## (undated) DEVICE — DRAPE THER FLUID WARMING 66X44 IN FLAT SLUSH DBL DISC ORS

## (undated) DEVICE — BASIC SINGLE BASIN 1-LF: Brand: MEDLINE INDUSTRIES, INC.

## (undated) DEVICE — AGENT HEMSTAT W4XL4IN OXIDIZED REGENERATED CELOS STRUCTURED

## (undated) DEVICE — DRAIN CHN 19FR L0.25MM DIA6.3MM SIL RND HUBLESS FULL FLUT

## (undated) DEVICE — ALCOHOL RUBBING ISO 16OZ 70%

## (undated) DEVICE — PICO 7 10CM X 30CM: Brand: PICO™ 7

## (undated) DEVICE — GLOVE ORANGE PI 7   MSG9070

## (undated) DEVICE — PACK OPEN HRT DRP

## (undated) DEVICE — CATHETER ANGIO FL3.5 0.045 INX5 FRX100 CM THRULUMEN TRILON

## (undated) DEVICE — ANGIOGRAPHIC CATHETER: Brand: EXPO™

## (undated) DEVICE — BAND COMPR L21CM SHT CLR PLAS HEMSTAT EXT HK AND LOOP RETEN

## (undated) DEVICE — E-Z CLEAN, NON-STICK, PTFE COATED, ELECTROSURGICAL NEEDLE ELECTRODE, MODIFIED EXTENDED INSULATION, 2.75 INCH (7 CM): Brand: MEGADYNE

## (undated) DEVICE — WARMER SCP 2 ANTIFOG LAP DISP

## (undated) DEVICE — PAD, DEFIB, ADULT, RADIOTRAN, PHYSIO, LO: Brand: MEDLINE

## (undated) DEVICE — CATHETER THORACENTESIS STR 28 FRX23 IN 6 EYELET TAPR TIP LF - SEE COMMENT

## (undated) DEVICE — CATHETER,URETHRAL,REDRUBBER,STERILE,20FR: Brand: MEDLINE

## (undated) DEVICE — AGENT HEMOSTATIC SURG ORIGINAL ABS 4X8IN LOOSE KNIT 12/CA

## (undated) DEVICE — 3M™ IOBAN™ 2 ANTIMICROBIAL INCISE DRAPE 6650EZ: Brand: IOBAN™ 2

## (undated) DEVICE — SOLUTION IV 1000ML 140MEQ/L SOD 5MEQ/L K 3MEQ/L MG 27MEQ/L

## (undated) DEVICE — TOWEL,OR,DSP,ST,BLUE,STD,6/PK,12PK/CS: Brand: MEDLINE

## (undated) DEVICE — GLOVE SURG SZ 6 THK91MIL LTX FREE SYN POLYISOPRENE ANTI

## (undated) DEVICE — GLIDESHEATH SLENDER ACCESS KIT: Brand: GLIDESHEATH SLENDER

## (undated) DEVICE — BATTERY PACK FOR VARISPEED: Brand: STRYKER VARISPEED

## (undated) DEVICE — CLEANER,CAUTERY TIP,2X2",STERILE: Brand: MEDLINE